# Patient Record
Sex: FEMALE | Race: WHITE | HISPANIC OR LATINO | Employment: OTHER | URBAN - METROPOLITAN AREA
[De-identification: names, ages, dates, MRNs, and addresses within clinical notes are randomized per-mention and may not be internally consistent; named-entity substitution may affect disease eponyms.]

---

## 2017-01-03 RX ORDER — OMEPRAZOLE 20 MG/1
CAPSULE, DELAYED RELEASE ORAL
Qty: 180 CAPSULE | Refills: 0 | OUTPATIENT
Start: 2017-01-03

## 2017-01-10 ENCOUNTER — OFFICE VISIT (OUTPATIENT)
Dept: FAMILY MEDICINE CLINIC | Facility: CLINIC | Age: 67
End: 2017-01-10

## 2017-01-10 VITALS
TEMPERATURE: 98.6 F | DIASTOLIC BLOOD PRESSURE: 74 MMHG | SYSTOLIC BLOOD PRESSURE: 122 MMHG | BODY MASS INDEX: 34.6 KG/M2 | HEIGHT: 62 IN | OXYGEN SATURATION: 95 % | WEIGHT: 188 LBS | HEART RATE: 62 BPM

## 2017-01-10 DIAGNOSIS — J01.00 ACUTE MAXILLARY SINUSITIS, RECURRENCE NOT SPECIFIED: Primary | ICD-10-CM

## 2017-01-10 PROCEDURE — 99213 OFFICE O/P EST LOW 20 MIN: CPT | Performed by: NURSE PRACTITIONER

## 2017-01-10 PROCEDURE — 96372 THER/PROPH/DIAG INJ SC/IM: CPT | Performed by: NURSE PRACTITIONER

## 2017-01-10 RX ORDER — METOPROLOL TARTRATE 50 MG/1
TABLET, FILM COATED ORAL
Qty: 180 TABLET | Refills: 0 | Status: SHIPPED | OUTPATIENT
Start: 2017-01-10 | End: 2017-04-08 | Stop reason: SDUPTHER

## 2017-01-10 RX ORDER — CIPROFLOXACIN 500 MG/1
500 TABLET, FILM COATED ORAL 2 TIMES DAILY
Qty: 14 TABLET | Refills: 0 | Status: SHIPPED | OUTPATIENT
Start: 2017-01-10 | End: 2017-02-14 | Stop reason: HOSPADM

## 2017-01-10 RX ADMIN — CYANOCOBALAMIN 1000 MCG: 1000 INJECTION, SOLUTION INTRAMUSCULAR; SUBCUTANEOUS at 12:25

## 2017-01-10 NOTE — PROGRESS NOTES
"Subjective   Dalila Valerio is a 66 y.o. female who presents c/o cough, congestion, sinus pressure, pain on left side of face x 3 weeks.     History of Present Illness   L facial and frontal pressure x 3 weeks, teeth not pain, avoiding chewing on this side, though due for skin graft to L lower gums. Has been using clove oil for sinus pressure.   The following portions of the patient's history were reviewed and updated as appropriate: allergies, current medications, past family history, past medical history, past social history, past surgical history and problem list.    Review of Systems   Constitutional: Negative for chills and fever.   HENT: Positive for congestion, ear pain, sinus pressure and sore throat. Negative for ear discharge, facial swelling, hearing loss, nosebleeds, rhinorrhea, sneezing and trouble swallowing.    Respiratory: Positive for cough and wheezing. Negative for shortness of breath.    Cardiovascular: Negative.  Negative for chest pain.   Gastrointestinal: Negative for abdominal pain.   Endocrine: Negative.    Musculoskeletal: Negative for arthralgias.   Allergic/Immunologic: Negative for environmental allergies.     Visit Vitals   • /74   • Pulse 62   • Temp 98.6 °F (37 °C) (Oral)   • Ht 62\" (157.5 cm)   • Wt 188 lb (85.3 kg)   • SpO2 95%   • BMI 34.39 kg/m2     Objective   Physical Exam   Constitutional: She is oriented to person, place, and time. She appears well-developed and well-nourished.   HENT:   Right Ear: Tympanic membrane is retracted. Tympanic membrane is not erythematous.   Left Ear: Tympanic membrane is erythematous. Tympanic membrane is not retracted and not bulging.   Nose: Mucosal edema present. No rhinorrhea. Right sinus exhibits maxillary sinus tenderness. Left sinus exhibits maxillary sinus tenderness.   Mouth/Throat: Abnormal dentition. Posterior oropharyngeal erythema present. No oropharyngeal exudate or posterior oropharyngeal edema.       Neck: Normal range of " motion. Neck supple. No thyromegaly present.   Cardiovascular: Normal rate, regular rhythm and normal heart sounds.    Pulmonary/Chest: Effort normal and breath sounds normal.   Neurological: She is alert and oriented to person, place, and time.   Skin: Skin is warm and dry.   Psychiatric: She has a normal mood and affect. Her behavior is normal. Judgment and thought content normal.     Assessment/Plan   Problems Addressed this Visit     None      Visit Diagnoses     Acute maxillary sinusitis, recurrence not specified    -  Primary        Seen for vertigo 12/23 and lisinopril decreased to 5 mg daily, doing better with this, but facial pressure worse. Cover for sinusitis. Continue work with pain management and cardiology.     Lactose free milk helping with chronic cough. Would continue for now. Fu prn and every 3-4 months for routine.

## 2017-01-10 NOTE — MR AVS SNAPSHOT
Dalila Valerio   1/10/2017 11:45 AM   Office Visit    Provider:  RL Michael   Department:  [facility]   Dept Phone:  [phone]                Your Full Care Plan              Today's Medication Changes          These changes are accurate as of: 1/10/17 12:55 PM.  If you have any questions, ask your nurse or doctor.               New Medication(s)Ordered:     ciprofloxacin 500 MG tablet   Commonly known as:  CIPRO   Take 1 tablet by mouth 2 (Two) Times a Day.   Started by:  RL Michael            Where to Get Your Medications      These medications were sent to [facility] [address] [place] [address] - [address] AT [facility] [address] - [phone]  - [phone]   [address] , [facility] [address]     Phone:  [phone]     ciprofloxacin 500 MG tablet    metoprolol tartrate 50 MG tablet                  Your Updated Medication List          This list is accurate as of: 1/10/17 12:55 PM.  Always use your most recent med list.                albuterol 108 (90 BASE) MCG/ACT inhaler   Commonly known as:  PROAIR HFA   INHALE 1 TO 2 PUFFS EVERY 4 TO 6 HOURS AS NEEDED       ALPRAZolam 0.25 MG tablet   Commonly known as:  XANAX   Take 1 tablet by mouth daily as needed for anxiety. Don't take more than written; on narcotic from pain management, risk of overdose       aspirin 325 MG tablet       atorvastatin 80 MG tablet   Commonly known as:  LIPITOR       BENADRYL 25 mg capsule   Generic drug:  diphenhydrAMINE       budesonide-formoterol 160-4.5 MCG/ACT inhaler   Commonly known as:  SYMBICORT   Inhale 2 puffs 2 (Two) Times a Day. Rinse mouth after use       ciprofloxacin 500 MG tablet   Commonly known as:  CIPRO   Take 1 tablet by mouth 2 (Two) Times a Day.       citalopram 20 MG tablet   Commonly known as:  CeleXA   TAKE 1 TABLET BY MOUTH EVERY DAY AS DIRECTED       cyclobenzaprine 10 MG tablet  "  Commonly known as:  FLEXERIL       EFFIENT 10 MG tablet   Generic drug:  prasugrel       FREESTYLE LITE test strip   Generic drug:  glucose blood   TEST UP TO 5 TIMES DAILY FOR LOW BLOOD SUGARS AND SYMPTOMATIC HYPOGLYCEMIA       gabapentin 600 MG tablet   Commonly known as:  NEURONTIN       HYDROcodone-acetaminophen  MG per tablet   Commonly known as:  NORCO       Insulin Syringe 29G X 1\" 0.3 ML misc   1 each daily with dinner.       lisinopril 5 MG tablet   Commonly known as:  PRINIVIL,ZESTRIL   Take 1 tablet by mouth Daily.       Melatonin 5 MG tablet dispersible       metoprolol tartrate 50 MG tablet   Commonly known as:  LOPRESSOR   TAKE 1 TABLET BY MOUTH EVERY 12 HOURS       NITROSTAT 0.4 MG SL tablet   Generic drug:  nitroglycerin       omeprazole 20 MG capsule   Commonly known as:  priLOSEC   TAKE 1 CAPSULE BY MOUTH TWICE DAILY       pioglitazone 15 MG tablet   Commonly known as:  ACTOS   TAKE 1 TABLET BY MOUTH DAILY       PREMARIN 0.625 MG/GM vaginal cream   Generic drug:  conjugated estrogens       TUMS 500 MG chewable tablet   Generic drug:  calcium carbonate       vitamin D 50838 UNITS capsule capsule   Commonly known as:  ERGOCALCIFEROL   Take one capsule by mouth 3 times weekly       vitamin D3 5000 UNITS capsule capsule   Take 1 capsule by mouth daily.               You Were Diagnosed With        Codes Comments    Acute maxillary sinusitis, recurrence not specified    -  Primary ICD-10-CM: J01.00  ICD-9-CM: 461.0       Medications to be Given to You by a Medical Professional     Due       Frequency    1/23/2017 cyanocobalamin injection 1,000 mcg  Every 28 Days      Instructions     None    Patient Instructions History      [facility] Sign[facility]     Our records indicate that you have an active [employer] account.    You can view your After Visit Summary by going to [employer] and logging in with your [employer] username and password.  If you don't have a [employer] username and " "password but a parent or guardian has access to your record, the parent or guardian should login with their own [employer] username and password and access your record to view the After Visit Summary.    If you have questions, you can email [email or web address] or call [phone] to talk to our [employer] staff.  Remember, [employer] is NOT to be used for urgent needs.  For medical emergencies, dial 911.               Other Info from Your Visit           Allergies     Carafate [Sucralfate]  GI Intolerance    Duloxetine Hcl      Other reaction(s): Hallucinations    Morphine And Related      Nitroglycerin      Sensitive, causes headaches    Nsaids      Oxycodone      Penicillins      Statins        Reason for Visit     URI           Vital Signs     Blood Pressure Pulse Temperature Height Weight Oxygen Saturation    122/74 62 98.6 °F (37 °C) (Oral) 62\" (157.5 cm) 188 lb (85.3 kg) 95%    Body Mass Index Smoking Status                34.39 kg/m2 Former Smoker          Problems and Diagnoses Noted     Acute maxillary sinusitis    -  Primary      Medications Administered     cyanocobalamin injection 1,000 mcg                    "

## 2017-01-17 ENCOUNTER — TELEPHONE (OUTPATIENT)
Dept: FAMILY MEDICINE CLINIC | Facility: CLINIC | Age: 67
End: 2017-01-17

## 2017-01-17 NOTE — TELEPHONE ENCOUNTER
Pt went to [facility] ER with left hip pain. She didn't get any imaging done but was given Valium and Diluadid while there.  Today the pain is radiating from waistline into her buttocks. She has appt with Dr. Luis Enrique Santacruz on 2/14/17 and is going to try to get this moved up sooner but wants to know what she should do about pain until then. Pt wants some imaging done.

## 2017-01-24 ENCOUNTER — TELEPHONE (OUTPATIENT)
Dept: FAMILY MEDICINE CLINIC | Facility: CLINIC | Age: 67
End: 2017-01-24

## 2017-01-24 RX ORDER — OMEPRAZOLE 40 MG/1
40 CAPSULE, DELAYED RELEASE ORAL 2 TIMES DAILY
Qty: 60 CAPSULE | Refills: 3 | Status: SHIPPED | OUTPATIENT
Start: 2017-01-24 | End: 2017-01-24 | Stop reason: SDUPTHER

## 2017-01-24 RX ORDER — OMEPRAZOLE 40 MG/1
CAPSULE, DELAYED RELEASE ORAL
Qty: 180 CAPSULE | Refills: 0 | Status: SHIPPED | OUTPATIENT
Start: 2017-01-24 | End: 2017-02-01 | Stop reason: SDUPTHER

## 2017-01-24 NOTE — TELEPHONE ENCOUNTER
Pt just wanted you to know that her gabapentin has been increased to 800 mg TID.  Pt has appt with Dr. Wilson's next week and Dr. Santacruz is the one that ordered the MRI. Pt is waiting to here back on when she is to have the MRI done.

## 2017-02-01 RX ORDER — OMEPRAZOLE 40 MG/1
40 CAPSULE, DELAYED RELEASE ORAL 2 TIMES DAILY
Qty: 180 CAPSULE | Refills: 0 | Status: SHIPPED | OUTPATIENT
Start: 2017-02-01 | End: 2017-08-01 | Stop reason: SDUPTHER

## 2017-02-04 DIAGNOSIS — E11.649 HYPOGLYCEMIA ASSOCIATED WITH TYPE 2 DIABETES MELLITUS (HCC): ICD-10-CM

## 2017-02-04 DIAGNOSIS — IMO0002 UNCONTROLLED DIABETES MELLITUS: ICD-10-CM

## 2017-02-06 DIAGNOSIS — IMO0002 UNCONTROLLED DIABETES MELLITUS: ICD-10-CM

## 2017-02-06 DIAGNOSIS — E11.649 HYPOGLYCEMIA ASSOCIATED WITH TYPE 2 DIABETES MELLITUS (HCC): ICD-10-CM

## 2017-02-06 RX ORDER — ERGOCALCIFEROL 1.25 MG/1
CAPSULE ORAL
Qty: 36 CAPSULE | Refills: 0 | Status: SHIPPED | OUTPATIENT
Start: 2017-02-06 | End: 2017-03-08

## 2017-02-06 RX ORDER — BLOOD-GLUCOSE METER
KIT MISCELLANEOUS
Qty: 150 EACH | Refills: 3 | Status: SHIPPED | OUTPATIENT
Start: 2017-02-06 | End: 2017-02-06 | Stop reason: SDUPTHER

## 2017-02-06 RX ORDER — BLOOD-GLUCOSE METER
KIT MISCELLANEOUS
Qty: 450 EACH | Refills: 3 | Status: SHIPPED | OUTPATIENT
Start: 2017-02-06 | End: 2019-02-26 | Stop reason: SDUPTHER

## 2017-02-07 ENCOUNTER — TELEPHONE (OUTPATIENT)
Dept: FAMILY MEDICINE CLINIC | Facility: CLINIC | Age: 67
End: 2017-02-07

## 2017-02-09 ENCOUNTER — TELEPHONE (OUTPATIENT)
Dept: FAMILY MEDICINE CLINIC | Facility: CLINIC | Age: 67
End: 2017-02-09

## 2017-02-14 ENCOUNTER — OFFICE VISIT (OUTPATIENT)
Dept: FAMILY MEDICINE CLINIC | Facility: CLINIC | Age: 67
End: 2017-02-14

## 2017-02-14 VITALS
HEART RATE: 59 BPM | OXYGEN SATURATION: 98 % | DIASTOLIC BLOOD PRESSURE: 68 MMHG | HEIGHT: 62 IN | SYSTOLIC BLOOD PRESSURE: 102 MMHG | WEIGHT: 186 LBS | TEMPERATURE: 98.6 F | BODY MASS INDEX: 34.23 KG/M2

## 2017-02-14 DIAGNOSIS — M71.332 OTHER BURSAL CYST OF WRIST, LEFT: ICD-10-CM

## 2017-02-14 DIAGNOSIS — M18.12 PRIMARY OSTEOARTHRITIS OF FIRST CARPOMETACARPAL JOINT OF LEFT HAND: ICD-10-CM

## 2017-02-14 DIAGNOSIS — K05.10 GINGIVITIS: Primary | ICD-10-CM

## 2017-02-14 PROCEDURE — 99213 OFFICE O/P EST LOW 20 MIN: CPT | Performed by: NURSE PRACTITIONER

## 2017-02-14 RX ORDER — CLINDAMYCIN HYDROCHLORIDE 300 MG/1
CAPSULE ORAL
COMMUNITY
Start: 2017-02-13 | End: 2017-03-08

## 2017-02-14 RX ORDER — DIAZEPAM 5 MG/1
TABLET ORAL
Refills: 0 | COMMUNITY
Start: 2017-01-17 | End: 2017-10-31

## 2017-02-14 RX ORDER — GABAPENTIN 800 MG/1
TABLET ORAL
Refills: 3 | COMMUNITY
Start: 2017-01-18 | End: 2017-04-28 | Stop reason: SDUPTHER

## 2017-03-07 DIAGNOSIS — IMO0002 UNCONTROLLED DIABETES MELLITUS: ICD-10-CM

## 2017-03-07 RX ORDER — PIOGLITAZONEHYDROCHLORIDE 15 MG/1
15 TABLET ORAL DAILY
Qty: 90 TABLET | Refills: 0 | Status: SHIPPED | OUTPATIENT
Start: 2017-03-07 | End: 2017-06-03 | Stop reason: SDUPTHER

## 2017-03-08 ENCOUNTER — OFFICE VISIT (OUTPATIENT)
Dept: FAMILY MEDICINE CLINIC | Facility: CLINIC | Age: 67
End: 2017-03-08

## 2017-03-08 VITALS
DIASTOLIC BLOOD PRESSURE: 80 MMHG | HEIGHT: 62 IN | SYSTOLIC BLOOD PRESSURE: 124 MMHG | HEART RATE: 78 BPM | WEIGHT: 188 LBS | BODY MASS INDEX: 34.6 KG/M2 | TEMPERATURE: 98.4 F | OXYGEN SATURATION: 95 %

## 2017-03-08 DIAGNOSIS — E55.9 VITAMIN D DEFICIENCY: Primary | ICD-10-CM

## 2017-03-08 DIAGNOSIS — J40 BRONCHITIS: ICD-10-CM

## 2017-03-08 DIAGNOSIS — E11.8 CONTROLLED TYPE 2 DIABETES MELLITUS WITH COMPLICATION, WITHOUT LONG-TERM CURRENT USE OF INSULIN (HCC): ICD-10-CM

## 2017-03-08 DIAGNOSIS — E53.8 COBALAMIN DEFICIENCY: ICD-10-CM

## 2017-03-08 PROCEDURE — 99213 OFFICE O/P EST LOW 20 MIN: CPT | Performed by: NURSE PRACTITIONER

## 2017-03-08 PROCEDURE — 96372 THER/PROPH/DIAG INJ SC/IM: CPT | Performed by: NURSE PRACTITIONER

## 2017-03-08 RX ORDER — AZITHROMYCIN 250 MG/1
TABLET, FILM COATED ORAL
Qty: 6 TABLET | Refills: 0 | Status: SHIPPED | OUTPATIENT
Start: 2017-03-08 | End: 2017-04-28

## 2017-03-08 RX ORDER — LISINOPRIL 10 MG/1
10 TABLET ORAL DAILY
COMMUNITY
Start: 2017-03-07 | End: 2019-07-31 | Stop reason: SDUPTHER

## 2017-03-08 RX ORDER — CYANOCOBALAMIN 1000 UG/ML
1000 INJECTION, SOLUTION INTRAMUSCULAR; SUBCUTANEOUS
Status: DISCONTINUED | OUTPATIENT
Start: 2017-03-08 | End: 2017-12-22

## 2017-03-08 RX ADMIN — CYANOCOBALAMIN 1000 MCG: 1000 INJECTION, SOLUTION INTRAMUSCULAR; SUBCUTANEOUS at 14:31

## 2017-03-08 NOTE — PROGRESS NOTES
"Subjective   Dalila Valerio is a 66 y.o. female who presents c/o productive cough x 2 weeks. Would like to discuss increase in Actos. Had epidural 2/7/17 and thinks it is causing increase in glucose. levels.     History of Present Illness   Cough productive thinks in bronchioles. symbicort is helping to prevent any worsening. Also having sinus drainage as well. No ST. Taking allergy pill as increased sinus drainage of late.     Epidural has helped 80% of pain, but blood sugar can get 180 or higher after eating. Lowest lately 140s. No low blood sugar in quite some time. Has not noticed blood sugar trending back down in the last week.   The following portions of the patient's history were reviewed and updated as appropriate: allergies, current medications, past family history, past medical history, past social history, past surgical history and problem list.    Review of Systems   Constitutional: Negative for chills and fever.   HENT: Positive for congestion, postnasal drip and rhinorrhea. Negative for ear pain, sinus pressure and sore throat.    Eyes: Negative.    Respiratory: Negative.    Cardiovascular: Negative.    Gastrointestinal: Negative.    Endocrine: Negative.    Allergic/Immunologic: Positive for environmental allergies.     Visit Vitals   • /80   • Pulse 78   • Temp 98.4 °F (36.9 °C) (Oral)   • Ht 62\" (157.5 cm)   • Wt 188 lb (85.3 kg)   • SpO2 95%   • BMI 34.39 kg/m2     Objective   Physical Exam   Constitutional: She appears well-developed and well-nourished.   HENT:   Right Ear: Tympanic membrane normal.   Left Ear: Tympanic membrane is retracted. A middle ear effusion is present.   Nose: Rhinorrhea present.   Mouth/Throat: Abnormal dentition.   Cardiovascular: Normal rate, regular rhythm and normal heart sounds.    Pulmonary/Chest: Effort normal and breath sounds normal.   Skin: Skin is warm and dry.   Nursing note and vitals reviewed.    Assessment/Plan   Problems Addressed this Visit        " Digestive    Cobalamin deficiency    Relevant Medications    cyanocobalamin injection 1,000 mcg    Vitamin D deficiency - Primary    Relevant Orders    Vitamin D 25 Hydroxy       Other    Controlled diabetes mellitus type 2 with complications    Relevant Orders    Hemoglobin A1c      Other Visit Diagnoses     Bronchitis        Relevant Medications    azithromycin (ZITHROMAX) 250 MG tablet        Cautious with increasing actos as history of severe lows. Hold changes for now. Changes based on A1c. As history of MAC, reasonable for atypical antibiotic coverage. Continue allergy symptom control. FU if not settling 1-2 weeks.

## 2017-03-09 LAB
25(OH)D3+25(OH)D2 SERPL-MCNC: 76.3 NG/ML (ref 30–100)
HBA1C MFR BLD: 6.9 % (ref 4.8–5.6)

## 2017-03-10 ENCOUNTER — TELEPHONE (OUTPATIENT)
Dept: FAMILY MEDICINE CLINIC | Facility: CLINIC | Age: 67
End: 2017-03-10

## 2017-03-10 NOTE — TELEPHONE ENCOUNTER
----- Message from RL Michael sent at 3/9/2017 12:53 PM EST -----  Normal lab results A1c 6.9, would not increase pioglitazone for now. Decrease vitamin D to 2000 IU daily as now well replaced.

## 2017-03-17 RX ORDER — CITALOPRAM 20 MG/1
TABLET ORAL
Qty: 90 TABLET | Refills: 0 | Status: SHIPPED | OUTPATIENT
Start: 2017-03-17 | End: 2017-06-12 | Stop reason: SDUPTHER

## 2017-04-10 RX ORDER — METOPROLOL TARTRATE 50 MG/1
TABLET, FILM COATED ORAL
Qty: 180 TABLET | Refills: 0 | Status: SHIPPED | OUTPATIENT
Start: 2017-04-10 | End: 2017-07-12 | Stop reason: SDUPTHER

## 2017-04-24 ENCOUNTER — TELEPHONE (OUTPATIENT)
Dept: FAMILY MEDICINE CLINIC | Facility: CLINIC | Age: 67
End: 2017-04-24

## 2017-04-24 DIAGNOSIS — Z12.39 SCREENING FOR BREAST CANCER: Primary | ICD-10-CM

## 2017-04-24 DIAGNOSIS — Z12.31 ENCOUNTER FOR SCREENING MAMMOGRAM FOR BREAST CANCER: ICD-10-CM

## 2017-04-28 ENCOUNTER — OFFICE VISIT (OUTPATIENT)
Dept: FAMILY MEDICINE CLINIC | Facility: CLINIC | Age: 67
End: 2017-04-28

## 2017-04-28 ENCOUNTER — TELEPHONE (OUTPATIENT)
Dept: FAMILY MEDICINE CLINIC | Facility: CLINIC | Age: 67
End: 2017-04-28

## 2017-04-28 VITALS
SYSTOLIC BLOOD PRESSURE: 126 MMHG | TEMPERATURE: 98.4 F | DIASTOLIC BLOOD PRESSURE: 74 MMHG | HEIGHT: 62 IN | HEART RATE: 63 BPM | OXYGEN SATURATION: 95 % | WEIGHT: 186 LBS | BODY MASS INDEX: 34.23 KG/M2

## 2017-04-28 DIAGNOSIS — M25.442 SWELLING OF HAND JOINT, LEFT: Primary | ICD-10-CM

## 2017-04-28 PROCEDURE — 73130 X-RAY EXAM OF HAND: CPT | Performed by: NURSE PRACTITIONER

## 2017-04-28 PROCEDURE — 99213 OFFICE O/P EST LOW 20 MIN: CPT | Performed by: NURSE PRACTITIONER

## 2017-04-28 RX ORDER — GABAPENTIN 800 MG/1
800 TABLET ORAL 3 TIMES DAILY
Qty: 90 TABLET | Refills: 3 | Status: SHIPPED | OUTPATIENT
Start: 2017-04-28 | End: 2017-10-30 | Stop reason: SDUPTHER

## 2017-04-28 NOTE — TELEPHONE ENCOUNTER
----- Message from RL Michael sent at 4/28/2017  1:37 PM EDT -----  Doppler negative for vein problem
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
## 2017-05-11 RX ORDER — ERGOCALCIFEROL 1.25 MG/1
CAPSULE ORAL
Qty: 36 CAPSULE | Refills: 0 | OUTPATIENT
Start: 2017-05-11

## 2017-05-17 RX ORDER — ERGOCALCIFEROL 1.25 MG/1
CAPSULE ORAL
Qty: 36 CAPSULE | Refills: 0 | OUTPATIENT
Start: 2017-05-17

## 2017-05-17 RX ORDER — ATORVASTATIN CALCIUM 80 MG/1
TABLET, FILM COATED ORAL
Qty: 90 TABLET | Refills: 0 | Status: SHIPPED | OUTPATIENT
Start: 2017-05-17 | End: 2017-08-10 | Stop reason: SDUPTHER

## 2017-06-01 ENCOUNTER — TELEPHONE (OUTPATIENT)
Dept: FAMILY MEDICINE CLINIC | Facility: CLINIC | Age: 67
End: 2017-06-01

## 2017-06-03 DIAGNOSIS — IMO0002 UNCONTROLLED DIABETES MELLITUS: ICD-10-CM

## 2017-06-05 RX ORDER — PIOGLITAZONEHYDROCHLORIDE 15 MG/1
TABLET ORAL
Qty: 90 TABLET | Refills: 0 | Status: SHIPPED | OUTPATIENT
Start: 2017-06-05 | End: 2017-09-09 | Stop reason: SDUPTHER

## 2017-06-06 ENCOUNTER — CLINICAL SUPPORT (OUTPATIENT)
Dept: FAMILY MEDICINE CLINIC | Facility: CLINIC | Age: 67
End: 2017-06-06

## 2017-06-06 DIAGNOSIS — E53.8 COBALAMIN DEFICIENCY: ICD-10-CM

## 2017-06-06 PROCEDURE — 96372 THER/PROPH/DIAG INJ SC/IM: CPT | Performed by: NURSE PRACTITIONER

## 2017-06-06 RX ADMIN — CYANOCOBALAMIN 1000 MCG: 1000 INJECTION, SOLUTION INTRAMUSCULAR; SUBCUTANEOUS at 14:57

## 2017-06-12 RX ORDER — CITALOPRAM 20 MG/1
TABLET ORAL
Qty: 90 TABLET | Refills: 0 | Status: SHIPPED | OUTPATIENT
Start: 2017-06-12 | End: 2017-09-21 | Stop reason: SDUPTHER

## 2017-06-19 ENCOUNTER — OFFICE VISIT (OUTPATIENT)
Dept: FAMILY MEDICINE CLINIC | Facility: CLINIC | Age: 67
End: 2017-06-19

## 2017-06-19 VITALS
BODY MASS INDEX: 33.49 KG/M2 | HEART RATE: 74 BPM | HEIGHT: 62 IN | SYSTOLIC BLOOD PRESSURE: 118 MMHG | OXYGEN SATURATION: 93 % | TEMPERATURE: 99.9 F | WEIGHT: 182 LBS | DIASTOLIC BLOOD PRESSURE: 72 MMHG

## 2017-06-19 DIAGNOSIS — N39.0 URINARY TRACT INFECTION, SITE UNSPECIFIED: Primary | ICD-10-CM

## 2017-06-19 LAB
BILIRUB BLD-MCNC: NEGATIVE MG/DL
CLARITY, POC: CLEAR
COLOR UR: YELLOW
GLUCOSE UR STRIP-MCNC: NEGATIVE MG/DL
KETONES UR QL: NEGATIVE
LEUKOCYTE EST, POC: ABNORMAL
NITRITE UR-MCNC: NEGATIVE MG/ML
PH UR: 5.5 [PH] (ref 5–8)
PROT UR STRIP-MCNC: NEGATIVE MG/DL
RBC # UR STRIP: ABNORMAL /UL
SP GR UR: 1.02 (ref 1–1.03)
UROBILINOGEN UR QL: NORMAL

## 2017-06-19 PROCEDURE — 81003 URINALYSIS AUTO W/O SCOPE: CPT | Performed by: NURSE PRACTITIONER

## 2017-06-19 PROCEDURE — 99213 OFFICE O/P EST LOW 20 MIN: CPT | Performed by: NURSE PRACTITIONER

## 2017-06-19 RX ORDER — SULFAMETHOXAZOLE AND TRIMETHOPRIM 800; 160 MG/1; MG/1
1 TABLET ORAL 2 TIMES DAILY
Qty: 20 TABLET | Refills: 0 | Status: SHIPPED | OUTPATIENT
Start: 2017-06-19 | End: 2017-10-31

## 2017-06-19 NOTE — PROGRESS NOTES
Subjective   Dalila Valerio is a 66 y.o. female. Trouble urinating with night sweats. Hot flashes. She states last time this happened she had a bladder infection. She started feeling bad about one week ago. She does have some dysuria, frequency and lower abdominal pain around where the ovaries would be.  Denies fever or chills. Just sweating.     History of Present Illness     The following portions of the patient's history were reviewed and updated as appropriate: allergies, current medications, past medical history, past social history and problem list.    Review of Systems   Constitutional: Negative.    Respiratory: Negative.    Cardiovascular: Negative.    Genitourinary: Positive for dysuria, frequency and pelvic pain.       Objective   Physical Exam   Constitutional: She appears well-developed and well-nourished.   Cardiovascular: Normal rate, regular rhythm, normal heart sounds and intact distal pulses.    Pulmonary/Chest: Effort normal and breath sounds normal.   Abdominal: Soft. Normal appearance and bowel sounds are normal. There is tenderness in the suprapubic area. There is no CVA tenderness.   Nursing note and vitals reviewed.    Vitals:    06/19/17 1415   BP: 118/72   Pulse: 74   Temp: 99.9 °F (37.7 °C)   SpO2: 93%     Results for orders placed or performed in visit on 06/19/17   POCT urinalysis dipstick, automated   Result Value Ref Range    Color Yellow Yellow, Straw, Dark Yellow, Ruth    Clarity, UA Clear Clear    Glucose, UA Negative Negative, 1000 mg/dL (3+) mg/dL    Bilirubin Negative Negative    Ketones, UA Negative Negative    Specific Gravity  1.025 1.005 - 1.030    Blood, UA Trace (A) Negative    pH, Urine 5.5 5.0 - 8.0    Protein, POC Negative Negative mg/dL    Urobilinogen, UA Normal Normal    Leukocytes Small (1+) (A) Negative    Nitrite, UA Negative Negative       Assessment/Plan   Diagnoses and all orders for this visit:    Urinary tract infection, site unspecified  -     POCT  urinalysis dipstick, automated  -     sulfamethoxazole-trimethoprim (BACTRIM DS,SEPTRA DS) 800-160 MG per tablet; Take 1 tablet by mouth 2 (Two) Times a Day.  -     Urine Culture    Will treat with Sulfa pending culture results.   Tips for preventing UTI's: Drink lots of water, less caffeine, always wipe front to back, limit bubble baths, urinate after intercourse, and wear cotton panties.  RTC if not improved

## 2017-06-21 ENCOUNTER — TELEPHONE (OUTPATIENT)
Dept: FAMILY MEDICINE CLINIC | Facility: CLINIC | Age: 67
End: 2017-06-21

## 2017-06-21 LAB
BACTERIA UR CULT: NORMAL
BACTERIA UR CULT: NORMAL

## 2017-06-21 RX ORDER — LANCETS
EACH MISCELLANEOUS
Qty: 408 EACH | Refills: 0 | Status: SHIPPED | OUTPATIENT
Start: 2017-06-21 | End: 2017-10-30 | Stop reason: SDUPTHER

## 2017-06-21 NOTE — TELEPHONE ENCOUNTER
----- Message from RL Machado sent at 6/21/2017  1:43 PM EDT -----  Urine culture shows no growth. Does not have to finish antibiotics.

## 2017-07-10 RX ORDER — BUDESONIDE AND FORMOTEROL FUMARATE DIHYDRATE 160; 4.5 UG/1; UG/1
AEROSOL RESPIRATORY (INHALATION)
Qty: 10.2 G | Refills: 2 | Status: SHIPPED | OUTPATIENT
Start: 2017-07-10 | End: 2017-10-09 | Stop reason: SDUPTHER

## 2017-07-12 RX ORDER — OMEPRAZOLE 40 MG/1
CAPSULE, DELAYED RELEASE ORAL
Qty: 180 CAPSULE | Refills: 0 | OUTPATIENT
Start: 2017-07-12

## 2017-07-12 RX ORDER — METOPROLOL TARTRATE 50 MG/1
TABLET, FILM COATED ORAL
Qty: 180 TABLET | Refills: 0 | Status: SHIPPED | OUTPATIENT
Start: 2017-07-12 | End: 2017-11-13 | Stop reason: SDUPTHER

## 2017-07-26 RX ORDER — OMEPRAZOLE 40 MG/1
CAPSULE, DELAYED RELEASE ORAL
Qty: 180 CAPSULE | Refills: 0 | OUTPATIENT
Start: 2017-07-26

## 2017-08-02 ENCOUNTER — TELEPHONE (OUTPATIENT)
Dept: FAMILY MEDICINE CLINIC | Facility: CLINIC | Age: 67
End: 2017-08-02

## 2017-08-02 RX ORDER — OMEPRAZOLE 40 MG/1
CAPSULE, DELAYED RELEASE ORAL
Qty: 180 CAPSULE | Refills: 0 | Status: SHIPPED | OUTPATIENT
Start: 2017-08-02 | End: 2017-10-29 | Stop reason: SDUPTHER

## 2017-08-10 RX ORDER — ATORVASTATIN CALCIUM 80 MG/1
TABLET, FILM COATED ORAL
Qty: 90 TABLET | Refills: 0 | Status: SHIPPED | OUTPATIENT
Start: 2017-08-10 | End: 2017-11-05 | Stop reason: SDUPTHER

## 2017-09-09 DIAGNOSIS — IMO0002 UNCONTROLLED DIABETES MELLITUS: ICD-10-CM

## 2017-09-11 RX ORDER — PIOGLITAZONEHYDROCHLORIDE 15 MG/1
TABLET ORAL
Qty: 90 TABLET | Refills: 0 | Status: SHIPPED | OUTPATIENT
Start: 2017-09-11 | End: 2017-12-09 | Stop reason: SDUPTHER

## 2017-09-21 RX ORDER — CITALOPRAM 20 MG/1
TABLET ORAL
Qty: 90 TABLET | Refills: 0 | Status: SHIPPED | OUTPATIENT
Start: 2017-09-21 | End: 2017-12-25 | Stop reason: SDUPTHER

## 2017-10-09 RX ORDER — BUDESONIDE AND FORMOTEROL FUMARATE DIHYDRATE 160; 4.5 UG/1; UG/1
AEROSOL RESPIRATORY (INHALATION)
Qty: 10.2 G | Refills: 1 | Status: SHIPPED | OUTPATIENT
Start: 2017-10-09 | End: 2017-12-08 | Stop reason: SDUPTHER

## 2017-10-30 DIAGNOSIS — M25.442 SWELLING OF HAND JOINT, LEFT: ICD-10-CM

## 2017-10-30 RX ORDER — OMEPRAZOLE 40 MG/1
CAPSULE, DELAYED RELEASE ORAL
Qty: 180 CAPSULE | Refills: 0 | Status: SHIPPED | OUTPATIENT
Start: 2017-10-30 | End: 2018-01-27 | Stop reason: SDUPTHER

## 2017-10-31 ENCOUNTER — OFFICE VISIT (OUTPATIENT)
Dept: FAMILY MEDICINE CLINIC | Facility: CLINIC | Age: 67
End: 2017-10-31

## 2017-10-31 VITALS
HEART RATE: 62 BPM | TEMPERATURE: 98.7 F | BODY MASS INDEX: 33.86 KG/M2 | SYSTOLIC BLOOD PRESSURE: 128 MMHG | WEIGHT: 184 LBS | DIASTOLIC BLOOD PRESSURE: 74 MMHG | OXYGEN SATURATION: 98 % | HEIGHT: 62 IN

## 2017-10-31 DIAGNOSIS — E78.5 DYSLIPIDEMIA: ICD-10-CM

## 2017-10-31 DIAGNOSIS — Z23 NEED FOR IMMUNIZATION AGAINST INFLUENZA: ICD-10-CM

## 2017-10-31 DIAGNOSIS — G47.00 INSOMNIA, UNSPECIFIED TYPE: ICD-10-CM

## 2017-10-31 DIAGNOSIS — E53.8 COBALAMIN DEFICIENCY: Primary | ICD-10-CM

## 2017-10-31 DIAGNOSIS — Z79.899 HIGH RISK MEDICATIONS (NOT ANTICOAGULANTS) LONG-TERM USE: ICD-10-CM

## 2017-10-31 DIAGNOSIS — E11.8 CONTROLLED TYPE 2 DIABETES MELLITUS WITH COMPLICATION, WITHOUT LONG-TERM CURRENT USE OF INSULIN (HCC): ICD-10-CM

## 2017-10-31 DIAGNOSIS — E55.9 VITAMIN D DEFICIENCY: ICD-10-CM

## 2017-10-31 PROCEDURE — 90662 IIV NO PRSV INCREASED AG IM: CPT | Performed by: NURSE PRACTITIONER

## 2017-10-31 PROCEDURE — 96372 THER/PROPH/DIAG INJ SC/IM: CPT | Performed by: NURSE PRACTITIONER

## 2017-10-31 PROCEDURE — G0008 ADMIN INFLUENZA VIRUS VAC: HCPCS | Performed by: NURSE PRACTITIONER

## 2017-10-31 PROCEDURE — 99214 OFFICE O/P EST MOD 30 MIN: CPT | Performed by: NURSE PRACTITIONER

## 2017-10-31 RX ORDER — AMANTADINE HYDROCHLORIDE 100 MG/1
TABLET ORAL
COMMUNITY
Start: 2017-10-27 | End: 2017-10-31

## 2017-10-31 RX ORDER — TRAZODONE HYDROCHLORIDE 50 MG/1
50 TABLET ORAL NIGHTLY
Qty: 30 TABLET | Refills: 0 | Status: SHIPPED | OUTPATIENT
Start: 2017-10-31 | End: 2017-10-31 | Stop reason: SDUPTHER

## 2017-10-31 RX ORDER — GABAPENTIN 800 MG/1
TABLET ORAL
Qty: 90 TABLET | Refills: 0 | Status: SHIPPED | OUTPATIENT
Start: 2017-10-31 | End: 2018-05-01 | Stop reason: SDUPTHER

## 2017-10-31 RX ORDER — LANCETS
EACH MISCELLANEOUS
Qty: 408 EACH | Refills: 0 | Status: SHIPPED | OUTPATIENT
Start: 2017-10-31 | End: 2017-11-06 | Stop reason: SDUPTHER

## 2017-10-31 RX ADMIN — CYANOCOBALAMIN 1000 MCG: 1000 INJECTION, SOLUTION INTRAMUSCULAR; SUBCUTANEOUS at 15:47

## 2017-10-31 NOTE — PROGRESS NOTES
"Subjective   Dalila Valerio is a 67 y.o. female who presents for a follow up after having a TIA on 10/28/17. Was also recently seen in ER for swelling of right leg and impacted bowels.     History of Present Illness   Recent TIA, on effient and ASA, told to follow up with neurology. No deficits noted now, at time had R facial droop and numbness, slurred speech.. Swelling of RLE has resolved. Has appointment with neurology next week.     Bowels are typically very regular, Had to be manually disimpacted. occ otc laxative. Discussed role of soft formed stools.      Can't sleep well, not napping during the day. Has trouble initiating and maintaining sleep. Feels not getting into deep sleep. Previously took otc melatonin, but not restful sleep. Feeling so tired thinks will start having panic attacks.     Has had cold, cough is settling, symbicort more regular use has seemed to help to manage.   The following portions of the patient's history were reviewed and updated as appropriate: allergies, current medications, past family history, past medical history, past social history, past surgical history and problem list.    Review of Systems   Constitutional: Negative for chills and fever.   HENT: Positive for congestion.    Respiratory: Positive for cough.    Cardiovascular: Negative.    Gastrointestinal: Positive for constipation, nausea and vomiting. Negative for anal bleeding and diarrhea.   Endocrine: Negative.    Genitourinary: Negative.    Musculoskeletal: Positive for arthralgias and back pain (Has TAVON scheduled next week. ).   Skin: Negative.    Allergic/Immunologic: Negative.    Neurological: Negative for dizziness, tremors, syncope, facial asymmetry, speech difficulty, weakness, light-headedness, numbness and headaches.   Psychiatric/Behavioral: Positive for dysphoric mood and sleep disturbance.     /74  Pulse 62  Temp 98.7 °F (37.1 °C) (Oral)   Ht 62\" (157.5 cm)  Wt 184 lb (83.5 kg)  SpO2 98%  BMI " 33.65 kg/m2    Objective   Physical Exam   Constitutional: She appears well-developed and well-nourished.   HENT:   Head: Normocephalic and atraumatic.   Neck: Normal range of motion. Neck supple. No thyromegaly present.   Cardiovascular: Normal rate, regular rhythm and normal heart sounds.    Pulses:       Carotid pulses are 2+ on the right side, and 2+ on the left side.  Pulmonary/Chest: Effort normal and breath sounds normal.   Abdominal: Soft. Bowel sounds are normal. There is no tenderness.   Lymphadenopathy:     She has no cervical adenopathy.   Skin: Skin is warm and dry. She is not diaphoretic.   Psychiatric: She has a normal mood and affect. Her behavior is normal. Judgment and thought content normal.   Nursing note and vitals reviewed.    Assessment/Plan   Problems Addressed this Visit        Digestive    Cobalamin deficiency - Primary    Relevant Orders    CBC & Differential    Vitamin B12    Vitamin D deficiency    Relevant Orders    Vitamin D 25 Hydroxy       Endocrine    Controlled diabetes mellitus type 2 with complications    Relevant Orders    Comprehensive Metabolic Panel    Microalbumin / Creatinine Urine Ratio - Urine, Clean Catch    Hemoglobin A1c       Other    Dyslipidemia    Relevant Orders    Lipid Panel      Other Visit Diagnoses     High risk medications (not anticoagulants) long-term use        Relevant Orders    Pain Management Profile (13 Drugs) Urine - Urine, Clean Catch    Insomnia, unspecified type        Relevant Medications    traZODone (DESYREL) 50 MG tablet    Need for immunization against influenza        Relevant Orders    Flu Vaccine High Dose PF 65YR+ (Completed)        Add fiber supplement to address bowels. Repeat colonoscopy 2019, had 2 small polyps in 2014.   ER for any recurrence of stroke symptoms.   Trial trazodone for sleep, FU 1 month if not settling.  Return for fasting labs regarding diabetes and lipids.

## 2017-11-01 RX ORDER — TRAZODONE HYDROCHLORIDE 50 MG/1
TABLET ORAL
Qty: 90 TABLET | Refills: 0 | Status: SHIPPED | OUTPATIENT
Start: 2017-11-01 | End: 2017-11-02

## 2017-11-02 ENCOUNTER — TELEPHONE (OUTPATIENT)
Dept: FAMILY MEDICINE CLINIC | Facility: CLINIC | Age: 67
End: 2017-11-02

## 2017-11-02 RX ORDER — HYDROXYZINE HYDROCHLORIDE 25 MG/1
25 TABLET, FILM COATED ORAL NIGHTLY
Qty: 30 TABLET | Refills: 0 | Status: SHIPPED | OUTPATIENT
Start: 2017-11-02 | End: 2018-12-10

## 2017-11-02 NOTE — TELEPHONE ENCOUNTER
Jose called about a possible drug-drug interaction between Trazodone and Metoprolol. There is a change of QT interval prolongation. Pharmacist recommends a trial of seroquel for sleep instead if you are agreeable.     Medicare has denied lancets to be used for testing blood sugar 5x daily. A CMN form was faxed in yesterday to support this and a decision is pending.

## 2017-11-06 RX ORDER — ATORVASTATIN CALCIUM 80 MG/1
TABLET, FILM COATED ORAL
Qty: 90 TABLET | Refills: 0 | Status: SHIPPED | OUTPATIENT
Start: 2017-11-06 | End: 2018-02-02 | Stop reason: SDUPTHER

## 2017-11-06 RX ORDER — LANCETS
EACH MISCELLANEOUS
Qty: 408 EACH | Refills: 0 | Status: SHIPPED | OUTPATIENT
Start: 2017-11-06

## 2017-11-13 RX ORDER — METOPROLOL TARTRATE 50 MG/1
TABLET, FILM COATED ORAL
Qty: 180 TABLET | Refills: 0 | Status: SHIPPED | OUTPATIENT
Start: 2017-11-13 | End: 2018-02-08 | Stop reason: SDUPTHER

## 2017-12-08 RX ORDER — BUDESONIDE AND FORMOTEROL FUMARATE DIHYDRATE 160; 4.5 UG/1; UG/1
AEROSOL RESPIRATORY (INHALATION)
Qty: 10.2 G | Refills: 0 | Status: SHIPPED | OUTPATIENT
Start: 2017-12-08 | End: 2018-01-07 | Stop reason: SDUPTHER

## 2017-12-09 DIAGNOSIS — IMO0002 UNCONTROLLED DIABETES MELLITUS: ICD-10-CM

## 2017-12-11 RX ORDER — PIOGLITAZONEHYDROCHLORIDE 15 MG/1
TABLET ORAL
Qty: 90 TABLET | Refills: 0 | Status: SHIPPED | OUTPATIENT
Start: 2017-12-11 | End: 2017-12-26 | Stop reason: SDUPTHER

## 2017-12-15 ENCOUNTER — OFFICE VISIT (OUTPATIENT)
Dept: FAMILY MEDICINE CLINIC | Facility: CLINIC | Age: 67
End: 2017-12-15

## 2017-12-15 VITALS
TEMPERATURE: 98.3 F | HEART RATE: 62 BPM | OXYGEN SATURATION: 98 % | WEIGHT: 200 LBS | DIASTOLIC BLOOD PRESSURE: 74 MMHG | BODY MASS INDEX: 36.8 KG/M2 | HEIGHT: 62 IN | SYSTOLIC BLOOD PRESSURE: 124 MMHG

## 2017-12-15 DIAGNOSIS — Z86.73 HISTORY OF TIA (TRANSIENT ISCHEMIC ATTACK): ICD-10-CM

## 2017-12-15 DIAGNOSIS — R47.01 EXPRESSIVE APHASIA: Primary | ICD-10-CM

## 2017-12-15 PROCEDURE — 99213 OFFICE O/P EST LOW 20 MIN: CPT | Performed by: NURSE PRACTITIONER

## 2017-12-15 RX ORDER — AMANTADINE HYDROCHLORIDE 100 MG/1
TABLET ORAL
COMMUNITY
Start: 2017-11-27 | End: 2017-12-15

## 2017-12-15 NOTE — PROGRESS NOTES
"Subjective   Dalila Valerio is a 67 y.o. female who presents c/o wheezing and SOA x several weeks. Woke up today with worsening dizziness and numbness in tongue.     History of Present Illness   Thinks had a TIA, as dizziness and numbness in the tongue, does have a history of vertigo. On effient and ASA, similar symptoms in October with full work up at [facility]. Now having difficulty with word finding as well.   The following portions of the patient's history were reviewed and updated as appropriate: allergies, current medications, past family history, past medical history, past social history, past surgical history and problem list.    Review of Systems   Constitutional: Positive for fatigue.   Respiratory: Positive for wheezing.    Neurological: Positive for dizziness, speech difficulty, light-headedness and numbness. Negative for tremors, seizures, syncope, facial asymmetry and weakness.     /74  Pulse 62  Temp 98.3 °F (36.8 °C) (Oral)   Ht 157.5 cm (62\")  Wt 90.7 kg (200 lb)  SpO2 98%  BMI 36.58 kg/m2    Objective   Physical Exam   Constitutional: She appears well-developed and well-nourished. No distress.   Cardiovascular: Normal rate.    Pulmonary/Chest: Effort normal.   Neurological:   Word searching   Skin: Skin is warm and dry.   Nursing note and vitals reviewed.    Assessment/Plan   Problems Addressed this Visit     None      Visit Diagnoses     Expressive aphasia    -  Primary    History of TIA (transient ischemic attack)            Instructed to ER for any TIA type symptoms. Spouse will transport to ER for further evaluation, declines EMS         "

## 2017-12-22 ENCOUNTER — OFFICE VISIT (OUTPATIENT)
Dept: FAMILY MEDICINE CLINIC | Facility: CLINIC | Age: 67
End: 2017-12-22

## 2017-12-22 VITALS
OXYGEN SATURATION: 95 % | DIASTOLIC BLOOD PRESSURE: 78 MMHG | BODY MASS INDEX: 35.88 KG/M2 | SYSTOLIC BLOOD PRESSURE: 128 MMHG | TEMPERATURE: 98.6 F | HEART RATE: 58 BPM | HEIGHT: 62 IN | WEIGHT: 195 LBS

## 2017-12-22 DIAGNOSIS — E78.5 DYSLIPIDEMIA: ICD-10-CM

## 2017-12-22 DIAGNOSIS — E11.8 CONTROLLED TYPE 2 DIABETES MELLITUS WITH COMPLICATION, WITHOUT LONG-TERM CURRENT USE OF INSULIN (HCC): ICD-10-CM

## 2017-12-22 DIAGNOSIS — J40 BRONCHITIS: ICD-10-CM

## 2017-12-22 DIAGNOSIS — E55.9 VITAMIN D DEFICIENCY: ICD-10-CM

## 2017-12-22 DIAGNOSIS — E53.8 COBALAMIN DEFICIENCY: Primary | ICD-10-CM

## 2017-12-22 DIAGNOSIS — I25.10 CORONARY ARTERIOSCLEROSIS IN NATIVE ARTERY: ICD-10-CM

## 2017-12-22 DIAGNOSIS — R25.2 MUSCLE CRAMPS: ICD-10-CM

## 2017-12-22 LAB
25(OH)D3+25(OH)D2 SERPL-MCNC: 14.5 NG/ML (ref 30–100)
ALBUMIN SERPL-MCNC: 4 G/DL (ref 3.5–5.2)
ALBUMIN/GLOB SERPL: 1.3 G/DL
ALP SERPL-CCNC: 47 U/L (ref 39–117)
ALT SERPL-CCNC: 31 U/L (ref 1–33)
AST SERPL-CCNC: 22 U/L (ref 1–32)
BASOPHILS # BLD AUTO: 0.04 10*3/MM3 (ref 0–0.2)
BASOPHILS NFR BLD AUTO: 0.5 % (ref 0–1.5)
BILIRUB SERPL-MCNC: 0.3 MG/DL (ref 0.1–1.2)
BUN SERPL-MCNC: 12 MG/DL (ref 8–23)
BUN/CREAT SERPL: 13 (ref 7–25)
CALCIUM SERPL-MCNC: 9.5 MG/DL (ref 8.6–10.5)
CHLORIDE SERPL-SCNC: 101 MMOL/L (ref 98–107)
CHOLEST SERPL-MCNC: 136 MG/DL (ref 0–200)
CO2 SERPL-SCNC: 28.9 MMOL/L (ref 22–29)
CREAT SERPL-MCNC: 0.92 MG/DL (ref 0.57–1)
EOSINOPHIL # BLD AUTO: 0.31 10*3/MM3 (ref 0–0.7)
EOSINOPHIL NFR BLD AUTO: 3.9 % (ref 0.3–6.2)
ERYTHROCYTE [DISTWIDTH] IN BLOOD BY AUTOMATED COUNT: 13.1 % (ref 11.7–13)
GLOBULIN SER CALC-MCNC: 3 GM/DL
GLUCOSE SERPL-MCNC: 137 MG/DL (ref 65–99)
HBA1C MFR BLD: 7.4 % (ref 4.8–5.6)
HCT VFR BLD AUTO: 41.7 % (ref 35.6–45.5)
HDLC SERPL-MCNC: 36 MG/DL (ref 40–60)
HGB BLD-MCNC: 13.5 G/DL (ref 11.9–15.5)
IMM GRANULOCYTES # BLD: 0.03 10*3/MM3 (ref 0–0.03)
IMM GRANULOCYTES NFR BLD: 0.4 % (ref 0–0.5)
LDLC SERPL CALC-MCNC: 70 MG/DL (ref 0–100)
LYMPHOCYTES # BLD AUTO: 3.46 10*3/MM3 (ref 0.9–4.8)
LYMPHOCYTES NFR BLD AUTO: 43.2 % (ref 19.6–45.3)
MAGNESIUM SERPL-MCNC: 1.7 MG/DL (ref 1.6–2.4)
MCH RBC QN AUTO: 31.7 PG (ref 26.9–32)
MCHC RBC AUTO-ENTMCNC: 32.4 G/DL (ref 32.4–36.3)
MCV RBC AUTO: 97.9 FL (ref 80.5–98.2)
MONOCYTES # BLD AUTO: 0.72 10*3/MM3 (ref 0.2–1.2)
MONOCYTES NFR BLD AUTO: 9 % (ref 5–12)
NEUTROPHILS # BLD AUTO: 3.45 10*3/MM3 (ref 1.9–8.1)
NEUTROPHILS NFR BLD AUTO: 43 % (ref 42.7–76)
PLATELET # BLD AUTO: 220 10*3/MM3 (ref 140–500)
POTASSIUM SERPL-SCNC: 4.3 MMOL/L (ref 3.5–5.2)
PROT SERPL-MCNC: 7 G/DL (ref 6–8.5)
RBC # BLD AUTO: 4.26 10*6/MM3 (ref 3.9–5.2)
SODIUM SERPL-SCNC: 141 MMOL/L (ref 136–145)
TRIGL SERPL-MCNC: 151 MG/DL (ref 0–150)
TSH SERPL DL<=0.005 MIU/L-ACNC: 2.2 MIU/ML (ref 0.27–4.2)
VIT B12 SERPL-MCNC: >2000 PG/ML (ref 211–946)
VLDLC SERPL CALC-MCNC: 30.2 MG/DL (ref 5–40)
WBC # BLD AUTO: 8.01 10*3/MM3 (ref 4.5–10.7)

## 2017-12-22 PROCEDURE — 96372 THER/PROPH/DIAG INJ SC/IM: CPT | Performed by: NURSE PRACTITIONER

## 2017-12-22 PROCEDURE — 99214 OFFICE O/P EST MOD 30 MIN: CPT | Performed by: NURSE PRACTITIONER

## 2017-12-22 RX ORDER — CYANOCOBALAMIN 1000 UG/ML
1000 INJECTION, SOLUTION INTRAMUSCULAR; SUBCUTANEOUS WEEKLY
Status: DISCONTINUED | OUTPATIENT
Start: 2017-12-23 | End: 2020-08-25 | Stop reason: HOSPADM

## 2017-12-22 RX ORDER — DOXYCYCLINE HYCLATE 100 MG/1
100 CAPSULE ORAL 2 TIMES DAILY
Qty: 14 CAPSULE | Refills: 0 | Status: SHIPPED | OUTPATIENT
Start: 2017-12-22 | End: 2018-03-02

## 2017-12-22 RX ADMIN — CYANOCOBALAMIN 1000 MCG: 1000 INJECTION, SOLUTION INTRAMUSCULAR; SUBCUTANEOUS at 09:42

## 2017-12-22 NOTE — PROGRESS NOTES
"Subjective   Dalila Valerio is a 67 y.o. female who presents for a follow up after being admitted to [facility] on 12/15/17 for stroke like symptoms. Was told b12 level very low and to FU with PCP about this.     History of Present Illness   AT [facility] Told B12 very low, generally feeling unwell, sleeping a lot. Was told no stroke. Was even giving wrong month of birth, to the nurses at the hospital. Has been getting b12 injections monthly.      Has had steroid shot from Dr. Velazco, and attributes weight gain to this, though is 5 lbs down by my scale.    Checking blood sugars, noting 130-140 highs, 115 lows. Depends on if eaten or not.     Wheezing has returned and having dark brown phlegm, using inhaler to address wheezing.   The following portions of the patient's history were reviewed and updated as appropriate: allergies, current medications, past family history, past medical history, past social history, past surgical history and problem list.    Review of Systems   Constitutional: Positive for fatigue. Negative for appetite change (poor), chills, fever and unexpected weight change.   HENT: Negative.    Eyes: Negative.    Respiratory: Positive for cough and wheezing. Negative for chest tightness and shortness of breath.    Cardiovascular: Negative.    Gastrointestinal: Negative.    Endocrine: Negative.    Genitourinary: Negative.    Musculoskeletal: Positive for arthralgias, back pain, gait problem (uses cane, though none today. ) and myalgias (cramping to upper back muscles and legs at times. ).   Skin: Negative.    Neurological: Positive for numbness (hands, painful and numb).   Hematological: Negative.    Psychiatric/Behavioral: Positive for dysphoric mood.     /78  Pulse 58  Temp 98.6 °F (37 °C) (Oral)   Ht 157.5 cm (62\")  Wt 88.5 kg (195 lb)  SpO2 95%  BMI 35.67 kg/m2    Objective   Physical Exam   Constitutional: She appears well-developed and well-nourished.   HENT:   Mouth/Throat: No oropharyngeal " exudate.   Neck: Normal range of motion. Neck supple. No JVD present. No tracheal deviation present. No thyromegaly present.   Cardiovascular: Normal rate, regular rhythm and normal heart sounds.    Pulmonary/Chest: Effort normal. She has wheezes.   Abdominal: Soft.   Lymphadenopathy:     She has no cervical adenopathy.   Skin: Skin is warm and dry.   Psychiatric: Her speech is normal and behavior is normal. Thought content normal. She exhibits a depressed mood.   Nursing note and vitals reviewed.    Assessment/Plan   Problems Addressed this Visit        Cardiovascular and Mediastinum    Coronary arteriosclerosis in native artery       Digestive    Cobalamin deficiency - Primary    Relevant Medications    cyanocobalamin injection 1,000 mcg (Start on 12/23/2017  9:00 AM)    Other Relevant Orders    CBC & Differential    Vitamin B12    Vitamin D deficiency    Relevant Orders    Vitamin D 25 Hydroxy       Endocrine    Controlled diabetes mellitus type 2 with complications    Relevant Orders    Comprehensive Metabolic Panel    Hemoglobin A1c       Other    Dyslipidemia    Relevant Orders    Lipid Panel      Other Visit Diagnoses     Muscle cramps        Relevant Orders    Comprehensive Metabolic Panel    Magnesium    TSH    Bronchitis        Relevant Medications    doxycycline (VIBRAMYCIN) 100 MG capsule        No records beyond radiology available for review, will update labs, increase B12 injections to weekly for now.   Start doxycycline for wheezing and cough  Continue work with Mora SOW for musculoskeletal complaints.   FU 1 month.

## 2017-12-24 NOTE — PROGRESS NOTES
Please call the patient regarding her abnormal result. Start vitamin d 2000 IU daily, consider increasing pioglitazone to 30mg daily. Otherwise, b12 injections as discussed and follow up in 6-8 weeks.

## 2017-12-26 ENCOUNTER — TELEPHONE (OUTPATIENT)
Dept: FAMILY MEDICINE CLINIC | Facility: CLINIC | Age: 67
End: 2017-12-26

## 2017-12-26 RX ORDER — CITALOPRAM 20 MG/1
TABLET ORAL
Qty: 90 TABLET | Refills: 0 | Status: SHIPPED | OUTPATIENT
Start: 2017-12-26 | End: 2018-03-30 | Stop reason: SDUPTHER

## 2017-12-26 RX ORDER — PIOGLITAZONEHYDROCHLORIDE 30 MG/1
30 TABLET ORAL DAILY
Start: 2017-12-26 | End: 2018-04-16 | Stop reason: SDUPTHER

## 2017-12-26 NOTE — TELEPHONE ENCOUNTER
----- Message from RL Michael sent at 12/24/2017 12:41 PM EST -----  Please call the patient regarding her abnormal result. Start vitamin d 2000 IU daily, consider increasing pioglitazone to 30mg daily. Otherwise, b12 injections as discussed and follow up in 6-8 weeks.

## 2017-12-27 ENCOUNTER — TELEPHONE (OUTPATIENT)
Dept: FAMILY MEDICINE CLINIC | Facility: CLINIC | Age: 67
End: 2017-12-27

## 2017-12-27 NOTE — TELEPHONE ENCOUNTER
Patient states she has extreme heartburn since starting doxycycline. She has tried with and without food. She describes the pain as a burning feeling in her throat that travels down chest and into stomach. Please advise

## 2017-12-29 ENCOUNTER — CLINICAL SUPPORT (OUTPATIENT)
Dept: FAMILY MEDICINE CLINIC | Facility: CLINIC | Age: 67
End: 2017-12-29

## 2017-12-29 DIAGNOSIS — E53.8 COBALAMIN DEFICIENCY: ICD-10-CM

## 2017-12-29 PROCEDURE — 96372 THER/PROPH/DIAG INJ SC/IM: CPT | Performed by: NURSE PRACTITIONER

## 2017-12-29 RX ADMIN — CYANOCOBALAMIN 1000 MCG: 1000 INJECTION, SOLUTION INTRAMUSCULAR; SUBCUTANEOUS at 11:58

## 2018-01-05 ENCOUNTER — CLINICAL SUPPORT (OUTPATIENT)
Dept: FAMILY MEDICINE CLINIC | Facility: CLINIC | Age: 68
End: 2018-01-05

## 2018-01-05 DIAGNOSIS — E53.8 COBALAMIN DEFICIENCY: ICD-10-CM

## 2018-01-05 PROCEDURE — 96372 THER/PROPH/DIAG INJ SC/IM: CPT | Performed by: NURSE PRACTITIONER

## 2018-01-05 RX ADMIN — CYANOCOBALAMIN 1000 MCG: 1000 INJECTION, SOLUTION INTRAMUSCULAR; SUBCUTANEOUS at 12:11

## 2018-01-08 RX ORDER — BUDESONIDE AND FORMOTEROL FUMARATE DIHYDRATE 160; 4.5 UG/1; UG/1
AEROSOL RESPIRATORY (INHALATION)
Qty: 10.2 G | Refills: 0 | Status: SHIPPED | OUTPATIENT
Start: 2018-01-08 | End: 2018-02-07 | Stop reason: SDUPTHER

## 2018-01-15 ENCOUNTER — TELEPHONE (OUTPATIENT)
Dept: FAMILY MEDICINE CLINIC | Facility: CLINIC | Age: 68
End: 2018-01-15

## 2018-01-15 ENCOUNTER — CLINICAL SUPPORT (OUTPATIENT)
Dept: FAMILY MEDICINE CLINIC | Facility: CLINIC | Age: 68
End: 2018-01-15

## 2018-01-15 DIAGNOSIS — E53.8 COBALAMIN DEFICIENCY: ICD-10-CM

## 2018-01-15 PROCEDURE — 96372 THER/PROPH/DIAG INJ SC/IM: CPT | Performed by: NURSE PRACTITIONER

## 2018-01-15 RX ADMIN — CYANOCOBALAMIN 1000 MCG: 1000 INJECTION, SOLUTION INTRAMUSCULAR; SUBCUTANEOUS at 09:39

## 2018-01-24 DIAGNOSIS — E53.8 B12 DEFICIENCY: Primary | ICD-10-CM

## 2018-01-24 LAB — VIT B12 SERPL-MCNC: 707 PG/ML (ref 211–946)

## 2018-01-25 NOTE — PROGRESS NOTES
Please call the patient regarding her abnormal result. B12 improved, start monthly injections and oral b12 supplement.

## 2018-01-29 RX ORDER — OMEPRAZOLE 40 MG/1
CAPSULE, DELAYED RELEASE ORAL
Qty: 180 CAPSULE | Refills: 0 | Status: SHIPPED | OUTPATIENT
Start: 2018-01-29 | End: 2018-04-30 | Stop reason: SDUPTHER

## 2018-01-31 ENCOUNTER — TELEPHONE (OUTPATIENT)
Dept: FAMILY MEDICINE CLINIC | Facility: CLINIC | Age: 68
End: 2018-01-31

## 2018-01-31 NOTE — TELEPHONE ENCOUNTER
----- Message from RL Michael sent at 1/25/2018  7:30 AM EST -----  Please call the patient regarding her abnormal result. B12 improved, start monthly injections and oral b12 supplement.

## 2018-02-02 RX ORDER — ATORVASTATIN CALCIUM 80 MG/1
TABLET, FILM COATED ORAL
Qty: 90 TABLET | Refills: 0 | Status: SHIPPED | OUTPATIENT
Start: 2018-02-02 | End: 2018-04-30 | Stop reason: SDUPTHER

## 2018-02-07 RX ORDER — BUDESONIDE AND FORMOTEROL FUMARATE DIHYDRATE 160; 4.5 UG/1; UG/1
AEROSOL RESPIRATORY (INHALATION)
Qty: 10.2 G | Refills: 0 | Status: SHIPPED | OUTPATIENT
Start: 2018-02-07 | End: 2018-03-07 | Stop reason: SDUPTHER

## 2018-02-08 RX ORDER — METOPROLOL TARTRATE 50 MG/1
TABLET, FILM COATED ORAL
Qty: 180 TABLET | Refills: 0 | Status: SHIPPED | OUTPATIENT
Start: 2018-02-08 | End: 2018-05-06 | Stop reason: SDUPTHER

## 2018-03-02 ENCOUNTER — OFFICE VISIT (OUTPATIENT)
Dept: FAMILY MEDICINE CLINIC | Facility: CLINIC | Age: 68
End: 2018-03-02

## 2018-03-02 VITALS
HEIGHT: 62 IN | WEIGHT: 200 LBS | TEMPERATURE: 98.8 F | SYSTOLIC BLOOD PRESSURE: 134 MMHG | DIASTOLIC BLOOD PRESSURE: 80 MMHG | HEART RATE: 59 BPM | OXYGEN SATURATION: 98 % | BODY MASS INDEX: 36.8 KG/M2

## 2018-03-02 DIAGNOSIS — K59.00 COLONIC CONSTIPATION: ICD-10-CM

## 2018-03-02 DIAGNOSIS — J30.89 CHRONIC NON-SEASONAL ALLERGIC RHINITIS, UNSPECIFIED TRIGGER: ICD-10-CM

## 2018-03-02 DIAGNOSIS — E53.8 COBALAMIN DEFICIENCY: ICD-10-CM

## 2018-03-02 DIAGNOSIS — E55.9 VITAMIN D DEFICIENCY: ICD-10-CM

## 2018-03-02 DIAGNOSIS — E11.8 CONTROLLED TYPE 2 DIABETES MELLITUS WITH COMPLICATION, WITHOUT LONG-TERM CURRENT USE OF INSULIN (HCC): Primary | ICD-10-CM

## 2018-03-02 PROCEDURE — 99214 OFFICE O/P EST MOD 30 MIN: CPT | Performed by: NURSE PRACTITIONER

## 2018-03-02 PROCEDURE — 96372 THER/PROPH/DIAG INJ SC/IM: CPT | Performed by: NURSE PRACTITIONER

## 2018-03-02 RX ORDER — DOCUSATE SODIUM 250 MG
250 CAPSULE ORAL DAILY
Qty: 30 CAPSULE | Refills: 5 | Status: SHIPPED | OUTPATIENT
Start: 2018-03-02 | End: 2019-06-06

## 2018-03-02 RX ADMIN — CYANOCOBALAMIN 1000 MCG: 1000 INJECTION, SOLUTION INTRAMUSCULAR; SUBCUTANEOUS at 15:19

## 2018-03-02 NOTE — PROGRESS NOTES
"Subjective   Dalila Valerio is a 67 y.o. female who presents c/o hard stool x 1 month. Also with productive cough with drainage x 2 weeks.     History of Present Illness   Productive cough x 2-3 week taking otc medication to address. Overall improved as initially had low grade fever and malaise. Did not want to get out of bed. Energy now improved.     Constipation and straining, a chronic issue, increased a bit of late. Taking otc stool softeners, not daily, prn for constipation. Has been having intestinal tenderness.     Also fiery pain to mid thighs occurred when in bed the other day, resolved with time on its own. No color change or rash.     Blood sugars are better overall, mostly fasting 119, feels overall controlled.   The following portions of the patient's history were reviewed and updated as appropriate: allergies, current medications, past family history, past medical history, past social history, past surgical history and problem list.    Review of Systems   Constitutional: Positive for fever. Negative for chills.   HENT: Positive for congestion and postnasal drip (constant sniffle).    Respiratory: Positive for cough.    Cardiovascular: Negative.    Gastrointestinal: Positive for abdominal pain and constipation. Negative for nausea and vomiting.   Endocrine: Positive for cold intolerance.   Genitourinary: Negative.    Musculoskeletal: Positive for arthralgias and back pain.   Skin: Negative.    Neurological: Positive for headaches (low grade sinus).   Hematological: Negative.    Psychiatric/Behavioral: Negative.      /80  Pulse 59  Temp 98.8 °F (37.1 °C) (Oral)   Ht 157.5 cm (62\")  Wt 90.7 kg (200 lb)  SpO2 98%  BMI 36.58 kg/m2    Objective   Physical Exam   Constitutional: She is oriented to person, place, and time. She appears well-developed and well-nourished.   HENT:   Head: Normocephalic.   Right Ear: Tympanic membrane normal.   Left Ear: Tympanic membrane normal.   Nose: Mucosal " edema present. Right sinus exhibits no maxillary sinus tenderness and no frontal sinus tenderness. Left sinus exhibits no maxillary sinus tenderness and no frontal sinus tenderness.   Mouth/Throat: Uvula is midline, oropharynx is clear and moist and mucous membranes are normal.   Cardiovascular: Normal rate, regular rhythm and normal heart sounds.    Pulmonary/Chest: Effort normal and breath sounds normal.   Neurological: She is alert and oriented to person, place, and time.   Skin: Skin is warm and dry.   Psychiatric: She has a normal mood and affect. Her behavior is normal. Judgment and thought content normal.   Nursing note and vitals reviewed.    Assessment/Plan   Problems Addressed this Visit        Respiratory    Atopic rhinitis       Digestive    Cobalamin deficiency    Colonic constipation    Relevant Medications    docusate sodium (COLACE) 250 MG capsule    Vitamin D deficiency    Relevant Orders    Vitamin D 25 Hydroxy       Endocrine    Controlled diabetes mellitus type 2 with complications - Primary    Relevant Orders    Hemoglobin A1c        Last colonoscopy 1/22/14 with 2 polyps, 5 yr repeat recommended. Recommend start stool softener daily for constipation.   With cough, favor allergies. Continue benadryl. As now improved, hold further treatment for now.   Update A1c 1-3 weeks.   Continue B12 injections and vitamin D.

## 2018-03-07 RX ORDER — BUDESONIDE AND FORMOTEROL FUMARATE DIHYDRATE 160; 4.5 UG/1; UG/1
AEROSOL RESPIRATORY (INHALATION)
Qty: 10.2 G | Refills: 0 | Status: SHIPPED | OUTPATIENT
Start: 2018-03-07 | End: 2018-04-07 | Stop reason: SDUPTHER

## 2018-03-08 DIAGNOSIS — IMO0002 UNCONTROLLED DIABETES MELLITUS: ICD-10-CM

## 2018-03-08 RX ORDER — PIOGLITAZONEHYDROCHLORIDE 30 MG/1
30 TABLET ORAL DAILY
Qty: 90 TABLET | Refills: 0 | Status: SHIPPED | OUTPATIENT
Start: 2018-03-08 | End: 2018-06-12

## 2018-03-14 ENCOUNTER — TELEPHONE (OUTPATIENT)
Dept: FAMILY MEDICINE CLINIC | Facility: CLINIC | Age: 68
End: 2018-03-14

## 2018-03-14 NOTE — TELEPHONE ENCOUNTER
Tell her to use some heat to the back. She should make a follow up appointment if she is not improving. She already takes gabapentin and hydrocodone. Thanks

## 2018-03-14 NOTE — TELEPHONE ENCOUNTER
Pt states she went to ER yesterday for severe pain in back that was radiating around to chest. Was [facility] and given a muscle relaxer while in ER and a script for nausea medication. Pt is requesting a script for muscle relaxer to help with this. ER dr told her he was concerned she was on enough medication already and did not want to sent script per pt report.

## 2018-03-15 ENCOUNTER — OFFICE VISIT (OUTPATIENT)
Dept: FAMILY MEDICINE CLINIC | Facility: CLINIC | Age: 68
End: 2018-03-15

## 2018-03-15 VITALS
BODY MASS INDEX: 36.07 KG/M2 | OXYGEN SATURATION: 96 % | TEMPERATURE: 98.6 F | HEIGHT: 62 IN | HEART RATE: 60 BPM | DIASTOLIC BLOOD PRESSURE: 78 MMHG | SYSTOLIC BLOOD PRESSURE: 124 MMHG | WEIGHT: 196 LBS

## 2018-03-15 DIAGNOSIS — M79.7 FIBROMYALGIA: Primary | ICD-10-CM

## 2018-03-15 DIAGNOSIS — M54.16 LUMBAR RADICULOPATHY: ICD-10-CM

## 2018-03-15 PROCEDURE — 99213 OFFICE O/P EST LOW 20 MIN: CPT | Performed by: NURSE PRACTITIONER

## 2018-03-15 RX ORDER — CYCLOBENZAPRINE HCL 10 MG
10 TABLET ORAL 3 TIMES DAILY PRN
Qty: 30 TABLET | Refills: 0 | Status: SHIPPED | OUTPATIENT
Start: 2018-03-15 | End: 2018-12-10

## 2018-03-15 RX ORDER — ONDANSETRON 4 MG/1
4 TABLET, ORALLY DISINTEGRATING ORAL EVERY 8 HOURS PRN
COMMUNITY
Start: 2018-03-14 | End: 2018-12-10

## 2018-03-15 NOTE — PROGRESS NOTES
"Subjective   Dalila Valerio is a 67 y.o. female who presents for follow up after being treated in ER on 3/13/18 for pulled muscle. Pain in mid back that radiates around to right side and down into buttocks on both side. Has been treating with heat and a pain cream as prescribed by pain management.     History of Present Illness   Pain described as R side of spine from neck to waist. Can extend into buttocks. Exhausted from the pain. Plans to schedule epidural through pain management. Would like muscle relaxer to address the pain, as helped while in ER. Pain started as sudden onset after standing up. No remembered injury.   The following portions of the patient's history were reviewed and updated as appropriate: allergies, current medications, past family history, past medical history, past social history, past surgical history and problem list.    Review of Systems   Constitutional: Positive for activity change. Negative for fever and unexpected weight gain.   HENT: Negative.    Respiratory: Negative for shortness of breath.    Cardiovascular: Negative for chest pain.   Gastrointestinal: Negative.    Musculoskeletal: Positive for back pain and myalgias.   Skin: Negative for bruise.   Neurological: Negative.    Hematological: Negative.    Psychiatric/Behavioral: Negative.      /78   Pulse 60   Temp 98.6 °F (37 °C) (Oral)   Ht 157.5 cm (62\")   Wt 88.9 kg (196 lb)   SpO2 96%   BMI 35.85 kg/m²     Objective   Physical Exam   Constitutional: She appears well-developed and well-nourished. No distress.   Musculoskeletal: She exhibits no edema.        Cervical back: She exhibits tenderness and pain. She exhibits no swelling and no spasm.        Thoracic back: She exhibits tenderness.        Lumbar back: She exhibits decreased range of motion, pain and spasm.   SLR + R, -L, SI niyah nontender.    Skin: Skin is warm and dry.   Psychiatric: She has a normal mood and affect. Her behavior is normal. Judgment and " thought content normal.   Nursing note and vitals reviewed.    Assessment/Plan   Problems Addressed this Visit        Nervous and Auditory    Lumbar radiculopathy       Other    Fibromyalgia - Primary      Other Visit Diagnoses    None.       CTA chest 3/13 normal, encouraged to be more active, cautious use of flexeril. Not to drive on flexeril. FU with Dr. Velazco regarding flare of musculoskeletal pain. Continue HC per his directions.

## 2018-03-30 RX ORDER — CITALOPRAM 20 MG/1
TABLET ORAL
Qty: 90 TABLET | Refills: 0 | Status: SHIPPED | OUTPATIENT
Start: 2018-03-30 | End: 2018-06-28 | Stop reason: SDUPTHER

## 2018-04-09 RX ORDER — BUDESONIDE AND FORMOTEROL FUMARATE DIHYDRATE 160; 4.5 UG/1; UG/1
AEROSOL RESPIRATORY (INHALATION)
Qty: 10.2 G | Refills: 3 | Status: SHIPPED | OUTPATIENT
Start: 2018-04-09 | End: 2018-10-08 | Stop reason: SDUPTHER

## 2018-04-16 ENCOUNTER — TELEPHONE (OUTPATIENT)
Dept: FAMILY MEDICINE CLINIC | Facility: CLINIC | Age: 68
End: 2018-04-16

## 2018-04-16 ENCOUNTER — OFFICE VISIT (OUTPATIENT)
Dept: FAMILY MEDICINE CLINIC | Facility: CLINIC | Age: 68
End: 2018-04-16

## 2018-04-16 VITALS
SYSTOLIC BLOOD PRESSURE: 124 MMHG | DIASTOLIC BLOOD PRESSURE: 70 MMHG | TEMPERATURE: 98.5 F | HEIGHT: 62 IN | HEART RATE: 79 BPM | BODY MASS INDEX: 36.62 KG/M2 | OXYGEN SATURATION: 96 % | WEIGHT: 199 LBS

## 2018-04-16 DIAGNOSIS — E55.9 VITAMIN D DEFICIENCY: ICD-10-CM

## 2018-04-16 DIAGNOSIS — Z00.00 INITIAL MEDICARE ANNUAL WELLNESS VISIT: ICD-10-CM

## 2018-04-16 DIAGNOSIS — Z11.59 NEED FOR HEPATITIS C SCREENING TEST: ICD-10-CM

## 2018-04-16 DIAGNOSIS — E11.8 CONTROLLED TYPE 2 DIABETES MELLITUS WITH COMPLICATION, WITHOUT LONG-TERM CURRENT USE OF INSULIN (HCC): Primary | ICD-10-CM

## 2018-04-16 DIAGNOSIS — B37.2 YEAST DERMATITIS: ICD-10-CM

## 2018-04-16 PROCEDURE — G0438 PPPS, INITIAL VISIT: HCPCS | Performed by: NURSE PRACTITIONER

## 2018-04-16 PROCEDURE — 99214 OFFICE O/P EST MOD 30 MIN: CPT | Performed by: NURSE PRACTITIONER

## 2018-04-16 PROCEDURE — 96372 THER/PROPH/DIAG INJ SC/IM: CPT | Performed by: NURSE PRACTITIONER

## 2018-04-16 RX ORDER — NYSTATIN AND TRIAMCINOLONE ACETONIDE 100000; 1 [USP'U]/G; MG/G
OINTMENT TOPICAL 2 TIMES DAILY
Qty: 30 G | Refills: 0 | Status: SHIPPED | OUTPATIENT
Start: 2018-04-16 | End: 2018-12-10

## 2018-04-16 RX ADMIN — CYANOCOBALAMIN 1000 MCG: 1000 INJECTION, SOLUTION INTRAMUSCULAR; SUBCUTANEOUS at 13:45

## 2018-04-16 NOTE — PROGRESS NOTES
QUICK REFERENCE INFORMATION:  The ABCs of the Annual Wellness Visit    Initial Medicare Wellness Visit    HEALTH RISK ASSESSMENT    1950    Recent Hospitalizations:  Recently treated at the following:  Other: [facility] x 3 for chest pain.        Current Medical Providers:  Patient Care Team:  RL Michael as PCP - General  RL Michael as PCP - Family Medicine  RL Michael as PCP - Claims Attributed  Devyn Velazco MD as Consulting Physician (Pain Medicine)  Bogdan Olmedo MD as Surgeon (Orthopedic Surgery)        Smoking Status:  History   Smoking Status   • Former Smoker   • Quit date: 1/1/1980   Smokeless Tobacco   • Never Used       Alcohol Consumption:  History   Alcohol Use No       Depression Screen:   PHQ-2/PHQ-9 Depression Screening 4/16/2018   Little interest or pleasure in doing things 0   Feeling down, depressed, or hopeless 1   Trouble falling or staying asleep, or sleeping too much 3   Feeling tired or having little energy 2   Poor appetite or overeating 0   Feeling bad about yourself - or that you are a failure or have let yourself or your family down 1   Trouble concentrating on things, such as reading the newspaper or watching television 0   Moving or speaking so slowly that other people could have noticed. Or the opposite - being so fidgety or restless that you have been moving around a lot more than usual 0   Thoughts that you would be better off dead, or of hurting yourself in some way 0   Total Score 7   If you checked off any problems, how difficult have these problems made it for you to do your work, take care of things at home, or get along with other people? Not difficult at all       Health Habits and Functional and Cognitive Screening:  Functional & Cognitive Status 4/16/2018   Do you have difficulty preparing food and eating? No   Do you have difficulty bathing yourself, getting dressed or grooming yourself? Yes   Do you have difficulty using the  toilet? No   Do you have difficulty moving around from place to place? No   Do you have trouble with steps or getting out of a bed or a chair? Yes   In the past year have you fallen or experienced a near fall? Yes   Current Diet Well Balanced Diet   Dental Exam Not up to date   Eye Exam Not up to date   Exercise (times per week) 0 times per week   Current Exercise Activities Include No Regular Exercise   Do you need help using the phone?  No   Are you deaf or do you have serious difficulty hearing?  No   Do you need help with transportation? No   Do you need help shopping? No   Do you need help preparing meals?  No   Do you need help with housework?  No   Do you need help with laundry? No   Do you need help taking your medications? No   Do you need help managing money? No   Do you ever drive or ride in a car without wearing a seat belt? No   Have you felt unusual stress, anger or loneliness in the last month? Yes   Who do you live with? Spouse   If you need help, do you have trouble finding someone available to you? No   Have you been bothered in the last four weeks by sexual problems? No   Do you have difficulty concentrating, remembering or making decisions? No           Does the patient have evidence of cognitive impairment? No    Asiprin use counseling: taking 325 mg ASA appropriately      Recent Lab Results:    Visual Acuity:  No exam data present    Age-appropriate Screening Schedule:  Refer to the list below for future screening recommendations based on patient's age, sex and/or medical conditions. Orders for these recommended tests are listed in the plan section. The patient has been provided with a written plan.    Health Maintenance   Topic Date Due   • TDAP/TD VACCINES (1 - Tdap) 10/05/1967   • URINE MICROALBUMIN  08/09/2017   • PNEUMOCOCCAL VACCINES (65+ LOW/MEDIUM RISK) (2 of 2 - PCV13) 11/15/2017   • DIABETIC FOOT EXAM  11/15/2017   • HEMOGLOBIN A1C  06/22/2018   • INFLUENZA VACCINE  08/01/2018   •  "DIABETIC EYE EXAM  04/16/2019   • MAMMOGRAM  04/28/2019   • COLONOSCOPY  01/22/2024   • ZOSTER VACCINE  Addressed        Subjective   History of Present Illness    Dalila Valerio is a 67 y.o. female who presents for an Annual Wellness Visit.    The following portions of the patient's history were reviewed and updated as appropriate: allergies, current medications, past family history, past medical history, past social history, past surgical history and problem list.    Outpatient Medications Prior to Visit   Medication Sig Dispense Refill   • ACCU-CHEK FASTCLIX LANCETS misc USE TO TEST BLOOD SUGAR UP TO FIVE TIMES DAILY AS DIRECTED. 408 each 0   • albuterol (PROAIR HFA) 108 (90 BASE) MCG/ACT inhaler  INHALE 1 TO 2 PUFFS EVERY 4 TO 6 HOURS AS NEEDED 1 inhaler 1   • aspirin 325 MG tablet Take 325 mg by mouth daily.     • atorvastatin (LIPITOR) 80 MG tablet TAKE 1 TABLET BY MOUTH EVERY DAY 90 tablet 0   • Cholecalciferol (VITAMIN D3) 5000 UNITS capsule capsule Take 1 capsule by mouth daily. 30 capsule 5   • citalopram (CeleXA) 20 MG tablet TAKE 1 TABLET BY MOUTH EVERY DAY AS DIRECTED 90 tablet 0   • cyclobenzaprine (FLEXERIL) 10 MG tablet Take 1 tablet by mouth 3 (Three) Times a Day As Needed for Muscle Spasms. 30 tablet 0   • diphenhydrAMINE (BENADRYL) 25 mg capsule Take 1 capsule by mouth nightly.     • docusate sodium (COLACE) 250 MG capsule Take 1 capsule by mouth Daily. 30 capsule 5   • EFFIENT 10 MG tablet Take 10 mg by mouth daily.     • FREESTYLE LITE test strip USE TO TEST BLOOD SUGAR UP TO FIVE TIMES DAILY FOR LOW BLOOD SUGAR AND SYMPTOMATIC GLYCEMIA 450 each 3   • gabapentin (NEURONTIN) 800 MG tablet TAKE 1 TABLET BY MOUTH THREE TIMES DAILY 90 tablet 0   • HYDROcodone-acetaminophen (NORCO)  MG per tablet Take 1 tablet by mouth 4 (four) times a day.     • hydrOXYzine (ATARAX) 25 MG tablet Take 1 tablet by mouth Every Night. 30 tablet 0   • Insulin Syringe 29G X 1\" 0.3 ML misc 1 each daily with " dinner. 100 each 0   • lisinopril (PRINIVIL,ZESTRIL) 10 MG tablet Take 10 mg by mouth Daily.     • metoprolol tartrate (LOPRESSOR) 50 MG tablet TAKE 1 TABLET BY MOUTH EVERY 12 HOURS 180 tablet 0   • nitroglycerin (NITROSTAT) 0.4 MG SL tablet Place under the tongue. Place 1 tablet under the tongue every 5 minutes for up to 3 doses as needed for chest pain. Call 911 if pain persists     • omeprazole (priLOSEC) 40 MG capsule TAKE ONE CAPSULE BY MOUTH TWICE DAILY 180 capsule 0   • ondansetron ODT (ZOFRAN-ODT) 4 MG disintegrating tablet      • pioglitazone (ACTOS) 30 MG tablet Take 1 tablet by mouth Daily. 90 tablet 0   • SYMBICORT 160-4.5 MCG/ACT inhaler INHALE 2 PUFFS BY MOUTH TWICE DAILY. RINSE MOUTH AFTER USE 10.2 g 3   • pioglitazone (ACTOS) 30 MG tablet Take 1 tablet by mouth Daily.       Facility-Administered Medications Prior to Visit   Medication Dose Route Frequency Provider Last Rate Last Dose   • cyanocobalamin injection 1,000 mcg  1,000 mcg Intramuscular Weekly RL Michael   1,000 mcg at 03/02/18 1519       Patient Active Problem List   Diagnosis   • Adjustment disorder with mixed anxiety and depressed mood   • Atopic rhinitis   • Coronary arteriosclerosis in native artery   • Cobalamin deficiency   • History of Williamson's esophagus   • Benign colonic polyp   • Lumbar radiculopathy   • Controlled diabetes mellitus type 2 with complications   • Binocular vision disorder with diplopia   • Dyslipidemia   • Arthropathy of hand   • Knee pain   • Cramps of lower extremity   • Low back pain   • Migraine with typical aura   • MAC (mycobacterium avium-intracellulare complex)   • Chronic nausea   • Obstructive sleep apnea syndrome   • Palpitations   • Ventricular premature beats   • Cephalalgia   • Colonic constipation   • Neurocardiogenic syncope   • Vitamin D deficiency   • DODSON (nonalcoholic steatohepatitis)   • Fibromyalgia       Advance Care Planning:  has NO advance directive - information provided to  "the patient today    Identification of Risk Factors:  Risk factors include: weight .    Review of Systems    Compared to one year ago, the patient feels her physical health is better.  Compared to one year ago, the patient feels her mental health is better.    Objective     Physical Exam    Vitals:    04/16/18 1303   BP: 124/70   Pulse: 79   Temp: 98.5 °F (36.9 °C)   TempSrc: Oral   SpO2: 96%   Weight: 90.3 kg (199 lb)   Height: 157.5 cm (62\")   PainSc:   8   PainLoc: Back       Patient's Body mass index is 36.4 kg/m². BMI is above normal parameters. Follow-up plan includes:  exercise counseling.      Assessment/Plan   Patient Self-Management and Personalized Health Advice  The patient has been provided with information about: designing advance directives and preventive services including:   · Advance directive, Exercise counseling provided.    Visit Diagnoses:  No diagnosis found.    No orders of the defined types were placed in this encounter.      Outpatient Encounter Prescriptions as of 4/16/2018   Medication Sig Dispense Refill   • ACCU-CHEK FASTCLIX LANCETS misc USE TO TEST BLOOD SUGAR UP TO FIVE TIMES DAILY AS DIRECTED. 408 each 0   • albuterol (PROAIR HFA) 108 (90 BASE) MCG/ACT inhaler  INHALE 1 TO 2 PUFFS EVERY 4 TO 6 HOURS AS NEEDED 1 inhaler 1   • aspirin 325 MG tablet Take 325 mg by mouth daily.     • atorvastatin (LIPITOR) 80 MG tablet TAKE 1 TABLET BY MOUTH EVERY DAY 90 tablet 0   • Cholecalciferol (VITAMIN D3) 5000 UNITS capsule capsule Take 1 capsule by mouth daily. 30 capsule 5   • citalopram (CeleXA) 20 MG tablet TAKE 1 TABLET BY MOUTH EVERY DAY AS DIRECTED 90 tablet 0   • cyclobenzaprine (FLEXERIL) 10 MG tablet Take 1 tablet by mouth 3 (Three) Times a Day As Needed for Muscle Spasms. 30 tablet 0   • diphenhydrAMINE (BENADRYL) 25 mg capsule Take 1 capsule by mouth nightly.     • docusate sodium (COLACE) 250 MG capsule Take 1 capsule by mouth Daily. 30 capsule 5   • EFFIENT 10 MG tablet Take 10 mg " "by mouth daily.     • FREESTYLE LITE test strip USE TO TEST BLOOD SUGAR UP TO FIVE TIMES DAILY FOR LOW BLOOD SUGAR AND SYMPTOMATIC GLYCEMIA 450 each 3   • gabapentin (NEURONTIN) 800 MG tablet TAKE 1 TABLET BY MOUTH THREE TIMES DAILY 90 tablet 0   • HYDROcodone-acetaminophen (NORCO)  MG per tablet Take 1 tablet by mouth 4 (four) times a day.     • hydrOXYzine (ATARAX) 25 MG tablet Take 1 tablet by mouth Every Night. 30 tablet 0   • Insulin Syringe 29G X 1\" 0.3 ML misc 1 each daily with dinner. 100 each 0   • lisinopril (PRINIVIL,ZESTRIL) 10 MG tablet Take 10 mg by mouth Daily.     • metoprolol tartrate (LOPRESSOR) 50 MG tablet TAKE 1 TABLET BY MOUTH EVERY 12 HOURS 180 tablet 0   • nitroglycerin (NITROSTAT) 0.4 MG SL tablet Place under the tongue. Place 1 tablet under the tongue every 5 minutes for up to 3 doses as needed for chest pain. Call 911 if pain persists     • omeprazole (priLOSEC) 40 MG capsule TAKE ONE CAPSULE BY MOUTH TWICE DAILY 180 capsule 0   • ondansetron ODT (ZOFRAN-ODT) 4 MG disintegrating tablet      • pioglitazone (ACTOS) 30 MG tablet Take 1 tablet by mouth Daily. 90 tablet 0   • SYMBICORT 160-4.5 MCG/ACT inhaler INHALE 2 PUFFS BY MOUTH TWICE DAILY. RINSE MOUTH AFTER USE 10.2 g 3   • [DISCONTINUED] pioglitazone (ACTOS) 30 MG tablet Take 1 tablet by mouth Daily.       Facility-Administered Encounter Medications as of 4/16/2018   Medication Dose Route Frequency Provider Last Rate Last Dose   • cyanocobalamin injection 1,000 mcg  1,000 mcg Intramuscular Weekly RL Michael   1,000 mcg at 03/02/18 1519       Reviewed use of high risk medication in the elderly: yes  Reviewed for potential of harmful drug interactions in the elderly: yes    Follow Up:  No Follow-up on file.     An After Visit Summary and PPPS with all of these plans were given to the patient.           "

## 2018-04-16 NOTE — PATIENT INSTRUCTIONS
Medicare Wellness  Personal Prevention Plan of Service     Date of Office Visit:  2018  Encounter Provider:  RL Dia  Place of Service:  [facility]  Patient Name: Dalila Valerio  :  1950    As part of the Medicare Wellness portion of your visit today, we are providing you with this personalized preventive plan of services (PPPS). This plan is based upon recommendations of the [place] Preventive Services Task Force (USPSTF) and the Advisory Committee on Immunization Practices (ACIP).    This lists the preventive care services that should be considered, and provides dates of when you are due. Items listed as completed are up-to-date and do not require any further intervention.    Health Maintenance   Topic Date Due   • TDAP/TD VACCINES (1 - Tdap) 10/05/1967   • HEPATITIS C SCREENING  2016   • URINE MICROALBUMIN  2017   • PNEUMOCOCCAL VACCINES (65+ LOW/MEDIUM RISK) (2 of 2 - PCV13) 11/15/2017   • DIABETIC FOOT EXAM  11/15/2017   • HEMOGLOBIN A1C  2018   • INFLUENZA VACCINE  2018   • MEDICARE ANNUAL WELLNESS  2019   • DIABETIC EYE EXAM  2019   • MAMMOGRAM  2019   • COLONOSCOPY  2024   • ZOSTER VACCINE  Addressed       Orders Placed This Encounter   Procedures   • Microalbumin / Creatinine Urine Ratio - Urine, Clean Catch   • Hemoglobin A1c   • Hepatitis C Antibody   • Vitamin D 25 Hydroxy       No Follow-up on file.

## 2018-04-16 NOTE — PROGRESS NOTES
Subjective   Dalila Valerio is a 67 y.o. female who presents c/o rash across chest.     Rash across the chest for 3 months treating with topical butt paste, justs not fully resolving.     3 ER evaluations for chest pain, all not MI, starts in middle of back, spreading to front of chest. Has had EKG, CTA chest, all negative, does have history of MI, stents. Does have TNG for pain, just not with her when had episodes.     Using topicals for pain, not helpful for chest pains. Following with Dr. Velazco.       Diabetes   She presents for her follow-up diabetic visit. She has type 2 diabetes mellitus. No MedicAlert identification noted. Her disease course has been fluctuating. There are no hypoglycemic associated symptoms. Pertinent negatives for hypoglycemia include no nervousness/anxiousness. Associated symptoms include chest pain. There are no hypoglycemic complications. Symptoms are stable. Diabetic complications include heart disease. Risk factors for coronary artery disease include diabetes mellitus, dyslipidemia, obesity, hypertension, post-menopausal, sedentary lifestyle and stress. Current diabetic treatment includes oral agent (monotherapy). She is compliant with treatment all of the time. Her weight is fluctuating minimally. She is following a diabetic diet. Meal planning includes avoidance of concentrated sweets. She has not had a previous visit with a dietitian. She rarely participates in exercise. Her home blood glucose trend is fluctuating minimally. Her breakfast blood glucose range is generally 110-130 mg/dl. Her overall blood glucose range is 140-180 mg/dl. An ACE inhibitor/angiotensin II receptor blocker is being taken. She does not see a podiatrist.Eye exam is current.      The following portions of the patient's history were reviewed and updated as appropriate: allergies, current medications, past family history, past medical history, past social history, past surgical history and problem  "list.    Review of Systems   Constitutional: Positive for unexpected weight gain.   HENT: Positive for sinus pressure. Negative for postnasal drip.    Respiratory: Positive for cough and shortness of breath.    Cardiovascular: Positive for chest pain. Negative for palpitations and leg swelling.   Gastrointestinal: Positive for GERD (well controlled, not due for scope until next year.).   Musculoskeletal: Positive for back pain.   Skin:        Toenail dystrophy   Psychiatric/Behavioral: Positive for stress. Negative for dysphoric mood, sleep disturbance and suicidal ideas. The patient is not nervous/anxious.      /70   Pulse 79   Temp 98.5 °F (36.9 °C) (Oral)   Ht 157.5 cm (62\")   Wt 90.3 kg (199 lb)   SpO2 96%   BMI 36.40 kg/m²     Objective   Physical Exam   Constitutional: She appears well-developed and well-nourished.   HENT:   Right Ear: Tympanic membrane normal.   Left Ear: Tympanic membrane normal.   Nose: Mucosal edema and rhinorrhea present. Right sinus exhibits no maxillary sinus tenderness and no frontal sinus tenderness. Left sinus exhibits no maxillary sinus tenderness and no frontal sinus tenderness.   Mouth/Throat: Uvula is midline, oropharynx is clear and moist and mucous membranes are normal.   Neck: Normal range of motion. Neck supple. No thyromegaly present.   Cardiovascular: Normal rate, regular rhythm and normal heart sounds.    Pulmonary/Chest: Effort normal and breath sounds normal.   Lymphadenopathy:     She has no cervical adenopathy.   Skin: Skin is warm and dry.   Psychiatric: She has a normal mood and affect. Her behavior is normal. Judgment and thought content normal.   Nursing note and vitals reviewed.    Assessment/Plan   Problems Addressed this Visit        Digestive    Vitamin D deficiency    Relevant Orders    Vitamin D 25 Hydroxy       Endocrine    Controlled diabetes mellitus type 2 with complications - Primary    Relevant Orders    Microalbumin / Creatinine Urine Ratio " - Urine, Clean Catch    Hemoglobin A1c      Other Visit Diagnoses     Need for hepatitis C screening test        Relevant Orders    Hepatitis C Antibody    Initial Medicare annual wellness visit        Yeast dermatitis        Relevant Medications    diclofenac (VOLTAREN) 1 % gel gel    nystatin-triamcinolone (MYCOLOG) 100932-7.1 UNIT/GM-% ointment      Depression--declines AD changes today, happy with citalopram.   Diabetes--expect less controlled, by home readings. Changes pending labs  Hep C screening--previous distant exposure unsure what type of hepatitis.  Vitamin D deficiency--update labs, continue supplement  Yeast dermatitis--start nystatin, follow up if not settling. 2-3 weeks.   Otherwise FU 6 months.

## 2018-04-17 LAB
25(OH)D3+25(OH)D2 SERPL-MCNC: 20.3 NG/ML (ref 30–100)
ALBUMIN/CREAT UR: 1.8 MG/G CREAT (ref 0–30)
CREAT UR-MCNC: 170 MG/DL
HBA1C MFR BLD: 7.6 % (ref 4.8–5.6)
HCV AB S/CO SERPL IA: <0.1 S/CO RATIO (ref 0–0.9)
MICROALBUMIN UR-MCNC: 3 UG/ML

## 2018-04-17 RX ORDER — GLIPIZIDE 5 MG/1
2.5 TABLET ORAL DAILY
Qty: 15 TABLET | Refills: 2 | Status: SHIPPED | OUTPATIENT
Start: 2018-04-17 | End: 2018-07-07 | Stop reason: SDUPTHER

## 2018-04-30 RX ORDER — OMEPRAZOLE 40 MG/1
CAPSULE, DELAYED RELEASE ORAL
Qty: 180 CAPSULE | Refills: 0 | Status: SHIPPED | OUTPATIENT
Start: 2018-04-30 | End: 2018-07-28 | Stop reason: SDUPTHER

## 2018-04-30 RX ORDER — ATORVASTATIN CALCIUM 80 MG/1
TABLET, FILM COATED ORAL
Qty: 90 TABLET | Refills: 0 | Status: SHIPPED | OUTPATIENT
Start: 2018-04-30 | End: 2018-07-28 | Stop reason: SDUPTHER

## 2018-05-01 DIAGNOSIS — M25.442 SWELLING OF HAND JOINT, LEFT: ICD-10-CM

## 2018-05-01 DIAGNOSIS — Z79.899 HIGH RISK MEDICATION USE: Primary | ICD-10-CM

## 2018-05-01 RX ORDER — GABAPENTIN 800 MG/1
TABLET ORAL
Qty: 90 TABLET | Refills: 0 | Status: SHIPPED | OUTPATIENT
Start: 2018-05-01 | End: 2019-03-30 | Stop reason: SDUPTHER

## 2018-05-02 DIAGNOSIS — M25.442 SWELLING OF HAND JOINT, LEFT: ICD-10-CM

## 2018-05-02 RX ORDER — GABAPENTIN 800 MG/1
TABLET ORAL
Qty: 90 TABLET | Refills: 0 | OUTPATIENT
Start: 2018-05-02

## 2018-05-07 RX ORDER — METOPROLOL TARTRATE 50 MG/1
TABLET, FILM COATED ORAL
Qty: 180 TABLET | Refills: 0 | Status: SHIPPED | OUTPATIENT
Start: 2018-05-07 | End: 2018-08-02 | Stop reason: SDUPTHER

## 2018-05-08 LAB
AMPHETAMINES UR QL SCN: NEGATIVE NG/ML
BARBITURATES UR QL SCN: NEGATIVE NG/ML
BENZODIAZ UR QL SCN: NEGATIVE NG/ML
BZE UR QL SCN: NEGATIVE NG/ML
CANNABINOIDS UR QL SCN: NEGATIVE NG/ML
CREAT UR-MCNC: 92.2 MG/DL (ref 20–300)
FENTANYL+NORFENTANYL UR QL SCN: NEGATIVE PG/ML
LABORATORY COMMENT REPORT: ABNORMAL
MEPERIDINE UR QL: NEGATIVE NG/ML
METHADONE UR QL SCN: NEGATIVE NG/ML
OPIATES UR QL SCN: POSITIVE NG/ML
OXYCODONE+OXYMORPHONE UR QL SCN: NEGATIVE NG/ML
PCP UR QL: NEGATIVE NG/ML
PH UR: 5.2 [PH] (ref 4.5–8.9)
PROPOXYPH UR QL SCN: NEGATIVE NG/ML
SP GR UR: 1.01
TRAMADOL UR QL SCN: NEGATIVE NG/ML

## 2018-05-17 ENCOUNTER — CLINICAL SUPPORT (OUTPATIENT)
Dept: FAMILY MEDICINE CLINIC | Facility: CLINIC | Age: 68
End: 2018-05-17

## 2018-05-17 DIAGNOSIS — E53.8 COBALAMIN DEFICIENCY: ICD-10-CM

## 2018-05-17 PROCEDURE — 96372 THER/PROPH/DIAG INJ SC/IM: CPT | Performed by: NURSE PRACTITIONER

## 2018-05-17 RX ADMIN — CYANOCOBALAMIN 1000 MCG: 1000 INJECTION, SOLUTION INTRAMUSCULAR; SUBCUTANEOUS at 13:08

## 2018-05-29 DIAGNOSIS — M25.442 SWELLING OF HAND JOINT, LEFT: ICD-10-CM

## 2018-06-01 RX ORDER — GABAPENTIN 800 MG/1
TABLET ORAL
Qty: 90 TABLET | Refills: 0 | OUTPATIENT
Start: 2018-06-01

## 2018-06-02 DIAGNOSIS — M25.442 SWELLING OF HAND JOINT, LEFT: ICD-10-CM

## 2018-06-04 ENCOUNTER — OFFICE VISIT (OUTPATIENT)
Dept: FAMILY MEDICINE CLINIC | Facility: CLINIC | Age: 68
End: 2018-06-04

## 2018-06-04 VITALS
TEMPERATURE: 98.4 F | BODY MASS INDEX: 36.76 KG/M2 | OXYGEN SATURATION: 97 % | WEIGHT: 201 LBS | SYSTOLIC BLOOD PRESSURE: 128 MMHG | DIASTOLIC BLOOD PRESSURE: 64 MMHG | HEART RATE: 71 BPM

## 2018-06-04 DIAGNOSIS — B37.2 YEAST DERMATITIS: Primary | ICD-10-CM

## 2018-06-04 DIAGNOSIS — F43.23 ADJUSTMENT DISORDER WITH MIXED ANXIETY AND DEPRESSED MOOD: ICD-10-CM

## 2018-06-04 DIAGNOSIS — H60.541 ACUTE ECZEMATOID OTITIS EXTERNA OF RIGHT EAR: ICD-10-CM

## 2018-06-04 PROCEDURE — 99214 OFFICE O/P EST MOD 30 MIN: CPT | Performed by: NURSE PRACTITIONER

## 2018-06-04 RX ORDER — ARIPIPRAZOLE 2 MG/1
2 TABLET ORAL DAILY
Qty: 30 TABLET | Refills: 0 | Status: SHIPPED | OUTPATIENT
Start: 2018-06-04 | End: 2018-06-04 | Stop reason: SDUPTHER

## 2018-06-04 RX ORDER — CIPROFLOXACIN AND DEXAMETHASONE 3; 1 MG/ML; MG/ML
4 SUSPENSION/ DROPS AURICULAR (OTIC) 2 TIMES DAILY
Qty: 7.5 ML | Refills: 0 | Status: SHIPPED | OUTPATIENT
Start: 2018-06-04 | End: 2018-06-12

## 2018-06-04 RX ORDER — GABAPENTIN 800 MG/1
TABLET ORAL
Qty: 90 TABLET | Refills: 0 | OUTPATIENT
Start: 2018-06-04

## 2018-06-04 RX ORDER — CLOTRIMAZOLE AND BETAMETHASONE DIPROPIONATE 10; .64 MG/G; MG/G
CREAM TOPICAL EVERY 12 HOURS SCHEDULED
Qty: 15 G | Refills: 0 | Status: SHIPPED | OUTPATIENT
Start: 2018-06-04 | End: 2018-07-02 | Stop reason: SDUPTHER

## 2018-06-04 NOTE — PROGRESS NOTES
Subjective   Dalila Valerio is a 67 y.o. female who presents c/o rash under abdomen x 2 weeks. Also not able to sleep lately and also with pain in right ear and near jaw.     History of Present Illness   Rash has been on abdomen, described as itchy, has tried applying guaze to keep dry and nystatin, for 2 weeks.    Back to not sleeping. Some issues with estrangement from daughter who is bipolar. Has had her crying daily. Daughter is burying a . She is concerned about her mental health. Also tearful about her current weight. Not a candidate for stimulants, based on CAD.     Ear has been hurting, if ear repositioned stops hurting. No nasal drainage, though did wake with PND cough this morning, choking her.   The following portions of the patient's history were reviewed and updated as appropriate: allergies, current medications, past family history, past medical history, past social history, past surgical history and problem list.    Review of Systems   Constitutional: Negative for chills and fever.   HENT: Positive for ear pain. Negative for facial swelling, postnasal drip, rhinorrhea, sinus pressure and sore throat.    Eyes: Negative.    Respiratory: Positive for cough. Negative for shortness of breath.    Cardiovascular: Negative.    Gastrointestinal: Negative.    Genitourinary: Negative.    Skin: Positive for rash.   Allergic/Immunologic: Positive for environmental allergies.   Neurological: Negative for headache.   Hematological: Negative for adenopathy.   Psychiatric/Behavioral: Positive for sleep disturbance and depressed mood. Negative for self-injury, suicidal ideas and stress.     /64   Pulse 71   Temp 98.4 °F (36.9 °C) (Oral)   Wt 91.2 kg (201 lb)   SpO2 97%   BMI 36.76 kg/m²     Objective   Physical Exam   Constitutional: She is oriented to person, place, and time. She appears well-developed and well-nourished.   HENT:   Right Ear: Tympanic membrane is erythematous.   Left Ear: Tympanic  membrane normal.  No middle ear effusion.   Nose: Mucosal edema and rhinorrhea present. Right sinus exhibits no maxillary sinus tenderness and no frontal sinus tenderness. Left sinus exhibits no maxillary sinus tenderness and no frontal sinus tenderness.   Mouth/Throat: Uvula is midline and oropharynx is clear and moist.   Cardiovascular: Normal rate and regular rhythm.    Pulmonary/Chest: Effort normal and breath sounds normal.   Neurological: She is alert and oriented to person, place, and time.   Skin:   Low abdomen with scattered macular rash.   Psychiatric: Her speech is normal and behavior is normal. Judgment and thought content normal. Her mood appears anxious. Her affect is not angry. Cognition and memory are normal. She exhibits a depressed mood. She is attentive.   Nursing note and vitals reviewed.    Assessment/Plan   Problems Addressed this Visit        Other    Adjustment disorder with mixed anxiety and depressed mood    Relevant Medications    ARIPiprazole (ABILIFY) 2 MG tablet      Other Visit Diagnoses     Yeast dermatitis    -  Primary    Relevant Medications    clotrimazole-betamethasone (LOTRISONE) 1-0.05 % cream    Acute eczematoid otitis externa of right ear        Relevant Medications    ciprofloxacin-dexamethasone (CIPRODEX) 0.3-0.1 % otic suspension    clotrimazole-betamethasone (LOTRISONE) 1-0.05 % cream        Depression--secondary worsening, add abilify  Yeast dermatitis--try lotrisone as nystatin insufficient.  Ear--start ciprodex, follow up if ear pain not settling 1 week.   DM and weight gain--stop pioglitazone, assess A1c in 1 month.

## 2018-06-05 RX ORDER — ARIPIPRAZOLE 2 MG/1
2 TABLET ORAL DAILY
Qty: 90 TABLET | Refills: 0 | Status: SHIPPED | OUTPATIENT
Start: 2018-06-05 | End: 2018-12-10 | Stop reason: SDUPTHER

## 2018-06-06 DIAGNOSIS — IMO0002 UNCONTROLLED DIABETES MELLITUS: ICD-10-CM

## 2018-06-06 RX ORDER — PIOGLITAZONEHYDROCHLORIDE 30 MG/1
30 TABLET ORAL DAILY
Qty: 90 TABLET | Refills: 0 | OUTPATIENT
Start: 2018-06-06

## 2018-06-08 ENCOUNTER — TELEPHONE (OUTPATIENT)
Dept: FAMILY MEDICINE CLINIC | Facility: CLINIC | Age: 68
End: 2018-06-08

## 2018-06-08 RX ORDER — DOXYCYCLINE HYCLATE 100 MG/1
100 CAPSULE ORAL 2 TIMES DAILY
Qty: 14 CAPSULE | Refills: 0 | Status: SHIPPED | OUTPATIENT
Start: 2018-06-08 | End: 2018-07-17

## 2018-06-08 NOTE — TELEPHONE ENCOUNTER
Patient reports right ear burns badly when applying drops. Ear pain is not resolving and face and neck on right side are also hurting. She thinks she needs an antibiotic for this.

## 2018-06-12 ENCOUNTER — TELEPHONE (OUTPATIENT)
Dept: FAMILY MEDICINE CLINIC | Facility: CLINIC | Age: 68
End: 2018-06-12

## 2018-06-12 ENCOUNTER — OFFICE VISIT (OUTPATIENT)
Dept: FAMILY MEDICINE CLINIC | Facility: CLINIC | Age: 68
End: 2018-06-12

## 2018-06-12 VITALS
HEART RATE: 59 BPM | OXYGEN SATURATION: 97 % | SYSTOLIC BLOOD PRESSURE: 124 MMHG | HEIGHT: 62 IN | WEIGHT: 198 LBS | BODY MASS INDEX: 36.44 KG/M2 | TEMPERATURE: 99.7 F | DIASTOLIC BLOOD PRESSURE: 72 MMHG

## 2018-06-12 DIAGNOSIS — H66.001 ACUTE SUPPURATIVE OTITIS MEDIA OF RIGHT EAR WITHOUT SPONTANEOUS RUPTURE OF TYMPANIC MEMBRANE, RECURRENCE NOT SPECIFIED: Primary | ICD-10-CM

## 2018-06-12 PROCEDURE — 99213 OFFICE O/P EST LOW 20 MIN: CPT | Performed by: NURSE PRACTITIONER

## 2018-06-12 RX ORDER — CEFDINIR 300 MG/1
300 CAPSULE ORAL 2 TIMES DAILY
Qty: 14 CAPSULE | Refills: 0 | Status: SHIPPED | OUTPATIENT
Start: 2018-06-12 | End: 2018-07-17

## 2018-06-12 NOTE — PROGRESS NOTES
"Subjective   Dalila Valerio is a 67 y.o. female who presents for follow up on right ear pain.     History of Present Illness   R ear pain x 2 weeks.Medication not effective Reports she is now getting headaches. Has been using the ear drops as well as taking antibiotic as prescribed with no relief.  Also reports possible tooth pain or it is associated with the ear pain Has tried oral gel but only provides temporary minimal relief and comes right back. Right great toe still reddened and the toenail still looks 'fungal'.  The following portions of the patient's history were reviewed and updated as appropriate: allergies, current medications, past family history, past medical history, past social history, past surgical history and problem list.    Review of Systems   HENT: Positive for dental problem and ear pain.    Gastrointestinal: Positive for nausea (with antibiotic use-takes with bread for prevention).   Genitourinary: Negative.    Musculoskeletal:        Right great toe pain, toenail   Neurological: Positive for headache.   /72   Pulse 59   Temp 99.7 °F (37.6 °C) (Oral)   Ht 157.5 cm (62\")   Wt 89.8 kg (198 lb)   SpO2 97%   BMI 36.21 kg/m²       Objective   Physical Exam   Constitutional: Vital signs are normal. She appears well-developed and well-nourished.   HENT:   Head: Normocephalic.   Right Ear: There is tenderness.   Left Ear: Tympanic membrane normal.   Nose: Nose normal. Right sinus exhibits no maxillary sinus tenderness and no frontal sinus tenderness. Left sinus exhibits no maxillary sinus tenderness and no frontal sinus tenderness.   Mouth/Throat: Uvula is midline.   Neck: No thyromegaly present.   Cardiovascular: Normal rate, regular rhythm, S1 normal, S2 normal and normal heart sounds.    Pulmonary/Chest: Effort normal and breath sounds normal.   Abdominal: Normal appearance.      During the foot exam she had a monofilament test not performed.     Neurological: She is alert.   Skin: " Skin is warm and dry.   Psychiatric: She has a normal mood and affect. Her speech is normal and behavior is normal. Judgment and thought content normal.   Nursing note and vitals reviewed.    Assessment/Plan   Problems Addressed this Visit     None      Visit Diagnoses     Acute suppurative otitis media of right ear without spontaneous rupture of tympanic membrane, recurrence not specified    -  Primary          Reasonable to discontinue ear drops as reports increased pain with drops and no relief after all this time. Consider referral to ENT as medications not effective for ear pain x 2 wks. Will trial omnicef and see if resolves.     Continue to follow with podiatry for toe.

## 2018-06-28 RX ORDER — CITALOPRAM 20 MG/1
TABLET ORAL
Qty: 90 TABLET | Refills: 0 | Status: SHIPPED | OUTPATIENT
Start: 2018-06-28 | End: 2018-08-07 | Stop reason: SDUPTHER

## 2018-07-02 RX ORDER — CLOTRIMAZOLE AND BETAMETHASONE DIPROPIONATE 10; .64 MG/G; MG/G
CREAM TOPICAL
Qty: 15 G | Refills: 0 | Status: SHIPPED | OUTPATIENT
Start: 2018-07-02 | End: 2018-12-10

## 2018-07-02 RX ORDER — ARIPIPRAZOLE 2 MG/1
2 TABLET ORAL DAILY
Qty: 30 TABLET | Refills: 0 | Status: SHIPPED | OUTPATIENT
Start: 2018-07-02 | End: 2018-07-17 | Stop reason: SDUPTHER

## 2018-07-09 RX ORDER — GLIPIZIDE 5 MG/1
TABLET ORAL
Qty: 15 TABLET | Refills: 0 | Status: SHIPPED | OUTPATIENT
Start: 2018-07-09 | End: 2018-07-19 | Stop reason: ALTCHOICE

## 2018-07-17 ENCOUNTER — OFFICE VISIT (OUTPATIENT)
Dept: FAMILY MEDICINE CLINIC | Facility: CLINIC | Age: 68
End: 2018-07-17

## 2018-07-17 VITALS
HEIGHT: 62 IN | HEART RATE: 70 BPM | BODY MASS INDEX: 36.8 KG/M2 | OXYGEN SATURATION: 96 % | SYSTOLIC BLOOD PRESSURE: 128 MMHG | WEIGHT: 200 LBS | DIASTOLIC BLOOD PRESSURE: 74 MMHG | TEMPERATURE: 98.9 F

## 2018-07-17 DIAGNOSIS — R63.5 ABNORMAL WEIGHT GAIN: ICD-10-CM

## 2018-07-17 DIAGNOSIS — F43.23 ADJUSTMENT DISORDER WITH MIXED ANXIETY AND DEPRESSED MOOD: ICD-10-CM

## 2018-07-17 DIAGNOSIS — E11.8 CONTROLLED TYPE 2 DIABETES MELLITUS WITH COMPLICATION, WITHOUT LONG-TERM CURRENT USE OF INSULIN (HCC): Primary | ICD-10-CM

## 2018-07-17 PROCEDURE — 99213 OFFICE O/P EST LOW 20 MIN: CPT | Performed by: NURSE PRACTITIONER

## 2018-07-17 RX ORDER — VILAZODONE HYDROCHLORIDE 40 MG/1
40 TABLET ORAL DAILY
Qty: 30 TABLET | Refills: 0 | Status: SHIPPED | OUTPATIENT
Start: 2018-07-17 | End: 2018-08-07

## 2018-07-17 NOTE — PROGRESS NOTES
"Subjective   Dalila Valerio is a 67 y.o. female who presents for a follow up on diabetes, weight gain.     History of Present Illness   Has had 2 highs in the 180s, has had 2 recent epidurals. No diet changes, reports a healthy diet with increased vegetable intake. Eats small servings of meat. Rare ice cream. Still will have an occasional low blood sugar. Tends to be when trying to be more active.     Having increased issues with nerves. Went 48 hrs without sleeping. Waking typically 4 am EMA, up, unsure why. Nervous about daughter's situation. Mood just plummets at times. Thinks did better when on prozac, had some down thoughts when on prozac, thought to \"check out\" when on. No suicidal actions. Thinks good support system with Ed and her friend.     Frustrated with extra weight.   The following portions of the patient's history were reviewed and updated as appropriate: allergies, current medications, past family history, past medical history, past social history, past surgical history and problem list.    Review of Systems   Constitutional: Negative for activity change, appetite change, fatigue, unexpected weight gain and unexpected weight loss.   Respiratory: Negative.  Negative for shortness of breath.    Cardiovascular: Negative.  Negative for chest pain, palpitations and leg swelling.   Musculoskeletal: Positive for arthralgias and back pain.   Psychiatric/Behavioral: Positive for depressed mood. The patient is nervous/anxious.      /74   Pulse 70   Temp 98.9 °F (37.2 °C) (Oral)   Ht 157.5 cm (62\")   Wt 90.7 kg (200 lb)   SpO2 96%   BMI 36.58 kg/m²     Objective   Physical Exam   Constitutional: She appears well-developed and well-nourished.   Neck: Normal range of motion. Neck supple. No thyromegaly present.   Cardiovascular: Normal rate, regular rhythm and normal heart sounds.    Pulmonary/Chest: Effort normal and breath sounds normal.    Dalila had a diabetic foot exam performed today.   " During the foot exam she had a monofilament test performed (see scanned).  Lymphadenopathy:     She has no cervical adenopathy.   Skin: Skin is warm and dry.   Psychiatric: Her behavior is normal. Judgment and thought content normal. She exhibits a depressed mood.   Nursing note and vitals reviewed.    Assessment/Plan   Problems Addressed this Visit        Endocrine    Controlled diabetes mellitus type 2 with complications (CMS/[facility]) - Primary    Relevant Orders    Hemoglobin A1c       Other    Adjustment disorder with mixed anxiety and depressed mood    Relevant Medications    vilazodone (VIIBRYD) 40 MG tablet tablet      Other Visit Diagnoses     Abnormal weight gain        Relevant Orders    TSH        Consider lexapro or viibryd to address depressive symptoms in a more weight neutral way. Not a candidate for stimulants. FU 1 month.     DM2--update A1c, consider change to glimepiride. Discussed carb cautions with pt. To increase protein in diet. FU 3 months.

## 2018-07-18 LAB
HBA1C MFR BLD: 6.8 % (ref 4.8–5.6)
TSH SERPL DL<=0.005 MIU/L-ACNC: 1.77 UIU/ML (ref 0.45–4.5)

## 2018-07-18 NOTE — PROGRESS NOTES
Please call the patient regarding her result. Thyroid normal and A1c 6.8. Ok to change to glimepiride 2mg qd #30 5rf, FU 6 months. Stop glyburide

## 2018-07-19 RX ORDER — GLIMEPIRIDE 2 MG/1
2 TABLET ORAL
Qty: 30 TABLET | Refills: 5
Start: 2018-07-19 | End: 2018-08-01 | Stop reason: SDUPTHER

## 2018-07-30 ENCOUNTER — TELEPHONE (OUTPATIENT)
Dept: FAMILY MEDICINE CLINIC | Facility: CLINIC | Age: 68
End: 2018-07-30

## 2018-07-30 RX ORDER — OMEPRAZOLE 40 MG/1
CAPSULE, DELAYED RELEASE ORAL
Qty: 180 CAPSULE | Refills: 0 | Status: SHIPPED | OUTPATIENT
Start: 2018-07-30 | End: 2018-10-25 | Stop reason: SDUPTHER

## 2018-07-30 RX ORDER — ATORVASTATIN CALCIUM 80 MG/1
TABLET, FILM COATED ORAL
Qty: 90 TABLET | Refills: 0 | Status: SHIPPED | OUTPATIENT
Start: 2018-07-30 | End: 2018-10-26 | Stop reason: SDUPTHER

## 2018-07-30 RX ORDER — ARIPIPRAZOLE 2 MG/1
2 TABLET ORAL DAILY
Qty: 30 TABLET | Refills: 0 | Status: SHIPPED | OUTPATIENT
Start: 2018-07-30 | End: 2018-08-23 | Stop reason: SDUPTHER

## 2018-08-01 ENCOUNTER — TELEPHONE (OUTPATIENT)
Dept: FAMILY MEDICINE CLINIC | Facility: CLINIC | Age: 68
End: 2018-08-01

## 2018-08-01 RX ORDER — GLIMEPIRIDE 2 MG/1
2 TABLET ORAL
Qty: 30 TABLET | Refills: 5 | Status: SHIPPED | OUTPATIENT
Start: 2018-08-01 | End: 2018-12-10 | Stop reason: SINTOL

## 2018-08-02 RX ORDER — METOPROLOL TARTRATE 50 MG/1
TABLET, FILM COATED ORAL
Qty: 180 TABLET | Refills: 0 | Status: SHIPPED | OUTPATIENT
Start: 2018-08-02 | End: 2018-10-29 | Stop reason: SDUPTHER

## 2018-08-03 ENCOUNTER — TELEPHONE (OUTPATIENT)
Dept: FAMILY MEDICINE CLINIC | Facility: CLINIC | Age: 68
End: 2018-08-03

## 2018-08-03 NOTE — TELEPHONE ENCOUNTER
Patient states she is having pain in legs. The pain comes fast and is severe. at times the pain shoots up her back as well. She is unsure if this is related to varicose veins, neuropathy, or something else and wants to know if you think she should FU here, with cardiology or elsewhere?

## 2018-08-07 ENCOUNTER — TELEPHONE (OUTPATIENT)
Dept: FAMILY MEDICINE CLINIC | Facility: CLINIC | Age: 68
End: 2018-08-07

## 2018-08-07 RX ORDER — CITALOPRAM 20 MG/1
20 TABLET ORAL DAILY
Qty: 90 TABLET | Refills: 0 | Status: SHIPPED | OUTPATIENT
Start: 2018-08-07 | End: 2018-12-17 | Stop reason: SDUPTHER

## 2018-08-13 ENCOUNTER — TELEPHONE (OUTPATIENT)
Dept: FAMILY MEDICINE CLINIC | Facility: CLINIC | Age: 68
End: 2018-08-13

## 2018-08-14 ENCOUNTER — OFFICE VISIT (OUTPATIENT)
Dept: FAMILY MEDICINE CLINIC | Facility: CLINIC | Age: 68
End: 2018-08-14

## 2018-08-14 VITALS
DIASTOLIC BLOOD PRESSURE: 76 MMHG | HEART RATE: 65 BPM | OXYGEN SATURATION: 97 % | BODY MASS INDEX: 36.8 KG/M2 | WEIGHT: 200 LBS | HEIGHT: 62 IN | SYSTOLIC BLOOD PRESSURE: 124 MMHG | TEMPERATURE: 98.6 F

## 2018-08-14 DIAGNOSIS — J40 BRONCHITIS: Primary | ICD-10-CM

## 2018-08-14 PROCEDURE — 99213 OFFICE O/P EST LOW 20 MIN: CPT | Performed by: NURSE PRACTITIONER

## 2018-08-14 RX ORDER — GLIPIZIDE 10 MG/1
10 TABLET ORAL DAILY
COMMUNITY
End: 2018-08-14 | Stop reason: ALTCHOICE

## 2018-08-14 RX ORDER — NITROGLYCERIN 0.4 MG/1
TABLET SUBLINGUAL
Qty: 300 TABLET | Refills: 5 | Status: SHIPPED | OUTPATIENT
Start: 2018-08-14 | End: 2022-01-05 | Stop reason: SDUPTHER

## 2018-08-14 RX ORDER — NITROGLYCERIN 0.4 MG/1
TABLET SUBLINGUAL
Qty: 30 TABLET | Refills: 5 | Status: SHIPPED | OUTPATIENT
Start: 2018-08-14 | End: 2018-08-14 | Stop reason: SDUPTHER

## 2018-08-14 NOTE — PROGRESS NOTES
"Subjective   Dalila Valerio is a 67 y.o. female. Congestion, wheezing    History of Present Illness    Has had issues with congestion and wheezing for about 1 week, brought up a lot of thick discharge yesterday, gray in color.   The following portions of the patient's history were reviewed and updated as appropriate: allergies, current medications, past family history, past medical history, past social history, past surgical history and problem list.    Review of Systems   Constitutional: Negative for chills and fever.   HENT: Negative for congestion, ear pain, rhinorrhea, sinus pressure and sore throat.    Respiratory: Positive for cough, shortness of breath and wheezing.    Cardiovascular: Negative.    Gastrointestinal: Negative.    Neurological: Positive for headache.   Hematological: Negative.    Psychiatric/Behavioral: Negative.      /76   Pulse 65   Temp 98.6 °F (37 °C) (Oral)   Ht 157.5 cm (62.01\")   Wt 90.7 kg (200 lb)   SpO2 97%   BMI 36.57 kg/m²     Objective   Physical Exam   Constitutional: She is oriented to person, place, and time. She appears well-developed and well-nourished.   HENT:   Right Ear: Tympanic membrane normal.   Left Ear: Tympanic membrane normal.   Nose: Mucosal edema present. No rhinorrhea. Right sinus exhibits no maxillary sinus tenderness and no frontal sinus tenderness. Left sinus exhibits no maxillary sinus tenderness and no frontal sinus tenderness.   Mouth/Throat: Uvula is midline, oropharynx is clear and moist and mucous membranes are normal.   Cardiovascular: Normal rate, regular rhythm and normal heart sounds.    Pulmonary/Chest: Effort normal. She has wheezes (scattered).   Neurological: She is alert and oriented to person, place, and time.   Psychiatric: She has a normal mood and affect. Her behavior is normal. Judgment and thought content normal.   Nursing note and vitals reviewed.    Assessment/Plan   Problems Addressed this Visit     None      Visit " Diagnoses     Bronchitis    -  Primary        As starting to feel better, hold further treatment, ok for prn nebs to address wheezing. Consider medrol dose pack if not settling, as blood sugar well controlled of late (120s)

## 2018-08-23 ENCOUNTER — TELEPHONE (OUTPATIENT)
Dept: FAMILY MEDICINE CLINIC | Facility: CLINIC | Age: 68
End: 2018-08-23

## 2018-08-23 RX ORDER — ARIPIPRAZOLE 2 MG/1
2 TABLET ORAL DAILY
Qty: 30 TABLET | Refills: 0 | Status: SHIPPED | OUTPATIENT
Start: 2018-08-23 | End: 2018-09-18 | Stop reason: SDUPTHER

## 2018-08-24 ENCOUNTER — TELEPHONE (OUTPATIENT)
Dept: FAMILY MEDICINE CLINIC | Facility: CLINIC | Age: 68
End: 2018-08-24

## 2018-08-24 RX ORDER — PROMETHAZINE HCL/CODEINE 6.25-10/5
5 SYRUP ORAL EVERY 6 HOURS PRN
Qty: 50 ML | Refills: 0 | Status: SHIPPED | OUTPATIENT
Start: 2018-08-24 | End: 2018-09-18

## 2018-08-24 RX ORDER — BENZONATATE 100 MG/1
100 CAPSULE ORAL 3 TIMES DAILY PRN
Qty: 45 CAPSULE | Refills: 0 | Status: SHIPPED | OUTPATIENT
Start: 2018-08-24 | End: 2018-09-18

## 2018-08-24 NOTE — TELEPHONE ENCOUNTER
Patient called back stating tessalon is not covered by insurance. Separate message sent to Rossy.

## 2018-08-29 ENCOUNTER — TELEPHONE (OUTPATIENT)
Dept: FAMILY MEDICINE CLINIC | Facility: CLINIC | Age: 68
End: 2018-08-29

## 2018-08-29 NOTE — TELEPHONE ENCOUNTER
Patient saw vascular specialist regarding pain in legs and was told the following:  Good artery circulation  No varicose veins  Has muscle hernias in both legs  Wants her to stop atorvastatin 2-4 weeks and re-evalluate the pain  Also advised that celexa may be worsening RLS and therefore causing leg pain.     Patient wants to know your opinion on this and if she should stop these 2 medications now or one at a time.

## 2018-08-29 NOTE — TELEPHONE ENCOUNTER
Would stop the atorvastatin 1st for 2 weeks and assess effect on legs. If decides to stop celexa, would consider weaning off to avoid withdrawal.

## 2018-09-18 ENCOUNTER — OFFICE VISIT (OUTPATIENT)
Dept: FAMILY MEDICINE CLINIC | Facility: CLINIC | Age: 68
End: 2018-09-18

## 2018-09-18 VITALS
HEIGHT: 62 IN | SYSTOLIC BLOOD PRESSURE: 124 MMHG | DIASTOLIC BLOOD PRESSURE: 78 MMHG | TEMPERATURE: 99.2 F | HEART RATE: 60 BPM | BODY MASS INDEX: 36.8 KG/M2 | OXYGEN SATURATION: 97 % | WEIGHT: 200 LBS

## 2018-09-18 DIAGNOSIS — J40 BRONCHITIS: Primary | ICD-10-CM

## 2018-09-18 PROCEDURE — 99213 OFFICE O/P EST LOW 20 MIN: CPT | Performed by: NURSE PRACTITIONER

## 2018-09-18 PROCEDURE — 71046 X-RAY EXAM CHEST 2 VIEWS: CPT | Performed by: NURSE PRACTITIONER

## 2018-09-18 RX ORDER — GUAIFENESIN AND CODEINE PHOSPHATE 100; 10 MG/5ML; MG/5ML
5 SOLUTION ORAL 3 TIMES DAILY PRN
Qty: 180 ML | Refills: 0 | Status: SHIPPED | OUTPATIENT
Start: 2018-09-18 | End: 2018-10-16

## 2018-09-18 NOTE — PROGRESS NOTES
"Subjective   Dalila Valerio is a 67 y.o. female who presents c/o cough, congestion, wheezing, SOA. Cough has been present for approx. 2 months.     History of Present Illness   Last CXR in March, former smoker. Has called for cough syrup, but too expensive, so taking an otc nyquil. Not getting a lot of sleep secondary to cough. Using inhaler BID. Cough is productive brown sputum.   The following portions of the patient's history were reviewed and updated as appropriate: allergies, current medications, past family history, past medical history, past social history, past surgical history and problem list.    Review of Systems   Constitutional: Positive for fever (at night).   HENT: Positive for congestion (worse at night), dental problem (teeth hurting. ), ear pain (R ear) and sore throat.    Respiratory: Positive for cough and wheezing.    Cardiovascular: Negative.    Musculoskeletal: Positive for back pain.   Allergic/Immunologic: Positive for environmental allergies.   Neurological: Negative for headache.   Hematological: Negative.    Psychiatric/Behavioral: Negative.      /78   Pulse 60   Temp 99.2 °F (37.3 °C) (Oral)   Ht 157.5 cm (62\")   Wt 90.7 kg (200 lb)   SpO2 97%   BMI 36.58 kg/m²     Objective   Physical Exam   Constitutional: She is oriented to person, place, and time. She appears well-developed and well-nourished.   HENT:   Right Ear: Tympanic membrane normal.   Left Ear: Tympanic membrane normal.   Nose: Mucosal edema and rhinorrhea present. Right sinus exhibits no maxillary sinus tenderness and no frontal sinus tenderness. Left sinus exhibits no maxillary sinus tenderness and no frontal sinus tenderness.   Mouth/Throat: Uvula is midline, oropharynx is clear and moist and mucous membranes are normal.   Neck: Normal range of motion. Neck supple. No tracheal deviation present. No thyromegaly present.   Cardiovascular: Normal rate, regular rhythm and normal heart sounds.    Pulmonary/Chest: " Effort normal and breath sounds normal.   Lymphadenopathy:     She has no cervical adenopathy.   Neurological: She is alert and oriented to person, place, and time.   Psychiatric: She has a normal mood and affect. Her behavior is normal. Judgment and thought content normal.   Nursing note and vitals reviewed.    Assessment/Plan   Problems Addressed this Visit     None      Visit Diagnoses     Bronchitis    -  Primary    Relevant Medications    guaifenesin-codeine (GUAIFENESIN AC) 100-10 MG/5ML liquid    Other Relevant Orders    XR Chest PA & Lateral (In Office) (Completed)        CXR with NAD, comparison 9/23/16, no change, will send to radiology for interpretation.   Reasonable to suppress the cough at this point. Follow up if not settling 1-2 weeks.

## 2018-09-25 RX ORDER — CITALOPRAM 20 MG/1
TABLET ORAL
Qty: 90 TABLET | Refills: 0 | Status: SHIPPED | OUTPATIENT
Start: 2018-09-25 | End: 2018-10-16 | Stop reason: SDUPTHER

## 2018-10-02 ENCOUNTER — FLU SHOT (OUTPATIENT)
Dept: FAMILY MEDICINE CLINIC | Facility: CLINIC | Age: 68
End: 2018-10-02

## 2018-10-02 PROCEDURE — 90662 IIV NO PRSV INCREASED AG IM: CPT | Performed by: NURSE PRACTITIONER

## 2018-10-02 PROCEDURE — G0008 ADMIN INFLUENZA VIRUS VAC: HCPCS | Performed by: NURSE PRACTITIONER

## 2018-10-03 RX ORDER — ARIPIPRAZOLE 2 MG/1
2 TABLET ORAL DAILY
Qty: 30 TABLET | Refills: 0 | Status: SHIPPED | OUTPATIENT
Start: 2018-10-03 | End: 2018-10-16 | Stop reason: SDUPTHER

## 2018-10-08 RX ORDER — BUDESONIDE AND FORMOTEROL FUMARATE DIHYDRATE 160; 4.5 UG/1; UG/1
AEROSOL RESPIRATORY (INHALATION)
Qty: 10.2 G | Refills: 0 | Status: SHIPPED | OUTPATIENT
Start: 2018-10-08 | End: 2018-11-12 | Stop reason: SDUPTHER

## 2018-10-16 ENCOUNTER — OFFICE VISIT (OUTPATIENT)
Dept: FAMILY MEDICINE CLINIC | Facility: CLINIC | Age: 68
End: 2018-10-16

## 2018-10-16 ENCOUNTER — TELEPHONE (OUTPATIENT)
Dept: FAMILY MEDICINE CLINIC | Facility: CLINIC | Age: 68
End: 2018-10-16

## 2018-10-16 VITALS
DIASTOLIC BLOOD PRESSURE: 78 MMHG | TEMPERATURE: 99.1 F | HEART RATE: 60 BPM | OXYGEN SATURATION: 95 % | SYSTOLIC BLOOD PRESSURE: 128 MMHG

## 2018-10-16 DIAGNOSIS — R29.6 FREQUENT FALLS: ICD-10-CM

## 2018-10-16 DIAGNOSIS — M85.9 DISORDER OF BONE DENSITY AND STRUCTURE, UNSPECIFIED: ICD-10-CM

## 2018-10-16 DIAGNOSIS — S92.902D CLOSED FRACTURE OF LEFT FOOT WITH ROUTINE HEALING, SUBSEQUENT ENCOUNTER: Primary | ICD-10-CM

## 2018-10-16 DIAGNOSIS — M48.062 SPINAL STENOSIS OF LUMBAR REGION WITH NEUROGENIC CLAUDICATION: ICD-10-CM

## 2018-10-16 PROCEDURE — 99213 OFFICE O/P EST LOW 20 MIN: CPT | Performed by: NURSE PRACTITIONER

## 2018-10-16 NOTE — PROGRESS NOTES
Subjective   Dalila Valerio is a 68 y.o. female who presents to discuss worsening sleep apnea symptoms. Also wants to discuss therapy or treatment for stenosis. Fell on 10/6 and fractured left foot, treated at [facility] ER.     History of Present Illness   Sleep apnea symptoms include daytime sleepiness, +snoring at night, at times worse than others. Has been tested for sleep apnea, told positive, just cannot tolerate PAP therapy.      FALLING CONSTANTLY secondary to not careful where stepping, or with from sit to stand will go backwards. Has EMG scheduled secondary to cane use and falls. MRI shows stenosis, does know legs are getting weak at times before falls.     Brace has helped low back pain, can stand longer. Chiropractor has also helped.   The following portions of the patient's history were reviewed and updated as appropriate: allergies, current medications, past family history, past medical history, past social history, past surgical history and problem list.    Review of Systems   Constitutional: Negative for appetite change and unexpected weight loss.   Respiratory: Positive for apnea. Negative for shortness of breath.    Cardiovascular: Negative.    Gastrointestinal: Negative.    Endocrine: Negative.    Musculoskeletal: Positive for arthralgias, back pain and myalgias.   Skin: Negative.    Neurological: Negative for dizziness and headache.   Psychiatric/Behavioral: Negative.      /78   Pulse 60   Temp 99.1 °F (37.3 °C) (Oral)   SpO2 95%     Objective   Physical Exam   Constitutional: She appears well-developed and well-nourished. No distress.   Musculoskeletal:        Lumbar back: She exhibits decreased range of motion, tenderness, pain and spasm.        Left foot: There is decreased range of motion and tenderness (in walking boot today).   SLR pos niyah, +SI tenderness, negative piriformis to distraction.    Neurological: No sensory deficit. She exhibits normal muscle tone. Gait normal.    Psychiatric: She has a normal mood and affect. Her speech is normal and behavior is normal.     Assessment/Plan   Problems Addressed this Visit     None      Visit Diagnoses     Closed fracture of left foot with routine healing, subsequent encounter    -  Primary    Relevant Orders    DEXA Bone Density Axial    Disorder of bone density and structure, unspecified         Relevant Orders    DEXA Bone Density Axial    Frequent falls        Spinal stenosis of lumbar region with neurogenic claudication            Discussed with her heart condition, typically stenosis is not surgically treated. Treatment pending EMG  Discussed medical necessity of walker with seat, as cane insufficient to prevent falls.   Recommend reconsider PAP therapy for sleep apnea  Update bone density, as fracture, not up to date.   FU January for labs, kaylene  Continue work with pain management.

## 2018-10-24 ENCOUNTER — TELEPHONE (OUTPATIENT)
Dept: FAMILY MEDICINE CLINIC | Facility: CLINIC | Age: 68
End: 2018-10-24

## 2018-10-24 NOTE — TELEPHONE ENCOUNTER
Patient states she is on a 1200 calorie diet. BS has been lowering since she started this. Last night it got down to 73. She has lost approx 10 lbs so far. She wants to know if she should lower the glimepiride down to 1 daily instead of 2 daily for now?

## 2018-10-26 RX ORDER — OMEPRAZOLE 40 MG/1
CAPSULE, DELAYED RELEASE ORAL
Qty: 180 CAPSULE | Refills: 0 | Status: SHIPPED | OUTPATIENT
Start: 2018-10-26 | End: 2019-01-26 | Stop reason: SDUPTHER

## 2018-10-26 RX ORDER — ATORVASTATIN CALCIUM 80 MG/1
TABLET, FILM COATED ORAL
Qty: 90 TABLET | Refills: 0 | Status: SHIPPED | OUTPATIENT
Start: 2018-10-26 | End: 2019-01-26 | Stop reason: SDUPTHER

## 2018-10-29 RX ORDER — METOPROLOL TARTRATE 50 MG/1
TABLET, FILM COATED ORAL
Qty: 180 TABLET | Refills: 0 | Status: SHIPPED | OUTPATIENT
Start: 2018-10-29 | End: 2019-01-26 | Stop reason: SDUPTHER

## 2018-10-31 RX ORDER — ALENDRONATE SODIUM 70 MG/1
70 TABLET ORAL
Qty: 12 TABLET | Refills: 3 | Status: SHIPPED | OUTPATIENT
Start: 2018-10-31 | End: 2019-10-06 | Stop reason: SDUPTHER

## 2018-11-02 DIAGNOSIS — M25.442 SWELLING OF HAND JOINT, LEFT: ICD-10-CM

## 2018-11-05 DIAGNOSIS — M25.442 SWELLING OF HAND JOINT, LEFT: ICD-10-CM

## 2018-11-05 RX ORDER — GABAPENTIN 800 MG/1
TABLET ORAL
Qty: 90 TABLET | Refills: 0 | OUTPATIENT
Start: 2018-11-05

## 2018-11-05 RX ORDER — ARIPIPRAZOLE 2 MG/1
2 TABLET ORAL DAILY
Qty: 30 TABLET | Refills: 3 | Status: SHIPPED | OUTPATIENT
Start: 2018-11-05 | End: 2019-02-13 | Stop reason: SDUPTHER

## 2018-11-12 RX ORDER — BUDESONIDE AND FORMOTEROL FUMARATE DIHYDRATE 160; 4.5 UG/1; UG/1
AEROSOL RESPIRATORY (INHALATION)
Qty: 10.2 G | Refills: 2 | Status: SHIPPED | OUTPATIENT
Start: 2018-11-12 | End: 2019-02-02 | Stop reason: SDUPTHER

## 2018-12-10 ENCOUNTER — OFFICE VISIT (OUTPATIENT)
Dept: FAMILY MEDICINE CLINIC | Facility: CLINIC | Age: 68
End: 2018-12-10

## 2018-12-10 VITALS
BODY MASS INDEX: 35.33 KG/M2 | SYSTOLIC BLOOD PRESSURE: 130 MMHG | WEIGHT: 192 LBS | HEART RATE: 58 BPM | OXYGEN SATURATION: 97 % | DIASTOLIC BLOOD PRESSURE: 74 MMHG | TEMPERATURE: 98.9 F | HEIGHT: 62 IN

## 2018-12-10 DIAGNOSIS — I25.10 CORONARY ARTERIOSCLEROSIS IN NATIVE ARTERY: ICD-10-CM

## 2018-12-10 DIAGNOSIS — E66.01 MORBIDLY OBESE (HCC): ICD-10-CM

## 2018-12-10 DIAGNOSIS — M25.552 HIP PAIN, CHRONIC, LEFT: ICD-10-CM

## 2018-12-10 DIAGNOSIS — E55.9 VITAMIN D DEFICIENCY: ICD-10-CM

## 2018-12-10 DIAGNOSIS — E11.8 CONTROLLED TYPE 2 DIABETES MELLITUS WITH COMPLICATION, WITHOUT LONG-TERM CURRENT USE OF INSULIN (HCC): Primary | ICD-10-CM

## 2018-12-10 DIAGNOSIS — G89.29 HIP PAIN, CHRONIC, LEFT: ICD-10-CM

## 2018-12-10 PROCEDURE — 73502 X-RAY EXAM HIP UNI 2-3 VIEWS: CPT | Performed by: NURSE PRACTITIONER

## 2018-12-10 PROCEDURE — 99214 OFFICE O/P EST MOD 30 MIN: CPT | Performed by: NURSE PRACTITIONER

## 2018-12-10 RX ORDER — NATEGLINIDE 60 MG/1
60 TABLET ORAL
Qty: 90 TABLET | Refills: 3 | Status: SHIPPED | OUTPATIENT
Start: 2018-12-10 | End: 2019-05-26 | Stop reason: SDUPTHER

## 2018-12-10 NOTE — PROGRESS NOTES
"Subjective   Dalila Valerio is a 68 y.o. female who presents c/o pain in both legs that is worsening. Left is worse than right. Also wants A1C checked and a referral to endocrinology for extreme fluctuations in blood sugar.     History of Present Illness   Pain in the legs occurs in the hips and legs. L hip hurts in the joint, through the groin. But also sharp pain from the foot up the thigh to the groin, feels like a nerve pain. Worse if lays on side. Not as bad if lays on back. Has not had hip xrayed.   Blood sugar ranges from 54 to 95 to 170, unsure why. Has never been on nateglinide. Checking blood sugar frequently due to lows. Does eat protein with every meal, avoids white carbs. Is losing weight.   Unsure why not taking a statin, thinks was told numbers were low enough? Still taking vitamin D  The following portions of the patient's history were reviewed and updated as appropriate: allergies, current medications, past family history, past medical history, past social history, past surgical history and problem list.    Review of Systems   Constitutional: Negative for activity change, appetite change, fatigue, unexpected weight gain and unexpected weight loss.   Respiratory: Negative.  Negative for shortness of breath.    Cardiovascular: Negative.  Negative for chest pain, palpitations and leg swelling.   Endocrine: Negative.    Genitourinary: Negative.    Musculoskeletal: Positive for arthralgias, back pain and gait problem. Negative for myalgias.   Skin: Negative.    Psychiatric/Behavioral: Negative.      /74   Pulse 58   Temp 98.9 °F (37.2 °C) (Oral)   Ht 157.5 cm (62\")   Wt 87.1 kg (192 lb) Comment: with back brace on  SpO2 97%   BMI 35.12 kg/m²     Objective   Physical Exam   Constitutional: She appears well-developed and well-nourished. No distress.   Neck: Normal range of motion. Neck supple. No thyromegaly present.   Cardiovascular: Normal rate, regular rhythm and normal heart sounds. "   Pulmonary/Chest: Effort normal and breath sounds normal.   Musculoskeletal:        Left hip: She exhibits decreased range of motion (decreased external rotation), tenderness and bony tenderness (trochanteric bursitis).   Lymphadenopathy:     She has no cervical adenopathy.   Skin: Skin is warm and dry.   Psychiatric: She has a normal mood and affect. Her behavior is normal. Judgment and thought content normal.   Nursing note and vitals reviewed.    Assessment/Plan   Problems Addressed this Visit        Cardiovascular and Mediastinum    Coronary arteriosclerosis in native artery    Relevant Orders    Comprehensive Metabolic Panel    Lipid Panel       Digestive    Vitamin D deficiency    Relevant Orders    Vitamin D 25 Hydroxy    Morbidly obese (CMS/[facility])       Endocrine    Controlled diabetes mellitus type 2 with complications (CMS/[facility]) - Primary    Relevant Medications    nateglinide (STARLIX) 60 MG tablet    Other Relevant Orders    Hemoglobin A1c    Microalbumin / Creatinine Urine Ratio - Urine, Clean Catch      Other Visit Diagnoses     Hip pain, chronic, left        Relevant Orders    XR Hip With or Without Pelvis 2 - 3 View Left      CAD--restart statin, continue ASA, effient, BB, ACE, continue to follow with [facility]  DM2--last A1c 6.8, on glimepiride secondary to budget for medications, intolerant to metformin. Will rotate to nateglinide, to see if can avoid lows. Declines referral to endocrinology for now.  Obesity--continue to work on diet and weight  Vit D def--continue supplement, update levels.    Leg pain--EMG did not indicate PN or radiculopathy, symptoms focused around the groin, will xray. Continue work with pain management.   Xrays show DJD, interval increase since 11/28/16, will send to radiology for interpretation. will refer to Manuel for another opinion.   HLD--recommend restart statin, previously tolerated, and Hx CAD  Recommend Hep A vaccine, should get at pharmacy.

## 2018-12-11 LAB
25(OH)D3+25(OH)D2 SERPL-MCNC: 13.3 NG/ML (ref 30–100)
ALBUMIN SERPL-MCNC: 4.5 G/DL (ref 3.5–5.2)
ALBUMIN/CREAT UR: 5.1 MG/G CREAT (ref 0–30)
ALBUMIN/GLOB SERPL: 1.7 G/DL
ALP SERPL-CCNC: 55 U/L (ref 39–117)
ALT SERPL-CCNC: 20 U/L (ref 1–33)
AST SERPL-CCNC: 16 U/L (ref 1–32)
BILIRUB SERPL-MCNC: 0.2 MG/DL (ref 0.1–1.2)
BUN SERPL-MCNC: 15 MG/DL (ref 8–23)
BUN/CREAT SERPL: 14.2 (ref 7–25)
CALCIUM SERPL-MCNC: 9.9 MG/DL (ref 8.6–10.5)
CHLORIDE SERPL-SCNC: 106 MMOL/L (ref 98–107)
CHOLEST SERPL-MCNC: 271 MG/DL (ref 0–200)
CO2 SERPL-SCNC: 25.4 MMOL/L (ref 22–29)
CREAT SERPL-MCNC: 1.06 MG/DL (ref 0.57–1)
CREAT UR-MCNC: 87.1 MG/DL
GLOBULIN SER CALC-MCNC: 2.6 GM/DL
GLUCOSE SERPL-MCNC: 67 MG/DL (ref 65–99)
HBA1C MFR BLD: 6 % (ref 4.8–5.6)
HDLC SERPL-MCNC: 36 MG/DL (ref 40–60)
LDLC SERPL CALC-MCNC: 207 MG/DL (ref 0–100)
MICROALBUMIN UR-MCNC: 4.4 UG/ML
POTASSIUM SERPL-SCNC: 4.2 MMOL/L (ref 3.5–5.2)
PROT SERPL-MCNC: 7.1 G/DL (ref 6–8.5)
SODIUM SERPL-SCNC: 143 MMOL/L (ref 136–145)
TRIGL SERPL-MCNC: 142 MG/DL (ref 0–150)
VLDLC SERPL CALC-MCNC: 28.4 MG/DL (ref 5–40)

## 2018-12-11 NOTE — PROGRESS NOTES
Please call the patient regarding her abnormal result. Diabetes well controlled, cholesterol very high. Changes as discussed yesterday. Vitamin d is low as well. Start vitamin d 5000 IU daily to address. FU 3 months.

## 2018-12-17 RX ORDER — CITALOPRAM 20 MG/1
20 TABLET ORAL DAILY
Qty: 90 TABLET | Refills: 0 | Status: SHIPPED | OUTPATIENT
Start: 2018-12-17 | End: 2019-06-12 | Stop reason: SDUPTHER

## 2018-12-24 ENCOUNTER — TELEPHONE (OUTPATIENT)
Dept: FAMILY MEDICINE CLINIC | Facility: CLINIC | Age: 68
End: 2018-12-24

## 2018-12-24 NOTE — TELEPHONE ENCOUNTER
Pt is aware and going to keep a log of her sugars. She said she may want a referral to endo but not at this time.

## 2019-01-09 ENCOUNTER — OFFICE VISIT (OUTPATIENT)
Dept: FAMILY MEDICINE CLINIC | Facility: CLINIC | Age: 69
End: 2019-01-09

## 2019-01-09 VITALS
SYSTOLIC BLOOD PRESSURE: 124 MMHG | DIASTOLIC BLOOD PRESSURE: 74 MMHG | OXYGEN SATURATION: 96 % | HEIGHT: 62 IN | BODY MASS INDEX: 33.86 KG/M2 | WEIGHT: 184 LBS | TEMPERATURE: 98.8 F | HEART RATE: 65 BPM

## 2019-01-09 DIAGNOSIS — J10.1 INFLUENZA A: ICD-10-CM

## 2019-01-09 DIAGNOSIS — S93.401D SPRAIN OF RIGHT ANKLE, UNSPECIFIED LIGAMENT, SUBSEQUENT ENCOUNTER: ICD-10-CM

## 2019-01-09 DIAGNOSIS — E11.8 CONTROLLED TYPE 2 DIABETES MELLITUS WITH COMPLICATION, WITHOUT LONG-TERM CURRENT USE OF INSULIN (HCC): Primary | ICD-10-CM

## 2019-01-09 PROCEDURE — 99214 OFFICE O/P EST MOD 30 MIN: CPT | Performed by: NURSE PRACTITIONER

## 2019-01-09 NOTE — PROGRESS NOTES
"Subjective   Dalila Valerio is a 68 y.o. female who presents for a follow up after being admitted to [facility] on 12/28 with influenza. Also went to ER on 1/7/19 and was dx with sprain of right ankle.     History of Present Illness   Still very fatigued from the flu, still running a low grade fever. Only had a higher fever the first few days. No appetite. Blood sugar is erratic 243-400, was given steroids when in hospital. Was running low on Magnesium in the hospital.     R ankle is painful now since walks on ball of foot after fracturing other ankle. Now wearing ace wrap. Is swollen.   The following portions of the patient's history were reviewed and updated as appropriate: allergies, current medications, past family history, past medical history, past social history, past surgical history and problem list.    Review of Systems   Constitutional: Positive for activity change, appetite change, fatigue and fever.   HENT: Positive for congestion. Negative for ear pain and sore throat.    Respiratory: Positive for cough and wheezing. Negative for shortness of breath.    Cardiovascular: Negative.    Gastrointestinal: Negative.    Musculoskeletal: Positive for arthralgias and back pain.   Skin: Negative.    Psychiatric/Behavioral: Negative.      /74   Pulse 65   Temp 98.8 °F (37.1 °C) (Oral)   Ht 157.5 cm (62\")   Wt 83.5 kg (184 lb)   SpO2 96%   BMI 33.65 kg/m²     Objective   Physical Exam   Constitutional: She is oriented to person, place, and time. She appears well-developed and well-nourished.   HENT:   Right Ear: Tympanic membrane normal.   Left Ear: Tympanic membrane normal.   Nose: Mucosal edema present. Right sinus exhibits no maxillary sinus tenderness and no frontal sinus tenderness. Left sinus exhibits no maxillary sinus tenderness and no frontal sinus tenderness.   Mouth/Throat: Uvula is midline. No oropharyngeal exudate.   Neck: Normal range of motion. Neck supple. No tracheal deviation present. No " thyromegaly present.   Cardiovascular: Normal rate, regular rhythm and normal heart sounds.   Pulmonary/Chest: Effort normal and breath sounds normal.   Musculoskeletal: She exhibits edema (trace at R foot and ankle).        Right ankle: She exhibits swelling. She exhibits normal range of motion, no ecchymosis and no deformity. Tenderness. Lateral malleolus, AITFL, posterior TFL and proximal fibula tenderness found. Achilles tendon normal.   Lymphadenopathy:     She has no cervical adenopathy.   Neurological: She is alert and oriented to person, place, and time.   Skin: Skin is warm and dry.   Psychiatric: She has a normal mood and affect. Her behavior is normal. Judgment and thought content normal.   Nursing note and vitals reviewed.    Assessment/Plan   Problems Addressed this Visit        Endocrine    Controlled diabetes mellitus type 2 with complications (CMS/[facility]) - Primary      Other Visit Diagnoses     Influenza A        Sprain of right ankle, unspecified ligament, subsequent encounter            Reviewed hospital records, A1c was 6.3, was positive for influenza A, blood cultures and chest xray were negative.     Influenza A--No longer infectious once fever resolved.     R ankle sprain--is using ACE and RICE, using walker. Recommend PT if pain not settling in a few weeks.     DM2--high numbers are result of steroids during hospitalization, numbers should be coming down during the next week. To call if not coming down. Just eat healthy meals. Don't be afraid to eat.

## 2019-01-28 RX ORDER — METOPROLOL TARTRATE 50 MG/1
TABLET, FILM COATED ORAL
Qty: 180 TABLET | Refills: 0 | Status: SHIPPED | OUTPATIENT
Start: 2019-01-28 | End: 2019-04-28 | Stop reason: SDUPTHER

## 2019-01-28 RX ORDER — OMEPRAZOLE 40 MG/1
CAPSULE, DELAYED RELEASE ORAL
Qty: 180 CAPSULE | Refills: 0 | Status: SHIPPED | OUTPATIENT
Start: 2019-01-28 | End: 2019-04-28 | Stop reason: SDUPTHER

## 2019-01-28 RX ORDER — ATORVASTATIN CALCIUM 80 MG/1
TABLET, FILM COATED ORAL
Qty: 90 TABLET | Refills: 0 | Status: SHIPPED | OUTPATIENT
Start: 2019-01-28 | End: 2019-04-28 | Stop reason: SDUPTHER

## 2019-01-31 ENCOUNTER — TELEPHONE (OUTPATIENT)
Dept: FAMILY MEDICINE CLINIC | Facility: CLINIC | Age: 69
End: 2019-01-31

## 2019-01-31 DIAGNOSIS — Z86.010 PERSONAL HISTORY OF COLONIC POLYPS: Primary | ICD-10-CM

## 2019-01-31 NOTE — TELEPHONE ENCOUNTER
With her mom's history and personal history of polyps would recommend repeat, refer to Dr. Eubanks who did her last scope. Ok for both

## 2019-01-31 NOTE — TELEPHONE ENCOUNTER
Patient received notice from [facility] that her colonoscopy is due. She wants to know if you think she should proceed with this and if so she would like to have endoscopy done at the same time. Will need orders for both if okay.

## 2019-02-04 RX ORDER — BUDESONIDE AND FORMOTEROL FUMARATE DIHYDRATE 160; 4.5 UG/1; UG/1
AEROSOL RESPIRATORY (INHALATION)
Qty: 10.2 G | Refills: 0 | Status: SHIPPED | OUTPATIENT
Start: 2019-02-04 | End: 2019-03-04 | Stop reason: SDUPTHER

## 2019-02-13 RX ORDER — ARIPIPRAZOLE 2 MG/1
2 TABLET ORAL DAILY
Qty: 30 TABLET | Refills: 0 | Status: SHIPPED | OUTPATIENT
Start: 2019-02-13 | End: 2019-03-12 | Stop reason: SDUPTHER

## 2019-02-15 ENCOUNTER — TELEPHONE (OUTPATIENT)
Dept: FAMILY MEDICINE CLINIC | Facility: CLINIC | Age: 69
End: 2019-02-15

## 2019-02-15 NOTE — TELEPHONE ENCOUNTER
Patient has a productive cough with dark colored sputum and wants to be worked in this afternoon. She unable to come in this morning due to another appt. Please advise.

## 2019-02-19 ENCOUNTER — OFFICE VISIT (OUTPATIENT)
Dept: FAMILY MEDICINE CLINIC | Facility: CLINIC | Age: 69
End: 2019-02-19

## 2019-02-19 VITALS
BODY MASS INDEX: 34.41 KG/M2 | HEART RATE: 66 BPM | DIASTOLIC BLOOD PRESSURE: 78 MMHG | WEIGHT: 187 LBS | SYSTOLIC BLOOD PRESSURE: 122 MMHG | HEIGHT: 62 IN | TEMPERATURE: 98.7 F | OXYGEN SATURATION: 98 %

## 2019-02-19 DIAGNOSIS — J40 BRONCHITIS: Primary | ICD-10-CM

## 2019-02-19 PROCEDURE — 99213 OFFICE O/P EST LOW 20 MIN: CPT | Performed by: NURSE PRACTITIONER

## 2019-02-19 RX ORDER — LEVOFLOXACIN 500 MG/1
TABLET, FILM COATED ORAL
Refills: 0 | COMMUNITY
Start: 2019-02-16 | End: 2019-03-05

## 2019-02-19 RX ORDER — PREDNISONE 20 MG/1
TABLET ORAL
Refills: 0 | COMMUNITY
Start: 2019-02-16 | End: 2019-03-05

## 2019-02-19 NOTE — PROGRESS NOTES
"Subjective   Dalila Valerio is a 68 y.o. female who presents for a follow up after being treated in ER on 2-15-19 for a cough. Prescribed Levaquin and prednisone and advised to FU with PCP for possible pneumonia.     History of Present Illness   Reports onset of symptoms 1 1/2 weeks ago, seen 2/15/19 at [facility], some waxing and waning of symptoms since starting antibiotics. Not sleeping well since starting prednisone. Less rumbling in the chest, though not resolved. Cough is productive of brown sputum. Still feeling feverish at times. Is using nebulizer at home.   The following portions of the patient's history were reviewed and updated as appropriate: allergies, current medications, past family history, past medical history, past social history, past surgical history and problem list.    Review of Systems   Constitutional: Positive for fatigue and fever. Negative for chills.   HENT: Positive for congestion. Negative for sore throat.    Respiratory: Positive for cough, chest tightness, shortness of breath and wheezing.    Cardiovascular: Positive for palpitations. Negative for chest pain.   Musculoskeletal: Positive for back pain.   Psychiatric/Behavioral: Negative.      /78   Pulse 66   Temp 98.7 °F (37.1 °C) (Oral)   Ht 157.5 cm (62\")   Wt 84.8 kg (187 lb) Comment: with back brace on  SpO2 98%   BMI 34.20 kg/m²     Objective   Physical Exam   Constitutional: She is oriented to person, place, and time. She appears well-developed and well-nourished.   HENT:   Right Ear: Tympanic membrane normal.   Left Ear: Tympanic membrane normal.   Nose: Nose normal. Right sinus exhibits no maxillary sinus tenderness and no frontal sinus tenderness. Left sinus exhibits no maxillary sinus tenderness and no frontal sinus tenderness.   Mouth/Throat: Uvula is midline, oropharynx is clear and moist and mucous membranes are normal.   Neck: Normal range of motion. Neck supple. No tracheal deviation present. No thyromegaly " present.   Cardiovascular: Normal rate, regular rhythm and normal heart sounds.   Pulmonary/Chest: Effort normal and breath sounds normal.   Lymphadenopathy:     She has no cervical adenopathy.   Neurological: She is alert and oriented to person, place, and time.   Skin: Skin is warm and dry. Capillary refill takes less than 2 seconds.   Psychiatric: She has a normal mood and affect. Her behavior is normal. Judgment and thought content normal.   Nursing note and vitals reviewed.    Assessment/Plan   Problems Addressed this Visit     None      Visit Diagnoses     Bronchitis    -  Primary        Reviewed ER records, WBC 9.7, CXR with no infiltrate, Dx bronchitis, on prednisone and levaquin, to finish all medications and follow up if not settling.

## 2019-02-25 ENCOUNTER — TELEPHONE (OUTPATIENT)
Dept: GASTROENTEROLOGY | Facility: CLINIC | Age: 69
End: 2019-02-25

## 2019-02-25 NOTE — TELEPHONE ENCOUNTER
Left voice message with Dr Cesilia Franco's MA requesting cardiac clearance for patient to hold her Effient for 7 days prior to a colonoscopy.

## 2019-02-25 NOTE — TELEPHONE ENCOUNTER
----- Message from Suzy Eubanks MD sent at 2/23/2019  8:36 AM EST -----  Regarding: FW: RECALL PAPERWORK  pls get clearance to hold effient from dr street      ----- Message -----  From: Gwendolyn Sanders RN  Sent: 2/19/2019   3:02 PM  To: Suzy Eubanks MD  Subject: RECALL PAPERWORK                                 Open Access/Recall paperwork scanned in under the media tab.  Please review and return to Gwendolyn.

## 2019-02-25 NOTE — TELEPHONE ENCOUNTER
Li from Dr Lomas's office returned call.  Dr Lomas said that it is okay for patient to hold her Effient for 7 days prior to a scheduled colonoscopy.

## 2019-02-26 ENCOUNTER — TELEPHONE (OUTPATIENT)
Dept: FAMILY MEDICINE CLINIC | Facility: CLINIC | Age: 69
End: 2019-02-26

## 2019-02-26 ENCOUNTER — TELEPHONE (OUTPATIENT)
Dept: GASTROENTEROLOGY | Facility: CLINIC | Age: 69
End: 2019-02-26

## 2019-02-26 ENCOUNTER — PREP FOR SURGERY (OUTPATIENT)
Dept: OTHER | Facility: HOSPITAL | Age: 69
End: 2019-02-26

## 2019-02-26 DIAGNOSIS — IMO0002 UNCONTROLLED DIABETES MELLITUS: ICD-10-CM

## 2019-02-26 DIAGNOSIS — Z80.0 FH: COLON CANCER: Primary | ICD-10-CM

## 2019-02-26 DIAGNOSIS — E11.649 HYPOGLYCEMIA ASSOCIATED WITH TYPE 2 DIABETES MELLITUS (HCC): ICD-10-CM

## 2019-02-26 NOTE — TELEPHONE ENCOUNTER
----- Message from Gwendolyn Sanders RN sent at 2/25/2019  2:03 PM EST -----  Regarding: RE: RECALL PAPERWORK  Received a message from Li at Dr Street's office.  Dr Street said that it is okay for patient to hold her Effient for 7 days prior to a scheduled colonoscopy.  ----- Message -----  From: Suzy Eubanks MD  Sent: 2/23/2019   8:36 AM  To: Gwendolyn Sanders RN  Subject: FW: RECALL PAPERWORK                             pls get clearance to hold effient from dr street      ----- Message -----  From: Gwendolyn Sanders RN  Sent: 2/19/2019   3:02 PM  To: Suzy Eubanks MD  Subject: RECALL PAPERWORK                                 Open Access/Recall paperwork scanned in under the media tab.  Please review and return to Gwendolyn.

## 2019-02-26 NOTE — TELEPHONE ENCOUNTER
Call to pt.  Advise per Dr Eubanks and Dr Lomas that may hold effient for 7 days prior to c/s.      Pt verb understanding - has not yet been scheduled.  Message to Scheduling.

## 2019-02-27 DIAGNOSIS — E11.649 HYPOGLYCEMIA ASSOCIATED WITH TYPE 2 DIABETES MELLITUS (HCC): ICD-10-CM

## 2019-02-27 DIAGNOSIS — IMO0002 UNCONTROLLED DIABETES MELLITUS: ICD-10-CM

## 2019-03-04 RX ORDER — BUDESONIDE AND FORMOTEROL FUMARATE DIHYDRATE 160; 4.5 UG/1; UG/1
AEROSOL RESPIRATORY (INHALATION)
Qty: 10.2 G | Refills: 2 | Status: SHIPPED | OUTPATIENT
Start: 2019-03-04 | End: 2019-06-02 | Stop reason: SDUPTHER

## 2019-03-05 ENCOUNTER — OFFICE VISIT (OUTPATIENT)
Dept: FAMILY MEDICINE CLINIC | Facility: CLINIC | Age: 69
End: 2019-03-05

## 2019-03-05 VITALS
HEART RATE: 59 BPM | BODY MASS INDEX: 35.15 KG/M2 | HEIGHT: 62 IN | TEMPERATURE: 99.1 F | WEIGHT: 191 LBS | SYSTOLIC BLOOD PRESSURE: 130 MMHG | DIASTOLIC BLOOD PRESSURE: 78 MMHG | OXYGEN SATURATION: 94 %

## 2019-03-05 DIAGNOSIS — J40 BRONCHITIS: ICD-10-CM

## 2019-03-05 DIAGNOSIS — E11.8 CONTROLLED TYPE 2 DIABETES MELLITUS WITH COMPLICATION, WITHOUT LONG-TERM CURRENT USE OF INSULIN (HCC): Primary | ICD-10-CM

## 2019-03-05 LAB — HBA1C MFR BLD: 7.41 % (ref 4.8–5.6)

## 2019-03-05 PROCEDURE — 99213 OFFICE O/P EST LOW 20 MIN: CPT | Performed by: NURSE PRACTITIONER

## 2019-03-05 NOTE — PROGRESS NOTES
Subjective   Dalila Valerio is a 68 y.o. female who presents c/o wheezing x 2 weeks. Also concerned diabetes medication is not working.     Diabetes   There are no hypoglycemic associated symptoms. There are no hypoglycemic complications. Diabetic complications include heart disease. Risk factors for coronary artery disease include dyslipidemia, diabetes mellitus, obesity, hypertension, post-menopausal, sedentary lifestyle and stress. Current diabetic treatment includes oral agent (monotherapy). She is compliant with treatment most of the time. She is following a generally healthy (snacks, eats 5-6 x daily) diet. When asked about meal planning, she reported none. Her home blood glucose trend is fluctuating minimally. Her breakfast blood glucose range is generally 140-180 mg/dl. Her overall blood glucose range is 140-180 mg/dl. An ACE inhibitor/angiotensin II receptor blocker is being taken.      On 2/19 was taking prednisone and levaquin from ER. Finished a few days later and wheezing returned. Wheezing is worse at night with increased congestion and temperature. Using albuterol and symbicort BID typically. Controlling wheezing some. Today a bit more controlled. Ed is worried she needs measles booster. Has not been exposed to the measles or been around anyone with the measles.   The following portions of the patient's history were reviewed and updated as appropriate: allergies, current medications, past family history, past medical history, past social history, past surgical history and problem list.    Review of Systems   Constitutional: Positive for activity change and fever. Negative for chills.   HENT: Positive for congestion, postnasal drip and rhinorrhea (improving). Negative for ear pain and sore throat.    Respiratory: Positive for cough (taking OTC cough suppressant, helps) and wheezing.    Gastrointestinal: Positive for abdominal distention (gas).   Musculoskeletal: Positive for back pain.   Skin:  "Negative for rash.   Neurological: Positive for headache (low grade intermittent).     /78   Pulse 59   Temp 99.1 °F (37.3 °C) (Oral)   Ht 157.5 cm (62\")   Wt 86.6 kg (191 lb)   SpO2 94%   BMI 34.93 kg/m²     Objective   Physical Exam   Constitutional: She is oriented to person, place, and time. She appears well-developed and well-nourished.   HENT:   Right Ear: Tympanic membrane normal.   Left Ear: A middle ear effusion is present.   Nose: Nose normal. Right sinus exhibits no maxillary sinus tenderness and no frontal sinus tenderness. Left sinus exhibits no maxillary sinus tenderness and no frontal sinus tenderness.   Mouth/Throat: Uvula is midline, oropharynx is clear and moist and mucous membranes are normal.   Neck: Normal range of motion. Neck supple. No tracheal deviation present. No thyromegaly present.   Cardiovascular: Normal rate, regular rhythm and normal heart sounds.   Pulmonary/Chest: Effort normal and breath sounds normal.   Lymphadenopathy:     She has no cervical adenopathy.   Neurological: She is alert and oriented to person, place, and time.   Skin: Skin is warm and dry. Capillary refill takes less than 2 seconds.   Psychiatric: She has a normal mood and affect. Her behavior is normal. Judgment and thought content normal.   Nursing note and vitals reviewed.    Assessment/Plan   Problems Addressed this Visit        Endocrine    Controlled diabetes mellitus type 2 with complications (CMS/[facility]) - Primary    Relevant Orders    Hemoglobin A1c      Other Visit Diagnoses     Bronchitis            DM2--update A1c, favor continue nateglinide, sugar likely up secondary to recent steroid use  Bronchitis--settling--increase inhaler use for breakthrough wheezing. Ok to continue to suppress the cough. Follow up if not settling 1-2 weeks.         "

## 2019-03-12 RX ORDER — ARIPIPRAZOLE 2 MG/1
2 TABLET ORAL DAILY
Qty: 30 TABLET | Refills: 3 | Status: SHIPPED | OUTPATIENT
Start: 2019-03-12 | End: 2019-07-13 | Stop reason: SDUPTHER

## 2019-03-18 RX ORDER — CITALOPRAM 20 MG/1
TABLET ORAL
Qty: 90 TABLET | Refills: 0 | Status: SHIPPED | OUTPATIENT
Start: 2019-03-18 | End: 2019-05-10 | Stop reason: SDUPTHER

## 2019-03-30 DIAGNOSIS — M25.442 SWELLING OF HAND JOINT, LEFT: ICD-10-CM

## 2019-04-03 RX ORDER — GABAPENTIN 800 MG/1
TABLET ORAL
Qty: 90 TABLET | Refills: 0 | Status: SHIPPED | OUTPATIENT
Start: 2019-04-03 | End: 2019-04-28 | Stop reason: SDUPTHER

## 2019-04-19 ENCOUNTER — OFFICE VISIT (OUTPATIENT)
Dept: FAMILY MEDICINE CLINIC | Facility: CLINIC | Age: 69
End: 2019-04-19

## 2019-04-19 VITALS
OXYGEN SATURATION: 98 % | WEIGHT: 185 LBS | HEART RATE: 64 BPM | TEMPERATURE: 99 F | HEIGHT: 62 IN | BODY MASS INDEX: 34.04 KG/M2

## 2019-04-19 DIAGNOSIS — R10.9 ACUTE RIGHT FLANK PAIN: ICD-10-CM

## 2019-04-19 DIAGNOSIS — R10.31 ABDOMINAL PAIN, RLQ: ICD-10-CM

## 2019-04-19 DIAGNOSIS — R31.21 ASYMPTOMATIC MICROSCOPIC HEMATURIA: Primary | ICD-10-CM

## 2019-04-19 LAB
BILIRUB BLD-MCNC: NEGATIVE MG/DL
CLARITY, POC: CLEAR
COLOR UR: YELLOW
GLUCOSE UR STRIP-MCNC: NEGATIVE MG/DL
KETONES UR QL: NEGATIVE
LEUKOCYTE EST, POC: ABNORMAL
NITRITE UR-MCNC: NEGATIVE MG/ML
PH UR: 6 [PH] (ref 5–8)
PROT UR STRIP-MCNC: NEGATIVE MG/DL
RBC # UR STRIP: ABNORMAL /UL
SP GR UR: 1 (ref 1–1.03)
UROBILINOGEN UR QL: NORMAL

## 2019-04-19 PROCEDURE — 99214 OFFICE O/P EST MOD 30 MIN: CPT | Performed by: NURSE PRACTITIONER

## 2019-04-19 PROCEDURE — 81003 URINALYSIS AUTO W/O SCOPE: CPT | Performed by: NURSE PRACTITIONER

## 2019-04-19 PROCEDURE — 74018 RADEX ABDOMEN 1 VIEW: CPT | Performed by: NURSE PRACTITIONER

## 2019-04-19 NOTE — PROGRESS NOTES
"Subjective   Dalila Valerio is a 68 y.o. female who presents for a follow up on right flank pain. Was treated in [facility] ER for UTI, musculoskeletal pain. Prescribed flexeril and levaquin but was unable to fill either medication.     History of Present Illness   Did not fill either medication secondary to expense of antibiotics. Since home from ER has felt a lot of R flank pain and pinching pain in the R hip area forward. Doesn't think it is musculoskeletal. Thinks it feels like a kidney stone. Pain still in the same area, but improving since the time of ER visit.   The following portions of the patient's history were reviewed and updated as appropriate: allergies, current medications, past family history, past medical history, past social history, past surgical history and problem list.    Review of Systems   Constitutional: Negative for chills and fever.   HENT: Negative.    Respiratory: Negative.    Cardiovascular: Negative.    Gastrointestinal: Positive for abdominal pain.   Genitourinary: Positive for flank pain. Negative for decreased urine volume, difficulty urinating, dysuria and hematuria.   Musculoskeletal: Positive for arthralgias (R hip pain) and back pain.   Skin: Negative.    Hematological: Negative.    Psychiatric/Behavioral: The patient is nervous/anxious.      Pulse 64   Temp 99 °F (37.2 °C) (Oral)   Ht 157.5 cm (62\")   Wt 83.9 kg (185 lb)   SpO2 98%   BMI 33.84 kg/m²     Objective   Physical Exam   Constitutional: She appears well-developed and well-nourished.   Cardiovascular: Normal rate, regular rhythm and normal heart sounds.   Pulmonary/Chest: Effort normal.   Abdominal: Soft. There is no hepatosplenomegaly. There is tenderness in the right lower quadrant. There is CVA tenderness. There is no rigidity, no rebound, no guarding, no tenderness at McBurney's point and negative Guadarrama's sign.   Musculoskeletal:        Lumbar back: She exhibits decreased range of motion, tenderness, bony " tenderness and spasm.   Nursing note and vitals reviewed.    Assessment/Plan   Problems Addressed this Visit     None      Visit Diagnoses     Asymptomatic microscopic hematuria    -  Primary    Relevant Orders    POCT urinalysis dipstick, automated (Completed)    Abdominal pain, RLQ        Relevant Orders    XR Abdomen KUB (In Office)    Acute right flank pain              Obtained and reviewed records from [facility], [facility] films only ordered, though her complaints of RLQ and flank pain, H/O kidney stones. Will order KUB, as records reviewed and culture negative for UTI, flank pain some improved with time, persistent blood in the urine. Continue to push fluids and rest. Send urine for culture. Follow up pending culture results.     KUB--with no obvious stones, no comparison, will send to radiology for interpretation. Discussed best test for stone is CT, to return to ER if worsening pain over the weekend. FU pending culture results and if other needs.     Results for orders placed or performed in visit on 04/19/19   POCT urinalysis dipstick, automated   Result Value Ref Range    Color Yellow Yellow, Straw, Dark Yellow, Ruth    Clarity, UA Clear Clear    Specific Gravity  1.005 1.005 - 1.030    pH, Urine 6.0 5.0 - 8.0    Leukocytes Small (1+) (A) Negative    Nitrite, UA Negative Negative    Protein, POC Negative Negative mg/dL    Glucose, UA Negative Negative, 1000 mg/dL (3+) mg/dL    Ketones, UA Negative Negative    Urobilinogen, UA Normal Normal    Bilirubin Negative Negative    Blood, UA Trace (A) Negative

## 2019-04-21 LAB
BACTERIA UR CULT: NO GROWTH
BACTERIA UR CULT: NORMAL
Lab: NORMAL

## 2019-04-28 DIAGNOSIS — M25.442 SWELLING OF HAND JOINT, LEFT: ICD-10-CM

## 2019-04-29 RX ORDER — ATORVASTATIN CALCIUM 80 MG/1
TABLET, FILM COATED ORAL
Qty: 90 TABLET | Refills: 0 | Status: SHIPPED | OUTPATIENT
Start: 2019-04-29 | End: 2019-11-08 | Stop reason: SDUPTHER

## 2019-04-29 RX ORDER — METOPROLOL TARTRATE 50 MG/1
TABLET, FILM COATED ORAL
Qty: 180 TABLET | Refills: 0 | Status: SHIPPED | OUTPATIENT
Start: 2019-04-29 | End: 2019-07-27 | Stop reason: SDUPTHER

## 2019-04-29 RX ORDER — OMEPRAZOLE 40 MG/1
CAPSULE, DELAYED RELEASE ORAL
Qty: 180 CAPSULE | Refills: 0 | Status: SHIPPED | OUTPATIENT
Start: 2019-04-29 | End: 2019-07-27 | Stop reason: SDUPTHER

## 2019-05-02 RX ORDER — GABAPENTIN 800 MG/1
TABLET ORAL
Qty: 90 TABLET | Refills: 0 | Status: SHIPPED | OUTPATIENT
Start: 2019-05-02 | End: 2019-05-25 | Stop reason: SDUPTHER

## 2019-05-10 ENCOUNTER — OFFICE VISIT (OUTPATIENT)
Dept: FAMILY MEDICINE CLINIC | Facility: CLINIC | Age: 69
End: 2019-05-10

## 2019-05-10 VITALS
WEIGHT: 177 LBS | DIASTOLIC BLOOD PRESSURE: 70 MMHG | HEART RATE: 73 BPM | BODY MASS INDEX: 32.57 KG/M2 | SYSTOLIC BLOOD PRESSURE: 118 MMHG | HEIGHT: 62 IN | OXYGEN SATURATION: 96 % | TEMPERATURE: 98.3 F

## 2019-05-10 DIAGNOSIS — H57.89 PERIORBITAL SWELLING: Primary | ICD-10-CM

## 2019-05-10 DIAGNOSIS — K12.1 ULCER MOUTH: ICD-10-CM

## 2019-05-10 PROCEDURE — 99213 OFFICE O/P EST LOW 20 MIN: CPT | Performed by: NURSE PRACTITIONER

## 2019-05-10 NOTE — PROGRESS NOTES
"Subjective   Dalila Valerio is a 68 y.o. female who present to us today for an eye issue.     History of Present Illness   Had molar removed on 5/2 and has had eye issue since with eye swelling and facial swelling since. On clindamycin since, throat pain and white spots in mouth. Dentist thought all would settle with clindamycin.   Moved, smaller place, less stress.   The following portions of the patient's history were reviewed and updated as appropriate: allergies, current medications, past family history, past medical history, past social history, past surgical history and problem list.    Review of Systems   Constitutional: Negative for fatigue, unexpected weight gain and unexpected weight loss.   HENT: Positive for dental problem, facial swelling, mouth sores and sore throat.    Eyes: Positive for blurred vision.        Lid swelling   Respiratory: Negative.  Negative for shortness of breath.    Cardiovascular: Negative.  Negative for chest pain and palpitations.   Psychiatric/Behavioral: Negative for behavioral problems, decreased concentration, dysphoric mood and sleep disturbance. The patient is not nervous/anxious.      /70 (BP Location: Right arm, Patient Position: Sitting, Cuff Size: Adult)   Pulse 73   Temp 98.3 °F (36.8 °C) (Oral)   Ht 157.5 cm (62\")   Wt 80.3 kg (177 lb)   SpO2 96%   BMI 32.37 kg/m²     Objective   Physical Exam   Constitutional: She appears well-developed and well-nourished.   HENT:   Mouth/Throat: Mucous membranes are not pale. Oral lesions (apthous ulcer R buccal) present. Abnormal dentition. No dental abscesses. Posterior oropharyngeal edema present.       Eyes: EOM are normal.   Mild upper lid swelling niyah   Neck: Normal range of motion. Neck supple. No tracheal deviation present. No thyromegaly present.   Cardiovascular: Normal rate, regular rhythm and normal heart sounds.   Pulmonary/Chest: Effort normal and breath sounds normal.   Lymphadenopathy:     She has no " cervical adenopathy.   Skin: Skin is warm and dry. No rash noted.   Psychiatric: She has a normal mood and affect. Her behavior is normal. Judgment and thought content normal.   Nursing note and vitals reviewed.    Assessment/Plan   Problems Addressed this Visit     None      Visit Diagnoses     Periorbital swelling    -  Primary    Ulcer mouth            Periorbital swelling--not significant swelling, doubt infectious  Ulcer mouth--should self settle.  Can tolerate clindamycin well. Doubt role of further antibiotics at this point.   FU prn

## 2019-05-25 DIAGNOSIS — M25.442 SWELLING OF HAND JOINT, LEFT: ICD-10-CM

## 2019-05-26 DIAGNOSIS — E11.8 CONTROLLED TYPE 2 DIABETES MELLITUS WITH COMPLICATION, WITHOUT LONG-TERM CURRENT USE OF INSULIN (HCC): ICD-10-CM

## 2019-05-28 DIAGNOSIS — Z79.899 HIGH RISK MEDICATION USE: Primary | ICD-10-CM

## 2019-05-28 RX ORDER — GABAPENTIN 800 MG/1
TABLET ORAL
Qty: 90 TABLET | Refills: 0 | Status: SHIPPED | OUTPATIENT
Start: 2019-05-28 | End: 2019-05-29 | Stop reason: SDUPTHER

## 2019-05-28 RX ORDER — NATEGLINIDE 60 MG/1
TABLET ORAL
Qty: 90 TABLET | Refills: 0 | Status: SHIPPED | OUTPATIENT
Start: 2019-05-28 | End: 2019-07-01 | Stop reason: SDUPTHER

## 2019-05-29 DIAGNOSIS — M25.442 SWELLING OF HAND JOINT, LEFT: ICD-10-CM

## 2019-05-29 RX ORDER — GABAPENTIN 800 MG/1
800 TABLET ORAL 3 TIMES DAILY
Qty: 90 TABLET | Refills: 0 | Status: SHIPPED | OUTPATIENT
Start: 2019-05-29 | End: 2019-07-01 | Stop reason: SDUPTHER

## 2019-05-29 NOTE — TELEPHONE ENCOUNTER
Pt wasin to  gabapentin script and leave UDS this morning but left with out taking script. She did leave the urine sample. Can you send this to pharmacy so she does not have to come back.

## 2019-05-29 NOTE — TELEPHONE ENCOUNTER
Pt says her last pain management appt was cancelled because she had an outstanding balance. She cannot pay the balance until the first and dr dunn would not accept a post dated check per pt report. She will pay him on the 1st and has another appt pending for later this month when he is able to see her again however she is out of lortab. She states she has been told if she abruptly stops her pain medication she could have a heart attack and she is worried. She wants you to write her a couple weeks worth of medication. I explained to pt she is under a contract with dr dunn and this is not possible. She wants advice.

## 2019-05-29 NOTE — TELEPHONE ENCOUNTER
Cannot write her lortab, she should cut back on doses to stretch out and taper off if going to run out.

## 2019-06-03 ENCOUNTER — TELEPHONE (OUTPATIENT)
Dept: FAMILY MEDICINE CLINIC | Facility: CLINIC | Age: 69
End: 2019-06-03

## 2019-06-03 LAB
AMPHETAMINES UR QL SCN: NEGATIVE NG/ML
BARBITURATES UR QL SCN: NEGATIVE NG/ML
BENZODIAZ UR QL SCN: NEGATIVE NG/ML
BZE UR QL SCN: NEGATIVE NG/ML
CANNABINOIDS UR QL SCN: NEGATIVE NG/ML
CREAT UR-MCNC: 145.5 MG/DL (ref 20–300)
FENTANYL+NORFENTANYL UR QL SCN: NEGATIVE PG/ML
LABORATORY COMMENT REPORT: ABNORMAL
MEPERIDINE UR QL: NEGATIVE NG/ML
METHADONE UR QL SCN: NEGATIVE NG/ML
OPIATES UR QL SCN: POSITIVE NG/ML
OXYCODONE+OXYMORPHONE UR QL SCN: NEGATIVE NG/ML
PCP UR QL: NEGATIVE NG/ML
PH UR: 5.6 [PH] (ref 4.5–8.9)
PROPOXYPH UR QL SCN: NEGATIVE NG/ML
SP GR UR: 1.01
TRAMADOL UR QL SCN: NEGATIVE NG/ML

## 2019-06-03 RX ORDER — BUDESONIDE AND FORMOTEROL FUMARATE DIHYDRATE 160; 4.5 UG/1; UG/1
AEROSOL RESPIRATORY (INHALATION)
Qty: 10.2 G | Refills: 2 | Status: SHIPPED | OUTPATIENT
Start: 2019-06-03 | End: 2022-03-21 | Stop reason: SDUPTHER

## 2019-06-03 NOTE — TELEPHONE ENCOUNTER
"REHAN pt walked into office to talk about this. I informed her that we cannot write for xanax either. She says she went to dunn office this morning and paid her bill and the next available appt is in July.she plans to call everyday to check for a cancelled appt. She stated \"I am starting to lose it and hurt myself.\" I asked her to go to [facility]. Spouse was waiting parked outside the front door, I asked her to have him take her to the hospital and if a script was prescribed there to have it approved by [facility] before filling.  She refused and said \"let it be\" and left the office.   "

## 2019-06-03 NOTE — TELEPHONE ENCOUNTER
Pt is going to dr dunn office today to pay her balance. Her rx will be processed after this is paid but it takes 7 days. She is asking if you can write her xanax to get her thru this week. I informed pt that this is also a controlled substance and will violate the contact she has with pain management. She wanted to ask you anyway.

## 2019-06-06 ENCOUNTER — OFFICE VISIT (OUTPATIENT)
Dept: FAMILY MEDICINE CLINIC | Facility: CLINIC | Age: 69
End: 2019-06-06

## 2019-06-06 VITALS
HEART RATE: 65 BPM | BODY MASS INDEX: 32.57 KG/M2 | OXYGEN SATURATION: 98 % | WEIGHT: 177 LBS | DIASTOLIC BLOOD PRESSURE: 60 MMHG | SYSTOLIC BLOOD PRESSURE: 118 MMHG | TEMPERATURE: 99.8 F | HEIGHT: 62 IN

## 2019-06-06 DIAGNOSIS — J02.9 SORE THROAT: Primary | ICD-10-CM

## 2019-06-06 DIAGNOSIS — H65.93 BILATERAL NON-SUPPURATIVE OTITIS MEDIA: ICD-10-CM

## 2019-06-06 DIAGNOSIS — J02.9 VIRAL PHARYNGITIS: ICD-10-CM

## 2019-06-06 LAB
EXPIRATION DATE: NORMAL
INTERNAL CONTROL: NORMAL
Lab: NORMAL
S PYO AG THROAT QL: NEGATIVE

## 2019-06-06 PROCEDURE — 99213 OFFICE O/P EST LOW 20 MIN: CPT | Performed by: NURSE PRACTITIONER

## 2019-06-06 PROCEDURE — 87880 STREP A ASSAY W/OPTIC: CPT | Performed by: NURSE PRACTITIONER

## 2019-06-06 RX ORDER — CLINDAMYCIN HYDROCHLORIDE 300 MG/1
300 CAPSULE ORAL AS NEEDED
COMMUNITY
Start: 2019-05-02 | End: 2019-08-27

## 2019-06-06 RX ORDER — CEFDINIR 300 MG/1
300 CAPSULE ORAL 2 TIMES DAILY
Qty: 14 CAPSULE | Refills: 0 | Status: CANCELLED | OUTPATIENT
Start: 2019-06-06

## 2019-06-06 RX ORDER — TOPIRAMATE 25 MG/1
25 TABLET ORAL DAILY
Refills: 1 | COMMUNITY
Start: 2019-05-28 | End: 2019-09-25 | Stop reason: SDUPTHER

## 2019-06-06 NOTE — PROGRESS NOTES
Subjective     Dalila Valerio is a 68 y.o. female who presents with   Chief Complaint   Patient presents with   • Sore Throat     x2 days   • URI   • Cough     non productive.       Sore Throat    The current episode started yesterday. The problem has been unchanged. The maximum temperature recorded prior to her arrival was 100.4 - 100.9 F. The pain is mild. Associated symptoms include congestion, coughing, headaches and a plugged ear sensation. Pertinent negatives include no abdominal pain, ear discharge, ear pain, shortness of breath or trouble swallowing. She has tried nothing (cool drinks ) for the symptoms.   URI    This is a new problem. The current episode started in the past 7 days. The problem has been unchanged. Associated symptoms include congestion, coughing, headaches, a plugged ear sensation, rhinorrhea and a sore throat. Pertinent negatives include no abdominal pain, chest pain, ear pain, sneezing or wheezing.   Cough   This is a new problem. The current episode started in the past 7 days. The problem has been unchanged. The problem occurs constantly. The cough is non-productive. Associated symptoms include a fever, headaches, rhinorrhea and a sore throat. Pertinent negatives include no chest pain, chills, ear pain, shortness of breath or wheezing. Nothing aggravates the symptoms. She has tried nothing for the symptoms. There is no history of environmental allergies.        Review of Systems   Constitutional: Positive for fever. Negative for chills.   HENT: Positive for congestion, rhinorrhea and sore throat. Negative for ear discharge, ear pain, facial swelling, hearing loss, nosebleeds, sinus pressure, sneezing and trouble swallowing.    Respiratory: Positive for cough. Negative for shortness of breath and wheezing.    Cardiovascular: Negative.  Negative for chest pain.   Gastrointestinal: Negative for abdominal pain.   Endocrine: Negative.    Musculoskeletal: Negative for arthralgias.    Allergic/Immunologic: Negative for environmental allergies.   Neurological: Positive for headaches.   All other systems reviewed and are negative.        The following portions of the patient's history were reviewed and updated as appropriate: allergies, current medications, past family history, past medical history, past social history, past surgical history and problem list.      Patient Active Problem List    Diagnosis Date Noted   • Morbidly obese (CMS/[facility]) 12/10/2018   • DODSON (nonalcoholic steatohepatitis) 04/01/2016   • Adjustment disorder with mixed anxiety and depressed mood 03/08/2016   • Atopic rhinitis 03/08/2016   • Coronary arteriosclerosis in native artery 03/08/2016     Note Last Updated: 3/8/2016     Description: s/p PX LAD stent on 12/26/13.     • Cobalamin deficiency 03/08/2016   • History of Williamson's esophagus 03/08/2016   • Benign colonic polyp 03/08/2016   • Lumbar radiculopathy 03/08/2016   • Controlled diabetes mellitus type 2 with complications (CMS/[facility]) 03/08/2016     Note Last Updated: 5/19/2016     Has lows with increased glimiperide dose, intolerant metformin, unable to afford many options, tolerating farxiga well, could consider pioglitizone as well, as no sign of heart failure, EF 60% on cath 5/16, did not try nessina, or any other DDP4 secondary to cost. Has not tried insulin secondary to cost.     • Binocular vision disorder with diplopia 03/08/2016   • Dyslipidemia 03/08/2016   • Arthropathy of hand 03/08/2016   • Knee pain 03/08/2016   • Cramps of lower extremity 03/08/2016   • Low back pain 03/08/2016   • Migraine with typical aura 03/08/2016   • MAC (mycobacterium avium-intracellulare complex) 03/08/2016   • Chronic nausea 03/08/2016   • Obstructive sleep apnea syndrome 03/08/2016     Note Last Updated: 3/8/2016     Description: Does not want to use CPAP as she is claustrophobic.     • Palpitations 03/08/2016   • Ventricular premature beats 03/08/2016   • Cephalalgia 03/08/2016  "  • Colonic constipation 03/08/2016   • Neurocardiogenic syncope 03/08/2016     Note Last Updated: 3/8/2016     Description: No issues at the present time.     • Vitamin D deficiency 03/08/2016   • Fibromyalgia 03/26/2013       Current Outpatient Medications on File Prior to Visit   Medication Sig Dispense Refill   • ACCU-CHEK FASTCLIX LANCETS misc USE TO TEST BLOOD SUGAR UP TO FIVE TIMES DAILY AS DIRECTED. 408 each 0   • albuterol (PROAIR HFA) 108 (90 BASE) MCG/ACT inhaler  INHALE 1 TO 2 PUFFS EVERY 4 TO 6 HOURS AS NEEDED 1 inhaler 1   • alendronate (FOSAMAX) 70 MG tablet Take 1 tablet by mouth Every 7 (Seven) Days. 12 tablet 3   • ARIPiprazole (ABILIFY) 2 MG tablet TAKE 1 TABLET BY MOUTH DAILY 30 tablet 3   • aspirin 325 MG tablet Take 325 mg by mouth daily.     • atorvastatin (LIPITOR) 80 MG tablet TAKE 1 TABLET BY MOUTH EVERY DAY 90 tablet 0   • Cholecalciferol (VITAMIN D3) 5000 UNITS capsule capsule Take 1 capsule by mouth daily. 30 capsule 5   • clindamycin (CLEOCIN) 300 MG capsule Take 300 mg by mouth As Needed.     • diphenhydrAMINE (BENADRYL) 25 mg capsule Take 1 capsule by mouth nightly.     • EFFIENT 10 MG tablet Take 10 mg by mouth daily.     • gabapentin (NEURONTIN) 800 MG tablet Take 1 tablet by mouth 3 (Three) Times a Day. 90 tablet 0   • glucose blood (FREESTYLE LITE) test strip Use as instructed to test blood sugar up to 5 times daily for low blood sugar and symptomatic glycemia 450 each 3   • HYDROcodone-acetaminophen (NORCO)  MG per tablet Take 1 tablet by mouth 4 (four) times a day.     • Insulin Syringe 29G X 1\" 0.3 ML misc 1 each daily with dinner. 100 each 0   • lisinopril (PRINIVIL,ZESTRIL) 10 MG tablet Take 10 mg by mouth Daily.     • metoprolol tartrate (LOPRESSOR) 50 MG tablet TAKE 1 TABLET BY MOUTH EVERY 12 HOURS 180 tablet 0   • nateglinide (STARLIX) 60 MG tablet TAKE 1 TABLET BY MOUTH THREE TIMES DAILY BEFORE MEALS 90 tablet 0   • nitroglycerin (NITROSTAT) 0.4 MG SL tablet ONE " "TABLET UNDER TONGUE AS NEEDED FOR CHEST PAIN EVERY 5 MINUTES FOR UP TO 3 DOSES, CALL 911 IF PAIN PERSISTS 300 tablet 5   • omeprazole (priLOSEC) 40 MG capsule TAKE ONE CAPSULE BY MOUTH TWICE DAILY 180 capsule 0   • SYMBICORT 160-4.5 MCG/ACT inhaler INHALE 2 PUFFS BY MOUTH TWICE DAILY. RINSE MOUTH AFTER USE 10.2 g 2   • topiramate (TOPAMAX) 25 MG tablet Take 25 mg by mouth Daily.  1   • [DISCONTINUED] citalopram (CeleXA) 20 MG tablet TAKE 1 TABLET BY MOUTH DAILY 90 tablet 0     Current Facility-Administered Medications on File Prior to Visit   Medication Dose Route Frequency Provider Last Rate Last Dose   • cyanocobalamin injection 1,000 mcg  1,000 mcg Intramuscular Weekly Cinthya Valentine APRN   1,000 mcg at 05/17/18 1308       Objective     /60 (BP Location: Right arm, Patient Position: Sitting, Cuff Size: Adult)   Pulse 65   Temp 99.8 °F (37.7 °C) (Oral)   Ht 157.5 cm (62\")   Wt 80.3 kg (177 lb)   SpO2 98%   BMI 32.37 kg/m²     Physical Exam   Vitals reviewed.      Procedures    Assessment/Plan   Dalila was seen today for sore throat, uri and cough.    Diagnoses and all orders for this visit:    Sore throat  -     POCT rapid strep A    Viral pharyngitis    Bilateral non-suppurative otitis media  -     cefdinir (OMNICEF) 300 MG capsule; Take 1 capsule by mouth 2 (Two) Times a Day.        Discussion     OTC cough medication and decongestant   Warm compress to bilateral ears for pain.  No future appointments.      "

## 2019-06-07 ENCOUNTER — TELEPHONE (OUTPATIENT)
Dept: FAMILY MEDICINE CLINIC | Facility: CLINIC | Age: 69
End: 2019-06-07

## 2019-06-07 DIAGNOSIS — H65.93 BILATERAL NON-SUPPURATIVE OTITIS MEDIA: Primary | ICD-10-CM

## 2019-06-07 RX ORDER — DOXYCYCLINE HYCLATE 100 MG/1
100 CAPSULE ORAL 2 TIMES DAILY
Qty: 14 CAPSULE | Refills: 0 | Status: SHIPPED | OUTPATIENT
Start: 2019-06-07 | End: 2019-06-26

## 2019-06-12 RX ORDER — CITALOPRAM 20 MG/1
20 TABLET ORAL DAILY
Qty: 90 TABLET | Refills: 0 | Status: SHIPPED | OUTPATIENT
Start: 2019-06-12 | End: 2019-09-07 | Stop reason: SDUPTHER

## 2019-06-14 ENCOUNTER — OFFICE VISIT (OUTPATIENT)
Dept: FAMILY MEDICINE CLINIC | Facility: CLINIC | Age: 69
End: 2019-06-14

## 2019-06-14 ENCOUNTER — TELEPHONE (OUTPATIENT)
Dept: FAMILY MEDICINE CLINIC | Facility: CLINIC | Age: 69
End: 2019-06-14

## 2019-06-14 VITALS
TEMPERATURE: 98.4 F | WEIGHT: 181 LBS | DIASTOLIC BLOOD PRESSURE: 82 MMHG | HEART RATE: 65 BPM | BODY MASS INDEX: 33.11 KG/M2 | OXYGEN SATURATION: 98 % | SYSTOLIC BLOOD PRESSURE: 155 MMHG

## 2019-06-14 DIAGNOSIS — J40 BRONCHITIS: Primary | ICD-10-CM

## 2019-06-14 DIAGNOSIS — I25.10 CORONARY ARTERIOSCLEROSIS IN NATIVE ARTERY: ICD-10-CM

## 2019-06-14 PROCEDURE — 96372 THER/PROPH/DIAG INJ SC/IM: CPT | Performed by: NURSE PRACTITIONER

## 2019-06-14 PROCEDURE — 99213 OFFICE O/P EST LOW 20 MIN: CPT | Performed by: NURSE PRACTITIONER

## 2019-06-14 RX ORDER — METHYLPREDNISOLONE ACETATE 80 MG/ML
80 INJECTION, SUSPENSION INTRA-ARTICULAR; INTRALESIONAL; INTRAMUSCULAR; SOFT TISSUE ONCE
Status: COMPLETED | OUTPATIENT
Start: 2019-06-14 | End: 2019-06-14

## 2019-06-14 RX ORDER — ONDANSETRON 4 MG/1
4 TABLET, FILM COATED ORAL 3 TIMES DAILY PRN
Refills: 0 | COMMUNITY
Start: 2019-06-12 | End: 2021-05-05 | Stop reason: HOSPADM

## 2019-06-14 RX ADMIN — METHYLPREDNISOLONE ACETATE 80 MG: 80 INJECTION, SUSPENSION INTRA-ARTICULAR; INTRALESIONAL; INTRAMUSCULAR; SOFT TISSUE at 14:41

## 2019-06-14 NOTE — PROGRESS NOTES
Subjective   Dalila Valerio is a 68 y.o. female who presents for a follow up after being treated in [facility] ER on 6/12/19 for bronchitis. Was advised to FU with PCP if BP went above 140/90 and lately it has been running around 166/90.     History of Present Illness   Reports was given zofran only in the ER, not feeling well and has been dizzy with elevated blood pressure. Ears are improving some, but cough feels worse this time of the day, worsening in the evening. Tired and not getting enough sleep. Must sleep sitting up secondary to the cough. Cough is really not productive. Today is last day of clindamycin. Taking OTC cough medication. Nasal discharge is decreasing. Taking albuterol and symbicort for the wheezing.   The following portions of the patient's history were reviewed and updated as appropriate: allergies, current medications, past family history, past medical history, past social history, past surgical history and problem list.    Review of Systems   Constitutional: Negative for chills and fever.   HENT: Positive for congestion and rhinorrhea. Negative for ear pain and sore throat.    Respiratory: Positive for cough. Negative for wheezing.    Cardiovascular: Negative.    Gastrointestinal: Positive for nausea.   Neurological: Positive for dizziness.     /82   Pulse 65   Temp 98.4 °F (36.9 °C) (Oral)   Wt 82.1 kg (181 lb)   SpO2 98%   BMI 33.11 kg/m²     Objective   Physical Exam   Constitutional: She is oriented to person, place, and time. She appears well-developed and well-nourished. No distress.   HENT:   Right Ear: Tympanic membrane normal.   Left Ear: Tympanic membrane normal.   Nose: No mucosal edema or rhinorrhea. Right sinus exhibits no maxillary sinus tenderness and no frontal sinus tenderness. Left sinus exhibits no maxillary sinus tenderness and no frontal sinus tenderness.   Mouth/Throat: Uvula is midline and mucous membranes are normal. Posterior oropharyngeal erythema present.    Neck: Normal range of motion. Neck supple. No tracheal deviation present. No thyromegaly present.   Cardiovascular: Normal rate, regular rhythm and normal heart sounds.   Pulmonary/Chest: Effort normal. No respiratory distress. She has wheezes.   Lymphadenopathy:     She has no cervical adenopathy.   Neurological: She is alert and oriented to person, place, and time.   Skin: Skin is warm and dry.   Psychiatric: She has a normal mood and affect. Her behavior is normal. Judgment and thought content normal.   Nursing note and vitals reviewed.    Assessment/Plan   Problems Addressed this Visit        Cardiovascular and Mediastinum    Coronary arteriosclerosis in native artery      Other Visit Diagnoses     Bronchitis    -  Primary    Relevant Medications    methylPREDNISolone acetate (DEPO-medrol) injection 80 mg (Start on 6/14/2019  2:35 PM)        CAD--HTN uncontrolled--Increase metoprolol to 150mg daily for 3 days while ill.   Bronchitis--continue doxycycline, add depo medrol FU if not settling 1 week.

## 2019-06-17 ENCOUNTER — TELEPHONE (OUTPATIENT)
Dept: FAMILY MEDICINE CLINIC | Facility: CLINIC | Age: 69
End: 2019-06-17

## 2019-06-26 ENCOUNTER — OFFICE VISIT (OUTPATIENT)
Dept: FAMILY MEDICINE CLINIC | Facility: CLINIC | Age: 69
End: 2019-06-26

## 2019-06-26 VITALS
SYSTOLIC BLOOD PRESSURE: 148 MMHG | BODY MASS INDEX: 31.65 KG/M2 | OXYGEN SATURATION: 98 % | WEIGHT: 172 LBS | HEART RATE: 59 BPM | TEMPERATURE: 98.8 F | DIASTOLIC BLOOD PRESSURE: 78 MMHG | HEIGHT: 62 IN

## 2019-06-26 DIAGNOSIS — I25.10 CORONARY ARTERIOSCLEROSIS IN NATIVE ARTERY: Primary | ICD-10-CM

## 2019-06-26 DIAGNOSIS — R25.2 MUSCLE CRAMPING: ICD-10-CM

## 2019-06-26 LAB
ALBUMIN SERPL-MCNC: 4.2 G/DL (ref 3.5–5.2)
ALBUMIN/GLOB SERPL: 1.4 G/DL
ALP SERPL-CCNC: 43 U/L (ref 39–117)
ALT SERPL-CCNC: 17 U/L (ref 1–33)
AST SERPL-CCNC: 15 U/L (ref 1–32)
BILIRUB SERPL-MCNC: 0.3 MG/DL (ref 0.2–1.2)
BUN SERPL-MCNC: 21 MG/DL (ref 8–23)
BUN/CREAT SERPL: 20.4 (ref 7–25)
CALCIUM SERPL-MCNC: 10.1 MG/DL (ref 8.6–10.5)
CHLORIDE SERPL-SCNC: 99 MMOL/L (ref 98–107)
CO2 SERPL-SCNC: 28.7 MMOL/L (ref 22–29)
CREAT SERPL-MCNC: 1.03 MG/DL (ref 0.57–1)
GLOBULIN SER CALC-MCNC: 2.9 GM/DL
GLUCOSE SERPL-MCNC: 173 MG/DL (ref 65–99)
MAGNESIUM SERPL-MCNC: 2.1 MG/DL (ref 1.6–2.4)
POTASSIUM SERPL-SCNC: 3.9 MMOL/L (ref 3.5–5.2)
PROT SERPL-MCNC: 7.1 G/DL (ref 6–8.5)
SODIUM SERPL-SCNC: 140 MMOL/L (ref 136–145)

## 2019-06-26 PROCEDURE — 99213 OFFICE O/P EST LOW 20 MIN: CPT | Performed by: NURSE PRACTITIONER

## 2019-06-26 RX ORDER — HYDROCHLOROTHIAZIDE 25 MG/1
TABLET ORAL DAILY
Refills: 3 | Status: ON HOLD | COMMUNITY
Start: 2019-06-20 | End: 2020-08-19

## 2019-06-26 NOTE — PROGRESS NOTES
"Subjective   Dalila Valerio is a 68 y.o. female who presents for a follow up after being admitted to [facility] on 6/18/19 for chest pain.    History of Present Illness   Since home she reports some continued high blood pressures, though some normals. Some muscle cramping in legs since starting HCTZ. Wakes with pain in legs, that when stands, goes away. When walks, goes away. Pillow between legs seems to help.   No chest pain since home.   The following portions of the patient's history were reviewed and updated as appropriate: allergies, current medications, past family history, past medical history, past social history, past surgical history and problem list.    Review of Systems   Constitutional: Negative for activity change, appetite change, fatigue, unexpected weight gain and unexpected weight loss.   Respiratory: Negative.  Negative for shortness of breath.    Cardiovascular: Negative.  Negative for chest pain, palpitations and leg swelling.   Psychiatric/Behavioral: Negative.      /78   Pulse 59   Temp 98.8 °F (37.1 °C) (Oral)   Ht 157.5 cm (62\")   Wt 78 kg (172 lb)   SpO2 98%   BMI 31.46 kg/m²     Objective   Physical Exam   Constitutional: She appears well-developed and well-nourished.   Neck: Normal range of motion. Neck supple. No thyromegaly present.   Cardiovascular: Normal rate, regular rhythm and normal heart sounds.   Pulmonary/Chest: Effort normal and breath sounds normal.   Lymphadenopathy:     She has no cervical adenopathy.   Skin: Skin is warm and dry.   Psychiatric: She has a normal mood and affect. Her behavior is normal. Judgment and thought content normal.   Nursing note and vitals reviewed.    Assessment/Plan   Problems Addressed this Visit        Cardiovascular and Mediastinum    Coronary arteriosclerosis in native artery - Primary      Other Visit Diagnoses     Muscle cramping        Relevant Orders    Comprehensive Metabolic Panel    Magnesium        CAD--Hospital records " reviewed: cath was negative for new occlusive disease, stents widely patent. ACE and HCTZ added for blood pressure control. Expect BP to goal in next 2 weeks.   Muscle cramping--check lytes as HCTZ added.   FU prn, update A1c in 9/19

## 2019-07-01 DIAGNOSIS — M25.442 SWELLING OF HAND JOINT, LEFT: ICD-10-CM

## 2019-07-01 DIAGNOSIS — E11.8 CONTROLLED TYPE 2 DIABETES MELLITUS WITH COMPLICATION, WITHOUT LONG-TERM CURRENT USE OF INSULIN (HCC): ICD-10-CM

## 2019-07-01 RX ORDER — NATEGLINIDE 60 MG/1
TABLET ORAL
Qty: 90 TABLET | Refills: 1 | Status: SHIPPED | OUTPATIENT
Start: 2019-07-01 | End: 2020-04-16 | Stop reason: DRUGHIGH

## 2019-07-02 RX ORDER — GABAPENTIN 800 MG/1
TABLET ORAL
Qty: 90 TABLET | Refills: 4 | Status: SHIPPED | OUTPATIENT
Start: 2019-07-02 | End: 2019-11-20 | Stop reason: SDUPTHER

## 2019-07-15 RX ORDER — ARIPIPRAZOLE 2 MG/1
2 TABLET ORAL DAILY
Qty: 30 TABLET | Refills: 1 | Status: SHIPPED | OUTPATIENT
Start: 2019-07-15 | End: 2019-09-17 | Stop reason: SDUPTHER

## 2019-07-18 ENCOUNTER — TELEPHONE (OUTPATIENT)
Dept: FAMILY MEDICINE CLINIC | Facility: CLINIC | Age: 69
End: 2019-07-18

## 2019-07-18 NOTE — TELEPHONE ENCOUNTER
Patient says BP has been running between 180-195/90's for a few days. She had headaches off and on. She is due to FU at the end of August but doesn't want to wait this long to address BP. She added HCTZ and increased metoprolol to TID as instructed. Please advise if there is something else she can do before coming in for FU.

## 2019-07-19 RX ORDER — AMLODIPINE BESYLATE 10 MG/1
10 TABLET ORAL DAILY
Qty: 30 TABLET | Refills: 0 | Status: SHIPPED | OUTPATIENT
Start: 2019-07-19 | End: 2019-08-22

## 2019-07-19 RX ORDER — AMLODIPINE BESYLATE 10 MG/1
10 TABLET ORAL DAILY
Qty: 90 TABLET | Refills: 0 | OUTPATIENT
Start: 2019-07-19

## 2019-07-19 RX ORDER — AMLODIPINE BESYLATE 10 MG/1
10 TABLET ORAL DAILY
Qty: 30 TABLET | Refills: 0 | Status: SHIPPED | OUTPATIENT
Start: 2019-07-19 | End: 2019-07-19 | Stop reason: SDUPTHER

## 2019-07-29 ENCOUNTER — TELEPHONE (OUTPATIENT)
Dept: FAMILY MEDICINE CLINIC | Facility: CLINIC | Age: 69
End: 2019-07-29

## 2019-07-29 RX ORDER — OMEPRAZOLE 40 MG/1
CAPSULE, DELAYED RELEASE ORAL
Qty: 180 CAPSULE | Refills: 0 | Status: SHIPPED | OUTPATIENT
Start: 2019-07-29 | End: 2019-10-24 | Stop reason: SDUPTHER

## 2019-07-29 RX ORDER — METOPROLOL TARTRATE 50 MG/1
TABLET, FILM COATED ORAL
Qty: 180 TABLET | Refills: 0 | Status: SHIPPED | OUTPATIENT
Start: 2019-07-29 | End: 2019-07-29 | Stop reason: SDUPTHER

## 2019-07-29 RX ORDER — METOPROLOL TARTRATE 50 MG/1
50 TABLET, FILM COATED ORAL 3 TIMES DAILY
Qty: 270 TABLET | Refills: 0 | Status: SHIPPED | OUTPATIENT
Start: 2019-07-29 | End: 2020-01-30

## 2019-07-29 NOTE — TELEPHONE ENCOUNTER
Metoprolol was sent to pharmacy for BID dosing but pt says she takes three daily now. Okay to change rx?

## 2019-07-31 ENCOUNTER — TELEPHONE (OUTPATIENT)
Dept: FAMILY MEDICINE CLINIC | Facility: CLINIC | Age: 69
End: 2019-07-31

## 2019-07-31 ENCOUNTER — CLINICAL SUPPORT (OUTPATIENT)
Dept: FAMILY MEDICINE CLINIC | Facility: CLINIC | Age: 69
End: 2019-07-31

## 2019-07-31 VITALS — OXYGEN SATURATION: 97 % | DIASTOLIC BLOOD PRESSURE: 82 MMHG | SYSTOLIC BLOOD PRESSURE: 142 MMHG | HEART RATE: 70 BPM

## 2019-07-31 RX ORDER — LISINOPRIL 20 MG/1
20 TABLET ORAL DAILY
Qty: 90 TABLET | Refills: 0 | Status: SHIPPED | OUTPATIENT
Start: 2019-07-31 | End: 2019-10-24 | Stop reason: SDUPTHER

## 2019-07-31 RX ORDER — LISINOPRIL 20 MG/1
20 TABLET ORAL DAILY
Qty: 30 TABLET | Refills: 0 | Status: SHIPPED | OUTPATIENT
Start: 2019-07-31 | End: 2019-07-31 | Stop reason: SDUPTHER

## 2019-07-31 NOTE — TELEPHONE ENCOUNTER
Pt says she had developed a headache since leaving the office and just checked her BP. It is now 173/103 pulse 72

## 2019-07-31 NOTE — TELEPHONE ENCOUNTER
Patient in office for BP check today. She says BP has been running up and down at home. Current hypertension medications are:   Amlodipine 10mg qd  Metoprolol 50mg tid  Lisinopril 10mg qd    /82  Pulse 70

## 2019-08-02 ENCOUNTER — TELEPHONE (OUTPATIENT)
Dept: FAMILY MEDICINE CLINIC | Facility: CLINIC | Age: 69
End: 2019-08-02

## 2019-08-02 NOTE — TELEPHONE ENCOUNTER
Patient called to report the increase in lisinopril to 20mg has greatly improved her blood pressure. FYI     128/75  116/63  117/64  127/70  149/74

## 2019-08-14 ENCOUNTER — TELEPHONE (OUTPATIENT)
Dept: FAMILY MEDICINE CLINIC | Facility: CLINIC | Age: 69
End: 2019-08-14

## 2019-08-14 NOTE — TELEPHONE ENCOUNTER
Pt hasn't been using any NSAIDS but has swollen feet and ankles. Pt wants to know if her kidney function could be causing this. Her cardiologist is out of town and she is concerned about this and doesn't know what to do about the swelling? Please advise.

## 2019-08-14 NOTE — TELEPHONE ENCOUNTER
Mild swelling in the ankles most likely secondary to the heat and the amlodipine. Should settle. If severe may need to switch to a stronger diuretic. Most likely would just observe for now

## 2019-08-21 ENCOUNTER — TELEPHONE (OUTPATIENT)
Dept: FAMILY MEDICINE CLINIC | Facility: CLINIC | Age: 69
End: 2019-08-21

## 2019-08-21 NOTE — TELEPHONE ENCOUNTER
I looked through phone notes and most recent change was lisinopril was increased for uncontrolled BP.  Phone call after noted great improvement.  When she called about bp, was she having those symptoms then as well?   BP still running normal or low?   If BP is high, GO ER.  IF normal or low, cut amlodipine to 5mg po daily, check BP once daily and see Ignacio next week or when has appt if already scheduled sometime in the next 2 weeks.  RRJ

## 2019-08-22 RX ORDER — AMLODIPINE BESYLATE 10 MG/1
5 TABLET ORAL DAILY
Qty: 30 TABLET | Refills: 0
Start: 2019-08-22 | End: 2020-01-08

## 2019-08-22 NOTE — TELEPHONE ENCOUNTER
Per pt, her BP has been running normal now. She is aware to cut the amlodipine in half and check her blood pressure once daily and bring log in for her appt with Neena on 8/27/19. I have update med list.

## 2019-08-27 ENCOUNTER — OFFICE VISIT (OUTPATIENT)
Dept: FAMILY MEDICINE CLINIC | Facility: CLINIC | Age: 69
End: 2019-08-27

## 2019-08-27 VITALS
WEIGHT: 177 LBS | HEIGHT: 62 IN | OXYGEN SATURATION: 98 % | HEART RATE: 59 BPM | DIASTOLIC BLOOD PRESSURE: 82 MMHG | BODY MASS INDEX: 32.57 KG/M2 | SYSTOLIC BLOOD PRESSURE: 140 MMHG | TEMPERATURE: 98.6 F

## 2019-08-27 DIAGNOSIS — Z12.11 SCREENING FOR COLON CANCER: ICD-10-CM

## 2019-08-27 DIAGNOSIS — Z87.19 HISTORY OF BARRETT'S ESOPHAGUS: ICD-10-CM

## 2019-08-27 DIAGNOSIS — Z00.00 MEDICARE ANNUAL WELLNESS VISIT, SUBSEQUENT: ICD-10-CM

## 2019-08-27 DIAGNOSIS — I25.10 CORONARY ARTERIOSCLEROSIS IN NATIVE ARTERY: Primary | ICD-10-CM

## 2019-08-27 DIAGNOSIS — R49.0 HOARSENESS, CHRONIC: ICD-10-CM

## 2019-08-27 DIAGNOSIS — E11.8 CONTROLLED TYPE 2 DIABETES MELLITUS WITH COMPLICATION, WITHOUT LONG-TERM CURRENT USE OF INSULIN (HCC): ICD-10-CM

## 2019-08-27 PROCEDURE — 99214 OFFICE O/P EST MOD 30 MIN: CPT | Performed by: NURSE PRACTITIONER

## 2019-08-27 PROCEDURE — G0439 PPPS, SUBSEQ VISIT: HCPCS | Performed by: NURSE PRACTITIONER

## 2019-08-27 NOTE — PATIENT INSTRUCTIONS
Medicare Wellness  Personal Prevention Plan of Service     Date of Office Visit:  2019  Encounter Provider:  RL Dia  Place of Service:  [facility]  Patient Name: Dalila Valerio  :  1950    As part of the Medicare Wellness portion of your visit today, we are providing you with this personalized preventive plan of services (PPPS). This plan is based upon recommendations of the [place] Preventive Services Task Force (USPSTF) and the Advisory Committee on Immunization Practices (ACIP).    This lists the preventive care services that should be considered, and provides dates of when you are due. Items listed as completed are up-to-date and do not require any further intervention.    Health Maintenance   Topic Date Due   • TDAP/TD VACCINES (1 - Tdap) 10/05/1967   • PNEUMOCOCCAL VACCINES (65+ LOW/MEDIUM RISK) (2 of 2 - PCV13) 11/15/2017   • COLONOSCOPY  2019   • DIABETIC EYE EXAM  2019   • INFLUENZA VACCINE  2019   • HEMOGLOBIN A1C  2019   • URINE MICROALBUMIN  12/10/2019   • MEDICARE ANNUAL WELLNESS  2020   • DIABETIC FOOT EXAM  2020   • MAMMOGRAM  10/16/2020   • HEPATITIS C SCREENING  Completed       No orders of the defined types were placed in this encounter.      No Follow-up on file.

## 2019-08-27 NOTE — PROGRESS NOTES
Subjective   Dalila Valerio is a 68 y.o. female who presents for a follow up on hypertension.     History of Present Illness   Hypertension has been difficult to control in the 2 months amlodipine has been added, lisinopril increased, and now amlodipine reduced in the last week due to sleepiness and dizziness and stomach pains.. Her home readings are as follows: 140/82 occasional high when watching TV, more concerned about muscle spasms in the heart. Plans to see cardiologist in September. Taking TNG every 4-5 days, works when takes. Doesn't think angina. Does not think it is an esophagus spasm. Is hoarse, losing voice more frequent, for last several months. Foods do not get stuck in the esophagus, is not able to swallow all pills at once anymore. Attributes voice loss to years of chronic smoking. Smoked for 18-20 yrs 3ppd.   Has not been checking sugar, due to recent move. Has recently completed another round of injections from pain management. Has gained a small amount of weight.   The following portions of the patient's history were reviewed and updated as appropriate: allergies, current medications, past family history, past medical history, past social history, past surgical history and problem list.    Review of Systems   Constitutional: Positive for fatigue and unexpected weight gain.   HENT: Positive for trouble swallowing (swallowing pills) and voice change. Negative for rhinorrhea and sore throat.    Respiratory: Negative for cough and shortness of breath.    Cardiovascular: Positive for chest pain and leg swelling (worse with standing prolonged, medication (HCTZ) is helping).   Gastrointestinal: Positive for abdominal pain (LUQ, now resolved) and GERD.   Musculoskeletal: Positive for arthralgias, back pain and myalgias.   Neurological: Positive for light-headedness, numbness and memory problem. Negative for dizziness.   Hematological: Negative.    Psychiatric/Behavioral: Negative for depressed mood.  "    /82   Pulse 59   Temp 98.6 °F (37 °C) (Oral)   Ht 157.5 cm (62\")   Wt 80.3 kg (177 lb)   SpO2 98%   BMI 32.37 kg/m²   110/52  Objective   Physical Exam   Constitutional: She appears well-developed and well-nourished.   HENT:   Mouth/Throat: Mucous membranes are dry. No oral lesions. Posterior oropharyngeal erythema present.   Neck: Normal range of motion. Neck supple. No thyromegaly present.   Cardiovascular: Normal rate, regular rhythm and normal heart sounds.   Pulmonary/Chest: Effort normal and breath sounds normal.    Dalila had a diabetic foot exam performed today.   During the foot exam she had a monofilament test performed (see scanned).  Lymphadenopathy:     She has no cervical adenopathy.   Skin: Skin is warm and dry.   Psychiatric: She has a normal mood and affect. Her behavior is normal. Judgment and thought content normal.   Nursing note and vitals reviewed.    Assessment/Plan   Problems Addressed this Visit        Cardiovascular and Mediastinum    Coronary arteriosclerosis in native artery - Primary       Endocrine    Controlled diabetes mellitus type 2 with complications (CMS/HCC)    Relevant Orders    Hemoglobin A1c    Comprehensive Metabolic Panel       Other    History of Williamson's esophagus    Relevant Orders    Ambulatory Referral to Gastroenterology      Other Visit Diagnoses     Hoarseness, chronic        Relevant Orders    Ambulatory Referral to Gastroenterology    Ambulatory Referral to ENT (Otolaryngology)    Screening for colon cancer        Relevant Orders    Ambulatory Referral to Gastroenterology        CAD--no changes to HTN medications, less side effects now.   DM2--check A1c, should find meter, start checking more around time of pain management injections, restart gentle exercise  Hx Williamson's--check upper endoscopy  Hoarseness--ex heavy smoker--refer to ENT as well  Update colon cancer screening.        "

## 2019-08-27 NOTE — PROGRESS NOTES
The ABCs of the Annual Wellness Visit  Subsequent Medicare Wellness Visit    Chief Complaint   Patient presents with   • Hypertension       Subjective   History of Present Illness:  Dalila Valerio is a 68 y.o. female who presents for a Subsequent Medicare Wellness Visit.    HEALTH RISK ASSESSMENT    Recent Hospitalizations:  Recently treated at the following:  Other: [facility]    Current Medical Providers:  Patient Care Team:  Cinthya Valentine APRN as PCP - General  Cinthya Valentine APRN as PCP - Family Medicine  Cinthya Valentine APRN as PCP - Claims Attributed  Devyn Velazco MD as Consulting Physician (Pain Medicine)  Bogdan Olmedo MD as Surgeon (Orthopedic Surgery)  Pedro Pablo Lomas MD as Consulting Physician (Cardiology)    Smoking Status:  Social History     Tobacco Use   Smoking Status Former Smoker   • Last attempt to quit: 1980   • Years since quittin.6   Smokeless Tobacco Never Used       Alcohol Consumption:  Social History     Substance and Sexual Activity   Alcohol Use No       Depression Screen:   PHQ-2/PHQ-9 Depression Screening 2019   Little interest or pleasure in doing things 0   Feeling down, depressed, or hopeless 0   Trouble falling or staying asleep, or sleeping too much 1   Feeling tired or having little energy 1   Poor appetite or overeating 0   Feeling bad about yourself - or that you are a failure or have let yourself or your family down 0   Trouble concentrating on things, such as reading the newspaper or watching television 0   Moving or speaking so slowly that other people could have noticed. Or the opposite - being so fidgety or restless that you have been moving around a lot more than usual 0   Thoughts that you would be better off dead, or of hurting yourself in some way 0   Total Score 2   If you checked off any problems, how difficult have these problems made it for you to do your work, take care of things at home, or get along with other people? Not difficult  at all       Fall Risk Screen:  AMIRA Fall Risk Assessment has not been completed.    Health Habits and Functional and Cognitive Screening:  Functional & Cognitive Status 8/27/2019   Do you have difficulty preparing food and eating? No   Do you have difficulty bathing yourself, getting dressed or grooming yourself? No   Do you have difficulty using the toilet? No   Do you have difficulty moving around from place to place? No   Do you have trouble with steps or getting out of a bed or a chair? Yes   Current Diet Unhealthy Diet   Dental Exam Up to date   Eye Exam Not up to date   Exercise (times per week) 0 times per week   Current Exercise Activities Include No Regular Exercise   Do you need help using the phone?  No   Are you deaf or do you have serious difficulty hearing?  No   Do you need help with transportation? No   Do you need help shopping? No   Do you need help preparing meals?  No   Do you need help with housework?  No   Do you need help with laundry? No   Do you need help taking your medications? No   Do you need help managing money? No   Do you ever drive or ride in a car without wearing a seat belt? No   Have you felt unusual stress, anger or loneliness in the last month? No   Who do you live with? Spouse   If you need help, do you have trouble finding someone available to you? No   Have you been bothered in the last four weeks by sexual problems? No   Do you have difficulty concentrating, remembering or making decisions? Yes         Does the patient have evidence of cognitive impairment? No    Asprin use counseling:Taking ASA appropriately as indicated    Age-appropriate Screening Schedule:  Refer to the list below for future screening recommendations based on patient's age, sex and/or medical conditions. Orders for these recommended tests are listed in the plan section. The patient has been provided with a written plan.    Health Maintenance   Topic Date Due   • TDAP/TD VACCINES (1 - Tdap) 10/05/1969    • PNEUMOCOCCAL VACCINES (65+ LOW/MEDIUM RISK) (2 of 2 - PCV13) 11/15/2017   • COLONOSCOPY  01/22/2019   • DIABETIC EYE EXAM  04/16/2019   • DIABETIC FOOT EXAM  07/17/2019   • INFLUENZA VACCINE  08/01/2019   • HEMOGLOBIN A1C  09/05/2019   • URINE MICROALBUMIN  12/10/2019   • MAMMOGRAM  10/16/2020          The following portions of the patient's history were reviewed and updated as appropriate: allergies, current medications, past family history, past medical history, past social history, past surgical history and problem list.    Outpatient Medications Prior to Visit   Medication Sig Dispense Refill   • ACCU-CHEK FASTCLIX LANCETS misc USE TO TEST BLOOD SUGAR UP TO FIVE TIMES DAILY AS DIRECTED. 408 each 0   • albuterol (PROAIR HFA) 108 (90 BASE) MCG/ACT inhaler  INHALE 1 TO 2 PUFFS EVERY 4 TO 6 HOURS AS NEEDED 1 inhaler 1   • alendronate (FOSAMAX) 70 MG tablet Take 1 tablet by mouth Every 7 (Seven) Days. 12 tablet 3   • amLODIPine (NORVASC) 10 MG tablet Take 0.5 tablets by mouth Daily. (Patient taking differently: Take 10 mg by mouth Daily.) 30 tablet 0   • ARIPiprazole (ABILIFY) 2 MG tablet TAKE 1 TABLET BY MOUTH DAILY 30 tablet 1   • aspirin 325 MG tablet Take 325 mg by mouth daily.     • atorvastatin (LIPITOR) 80 MG tablet TAKE 1 TABLET BY MOUTH EVERY DAY 90 tablet 0   • Cholecalciferol (VITAMIN D3) 5000 UNITS capsule capsule Take 1 capsule by mouth daily. 30 capsule 5   • citalopram (CeleXA) 20 MG tablet TAKE 1 TABLET BY MOUTH DAILY 90 tablet 0   • diphenhydrAMINE (BENADRYL) 25 mg capsule Take 1 capsule by mouth nightly.     • EFFIENT 10 MG tablet Take 10 mg by mouth daily.     • gabapentin (NEURONTIN) 800 MG tablet TAKE 1 TABLET BY MOUTH THREE TIMES DAILY 90 tablet 4   • glucose blood (FREESTYLE LITE) test strip Use as instructed to test blood sugar up to 5 times daily for low blood sugar and symptomatic glycemia 450 each 3   • hydrochlorothiazide (HYDRODIURIL) 25 MG tablet Take  by mouth Daily.  3   •  "HYDROcodone-acetaminophen (NORCO)  MG per tablet Take 1 tablet by mouth 4 (four) times a day.     • Insulin Syringe 29G X 1\" 0.3 ML misc 1 each daily with dinner. 100 each 0   • lisinopril (PRINIVIL,ZESTRIL) 20 MG tablet TAKE 1 TABLET BY MOUTH DAILY 90 tablet 0   • metoprolol tartrate (LOPRESSOR) 50 MG tablet Take 1 tablet by mouth 3 (Three) Times a Day. 270 tablet 0   • nateglinide (STARLIX) 60 MG tablet TAKE 1 TABLET BY MOUTH THREE TIMES DAILY BEFORE MEALS 90 tablet 1   • nitroglycerin (NITROSTAT) 0.4 MG SL tablet ONE TABLET UNDER TONGUE AS NEEDED FOR CHEST PAIN EVERY 5 MINUTES FOR UP TO 3 DOSES, CALL 911 IF PAIN PERSISTS 300 tablet 5   • omeprazole (priLOSEC) 40 MG capsule TAKE ONE CAPSULE BY MOUTH TWICE DAILY 180 capsule 0   • ondansetron (ZOFRAN) 4 MG tablet TK 1 T PO  TID PRF NAUSEA  0   • SYMBICORT 160-4.5 MCG/ACT inhaler INHALE 2 PUFFS BY MOUTH TWICE DAILY. RINSE MOUTH AFTER USE 10.2 g 2   • topiramate (TOPAMAX) 25 MG tablet Take 25 mg by mouth Daily.  1   • clindamycin (CLEOCIN) 300 MG capsule Take 300 mg by mouth As Needed.       Facility-Administered Medications Prior to Visit   Medication Dose Route Frequency Provider Last Rate Last Dose   • cyanocobalamin injection 1,000 mcg  1,000 mcg Intramuscular Weekly Cinthya Valentine APRN   1,000 mcg at 05/17/18 1308       Patient Active Problem List   Diagnosis   • Adjustment disorder with mixed anxiety and depressed mood   • Atopic rhinitis   • Coronary arteriosclerosis in native artery   • Cobalamin deficiency   • History of Williamson's esophagus   • Benign colonic polyp   • Lumbar radiculopathy   • Controlled diabetes mellitus type 2 with complications (CMS/HCC)   • Binocular vision disorder with diplopia   • Dyslipidemia   • Arthropathy of hand   • Knee pain   • Cramps of lower extremity   • Low back pain   • Migraine with typical aura   • MAC (mycobacterium avium-intracellulare complex)   • Chronic nausea   • Obstructive sleep apnea syndrome   • " "Palpitations   • Ventricular premature beats   • Cephalalgia   • Colonic constipation   • Neurocardiogenic syncope   • Vitamin D deficiency   • DODSON (nonalcoholic steatohepatitis)   • Fibromyalgia   • Morbidly obese (CMS/[facility])       Advanced Care Planning:  Patient does not have an advance directive - information provided to the patient today    Review of Systems    Compared to one year ago, the patient feels her physical health is worse.  Compared to one year ago, the patient feels her mental health is the same.    Reviewed chart for potential of high risk medication in the elderly: yes  Reviewed chart for potential of harmful drug interactions in the elderly:yes    Objective         Vitals:    08/27/19 1013   BP: 140/82   Pulse: 59   Temp: 98.6 °F (37 °C)   TempSrc: Oral   SpO2: 98%   Weight: 80.3 kg (177 lb)   Height: 157.5 cm (62\")   PainSc: 0-No pain       Body mass index is 32.37 kg/m².  Discussed the patient's BMI with her. The BMI is above average; BMI management plan is completed.    Physical Exam    Lab Results   Component Value Date     (H) 06/26/2019        Assessment/Plan   Medicare Risks and Personalized Health Plan  CMS Preventative Services Quick Reference  Advance Directive Discussion  Colon Cancer Screening  Diabetic Lab Screening   Immunizations Discussed/Encouraged (specific immunizations; Td and Influenza )  Inactivity/Sedentary  Obesity/Overweight     The above risks/problems have been discussed with the patient.  Pertinent information has been shared with the patient in the After Visit Summary.  Follow up plans and orders are seen below in the Assessment/Plan Section.    There are no diagnoses linked to this encounter.  Follow Up:  No Follow-up on file.     An After Visit Summary and PPPS were given to the patient.             "

## 2019-08-28 ENCOUNTER — TELEPHONE (OUTPATIENT)
Dept: FAMILY MEDICINE CLINIC | Facility: CLINIC | Age: 69
End: 2019-08-28

## 2019-08-28 DIAGNOSIS — E11.8 CONTROLLED TYPE 2 DIABETES MELLITUS WITH COMPLICATION, WITHOUT LONG-TERM CURRENT USE OF INSULIN (HCC): Primary | ICD-10-CM

## 2019-08-28 LAB
ALBUMIN SERPL-MCNC: 4.4 G/DL (ref 3.5–5.2)
ALBUMIN/GLOB SERPL: 1.6 G/DL
ALP SERPL-CCNC: 42 U/L (ref 39–117)
ALT SERPL-CCNC: 19 U/L (ref 1–33)
AST SERPL-CCNC: 16 U/L (ref 1–32)
BILIRUB SERPL-MCNC: 0.3 MG/DL (ref 0.2–1.2)
BUN SERPL-MCNC: 20 MG/DL (ref 8–23)
BUN/CREAT SERPL: 18.2 (ref 7–25)
CALCIUM SERPL-MCNC: 9.3 MG/DL (ref 8.6–10.5)
CHLORIDE SERPL-SCNC: 101 MMOL/L (ref 98–107)
CO2 SERPL-SCNC: 27.9 MMOL/L (ref 22–29)
CREAT SERPL-MCNC: 1.1 MG/DL (ref 0.57–1)
GLOBULIN SER CALC-MCNC: 2.7 GM/DL
GLUCOSE SERPL-MCNC: 120 MG/DL (ref 65–99)
HBA1C MFR BLD: 7.4 % (ref 4.8–5.6)
POTASSIUM SERPL-SCNC: 3.9 MMOL/L (ref 3.5–5.2)
PROT SERPL-MCNC: 7.1 G/DL (ref 6–8.5)
SODIUM SERPL-SCNC: 140 MMOL/L (ref 136–145)

## 2019-08-28 NOTE — TELEPHONE ENCOUNTER
Call patient, see if she has preference who to see, I don't recall discussing this. Why does she need to go?

## 2019-08-29 NOTE — TELEPHONE ENCOUNTER
She needs a diabetic eye exam and thinks she may have cataracts. She does not have a doctor preference but wants to stay close to home.

## 2019-09-09 RX ORDER — CITALOPRAM 20 MG/1
20 TABLET ORAL DAILY
Qty: 90 TABLET | Refills: 0 | Status: SHIPPED | OUTPATIENT
Start: 2019-09-09 | End: 2019-12-04 | Stop reason: SDUPTHER

## 2019-09-16 RX ORDER — AMLODIPINE BESYLATE 10 MG/1
5 TABLET ORAL DAILY
Qty: 30 TABLET | Refills: 0 | Status: SHIPPED | OUTPATIENT
Start: 2019-09-16 | End: 2019-09-25 | Stop reason: SDUPTHER

## 2019-09-17 RX ORDER — ARIPIPRAZOLE 2 MG/1
2 TABLET ORAL DAILY
Qty: 30 TABLET | Refills: 0 | Status: SHIPPED | OUTPATIENT
Start: 2019-09-17 | End: 2019-10-14 | Stop reason: SDUPTHER

## 2019-09-23 ENCOUNTER — TELEPHONE (OUTPATIENT)
Dept: FAMILY MEDICINE CLINIC | Facility: CLINIC | Age: 69
End: 2019-09-23

## 2019-09-25 ENCOUNTER — OFFICE VISIT (OUTPATIENT)
Dept: FAMILY MEDICINE CLINIC | Facility: CLINIC | Age: 69
End: 2019-09-25

## 2019-09-25 VITALS
HEART RATE: 71 BPM | WEIGHT: 177 LBS | TEMPERATURE: 99.1 F | OXYGEN SATURATION: 98 % | BODY MASS INDEX: 32.57 KG/M2 | HEIGHT: 62 IN | DIASTOLIC BLOOD PRESSURE: 78 MMHG | SYSTOLIC BLOOD PRESSURE: 138 MMHG

## 2019-09-25 DIAGNOSIS — G43.109 MIGRAINE WITH TYPICAL AURA: Primary | ICD-10-CM

## 2019-09-25 DIAGNOSIS — Z23 IMMUNIZATION DUE: ICD-10-CM

## 2019-09-25 PROCEDURE — 90662 IIV NO PRSV INCREASED AG IM: CPT | Performed by: NURSE PRACTITIONER

## 2019-09-25 PROCEDURE — 99213 OFFICE O/P EST LOW 20 MIN: CPT | Performed by: NURSE PRACTITIONER

## 2019-09-25 PROCEDURE — G0008 ADMIN INFLUENZA VIRUS VAC: HCPCS | Performed by: NURSE PRACTITIONER

## 2019-09-25 RX ORDER — DEXAMETHASONE 4 MG/1
4 TABLET ORAL
Qty: 7 TABLET | Refills: 0 | Status: ON HOLD | OUTPATIENT
Start: 2019-09-25 | End: 2020-08-19

## 2019-09-25 RX ORDER — TOPIRAMATE 50 MG/1
50 TABLET, FILM COATED ORAL DAILY
Qty: 30 TABLET | Refills: 3 | Status: SHIPPED | OUTPATIENT
Start: 2019-09-25 | End: 2020-01-14

## 2019-09-25 NOTE — PROGRESS NOTES
"Subjective   Dalila Valerio is a 68 y.o. female who presents for follow up on migraine. Was treated at [facility] ER on 9/21/19 and advised to FU with PCP for abortive medications.     History of Present Illness   Patient reports had migraine for couple of weeks and got really bad 3 days before and went to er. Called pharmacy and was told couldn't take anything over the counter so she went to er. Once gets a headache her whole head hurts and it does not go away. Any reading or using vision makes it worse. Made appointment to see eye doctor oct 10th dr french. Usually starts on one side or the other then sits in one side and she can't tolerate light or tv, talking, and certain sounds can trigger it. Has seen neurologist has been struggling with this since little girl. Went without a lot of them for a long time then they started to come back.  Headache causes double vision then it becomes blurred. Gets unable to do any tasks when has headaches. No unilateral weakness with headaches. Migraines occurring 1 x monthly, but last for 2-3 weeks.  Previously took imitrex and zomig, fiorcet, topamax at a higher dose. Headache always with same features and type.   The following portions of the patient's history were reviewed and updated as appropriate: allergies, current medications, past family history, past medical history, past social history, past surgical history and problem list.    Review of Systems   Constitutional: Negative for chills and fever.   HENT: Negative.    Eyes: Positive for blurred vision, double vision and visual disturbance.   Respiratory: Negative for cough and shortness of breath.    Gastrointestinal: Negative for constipation and nausea.   Endocrine: Negative.    Musculoskeletal: Negative.    Skin: Negative.    Neurological: Positive for weakness (feels generally weak during headaches due to severity) and headache.   /78   Pulse 71   Temp 99.1 °F (37.3 °C) (Oral)   Ht 157.5 cm (62\")   Wt 80.3 kg " (177 lb)   SpO2 98%   BMI 32.37 kg/m²     Objective   Physical Exam   Constitutional: She is oriented to person, place, and time. She appears well-developed and well-nourished.   Neck: Normal range of motion.   Cardiovascular: Normal rate, regular rhythm, normal heart sounds and intact distal pulses.   Pulmonary/Chest: Effort normal and breath sounds normal.   Abdominal: Soft. Bowel sounds are normal.   Neurological: She is alert and oriented to person, place, and time. She has normal strength. No cranial nerve deficit or sensory deficit. She exhibits normal muscle tone. Coordination and gait normal.   Skin: Skin is warm and dry.   Psychiatric: She has a normal mood and affect. Her behavior is normal. Judgment and thought content normal.   Nursing note and vitals reviewed.    Assessment/Plan   Problems Addressed this Visit        Cardiovascular and Mediastinum    Migraine with typical aura - Primary    Relevant Medications    dexamethasone (DECADRON) 4 MG tablet    topiramate (TOPAMAX) 50 MG tablet      Other Visit Diagnoses     Immunization due        Relevant Orders    [employer] High Dose 65YR+ (9316-5081) (Completed)          Migraine- increase topiramate to 50 mg daily, and start ATC diphenhydramine for 3 days to abort, add dexamethasone to abort. Consider neurology referral. She will call back with preferred name. Consider triptan contraindicated with her CAD, angina fairly uncontrolled. NSAID relatively contraindicated with Effient    MRI brain in 2017 with migrainous changes, CTA head and neck with no evidence of aneurysms or masses. Description of migraine really not changed from 2017 other than resurgence of frequency. Hold additional imaging at this point.

## 2019-10-01 ENCOUNTER — TELEPHONE (OUTPATIENT)
Dept: FAMILY MEDICINE CLINIC | Facility: CLINIC | Age: 69
End: 2019-10-01

## 2019-10-01 DIAGNOSIS — Z12.39 SCREENING FOR BREAST CANCER: Primary | ICD-10-CM

## 2019-10-01 DIAGNOSIS — Z12.31 ENCOUNTER FOR SCREENING MAMMOGRAM FOR MALIGNANT NEOPLASM OF BREAST: ICD-10-CM

## 2019-10-03 ENCOUNTER — OFFICE VISIT (OUTPATIENT)
Dept: FAMILY MEDICINE CLINIC | Facility: CLINIC | Age: 69
End: 2019-10-03

## 2019-10-03 VITALS
HEART RATE: 61 BPM | DIASTOLIC BLOOD PRESSURE: 68 MMHG | TEMPERATURE: 98.8 F | WEIGHT: 176 LBS | HEIGHT: 62 IN | OXYGEN SATURATION: 95 % | SYSTOLIC BLOOD PRESSURE: 120 MMHG | BODY MASS INDEX: 32.39 KG/M2

## 2019-10-03 DIAGNOSIS — J40 FREQUENT EPISODES OF BRONCHITIS: Primary | ICD-10-CM

## 2019-10-03 PROCEDURE — 99213 OFFICE O/P EST LOW 20 MIN: CPT | Performed by: NURSE PRACTITIONER

## 2019-10-03 NOTE — PROGRESS NOTES
Subjective     Dalila Valerio is a 69 y.o. female who presents with   Chief Complaint   Patient presents with   • Bronchitis       Chronic Cough: Originally seen in the office for cough nagging cough and voice starts to vade when I speak for a few months now. Endoscopy and colonoscopy to be performed history of mora's esophagitis. Made an effort the last few months to not cough as much.      Bronchitis   This is a chronic problem. The current episode started more than 1 month ago. The problem occurs constantly. The problem has been unchanged. Associated symptoms include coughing (really hard time getting the mucous up and out ). Pertinent negatives include no abdominal pain, arthralgias, chest pain, diaphoresis, fatigue, headaches, myalgias, nausea or vomiting. Associated symptoms comments: Loss of voice . Exacerbated by: talking  Treatments tried: ENT suggest Chest imaging and chest imaging suggest that there is mild bilateral peribronchial cuffing is noted in report  The treatment provided mild relief.        Review of Systems   Constitutional: Negative for appetite change, diaphoresis, fatigue and unexpected weight change.   Eyes: Negative for pain and visual disturbance.   Respiratory: Positive for cough (really hard time getting the mucous up and out ). Negative for shortness of breath.    Cardiovascular: Negative for chest pain.   Gastrointestinal: Negative for abdominal pain, nausea and vomiting.   Musculoskeletal: Negative for arthralgias and myalgias.   Neurological: Negative for dizziness and headaches.         The following portions of the patient's history were reviewed and updated as appropriate: allergies, current medications, past family history, past medical history, past social history, past surgical history and problem list.      Patient Active Problem List    Diagnosis Date Noted   • Morbidly obese (CMS/HCC) 12/10/2018   • DODSON (nonalcoholic steatohepatitis) 04/01/2016   • Adjustment  disorder with mixed anxiety and depressed mood 03/08/2016   • Atopic rhinitis 03/08/2016   • Coronary arteriosclerosis in native artery 03/08/2016     Note Last Updated: 3/8/2016     Description: s/p PX LAD stent on 12/26/13.     • Cobalamin deficiency 03/08/2016   • History of Williamson's esophagus 03/08/2016   • Benign colonic polyp 03/08/2016   • Lumbar radiculopathy 03/08/2016   • Controlled diabetes mellitus type 2 with complications (CMS/[facility]) 03/08/2016     Note Last Updated: 5/19/2016     Has lows with increased glimiperide dose, intolerant metformin, unable to afford many options, tolerating farxiga well, could consider pioglitizone as well, as no sign of heart failure, EF 60% on cath 5/16, did not try nessina, or any other DDP4 secondary to cost. Has not tried insulin secondary to cost.     • Binocular vision disorder with diplopia 03/08/2016   • Dyslipidemia 03/08/2016   • Arthropathy of hand 03/08/2016   • Knee pain 03/08/2016   • Cramps of lower extremity 03/08/2016   • Low back pain 03/08/2016   • Migraine with typical aura 03/08/2016   • Chronic nausea 03/08/2016   • Obstructive sleep apnea syndrome 03/08/2016     Note Last Updated: 3/8/2016     Description: Does not want to use CPAP as she is claustrophobic.     • Palpitations 03/08/2016   • Ventricular premature beats 03/08/2016   • Cephalalgia 03/08/2016   • Colonic constipation 03/08/2016   • Neurocardiogenic syncope 03/08/2016     Note Last Updated: 3/8/2016     Description: No issues at the present time.     • Vitamin D deficiency 03/08/2016   • Fibromyalgia 03/26/2013       Current Outpatient Medications on File Prior to Visit   Medication Sig Dispense Refill   • ACCU-CHEK FASTCLIX LANCETS misc USE TO TEST BLOOD SUGAR UP TO FIVE TIMES DAILY AS DIRECTED. 408 each 0   • albuterol (PROAIR HFA) 108 (90 BASE) MCG/ACT inhaler  INHALE 1 TO 2 PUFFS EVERY 4 TO 6 HOURS AS NEEDED 1 inhaler 1   • amLODIPine (NORVASC) 10 MG tablet Take 0.5 tablets by mouth  "Daily. (Patient taking differently: Take 10 mg by mouth Daily.) 30 tablet 0   • ARIPiprazole (ABILIFY) 2 MG tablet TAKE 1 TABLET BY MOUTH DAILY 30 tablet 0   • aspirin 325 MG tablet Take 325 mg by mouth daily.     • atorvastatin (LIPITOR) 80 MG tablet TAKE 1 TABLET BY MOUTH EVERY DAY 90 tablet 0   • Cholecalciferol (VITAMIN D3) 5000 UNITS capsule capsule Take 1 capsule by mouth daily. 30 capsule 5   • citalopram (CeleXA) 20 MG tablet TAKE 1 TABLET BY MOUTH DAILY 90 tablet 0   • dexamethasone (DECADRON) 4 MG tablet Take 1 tablet by mouth Daily With Breakfast. 7 tablet 0   • diphenhydrAMINE (BENADRYL) 25 mg capsule Take 1 capsule by mouth nightly.     • EFFIENT 10 MG tablet Take 10 mg by mouth daily.     • gabapentin (NEURONTIN) 800 MG tablet TAKE 1 TABLET BY MOUTH THREE TIMES DAILY 90 tablet 4   • glucose blood (FREESTYLE LITE) test strip Use as instructed to test blood sugar up to 5 times daily for low blood sugar and symptomatic glycemia 450 each 3   • hydrochlorothiazide (HYDRODIURIL) 25 MG tablet Take  by mouth Daily.  3   • HYDROcodone-acetaminophen (NORCO)  MG per tablet Take 1 tablet by mouth 4 (four) times a day.     • Insulin Syringe 29G X 1\" 0.3 ML misc 1 each daily with dinner. 100 each 0   • lisinopril (PRINIVIL,ZESTRIL) 20 MG tablet TAKE 1 TABLET BY MOUTH DAILY 90 tablet 0   • metoprolol tartrate (LOPRESSOR) 50 MG tablet Take 1 tablet by mouth 3 (Three) Times a Day. 270 tablet 0   • nateglinide (STARLIX) 60 MG tablet TAKE 1 TABLET BY MOUTH THREE TIMES DAILY BEFORE MEALS 90 tablet 1   • nitroglycerin (NITROSTAT) 0.4 MG SL tablet ONE TABLET UNDER TONGUE AS NEEDED FOR CHEST PAIN EVERY 5 MINUTES FOR UP TO 3 DOSES, CALL 911 IF PAIN PERSISTS 300 tablet 5   • omeprazole (priLOSEC) 40 MG capsule TAKE ONE CAPSULE BY MOUTH TWICE DAILY 180 capsule 0   • ondansetron (ZOFRAN) 4 MG tablet TK 1 T PO  TID PRF NAUSEA  0   • SYMBICORT 160-4.5 MCG/ACT inhaler INHALE 2 PUFFS BY MOUTH TWICE DAILY. RINSE MOUTH AFTER USE " "10.2 g 2   • topiramate (TOPAMAX) 50 MG tablet Take 1 tablet by mouth Daily. 30 tablet 3     Current Facility-Administered Medications on File Prior to Visit   Medication Dose Route Frequency Provider Last Rate Last Dose   • cyanocobalamin injection 1,000 mcg  1,000 mcg Intramuscular Weekly Cinthya Valentine APRN   1,000 mcg at 05/17/18 1308       Objective     /68 (BP Location: Right arm, Patient Position: Sitting, Cuff Size: Adult)   Pulse 61   Temp 98.8 °F (37.1 °C) (Oral)   Ht 157.5 cm (62\")   Wt 79.8 kg (176 lb)   SpO2 95%   BMI 32.19 kg/m²     Physical Exam   Constitutional: She appears well-developed and well-nourished.   Eyes: Conjunctivae are normal. Pupils are equal, round, and reactive to light.   Neck: Normal range of motion.   Cardiovascular: Normal rate, regular rhythm and normal heart sounds.   Pulmonary/Chest: Effort normal. No respiratory distress. She has wheezes.   Abdominal: Bowel sounds are normal. She exhibits no distension and no mass. There is no tenderness. There is no rebound and no guarding.   Skin: Skin is warm and dry.   Psychiatric: She has a normal mood and affect.       Procedures    Assessment/Plan   Dalila was seen today for bronchitis.    Diagnoses and all orders for this visit:    Frequent episodes of bronchitis  -     Ambulatory Referral to Pulmonology        Discussion         No future appointments.    Colonoscopy scheduled and Diabetic eye exam on the 13 of October   "

## 2019-10-07 RX ORDER — ALENDRONATE SODIUM 70 MG/1
TABLET ORAL
Qty: 12 TABLET | Refills: 1 | Status: SHIPPED | OUTPATIENT
Start: 2019-10-07 | End: 2020-03-18

## 2019-10-14 RX ORDER — ARIPIPRAZOLE 2 MG/1
2 TABLET ORAL DAILY
Qty: 30 TABLET | Refills: 0 | Status: SHIPPED | OUTPATIENT
Start: 2019-10-14 | End: 2019-11-08 | Stop reason: SDUPTHER

## 2019-10-15 ENCOUNTER — TELEPHONE (OUTPATIENT)
Dept: FAMILY MEDICINE CLINIC | Facility: CLINIC | Age: 69
End: 2019-10-15

## 2019-10-15 NOTE — TELEPHONE ENCOUNTER
Patient says she was admitted to [facility] over the weekend and will be coming in for a follow up. A blood clot and a heart attack was ruled out per patient. She has had a cough and congestion and is afraid to take anything over the counter. She is calling to find out what she can take. She did use nyquil for the cough 1 time, has also been using a humidifier and a nebulizer.

## 2019-10-15 NOTE — TELEPHONE ENCOUNTER
Patient wants to know what else she can take? She was told not to use ibuprofen or tylenol for fever, etc.

## 2019-10-21 RX ORDER — METOPROLOL TARTRATE 50 MG/1
50 TABLET, FILM COATED ORAL 3 TIMES DAILY
Qty: 270 TABLET | Refills: 0 | Status: SHIPPED | OUTPATIENT
Start: 2019-10-21 | End: 2019-12-17

## 2019-10-22 ENCOUNTER — TELEPHONE (OUTPATIENT)
Dept: GASTROENTEROLOGY | Facility: CLINIC | Age: 69
End: 2019-10-22

## 2019-10-22 ENCOUNTER — PREP FOR SURGERY (OUTPATIENT)
Dept: OTHER | Facility: HOSPITAL | Age: 69
End: 2019-10-22

## 2019-10-22 DIAGNOSIS — Z83.71 FAMILY HISTORY OF COLONIC POLYPS: ICD-10-CM

## 2019-10-22 DIAGNOSIS — Z80.0 FAMILY HISTORY OF MALIGNANT NEOPLASM OF COLON: ICD-10-CM

## 2019-10-22 DIAGNOSIS — K63.5 POLYP OF COLON, UNSPECIFIED PART OF COLON, UNSPECIFIED TYPE: Primary | ICD-10-CM

## 2019-10-22 NOTE — TELEPHONE ENCOUNTER
Need clearance to hold plavix x 1 week for c/s - pls find out prescribing md grimm c/s at [facility] only

## 2019-10-22 NOTE — TELEPHONE ENCOUNTER
Per OA paperwork pt's cardiologist is Dr Morales.  Called Dr Morales's office at [phone] and on vm requested approval for pt to hold plavix for one week prior to c/s.  Advised they can call back at [phone] or can fax [phone].

## 2019-10-23 NOTE — TELEPHONE ENCOUNTER
Called Dr Morales's office at [phone] and spoke with Cheryl who advised that she has faxed us approval.  Pt can hold her effient for one week prior to her c/s. Advised that our fax machine has been down today.    Called pt and left vm for pt to call back.

## 2019-10-24 RX ORDER — OMEPRAZOLE 40 MG/1
CAPSULE, DELAYED RELEASE ORAL
Qty: 180 CAPSULE | Refills: 0 | Status: SHIPPED | OUTPATIENT
Start: 2019-10-24 | End: 2020-01-20

## 2019-10-24 RX ORDER — LISINOPRIL 20 MG/1
20 TABLET ORAL DAILY
Qty: 90 TABLET | Refills: 0 | Status: SHIPPED | OUTPATIENT
Start: 2019-10-24 | End: 2020-01-20

## 2019-10-25 ENCOUNTER — PREP FOR SURGERY (OUTPATIENT)
Dept: OTHER | Facility: HOSPITAL | Age: 69
End: 2019-10-25

## 2019-10-28 NOTE — TELEPHONE ENCOUNTER
Called pt and advised per Dr Crawford that she can hold her effient for one week prior to her c/s.     Pt verb understanding and reports that her pcp also wanted her to have egd at the same time.  Pt reports that while talking she loses her voice . Pt is also reporting having increased reflux and she states she does have a hx of mora's esophagus.  Pt states she thinks her last egd was in 2014.  Advised pt will send message to Dr Eubanks. Pt verb understanding.

## 2019-10-29 NOTE — TELEPHONE ENCOUNTER
Received fax from Dr Crawford's office advising pt can hole effient for 5 days prior to c/s. Scanned under media tab. Pt called and update and verb understanding.     Also update sent to Dr Eubanks. Pt is still wanting to add egd.

## 2019-11-01 PROBLEM — K63.5 POLYP OF COLON: Status: ACTIVE | Noted: 2019-11-01

## 2019-11-01 PROBLEM — Z80.0 FAMILY HISTORY OF MALIGNANT NEOPLASM OF COLON: Status: ACTIVE | Noted: 2019-11-01

## 2019-11-01 PROBLEM — Z83.71 FAMILY HISTORY OF COLONIC POLYPS: Status: ACTIVE | Noted: 2019-11-01

## 2019-11-04 ENCOUNTER — PREP FOR SURGERY (OUTPATIENT)
Dept: OTHER | Facility: HOSPITAL | Age: 69
End: 2019-11-04

## 2019-11-05 ENCOUNTER — ANESTHESIA EVENT (OUTPATIENT)
Dept: GASTROENTEROLOGY | Facility: HOSPITAL | Age: 69
End: 2019-11-05

## 2019-11-05 ENCOUNTER — ANESTHESIA (OUTPATIENT)
Dept: GASTROENTEROLOGY | Facility: HOSPITAL | Age: 69
End: 2019-11-05

## 2019-11-05 ENCOUNTER — HOSPITAL ENCOUNTER (OUTPATIENT)
Facility: HOSPITAL | Age: 69
Setting detail: HOSPITAL OUTPATIENT SURGERY
Discharge: HOME | End: 2019-11-05
Attending: INTERNAL MEDICINE | Admitting: INTERNAL MEDICINE

## 2019-11-05 VITALS
BODY MASS INDEX: 32.39 KG/M2 | HEIGHT: 62 IN | RESPIRATION RATE: 16 BRPM | DIASTOLIC BLOOD PRESSURE: 69 MMHG | SYSTOLIC BLOOD PRESSURE: 121 MMHG | OXYGEN SATURATION: 99 % | HEART RATE: 69 BPM | WEIGHT: 176 LBS | TEMPERATURE: 98.2 F

## 2019-11-05 DIAGNOSIS — K63.5 POLYP OF COLON, UNSPECIFIED PART OF COLON, UNSPECIFIED TYPE: ICD-10-CM

## 2019-11-05 DIAGNOSIS — Z83.71 FAMILY HISTORY OF COLONIC POLYPS: ICD-10-CM

## 2019-11-05 DIAGNOSIS — Z80.0 FAMILY HISTORY OF MALIGNANT NEOPLASM OF COLON: ICD-10-CM

## 2019-11-05 LAB — GLUCOSE BLDC GLUCOMTR-MCNC: 157 MG/DL (ref 70–130)

## 2019-11-05 PROCEDURE — 82962 GLUCOSE BLOOD TEST: CPT

## 2019-11-05 PROCEDURE — 43239 EGD BIOPSY SINGLE/MULTIPLE: CPT | Performed by: INTERNAL MEDICINE

## 2019-11-05 PROCEDURE — 45380 COLONOSCOPY AND BIOPSY: CPT | Performed by: INTERNAL MEDICINE

## 2019-11-05 PROCEDURE — 25010000002 PROPOFOL 10 MG/ML EMULSION: Performed by: ANESTHESIOLOGY

## 2019-11-05 PROCEDURE — 88305 TISSUE EXAM BY PATHOLOGIST: CPT | Performed by: INTERNAL MEDICINE

## 2019-11-05 PROCEDURE — 45385 COLONOSCOPY W/LESION REMOVAL: CPT | Performed by: INTERNAL MEDICINE

## 2019-11-05 PROCEDURE — S0260 H&P FOR SURGERY: HCPCS | Performed by: INTERNAL MEDICINE

## 2019-11-05 RX ORDER — SODIUM CHLORIDE, SODIUM LACTATE, POTASSIUM CHLORIDE, CALCIUM CHLORIDE 600; 310; 30; 20 MG/100ML; MG/100ML; MG/100ML; MG/100ML
1000 INJECTION, SOLUTION INTRAVENOUS CONTINUOUS
Status: DISCONTINUED | OUTPATIENT
Start: 2019-11-05 | End: 2019-11-05 | Stop reason: HOSPADM

## 2019-11-05 RX ORDER — PROPOFOL 10 MG/ML
VIAL (ML) INTRAVENOUS AS NEEDED
Status: DISCONTINUED | OUTPATIENT
Start: 2019-11-05 | End: 2019-11-05 | Stop reason: SURG

## 2019-11-05 RX ORDER — PROPOFOL 10 MG/ML
VIAL (ML) INTRAVENOUS CONTINUOUS PRN
Status: DISCONTINUED | OUTPATIENT
Start: 2019-11-05 | End: 2019-11-05 | Stop reason: SURG

## 2019-11-05 RX ORDER — SODIUM CHLORIDE 0.9 % (FLUSH) 0.9 %
10 SYRINGE (ML) INJECTION AS NEEDED
Status: DISCONTINUED | OUTPATIENT
Start: 2019-11-05 | End: 2019-11-05 | Stop reason: HOSPADM

## 2019-11-05 RX ORDER — LIDOCAINE HYDROCHLORIDE 10 MG/ML
0.5 INJECTION, SOLUTION INFILTRATION; PERINEURAL ONCE AS NEEDED
Status: DISCONTINUED | OUTPATIENT
Start: 2019-11-05 | End: 2019-11-05 | Stop reason: HOSPADM

## 2019-11-05 RX ORDER — LIDOCAINE HYDROCHLORIDE 20 MG/ML
INJECTION, SOLUTION INFILTRATION; PERINEURAL AS NEEDED
Status: DISCONTINUED | OUTPATIENT
Start: 2019-11-05 | End: 2019-11-05 | Stop reason: SURG

## 2019-11-05 RX ADMIN — SODIUM CHLORIDE, POTASSIUM CHLORIDE, SODIUM LACTATE AND CALCIUM CHLORIDE 1000 ML: 600; 310; 30; 20 INJECTION, SOLUTION INTRAVENOUS at 10:44

## 2019-11-05 RX ADMIN — PROPOFOL 125 MG: 10 INJECTION, EMULSION INTRAVENOUS at 11:18

## 2019-11-05 RX ADMIN — LIDOCAINE HYDROCHLORIDE 50 MG: 20 INJECTION, SOLUTION INFILTRATION; PERINEURAL at 11:18

## 2019-11-05 RX ADMIN — PROPOFOL 140 MCG/KG/MIN: 10 INJECTION, EMULSION INTRAVENOUS at 11:18

## 2019-11-05 RX ADMIN — EPHEDRINE SULFATE 10 MG: 50 INJECTION INTRAMUSCULAR; INTRAVENOUS; SUBCUTANEOUS at 11:31

## 2019-11-05 NOTE — ANESTHESIA PREPROCEDURE EVALUATION
Anesthesia Evaluation     Patient summary reviewed   NPO Solid Status: > 8 hours  NPO Liquid Status: > 8 hours           Airway   Mallampati: III  TM distance: >3 FB  Neck ROM: full  Possible difficult intubation  Dental - normal exam     Pulmonary     breath sounds clear to auscultation  Cardiovascular   Exercise tolerance: good (4-7 METS)    Rhythm: regular  Rate: normal        Neuro/Psych  GI/Hepatic/Renal/Endo      Musculoskeletal     Abdominal    Substance History      OB/GYN          Other                        Anesthesia Plan    ASA 3     MAC     intravenous induction     Anesthetic plan, all risks, benefits, and alternatives have been provided, discussed and informed consent has been obtained with: patient.

## 2019-11-05 NOTE — H&P
[facility]  Pre Procedure History & Physical    Chief Complaint:   gerd/Hx polyps, FH CRC    Subjective     HPI:   70 yo here today for egd/colonoscopy.  Pt reports + FH CRC/polyps (mother).  Patient reports GI symptoms currrently-increased gerd.  Last exam .    Last dose effient 5 days ago    Past Medical History:   Past Medical History:   Diagnosis Date   • Abnormal weight gain    • Acquired trigger finger    • Acute myocardial infarction (CMS/HCC)    • Adjustment reaction with mixed emotional features    • ASHD (arteriosclerotic heart disease)    • B12 deficiency    • Bacterial pneumonia    • Williamson esophagus    • Barretts esophagus    • Benign colonic polyp    • Bilateral knee pain    • Birthmark     haemangioma of the bone   • Bronchiectasis (CMS/HCC)    • Chronic cough    • Chronic glomerulonephritis with exudative nephritis    • Chronic lumbar radiculopathy    • Diabetes mellitus (CMS/HCC)    • Diabetic peripheral neuropathy (CMS/HCC)    • Dyslipidemia    • Fibromyositis    • Herpes zoster    • Lupus anticoagulant disorder (CMS/HCC)    • Mycobacterium avium complex (CMS/HCC)    • Nephrolithiasis    • SILVINAO (obstructive sleep apnea)    • Palpitations    • Premature ventricular contraction    • Slow transit constipation    • Vitamin D deficiency        Past Surgical History:  Past Surgical History:   Procedure Laterality Date   • APPENDECTOMY     • BRONCHOSCOPY     •  SECTION     • CHOLECYSTECTOMY     • COLONOSCOPY     • CORONARY ANGIOPLASTY WITH STENT PLACEMENT     • KNEE ARTHROSCOPY     • OTHER SURGICAL HISTORY      elbow surgery   • ROTATOR CUFF REPAIR     • TUBAL ABDOMINAL LIGATION         Family History:  Family History   Problem Relation Age of Onset   • Colon cancer Mother    • Uterine cancer Mother    • Cancer Father         malignant brain tumor   • Bone cancer Maternal Aunt    • Diabetes Paternal Grandmother    • Diabetes Paternal Grandfather        Social  History:   reports that she quit smoking about 39 years ago. She has never used smokeless tobacco. She reports that she does not drink alcohol or use drugs.    Medications:   Facility-Administered Medications Prior to Admission   Medication Dose Route Frequency Provider Last Rate Last Dose   • cyanocobalamin injection 1,000 mcg  1,000 mcg Intramuscular Weekly Cinthya Valentine APRN   1,000 mcg at 05/17/18 1308     Medications Prior to Admission   Medication Sig Dispense Refill Last Dose   • ACCU-CHEK FASTCLIX LANCETS misc USE TO TEST BLOOD SUGAR UP TO FIVE TIMES DAILY AS DIRECTED. 408 each 0 Past Week at Unknown time   • albuterol (PROAIR HFA) 108 (90 BASE) MCG/ACT inhaler  INHALE 1 TO 2 PUFFS EVERY 4 TO 6 HOURS AS NEEDED 1 inhaler 1 Past Month at Unknown time   • atorvastatin (LIPITOR) 80 MG tablet TAKE 1 TABLET BY MOUTH EVERY DAY 90 tablet 0 11/4/2019 at Unknown time   • Cholecalciferol (VITAMIN D3) 5000 UNITS capsule capsule Take 1 capsule by mouth daily. 30 capsule 5 11/4/2019 at Unknown time   • citalopram (CeleXA) 20 MG tablet TAKE 1 TABLET BY MOUTH DAILY 90 tablet 0 11/4/2019 at Unknown time   • diphenhydrAMINE (BENADRYL) 25 mg capsule Take 1 capsule by mouth nightly.   11/5/2019 at Unknown time   • gabapentin (NEURONTIN) 800 MG tablet TAKE 1 TABLET BY MOUTH THREE TIMES DAILY 90 tablet 4 11/5/2019 at Unknown time   • glucose blood (FREESTYLE LITE) test strip Use as instructed to test blood sugar up to 5 times daily for low blood sugar and symptomatic glycemia 450 each 3 Past Week at Unknown time   • hydrochlorothiazide (HYDRODIURIL) 25 MG tablet Take  by mouth Daily.  3 11/4/2019 at Unknown time   • HYDROcodone-acetaminophen (NORCO)  MG per tablet Take 1 tablet by mouth 4 (four) times a day.   11/5/2019 at 0700   • lisinopril (PRINIVIL,ZESTRIL) 20 MG tablet TAKE 1 TABLET BY MOUTH DAILY 90 tablet 0 11/4/2019 at Unknown time   • metoprolol tartrate (LOPRESSOR) 50 MG tablet Take 1 tablet by mouth 3  "(Three) Times a Day. 270 tablet 0 11/4/2019 at Unknown time   • metoprolol tartrate (LOPRESSOR) 50 MG tablet Take 1 tablet by mouth 3 (Three) Times a Day. 270 tablet 0 11/5/2019 at 0700   • nateglinide (STARLIX) 60 MG tablet TAKE 1 TABLET BY MOUTH THREE TIMES DAILY BEFORE MEALS 90 tablet 1 Past Week at Unknown time   • nitroglycerin (NITROSTAT) 0.4 MG SL tablet ONE TABLET UNDER TONGUE AS NEEDED FOR CHEST PAIN EVERY 5 MINUTES FOR UP TO 3 DOSES, CALL 911 IF PAIN PERSISTS 300 tablet 5 Past Month at Unknown time   • omeprazole (priLOSEC) 40 MG capsule TAKE ONE CAPSULE BY MOUTH TWICE DAILY 180 capsule 0 11/5/2019 at 0700   • SYMBICORT 160-4.5 MCG/ACT inhaler INHALE 2 PUFFS BY MOUTH TWICE DAILY. RINSE MOUTH AFTER USE 10.2 g 2 Past Month at Unknown time   • topiramate (TOPAMAX) 50 MG tablet Take 1 tablet by mouth Daily. 30 tablet 3 11/4/2019 at Unknown time   • alendronate (FOSAMAX) 70 MG tablet TAKE 1 TABLET BY MOUTH EVERY 7 DAYS 12 tablet 1    • amLODIPine (NORVASC) 10 MG tablet Take 0.5 tablets by mouth Daily. (Patient taking differently: Take 10 mg by mouth Daily.) 30 tablet 0 Taking   • ARIPiprazole (ABILIFY) 2 MG tablet TAKE 1 TABLET BY MOUTH DAILY 30 tablet 0    • aspirin 325 MG tablet Take 325 mg by mouth daily.   10/31/2019   • dexamethasone (DECADRON) 4 MG tablet Take 1 tablet by mouth Daily With Breakfast. 7 tablet 0 Unknown at Unknown time   • EFFIENT 10 MG tablet Take 10 mg by mouth daily.   10/31/2019   • Insulin Syringe 29G X 1\" 0.3 ML misc 1 each daily with dinner. 100 each 0 Taking   • ondansetron (ZOFRAN) 4 MG tablet TK 1 T PO  TID PRF NAUSEA  0 More than a month at Unknown time       Allergies:  Carafate [sucralfate]; Duloxetine hcl; Morphine and related; Nitroglycerin; Nsaids; Oxycodone; Penicillins; Statins; and Viibryd [vilazodone hcl]    ROS:    Pertinent items are noted in HPI, all other systems reviewed and negative     Objective     Blood pressure 106/49, pulse 56, temperature 98.2 °F (36.8 °C), " "temperature source Oral, resp. rate 16, height 157.5 cm (62\"), weight 79.8 kg (176 lb), SpO2 93 %.    Physical Exam   Constitutional: Pt is oriented to person, place, and time and well-developed, well-nourished, and in no distress.   Mouth/Throat: Oropharynx is clear and moist.   Neck: Normal range of motion.   Cardiovascular: Normal rate, regular rhythm    Pulmonary/Chest: Effort normal    Abdominal: Soft. Nontender  Skin: Skin is warm and dry.   Psychiatric: Mood, memory, affect and judgment normal.     Assessment/Plan     Diagnosis:  gerd/Hx polyps, FH CRC    Anticipated Surgical Procedure:  egd/colonoscopy    The risks, benefits, and alternatives of this procedure have been discussed with the patient or the responsible party- the patient understands and agrees to proceed.                                                              "

## 2019-11-05 NOTE — ANESTHESIA POSTPROCEDURE EVALUATION
Patient: Dalila Valerio    Procedure Summary     Date:  11/05/19 Room / Location:  [facility] 10 / [facility]    Anesthesia Start:  1110 Anesthesia Stop:  1156    Procedures:       COLONOSCOPY to cecum with cold biopsy and cold and hotsnare polypectomies (N/A )      ESOPHAGOGASTRODUODENOSCOPY with cold biopsies (N/A Esophagus) Diagnosis:       Polyp of colon, unspecified part of colon, unspecified type      Family history of colonic polyps      Family history of malignant neoplasm of colon      (Polyp of colon, unspecified part of colon, unspecified type [K63.5])      (Family history of colonic polyps [Z83.71])      (Family history of malignant neoplasm of colon [Z80.0])    Surgeon:  Suzy Eubanks MD Provider:  Skip Austin MD    Anesthesia Type:  MAC ASA Status:  3          Anesthesia Type: MAC  Last vitals  BP   109/62 (11/05/19 1210)   Temp   36.8 °C (98.2 °F) (11/05/19 1025)   Pulse   69 (11/05/19 1210)   Resp   16 (11/05/19 1210)     SpO2   98 % (11/05/19 1210)     Post Anesthesia Care and Evaluation    Patient location during evaluation: bedside  Patient participation: complete - patient participated  Level of consciousness: awake and alert  Pain score: 0  Pain management: adequate  Airway patency: patent  Anesthetic complications: No anesthetic complications  PONV Status: none  Cardiovascular status: acceptable  Respiratory status: acceptable  Hydration status: acceptable  Post Neuraxial Block status: Motor and sensory function returned to baseline

## 2019-11-05 NOTE — DISCHARGE INSTRUCTIONS
For the next 24 hours patient needs to be with a responsible adult.    For 24 hours DO NOT drive, operate machinery, appliances, drink alcohol, make important decisions or sign legal documents.    Start with a light or bland diet and advance to regular diet as tolerated.    Follow recommendations on procedure report provided by your doctor.    Call Dr Eubanks for problems [phone] and for biopsy results in 7-10 days    Problems may include but not limited to: large amounts of bleeding, trouble breathing, repeated vomiting, severe unrelieved pain, fever or chills.

## 2019-11-08 LAB
CYTO UR: NORMAL
LAB AP CASE REPORT: NORMAL
PATH REPORT.FINAL DX SPEC: NORMAL
PATH REPORT.GROSS SPEC: NORMAL

## 2019-11-08 RX ORDER — ARIPIPRAZOLE 2 MG/1
2 TABLET ORAL DAILY
Qty: 30 TABLET | Refills: 0 | Status: SHIPPED | OUTPATIENT
Start: 2019-11-08 | End: 2019-12-08 | Stop reason: SDUPTHER

## 2019-11-08 RX ORDER — AMLODIPINE BESYLATE 10 MG/1
TABLET ORAL
Qty: 30 TABLET | Refills: 0 | Status: SHIPPED | OUTPATIENT
Start: 2019-11-08 | End: 2019-11-15 | Stop reason: SDUPTHER

## 2019-11-08 RX ORDER — ATORVASTATIN CALCIUM 80 MG/1
TABLET, FILM COATED ORAL
Qty: 90 TABLET | Refills: 0 | Status: SHIPPED | OUTPATIENT
Start: 2019-11-08 | End: 2020-01-30

## 2019-11-12 ENCOUNTER — TELEPHONE (OUTPATIENT)
Dept: GASTROENTEROLOGY | Facility: CLINIC | Age: 69
End: 2019-11-12

## 2019-11-13 ENCOUNTER — TELEPHONE (OUTPATIENT)
Dept: GASTROENTEROLOGY | Facility: CLINIC | Age: 69
End: 2019-11-13

## 2019-11-13 NOTE — TELEPHONE ENCOUNTER
----- Message from Riley Olson sent at 11/13/2019 12:35 PM EST -----  Regarding: C/S   Contact: [phone]  CALLING FOR C/S REPORT

## 2019-11-14 NOTE — TELEPHONE ENCOUNTER
Gastric biopsy shows chronic inactive inflammation.  Gastric polyps were benign.    The polyp(s) showed hyperplastic change, which is non-cancerous and not pre-cancerous. Follow-up colonoscopy in 5 years is advised.

## 2019-11-15 ENCOUNTER — OFFICE VISIT (OUTPATIENT)
Dept: FAMILY MEDICINE CLINIC | Facility: CLINIC | Age: 69
End: 2019-11-15

## 2019-11-15 VITALS
HEIGHT: 62 IN | DIASTOLIC BLOOD PRESSURE: 78 MMHG | WEIGHT: 180 LBS | OXYGEN SATURATION: 97 % | BODY MASS INDEX: 33.13 KG/M2 | SYSTOLIC BLOOD PRESSURE: 128 MMHG | TEMPERATURE: 98.7 F | HEART RATE: 63 BPM

## 2019-11-15 DIAGNOSIS — K63.5 POLYP OF COLON, UNSPECIFIED PART OF COLON, UNSPECIFIED TYPE: ICD-10-CM

## 2019-11-15 DIAGNOSIS — M75.51 BURSITIS OF RIGHT SHOULDER: Primary | ICD-10-CM

## 2019-11-15 DIAGNOSIS — M79.18 MYOFASCIAL PAIN SYNDROME, CERVICAL: ICD-10-CM

## 2019-11-15 PROCEDURE — 99213 OFFICE O/P EST LOW 20 MIN: CPT | Performed by: NURSE PRACTITIONER

## 2019-11-15 PROCEDURE — 20610 DRAIN/INJ JOINT/BURSA W/O US: CPT | Performed by: NURSE PRACTITIONER

## 2019-11-15 RX ORDER — LIDOCAINE HYDROCHLORIDE 20 MG/ML
2 INJECTION, SOLUTION INFILTRATION; PERINEURAL ONCE
Status: COMPLETED | OUTPATIENT
Start: 2019-11-15 | End: 2019-11-15

## 2019-11-15 RX ORDER — TRIAMCINOLONE ACETONIDE 40 MG/ML
40 INJECTION, SUSPENSION INTRA-ARTICULAR; INTRAMUSCULAR ONCE
Status: COMPLETED | OUTPATIENT
Start: 2019-11-15 | End: 2019-11-15

## 2019-11-15 RX ADMIN — TRIAMCINOLONE ACETONIDE 40 MG: 40 INJECTION, SUSPENSION INTRA-ARTICULAR; INTRAMUSCULAR at 15:45

## 2019-11-15 RX ADMIN — LIDOCAINE HYDROCHLORIDE 2 ML: 20 INJECTION, SOLUTION INFILTRATION; PERINEURAL at 15:46

## 2019-11-15 NOTE — TELEPHONE ENCOUNTER
Called pt and advised per Dr Eubanks that the stomach bx showed chronic inactive inflammation.  Stomach polyps were benign.     The polyp bx showed hyperplastic change.  This is not cancerous and not precancerous.  She recommends a repeat c/s in 5 yrs. Pt verb understanding.     C/s placed in recall for 11/05/2024.

## 2019-11-15 NOTE — PROGRESS NOTES
"Subjective   Dalila Valerio is a 69 y.o. female who presents c/o pain in right shoulder and right side of neck x several weeks that is not improving.     History of Present Illness   No remembered injury, has had pain in R arm for a really long time, but now has worked into neck. Has difficulty rotating arm now for 6 weeks. Is difficult to turn neck as well at times. Worse if keeping neck in fixed position. Arm feels occasionally numb and tingling x1, this occurred after sleeping. usually more painful. Only position can sleep in is on back. Has been using a topical agent with NSAID in it, but stopped as should not be on. Was unsure whether to use ice or heat. Has had bursitis in the past.  The following portions of the patient's history were reviewed and updated as appropriate: allergies, current medications, past family history, past medical history, past social history, past surgical history and problem list.    Review of Systems   Constitutional: Negative.  Negative for activity change, appetite change, chills, fever, unexpected weight gain and unexpected weight loss.   Respiratory: Negative for shortness of breath.    Cardiovascular: Negative.    Gastrointestinal: Negative for constipation (no bowel or bladder changes) and diarrhea.   Genitourinary: Negative for urinary incontinence.   Musculoskeletal: Positive for arthralgias, myalgias, neck pain, neck stiffness and bursitis. Negative for gait problem.   Skin: Negative for rash.   Neurological: Positive for numbness and headache (no migraines, but daily). Negative for weakness.   Hematological: Negative.    Psychiatric/Behavioral: Negative.      /78   Pulse 63   Temp 98.7 °F (37.1 °C) (Oral)   Ht 157.5 cm (62\")   Wt 81.6 kg (180 lb)   SpO2 97%   BMI 32.92 kg/m²     Objective   Physical Exam   Constitutional: She appears well-developed and well-nourished. No distress.   Cardiovascular: Normal rate.   Pulmonary/Chest: Effort normal. "   Musculoskeletal:        Right shoulder: She exhibits decreased range of motion, tenderness, crepitus and pain (subacromial). She exhibits normal strength.        Cervical back: She exhibits decreased range of motion (marked), tenderness (diffuse), pain and spasm (traps and SCM niyah, L/S niyah). She exhibits no bony tenderness.   Neurological: She has normal strength. No cranial nerve deficit or sensory deficit. She exhibits normal muscle tone. Gait normal.   Skin: Skin is warm and dry.   Psychiatric: She has a normal mood and affect. Her behavior is normal. Judgment and thought content normal.   Nursing note and vitals reviewed.    Assessment/Plan   Problems Addressed this Visit        Digestive    Polyp of colon      Other Visit Diagnoses     Bursitis of right shoulder    -  Primary    Relevant Medications    lidocaine (XYLOCAINE) 2% injection 2 mL (Start on 11/15/2019  3:29 PM)    triamcinolone acetonide (KENALOG-40) injection 40 mg (Start on 11/15/2019  3:29 PM)    Other Relevant Orders    Arthrocentesis    Myofascial pain syndrome, cervical            Colon polyps--discussed recent results--scopes upper with no esophogitis, mora's healed. Polyps were hyperplastic only, to return in 5 yrs  Cervical pain--MRI CSPINE 12/16/17 poor quality study with DDD, no canal stenosis--if not settling down with shoulder settling and pain management, would recommend PT  Shoulder bursitis--allergic NSAIDS, reasonable for injection, may need therapy as well, as features of rotator cuff syndrome. Continue work with pain management.    Arthrocentesis  Date/Time: 11/15/2019 2:31 PM  Performed by: Cinthya Valentine APRN  Authorized by: Cinthya Valentine APRN   Consent: Verbal consent obtained. Written consent obtained.  Risks and benefits: risks, benefits and alternatives were discussed  Patient understanding: patient states understanding of the procedure being performed  Patient consent: the patient's understanding of the  "procedure matches consent given  Procedure consent: procedure consent matches procedure scheduled  Patient identity confirmed: verbally with patient  Time out: Immediately prior to procedure a \"time out\" was called to verify the correct patient, procedure, equipment, support staff and site/side marked as required.  Indications: pain   Body area: shoulder  Joint: right subacromial bursa  Local anesthesia used: no    Anesthesia:  Local anesthesia used: no    Sedation:  Patient sedated: no    Preparation: Patient was prepped and draped in the usual sterile fashion.  Needle size: 22 G  Ultrasound guidance: no  Approach: posterior  Aspirate amount: 0 mL  Patient tolerance: Patient tolerated the procedure well with no immediate complications             "

## 2019-11-20 DIAGNOSIS — M25.442 SWELLING OF HAND JOINT, LEFT: ICD-10-CM

## 2019-11-20 RX ORDER — GABAPENTIN 800 MG/1
TABLET ORAL
Qty: 90 TABLET | Refills: 0 | Status: SHIPPED | OUTPATIENT
Start: 2019-11-20 | End: 2020-01-09

## 2019-12-04 RX ORDER — CITALOPRAM 20 MG/1
20 TABLET ORAL DAILY
Qty: 90 TABLET | Refills: 0 | Status: SHIPPED | OUTPATIENT
Start: 2019-12-04 | End: 2020-03-02

## 2019-12-09 RX ORDER — ARIPIPRAZOLE 2 MG/1
2 TABLET ORAL DAILY
Qty: 30 TABLET | Refills: 1 | Status: SHIPPED | OUTPATIENT
Start: 2019-12-09 | End: 2020-01-30 | Stop reason: SDUPTHER

## 2019-12-16 ENCOUNTER — TELEPHONE (OUTPATIENT)
Dept: FAMILY MEDICINE CLINIC | Facility: CLINIC | Age: 69
End: 2019-12-16

## 2019-12-17 ENCOUNTER — OFFICE VISIT (OUTPATIENT)
Dept: FAMILY MEDICINE CLINIC | Facility: CLINIC | Age: 69
End: 2019-12-17

## 2019-12-17 VITALS
OXYGEN SATURATION: 99 % | WEIGHT: 178 LBS | DIASTOLIC BLOOD PRESSURE: 74 MMHG | SYSTOLIC BLOOD PRESSURE: 128 MMHG | TEMPERATURE: 98.5 F | HEART RATE: 57 BPM | HEIGHT: 62 IN | BODY MASS INDEX: 32.76 KG/M2

## 2019-12-17 DIAGNOSIS — R63.8 DEHYDRATION SYMPTOMS: Primary | ICD-10-CM

## 2019-12-17 DIAGNOSIS — R42 VERTIGO: ICD-10-CM

## 2019-12-17 DIAGNOSIS — R25.2 MUSCLE CRAMPING: ICD-10-CM

## 2019-12-17 DIAGNOSIS — H65.91 MIDDLE EAR EFFUSION, RIGHT: ICD-10-CM

## 2019-12-17 DIAGNOSIS — R10.84 GENERALIZED ABDOMINAL PAIN: ICD-10-CM

## 2019-12-17 PROCEDURE — 99214 OFFICE O/P EST MOD 30 MIN: CPT | Performed by: NURSE PRACTITIONER

## 2019-12-17 NOTE — PROGRESS NOTES
"Subjective   Dalila Valerio is a 69 y.o. female who presents c/o dizziness that started with severe abd pain several days ago. Has been nauseated also and has an area of skin discoloration on left wrist that is concerning.     History of Present Illness   Abdominal pain started 3 days ago, though was feeling nauseous before this. Onset was sudden with associated bilious vomiting and dizziness. Still with dizzy moments and leg and toe cramps. Has been able to eat small amts since then and keep things down. No diarrhea. No fever. Has just been drinking water to get rehydrated.  No vomiting in 2 days and abdominal pain has improved, though some is still present.  The following portions of the patient's history were reviewed and updated as appropriate: allergies, current medications, past family history, past medical history, past social history, past surgical history and problem list.    Review of Systems   Constitutional: Negative for chills, diaphoresis and fever.   HENT: Negative.    Eyes: Negative.    Respiratory: Negative.    Cardiovascular: Negative.    Gastrointestinal: Positive for abdominal pain, nausea, vomiting and GERD. Negative for abdominal distention, diarrhea and indigestion.   Genitourinary: Negative.    Musculoskeletal: Positive for myalgias (charley horses.).   Neurological: Positive for dizziness and headache. Negative for confusion.   Hematological: Negative.      /74   Pulse 57   Temp 98.5 °F (36.9 °C) (Oral)   Ht 157.5 cm (62\")   Wt 80.7 kg (178 lb)   SpO2 99%   BMI 32.56 kg/m²     Objective   Physical Exam   Constitutional: She is oriented to person, place, and time. She appears well-developed and well-nourished.   HENT:   Right Ear: There is tenderness.   Left Ear: A middle ear effusion is present.   Nose: Nose normal. Right sinus exhibits no maxillary sinus tenderness and no frontal sinus tenderness. Left sinus exhibits no maxillary sinus tenderness and no frontal sinus " tenderness.   Mouth/Throat: Uvula is midline, oropharynx is clear and moist and mucous membranes are normal.   Neck: Normal range of motion. Neck supple. No tracheal deviation present. No thyromegaly present.   Cardiovascular: Normal rate, regular rhythm and normal heart sounds.   Pulmonary/Chest: Effort normal and breath sounds normal.   Abdominal: Soft. Bowel sounds are normal. She exhibits no distension. There is no hepatosplenomegaly. There is generalized tenderness. There is no rigidity, no rebound, no guarding, no tenderness at McBurney's point and negative Guadarrama's sign.   Lymphadenopathy:     She has no cervical adenopathy.   Neurological: She is alert and oriented to person, place, and time. No cranial nerve deficit or sensory deficit. Coordination normal.   aicha osman pike negative   Skin: Skin is warm and dry. Capillary refill takes less than 2 seconds. There is pallor.   Nursing note and vitals reviewed.    Assessment/Plan   Problems Addressed this Visit     None      Visit Diagnoses     Dehydration symptoms    -  Primary    Relevant Orders    Comprehensive Metabolic Panel (Completed)    Magnesium (Completed)    Muscle cramping        Relevant Orders    Comprehensive Metabolic Panel (Completed)    Magnesium (Completed)    Vertigo        Generalized abdominal pain        Relevant Orders    Comprehensive Metabolic Panel (Completed)    Magnesium (Completed)    CBC & Differential (Completed)    Middle ear effusion, right            Dehydration symptoms--check labs--encouraged increased fluid intake to settle symptoms down.  Muscle cramping--likely secondary to low po intake  Vertigo--should settle with increased water intake, likely triggered by episode of vomiting. To call if not settling 2-3 days  Generalized abdominal pain--likely worsened by severe episode of retching--Recent upper scope with benign gastric polyps and cscope with hyperplastic polyps  MISSAEL--continue decadron and allergy symptom  controllers

## 2019-12-18 LAB
ALBUMIN SERPL-MCNC: 4.2 G/DL (ref 3.5–5.2)
ALBUMIN/GLOB SERPL: 1.5 G/DL
ALP SERPL-CCNC: 47 U/L (ref 39–117)
ALT SERPL-CCNC: 24 U/L (ref 1–33)
AST SERPL-CCNC: 14 U/L (ref 1–32)
BASOPHILS # BLD AUTO: 0.07 10*3/MM3 (ref 0–0.2)
BASOPHILS NFR BLD AUTO: 0.7 % (ref 0–1.5)
BILIRUB SERPL-MCNC: 0.2 MG/DL (ref 0.2–1.2)
BUN SERPL-MCNC: 19 MG/DL (ref 8–23)
BUN/CREAT SERPL: 16.1 (ref 7–25)
CALCIUM SERPL-MCNC: 9.3 MG/DL (ref 8.6–10.5)
CHLORIDE SERPL-SCNC: 99 MMOL/L (ref 98–107)
CO2 SERPL-SCNC: 28.4 MMOL/L (ref 22–29)
CREAT SERPL-MCNC: 1.18 MG/DL (ref 0.57–1)
EOSINOPHIL # BLD AUTO: 0.29 10*3/MM3 (ref 0–0.4)
EOSINOPHIL NFR BLD AUTO: 2.9 % (ref 0.3–6.2)
ERYTHROCYTE [DISTWIDTH] IN BLOOD BY AUTOMATED COUNT: 12.4 % (ref 12.3–15.4)
GLOBULIN SER CALC-MCNC: 2.8 GM/DL
GLUCOSE SERPL-MCNC: 201 MG/DL (ref 65–99)
HCT VFR BLD AUTO: 39.2 % (ref 34–46.6)
HGB BLD-MCNC: 12.7 G/DL (ref 12–15.9)
IMM GRANULOCYTES # BLD AUTO: 0.05 10*3/MM3 (ref 0–0.05)
IMM GRANULOCYTES NFR BLD AUTO: 0.5 % (ref 0–0.5)
LYMPHOCYTES # BLD AUTO: 2.99 10*3/MM3 (ref 0.7–3.1)
LYMPHOCYTES NFR BLD AUTO: 29.5 % (ref 19.6–45.3)
MAGNESIUM SERPL-MCNC: 2.2 MG/DL (ref 1.6–2.4)
MCH RBC QN AUTO: 29.1 PG (ref 26.6–33)
MCHC RBC AUTO-ENTMCNC: 32.4 G/DL (ref 31.5–35.7)
MCV RBC AUTO: 89.9 FL (ref 79–97)
MONOCYTES # BLD AUTO: 0.72 10*3/MM3 (ref 0.1–0.9)
MONOCYTES NFR BLD AUTO: 7.1 % (ref 5–12)
NEUTROPHILS # BLD AUTO: 6.02 10*3/MM3 (ref 1.7–7)
NEUTROPHILS NFR BLD AUTO: 59.3 % (ref 42.7–76)
NRBC BLD AUTO-RTO: 0 /100 WBC (ref 0–0.2)
PLATELET # BLD AUTO: 214 10*3/MM3 (ref 140–450)
POTASSIUM SERPL-SCNC: 4 MMOL/L (ref 3.5–5.2)
PROT SERPL-MCNC: 7 G/DL (ref 6–8.5)
RBC # BLD AUTO: 4.36 10*6/MM3 (ref 3.77–5.28)
SODIUM SERPL-SCNC: 137 MMOL/L (ref 136–145)
WBC # BLD AUTO: 10.14 10*3/MM3 (ref 3.4–10.8)

## 2019-12-18 NOTE — PROGRESS NOTES
Please call the patient regarding her abnormal result. Electrolytes are normal, but blood sugar high and kidney function a little low, likely from the dehydration and vomiting. Increase water intake. This should ease the cramping. Follow up if not settling.

## 2020-01-07 ENCOUNTER — OFFICE VISIT (OUTPATIENT)
Dept: FAMILY MEDICINE CLINIC | Facility: CLINIC | Age: 70
End: 2020-01-07

## 2020-01-07 VITALS
SYSTOLIC BLOOD PRESSURE: 124 MMHG | BODY MASS INDEX: 33.49 KG/M2 | OXYGEN SATURATION: 95 % | TEMPERATURE: 98.5 F | HEIGHT: 62 IN | WEIGHT: 182 LBS | DIASTOLIC BLOOD PRESSURE: 78 MMHG | HEART RATE: 64 BPM

## 2020-01-07 DIAGNOSIS — H81.13 BPPV (BENIGN PAROXYSMAL POSITIONAL VERTIGO), BILATERAL: Primary | ICD-10-CM

## 2020-01-07 PROCEDURE — 99213 OFFICE O/P EST LOW 20 MIN: CPT | Performed by: NURSE PRACTITIONER

## 2020-01-07 RX ORDER — MECLIZINE HCL 12.5 MG/1
TABLET ORAL
COMMUNITY
Start: 2019-12-30 | End: 2020-01-07 | Stop reason: SDUPTHER

## 2020-01-07 RX ORDER — MECLIZINE HCL 12.5 MG/1
12.5 TABLET ORAL 3 TIMES DAILY PRN
Qty: 30 TABLET | Refills: 0 | Status: SHIPPED | OUTPATIENT
Start: 2020-01-07 | End: 2020-10-12

## 2020-01-07 NOTE — PROGRESS NOTES
Subjective   Dalila Valerio is a 69 y.o. female who presents for a follow up on dizziness. Went to [facility] and was given meclizine but still feeling dizzy.     History of Present Illness   Dizziness a chronic problem off and on. Meclizine has helped when taken. Notices a pattern with sleep apnea. When weather is bad and cloudy, tends to be more severe. Has trouble waking up. And will have difficulty walking from bathroom to bed without assistance. If puts head forward, will fall forward. Has trouble going around curves, falling into walls. No longer following with neurology for migraines. In bad cycle of dizzy spells for a few months. No major medication changes in the last few months. Has never had a bad cycle like this in the past. Not treating sleep apnea. No trouble initiating sleep, but sleep very interrupted during night. Dizzy spells are not worse during the night or day. Much worse when severely fatigued. Has to stand up and still before can walk.  Ortho is sending to therapy for shoulder--Dr. Carreon  The following portions of the patient's history were reviewed and updated as appropriate: allergies, current medications, past family history, past medical history, past social history, past surgical history and problem list.    Review of Systems   Constitutional: Positive for activity change and fatigue. Negative for appetite change, chills, diaphoresis, fever, unexpected weight gain and unexpected weight loss.   HENT: Negative.    Eyes: Positive for visual disturbance.   Respiratory: Positive for apnea. Negative for shortness of breath.    Cardiovascular: Negative.  Negative for chest pain, palpitations and leg swelling.   Genitourinary: Negative.    Musculoskeletal: Positive for back pain, gait problem, myalgias and neck pain.   Skin: Negative.    Neurological: Positive for dizziness and headache (continuous HA all the time). Negative for facial asymmetry, speech difficulty, weakness,  "light-headedness and memory problem.   Hematological: Negative.    Psychiatric/Behavioral: Positive for sleep disturbance, depressed mood and stress. Negative for self-injury and suicidal ideas. The patient is nervous/anxious.      /78   Pulse 64   Temp 98.5 °F (36.9 °C) (Oral)   Ht 157.5 cm (62\")   Wt 82.6 kg (182 lb)   SpO2 95%   BMI 33.29 kg/m²     Objective   Physical Exam   Constitutional: She is oriented to person, place, and time.   HENT:   Right Ear: Tympanic membrane normal.   Left Ear: Tympanic membrane normal.   Nose: Nose normal.   Mouth/Throat: Uvula is midline, oropharynx is clear and moist and mucous membranes are normal.   Cardiovascular: Normal rate, regular rhythm and normal heart sounds.   Pulmonary/Chest: Effort normal and breath sounds normal.   Neurological: She is alert and oriented to person, place, and time. She has normal strength. No cranial nerve deficit or sensory deficit. Coordination normal.   aicha osman pike positive R   Psychiatric: Her speech is normal and behavior is normal. Thought content normal. Her mood appears anxious. She exhibits a depressed mood.   Nursing note and vitals reviewed.    Assessment/Plan   Problems Addressed this Visit     None      Visit Diagnoses     BPPV (benign paroxysmal positional vertigo), bilateral    -  Primary    Relevant Medications    meclizine (ANTIVERT) 12.5 MG tablet        Meclizine--was making managable does not want stronger, but offered option. To start exercises to address. If not settling, 1 week, to call for PT. Consider re-evaluation with neurology if not settling. Consider migraine variant.        "

## 2020-01-08 RX ORDER — AMLODIPINE BESYLATE 10 MG/1
TABLET ORAL
Qty: 30 TABLET | Refills: 0 | Status: SHIPPED | OUTPATIENT
Start: 2020-01-08 | End: 2020-01-10 | Stop reason: SDUPTHER

## 2020-01-09 DIAGNOSIS — M25.442 SWELLING OF HAND JOINT, LEFT: ICD-10-CM

## 2020-01-09 RX ORDER — GABAPENTIN 800 MG/1
TABLET ORAL
Qty: 90 TABLET | Refills: 4 | Status: SHIPPED | OUTPATIENT
Start: 2020-01-09 | End: 2020-06-16

## 2020-01-09 NOTE — TELEPHONE ENCOUNTER
Pt says she is prescribed amlodipine 10mg and is supposed to take 1/2 tab daily. She is unable to cut this in half and wants a new script for 5mg daily. Please advise.

## 2020-01-10 RX ORDER — AMLODIPINE BESYLATE 5 MG/1
5 TABLET ORAL DAILY
Qty: 30 TABLET | Refills: 3 | Status: SHIPPED | OUTPATIENT
Start: 2020-01-10 | End: 2020-04-29

## 2020-01-14 DIAGNOSIS — G43.109 MIGRAINE WITH TYPICAL AURA: ICD-10-CM

## 2020-01-14 RX ORDER — TOPIRAMATE 50 MG/1
50 TABLET, FILM COATED ORAL DAILY
Qty: 30 TABLET | Refills: 3 | Status: SHIPPED | OUTPATIENT
Start: 2020-01-14 | End: 2020-05-11

## 2020-01-20 RX ORDER — OMEPRAZOLE 40 MG/1
CAPSULE, DELAYED RELEASE ORAL
Qty: 180 CAPSULE | Refills: 0 | Status: SHIPPED | OUTPATIENT
Start: 2020-01-20 | End: 2020-04-15

## 2020-01-20 RX ORDER — LISINOPRIL 20 MG/1
20 TABLET ORAL DAILY
Qty: 90 TABLET | Refills: 0 | Status: SHIPPED | OUTPATIENT
Start: 2020-01-20 | End: 2020-04-15

## 2020-01-21 ENCOUNTER — OFFICE VISIT (OUTPATIENT)
Dept: FAMILY MEDICINE CLINIC | Facility: CLINIC | Age: 70
End: 2020-01-21

## 2020-01-21 VITALS
DIASTOLIC BLOOD PRESSURE: 78 MMHG | HEART RATE: 68 BPM | BODY MASS INDEX: 33.13 KG/M2 | OXYGEN SATURATION: 96 % | HEIGHT: 62 IN | SYSTOLIC BLOOD PRESSURE: 124 MMHG | TEMPERATURE: 98.5 F | WEIGHT: 180 LBS

## 2020-01-21 DIAGNOSIS — R20.0 NUMBNESS OF TONGUE: ICD-10-CM

## 2020-01-21 DIAGNOSIS — R39.9 UTI SYMPTOMS: Primary | ICD-10-CM

## 2020-01-21 DIAGNOSIS — G43.109 MIGRAINE WITH TYPICAL AURA: ICD-10-CM

## 2020-01-21 LAB
BILIRUB BLD-MCNC: NEGATIVE MG/DL
CLARITY, POC: CLEAR
COLOR UR: ABNORMAL
GLUCOSE UR STRIP-MCNC: NEGATIVE MG/DL
KETONES UR QL: NEGATIVE
LEUKOCYTE EST, POC: ABNORMAL
NITRITE UR-MCNC: NEGATIVE MG/ML
PH UR: 5.5 [PH] (ref 5–8)
PROT UR STRIP-MCNC: ABNORMAL MG/DL
RBC # UR STRIP: NEGATIVE /UL
SP GR UR: 1.02 (ref 1–1.03)
UROBILINOGEN UR QL: NORMAL

## 2020-01-21 PROCEDURE — 99214 OFFICE O/P EST MOD 30 MIN: CPT | Performed by: NURSE PRACTITIONER

## 2020-01-21 PROCEDURE — 81003 URINALYSIS AUTO W/O SCOPE: CPT | Performed by: NURSE PRACTITIONER

## 2020-01-21 RX ORDER — LORAZEPAM 1 MG/1
1 TABLET ORAL 3 TIMES DAILY PRN
COMMUNITY
Start: 2020-01-14 | End: 2020-08-25 | Stop reason: HOSPADM
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
## 2020-01-22 LAB
ANA SER QL: NEGATIVE
CRP SERPL-MCNC: 0.04 MG/DL (ref 0–0.5)
ERYTHROCYTE [SEDIMENTATION RATE] IN BLOOD BY WESTERGREN METHOD: 8 MM/HR (ref 0–30)
RHEUMATOID FACT SERPL-ACNC: <10 IU/ML (ref 0–13.9)

## 2020-01-30 RX ORDER — METOPROLOL TARTRATE 50 MG/1
TABLET, FILM COATED ORAL
Qty: 270 TABLET | Refills: 0 | Status: SHIPPED | OUTPATIENT
Start: 2020-01-30 | End: 2020-04-29

## 2020-01-30 RX ORDER — ARIPIPRAZOLE 5 MG/1
5 TABLET ORAL DAILY
Qty: 30 TABLET | Refills: 0 | Status: SHIPPED | OUTPATIENT
Start: 2020-01-30 | End: 2020-02-27

## 2020-01-30 RX ORDER — ATORVASTATIN CALCIUM 80 MG/1
TABLET, FILM COATED ORAL
Qty: 90 TABLET | Refills: 0 | Status: SHIPPED | OUTPATIENT
Start: 2020-01-30 | End: 2020-04-29

## 2020-01-31 RX ORDER — ARIPIPRAZOLE 2 MG/1
2 TABLET ORAL DAILY
Qty: 30 TABLET | Refills: 1 | OUTPATIENT
Start: 2020-01-31

## 2020-02-20 DIAGNOSIS — E11.649 HYPOGLYCEMIA ASSOCIATED WITH TYPE 2 DIABETES MELLITUS (HCC): ICD-10-CM

## 2020-02-20 DIAGNOSIS — IMO0002 UNCONTROLLED DIABETES MELLITUS: ICD-10-CM

## 2020-02-27 RX ORDER — ARIPIPRAZOLE 5 MG/1
5 TABLET ORAL DAILY
Qty: 30 TABLET | Refills: 2 | Status: SHIPPED | OUTPATIENT
Start: 2020-02-27 | End: 2020-05-21

## 2020-03-02 RX ORDER — CITALOPRAM 20 MG/1
20 TABLET ORAL DAILY
Qty: 90 TABLET | Refills: 0 | Status: SHIPPED | OUTPATIENT
Start: 2020-03-02 | End: 2020-05-28

## 2020-03-16 ENCOUNTER — OFFICE VISIT (OUTPATIENT)
Dept: FAMILY MEDICINE CLINIC | Facility: CLINIC | Age: 70
End: 2020-03-16

## 2020-03-16 VITALS
BODY MASS INDEX: 33.49 KG/M2 | WEIGHT: 182 LBS | SYSTOLIC BLOOD PRESSURE: 128 MMHG | OXYGEN SATURATION: 98 % | DIASTOLIC BLOOD PRESSURE: 72 MMHG | HEIGHT: 62 IN | HEART RATE: 66 BPM | TEMPERATURE: 98.4 F

## 2020-03-16 DIAGNOSIS — E11.65 UNCONTROLLED TYPE 2 DIABETES MELLITUS WITH HYPERGLYCEMIA (HCC): ICD-10-CM

## 2020-03-16 DIAGNOSIS — J18.9 PNEUMONIA DUE TO INFECTIOUS ORGANISM, UNSPECIFIED LATERALITY, UNSPECIFIED PART OF LUNG: Primary | ICD-10-CM

## 2020-03-16 PROCEDURE — 99213 OFFICE O/P EST LOW 20 MIN: CPT | Performed by: NURSE PRACTITIONER

## 2020-03-16 RX ORDER — BENZONATATE 100 MG/1
CAPSULE ORAL
COMMUNITY
Start: 2020-03-09 | End: 2020-04-14

## 2020-03-16 RX ORDER — DOXYCYCLINE HYCLATE 100 MG/1
CAPSULE ORAL
COMMUNITY
Start: 2020-03-09 | End: 2020-04-14

## 2020-03-16 NOTE — PROGRESS NOTES
"Subjective   Dalila Valerio is a 69 y.o. female who presents for a follow up after being treated at [facility] for pneumonia.     History of Present Illness   Onset of symptoms 2 weeks before treated in ER, thinks about 3/7/2020 to ER. Was given 10 days of doxy, but always has a difficult time tolerating secondary to nausea. Was wheezing audibly when at ER. Was doing 3 breathing tx a day when ill. Needing less now. Still coughing a bit. Overall greatly improved.  The following portions of the patient's history were reviewed and updated as appropriate: allergies, current medications, past family history, past medical history, past social history, past surgical history and problem list.    Review of Systems   Constitutional: Negative for chills and fever.   HENT: Positive for congestion and rhinorrhea. Negative for postnasal drip, sinus pressure and sore throat.    Respiratory: Positive for cough (not productive), shortness of breath (improved completely) and wheezing (now only occasional at night).    Gastrointestinal: Positive for nausea.   Musculoskeletal: Positive for arthralgias, back pain and gait problem.   Neurological: Negative for dizziness and weakness.   Hematological: Negative.    Psychiatric/Behavioral: Negative.      /72   Pulse 66   Temp 98.4 °F (36.9 °C) (Oral)   Ht 157.5 cm (62\")   Wt 82.6 kg (182 lb)   SpO2 98%   BMI 33.29 kg/m²     Objective   Physical Exam   Constitutional: She is oriented to person, place, and time. She appears well-developed and well-nourished.   HENT:   Right Ear: Tympanic membrane normal.   Left Ear: Tympanic membrane normal.   Nose: Right sinus exhibits no maxillary sinus tenderness and no frontal sinus tenderness. Left sinus exhibits no maxillary sinus tenderness and no frontal sinus tenderness.   Neck: Normal range of motion. Neck supple. No tracheal deviation present. No thyromegaly present.   Cardiovascular: Normal rate, regular rhythm and normal heart sounds. "   Pulmonary/Chest: Effort normal and breath sounds normal. She has no wheezes. She exhibits no tenderness.   Musculoskeletal: She exhibits no edema.   Lymphadenopathy:     She has no cervical adenopathy.   Neurological: She is alert and oriented to person, place, and time.   Skin: Skin is warm and dry.   Psychiatric: She has a normal mood and affect. Her behavior is normal. Judgment and thought content normal.     Assessment/Plan   Problems Addressed this Visit     None      Visit Diagnoses     Pneumonia due to infectious organism, unspecified laterality, unspecified part of lung    -  Primary    Relevant Medications    doxycycline (VIBRAMYCIN) 100 MG capsule    benzonatate (TESSALON) 100 MG capsule    Uncontrolled type 2 diabetes mellitus with hyperglycemia (CMS/[facility])        Relevant Orders    Hemoglobin A1c    Microalbumin / Creatinine Urine Ratio - Urine, Clean Catch        Pneumonia--settling, have called for records, but not available when seen in clinic today. Clinically better. Would finish doxycycline and FU if not settling.   S2BB--ybawlfk for lab, but just treated with steroid. Hold labs 30 days and do outpt. She reports sugars were controlled while in hospital.

## 2020-03-18 RX ORDER — ALENDRONATE SODIUM 70 MG/1
TABLET ORAL
Qty: 12 TABLET | Refills: 1 | Status: SHIPPED | OUTPATIENT
Start: 2020-03-18 | End: 2020-07-13

## 2020-04-14 ENCOUNTER — OFFICE VISIT (OUTPATIENT)
Dept: FAMILY MEDICINE CLINIC | Facility: CLINIC | Age: 70
End: 2020-04-14

## 2020-04-14 VITALS
HEIGHT: 62 IN | SYSTOLIC BLOOD PRESSURE: 124 MMHG | OXYGEN SATURATION: 99 % | HEART RATE: 61 BPM | TEMPERATURE: 98.4 F | BODY MASS INDEX: 34.41 KG/M2 | WEIGHT: 187 LBS | DIASTOLIC BLOOD PRESSURE: 80 MMHG

## 2020-04-14 DIAGNOSIS — Z78.9 STATIN INTOLERANCE: ICD-10-CM

## 2020-04-14 DIAGNOSIS — R79.9 ABNORMAL FINDING OF BLOOD CHEMISTRY, UNSPECIFIED: ICD-10-CM

## 2020-04-14 DIAGNOSIS — R25.2 MUSCLE CRAMPING: Primary | ICD-10-CM

## 2020-04-14 DIAGNOSIS — I25.720 ATHEROSCLEROSIS OF AUTOLOGOUS ARTERY CORONARY ARTERY BYPASS GRAFT(S) WITH UNSTABLE ANGINA PECTORIS (HCC): ICD-10-CM

## 2020-04-14 DIAGNOSIS — E11.65 TYPE 2 DIABETES MELLITUS WITH HYPERGLYCEMIA, UNSPECIFIED WHETHER LONG TERM INSULIN USE (HCC): ICD-10-CM

## 2020-04-14 DIAGNOSIS — M54.16 LUMBAR RADICULOPATHY: ICD-10-CM

## 2020-04-14 DIAGNOSIS — R79.89 ELEVATED BRAIN NATRIURETIC PEPTIDE (BNP) LEVEL: ICD-10-CM

## 2020-04-14 PROCEDURE — 36415 COLL VENOUS BLD VENIPUNCTURE: CPT | Performed by: NURSE PRACTITIONER

## 2020-04-14 PROCEDURE — 99214 OFFICE O/P EST MOD 30 MIN: CPT | Performed by: NURSE PRACTITIONER

## 2020-04-14 RX ORDER — CYCLOBENZAPRINE HCL 10 MG
10 TABLET ORAL 3 TIMES DAILY PRN
COMMUNITY
Start: 2020-03-27 | End: 2020-10-01 | Stop reason: HOSPADM

## 2020-04-15 LAB
ALBUMIN SERPL-MCNC: 4.1 G/DL (ref 3.7–4.7)
ALBUMIN/GLOB SERPL: 1.6 {RATIO} (ref 1.2–2.2)
ALP SERPL-CCNC: 46 IU/L (ref 39–117)
ALT SERPL-CCNC: 23 IU/L (ref 0–32)
AST SERPL-CCNC: 18 IU/L (ref 0–40)
BILIRUB SERPL-MCNC: <0.2 MG/DL (ref 0–1.2)
BNP SERPL-MCNC: 86.6 PG/ML (ref 0–100)
BUN SERPL-MCNC: 21 MG/DL (ref 8–27)
BUN/CREAT SERPL: 19 (ref 12–28)
CALCIUM SERPL-MCNC: 9.4 MG/DL (ref 8.7–10.3)
CHLORIDE SERPL-SCNC: 96 MMOL/L (ref 96–106)
CHOLEST SERPL-MCNC: 140 MG/DL (ref 100–199)
CO2 SERPL-SCNC: 25 MMOL/L (ref 20–29)
CREAT SERPL-MCNC: 1.08 MG/DL (ref 0.57–1)
FERRITIN SERPL-MCNC: 17 NG/ML (ref 15–150)
GLOBULIN SER CALC-MCNC: 2.6 G/DL (ref 1.5–4.5)
GLUCOSE SERPL-MCNC: 170 MG/DL (ref 65–99)
HBA1C MFR BLD: 8.3 % (ref 4.8–5.6)
HDLC SERPL-MCNC: 47 MG/DL
LDLC SERPL CALC-MCNC: 73 MG/DL (ref 0–99)
MAGNESIUM SERPL-MCNC: 1.9 MG/DL (ref 1.6–2.3)
POTASSIUM SERPL-SCNC: 4.2 MMOL/L (ref 3.5–5.2)
PROT SERPL-MCNC: 6.7 G/DL (ref 6–8.5)
SODIUM SERPL-SCNC: 136 MMOL/L (ref 134–144)
TRIGL SERPL-MCNC: 101 MG/DL (ref 0–149)
VLDLC SERPL CALC-MCNC: 20 MG/DL (ref 5–40)

## 2020-04-15 RX ORDER — LISINOPRIL 20 MG/1
20 TABLET ORAL DAILY
Qty: 90 TABLET | Refills: 0 | Status: SHIPPED | OUTPATIENT
Start: 2020-04-15 | End: 2020-08-18

## 2020-04-15 RX ORDER — OMEPRAZOLE 40 MG/1
CAPSULE, DELAYED RELEASE ORAL
Qty: 180 CAPSULE | Refills: 0 | Status: SHIPPED | OUTPATIENT
Start: 2020-04-15 | End: 2020-08-03

## 2020-04-15 NOTE — PROGRESS NOTES
Please call the patient regarding her abnormal result. Electrolytes are normal cholesterol to goal with high intensity statin. Rotate atorvastatin to crestor 20 mg daily and see if tolerated better. A1c not to goal. Increase nateglinide to 120mg TID <30 min before meals--skip if not eating #90 3rf FU 3 months

## 2020-04-16 RX ORDER — NATEGLINIDE 120 MG/1
120 TABLET ORAL
Qty: 90 TABLET | Refills: 3 | Status: SHIPPED | OUTPATIENT
Start: 2020-04-16 | End: 2020-10-12

## 2020-04-16 RX ORDER — ROSUVASTATIN CALCIUM 20 MG/1
20 TABLET, COATED ORAL DAILY
Qty: 30 TABLET | Refills: 3 | Status: SHIPPED | OUTPATIENT
Start: 2020-04-16 | End: 2020-08-18

## 2020-04-29 RX ORDER — AMLODIPINE BESYLATE 5 MG/1
5 TABLET ORAL DAILY
Qty: 90 TABLET | Refills: 0 | Status: SHIPPED | OUTPATIENT
Start: 2020-04-29 | End: 2020-08-03

## 2020-04-29 RX ORDER — ATORVASTATIN CALCIUM 80 MG/1
TABLET, FILM COATED ORAL
Qty: 90 TABLET | Refills: 0 | Status: ON HOLD | OUTPATIENT
Start: 2020-04-29 | End: 2020-08-19

## 2020-04-29 RX ORDER — METOPROLOL TARTRATE 50 MG/1
TABLET, FILM COATED ORAL
Qty: 270 TABLET | Refills: 0 | Status: SHIPPED | OUTPATIENT
Start: 2020-04-29 | End: 2020-11-09

## 2020-05-09 DIAGNOSIS — G43.109 MIGRAINE WITH TYPICAL AURA: ICD-10-CM

## 2020-05-11 RX ORDER — TOPIRAMATE 50 MG/1
50 TABLET, FILM COATED ORAL DAILY
Qty: 30 TABLET | Refills: 3 | Status: ON HOLD | OUTPATIENT
Start: 2020-05-11 | End: 2020-08-19

## 2020-05-21 RX ORDER — ARIPIPRAZOLE 5 MG/1
5 TABLET ORAL DAILY
Qty: 30 TABLET | Refills: 2 | Status: SHIPPED | OUTPATIENT
Start: 2020-05-21 | End: 2020-08-03

## 2020-05-28 RX ORDER — CITALOPRAM 20 MG/1
20 TABLET ORAL DAILY
Qty: 90 TABLET | Refills: 0 | Status: SHIPPED | OUTPATIENT
Start: 2020-05-28 | End: 2020-10-12

## 2020-06-03 ENCOUNTER — OFFICE VISIT (OUTPATIENT)
Dept: FAMILY MEDICINE CLINIC | Facility: CLINIC | Age: 70
End: 2020-06-03

## 2020-06-03 VITALS
TEMPERATURE: 98.5 F | SYSTOLIC BLOOD PRESSURE: 134 MMHG | WEIGHT: 180 LBS | DIASTOLIC BLOOD PRESSURE: 82 MMHG | HEIGHT: 62 IN | OXYGEN SATURATION: 96 % | HEART RATE: 87 BPM | BODY MASS INDEX: 33.13 KG/M2

## 2020-06-03 DIAGNOSIS — T14.8XXA CONTUSION OF MUSCLE: ICD-10-CM

## 2020-06-03 DIAGNOSIS — N17.9 ACUTE KIDNEY INJURY (HCC): ICD-10-CM

## 2020-06-03 DIAGNOSIS — R20.2 PARESTHESIA OF LOWER LIMB: ICD-10-CM

## 2020-06-03 DIAGNOSIS — R60.0 BILATERAL LOWER EXTREMITY EDEMA: ICD-10-CM

## 2020-06-03 DIAGNOSIS — E11.8 CONTROLLED TYPE 2 DIABETES MELLITUS WITH COMPLICATION, WITHOUT LONG-TERM CURRENT USE OF INSULIN (HCC): ICD-10-CM

## 2020-06-03 DIAGNOSIS — F07.81 POST CONCUSSIVE SYNDROME: Primary | ICD-10-CM

## 2020-06-03 DIAGNOSIS — R25.1: ICD-10-CM

## 2020-06-03 PROCEDURE — 99214 OFFICE O/P EST MOD 30 MIN: CPT | Performed by: NURSE PRACTITIONER

## 2020-06-03 RX ORDER — CLOPIDOGREL BISULFATE 75 MG/1
75 TABLET ORAL DAILY
Status: ON HOLD | COMMUNITY
Start: 2020-04-29 | End: 2020-08-19

## 2020-06-04 ENCOUNTER — TELEPHONE (OUTPATIENT)
Dept: FAMILY MEDICINE CLINIC | Facility: CLINIC | Age: 70
End: 2020-06-04

## 2020-06-04 DIAGNOSIS — R20.2 PARESTHESIA OF LOWER LIMB: ICD-10-CM

## 2020-06-04 DIAGNOSIS — R60.0 BILATERAL LOWER EXTREMITY EDEMA: ICD-10-CM

## 2020-06-04 LAB
BASOPHILS # BLD AUTO: 0.07 10*3/MM3 (ref 0–0.2)
BASOPHILS NFR BLD AUTO: 0.5 % (ref 0–1.5)
BUN SERPL-MCNC: 21 MG/DL (ref 8–23)
BUN/CREAT SERPL: 16.9 (ref 7–25)
CALCIUM SERPL-MCNC: 10.1 MG/DL (ref 8.6–10.5)
CHLORIDE SERPL-SCNC: 100 MMOL/L (ref 98–107)
CK SERPL-CCNC: 95 U/L (ref 20–180)
CO2 SERPL-SCNC: 26.6 MMOL/L (ref 22–29)
CREAT SERPL-MCNC: 1.24 MG/DL (ref 0.57–1)
EOSINOPHIL # BLD AUTO: 0.58 10*3/MM3 (ref 0–0.4)
EOSINOPHIL NFR BLD AUTO: 4 % (ref 0.3–6.2)
ERYTHROCYTE [DISTWIDTH] IN BLOOD BY AUTOMATED COUNT: 13.3 % (ref 12.3–15.4)
GLUCOSE SERPL-MCNC: 174 MG/DL (ref 65–99)
HCT VFR BLD AUTO: 35.1 % (ref 34–46.6)
HGB BLD-MCNC: 11.7 G/DL (ref 12–15.9)
IMM GRANULOCYTES # BLD AUTO: 0.11 10*3/MM3 (ref 0–0.05)
IMM GRANULOCYTES NFR BLD AUTO: 0.8 % (ref 0–0.5)
LYMPHOCYTES # BLD AUTO: 2.73 10*3/MM3 (ref 0.7–3.1)
LYMPHOCYTES NFR BLD AUTO: 19 % (ref 19.6–45.3)
MCH RBC QN AUTO: 30.2 PG (ref 26.6–33)
MCHC RBC AUTO-ENTMCNC: 33.3 G/DL (ref 31.5–35.7)
MCV RBC AUTO: 90.5 FL (ref 79–97)
MONOCYTES # BLD AUTO: 1.04 10*3/MM3 (ref 0.1–0.9)
MONOCYTES NFR BLD AUTO: 7.2 % (ref 5–12)
NEUTROPHILS # BLD AUTO: 9.85 10*3/MM3 (ref 1.7–7)
NEUTROPHILS NFR BLD AUTO: 68.5 % (ref 42.7–76)
NRBC BLD AUTO-RTO: 0 /100 WBC (ref 0–0.2)
PLATELET # BLD AUTO: 179 10*3/MM3 (ref 140–450)
POTASSIUM SERPL-SCNC: 4.1 MMOL/L (ref 3.5–5.2)
RBC # BLD AUTO: 3.88 10*6/MM3 (ref 3.77–5.28)
SODIUM SERPL-SCNC: 135 MMOL/L (ref 136–145)
WBC # BLD AUTO: 14.38 10*3/MM3 (ref 3.4–10.8)

## 2020-06-04 RX ORDER — FUROSEMIDE 20 MG/1
20 TABLET ORAL DAILY
Qty: 30 TABLET | Refills: 0 | Status: SHIPPED | OUTPATIENT
Start: 2020-06-04 | End: 2020-06-23

## 2020-06-04 RX ORDER — FUROSEMIDE 20 MG/1
20 TABLET ORAL DAILY
Qty: 90 TABLET | OUTPATIENT
Start: 2020-06-04

## 2020-06-04 NOTE — TELEPHONE ENCOUNTER
Pt wants to know the results of her U/S bilateral veins. Report scanned in chart. Please advise what to tell pt.

## 2020-06-04 NOTE — PROGRESS NOTES
Please call the patient regarding her abnormal result. White blood cell count is elevated, most likely just reactive due to recent steroids. If any sign of infecction, would follow up. Doppler was negative for DVT, did have a superficial clot in LLE. Treat with ambulation, elevation, warm or cool compresses. If becoming more painful and swollen would recheck. Kidney function is improving. avoidance of all NSAIDS recommended. These include ibuprofen, aleve, naproxen, meloxicam, and many others. If need otc pain relief, tylenol is ok. Recheck 1 month. No sign of compartment syndrome

## 2020-06-10 ENCOUNTER — TELEPHONE (OUTPATIENT)
Dept: FAMILY MEDICINE CLINIC | Facility: CLINIC | Age: 70
End: 2020-06-10

## 2020-06-10 NOTE — TELEPHONE ENCOUNTER
Patient informed. She will not have a car for about a week or so. Can she do a phone visit for this follow up

## 2020-06-10 NOTE — TELEPHONE ENCOUNTER
Patient called to report her legs are both still swollen, especially the left one. She wants to know what to do about this. Please advise.

## 2020-06-15 DIAGNOSIS — M25.442 SWELLING OF HAND JOINT, LEFT: ICD-10-CM

## 2020-06-16 RX ORDER — GABAPENTIN 800 MG/1
TABLET ORAL
Qty: 90 TABLET | Refills: 0 | Status: SHIPPED | OUTPATIENT
Start: 2020-06-16 | End: 2020-07-14

## 2020-06-19 ENCOUNTER — OFFICE VISIT (OUTPATIENT)
Dept: FAMILY MEDICINE CLINIC | Facility: CLINIC | Age: 70
End: 2020-06-19

## 2020-06-19 DIAGNOSIS — R60.0 LOWER EXTREMITY EDEMA: Primary | ICD-10-CM

## 2020-06-19 DIAGNOSIS — F07.81 POSTCONCUSSIONAL SYNDROME: ICD-10-CM

## 2020-06-19 DIAGNOSIS — R60.0 LOWER EXTREMITY EDEMA: ICD-10-CM

## 2020-06-19 PROCEDURE — 99442 PR PHYS/QHP TELEPHONE EVALUATION 11-20 MIN: CPT | Performed by: NURSE PRACTITIONER

## 2020-06-19 RX ORDER — POTASSIUM CHLORIDE 600 MG/1
8 TABLET, FILM COATED, EXTENDED RELEASE ORAL 2 TIMES DAILY
Qty: 60 TABLET | Refills: 0 | Status: SHIPPED | OUTPATIENT
Start: 2020-06-19 | End: 2020-06-19

## 2020-06-19 RX ORDER — POTASSIUM CHLORIDE 600 MG/1
TABLET, FILM COATED, EXTENDED RELEASE ORAL
Qty: 180 TABLET | Refills: 0 | Status: SHIPPED | OUTPATIENT
Start: 2020-06-19 | End: 2020-07-14

## 2020-06-19 NOTE — PROGRESS NOTES
Subjective   Dalila Valerio is a 69 y.o. female. Swelling in the lower extremities    History of Present Illness    bruising is improving to lower extremities, but still hurt when walking. Edema extends from feet to proximal shins at level of injury. HA severe, not daily and extend into neck. Flexeril seems to help. Considering chiro to adjust neck. Has not had xrays. Overall HA are somewhat better overall unless shoulders get tight.  Taking 40 mg Lasix daily, and legs are going down, just taking some time. When stands legs hurt.  The following portions of the patient's history were reviewed and updated as appropriate: allergies, current medications, past family history, past medical history, past social history, past surgical history and problem list.    Review of Systems   Constitutional: Negative for activity change, appetite change, fatigue, unexpected weight gain and unexpected weight loss.   Respiratory: Negative.  Negative for shortness of breath.    Cardiovascular: Positive for leg swelling. Negative for chest pain and palpitations.   Musculoskeletal: Positive for arthralgias, gait problem and myalgias.   Skin: Positive for color change and bruise.   Neurological: Positive for headache. Negative for dizziness, weakness, numbness and confusion.   Psychiatric/Behavioral: Negative.        Objective   Physical Exam   Constitutional: She is oriented to person, place, and time. No distress.   Pulmonary/Chest: Effort normal.   Neurological: She is alert and oriented to person, place, and time.   Psychiatric: She has a normal mood and affect. Her behavior is normal. Judgment and thought content normal.     Assessment/Plan   Problems Addressed this Visit     None      Visit Diagnoses     Lower extremity edema    -  Primary    Postconcussional syndrome              Lower extremity edema--increase lasix to 40 am and 20 early afternoon and start potassium replacement. FU 2 weeks for recheck and BMP  Postconcussional  syndrome--HA should continue to gradually improve    Visit conducted by telephone secondary to technology issues and pandemic. She consents to phone evaluation. EM 14 minutes

## 2020-06-23 RX ORDER — FUROSEMIDE 20 MG/1
TABLET ORAL
Qty: 90 TABLET | Refills: 0 | Status: SHIPPED | OUTPATIENT
Start: 2020-06-23 | End: 2020-06-23

## 2020-06-23 RX ORDER — FUROSEMIDE 20 MG/1
TABLET ORAL
Qty: 270 TABLET | Refills: 0 | Status: SHIPPED | OUTPATIENT
Start: 2020-06-23 | End: 2020-09-24

## 2020-07-01 ENCOUNTER — OFFICE VISIT (OUTPATIENT)
Dept: NEUROLOGY | Facility: CLINIC | Age: 70
End: 2020-07-01

## 2020-07-01 VITALS — HEIGHT: 62 IN | OXYGEN SATURATION: 93 % | WEIGHT: 190 LBS | HEART RATE: 100 BPM | BODY MASS INDEX: 34.96 KG/M2

## 2020-07-01 DIAGNOSIS — Z87.828 HISTORY OF HEAD INJURY: ICD-10-CM

## 2020-07-01 DIAGNOSIS — G20 PARKINSONISM, UNSPECIFIED PARKINSONISM TYPE (HCC): Primary | ICD-10-CM

## 2020-07-01 PROCEDURE — 99204 OFFICE O/P NEW MOD 45 MIN: CPT | Performed by: PSYCHIATRY & NEUROLOGY

## 2020-07-01 RX ORDER — PROMETHAZINE HYDROCHLORIDE 25 MG/1
25 TABLET ORAL EVERY 6 HOURS PRN
COMMUNITY
End: 2020-08-25 | Stop reason: HOSPADM

## 2020-07-01 RX ORDER — ASPIRIN 81 MG/1
81 TABLET ORAL DAILY
COMMUNITY
End: 2020-08-25 | Stop reason: HOSPADM

## 2020-07-01 NOTE — PROGRESS NOTES
"Chief Complaint   Patient presents with   • Concussion       Patient ID: Dalila Valerio is a 69 y.o. female.    HPI: I had the pleasure of seeing your patient today.  As you may know she is a 69-year-old female referred to us by Cinthya Valentine for history of a fall with hit to head.  She states that back in May she was walking up a step at a [employer] and ended up tripping on that step hitting her head on a post.  She did suffer from a severe laceration on the forehead.  She did not lose consciousness however she says that she saw \"stars\" for several moments.  She was not confused afterwards.  She was in obvious pain for quite some time.  She had a CT scan that did not show any acute abnormalities.  She says that since then she has had a lot of issues with this lightheadedness.  She is also had some double vision occasionally.  She is also noted a decrease in the volume of her voice.  She states that she will have excessive pooling of saliva and occasionally noticed some drooling.  She has continued to have gait issues.  She says that she shuffles a lot when she walks.  She has noted an occasional tremor in her hands at rest.  It is not very consistent.  She has also noticed that it is hard for her to initiate her movements.  Getting up from a seated position is quite difficult for her.  She says that her legs just do not seem to want to do what she asked them to.  She has some occasional double vision as a mention.  Typically this occurs when reading.  Things seem to overlap each other.  If she looks away and rolls her eyes it will typically resolve.  She does acknowledge a family history of Parkinson's disease.  She states that her paternal uncle had Parkinson's.  She denies previous history of head injuries aside from this most recent one.  No history of stroke.     seek emergent medical attentionThe following portions of the patient's history were reviewed and updated as appropriate: allergies, current " medications, past family history, past medical history, past social history, past surgical history and problem list.    Review of Systems   Constitutional: Negative for activity change, appetite change and fatigue.   HENT: Negative for facial swelling, hearing loss and trouble swallowing.    Eyes: Positive for visual disturbance (double vision. over lapping vision ). Negative for photophobia and redness.   Respiratory: Negative for chest tightness, shortness of breath and wheezing.    Cardiovascular: Negative for chest pain, palpitations and leg swelling.   Gastrointestinal: Negative for abdominal pain, nausea and vomiting.   Endocrine: Negative for cold intolerance, heat intolerance and polydipsia.   Musculoskeletal: Positive for gait problem (pt fell may 23/2020 and hit head. pt uses cane. when pt fell she states it was like she forgot how to walk  and shuffling.). Negative for back pain and neck pain.   Skin: Negative for color change, rash and wound.   Allergic/Immunologic: Negative for environmental allergies, food allergies and immunocompromised state.   Neurological: Positive for dizziness, light-headedness and headaches. Negative for tremors, seizures, syncope, facial asymmetry, speech difficulty, weakness and numbness.   Hematological: Negative for adenopathy. Does not bruise/bleed easily.   Psychiatric/Behavioral: Positive for confusion (confused after concussion) and sleep disturbance (sleeping a lot ). Negative for agitation, behavioral problems, decreased concentration, dysphoric mood, hallucinations, self-injury and suicidal ideas. The patient is not nervous/anxious and is not hyperactive.       I have reviewed the review of systems above performed by my medical assistant.      Vitals:    07/01/20 1454   Pulse: 100   SpO2: 93%       Neurologic Exam     Mental Status   Oriented to person, place, and time.   Registration: recalls 3 of 3 objects. Follows 3 step commands.   Attention: normal.  Concentration: normal.   Speech: speech is normal   Level of consciousness: alert  Knowledge: consistent with education (No deficits found.).   Normal comprehension.     Cranial Nerves     CN II   Visual fields full to confrontation.     CN III, IV, VI   Pupils are equal, round, and reactive to light.  Extraocular motions are normal.   CN III: no CN III palsy  CN VI: no CN VI palsy  Nystagmus: none   Diplopia: none    CN V   Facial sensation intact.     CN VII   Facial expression full, symmetric.     CN VIII   CN VIII normal.     CN IX, X   CN IX normal.   CN X normal.     CN XI   CN XI normal.     CN XII   CN XII normal.     Motor Exam   Muscle bulk: normal  Right arm tone: normal  Left arm tone: cogwheel rigidity  Right leg tone: normal  Left leg tone: normal    Strength   Right neck flexion: 5/5  Left neck flexion: 5/5  Right neck extension: 5/5  Left neck extension: 5/5  Right deltoid: 5/5  Left deltoid: 5/5  Right biceps: 5/5  Left biceps: 5/5  Right triceps: 5/5  Left triceps: 5/5  Right wrist flexion: 5/5  Left wrist flexion: 5/5  Right wrist extension: 5/5  Left wrist extension: 5/5  Right interossei: 5/5  Left interossei: 5/5  Right abdominals: 5/5  Left abdominals: 5/5  Right iliopsoas: 5/5  Left iliopsoas: 5/5  Right quadriceps: 5/5  Left quadriceps: 5/5  Right hamstrin/5  Left hamstrin/5  Right glutei: 5/5  Left glutei: 5/5  Right anterior tibial: 5/5  Left anterior tibial: 5/5  Right posterior tibial: 5/5  Left posterior tibial: 5/5  Right peroneal: 5/5  Left peroneal: 5/5  Right gastroc: 5/5  Left gastroc: 5/5Significant bradykinesia in both upper and lower extremities.     Sensory Exam   Light touch normal.   Vibration normal.   Proprioception normal.   Pinprick normal.     Gait, Coordination, and Reflexes     Gait  Gait: (Stooped posture.  She shuffles.  Turns slowly.  Poor arm swing.)    Coordination   Romberg: negative    Tremor   Resting tremor: absent  Intention tremor: absent    Reflexes    Right brachioradialis: 2+  Left brachioradialis: 2+  Right biceps: 2+  Left biceps: 2+  Right triceps: 2+  Left triceps: 2+  Right patellar: 2+  Left patellar: 2+  Right achilles: 2+  Left achilles: 2+  Right : 2+  Left : 2+Masked face       Physical Exam   Constitutional: She is oriented to person, place, and time. She appears well-developed. No distress.   HENT:   Head: Normocephalic and atraumatic.   Eyes: Pupils are equal, round, and reactive to light. EOM are normal.   Neck: Normal range of motion.   Cardiovascular: Normal rate, regular rhythm and normal heart sounds.   Pulmonary/Chest: Effort normal and breath sounds normal. No respiratory distress.   Abdominal: Soft. Bowel sounds are normal. She exhibits no distension. There is no tenderness.   Musculoskeletal: She exhibits no edema or deformity.   Neurological: She is oriented to person, place, and time. She has a normal Romberg Test.   Reflex Scores:       Tricep reflexes are 2+ on the right side and 2+ on the left side.       Bicep reflexes are 2+ on the right side and 2+ on the left side.       Brachioradialis reflexes are 2+ on the right side and 2+ on the left side.       Patellar reflexes are 2+ on the right side and 2+ on the left side.       Achilles reflexes are 2+ on the right side and 2+ on the left side.  Skin: Skin is warm. No rash noted.   Psychiatric: She has a normal mood and affect. Her speech is normal. Judgment normal.   Vitals reviewed.      Procedures    Assessment/Plan: I would like to schedule her for an MRI of the brain.  We did talk about parkinsonism.  It is very likely that she has idiopathic Parkinson's disease.  I at this point do not feel that the onset of her parkinsonism is directly related to the head injury.  We will start physical therapy, particularly [facility] big and loud therapy.  Return to clinic in 4 months.       Dalila was seen today for concussion.    Diagnoses and all orders for this visit:    Parkinsonism,  unspecified Parkinsonism type (CMS/HCC)  -     Ambulatory Referral to Physical Therapy Evaluate and treat  -     MRI Brain With & Without Contrast; Future    History of head injury  -     MRI Brain With & Without Contrast; Future           Luis Enrique Santacruz II, MD

## 2020-07-02 ENCOUNTER — OFFICE VISIT (OUTPATIENT)
Dept: FAMILY MEDICINE CLINIC | Facility: CLINIC | Age: 70
End: 2020-07-02

## 2020-07-02 VITALS
TEMPERATURE: 97.9 F | BODY MASS INDEX: 34.96 KG/M2 | WEIGHT: 190 LBS | SYSTOLIC BLOOD PRESSURE: 130 MMHG | DIASTOLIC BLOOD PRESSURE: 60 MMHG | HEART RATE: 82 BPM | HEIGHT: 62 IN | OXYGEN SATURATION: 95 %

## 2020-07-02 DIAGNOSIS — N17.9 ACUTE KIDNEY INJURY (HCC): ICD-10-CM

## 2020-07-02 DIAGNOSIS — F07.81 POSTCONCUSSIONAL SYNDROME: Primary | ICD-10-CM

## 2020-07-02 DIAGNOSIS — E11.8 CONTROLLED TYPE 2 DIABETES MELLITUS WITH COMPLICATION, WITHOUT LONG-TERM CURRENT USE OF INSULIN (HCC): ICD-10-CM

## 2020-07-02 DIAGNOSIS — R60.0 LOWER EXTREMITY EDEMA: ICD-10-CM

## 2020-07-02 DIAGNOSIS — R60.0 BILATERAL LOWER EXTREMITY EDEMA: ICD-10-CM

## 2020-07-02 LAB
BUN SERPL-MCNC: 12 MG/DL (ref 8–23)
BUN/CREAT SERPL: 9.1 (ref 7–25)
CALCIUM SERPL-MCNC: 9.9 MG/DL (ref 8.6–10.5)
CHLORIDE SERPL-SCNC: 106 MMOL/L (ref 98–107)
CO2 SERPL-SCNC: 25.7 MMOL/L (ref 22–29)
CREAT SERPL-MCNC: 1.32 MG/DL (ref 0.57–1)
GLUCOSE SERPL-MCNC: 136 MG/DL (ref 65–99)
HBA1C MFR BLD: 8 % (ref 4.8–5.6)
POTASSIUM SERPL-SCNC: 4.3 MMOL/L (ref 3.5–5.2)
SODIUM SERPL-SCNC: 142 MMOL/L (ref 136–145)

## 2020-07-02 PROCEDURE — 99214 OFFICE O/P EST MOD 30 MIN: CPT | Performed by: NURSE PRACTITIONER

## 2020-07-02 NOTE — PROGRESS NOTES
"Subjective   Dalila Valerio is a 69 y.o. female. Patient presents today to follow up on the swelling in her feet and ankles.    History of Present Illness   Had increased lasix to 60 mg daily 2 weeks ago and added potassium supplement, reports swelling is not improving. Did have a lot of walking 1 day, legs were hurting. Did fall very hard onto shins. Pain is primarily in the front of shins. No pain in calves.  Saw Dr. Santacruz yesterday, regarding post concussion changes--still having double vision and HA, but gradually improving. She does not feel had parkinson symptoms before the fall. Will be scheduled for MRI. She really felt set back very far from fall. She was reluctant to start medication. Discussed benefits of medication. Planning on open MRI, PT    The following portions of the patient's history were reviewed and updated as appropriate: allergies, current medications, past family history, past medical history, past social history, past surgical history and problem list.    Review of Systems   Constitutional: Positive for activity change. Negative for chills, fever, unexpected weight gain and unexpected weight loss.   HENT: Positive for drooling.    Respiratory: Positive for cough.    Cardiovascular: Negative.    Gastrointestinal: Negative.    Endocrine: Negative.    Musculoskeletal: Positive for arthralgias, gait problem and myalgias.   Neurological: Positive for tremors and headache. Negative for seizures and speech difficulty.   Hematological: Negative.    Psychiatric/Behavioral: Positive for depressed mood.     /60 (BP Location: Left arm, Patient Position: Sitting, Cuff Size: Adult)   Pulse 82   Temp 97.9 °F (36.6 °C) (Oral)   Ht 157.5 cm (62\")   Wt 86.2 kg (190 lb)   SpO2 95%   BMI 34.75 kg/m²     Objective   Physical Exam   Constitutional: She is oriented to person, place, and time. She appears well-developed and well-nourished.   Neck: Normal range of motion. Neck supple. No thyromegaly " present.   Cardiovascular: Normal rate, regular rhythm and normal heart sounds.   Pulmonary/Chest: Effort normal and breath sounds normal.   Lymphadenopathy:     She has no cervical adenopathy.   Neurological: She is alert and oriented to person, place, and time. She displays no atrophy. No cranial nerve deficit or sensory deficit. Gait abnormal. Coordination normal.   Skin: Skin is warm and dry.   Psychiatric: She has a normal mood and affect. Her behavior is normal. Judgment and thought content normal.   Nursing note and vitals reviewed.    Assessment/Plan   Problems Addressed this Visit        Endocrine    Controlled diabetes mellitus type 2 with complications (CMS/[facility])    Relevant Orders    Hemoglobin A1c      Other Visit Diagnoses     Postconcussional syndrome    -  Primary    Lower extremity edema        Bilateral lower extremity edema        Relevant Orders    Basic Metabolic Panel    Acute kidney injury (CMS/[facility])        Relevant Orders    Basic Metabolic Panel        DM2--due for A1c, possibly less controlled due to recent steroid injections and hospitalization for pneumonia  Postconcussion syndrome--Reviewed Dr. Santacruz's note and impression is gait and tremor not related to concussion, but idiopathic parkinsons. Agree with MRI, based on Hx head trauma. Based on my knowledge of her, still favor some post concussive symptoms. These including fatigue and HA, short term memory loss should continue to settle.  Lower extremity edema--Dopplers negative for DVT 6/3/2020--not improved with lasix 60 mg daily--would recheck kidney function  ROSALINE--has gotten dehydrated with pneumonia  FU 3 months and prn

## 2020-07-03 NOTE — PROGRESS NOTES
Please call the patient regarding her abnormal result. Kidney function declining mildly. Avoid all NSAIDS. Recheck 6 months. A1c not to goal, add back glimepiride 1 mg with biggest meal of day

## 2020-07-06 RX ORDER — GLIMEPIRIDE 1 MG/1
TABLET ORAL
Qty: 30 TABLET | Refills: 3 | Status: SHIPPED | OUTPATIENT
Start: 2020-07-06 | End: 2020-07-27 | Stop reason: SDUPTHER

## 2020-07-13 ENCOUNTER — TELEPHONE (OUTPATIENT)
Dept: NEUROLOGY | Facility: CLINIC | Age: 70
End: 2020-07-13

## 2020-07-13 DIAGNOSIS — M25.442 SWELLING OF HAND JOINT, LEFT: ICD-10-CM

## 2020-07-13 RX ORDER — ALENDRONATE SODIUM 70 MG/1
TABLET ORAL
Qty: 12 TABLET | Refills: 1 | Status: SHIPPED | OUTPATIENT
Start: 2020-07-13 | End: 2020-09-23

## 2020-07-13 NOTE — TELEPHONE ENCOUNTER
Provider: DR. MADSEN  Caller: KATY REID  Relationship to Patient: SELF        Reason for Call: PT STATES THAT SHE HAD TALKED TO DR. MADSEN AND IT WAS OK FOR HER TO GET THERAPY THAT IS CLOSER TO HER AND HE SAID THAT IT WAS FINE. SHE ALSO THOUGHT SHE WOULD GET A SCRIPT FOR THE THERAPY BUT DID NOT. SHE STATES THAT [facility] CALL[facility] HER Thursday AND DIDN'T KNOW WHY THEY CALLED HER CAUSE SHE THOUGHT SHE CAN DO THE THERAPY CLOSER TO HER HOUSE, SINCE [facility] IS FATHER AWAY. PLEASE CALL HER BACK [phone]

## 2020-07-13 NOTE — TELEPHONE ENCOUNTER
PT requesting something closer to home [facility] is to far away. Pt has mediation and this is for Parkinsons. Can you put in order for [facility] home health for pt.     Please advise thank you

## 2020-07-14 DIAGNOSIS — R60.0 LOWER EXTREMITY EDEMA: ICD-10-CM

## 2020-07-14 RX ORDER — GABAPENTIN 800 MG/1
TABLET ORAL
Qty: 90 TABLET | Refills: 0 | Status: SHIPPED | OUTPATIENT
Start: 2020-07-14 | End: 2020-08-18

## 2020-07-14 RX ORDER — POTASSIUM CHLORIDE 600 MG/1
TABLET, FILM COATED, EXTENDED RELEASE ORAL
Qty: 60 TABLET | Refills: 1 | Status: SHIPPED | OUTPATIENT
Start: 2020-07-14 | End: 2020-07-16

## 2020-07-14 NOTE — TELEPHONE ENCOUNTER
Please advise pt wants to go to [facility] rehab. Number listed below. Do I need a new order in ?

## 2020-07-16 DIAGNOSIS — R60.0 LOWER EXTREMITY EDEMA: ICD-10-CM

## 2020-07-16 RX ORDER — POTASSIUM CHLORIDE 600 MG/1
TABLET, FILM COATED, EXTENDED RELEASE ORAL
Qty: 180 TABLET | Refills: 0 | Status: SHIPPED | OUTPATIENT
Start: 2020-07-16 | End: 2020-12-21

## 2020-07-17 ENCOUNTER — HOSPITAL ENCOUNTER (OUTPATIENT)
Dept: MRI IMAGING | Facility: HOSPITAL | Age: 70
Discharge: HOME | End: 2020-07-17
Admitting: PSYCHIATRY & NEUROLOGY

## 2020-07-17 DIAGNOSIS — Z87.828 HISTORY OF HEAD INJURY: ICD-10-CM

## 2020-07-17 DIAGNOSIS — G20 PARKINSONISM, UNSPECIFIED PARKINSONISM TYPE (HCC): ICD-10-CM

## 2020-07-17 PROCEDURE — 0 GADOBENATE DIMEGLUMINE 529 MG/ML SOLUTION: Performed by: PSYCHIATRY & NEUROLOGY

## 2020-07-17 PROCEDURE — A9577 INJ MULTIHANCE: HCPCS | Performed by: PSYCHIATRY & NEUROLOGY

## 2020-07-17 PROCEDURE — 70553 MRI BRAIN STEM W/O & W/DYE: CPT

## 2020-07-17 RX ADMIN — GADOBENATE DIMEGLUMINE 15 ML: 529 INJECTION, SOLUTION INTRAVENOUS at 08:26

## 2020-07-21 ENCOUNTER — TELEPHONE (OUTPATIENT)
Dept: NEUROLOGY | Facility: CLINIC | Age: 70
End: 2020-07-21

## 2020-07-27 ENCOUNTER — TELEPHONE (OUTPATIENT)
Dept: FAMILY MEDICINE CLINIC | Facility: CLINIC | Age: 70
End: 2020-07-27

## 2020-07-27 RX ORDER — GLIMEPIRIDE 1 MG/1
TABLET ORAL
Qty: 30 TABLET | Refills: 3
Start: 2020-07-27 | End: 2020-10-05

## 2020-08-03 RX ORDER — OMEPRAZOLE 40 MG/1
CAPSULE, DELAYED RELEASE ORAL
Qty: 180 CAPSULE | Refills: 0 | Status: SHIPPED | OUTPATIENT
Start: 2020-08-03 | End: 2021-02-03

## 2020-08-03 RX ORDER — AMLODIPINE BESYLATE 5 MG/1
5 TABLET ORAL DAILY
Qty: 90 TABLET | Refills: 0 | Status: SHIPPED | OUTPATIENT
Start: 2020-08-03 | End: 2020-09-22

## 2020-08-03 RX ORDER — ARIPIPRAZOLE 5 MG/1
5 TABLET ORAL DAILY
Qty: 30 TABLET | Refills: 2 | Status: SHIPPED | OUTPATIENT
Start: 2020-08-03 | End: 2020-09-08

## 2020-08-03 RX ORDER — OMEPRAZOLE 40 MG/1
CAPSULE, DELAYED RELEASE ORAL
Qty: 180 CAPSULE | Refills: 0 | Status: SHIPPED | OUTPATIENT
Start: 2020-08-03 | End: 2020-08-03

## 2020-08-18 DIAGNOSIS — M25.442 SWELLING OF HAND JOINT, LEFT: ICD-10-CM

## 2020-08-18 DIAGNOSIS — Z79.899 HIGH RISK MEDICATION USE: Primary | ICD-10-CM

## 2020-08-18 RX ORDER — LISINOPRIL 20 MG/1
20 TABLET ORAL DAILY
Qty: 90 TABLET | Refills: 0 | Status: SHIPPED | OUTPATIENT
Start: 2020-08-18 | End: 2020-09-22

## 2020-08-18 RX ORDER — GABAPENTIN 800 MG/1
TABLET ORAL
Qty: 90 TABLET | Refills: 0 | Status: SHIPPED | OUTPATIENT
Start: 2020-08-18 | End: 2020-09-23

## 2020-08-18 RX ORDER — ROSUVASTATIN CALCIUM 20 MG/1
20 TABLET, COATED ORAL DAILY
Qty: 90 TABLET | Refills: 0 | Status: SHIPPED | OUTPATIENT
Start: 2020-08-18 | End: 2020-12-01

## 2020-08-19 ENCOUNTER — APPOINTMENT (OUTPATIENT)
Dept: GENERAL RADIOLOGY | Facility: HOSPITAL | Age: 70
End: 2020-08-19

## 2020-08-19 ENCOUNTER — APPOINTMENT (OUTPATIENT)
Dept: CT IMAGING | Facility: HOSPITAL | Age: 70
End: 2020-08-19

## 2020-08-19 ENCOUNTER — ANESTHESIA (OUTPATIENT)
Dept: PERIOP | Facility: HOSPITAL | Age: 70
End: 2020-08-19

## 2020-08-19 ENCOUNTER — ANESTHESIA EVENT (OUTPATIENT)
Dept: PERIOP | Facility: HOSPITAL | Age: 70
End: 2020-08-19

## 2020-08-19 ENCOUNTER — APPOINTMENT (OUTPATIENT)
Dept: MRI IMAGING | Facility: HOSPITAL | Age: 70
End: 2020-08-19

## 2020-08-19 ENCOUNTER — HOSPITAL ENCOUNTER (INPATIENT)
Facility: HOSPITAL | Age: 70
LOS: 6 days | Discharge: HOME HEALTH | End: 2020-08-25
Attending: EMERGENCY MEDICINE | Admitting: INTERNAL MEDICINE

## 2020-08-19 DIAGNOSIS — I63.9 ACUTE CVA (CEREBROVASCULAR ACCIDENT) (HCC): Primary | ICD-10-CM

## 2020-08-19 LAB
ABO GROUP BLD: NORMAL
ALBUMIN SERPL-MCNC: 3.9 G/DL (ref 3.5–5.2)
ALBUMIN/GLOB SERPL: 1.5 G/DL
ALP SERPL-CCNC: 30 U/L (ref 39–117)
ALT SERPL W P-5'-P-CCNC: 12 U/L (ref 1–33)
ANION GAP SERPL CALCULATED.3IONS-SCNC: 10.3 MMOL/L (ref 5–15)
APTT PPP: 23.8 SECONDS (ref 22.7–35.4)
AST SERPL-CCNC: 13 U/L (ref 1–32)
B PARAPERT DNA SPEC QL NAA+PROBE: NOT DETECTED
B PERT DNA SPEC QL NAA+PROBE: NOT DETECTED
BILIRUB SERPL-MCNC: 0.2 MG/DL (ref 0–1.2)
BLD GP AB SCN SERPL QL: NEGATIVE
BUN SERPL-MCNC: 15 MG/DL (ref 8–23)
BUN/CREAT SERPL: 11.9 (ref 7–25)
C PNEUM DNA NPH QL NAA+NON-PROBE: NOT DETECTED
CALCIUM SPEC-SCNC: 9.3 MG/DL (ref 8.6–10.5)
CHLORIDE SERPL-SCNC: 106 MMOL/L (ref 98–107)
CO2 SERPL-SCNC: 25.7 MMOL/L (ref 22–29)
CREAT SERPL-MCNC: 1.26 MG/DL (ref 0.57–1)
FLUAV H1 2009 PAND RNA NPH QL NAA+PROBE: NOT DETECTED
FLUAV H1 HA GENE NPH QL NAA+PROBE: NOT DETECTED
FLUAV H3 RNA NPH QL NAA+PROBE: NOT DETECTED
FLUAV SUBTYP SPEC NAA+PROBE: NOT DETECTED
FLUBV RNA ISLT QL NAA+PROBE: NOT DETECTED
GFR SERPL CREATININE-BSD FRML MDRD: 42 ML/MIN/1.73
GLOBULIN UR ELPH-MCNC: 2.6 GM/DL
GLUCOSE BLDC GLUCOMTR-MCNC: 131 MG/DL (ref 70–130)
GLUCOSE BLDC GLUCOMTR-MCNC: 137 MG/DL (ref 70–130)
GLUCOSE BLDC GLUCOMTR-MCNC: 137 MG/DL (ref 70–130)
GLUCOSE BLDC GLUCOMTR-MCNC: 149 MG/DL (ref 70–130)
GLUCOSE BLDC GLUCOMTR-MCNC: 172 MG/DL (ref 70–130)
GLUCOSE SERPL-MCNC: 188 MG/DL (ref 65–99)
HADV DNA SPEC NAA+PROBE: NOT DETECTED
HCOV 229E RNA SPEC QL NAA+PROBE: NOT DETECTED
HCOV HKU1 RNA SPEC QL NAA+PROBE: NOT DETECTED
HCOV NL63 RNA SPEC QL NAA+PROBE: NOT DETECTED
HCOV OC43 RNA SPEC QL NAA+PROBE: NOT DETECTED
HMPV RNA NPH QL NAA+NON-PROBE: NOT DETECTED
HPIV1 RNA SPEC QL NAA+PROBE: NOT DETECTED
HPIV2 RNA SPEC QL NAA+PROBE: NOT DETECTED
HPIV3 RNA NPH QL NAA+PROBE: NOT DETECTED
HPIV4 P GENE NPH QL NAA+PROBE: NOT DETECTED
INR PPP: 0.99 (ref 0.9–1.1)
M PNEUMO IGG SER IA-ACNC: NOT DETECTED
POTASSIUM SERPL-SCNC: 3.2 MMOL/L (ref 3.5–5.2)
PROT SERPL-MCNC: 6.5 G/DL (ref 6–8.5)
PROTHROMBIN TIME: 13 SECONDS (ref 11.7–14.2)
RH BLD: NEGATIVE
RHINOVIRUS RNA SPEC NAA+PROBE: NOT DETECTED
RSV RNA NPH QL NAA+NON-PROBE: NOT DETECTED
SARS-COV-2 RNA PNL SPEC NAA+PROBE: NOT DETECTED
SODIUM SERPL-SCNC: 142 MMOL/L (ref 136–145)
T&S EXPIRATION DATE: NORMAL
TROPONIN T SERPL-MCNC: <0.01 NG/ML (ref 0–0.03)

## 2020-08-19 PROCEDURE — 70498 CT ANGIOGRAPHY NECK: CPT

## 2020-08-19 PROCEDURE — 0 IOPAMIDOL PER 1 ML: Performed by: EMERGENCY MEDICINE

## 2020-08-19 PROCEDURE — 86901 BLOOD TYPING SEROLOGIC RH(D): CPT | Performed by: EMERGENCY MEDICINE

## 2020-08-19 PROCEDURE — 25010000002 SUCCINYLCHOLINE PER 20 MG: Performed by: ANESTHESIOLOGY

## 2020-08-19 PROCEDURE — 25010000002 ALTEPLASE PER 1 MG: Performed by: PSYCHIATRY & NEUROLOGY

## 2020-08-19 PROCEDURE — 80053 COMPREHEN METABOLIC PANEL: CPT | Performed by: EMERGENCY MEDICINE

## 2020-08-19 PROCEDURE — 3E03317 INTRODUCTION OF OTHER THROMBOLYTIC INTO PERIPHERAL VEIN, PERCUTANEOUS APPROACH: ICD-10-PCS | Performed by: PSYCHIATRY & NEUROLOGY

## 2020-08-19 PROCEDURE — B30RZZZ PLAIN RADIOGRAPHY OF INTRACRANIAL ARTERIES: ICD-10-PCS | Performed by: NEUROLOGICAL SURGERY

## 2020-08-19 PROCEDURE — 93010 ELECTROCARDIOGRAM REPORT: CPT | Performed by: INTERNAL MEDICINE

## 2020-08-19 PROCEDURE — 0202U NFCT DS 22 TRGT SARS-COV-2: CPT | Performed by: EMERGENCY MEDICINE

## 2020-08-19 PROCEDURE — C1887 CATHETER, GUIDING: HCPCS | Performed by: NEUROLOGICAL SURGERY

## 2020-08-19 PROCEDURE — 70496 CT ANGIOGRAPHY HEAD: CPT

## 2020-08-19 PROCEDURE — C1769 GUIDE WIRE: HCPCS | Performed by: NEUROLOGICAL SURGERY

## 2020-08-19 PROCEDURE — 25010000002 ONDANSETRON PER 1 MG: Performed by: ANESTHESIOLOGY

## 2020-08-19 PROCEDURE — 0042T HC CT CEREBRAL PERFUSION W/WO CONTRAST: CPT

## 2020-08-19 PROCEDURE — 36226 PLACE CATH VERTEBRAL ART: CPT | Performed by: NEUROLOGICAL SURGERY

## 2020-08-19 PROCEDURE — C1894 INTRO/SHEATH, NON-LASER: HCPCS | Performed by: NEUROLOGICAL SURGERY

## 2020-08-19 PROCEDURE — 86850 RBC ANTIBODY SCREEN: CPT | Performed by: EMERGENCY MEDICINE

## 2020-08-19 PROCEDURE — 71045 X-RAY EXAM CHEST 1 VIEW: CPT

## 2020-08-19 PROCEDURE — 85610 PROTHROMBIN TIME: CPT | Performed by: EMERGENCY MEDICINE

## 2020-08-19 PROCEDURE — 86900 BLOOD TYPING SEROLOGIC ABO: CPT | Performed by: EMERGENCY MEDICINE

## 2020-08-19 PROCEDURE — 82962 GLUCOSE BLOOD TEST: CPT

## 2020-08-19 PROCEDURE — 70450 CT HEAD/BRAIN W/O DYE: CPT

## 2020-08-19 PROCEDURE — 85730 THROMBOPLASTIN TIME PARTIAL: CPT | Performed by: EMERGENCY MEDICINE

## 2020-08-19 PROCEDURE — 99222 1ST HOSP IP/OBS MODERATE 55: CPT | Performed by: PSYCHIATRY & NEUROLOGY

## 2020-08-19 PROCEDURE — 25010000002 GADOTERATE MEGLUMINE 10 MMOL/20ML SOLUTION: Performed by: INTERNAL MEDICINE

## 2020-08-19 PROCEDURE — 99284 EMERGENCY DEPT VISIT MOD MDM: CPT

## 2020-08-19 PROCEDURE — 70553 MRI BRAIN STEM W/O & W/DYE: CPT

## 2020-08-19 PROCEDURE — A9575 INJ GADOTERATE MEGLUMI 0.1ML: HCPCS | Performed by: INTERNAL MEDICINE

## 2020-08-19 PROCEDURE — 84484 ASSAY OF TROPONIN QUANT: CPT | Performed by: EMERGENCY MEDICINE

## 2020-08-19 PROCEDURE — 93005 ELECTROCARDIOGRAM TRACING: CPT | Performed by: EMERGENCY MEDICINE

## 2020-08-19 PROCEDURE — 25010000002 PROPOFOL 10 MG/ML EMULSION: Performed by: ANESTHESIOLOGY

## 2020-08-19 PROCEDURE — 25010000002 FENTANYL CITRATE (PF) 100 MCG/2ML SOLUTION: Performed by: ANESTHESIOLOGY

## 2020-08-19 PROCEDURE — 25010000002 HEPARIN (PORCINE) PER 1000 UNITS: Performed by: NEUROLOGICAL SURGERY

## 2020-08-19 PROCEDURE — 82565 ASSAY OF CREATININE: CPT

## 2020-08-19 PROCEDURE — 25010000002 PHENYLEPHRINE PER 1 ML: Performed by: ANESTHESIOLOGY

## 2020-08-19 RX ORDER — SODIUM CHLORIDE 9 MG/ML
75 INJECTION, SOLUTION INTRAVENOUS CONTINUOUS
Status: DISCONTINUED | OUTPATIENT
Start: 2020-08-19 | End: 2020-08-20

## 2020-08-19 RX ORDER — ONDANSETRON 2 MG/ML
4 INJECTION INTRAMUSCULAR; INTRAVENOUS ONCE AS NEEDED
Status: COMPLETED | OUTPATIENT
Start: 2020-08-19 | End: 2020-08-20

## 2020-08-19 RX ORDER — ASPIRIN 325 MG
325 TABLET ORAL DAILY
Status: DISCONTINUED | OUTPATIENT
Start: 2020-08-20 | End: 2020-08-20

## 2020-08-19 RX ORDER — ROCURONIUM BROMIDE 10 MG/ML
INJECTION, SOLUTION INTRAVENOUS AS NEEDED
Status: DISCONTINUED | OUTPATIENT
Start: 2020-08-19 | End: 2020-08-19 | Stop reason: SURG

## 2020-08-19 RX ORDER — SODIUM CHLORIDE 0.9 % (FLUSH) 0.9 %
10 SYRINGE (ML) INJECTION AS NEEDED
Status: DISCONTINUED | OUTPATIENT
Start: 2020-08-19 | End: 2020-08-25 | Stop reason: HOSPADM

## 2020-08-19 RX ORDER — FENTANYL CITRATE 50 UG/ML
INJECTION, SOLUTION INTRAMUSCULAR; INTRAVENOUS AS NEEDED
Status: DISCONTINUED | OUTPATIENT
Start: 2020-08-19 | End: 2020-08-19 | Stop reason: SURG

## 2020-08-19 RX ORDER — HYDRALAZINE HYDROCHLORIDE 20 MG/ML
5 INJECTION INTRAMUSCULAR; INTRAVENOUS
Status: DISCONTINUED | OUTPATIENT
Start: 2020-08-19 | End: 2020-08-20

## 2020-08-19 RX ORDER — PROMETHAZINE HYDROCHLORIDE 25 MG/ML
6.25 INJECTION, SOLUTION INTRAMUSCULAR; INTRAVENOUS
Status: DISCONTINUED | OUTPATIENT
Start: 2020-08-19 | End: 2020-08-20

## 2020-08-19 RX ORDER — PROMETHAZINE HYDROCHLORIDE 25 MG/1
25 SUPPOSITORY RECTAL ONCE AS NEEDED
Status: DISCONTINUED | OUTPATIENT
Start: 2020-08-19 | End: 2020-08-20

## 2020-08-19 RX ORDER — DIPHENHYDRAMINE HCL 25 MG
25 CAPSULE ORAL
Status: DISCONTINUED | OUTPATIENT
Start: 2020-08-19 | End: 2020-08-20

## 2020-08-19 RX ORDER — FLUMAZENIL 0.1 MG/ML
0.2 INJECTION INTRAVENOUS AS NEEDED
Status: DISCONTINUED | OUTPATIENT
Start: 2020-08-19 | End: 2020-08-20

## 2020-08-19 RX ORDER — IODIXANOL 320 MG/ML
100 INJECTION, SOLUTION INTRAVASCULAR
Status: DISCONTINUED | OUTPATIENT
Start: 2020-08-19 | End: 2020-08-25 | Stop reason: HOSPADM

## 2020-08-19 RX ORDER — HYDROMORPHONE HYDROCHLORIDE 1 MG/ML
0.5 INJECTION, SOLUTION INTRAMUSCULAR; INTRAVENOUS; SUBCUTANEOUS
Status: DISCONTINUED | OUTPATIENT
Start: 2020-08-19 | End: 2020-08-20

## 2020-08-19 RX ORDER — DIPHENHYDRAMINE HYDROCHLORIDE 50 MG/ML
12.5 INJECTION INTRAMUSCULAR; INTRAVENOUS
Status: DISCONTINUED | OUTPATIENT
Start: 2020-08-19 | End: 2020-08-20

## 2020-08-19 RX ORDER — HYDROCODONE BITARTRATE AND ACETAMINOPHEN 7.5; 325 MG/1; MG/1
1 TABLET ORAL ONCE AS NEEDED
Status: DISCONTINUED | OUTPATIENT
Start: 2020-08-19 | End: 2020-08-20

## 2020-08-19 RX ORDER — EPHEDRINE SULFATE 50 MG/ML
5 INJECTION, SOLUTION INTRAVENOUS ONCE AS NEEDED
Status: DISCONTINUED | OUTPATIENT
Start: 2020-08-19 | End: 2020-08-20

## 2020-08-19 RX ORDER — LABETALOL HYDROCHLORIDE 5 MG/ML
5 INJECTION, SOLUTION INTRAVENOUS
Status: DISCONTINUED | OUTPATIENT
Start: 2020-08-19 | End: 2020-08-20

## 2020-08-19 RX ORDER — FENTANYL CITRATE 50 UG/ML
50 INJECTION, SOLUTION INTRAMUSCULAR; INTRAVENOUS
Status: DISCONTINUED | OUTPATIENT
Start: 2020-08-19 | End: 2020-08-19

## 2020-08-19 RX ORDER — NALOXONE HCL 0.4 MG/ML
0.2 VIAL (ML) INJECTION AS NEEDED
Status: DISCONTINUED | OUTPATIENT
Start: 2020-08-19 | End: 2020-08-20

## 2020-08-19 RX ORDER — SUCCINYLCHOLINE CHLORIDE 20 MG/ML
INJECTION INTRAMUSCULAR; INTRAVENOUS AS NEEDED
Status: DISCONTINUED | OUTPATIENT
Start: 2020-08-19 | End: 2020-08-19 | Stop reason: SURG

## 2020-08-19 RX ORDER — PROMETHAZINE HYDROCHLORIDE 25 MG/ML
12.5 INJECTION, SOLUTION INTRAMUSCULAR; INTRAVENOUS ONCE AS NEEDED
Status: DISCONTINUED | OUTPATIENT
Start: 2020-08-19 | End: 2020-08-20

## 2020-08-19 RX ORDER — ONDANSETRON 2 MG/ML
INJECTION INTRAMUSCULAR; INTRAVENOUS AS NEEDED
Status: DISCONTINUED | OUTPATIENT
Start: 2020-08-19 | End: 2020-08-19 | Stop reason: SURG

## 2020-08-19 RX ORDER — GADOTERATE MEGLUMINE 376.9 MG/ML
20 INJECTION INTRAVENOUS
Status: COMPLETED | OUTPATIENT
Start: 2020-08-19 | End: 2020-08-19

## 2020-08-19 RX ORDER — PRASUGREL 10 MG/1
10 TABLET, FILM COATED ORAL DAILY
COMMUNITY
End: 2020-08-25 | Stop reason: HOSPADM

## 2020-08-19 RX ORDER — PROMETHAZINE HYDROCHLORIDE 25 MG/1
25 TABLET ORAL ONCE AS NEEDED
Status: DISCONTINUED | OUTPATIENT
Start: 2020-08-19 | End: 2020-08-20

## 2020-08-19 RX ORDER — SODIUM CHLORIDE 0.9 % (FLUSH) 0.9 %
10 SYRINGE (ML) INJECTION EVERY 12 HOURS SCHEDULED
Status: DISCONTINUED | OUTPATIENT
Start: 2020-08-19 | End: 2020-08-25 | Stop reason: HOSPADM

## 2020-08-19 RX ORDER — PROPOFOL 10 MG/ML
VIAL (ML) INTRAVENOUS AS NEEDED
Status: DISCONTINUED | OUTPATIENT
Start: 2020-08-19 | End: 2020-08-19 | Stop reason: SURG

## 2020-08-19 RX ORDER — ASPIRIN 300 MG/1
300 SUPPOSITORY RECTAL DAILY
Status: DISCONTINUED | OUTPATIENT
Start: 2020-08-20 | End: 2020-08-20

## 2020-08-19 RX ORDER — SODIUM CHLORIDE 9 MG/ML
100 INJECTION, SOLUTION INTRAVENOUS ONCE
Status: DISCONTINUED | OUTPATIENT
Start: 2020-08-19 | End: 2020-08-25 | Stop reason: HOSPADM

## 2020-08-19 RX ORDER — OXYCODONE AND ACETAMINOPHEN 7.5; 325 MG/1; MG/1
1 TABLET ORAL ONCE AS NEEDED
Status: DISCONTINUED | OUTPATIENT
Start: 2020-08-19 | End: 2020-08-20

## 2020-08-19 RX ORDER — SODIUM CHLORIDE, SODIUM LACTATE, POTASSIUM CHLORIDE, CALCIUM CHLORIDE 600; 310; 30; 20 MG/100ML; MG/100ML; MG/100ML; MG/100ML
INJECTION, SOLUTION INTRAVENOUS CONTINUOUS PRN
Status: DISCONTINUED | OUTPATIENT
Start: 2020-08-19 | End: 2020-08-19 | Stop reason: SURG

## 2020-08-19 RX ADMIN — ALTEPLASE 7.7 MG: KIT at 07:55

## 2020-08-19 RX ADMIN — SODIUM CHLORIDE 75 ML/HR: 9 INJECTION, SOLUTION INTRAVENOUS at 22:07

## 2020-08-19 RX ADMIN — PHENYLEPHRINE HYDROCHLORIDE 0.5 MCG/KG/MIN: 10 INJECTION INTRAVENOUS at 09:00

## 2020-08-19 RX ADMIN — ROCURONIUM BROMIDE 40 MG: 10 INJECTION INTRAVENOUS at 08:40

## 2020-08-19 RX ADMIN — ONDANSETRON HYDROCHLORIDE 4 MG: 2 SOLUTION INTRAMUSCULAR; INTRAVENOUS at 09:21

## 2020-08-19 RX ADMIN — SUGAMMADEX 200 MG: 100 INJECTION, SOLUTION INTRAVENOUS at 09:21

## 2020-08-19 RX ADMIN — ALTEPLASE 69.3 MG: KIT at 08:00

## 2020-08-19 RX ADMIN — PHENYLEPHRINE HYDROCHLORIDE 200 MCG: 10 INJECTION INTRAVENOUS at 08:45

## 2020-08-19 RX ADMIN — SODIUM CHLORIDE 5 MG/HR: 9 INJECTION, SOLUTION INTRAVENOUS at 10:36

## 2020-08-19 RX ADMIN — PHENYLEPHRINE HYDROCHLORIDE 100 MCG: 10 INJECTION INTRAVENOUS at 09:00

## 2020-08-19 RX ADMIN — SODIUM CHLORIDE, PRESERVATIVE FREE 10 ML: 5 INJECTION INTRAVENOUS at 22:06

## 2020-08-19 RX ADMIN — IOPAMIDOL 150 ML: 755 INJECTION, SOLUTION INTRAVENOUS at 07:53

## 2020-08-19 RX ADMIN — SODIUM CHLORIDE, POTASSIUM CHLORIDE, SODIUM LACTATE AND CALCIUM CHLORIDE: 600; 310; 30; 20 INJECTION, SOLUTION INTRAVENOUS at 08:24

## 2020-08-19 RX ADMIN — PHENYLEPHRINE HYDROCHLORIDE 200 MCG: 10 INJECTION INTRAVENOUS at 08:30

## 2020-08-19 RX ADMIN — PHENYLEPHRINE HYDROCHLORIDE 200 MCG: 10 INJECTION INTRAVENOUS at 08:51

## 2020-08-19 RX ADMIN — GADOTERATE MEGLUMINE 17 ML: 376.9 INJECTION, SOLUTION INTRAVENOUS at 21:07

## 2020-08-19 RX ADMIN — FENTANYL CITRATE 100 MCG: 50 INJECTION INTRAMUSCULAR; INTRAVENOUS at 08:45

## 2020-08-19 RX ADMIN — SUCCINYLCHOLINE CHLORIDE 140 MG: 20 INJECTION, SOLUTION INTRAMUSCULAR; INTRAVENOUS; PARENTERAL at 08:30

## 2020-08-19 RX ADMIN — SODIUM CHLORIDE 75 ML/HR: 9 INJECTION, SOLUTION INTRAVENOUS at 13:00

## 2020-08-19 RX ADMIN — PROPOFOL 100 MG: 10 INJECTION, EMULSION INTRAVENOUS at 08:30

## 2020-08-19 RX ADMIN — ROCURONIUM BROMIDE 5 MG: 10 INJECTION INTRAVENOUS at 08:28

## 2020-08-19 NOTE — BRIEF OP NOTE
EMBOLECTOMY MECHANICAL  Progress Note    Dalila Valerio  8/19/2020    Pre-op Diagnosis:   Basilar tip occlusion       Post-Op Diagnosis Codes:  Resolved basilar tip occlusion with IV TPA with TICI 3 revascularization    Procedure/CPT® Codes:        Procedure(s):  EMERGENT CEREBRAL ANGIOGRAM    Surgeon(s):  Jonh Meehan MD    Anesthesia: General    Staff:   Circulator: Cinthya Amado RN  Scrub Person: Tank Rooney  Vascular Radiology Technician: Bogdan Gary  Other: Lakesha Salguero RN         Estimated Blood Loss: minimal    Urine Voided: * No values recorded between 8/19/2020  8:24 AM and 8/19/2020  9:27 AM *    Specimens:                None          Drains: * No LDAs found *    Findings: Basilar thrombus was cleared with IV TPA.  TICI 3 revascularization    Complications: none          Jonh Meehan MD     Date: 8/19/2020  Time: 09:30

## 2020-08-19 NOTE — OP NOTE
Procedure: Diagnostic cerebral angiogram     Surgeon: Willis Meehan MD     Anesthesia Type         Monitored anesthesia care      Pre-operative Diagnosis  Acute ischemic stroke with basilar tip occlusion     Post-operative Diagnosis  Acute ischemic stroke with basilar tip occlusion now status post diagnostic angiogram and IV TPA with TICI 3 revascularization    Name of Procedure: Diagnostic cerebral angiogram.     Vessels catheterized:  1.  Right radial artery  2.  Right vertebral artery        Indications for procedure and informed consent:        Patient is a 69-year-old female who presented to the emergency room with new onset confusion, a aphasia, and right-sided hemiparesis   Sedation and monitoring:    General endotracheal anesthesia was carried out by the anesthesia service.  See anesthesia note for complete details.        Description of the procedure: The patient was brought to the angio suite and placed in a supine position on the exam table.  His right wrist was supinated prepped and draped in the usual sterile fashion.  Using a micropuncture set, access into the right radial artery was obtained. There was no resistance to threading the microwire. Using modified Seldinger technique, a 5 Angolan introducer sheath was inserted into the right radial artery.  The 5 Angolan sheath was then exchanged for a 6 Angolan fubuki over a Glidewire and this was advanced into the right subclavian artery.  An angiogram was performed of the subclavian artery and the right vertebral artery was identified at its origin.  This image was then used as a roadmap and a 5 Angolan Pauly was advanced over a marksman microcatheter and Synchro standard microwire into the vertebral artery.  A baseline angiogram was then performed which demonstrated complete resolution of the basilar tip thrombus.  With TICI 3 revascularization.   Cervical and cranial views were obtained and there was no evidence of residual thrombus.  The decision was  made in the procedure at this point the catheter was removed and a TR band was placed on the right wrist and inflated to 15 cc of air.          Interpretation of the films:  1.  Right subclavian artery: AP view.  There is no evidence of significant stenosis, the origin of the vertebral artery is seen with no significant stenosis.  2. Right vertebral artery, AP and lateral cervical views.  The catheter is seen positioned with the tip in the V1 segment.  There is no evidence of significant flow-limiting stenosis in the V2 or V3 segments  3. Right vertebral artery: AP and lateral cranial views.  The left V4 segment is seen tapering as it merges into the right vertebral artery to become the basilar artery.  PICA is seen without any evidence of aneurysm.  The normal branches are seen including the bilateral PCA, SCA's, and the AICAs.   There is no evidence of residual thrombus.  No evidence of aneurysm or AVM.

## 2020-08-19 NOTE — ED PROVIDER NOTES
EMERGENCY DEPARTMENT ENCOUNTER    Room Number:  13/13  Date of encounter:  8/19/2020  PCP: Cinthya Valentine APRN  Historian: EMS    Patient was placed in face mask during triage process. Patient was wearing facemask when I entered the room and throughout our encounter. I wore full protective equipment throughout this patient encounter including a face mask, eye protection, and gloves. Hand hygiene was performed before donning protective equipment and again following doffing of PPE after leaving the room.    HPI:  Chief Complaint: Acute aphasia and right sided weakness  A complete HPI/ROS/PMH/PSH/SH/FH are unobtainable due to: Acute altered mental status/aphasia  Context: Dalila Valerio is a 69 y.o. female who presents to the ED for evaluation after she developed sudden onset aphasia and right-sided weakness.  She was noted by her  to get up at 0630 this morning complaining only of some mild dizziness.  When he saw her again it 0 640 she was unable to speak and had limited movement of her right side with facial droop.  Patient's prior medical status is reported as normal ability to walk, talk, and take care of daily activities.      MEDICAL HISTORY REVIEW  MRI BRAIN W WO CONTRAST-  07/17/2020   HISTORY: Parkinsonism. History of head injury.   Multiple pre and postcontrast sagittal, axial and coronal images were  obtained through the brain.   The diffusion-weighted images show no evidence of acute infarction.  FLAIR images show only a few very tiny scattered foci of bright signal  intensity in the bilateral cerebral white matter consistent with minimal  small vessel white matter ischemic disease.   There is mild diffuse atrophy. No intracranial hemorrhage is seen.   No abnormal enhancement is seen following gadolinium. The craniocervical  junction and sella appear normal.   Normal-appearing vascular flow voids are seen.   IMPRESSION:  1. Very minimal changes of bilateral small vessel white matter  ischemic  disease.  2. No other significant findings are noted. There is no evidence of  acute infarction, hemorrhage or abnormal enhancement.     This report was finalized on 7/17/2020 4:35 PM by Dr. Nikos Blanchard M.D.    PAST MEDICAL HISTORY  Active Ambulatory Problems     Diagnosis Date Noted   • Adjustment disorder with mixed anxiety and depressed mood 03/08/2016   • Atopic rhinitis 03/08/2016   • Coronary arteriosclerosis in native artery 03/08/2016   • Cobalamin deficiency 03/08/2016   • Benign colonic polyp 03/08/2016   • Lumbar radiculopathy 03/08/2016   • Controlled diabetes mellitus type 2 with complications (CMS/[facility]) 03/08/2016   • Binocular vision disorder with diplopia 03/08/2016   • Dyslipidemia 03/08/2016   • Arthropathy of hand 03/08/2016   • Knee pain 03/08/2016   • Cramps of lower extremity 03/08/2016   • Low back pain 03/08/2016   • Migraine with typical aura 03/08/2016   • Chronic nausea 03/08/2016   • Obstructive sleep apnea syndrome 03/08/2016   • Palpitations 03/08/2016   • Ventricular premature beats 03/08/2016   • Cephalalgia 03/08/2016   • Colonic constipation 03/08/2016   • Neurocardiogenic syncope 03/08/2016   • Vitamin D deficiency 03/08/2016   • DODSON (nonalcoholic steatohepatitis) 04/01/2016   • Fibromyalgia 03/26/2013   • Morbidly obese (CMS/[facility]) 12/10/2018   • Polyp of colon 11/01/2019   • Family history of colonic polyps 11/01/2019   • Family history of malignant neoplasm of colon 11/01/2019     Resolved Ambulatory Problems     Diagnosis Date Noted   • Flank pain 03/08/2016   • Abnormal blood sugar 03/08/2016   • Abnormal weight gain 03/08/2016   • Acquired trigger finger 03/08/2016   • Acute bronchitis 03/08/2016   • Atypical chest pain 03/08/2016   • History of Williamson's esophagus 03/08/2016   • CAP (community acquired pneumonia) 03/08/2016   • Candidiasis of skin 03/08/2016   • Diabetic peripheral neuropathy (CMS/[facility]) 03/08/2016   • Breathing difficult 03/08/2016   •  Dizziness 2016   • Hematuria 2016   • Muscle ache 2016   • MAC (mycobacterium avium-intracellulare complex) 2016   • Night sweats 2016   • Otitis externa 2016   • Abscess, perianal 2016   • Skin tag 2016   • Upper respiratory tract infection 2016   • Urinary hesitancy 2016   • Asthmatic breathing 2016   • Preoperative clearance 2016     Past Medical History:   Diagnosis Date   • Acute myocardial infarction (CMS/HCC)    • Adjustment reaction with mixed emotional features    • ASHD (arteriosclerotic heart disease)    • B12 deficiency    • Bacterial pneumonia    • Williamson esophagus    • Barretts esophagus    • Bilateral knee pain    • Birthmark    • Bronchiectasis (CMS/HCC)    • Chronic cough    • Chronic glomerulonephritis with exudative nephritis    • Chronic lumbar radiculopathy    • Diabetes mellitus (CMS/HCC)    • Fibromyositis    • Herpes zoster    • Lupus anticoagulant disorder (CMS/HCC)    • Mycobacterium avium complex (CMS/HCC)    • Nephrolithiasis    • SILVIANO (obstructive sleep apnea)    • Premature ventricular contraction    • Slow transit constipation          PAST SURGICAL HISTORY  Past Surgical History:   Procedure Laterality Date   • APPENDECTOMY     • BRONCHOSCOPY     •  SECTION     • CHOLECYSTECTOMY     • COLONOSCOPY     • COLONOSCOPY N/A 2019    Procedure: COLONOSCOPY to cecum with cold biopsy and cold and hotsnare polypectomies;  Surgeon: Suzy Eubanks MD;  Location: [facility];  Service: Gastroenterology   • CORONARY ANGIOPLASTY WITH STENT PLACEMENT     • ENDOSCOPY N/A 2019    Procedure: ESOPHAGOGASTRODUODENOSCOPY with cold biopsies;  Surgeon: Suzy Eubanks MD;  Location: [facility] END[facility];  Service: Gastroenterology   • KNEE ARTHROSCOPY     • OTHER SURGICAL HISTORY      elbow surgery   • ROTATOR CUFF REPAIR     • TUBAL ABDOMINAL LIGATION           FAMILY HISTORY  Family History   Problem  Relation Age of Onset   • Colon cancer Mother    • Uterine cancer Mother    • Arthritis Mother    • Cancer Mother    • Heart disease Mother    • Migraines Mother    • Stroke Mother    • Cancer Father         malignant brain tumor   • Aneurysm Father    • Bone cancer Maternal Aunt    • Diabetes Paternal Grandmother    • Diabetes Paternal Grandfather    • Arthritis Maternal Grandmother    • Heart disease Maternal Grandmother    • Thyroid disease Maternal Grandmother          SOCIAL HISTORY  Social History     Socioeconomic History   • Marital status: Single     Spouse name: Not on file   • Number of children: Not on file   • Years of education: Not on file   • Highest education level: Not on file   Tobacco Use   • Smoking status: Former Smoker     Last attempt to quit: 1980     Years since quittin.6   • Smokeless tobacco: Never Used   Substance and Sexual Activity   • Alcohol use: No   • Drug use: No   • Sexual activity: Yes     Partners: Male         ALLERGIES  Carafate [sucralfate]; Duloxetine hcl; Morphine and related; Nitroglycerin; Nsaids; Oxycodone; Penicillins; Statins; and Viibryd [vilazodone hcl]        REVIEW OF SYSTEMS  Review of Systems     All systems reviewed and negative except for those discussed in HPI.       PHYSICAL EXAM    I have reviewed the triage vital signs and nursing notes.    ED Triage Vitals [20 0732]   Temp Heart Rate Resp BP SpO2   98.1 °F (36.7 °C) 90 16 120/80 98 %      Temp src Heart Rate Source Patient Position BP Location FiO2 (%)   Temporal Monitor -- -- --       Physical Exam    Physical Exam   Constitutional: Not overtly toxic appearing.  Aphasic though able to follow some simple commands.  HENT:  Head: Normocephalic and atraumatic.   Oropharynx: Mucous membranes are moist.   Eyes: No scleral icterus. No conjunctival pallor.  Neck: Painless range of motion noted. Neck supple.   Cardiovascular: Normal rate, regular rhythm and intact distal pulses.  Pulmonary/Chest:  No respiratory distress. There are no wheezes, no rhonchi, and no rales.   Abdominal: Soft. There is no tenderness. There is no rebound and no guarding.   Musculoskeletal: Significant right-sided motor deficit the patient is able to weakly squeeze with her right hand and wiggle right toes.  Atraumatic extremity exam.    Neurological: Alert c facial droop and expressive aphasia though she seems to be receptively intact. Able to follow commands with extremities with significant R sided motor deficit. Difficulty tracking examiner initially.  NIHSS 14-15  Skin: Skin is pink, warm, and dry. No pallor.   Psychiatric: unable to assess   Nursing note and vitals reviewed.    LAB RESULTS  Recent Results (from the past 24 hour(s))   Comprehensive Metabolic Panel    Collection Time: 08/19/20  7:38 AM   Result Value Ref Range    Glucose 188 (H) 65 - 99 mg/dL    BUN 15 8 - 23 mg/dL    Creatinine 1.26 (H) 0.57 - 1.00 mg/dL    Sodium 142 136 - 145 mmol/L    Potassium 3.2 (L) 3.5 - 5.2 mmol/L    Chloride 106 98 - 107 mmol/L    CO2 25.7 22.0 - 29.0 mmol/L    Calcium 9.3 8.6 - 10.5 mg/dL    Total Protein 6.5 6.0 - 8.5 g/dL    Albumin 3.90 3.50 - 5.20 g/dL    ALT (SGPT) 12 1 - 33 U/L    AST (SGOT) 13 1 - 32 U/L    Alkaline Phosphatase 30 (L) 39 - 117 U/L    Total Bilirubin 0.2 0.0 - 1.2 mg/dL    eGFR Non African Amer 42 (L) >60 mL/min/1.73    Globulin 2.6 gm/dL    A/G Ratio 1.5 g/dL    BUN/Creatinine Ratio 11.9 7.0 - 25.0    Anion Gap 10.3 5.0 - 15.0 mmol/L   Protime-INR    Collection Time: 08/19/20  7:38 AM   Result Value Ref Range    Protime 13.0 11.7 - 14.2 Seconds    INR 0.99 0.90 - 1.10   aPTT    Collection Time: 08/19/20  7:38 AM   Result Value Ref Range    PTT 23.8 22.7 - 35.4 seconds   Troponin    Collection Time: 08/19/20  7:38 AM   Result Value Ref Range    Troponin T <0.010 0.000 - 0.030 ng/mL   POC Glucose Once    Collection Time: 08/19/20  7:42 AM   Result Value Ref Range    Glucose 149 (H) 70 - 130 mg/dL       Ordered the  above labs and independently reviewed the results.    EKG           EKG time: 0802  Rhythm/Rate: Sinus, 90  P waves and MN: SMITH within normal limits  QRS, axis: Narrow QRS complex  ST and T waves: No STEMI; QTC within normal limits    Interpreted Contemporaneously by me, independently viewed  Comparison: 5/25/2015-other than rate no significant change      RADIOLOGY    CT head, CTA head and neck, CT perfusion brain reviewed and discussed with stroke neurology and neuroradiologist.    RADIOLOGY        Study: Portable chest x-ray-1 view    Findings: No acute pathology identified    Interpreted contemporaneously with treatment by me, independently viewed by me    I ordered the above noted radiological studies. Reviewed by me and discussed with radiologist.  See dictation for official radiology interpretation.      PROCEDURES    Procedures    Total critical care time: Approximately 35 minutes    Due to a high probability of clinically significant, life threatening deterioration, the patient required my highest level of preparedness to intervene emergently and I personally spent this critical care time directly and personally managing the patient. This critical care time included obtaining a history; examining the patient; vital sign monitoring; ordering and review of studies; arranging urgent treatment with development of a management plan; evaluation of patient's response to treatment; frequent reassessment; and, discussions with other providers.    This critical care time was performed to assess and manage the high probability of imminent, life-threatening deterioration that could result in multi-organ failure. It was exclusive of separately billable procedures and treating other patients and teaching time.    Please see MDM section and the rest of the note for further information on patient assessment and treatment.      MEDICATIONS GIVEN IN ER    Medications   sodium chloride 0.9 % flush 10 mL (has no administration  in time range)   sodium chloride 0.9 % bolus 1,000 mL (has no administration in time range)   iopamidol (ISOVUE-370) 76 % injection 150 mL (150 mL Intravenous Given by Other 8/19/20 0753)         PROGRESS, DATA ANALYSIS, CONSULTS, AND MEDICAL DECISION MAKING    My differential diagnosis for altered mental status includes but is not limited to:  Hypoglycemia, hyperglycemia, DKA, overdose, ethanol intoxication, thiamine deficiency, niacin deficiency, hypothymia, hyperviscosity, Wesley’s disease, hyponatremia, hypernatremia, liver failure, kidney failure, hyper or hypothyroid, no insufficiency, hypoxia, hypercarbia, carbon monoxide poisoning, postanoxic encephalopathy, ischemic stroke, intracranial bleed, subarachnoid hemorrhage, brain tumor, closed head injury, epidural hematoma, epidural hematoma, seizure activity, postictal state, syncopal episode, disseminated encephalomyelitis, central pontine myelinolysis, post cardiac arrest, bacterial meningitis, viral meningitis, fungal meningitis, encephalitis, brain abscess, subdural empyema, hysteria, catatonic state, malingering, hypertensive encephalopathy, vasculitis, TTP, DIC      All labs have been independently reviewed by me.  All radiology studies have been reviewed by me and discussed with radiologist dictating the report.   EKG's independently viewed and interpreted by me.  Discussion below represents my analysis of pertinent findings related to patient's condition, differential diagnosis, treatment plan and final disposition.      ED Course as of Aug 19 0815   Wed Aug 19, 2020   0738 Case has been reviewed with stroke neurology after team D declared upon patient's arrival.  Dr. Blas Zamora is aware of the patient, her elevated NIH stroke scale, last known normal, and pending imaging studies.    [RS]   0800 Stroke neurologist, Dr. Zamora, at bedside. He and I have both discussed diagnosis c the patient's , plan for tPA and possibly surgical intervention,  as well as, risks, benefits and alternatives. He grants permission to proceed with plan as discussed.    [RS]   0806 Chronic renal insufficiency unchanged baseline.   Creatinine(!): 1.26 [RS]   0806 Not hypoglycemic   Glucose(!): 149 [RS]   0806 No evidence of underlying coagulopathy   INR: 0.99 [RS]   0814 Case discussed with Dr. AMAYA, ICU.  He is agreeable to accept the patient postoperatively    [RS]   0814  to the ICU.    [RS]      ED Course User Index  [RS] Justin Kelley MD       AS OF 08:15 VITALS:    BP - 120/80  HR - 90  TEMP - 98.1 °F (36.7 °C) (Temporal)  O2 SATS - 98%        DIAGNOSIS  Final diagnoses:   Acute CVA (cerebrovascular accident) (CMS/HCC)         DISPOSITION  ADMISSION    Discussed treatment plan and reason for admission with pt/family and admitting physician.  Pt/family voiced understanding of the plan for admission for further testing/treatment as needed.          Justin Kelley MD  08/19/20 [id number]

## 2020-08-19 NOTE — PROGRESS NOTES
Clinical Pharmacy Services: Medication History    Dalila Valerio is a 69 y.o. female presenting to [facility] for Acute CVA (cerebrovascular accident) (CMS/[facility]) [I63.9]    She  has a past medical history of Abnormal weight gain, Acquired trigger finger, Acute myocardial infarction (CMS/[facility]), Adjustment reaction with mixed emotional features, ASHD (arteriosclerotic heart disease), B12 deficiency, Bacterial pneumonia, Williamson esophagus, Barretts esophagus, Benign colonic polyp, Bilateral knee pain, Birthmark, Bronchiectasis (CMS/[facility]), Chronic cough, Chronic glomerulonephritis with exudative nephritis, Chronic lumbar radiculopathy, Diabetes mellitus (CMS/[facility]), Diabetic peripheral neuropathy (CMS/[facility]), Dyslipidemia, Fibromyositis, Herpes zoster, Lupus anticoagulant disorder (CMS/[facility]), Mycobacterium avium complex (CMS/[facility]), Nephrolithiasis, SILVIANO (obstructive sleep apnea), Palpitations, Premature ventricular contraction, Slow transit constipation, and Vitamin D deficiency.    Allergies as of 08/19/2020 - Reviewed 08/19/2020   Allergen Reaction Noted   • Carafate [sucralfate] GI Intolerance 09/15/2016   • Duloxetine hcl  12/01/2015   • Morphine and related  12/01/2015   • Nitroglycerin  12/01/2015   • Nsaids  12/01/2015   • Oxycodone  12/01/2015   • Penicillins  12/01/2015   • Statins  12/01/2015   • Viibryd [vilazodone hcl] Hallucinations 08/07/2018       Medication information was obtained from: Pharmacy  [id number] [address] AT [address] [phone] [facility] [phone] FX    Prior to Admission Medications       Prescriptions Last Dose Informant Patient Reported? Taking?    ACCU-CHEK FASTCLIX LANCETS misc   No No    USE TO TEST BLOOD SUGAR UP TO FIVE TIMES DAILY AS DIRECTED.    albuterol (PROAIR HFA) 108 (90 BASE) MCG/ACT inhaler   No No     INHALE 1 TO 2 PUFFS EVERY 4 TO 6 HOURS AS NEEDED    alendronate  "(FOSAMAX) 70 MG tablet  Pharmacy No Yes    TAKE 1 TABLET BY MOUTH EVERY 7 DAYS    Patient taking differently:  Take 70 mg by mouth Every 7 (Seven) Days.    amLODIPine (NORVASC) 5 MG tablet  Pharmacy No Yes    TAKE 1 TABLET BY MOUTH DAILY    ARIPiprazole (ABILIFY) 5 MG tablet  Pharmacy No Yes    TAKE 1 TABLET BY MOUTH DAILY    aspirin 81 MG EC tablet   Yes No    Take 81 mg by mouth Daily.    carbidopa-levodopa (SINEMET)  MG per tablet  Pharmacy Yes Yes    Take 1 tablet by mouth 3 (Three) Times a Day.    Cholecalciferol (VITAMIN D3) 5000 UNITS capsule capsule   No No    Take 1 capsule by mouth daily.    citalopram (CeleXA) 20 MG tablet  Pharmacy No No    TAKE 1 TABLET BY MOUTH DAILY    cyanocobalamin injection 1,000 mcg   No No    cyclobenzaprine (FLEXERIL) 10 MG tablet   Yes No    Take 10 mg by mouth 3 (Three) Times a Day As Needed.    dexamethasone (DECADRON) 4 MG tablet  Pharmacy No No    Take 1 tablet by mouth Daily With Breakfast.    diphenhydrAMINE (BENADRYL) 25 mg capsule   Yes No    Take 1 capsule by mouth nightly.    furosemide (LASIX) 20 MG tablet  Pharmacy No Yes    TAKE 2 TABLETS BY MOUTH IN THE MORNING AND 1 TABLET BY MOUTH EARLY AFTERNOON    gabapentin (NEURONTIN) 800 MG tablet  Pharmacy No Yes    TAKE 1 TABLET BY MOUTH THREE TIMES DAILY    glimepiride (Amaryl) 1 MG tablet  Pharmacy No Yes    Take 2 tablet by mouth daily with the biggest of the meal of the day    Patient taking differently:  Take 1 mg by mouth Daily. Take 1 tablet by mouth daily with the biggest of the meal of the day    glucose blood test strip   No No    Test up to 5 times daily for low blood sugar and symptomatic glycemia    hydrochlorothiazide (HYDRODIURIL) 25 MG tablet  Pharmacy Yes No    Take  by mouth Daily.    HYDROcodone-acetaminophen (NORCO)  MG per tablet  Pharmacy Yes Yes    Take 1 tablet by mouth 4 (four) times a day.    Insulin Syringe 29G X 1\" 0.3 ML misc   No No    1 each daily with dinner.    lisinopril " (PRINIVIL,ZESTRIL) 20 MG tablet  Pharmacy No Yes    TAKE 1 TABLET BY MOUTH DAILY    LORazepam (ATIVAN) 1 MG tablet  Pharmacy Yes No    Take 1 mg by mouth 3 (Three) Times a Day As Needed for Anxiety.    meclizine (ANTIVERT) 12.5 MG tablet   No No    Take 1 tablet by mouth 3 (Three) Times a Day As Needed for Dizziness.    metoprolol tartrate (LOPRESSOR) 50 MG tablet  Pharmacy No No    TAKE 1 TABLET BY MOUTH THREE TIMES DAILY    Patient taking differently:  Take 50 mg by mouth 2 (Two) Times a Day.    nateglinide (Starlix) 120 MG tablet  Pharmacy No No    Take 1 tablet by mouth 3 (Three) Times a Day Before Meals.    nitroglycerin (NITROSTAT) 0.4 MG SL tablet   No No    ONE TABLET UNDER TONGUE AS NEEDED FOR CHEST PAIN EVERY 5 MINUTES FOR UP TO 3 DOSES, CALL 911 IF PAIN PERSISTS    omeprazole (priLOSEC) 40 MG capsule  Pharmacy No Yes    TAKE 1 CAPSULE BY MOUTH TWICE DAILY    Patient taking differently:  Take 40 mg by mouth 2 (two) times a day.    ondansetron (ZOFRAN) 4 MG tablet   Yes No    Take 4 mg by mouth 3 (Three) Times a Day As Needed for Nausea.    potassium chloride (KLOR-CON) 8 MEQ CR tablet  Pharmacy No Yes    TAKE 1 TABLET BY MOUTH TWICE DAILY    Patient taking differently:  Take 8 mEq by mouth 2 (Two) Times a Day.    prasugrel (EFFIENT) 10 MG tablet  Pharmacy Yes Yes    Take 10 mg by mouth Daily.    promethazine (PHENERGAN) 25 MG tablet   Yes No    Take 25 mg by mouth Every 6 (Six) Hours As Needed for Nausea or Vomiting.    rosuvastatin (CRESTOR) 20 MG tablet  Pharmacy No Yes    TAKE 1 TABLET BY MOUTH DAILY    Sennosides 10 MG chewable tablet   Yes No    Chew As Needed.    SYMBICORT 160-4.5 MCG/ACT inhaler   No No    INHALE 2 PUFFS BY MOUTH TWICE DAILY. RINSE MOUTH AFTER USE    topiramate (TOPAMAX) 50 MG tablet  Pharmacy No No    TAKE 1 TABLET BY MOUTH DAILY          Medication notes: Was unable to see patient because the patient went to surgery. Called the patient's pharmacy for all of the following  information. Removed atorvastatin, clopidogrel. Pt is likely no longer filling dexamethasonem hydrochlorothiazide, and topiramate given the last fill date. Adjusted directions on glimepiride and added sinemet. Unsure if the patient is still taking metoprolol tartrate and nateglinide because the patient's pharmacy reports the medication had not been picked up since April. Recommend further f/u when able to talk to patient or .     This medication list is complete to the best of my knowledge as of 8/19/2020    Please call if questions.    Jose Miguel Haddad, PharmD, BCPS  Clinical Staff Pharmacist  Ext. [phone]  8/19/2020 10:25

## 2020-08-19 NOTE — PERIOPERATIVE NURSING NOTE
Pre-procedure NIH:  Decision time: 0803  In room : 0824  Procedure start: 0839  Arterial access Puncture time: 0839  Guide catheter placement time: 0856  First thrombectomy device placement time (first pass):  n/a  Subsequent device placement time: n/a  Time of recanalization:  n/a  Final TICI Flow: 3  Procedure End time:  0927    **These values were verbally verified with physician performing procedure.

## 2020-08-19 NOTE — ED TRIAGE NOTES
Pt woke at 0630 and was dizzy.  10 minutes later she exhibited right side weakness and was unable top speak.      Patient was placed in face mask during first look triage.  Patient was wearing a face mask throughout encounter.  I wore personal protective equipment throughout the encounter.  Hand hygiene was performed before and after patient encounter.

## 2020-08-19 NOTE — ANESTHESIA PROCEDURE NOTES
Airway  Urgency: emergent    Date/Time: 8/19/2020 8:35 AM  Airway not difficult    General Information and Staff    Patient location during procedure: OR  Anesthesiologist: Tae Kathleen MD    Consent for Airway (if performed for an anesthetic, see related documentation for consents)  Patient identity confirmed: arm band, provided demographic data and hospital-assigned identification number  Consent: The procedure was performed in an emergent situation. Verbal consent not obtained. Written consent not obtained.  Risks and benefits: risks, benefits and alternatives were not discussed      Indications and Patient Condition  Indications for airway management: airway protection    Preoxygenated: yes  MILS maintained throughout  Mask difficulty assessment: 2 - vent by mask + OA or adjuvant +/- NMBA    Final Airway Details  Final airway type: endotracheal airway      Successful airway: ETT  Cuffed: yes   Successful intubation technique: direct laryngoscopy  Endotracheal tube insertion site: oral  Blade: Charla  Blade size: 3  ETT size (mm): 7.0  Cormack-Lehane Classification: grade I - full view of glottis  Placement verified by: chest auscultation and capnometry   Measured from: lips  ETT/EBT  to lips (cm): 21  Number of attempts at approach: 1  Assessment: lips, teeth, and gum same as pre-op and atraumatic intubation

## 2020-08-19 NOTE — H&P
[place] [facility]    Patient Care Team:  Cinthya Valentine APRN as PCP - General  Cinthya Valentine APRN as PCP - Family Medicine  Cinthya Valentine APRN as PCP - Claims Attributed  Devyn Velazco MD as Consulting Physician (Pain Medicine)  Bogdan Olmedo MD as Surgeon (Orthopedic Surgery)  Pedro Pablo Lomas MD as Consulting Physician (Cardiology)    CC: Confusion, aphasia, ataxia    HPI: 69-year-old morbidly obese white female with a past medical history significant for CAD status post PCI, CKD, diabetes, SILVIANO on CPAP, previous history of lupus anticoagulant not on anticoagulation who was brought into the ER with concerns of new onset dizziness and ataxia.  Patient was aphasic and having right hemiplegia after she collapsed.  She was mute on arrival and team D was called and patient was diagnosed with distal basilar thrombosis.  She was given TPA and taken for possible mechanical thrombectomy and patient was noted to have good flow in the basilar artery with recanalization post TPA and hence was transferred to the ICU postprocedure.    Patient initially was altered and there was some concern for airway compromise but she started waking up and he is actually able to speak some now.  No significant hemiparesis noted as well.  Not able to provide much more history now due to aphasia.      I have reviewed (if any available) last discharge summaries as well as the outpatient notes from the patients other consultants available in the [facility] [place] as well previous notes from our group and summarized above    Objective     I have discussed the case with ED physician     Records Reviewed  Old medical records.  Nursing notes.  Previous radiology studies.    Review of Systems:  UTO due post procedural confusion and aphasia    Past Medical History:   Diagnosis Date   • Abnormal weight gain    • Acquired trigger finger    • Acute myocardial infarction (CMS/HCC)    • Adjustment reaction with mixed emotional  features    • ASHD (arteriosclerotic heart disease)    • B12 deficiency    • Bacterial pneumonia    • Williamson esophagus    • Barretts esophagus    • Benign colonic polyp    • Bilateral knee pain    • Birthmark     haemangioma of the bone   • Bronchiectasis (CMS/HCC)    • Chronic cough    • Chronic glomerulonephritis with exudative nephritis    • Chronic lumbar radiculopathy    • Diabetes mellitus (CMS/HCC)    • Diabetic peripheral neuropathy (CMS/HCC)    • Dyslipidemia    • Fibromyositis    • Herpes zoster    • Lupus anticoagulant disorder (CMS/HCC)    • Mycobacterium avium complex (CMS/HCC)    • Nephrolithiasis    • SILVIANO (obstructive sleep apnea)    • Palpitations    • Premature ventricular contraction    • Slow transit constipation    • Vitamin D deficiency        Past Surgical History:   Procedure Laterality Date   • APPENDECTOMY     • BRONCHOSCOPY     •  SECTION     • CHOLECYSTECTOMY     • COLONOSCOPY     • COLONOSCOPY N/A 2019    Procedure: COLONOSCOPY to cecum with cold biopsy and cold and hotsnare polypectomies;  Surgeon: Suzy Eubanks MD;  Location: [facility] ENDOSCOPY;  Service: Gastroenterology   • CORONARY ANGIOPLASTY WITH STENT PLACEMENT     • ENDOSCOPY N/A 2019    Procedure: ESOPHAGOGASTRODUODENOSCOPY with cold biopsies;  Surgeon: Suzy Eubanks MD;  Location: [facility] ENDOSCOPY;  Service: Gastroenterology   • KNEE ARTHROSCOPY     • OTHER SURGICAL HISTORY      elbow surgery   • ROTATOR CUFF REPAIR     • TUBAL ABDOMINAL LIGATION         Prior to Admission Medications     Prescriptions Last Dose Informant Patient Reported? Taking?    alendronate (FOSAMAX) 70 MG tablet  Pharmacy No Yes    TAKE 1 TABLET BY MOUTH EVERY 7 DAYS    Patient taking differently:  Take 70 mg by mouth Every 7 (Seven) Days.    amLODIPine (NORVASC) 5 MG tablet  Pharmacy No Yes    TAKE 1 TABLET BY MOUTH DAILY    ARIPiprazole (ABILIFY) 5 MG tablet  Pharmacy No Yes    TAKE 1 TABLET BY MOUTH DAILY     carbidopa-levodopa (SINEMET)  MG per tablet  Pharmacy Yes Yes    Take 1 tablet by mouth 3 (Three) Times a Day.    furosemide (LASIX) 20 MG tablet  Pharmacy No Yes    TAKE 2 TABLETS BY MOUTH IN THE MORNING AND 1 TABLET BY MOUTH EARLY AFTERNOON    gabapentin (NEURONTIN) 800 MG tablet  Pharmacy No Yes    TAKE 1 TABLET BY MOUTH THREE TIMES DAILY    glimepiride (Amaryl) 1 MG tablet  Pharmacy No Yes    Take 2 tablet by mouth daily with the biggest of the meal of the day    Patient taking differently:  Take 1 mg by mouth Daily. Take 1 tablet by mouth daily with the biggest of the meal of the day    HYDROcodone-acetaminophen (NORCO)  MG per tablet  Pharmacy Yes Yes    Take 1 tablet by mouth 4 (four) times a day.    lisinopril (PRINIVIL,ZESTRIL) 20 MG tablet  Pharmacy No Yes    TAKE 1 TABLET BY MOUTH DAILY    omeprazole (priLOSEC) 40 MG capsule  Pharmacy No Yes    TAKE 1 CAPSULE BY MOUTH TWICE DAILY    Patient taking differently:  Take 40 mg by mouth 2 (two) times a day.    potassium chloride (KLOR-CON) 8 MEQ CR tablet  Pharmacy No Yes    TAKE 1 TABLET BY MOUTH TWICE DAILY    Patient taking differently:  Take 8 mEq by mouth 2 (Two) Times a Day.    prasugrel (EFFIENT) 10 MG tablet  Pharmacy Yes Yes    Take 10 mg by mouth Daily.    rosuvastatin (CRESTOR) 20 MG tablet  Pharmacy No Yes    TAKE 1 TABLET BY MOUTH DAILY    ACCU-CHEK FASTCLIX LANCETS [facility]   No No    USE TO TEST BLOOD SUGAR UP TO FIVE TIMES DAILY AS DIRECTED.    albuterol (PROAIR HFA) 108 (90 BASE) MCG/ACT inhaler   No No     INHALE 1 TO 2 PUFFS EVERY 4 TO 6 HOURS AS NEEDED    aspirin 81 MG EC tablet   Yes No    Take 81 mg by mouth Daily.    Cholecalciferol (VITAMIN D3) 5000 UNITS capsule capsule   No No    Take 1 capsule by mouth daily.    citalopram (CeleXA) 20 MG tablet  Pharmacy No No    TAKE 1 TABLET BY MOUTH DAILY    cyanocobalamin injection 1,000 mcg   No No    cyclobenzaprine (FLEXERIL) 10 MG tablet   Yes No    Take 10 mg by mouth 3 (Three) Times  "a Day As Needed.    diphenhydrAMINE (BENADRYL) 25 mg capsule   Yes No    Take 1 capsule by mouth nightly.    glucose blood test strip   No No    Test up to 5 times daily for low blood sugar and symptomatic glycemia    Insulin Syringe 29G X 1\" 0.3 ML misc   No No    1 each daily with dinner.    LORazepam (ATIVAN) 1 MG tablet  Pharmacy Yes No    Take 1 mg by mouth 3 (Three) Times a Day As Needed for Anxiety.    meclizine (ANTIVERT) 12.5 MG tablet   No No    Take 1 tablet by mouth 3 (Three) Times a Day As Needed for Dizziness.    metoprolol tartrate (LOPRESSOR) 50 MG tablet  Pharmacy No No    TAKE 1 TABLET BY MOUTH THREE TIMES DAILY    Patient taking differently:  Take 50 mg by mouth 2 (Two) Times a Day.    nateglinide (Starlix) 120 MG tablet  Pharmacy No No    Take 1 tablet by mouth 3 (Three) Times a Day Before Meals.    nitroglycerin (NITROSTAT) 0.4 MG SL tablet   No No    ONE TABLET UNDER TONGUE AS NEEDED FOR CHEST PAIN EVERY 5 MINUTES FOR UP TO 3 DOSES, CALL 911 IF PAIN PERSISTS    ondansetron (ZOFRAN) 4 MG tablet   Yes No    Take 4 mg by mouth 3 (Three) Times a Day As Needed for Nausea.    promethazine (PHENERGAN) 25 MG tablet   Yes No    Take 25 mg by mouth Every 6 (Six) Hours As Needed for Nausea or Vomiting.    Sennosides 10 MG chewable tablet   Yes No    Chew As Needed.    SYMBICORT 160-4.5 MCG/ACT inhaler   No No    INHALE 2 PUFFS BY MOUTH TWICE DAILY. RINSE MOUTH AFTER USE          Carafate [sucralfate]; Duloxetine hcl; Morphine and related; Nitroglycerin; Nsaids; Oxycodone; Penicillins; Statins; and Viibryd [vilazodone hcl]    Family History   Problem Relation Age of Onset   • Colon cancer Mother    • Uterine cancer Mother    • Arthritis Mother    • Cancer Mother    • Heart disease Mother    • Migraines Mother    • Stroke Mother    • Cancer Father         malignant brain tumor   • Aneurysm Father    • Bone cancer Maternal Aunt    • Diabetes Paternal Grandmother    • Diabetes Paternal Grandfather    • " Arthritis Maternal Grandmother    • Heart disease Maternal Grandmother    • Thyroid disease Maternal Grandmother        Social History     Socioeconomic History   • Marital status: Single     Spouse name: Not on file   • Number of children: Not on file   • Years of education: Not on file   • Highest education level: Not on file   Tobacco Use   • Smoking status: Former Smoker     Last attempt to quit: 1980     Years since quittin.6   • Smokeless tobacco: Never Used   Substance and Sexual Activity   • Alcohol use: No   • Drug use: No   • Sexual activity: Yes     Partners: Male       Physical Exam    Temp:  [97.9 °F (36.6 °C)-98.1 °F (36.7 °C)] 97.9 °F (36.6 °C)  Heart Rate:  [] 101  Resp:  [16] 16  BP: (120-146)/(74-80) 146/74    PHYSICAL EXAM   Constitutional: Middle aged morbidly obese WF pt in bed, No acute respiratory distress, + accessory muscle use  Head: - NCAT  Eyes: No pallor, Anicteric conjunctiva, EOMI.  ENMT:  Mallampati 4, no oral thrush. Moist MM.   NECK: Trachea midline, No thyromegaly, no palpable cervical LNpathy  Heart: RRR, no murmur. Trace pedal edema   Lungs: MIREILLE +, Occ rhonchi. No wheezes/ crackles heard    Abdomen: Soft. Obese. No tenderness, guarding or rigidity. No palpable masses  Extremities: Extremities warm and well perfused. No cyanosis/ clubbing  Neuro: Conscious, drowsy, aphasic. No gross focal neuro deficits  Psych: Mood and affect appropriate    LABS and IMAGING DATA:    Lab Results (last 24 hours)     Procedure Component Value Units Date/Time    POC Glucose Once [[id number]]  (Abnormal) Collected:  20 1005    Specimen:  Blood Updated:  20 1006     Glucose 172 mg/dL     COVID PRE-OP / PRE-PROCEDURE SCREENING ORDER (NO ISOLATION) - Swab, Nasopharynx [[id number]] Collected:  20 0811    Specimen:  Swab from Nasopharynx Updated:  20 0912    Narrative:       The following orders were created for panel order COVID PRE-OP / PRE-PROCEDURE SCREENING ORDER  (NO ISOLATION) - Swab, Nasopharynx.  Procedure                               Abnormality         Status                     ---------                               -----------         ------                     Respiratory Panel PCR w/...[[id number]]  Normal              Final result                 Please view results for these tests on the individual orders.    Respiratory Panel PCR w/COVID-19(SARS-CoV-2) HENRY/SONYA/TOD/PAD In-House, NP Swab in UTM/VTM, 3-4 HR TAT - Swab, Nasopharynx [[id number]]  (Normal) Collected:  08/19/20 0811    Specimen:  Swab from Nasopharynx Updated:  08/19/20 0912     ADENOVIRUS, PCR Not Detected     Coronavirus 229E Not Detected     Coronavirus HKU1 Not Detected     Coronavirus NL63 Not Detected     Coronavirus OC43 Not Detected     COVID19 Not Detected     Human Metapneumovirus Not Detected     Human Rhinovirus/Enterovirus Not Detected     Influenza A PCR Not Detected     Influenza A H1 Not Detected     Influenza A H1 2009 PCR Not Detected     Influenza A H3 Not Detected     Influenza B PCR Not Detected     Parainfluenza Virus 1 Not Detected     Parainfluenza Virus 2 Not Detected     Parainfluenza Virus 3 Not Detected     Parainfluenza Virus 4 Not Detected     RSV, PCR Not Detected     Bordetella pertussis pcr Not Detected     Bordetella parapertussis PCR Not Detected     Chlamydophila pneumoniae PCR Not Detected     Mycoplasma pneumo by PCR Not Detected    Narrative:       Fact sheet for providers: https://docs.[employer]/wp-content/uploads/JRZ9080-6313-RT6.1-EUA-Provider-Fact-Sheet-3.pdf    Fact sheet for patients: https://docs.[employer]/wp-content/uploads/LBI2800-8360-RX3.1-EUA-Patient-Fact-Sheet-1.pdf    Comprehensive Metabolic Panel [[id number]]  (Abnormal) Collected:  08/19/20 0738    Specimen:  Blood Updated:  08/19/20 0803     Glucose 188 mg/dL      BUN 15 mg/dL      Creatinine 1.26 mg/dL      Sodium 142 mmol/L      Potassium 3.2 mmol/L      Chloride 106 mmol/L      CO2 25.7  mmol/L      Calcium 9.3 mg/dL      Total Protein 6.5 g/dL      Albumin 3.90 g/dL      ALT (SGPT) 12 U/L      AST (SGOT) 13 U/L      Alkaline Phosphatase 30 U/L      Total Bilirubin 0.2 mg/dL      eGFR Non African Amer 42 mL/min/1.73      Globulin 2.6 gm/dL      A/G Ratio 1.5 g/dL      BUN/Creatinine Ratio 11.9     Anion Gap 10.3 mmol/L     Narrative:       GFR Normal >60  Chronic Kidney Disease <60  Kidney Failure <15      Troponin [[id number]]  (Normal) Collected:  08/19/20 0738    Specimen:  Blood Updated:  08/19/20 0803     Troponin T <0.010 ng/mL     Narrative:       Troponin T Reference Range:  <= 0.03 ng/mL-   Negative for AMI  >0.03 ng/mL-     Abnormal for myocardial necrosis.  Clinicians would have to utilize clinical acumen, EKG, Troponin and serial changes to determine if it is an Acute Myocardial Infarction or myocardial injury due to an underlying chronic condition.       Results may be falsely decreased if patient taking Biotin.      Protime-INR [[id number]]  (Normal) Collected:  08/19/20 0738    Specimen:  Blood Updated:  08/19/20 0757     Protime 13.0 Seconds      INR 0.99    aPTT [[id number]]  (Normal) Collected:  08/19/20 0738    Specimen:  Blood Updated:  08/19/20 0757     PTT 23.8 seconds     POC Glucose Once [[id number]]  (Abnormal) Collected:  08/19/20 0742    Specimen:  Blood Updated:  08/19/20 0745     Glucose 149 mg/dL         Lab Results   Component Value Date    CKTOTAL 95 06/03/2020    CKMB 1.9 03/14/2014    TROPONINT <0.010 08/19/2020             Results from last 7 days   Lab Units 08/19/20 0738   INR  0.99            I have personally reviewed the chest imaging from the last 3 days, agree with assessment of the radiologist and summarized it below    Assessment     ASSESSMENT:  Acute basilar artery CVA s/p TPA  Acute basilar artery occlusion s/p angiogram - not needing thrombectomy  Acute aphasia; improving  Acute encephalopathy  Right-sided hemiparesis; improved  CAD s/p MI/  PCI  CKD  Bronchiectasis with LUL  B12 deficiency  Lupus anticoagulant disorder  DM  Williamson's esophagus/GERD  Morbid obesity  SILVIANO on PAP    PLAN:  Patient admitted to the ICU post TPA and attempted thrombectomy for acute basilar artery CVA. Work-up done so far and recommendations from consultants including stroke neurology and interventional neurology noted.  Reviewed operative reports.  Some concern for airway compromise initially but she is doing well now post sedation wearing off.  Post TPA protocol.  Secondary stroke prophylaxis and ongoing work-up for etiology noted  Continue with frequent neuro checks.  Discussed with the nurse at bedside  Renal function is stable.  Noted concern for lupus anticoagulant and hypercoagulability state concern.  Therapy evaluation when appropriate  Will restart home medications once cleared by speech therapy  We will continue with CPAP from home  Full code    I have discussed my findings and recommendations with patient, nursing staff and consulting provider.     Quang Elizabeth MD  8/19/2020  10:51

## 2020-08-19 NOTE — NURSING NOTE
Acute rehab referral received via stroke order set. Please note that the acute rehab admission RNs will not be actively evaluating this patient. If it is felt that the patient is or will be acute rehab appropriate please call the admissions office at [phone] and a full evaluation will be initiated.    Thank you!    Bernardo Ibrahim RN  p   f

## 2020-08-19 NOTE — PLAN OF CARE
Problem: Patient Care Overview  Goal: Plan of Care Review  Outcome: Ongoing (interventions implemented as appropriate)  Flowsheets (Taken 8/19/2020 1619)  Plan of Care Reviewed With: patient; spouse  Outcome Summary: Pt recieved from OR around 1000 post retreival.  NIH has improved since TPA administration. Pt continues to have Rt radial compression device on, bleeding present when defalted. Dr. Meehan aware.  VSS Cardene as needed for SBP less than 150.     Problem: Patient Care Overview  Goal: Individualization and Mutuality  Outcome: Ongoing (interventions implemented as appropriate)     Problem: Patient Care Overview  Goal: Discharge Needs Assessment  Outcome: Ongoing (interventions implemented as appropriate)     Problem: Patient Care Overview  Goal: Interprofessional Rounds/Family Conf  Outcome: Ongoing (interventions implemented as appropriate)     Problem: Fall Risk (Adult)  Goal: Identify Related Risk Factors and Signs and Symptoms  Outcome: Ongoing (interventions implemented as appropriate)     Problem: Fall Risk (Adult)  Goal: Absence of Fall  Outcome: Ongoing (interventions implemented as appropriate)     Problem: Skin Injury Risk (Adult)  Goal: Identify Related Risk Factors and Signs and Symptoms  Outcome: Ongoing (interventions implemented as appropriate)

## 2020-08-19 NOTE — ANESTHESIA PREPROCEDURE EVALUATION
Anesthesia Evaluation     Patient summary reviewed and Nursing notes reviewed   NPO Solid Status: Waived due to emergency  NPO Liquid Status: Waived due to emergency           Airway   No difficulty expected  Comment: Patient is not cooperative. Unable to assess.  Dental      Pulmonary     breath sounds clear to auscultation  (+) sleep apnea,   (-) pneumonia  Cardiovascular   Exercise tolerance: unable to assess    Rhythm: regular  Rate: normal    (+) CAD,       Neuro/Psych  (+) CVA residual symptoms, psychiatric history,     GI/Hepatic/Renal/Endo    (+) morbid obesity,  diabetes mellitus,     Musculoskeletal     Abdominal    Substance History      OB/GYN          Other                        Anesthesia Plan    ASA 4 - emergent     general     intravenous induction   Postoperative Plan: Expected vent after surgery  Plan/risks discussed with: Unable to consent. Emergency.

## 2020-08-19 NOTE — CONSULTS
Neurology Consult Note    Consult Date: 8/19/2020    Referring MD: Quang Elizabeth*    Reason for Consult I have been asked to see the patient in neurological consultation to render advice and opinion regarding acute stroke    Dalila Valerio is a 69 y.o. female with PMH of CAD, B12 deficiency, Williamson's esophagus, glomerulonephritis, diabetes, diabetic neuropathy, obstructive sleep apnea, reported lupus anticoagulant, no known history of atrial fibrillation.  She reportedly got up at 6 AM today and complained of feeling somewhat dizzy and ataxic.  At 630 she collapsed and was aphasic with right hemiplegia.  She was brought to the emergency department and found to be mute with right hemiplegia and disconjugate gaze.  The symptoms did not improve in the emergency department.  She underwent team D imaging with CT, CTA and CT perfusion which showed a distal basilar thrombus.  She received TPA in the emergency department and subsequently went to the operating room for angiogram which revealed that the basilar artery had recanalized after TPA.  She was subsequently transferred to the ICU with an improving neurologic exam.    Past Medical History:   Diagnosis Date   • Abnormal weight gain    • Acquired trigger finger    • Acute myocardial infarction (CMS/HCC)    • Adjustment reaction with mixed emotional features    • ASHD (arteriosclerotic heart disease)    • B12 deficiency    • Bacterial pneumonia    • Williamson esophagus    • Barretts esophagus    • Benign colonic polyp    • Bilateral knee pain    • Birthmark     haemangioma of the bone   • Bronchiectasis (CMS/HCC)    • Chronic cough    • Chronic glomerulonephritis with exudative nephritis    • Chronic lumbar radiculopathy    • Diabetes mellitus (CMS/HCC)    • Diabetic peripheral neuropathy (CMS/HCC)    • Dyslipidemia    • Fibromyositis    • Herpes zoster    • Lupus anticoagulant disorder (CMS/HCC)    • Mycobacterium avium complex (CMS/HCC)    • Nephrolithiasis     • SILVIANO (obstructive sleep apnea)    • Palpitations    • Premature ventricular contraction    • Slow transit constipation    • Vitamin D deficiency        ROS: Obtained from   No fevers, chills  + weakness, aphasia, ataxia, dizziness  No chest pain, + palpitations  No shortness of air, cough    Exam  /80   Pulse 90   Temp 98.1 °F (36.7 °C) (Temporal)   Resp 16   Wt 85.5 kg (188 lb 9 oz)   SpO2 98%   BMI 34.49 kg/m²   Gen: NAD, vitals reviewed  MS: Opens eyes to voice, tracks examiner, does not follow commands, globally aphasic, unable to test memory, orientation, concentration due to aphasia  CN: visual acuity grossly normal, no blink to threat on right side, disconjugate gaze, PERRL, EOMI, right facial droop, tongue midline  Motor: 0/5 right upper and lower extremity, increased tone right lower extremity, decreased tone right upper extremity, 5/5 left upper and lower extremity,  Reflexes: Right plantar mute, left plantar downgoing    DATA:    Lab Results   Component Value Date    GLUCOSE 188 (H) 08/19/2020    CALCIUM 9.3 08/19/2020     08/19/2020    K 3.2 (L) 08/19/2020    CO2 25.7 08/19/2020     08/19/2020    BUN 15 08/19/2020    CREATININE 1.26 (H) 08/19/2020    EGFRIFAFRI 48 (L) 07/02/2020    EGFRIFNONA 42 (L) 08/19/2020    BCR 11.9 08/19/2020    ANIONGAP 10.3 08/19/2020     Lab Results   Component Value Date    WBC 14.38 (H) 06/03/2020    HGB 11.7 (L) 06/03/2020    HCT 35.1 06/03/2020    MCV 90.5 06/03/2020     06/03/2020       Lab review: GFR 42, WBC 14.4    Imaging review: I personally reviewed her CT head, CTA head and neck and CT perfusion.  CT head showed a hyperdensity in the distal basilar artery with no evidence of intracerebral hemorrhage, CTA showed distal basilar occlusion with clot extending into the posterior cerebral arteries, CT perfusion showed large area of tissue at risk mainly in posterior circulation.  Discussed with the reading neuroradiologist   Audra.    Discussed with Dr. Meehan regarding plan for angio, thrombectomy    Diagnoses:  Stroke, basilar artery, embolic    Comment: Suspect embolic occlusion of the basilar artery.  Patient received TPA in the emergency department and fortunately her angiogram showed recanalization of the basilar artery.  The patient is examined in ICU is improving at this time.  Plan is to control blood pressure less than 150 systolic post intervention and obtain brain MRI to determine the extent of her infarct.  There is a reported history of lupus anticoagulant which we will need to investigate as a possible etiology.    PLAN:  Status post TPA and angiogram, goal blood pressure less than 150 systolic  MRI brain without contrast  2D echocardiogram with bubble study  Will likely need GREGORY  No statin due to allergy    Addendum:     Patient continues to improve. Got some additional history from . She had a recent concussion which was associated with some impaired memory and dysconjugate gaze. MRI was just recently done and did not show any intracranial abnormality. She was seen by my partner Dr. Santacruz who noted some parkinsonism and suspects she may be developing idiopathic PD. She was started on Sinemet. On my exam now her mental status is improved, she has no aphasia or neglect, and is oriented. She does have prominent dysconjugate gaze with left MITALI. Her  is not sure this is new because she has had some intermittent diplopia since her concussion. She did not have any obvious gaze palsy when seen in our office by Dr. Santacruz on 7/1 which makes me suspect her ocular findings today are probably secondary to her stroke. Otherwise she has generalized weakness but is improving. We will get MRI to evaluate for the extent of her strokes in the posterior circulation. Regarding the etiology the stroke looks cardioembolic. She has a documented history of lupus anticoagulant but the patient and her  do not know the details  of that. She takes plavix for CAD. There is no known history of a fib that I can elicit.    Updated plan  BP <150 systolic post angio  MRI brain wo/w  Cardiology consult for possible GREGORY tomorrow  Follow up CT today per Dr. Meehan, hold ASA for now per TPA precauations    Discussed with nurse and  at the bedside

## 2020-08-20 ENCOUNTER — APPOINTMENT (OUTPATIENT)
Dept: CARDIOLOGY | Facility: HOSPITAL | Age: 70
End: 2020-08-20

## 2020-08-20 ENCOUNTER — APPOINTMENT (OUTPATIENT)
Dept: CT IMAGING | Facility: HOSPITAL | Age: 70
End: 2020-08-20

## 2020-08-20 LAB
ALBUMIN SERPL-MCNC: 3.3 G/DL (ref 3.5–5.2)
ALBUMIN/GLOB SERPL: 1.4 G/DL
ALP SERPL-CCNC: 26 U/L (ref 39–117)
ALT SERPL W P-5'-P-CCNC: 18 U/L (ref 1–33)
ANION GAP SERPL CALCULATED.3IONS-SCNC: 8 MMOL/L (ref 5–15)
AST SERPL-CCNC: 16 U/L (ref 1–32)
BASOPHILS # BLD AUTO: 0.03 10*3/MM3 (ref 0–0.2)
BASOPHILS NFR BLD AUTO: 0.3 % (ref 0–1.5)
BH CV LOW VAS RIGHT GASTRONEMIUS VESSEL: 1
BH CV LOW VAS RIGHT GREATER SAPH AK VESSEL: 1
BH CV LOWER VASCULAR LEFT COMMON FEMORAL AUGMENT: NORMAL
BH CV LOWER VASCULAR LEFT COMMON FEMORAL COMPETENT: NORMAL
BH CV LOWER VASCULAR LEFT COMMON FEMORAL COMPRESS: NORMAL
BH CV LOWER VASCULAR LEFT COMMON FEMORAL PHASIC: NORMAL
BH CV LOWER VASCULAR LEFT COMMON FEMORAL SPONT: NORMAL
BH CV LOWER VASCULAR LEFT DISTAL FEMORAL COMPRESS: NORMAL
BH CV LOWER VASCULAR LEFT GASTRONEMIUS COMPRESS: NORMAL
BH CV LOWER VASCULAR LEFT GREATER SAPH AK COMPRESS: NORMAL
BH CV LOWER VASCULAR LEFT GREATER SAPH BK COMPRESS: NORMAL
BH CV LOWER VASCULAR LEFT LESSER SAPH COMPRESS: NORMAL
BH CV LOWER VASCULAR LEFT MID FEMORAL AUGMENT: NORMAL
BH CV LOWER VASCULAR LEFT MID FEMORAL COMPETENT: NORMAL
BH CV LOWER VASCULAR LEFT MID FEMORAL COMPRESS: NORMAL
BH CV LOWER VASCULAR LEFT MID FEMORAL PHASIC: NORMAL
BH CV LOWER VASCULAR LEFT MID FEMORAL SPONT: NORMAL
BH CV LOWER VASCULAR LEFT PERONEAL COMPRESS: NORMAL
BH CV LOWER VASCULAR LEFT POPLITEAL AUGMENT: NORMAL
BH CV LOWER VASCULAR LEFT POPLITEAL COMPETENT: NORMAL
BH CV LOWER VASCULAR LEFT POPLITEAL COMPRESS: NORMAL
BH CV LOWER VASCULAR LEFT POPLITEAL PHASIC: NORMAL
BH CV LOWER VASCULAR LEFT POPLITEAL SPONT: NORMAL
BH CV LOWER VASCULAR LEFT POSTERIOR TIBIAL COMPRESS: NORMAL
BH CV LOWER VASCULAR LEFT PROFUNDA FEMORAL COMPRESS: NORMAL
BH CV LOWER VASCULAR LEFT PROXIMAL FEMORAL COMPRESS: NORMAL
BH CV LOWER VASCULAR LEFT SAPHENOFEMORAL JUNCTION COMPRESS: NORMAL
BH CV LOWER VASCULAR RIGHT COMMON FEMORAL AUGMENT: NORMAL
BH CV LOWER VASCULAR RIGHT COMMON FEMORAL COMPETENT: NORMAL
BH CV LOWER VASCULAR RIGHT COMMON FEMORAL COMPRESS: NORMAL
BH CV LOWER VASCULAR RIGHT COMMON FEMORAL PHASIC: NORMAL
BH CV LOWER VASCULAR RIGHT COMMON FEMORAL SPONT: NORMAL
BH CV LOWER VASCULAR RIGHT DISTAL FEMORAL COMPRESS: NORMAL
BH CV LOWER VASCULAR RIGHT GASTRONEMIUS COMPRESS: NORMAL
BH CV LOWER VASCULAR RIGHT GASTRONEMIUS THROMBUS: NORMAL
BH CV LOWER VASCULAR RIGHT GREATER SAPH AK COMPRESS: NORMAL
BH CV LOWER VASCULAR RIGHT GREATER SAPH AK THROMBUS: NORMAL
BH CV LOWER VASCULAR RIGHT GREATER SAPH BK COMPRESS: NORMAL
BH CV LOWER VASCULAR RIGHT LESSER SAPH COMPRESS: NORMAL
BH CV LOWER VASCULAR RIGHT MID FEMORAL AUGMENT: NORMAL
BH CV LOWER VASCULAR RIGHT MID FEMORAL COMPETENT: NORMAL
BH CV LOWER VASCULAR RIGHT MID FEMORAL COMPRESS: NORMAL
BH CV LOWER VASCULAR RIGHT MID FEMORAL PHASIC: NORMAL
BH CV LOWER VASCULAR RIGHT MID FEMORAL SPONT: NORMAL
BH CV LOWER VASCULAR RIGHT PERONEAL COMPRESS: NORMAL
BH CV LOWER VASCULAR RIGHT POPLITEAL AUGMENT: NORMAL
BH CV LOWER VASCULAR RIGHT POPLITEAL COMPETENT: NORMAL
BH CV LOWER VASCULAR RIGHT POPLITEAL COMPRESS: NORMAL
BH CV LOWER VASCULAR RIGHT POPLITEAL PHASIC: NORMAL
BH CV LOWER VASCULAR RIGHT POPLITEAL SPONT: NORMAL
BH CV LOWER VASCULAR RIGHT POSTERIOR TIBIAL COMPRESS: NORMAL
BH CV LOWER VASCULAR RIGHT PROFUNDA FEMORAL COMPRESS: NORMAL
BH CV LOWER VASCULAR RIGHT PROXIMAL FEMORAL COMPRESS: NORMAL
BH CV LOWER VASCULAR RIGHT SAPHENOFEMORAL JUNCTION COMPRESS: NORMAL
BILIRUB SERPL-MCNC: 0.3 MG/DL (ref 0–1.2)
BUN SERPL-MCNC: 11 MG/DL (ref 8–23)
BUN/CREAT SERPL: 12 (ref 7–25)
CALCIUM SPEC-SCNC: 8.5 MG/DL (ref 8.6–10.5)
CHLORIDE SERPL-SCNC: 112 MMOL/L (ref 98–107)
CHOLEST SERPL-MCNC: 106 MG/DL (ref 0–200)
CO2 SERPL-SCNC: 21 MMOL/L (ref 22–29)
CREAT SERPL-MCNC: 0.92 MG/DL (ref 0.57–1)
DEPRECATED RDW RBC AUTO: 41.7 FL (ref 37–54)
DEPRECATED RDW RBC AUTO: 42.6 FL (ref 37–54)
EOSINOPHIL # BLD AUTO: 0.05 10*3/MM3 (ref 0–0.4)
EOSINOPHIL NFR BLD AUTO: 0.5 % (ref 0.3–6.2)
ERYTHROCYTE [DISTWIDTH] IN BLOOD BY AUTOMATED COUNT: 12.8 % (ref 12.3–15.4)
ERYTHROCYTE [DISTWIDTH] IN BLOOD BY AUTOMATED COUNT: 12.9 % (ref 12.3–15.4)
GFR SERPL CREATININE-BSD FRML MDRD: 60 ML/MIN/1.73
GLOBULIN UR ELPH-MCNC: 2.4 GM/DL
GLUCOSE BLDC GLUCOMTR-MCNC: 128 MG/DL (ref 70–130)
GLUCOSE BLDC GLUCOMTR-MCNC: 131 MG/DL (ref 70–130)
GLUCOSE SERPL-MCNC: 129 MG/DL (ref 65–99)
HBA1C MFR BLD: 7.9 % (ref 4.8–5.6)
HCT VFR BLD AUTO: 31.8 % (ref 34–46.6)
HCT VFR BLD AUTO: 32 % (ref 34–46.6)
HDLC SERPL-MCNC: 34 MG/DL (ref 40–60)
HGB BLD-MCNC: 10.7 G/DL (ref 12–15.9)
HGB BLD-MCNC: 10.7 G/DL (ref 12–15.9)
IMM GRANULOCYTES # BLD AUTO: 0.04 10*3/MM3 (ref 0–0.05)
IMM GRANULOCYTES NFR BLD AUTO: 0.4 % (ref 0–0.5)
LDLC SERPL CALC-MCNC: 54 MG/DL (ref 0–100)
LDLC/HDLC SERPL: 1.59 {RATIO}
LV EF 2D ECHO EST: 60 %
LYMPHOCYTES # BLD AUTO: 2.07 10*3/MM3 (ref 0.7–3.1)
LYMPHOCYTES NFR BLD AUTO: 19.4 % (ref 19.6–45.3)
MCH RBC QN AUTO: 29.6 PG (ref 26.6–33)
MCH RBC QN AUTO: 29.8 PG (ref 26.6–33)
MCHC RBC AUTO-ENTMCNC: 33.4 G/DL (ref 31.5–35.7)
MCHC RBC AUTO-ENTMCNC: 33.6 G/DL (ref 31.5–35.7)
MCV RBC AUTO: 88.6 FL (ref 79–97)
MCV RBC AUTO: 88.6 FL (ref 79–97)
MONOCYTES # BLD AUTO: 0.84 10*3/MM3 (ref 0.1–0.9)
MONOCYTES NFR BLD AUTO: 7.9 % (ref 5–12)
NEUTROPHILS NFR BLD AUTO: 7.63 10*3/MM3 (ref 1.7–7)
NEUTROPHILS NFR BLD AUTO: 71.5 % (ref 42.7–76)
NRBC BLD AUTO-RTO: 0 /100 WBC (ref 0–0.2)
PLATELET # BLD AUTO: 172 10*3/MM3 (ref 140–450)
PLATELET # BLD AUTO: 180 10*3/MM3 (ref 140–450)
PMV BLD AUTO: 10.1 FL (ref 6–12)
PMV BLD AUTO: 10.1 FL (ref 6–12)
POTASSIUM SERPL-SCNC: 3.8 MMOL/L (ref 3.5–5.2)
PROT SERPL-MCNC: 5.7 G/DL (ref 6–8.5)
RBC # BLD AUTO: 3.59 10*6/MM3 (ref 3.77–5.28)
RBC # BLD AUTO: 3.61 10*6/MM3 (ref 3.77–5.28)
SODIUM SERPL-SCNC: 141 MMOL/L (ref 136–145)
TRIGL SERPL-MCNC: 90 MG/DL (ref 0–150)
VLDLC SERPL-MCNC: 18 MG/DL (ref 5–40)
WBC # BLD AUTO: 10.66 10*3/MM3 (ref 3.4–10.8)
WBC # BLD AUTO: 11 10*3/MM3 (ref 3.4–10.8)

## 2020-08-20 PROCEDURE — 82962 GLUCOSE BLOOD TEST: CPT

## 2020-08-20 PROCEDURE — 25010000002 FENTANYL CITRATE (PF) 100 MCG/2ML SOLUTION: Performed by: INTERNAL MEDICINE

## 2020-08-20 PROCEDURE — 25010000002 ONDANSETRON PER 1 MG: Performed by: ANESTHESIOLOGY

## 2020-08-20 PROCEDURE — 25010000002 ENOXAPARIN PER 10 MG: Performed by: PSYCHIATRY & NEUROLOGY

## 2020-08-20 PROCEDURE — 97162 PT EVAL MOD COMPLEX 30 MIN: CPT

## 2020-08-20 PROCEDURE — 93320 DOPPLER ECHO COMPLETE: CPT | Performed by: INTERNAL MEDICINE

## 2020-08-20 PROCEDURE — 92610 EVALUATE SWALLOWING FUNCTION: CPT | Performed by: SPEECH-LANGUAGE PATHOLOGIST

## 2020-08-20 PROCEDURE — 97530 THERAPEUTIC ACTIVITIES: CPT

## 2020-08-20 PROCEDURE — 80061 LIPID PANEL: CPT | Performed by: NEUROLOGICAL SURGERY

## 2020-08-20 PROCEDURE — 25010000002 MIDAZOLAM PER 1 MG: Performed by: INTERNAL MEDICINE

## 2020-08-20 PROCEDURE — 80053 COMPREHEN METABOLIC PANEL: CPT | Performed by: NEUROLOGICAL SURGERY

## 2020-08-20 PROCEDURE — 85027 COMPLETE CBC AUTOMATED: CPT | Performed by: NEUROLOGICAL SURGERY

## 2020-08-20 PROCEDURE — 99232 SBSQ HOSP IP/OBS MODERATE 35: CPT | Performed by: PSYCHIATRY & NEUROLOGY

## 2020-08-20 PROCEDURE — 93970 EXTREMITY STUDY: CPT

## 2020-08-20 PROCEDURE — 70450 CT HEAD/BRAIN W/O DYE: CPT

## 2020-08-20 PROCEDURE — B24BZZ4 ULTRASONOGRAPHY OF HEART WITH AORTA, TRANSESOPHAGEAL: ICD-10-PCS | Performed by: INTERNAL MEDICINE

## 2020-08-20 PROCEDURE — 93325 DOPPLER ECHO COLOR FLOW MAPG: CPT | Performed by: INTERNAL MEDICINE

## 2020-08-20 PROCEDURE — 85025 COMPLETE CBC W/AUTO DIFF WBC: CPT | Performed by: EMERGENCY MEDICINE

## 2020-08-20 PROCEDURE — 93312 ECHO TRANSESOPHAGEAL: CPT | Performed by: INTERNAL MEDICINE

## 2020-08-20 PROCEDURE — 83036 HEMOGLOBIN GLYCOSYLATED A1C: CPT | Performed by: NEUROLOGICAL SURGERY

## 2020-08-20 PROCEDURE — 25010000002 DIPHENHYDRAMINE PER 50 MG: Performed by: INTERNAL MEDICINE

## 2020-08-20 PROCEDURE — 99232 SBSQ HOSP IP/OBS MODERATE 35: CPT | Performed by: NURSE PRACTITIONER

## 2020-08-20 RX ORDER — SENNA PLUS 8.6 MG/1
1 TABLET ORAL NIGHTLY
Status: DISCONTINUED | OUTPATIENT
Start: 2020-08-20 | End: 2020-08-25 | Stop reason: HOSPADM

## 2020-08-20 RX ORDER — LISINOPRIL 20 MG/1
20 TABLET ORAL DAILY
Status: DISCONTINUED | OUTPATIENT
Start: 2020-08-20 | End: 2020-08-25 | Stop reason: HOSPADM

## 2020-08-20 RX ORDER — AMLODIPINE BESYLATE 5 MG/1
5 TABLET ORAL DAILY
Status: DISCONTINUED | OUTPATIENT
Start: 2020-08-20 | End: 2020-08-25 | Stop reason: HOSPADM

## 2020-08-20 RX ORDER — BUDESONIDE AND FORMOTEROL FUMARATE DIHYDRATE 160; 4.5 UG/1; UG/1
2 AEROSOL RESPIRATORY (INHALATION) 2 TIMES DAILY
Status: DISCONTINUED | OUTPATIENT
Start: 2020-08-20 | End: 2020-08-25 | Stop reason: HOSPADM

## 2020-08-20 RX ORDER — GABAPENTIN 400 MG/1
400 CAPSULE ORAL EVERY 12 HOURS SCHEDULED
Status: DISCONTINUED | OUTPATIENT
Start: 2020-08-20 | End: 2020-08-25 | Stop reason: HOSPADM

## 2020-08-20 RX ORDER — METOPROLOL TARTRATE 50 MG/1
50 TABLET, FILM COATED ORAL 2 TIMES DAILY
Status: DISCONTINUED | OUTPATIENT
Start: 2020-08-20 | End: 2020-08-25 | Stop reason: HOSPADM

## 2020-08-20 RX ORDER — DIPHENHYDRAMINE HYDROCHLORIDE 50 MG/ML
50 INJECTION INTRAMUSCULAR; INTRAVENOUS ONCE
Status: COMPLETED | OUTPATIENT
Start: 2020-08-20 | End: 2020-08-20

## 2020-08-20 RX ORDER — CYCLOBENZAPRINE HCL 10 MG
10 TABLET ORAL 3 TIMES DAILY PRN
Status: DISCONTINUED | OUTPATIENT
Start: 2020-08-20 | End: 2020-08-25 | Stop reason: HOSPADM

## 2020-08-20 RX ORDER — MIDAZOLAM HYDROCHLORIDE 5 MG/ML
4 INJECTION INTRAMUSCULAR; INTRAVENOUS ONCE
Status: COMPLETED | OUTPATIENT
Start: 2020-08-20 | End: 2020-08-20

## 2020-08-20 RX ORDER — PANTOPRAZOLE SODIUM 40 MG/1
40 TABLET, DELAYED RELEASE ORAL EVERY MORNING
Status: DISCONTINUED | OUTPATIENT
Start: 2020-08-21 | End: 2020-08-25 | Stop reason: HOSPADM

## 2020-08-20 RX ORDER — CITALOPRAM 20 MG/1
20 TABLET ORAL DAILY
Status: DISCONTINUED | OUTPATIENT
Start: 2020-08-20 | End: 2020-08-25 | Stop reason: HOSPADM

## 2020-08-20 RX ORDER — ARIPIPRAZOLE 5 MG/1
5 TABLET ORAL DAILY
Status: DISCONTINUED | OUTPATIENT
Start: 2020-08-20 | End: 2020-08-25 | Stop reason: HOSPADM

## 2020-08-20 RX ORDER — FENTANYL CITRATE 50 UG/ML
100 INJECTION, SOLUTION INTRAMUSCULAR; INTRAVENOUS ONCE
Status: COMPLETED | OUTPATIENT
Start: 2020-08-20 | End: 2020-08-20

## 2020-08-20 RX ADMIN — FENTANYL CITRATE 100 MCG: 50 INJECTION, SOLUTION INTRAMUSCULAR; INTRAVENOUS at 12:27

## 2020-08-20 RX ADMIN — GABAPENTIN 400 MG: 400 CAPSULE ORAL at 20:01

## 2020-08-20 RX ADMIN — LISINOPRIL 20 MG: 20 TABLET ORAL at 15:40

## 2020-08-20 RX ADMIN — DIPHENHYDRAMINE HYDROCHLORIDE 50 MG: 50 INJECTION, SOLUTION INTRAMUSCULAR; INTRAVENOUS at 12:27

## 2020-08-20 RX ADMIN — MIDAZOLAM 4 MG: 5 INJECTION INTRAMUSCULAR; INTRAVENOUS at 12:27

## 2020-08-20 RX ADMIN — CARBIDOPA AND LEVODOPA 1 TABLET: 10; 100 TABLET ORAL at 15:40

## 2020-08-20 RX ADMIN — ASPIRIN 300 MG: 300 SUPPOSITORY RECTAL at 11:53

## 2020-08-20 RX ADMIN — CARBIDOPA AND LEVODOPA 1 TABLET: 10; 100 TABLET ORAL at 20:01

## 2020-08-20 RX ADMIN — ENOXAPARIN SODIUM 90 MG: 100 INJECTION SUBCUTANEOUS at 19:59

## 2020-08-20 RX ADMIN — AMLODIPINE BESYLATE 5 MG: 5 TABLET ORAL at 15:40

## 2020-08-20 RX ADMIN — METOPROLOL TARTRATE 50 MG: 50 TABLET, FILM COATED ORAL at 20:01

## 2020-08-20 RX ADMIN — GABAPENTIN 400 MG: 400 CAPSULE ORAL at 15:40

## 2020-08-20 RX ADMIN — SENNOSIDES 1 TABLET: 8.6 TABLET, FILM COATED ORAL at 20:01

## 2020-08-20 RX ADMIN — SODIUM CHLORIDE, PRESERVATIVE FREE 10 ML: 5 INJECTION INTRAVENOUS at 12:52

## 2020-08-20 RX ADMIN — ARIPIPRAZOLE 5 MG: 5 TABLET ORAL at 15:40

## 2020-08-20 RX ADMIN — SODIUM CHLORIDE, PRESERVATIVE FREE 10 ML: 5 INJECTION INTRAVENOUS at 20:01

## 2020-08-20 RX ADMIN — CITALOPRAM 20 MG: 20 TABLET, FILM COATED ORAL at 15:40

## 2020-08-20 RX ADMIN — ONDANSETRON 4 MG: 2 INJECTION INTRAMUSCULAR; INTRAVENOUS at 01:46

## 2020-08-20 NOTE — PROGRESS NOTES
"Adult Nutrition  Assessment/PES    Patient Name:  Dalila Valerio  YOB: 1950  MRN: [id number]  Admit Date:  8/19/2020    Assessment Date:  8/20/2020    Comments:  Nutrition screen completed for Олег Baumann. Pt s/p CVA, speech to evaluate for po. BMI 34kg/m2. Discussed care in ICU rounds. Will follow for advance of diet.    Reason for Assessment     Row Name 08/20/20 1042          Reason for Assessment    Reason For Assessment  identified at risk by screening criteria     Diagnosis  -- CVA, CAD, DM, obesity         Nutrition/Diet History     Row Name 08/20/20 1043          Nutrition/Diet History    Typical Food/Fluid Intake  speech eval scheduled today         Anthropometrics     Row Name 08/20/20 1043          Anthropometrics    Height  157.5 cm (62.01\")     Weight  85.5 kg (188 lb 7.9 oz) not weighed by RD        Ideal Body Weight (IBW)    Ideal Body Weight (IBW) (kg)  50.45     % Ideal Body Weight  169.47        Body Mass Index (BMI)    BMI (kg/m2)  34.54     BMI Assessment  BMI 30-34.9: obesity grade I         Labs/Tests/Procedures/Meds     Row Name 08/20/20 1044          Labs/Procedures/Meds    Lab Results Reviewed  reviewed     Lab Results Comments  glu, A1C, alb        Diagnostic Tests/Procedures    Diagnostic Test/Procedure Reviewed  reviewed        Medications    Pertinent Medications Reviewed  reviewed     Pertinent Medications Comments  NaCl at 100cc/hr,         Physical Findings     Row Name 08/20/20 1045          Physical Findings    Overall Physical Appearance  obese     Skin  -- олег 12         Estimated/Assessed Needs     Row Name 08/20/20 1043          Calculation Measurements    Height  157.5 cm (62.01\")         Nutrition Prescription Ordered     Row Name 08/20/20 1045          Nutrition Prescription PO    Current PO Diet  NPO                 Problem/Interventions:  Problem 1     Row Name 08/20/20 1045          Nutrition Diagnoses Problem 1    Problem 1  Nutrition Appropriate for " Condition at this Time     Etiology (related to)  Medical Diagnosis     Neurological  CVA     Signs/Symptoms (evidenced by)  NPO;SLP/Swallow eval     Swallow eval status  Pending               Intervention Goal     Row Name 08/20/20 1045          Intervention Goal    General  Maintain nutrition     PO  Initiate feeding     Weight  Appropriate weight loss         Nutrition Intervention     Row Name 08/20/20 1046          Nutrition Intervention    RD/Tech Action  Follow Tx progress;Care plan reviewd           Education/Evaluation     Row Name 08/20/20 1046          Education    Education  Will Instruct as appropriate        Monitor/Evaluation    Monitor  Per protocol           Electronically signed by:  María Morales RD  08/20/20 10:47

## 2020-08-20 NOTE — PROGRESS NOTES
Discharge Planning Assessment  [facility]     Patient Name: Dalila Valerio  MRN: [id number]  Today's Date: 8/20/2020    Admit Date: 8/19/2020    Discharge Needs Assessment     Row Name 08/20/20 1346       Living Environment    Lives With  spouse    Current Living Arrangements  home/apartment/condo    Potentially Unsafe Housing Conditions  other (see comments) no concerns     Primary Care Provided by  self    Provides Primary Care For  no one    Family Caregiver if Needed  spouse    Quality of Family Relationships  helpful;involved;supportive       Resource/Environmental Concerns    Transportation Concerns  car, none       Transition Planning    Patient/Family Anticipated Services at Transition  home health care;skilled nursing    Transportation Anticipated  family or friend will provide       Discharge Needs Assessment    Readmission Within the Last 30 Days  no previous admission in last 30 days    Concerns to be Addressed  care coordination/care conferences    Equipment Currently Used at Home  shower chair;grab bar    Outpatient/Agency/Support Group Needs  homecare agency;skilled nursing facility    Discharge Facility/Level of Care Needs  home with home health;nursing facility, skilled        Discharge Plan     Row Name 08/20/20 5041       Plan    Plan  Follow for PT/OT vivienne     Patient/Family in Agreement with Plan  yes    Plan Comments  CCP met with patient at bedside. CCP role explained and face sheet verified. IMM given. Patient lives with her , with 6-7 steps to the entrance of her home (handrail present). Patient's bedroom and bathroom are on the main level. Patient does not have any DME at home and is independent with her ADLs. Patient has grab bars and shower chair present in her bathroom. Patient has not used home health or been to SNF. Patient's preferred pharmacy is [facility] [address]. Patient unsure of discharge needs. CCP will follow for PT/OT justenals and provided HH/SNF list for patient.  Jessi OSCAR          Destination      Coordination has not been started for this encounter.      Durable Medical Equipment      Coordination has not been started for this encounter.      Dialysis/Infusion      Coordination has not been started for this encounter.      Home Medical Care      Coordination has not been started for this encounter.      Therapy      Coordination has not been started for this encounter.      Community Resources      Coordination has not been started for this encounter.          Demographic Summary     Row Name 08/20/20 1346       General Information    Admission Type  inpatient    Arrived From  emergency department    Required Notices Provided  Important Message from Medicare    Referral Source  admission list    Reason for Consult  discharge planning    Preferred Language  English        Functional Status     Row Name 08/20/20 1346       Functional Status    Usual Activity Tolerance  good       Functional Status, IADL    Medications  independent    Meal Preparation  independent    Housekeeping  independent    Laundry  independent    Shopping  independent        Psychosocial    No documentation.       Abuse/Neglect    No documentation.       Legal    No documentation.       Substance Abuse    No documentation.       Patient Forms    No documentation.           DAYNE Callejas

## 2020-08-20 NOTE — CONSULTS
Dalila Valerio   69 y.o.  female    LOS: 1 day   Patient Care Team:  Cinthya Valentine APRN as PCP - General  Cinthya Valentine APRN as PCP - Family Medicine  Cinthya Valentine APRN as PCP - Claims Attributed  Devyn Velazco MD as Consulting Physician (Pain Medicine)  Bogdan Olmedo MD as Surgeon (Orthopedic Surgery)  Pedro Pablo Lomas MD as Consulting Physician (Cardiology)      Subjective     Patient Complaints: Dizziness yesterday morning, patient does not remember much after that.    History of Present Illness: Patient was dizzy yesterday morning.  He was somewhat ataxic.  At 6:30 AM yesterday she apparently collapsed and was a phasic with a right hemiplegia.  She was brought to the emergency room.  Work-up revealed a distal basilar thrombus.  She received TPA in the emergency department and subsequently went to the operating room for angiogram which revealed that the basilar artery had recanalized after TPA.  No further intervention was required.  She was then sent to the ICU.  She has recovered neurologically.  She has very little memory of events yesterday.  Also her  of events prior to yesterday is not very good.  Neurology thought that the stroke was possibly embolic and wished her to have a GREGORY.  She has been followed in our office by Dr. Pedro Pablo Lomas.  She has coronary disease and a history of prior stents in the LAD and the right coronary artery system.  Last stent was in 2016.  Most recently she has been on lisinopril 75 mg daily for her coronary disease and stents.  May also still be on 81 mg aspirin but I need to confirm that.  She has a history of hypertension and hyperlipidemia.  I did not see a statin on the med list from her last visit in our office in November 2019.  She lists previous problems with statins.  She has a history of hypertension and has been on lisinopril 20 mg daily and metoprolol tartrate 50 mg twice a day.  She has a history of GERD and has been on omeprazole 40 mg daily.   History of COPD and she has been on some inhalers for that.  Said she stopped smoking 30 or 40 years ago.  She recently  a few months ago but I do not think she has legally changed her last name.     Review of Systems:   Patient not able to give me an adequate review of systems at this time    Medication Review:   Current Facility-Administered Medications:   •  aspirin tablet 325 mg, 325 mg, Oral, Daily **OR** aspirin suppository 300 mg, 300 mg, Rectal, Daily, Jonh Meehan MD  •  carbidopa-levodopa (SINEMET)  MG per tablet 1 tablet, 1 tablet, Oral, TID, Quang Elizabeth MD  •  diphenhydrAMINE (BENADRYL) capsule 25 mg, 25 mg, Oral, Q30 Min PRN, Tae Kathleen MD  •  diphenhydrAMINE (BENADRYL) injection 12.5 mg, 12.5 mg, Intravenous, Q15 Min PRN, Tae Kathleen MD  •  ePHEDrine injection 5 mg, 5 mg, Intravenous, Once PRN, Tae Kathleen MD  •  flumazenil (ROMAZICON) injection 0.2 mg, 0.2 mg, Intravenous, PRN, Tae Kathleen MD  •  hydrALAZINE (APRESOLINE) injection 5 mg, 5 mg, Intravenous, Q10 Min PRN, Tae Kathleen MD  •  HYDROcodone-acetaminophen (NORCO) 7.5-325 MG per tablet 1 tablet, 1 tablet, Oral, Once PRN, Tae Kathleen MD  •  HYDROmorphone (DILAUDID) injection 0.5 mg, 0.5 mg, Intravenous, Q5 Min PRN, Tae Kathleen MD  •  iodixanol (VISIPAQUE) 320 MG/ML injection 100 mL, 100 mL, Intra-arterial, Once in imaging, Jonh Meehan MD  •  labetalol (NORMODYNE,TRANDATE) injection 5 mg, 5 mg, Intravenous, Q5 Min PRN, Tae Kathleen MD  •  naloxone (NARCAN) injection 0.2 mg, 0.2 mg, Intravenous, PRN, Tae Kathleen MD  •  niCARdipine (CARDENE) 25 mg in 250 mL NS (0.1 mg/mL) infusion kit, 5-15 mg/hr, Intravenous, Titrated, Quang Elizabeth MD, Stopped at 08/20/20 0700  •  oxyCODONE-acetaminophen (PERCOCET) 7.5-325 MG per tablet 1 tablet, 1 tablet, Oral, Once PRN, Tae Kathleen MD  •  promethazine (PHENERGAN)  injection 6.25 mg, 6.25 mg, Intravenous, Q10 Min PRN **OR** promethazine (PHENERGAN) injection 12.5 mg, 12.5 mg, Intramuscular, Once PRN **OR** promethazine (PHENERGAN) suppository 25 mg, 25 mg, Rectal, Once PRN **OR** promethazine (PHENERGAN) tablet 25 mg, 25 mg, Oral, Once PRN, Tae Kathleen MD  •  sodium chloride 0.9 % bolus 1,000 mL, 1,000 mL, Intravenous, Once, Jonh Meehan MD  •  sodium chloride 0.9 % flush 10 mL, 10 mL, Intravenous, PRN, Jonh Meehan MD  •  sodium chloride 0.9 % flush 10 mL, 10 mL, Intravenous, Q12H, Jonh Meehan MD, 10 mL at 20  •  sodium chloride 0.9 % flush 10 mL, 10 mL, Intravenous, PRN, Jonh Meehan MD  •  sodium chloride 0.9 % infusion, 75 mL/hr, Intravenous, Continuous, Jonh Meehan MD, Last Rate: 75 mL/hr at 20, 75 mL/hr at 20  •  sodium chloride 0.9 % infusion, 100 mL/hr, Intravenous, Once, James Zamora MD      Family History   Problem Relation Age of Onset   • Colon cancer Mother    • Uterine cancer Mother    • Arthritis Mother    • Cancer Mother    • Heart disease Mother    • Migraines Mother    • Stroke Mother    • Cancer Father         malignant brain tumor   • Aneurysm Father    • Bone cancer Maternal Aunt    • Diabetes Paternal Grandmother    • Diabetes Paternal Grandfather    • Arthritis Maternal Grandmother    • Heart disease Maternal Grandmother    • Thyroid disease Maternal Grandmother      Social History     Socioeconomic History   • Marital status: Single     Spouse name: Not on file   • Number of children: Not on file   • Years of education: Not on file   • Highest education level: Not on file   Tobacco Use   • Smoking status: Former Smoker     Last attempt to quit: 1980     Years since quittin.6   • Smokeless tobacco: Never Used   Substance and Sexual Activity   • Alcohol use: No   • Drug use: No   • Sexual activity: Yes     Partners: Male     Objective   Past Surgical History:    Procedure Laterality Date   • APPENDECTOMY     • BRONCHOSCOPY     •  SECTION     • CHOLECYSTECTOMY     • COLONOSCOPY     • COLONOSCOPY N/A 2019    Procedure: COLONOSCOPY to cecum with cold biopsy and cold and hotsnare polypectomies;  Surgeon: Suzy Eubanks MD;  Location: [facility] ENDOSCOPY;  Service: Gastroenterology   • CORONARY ANGIOPLASTY WITH STENT PLACEMENT     • EMBOLECTOMY N/A 2020    Procedure: EMERGENT CEREBRAL ANGIOGRAM;  Surgeon: Jonh Meehan MD;  Location: [facility] OR ;  Service: Neurosurgery;  Laterality: N/A;   • ENDOSCOPY N/A 2019    Procedure: ESOPHAGOGASTRODUODENOSCOPY with cold biopsies;  Surgeon: Suzy Eubanks MD;  Location: [facility] ENDOSCOPY;  Service: Gastroenterology   • KNEE ARTHROSCOPY     • OTHER SURGICAL HISTORY      elbow surgery   • ROTATOR CUFF REPAIR     • TUBAL ABDOMINAL LIGATION       Past Medical History:   Diagnosis Date   • Abnormal weight gain    • Acquired trigger finger    • Acute myocardial infarction (CMS/HCC)    • Adjustment reaction with mixed emotional features    • Arthritis    • ASHD (arteriosclerotic heart disease)    • Asthma    • B12 deficiency    • Bacterial pneumonia    • Williamson esophagus    • Barretts esophagus    • Benign colonic polyp    • Bilateral knee pain    • Birthmark     haemangioma of the bone   • Bronchiectasis (CMS/HCC)    • CHF (congestive heart failure) (CMS/HCC)    • Chronic cough    • Chronic glomerulonephritis with exudative nephritis    • Chronic lumbar radiculopathy    • Diabetes mellitus (CMS/HCC)    • Diabetic peripheral neuropathy (CMS/HCC)    • Dyslipidemia    • Fibromyositis    • GERD (gastroesophageal reflux disease)    • Herpes zoster    • Hyperlipidemia    • Hypertension    • Lupus anticoagulant disorder (CMS/HCC)    • Mycobacterium avium complex (CMS/HCC)    • Nephrolithiasis    • SILVIANO (obstructive sleep apnea)    • Palpitations    • Premature ventricular contraction    • Slow transit  constipation    • Stroke (CMS/HCC)    • Vitamin D deficiency        Vital Sign Min/Max for last 24 hours  Temp  Min: 98.4 °F (36.9 °C)  Max: 99.6 °F (37.6 °C)   BP  Min: 89/48  Max: 142/59    Pulse  Min: 76  Max: 104     Wt Readings from Last 3 Encounters:   08/19/20 85.5 kg (188 lb 7.9 oz)   07/02/20 86.2 kg (190 lb)   07/01/20 86.2 kg (190 lb)        Physical Exam:      General Appearance:    Alert, cooperative, in no acute distress   Head:    Normocephalic, without obvious abnormality, atraumatic   Eyes:            Conjunctivae normal, no   icterus   Neck:   No adenopathy, supple, trachea midline, no thyromegaly, no   carotid bruit, no JVD   Lungs:     Clear to auscultation,respirations regular, even and                  unlabored    Heart:    Regular rhythm and normal rate, normal S1 and S2,            No murmur, no gallop, no rub, no click   Chest Wall:    No abnormalities observed   Abdomen:     Normal bowel sounds, no masses, no organomegaly, soft        non-tender, non-distended, no guarding, no rebound                tenderness   Rectal:     Deferred   Extremities:   No edema. Moves all extremities well, no cyanosis, no erythema   Pulses:   Pulses palpable and equal bilaterally   Skin:   No bleeding, bruising or rash   Neurologic:  Memory poor for the events yesterday.  Also her memory for prior events is not very good.  Speech seems normal now.  Moving extremities well        Results Review:     I reviewed the patient's new clinical results.  Potassium   Date Value Ref Range Status   08/20/2020 3.8 3.5 - 5.2 mmol/L Final     Creatinine   Date Value Ref Range Status   08/20/2020 0.92 0.57 - 1.00 mg/dL Final     Troponin T   Date Value Ref Range Status   08/19/2020 <0.010 0.000 - 0.030 ng/mL Final      Echo EF Estimated  )No results found for: ECHOEFEST    Sodium Sodium   Date Value Ref Range Status   08/20/2020 141 136 - 145 mmol/L Final   08/19/2020 142 136 - 145 mmol/L Final      Potassium Potassium    Date Value Ref Range Status   08/20/2020 3.8 3.5 - 5.2 mmol/L Final   08/19/2020 3.2 (L) 3.5 - 5.2 mmol/L Final      Chloride Chloride   Date Value Ref Range Status   08/20/2020 112 (H) 98 - 107 mmol/L Final   08/19/2020 106 98 - 107 mmol/L Final      Bicarbonate No results found for: PLASMABICARB   BUN BUN   Date Value Ref Range Status   08/20/2020 11 8 - 23 mg/dL Final   08/19/2020 15 8 - 23 mg/dL Final      Creatinine Creatinine   Date Value Ref Range Status   08/20/2020 0.92 0.57 - 1.00 mg/dL Final   08/19/2020 1.26 (H) 0.57 - 1.00 mg/dL Final      Calcium Calcium   Date Value Ref Range Status   08/20/2020 8.5 (L) 8.6 - 10.5 mg/dL Final   08/19/2020 9.3 8.6 - 10.5 mg/dL Final      Magnesium No results found for: MG     Results from last 7 days   Lab Units 08/20/20  0439   WBC 10*3/mm3 11.00*  10.66   HEMOGLOBIN g/dL 10.7*  10.7*   HEMATOCRIT % 32.0*  31.8*   PLATELETS 10*3/mm3 180  172     Lab Results   Lab Value Date/Time    TROPONINT <0.010 08/19/2020 0738    TROPONINT <0.01 05/25/2015 1758    TROPONINT <0.01 03/31/2014 1050     Lab Results   Component Value Date    CHOL 106 08/20/2020     Lab Results   Component Value Date    HDL 34 (L) 08/20/2020    HDL 47 04/14/2020    HDL 36 (L) 12/10/2018     Lab Results   Component Value Date    LDL 54 08/20/2020    LDL 73 04/14/2020     (H) 12/10/2018     Lab Results   Component Value Date    TRIG 90 08/20/2020    TRIG 101 04/14/2020    TRIG 142 12/10/2018     No components found for: CHOLHDL       Assessment/ Plan      Acute CVA (cerebrovascular accident) (CMS/HCC)  She received TPA in the emergency room her angiogram showed recannulization of the basilar artery, no other intervention required.  Neurology felt that the stroke looks cardioembolic.  Coronary artery disease.  Previous stents to the LAD and right coronary artery.  Last stent was in 2016.  No documented history of atrial fibrillation in the prior office records   hypertension  Hyperlipidemia,  has had problems taking statins previously  History of possible TIA in the past  Obesity  COPD  History of lupus anticoagulant  Normal LVEF 60% in June 2019  EGD November 2019 showing some gastritis, history of GERD but no esophagitis  Plan #1 patient stable cardiac wise at this time.  Neurology wishes her to have a GREGORY and they cleared her neurologically to proceed with it today.  GREGORY scheduled for about 1215 today.  Discussed this with the patient and  and they are agreeable.  Discussed with the patient's nurse.  Shai Omalley.MD   08/20/20  10:31      Time: 47 min

## 2020-08-20 NOTE — SIGNIFICANT NOTE
Patient off the floor at CT, will f/u this afternoon for swallow evaluation    08/20/20 0916   Rehab Treatment   Discipline speech language pathologist

## 2020-08-20 NOTE — PROGRESS NOTES
"DOS: 2020  NAME: aDlila Valerio   : 1950  PCP: Cinthya Valentine APRN  Chief Complaint   Patient presents with   • right sided weakness   • Speech Problem       Chief complaint: Stroke  Subjective: No acute event overnight.  Generalized weakness improving.  Denies double vision but has some persistent disconjugate gaze.  Underwent GREGORY today which showed moderate sized PFO with some shunting.  No new neurologic symptoms.    Objective:  Vital signs: /58   Pulse 79   Temp 99.3 °F (37.4 °C) (Oral)   Resp 16   Ht 157.5 cm (62.01\")   Wt 85.5 kg (188 lb 7.9 oz) Comment: not weighed by RD  SpO2 100%   BMI 34.47 kg/m²    Gen: NAD, vitals reviewed  MS: oriented x3, recent/remote memory intact, normal attention/concentration, language intact, no neglect.  CN: Visual fields full, visual acuity grossly normal, PERRL, subtle left internuclear ophthalmoplegia, no facial droop, no dysarthria  Motor: 5/5 throughout upper and lower extremities, normal tone  Sensory: intact to light touch all 4 ext.    ROS:  No weakness, numbness  No fevers, chills      Laboratory results:  Lab Results   Component Value Date    GLUCOSE 129 (H) 2020    CALCIUM 8.5 (L) 2020     2020    K 3.8 2020    CO2 21.0 (L) 2020     (H) 2020    BUN 11 2020    CREATININE 0.92 2020    EGFRIFAFRI 48 (L) 2020    EGFRIFNONA 60 (L) 2020    BCR 12.0 2020    ANIONGAP 8.0 2020     Lab Results   Component Value Date    WBC 10.66 2020    WBC 11.00 (H) 2020    HGB 10.7 (L) 2020    HGB 10.7 (L) 2020    HCT 31.8 (L) 2020    HCT 32.0 (L) 2020    MCV 88.6 2020    MCV 88.6 2020     2020     2020     Lab Results   Component Value Date    LDL 54 2020    LDL 73 2020     (H) 12/10/2018     @[email or web address]@     Review of labs: GFR 60, WBC 11, hemoglobin 10.7, platelets 180, LDL 54    Review " and interpretation of imaging: I personally reviewed her brain MRI which shows patchy left posterior cerebral artery infarcts with no hemorrhagic conversion.  Radiology report reviewed.  Follow-up post TPA CT head showed no hemorrhage.    Workup to date: Distal basilar occlusion, embolic, successfully recanalized with IV TPA    CTA 8/19: Large thrombus in the distal basilar artery extending into the bilateral PCAs  CTP 8/19: Large area of tissue at risk in the posterior circulation  Angiogram 8/19: Recanalization of basilar artery and bilateral PCAs  MRI 8/19: ill-defined acute infarct in the left temporal occipital region, no hemorrhage  GREGORY 8/20: EF 60%, borderline dilated left atrium, moderate size PFO with bidirectional shunt    Diagnoses:  Stroke, basilar artery, embolic  Status post TPA in the emergency department  PFO    Comment: GREGORY with moderate sized PFO which could be a potential etiology.  The atrium was also somewhat dilated which raises the possibility of atrial fibrillation.  Will discuss with cardiology regarding whether we should consider PFO closure and/or prolonged cardiac monitoring. I will check LE dopplers to evaluate for DVT.    Plan:  1.  daily for now  2. No statin due to allergy  3. Will discuss with cards PFO closure and/or prolonged heart monitoring  4. q4 neuro checks, downgrade to floor    Addendum: called regarding prelim LE doppler result, positive for DVT. Will go ahead and start Lovenox 1mg/kg. D/w Dr. Omalley, this certainly suggests that the PFO is symptomatic. I will go ahead and test for antiphospholipid antibodies as well given her previous reported history. If she has antiphospholipid antibody syndrome she will probably require long term AC anyway and there wouldn't be much indication for PFO closure. If her antiphospholipid panel is negative I think PFO closure should be seriously considered.    With her current infarct volume risk of clinically significant hemorrhagic  conversion is low. Will check an AM follow up CT to monitor.

## 2020-08-20 NOTE — PROCEDURES
· Estimated EF = 60%.  · Left ventricular systolic function is normal.  · Left atrial cavity size is borderline dilated.  · Mild mitral valve regurgitation is present  · Mild tricuspid valve regurgitation is present.  · Mild pulmonic valve regurgitation is present.     GREGORY done bedside, no complications    4 mg versed  100 mcg fentanyl  50 mg benadryl  30 mins sedation time    LV EF 55-60%  RV normal size and systolic function  Mild MR  Mild TR  No MAG thrombus  No significant aortic plaque  Medium size PFO with bi-directional shunt (color doppler L-R shunt, but several bubbles crossed over indicating R-L shunt)    Clinical correlation recommended

## 2020-08-20 NOTE — PLAN OF CARE
Problem: Patient Care Overview  Goal: Plan of Care Review  Outcome: Ongoing (interventions implemented as appropriate)  Flowsheets (Taken 8/20/2020 1842)  Plan of Care Reviewed With: patient  Outcome Summary: SLP completed bedside swallow evaluation recommend regular solids with thin liquids meds whole with thins and small bites and sips

## 2020-08-20 NOTE — THERAPY EVALUATION
Acute Care - Speech Language Pathology   Swallow Initial Evaluation [facility]     Patient Name: Dalila Valerio  : 1950  MRN: [id number]  Today's Date: 2020               Admit Date: 2020    Visit Dx:     ICD-10-CM ICD-9-CM   1. Acute CVA (cerebrovascular accident) (CMS/HCC) I63.9 434.91     Patient Active Problem List   Diagnosis   • Adjustment disorder with mixed anxiety and depressed mood   • Atopic rhinitis   • Coronary arteriosclerosis in native artery   • Cobalamin deficiency   • Benign colonic polyp   • Lumbar radiculopathy   • Controlled diabetes mellitus type 2 with complications (CMS/HCC)   • Binocular vision disorder with diplopia   • Dyslipidemia   • Arthropathy of hand   • Knee pain   • Cramps of lower extremity   • Low back pain   • Migraine with typical aura   • Chronic nausea   • Obstructive sleep apnea syndrome   • Palpitations   • Ventricular premature beats   • Cephalalgia   • Colonic constipation   • Neurocardiogenic syncope   • Vitamin D deficiency   • DODSON (nonalcoholic steatohepatitis)   • Fibromyalgia   • Morbidly obese (CMS/HCC)   • Polyp of colon   • Family history of colonic polyps   • Family history of malignant neoplasm of colon   • Acute CVA (cerebrovascular accident) (CMS/HCC)     Past Medical History:   Diagnosis Date   • Abnormal weight gain    • Acquired trigger finger    • Acute myocardial infarction (CMS/HCC)    • Adjustment reaction with mixed emotional features    • Arthritis    • ASHD (arteriosclerotic heart disease)    • Asthma    • B12 deficiency    • Bacterial pneumonia    • Williamson esophagus    • Barretts esophagus    • Benign colonic polyp    • Bilateral knee pain    • Birthmark     haemangioma of the bone   • Bronchiectasis (CMS/HCC)    • CHF (congestive heart failure) (CMS/HCC)    • Chronic cough    • Chronic glomerulonephritis with exudative nephritis    • Chronic lumbar radiculopathy    • Diabetes mellitus (CMS/HCC)    • Diabetic peripheral  neuropathy (CMS/HCC)    • Dyslipidemia    • Fibromyositis    • GERD (gastroesophageal reflux disease)    • Herpes zoster    • Hyperlipidemia    • Hypertension    • Lupus anticoagulant disorder (CMS/HCC)    • Mycobacterium avium complex (CMS/HCC)    • Nephrolithiasis    • SILVIANO (obstructive sleep apnea)    • Palpitations    • Premature ventricular contraction    • Slow transit constipation    • Stroke (CMS/HCC)    • Vitamin D deficiency      Past Surgical History:   Procedure Laterality Date   • APPENDECTOMY     • BRONCHOSCOPY     •  SECTION     • CHOLECYSTECTOMY     • COLONOSCOPY     • COLONOSCOPY N/A 2019    Procedure: COLONOSCOPY to cecum with cold biopsy and cold and hotsnare polypectomies;  Surgeon: Suzy Eubanks MD;  Location: [facility] ENDOSCOPY;  Service: Gastroenterology   • CORONARY ANGIOPLASTY WITH STENT PLACEMENT     • EMBOLECTOMY N/A 2020    Procedure: EMERGENT CEREBRAL ANGIOGRAM;  Surgeon: Jnoh Meehan MD;  Location: [facility] OR ;  Service: Neurosurgery;  Laterality: N/A;   • ENDOSCOPY N/A 2019    Procedure: ESOPHAGOGASTRODUODENOSCOPY with cold biopsies;  Surgeon: Suzy Eubanks MD;  Location: [facility] ENDOSCOPY;  Service: Gastroenterology   • KNEE ARTHROSCOPY     • OTHER SURGICAL HISTORY      elbow surgery   • ROTATOR CUFF REPAIR     • TUBAL ABDOMINAL LIGATION          SWALLOW EVALUATION (last 72 hours)      SLP Adult Swallow Evaluation     Row Name 20 1520                   Rehab Evaluation    Document Type  evaluation  -KA        Subjective Information  no complaints  -KA        Patient Observations  alert;cooperative  -KA        Patient Effort  good  -KA        Symptoms Noted During/After Treatment  none  -KA           General Information    Patient Profile Reviewed  yes  -KA        Pertinent History Of Current Problem  left temporoccipital lobe region CVA,  s/p thrombectomy and TIA, recent hx of concussion   -KA        Current Method of Nutrition   NPO  -KA        Precautions/Limitations, Vision  WFL;for purposes of eval  -KA        Precautions/Limitations, Hearing  WFL;for purposes of eval  -KA        Prior Level of Function-Communication  WFL  -KA        Prior Level of Function-Swallowing  no diet consistency restrictions;safe, efficient swallowing in all situations  -KA        Plans/Goals Discussed with  patient and family;agreed upon  -KA        Barriers to Rehab  none identified  -KA        Patient's Goals for Discharge  return to PO diet  -KA           Oral Motor and Function    Dentition Assessment  natural, present and adequate  -KA        Secretion Management  WNL/WFL  -KA        Mucosal Quality  moist, healthy  -KA        Volitional Swallow  WFL  -KA        Volitional Cough  WFL  -KA           Oral Musculature and Cranial Nerve Assessment    Oral Motor General Assessment  oral labial or buccal impairment  -KA        Oral Labial or Buccal Impairment, Detail, Cranial Nerve VII (Facial):  other (see comments);right labial droop very minimal   -KA           General Eating/Swallowing Observations    Respiratory Support Currently in Use  room air  -KA        Eating/Swallowing Skills  self-fed  -KA        Positioning During Eating  upright in chair  -KA        Utensils Used  spoon;cup;straw  -KA        Consistencies Trialed  regular textures;soft textures;chopped;thin liquids;pureed mixed  -KA           Clinical Swallow Eval    Oral Prep Phase  WFL  -KA        Oral Transit  WFL  -KA        Oral Residue  WFL  -KA        Pharyngeal Phase  no overt signs/symptoms of pharyngeal impairment  -KA        Clinical Swallow Evaluation Summary  Swallow appears WNL no overt s/s  of pen/asp with all tested consistencies, adequate mastication, timely swallow initiation and laryngeal elevation felt adequate upon neck palpation   -KA           Clinical Impression    SLP Swallowing Diagnosis  functional oral phase;functional pharyngeal phase  -KA        Functional Impact   risk of aspiration/pneumonia  -        Rehab Potential/Prognosis, Swallowing  good, to achieve stated therapy goals  -        Swallow Criteria for Skilled Therapeutic Interventions Met  demonstrates skilled criteria  -           Recommendations    Therapy Frequency (Swallow)  PRN  -KA        Predicted Duration Therapy Intervention (Days)  until discharge  -        SLP Diet Recommendation  thin liquids;regular textures  -        Recommended Precautions and Strategies  upright posture during/after eating;small bites of food and sips of liquid  -KA        SLP Rec. for Method of Medication Administration  meds whole;with thin liquids;as tolerated  -        Monitor for Signs of Aspiration  yes;notify SLP if any concerns  -KA        Anticipated Dischage Disposition (SLP)  anticipate therapy at next level of care;other (see comments) possibly for aphasia pending SLe  -           Swallow Goals (SLP)    Oral Nutrition/Hydration Goal Selection (SLP)  oral nutrition/hydration, SLP goal 1  -          User Key  (r) = Recorded By, (t) = Taken By, (c) = Cosigned By    Initials Name Effective Dates    Lauri Loyola MA,[facility]-SLP 06/08/18 -           EDUCATION  The patient has been educated in the following areas:   Dysphagia (Swallowing Impairment).    SLP Recommendation and Plan  SLP Swallowing Diagnosis: functional oral phase, functional pharyngeal phase  SLP Diet Recommendation: thin liquids, regular textures  Recommended Precautions and Strategies: upright posture during/after eating, small bites of food and sips of liquid  SLP Rec. for Method of Medication Administration: meds whole, with thin liquids, as tolerated     Monitor for Signs of Aspiration: yes, notify SLP if any concerns     Swallow Criteria for Skilled Therapeutic Interventions Met: demonstrates skilled criteria  Anticipated Dischage Disposition (SLP): anticipate therapy at next level of care, other (see comments)(possibly for aphasia pending  SLe)  Rehab Potential/Prognosis, Swallowing: good, to achieve stated therapy goals  Therapy Frequency (Swallow): PRN  Predicted Duration Therapy Intervention (Days): until discharge       Plan of Care Reviewed With: patient  Outcome Summary: SLP completed bedside swallow evaluation recommend regular solids with thin liquids meds whole with thins and small bites and sips         SLP Outcome Measures (last 72 hours)      SLP Outcome Measures     Row Name 08/20/20 1600             SLP Outcome Measures    Outcome Measure Used?  Adult NOMS  -         Adult FCM Scores    FCM Chosen  Swallowing  -      Swallowing FCM Score  7  -KA        User Key  (r) = Recorded By, (t) = Taken By, (c) = Cosigned By    Initials Name Effective Dates    Lauri Loyola MA,CCC-SLP 06/08/18 -            Time Calculation:   Time Calculation- SLP     Row Name 08/20/20 1627             Time Calculation- SLP    SLP Start Time  1445  -KA      SLP Received On  08/20/20  -        User Key  (r) = Recorded By, (t) = Taken By, (c) = Cosigned By    Initials Name Provider Type    Lauri Loyola MA,CCC-SLP Speech and Language Pathologist          Therapy Charges for Today     Code Description Service Date Service Provider Modifiers Qty    [id number] HC ST EVAL ORAL PHARYNG SWALLOW 4 8/20/2020 Lauri Kelley MA,CCC-SLP GN 1               Lauri Kelley MA,RYAN-SLP  8/20/2020

## 2020-08-20 NOTE — PROGRESS NOTES
Quang Elizabeth MD                          [phone]      Patient ID:    Name:  Dalila Valerio    MRN:  [id number]    1950   69 y.o.  female            Patient Care Team:  Cinthya Valentine APRN as PCP - General  Cinthya Valentine APRN as PCP - Family Medicine  Cinthya Valentine APRN as PCP - Claims Attributed  Devyn Velazco MD as Consulting Physician (Pain Medicine)  Bogdan Olmedo MD as Surgeon (Orthopedic Surgery)  Pedro Pablo Lomas MD as Consulting Physician (Cardiology)    CC/ Reason for visit: Acute CVA    Subjective: Pt seen and examined this AM. No acute overnight events noted. Doing better.  Does not have any aphasia anymore.  Wide-awake and interactive    ROS: Denies any subjective fevers, syncope or presyncopal events, new neurological deficits, nausea or vomiting currently    Objective     Vital Signs past 24hrs    BP range: BP: ()/(48-79) 118/63  Pulse range: Heart Rate:  [] 84  Resp rate range: Resp:  [16] 16  Temp range: Temp (24hrs), Av.3 °F (37.4 °C), Min:98.5 °F (36.9 °C), Max:99.6 °F (37.6 °C)      Ventilator/Non-Invasive Ventilation Settings (From admission, onward)    None          Device (Oxygen Therapy): room air       85.5 kg (188 lb 7.9 oz); Body mass index is 34.47 kg/m².      Intake/Output Summary (Last 24 hours) at 2020 1255  Last data filed at 2020 0500  Gross per 24 hour   Intake 557.5 ml   Output 1725 ml   Net -1167.5 ml       PHYSICAL EXAM   Constitutional: Middle aged morbidly obese WF pt in bed, No acute respiratory distress, No accessory muscle use  Head: - NCAT  Eyes: No pallor, Anicteric conjunctiva, EOMI.  ENMT:  Mallampati 4, no oral thrush. Moist MM.   NECK: Trachea midline, No thyromegaly, no palpable cervical LNpathy  Heart: RRR, no murmur. Trace pedal edema   Lungs: MIREILLE +, No wheezes/ crackles heard    Abdomen: Soft. Obese. No tenderness, guarding or rigidity. No palpable masses  Extremities:  Extremities warm and well perfused. No cyanosis/ clubbing  Neuro: Conscious, Answers well. No gross focal neuro deficits  Psych: Mood and affect appropriate    Scheduled meds:    aspirin 325 mg Oral Daily   Or      aspirin 300 mg Rectal Daily   carbidopa-levodopa 1 tablet Oral TID   iodixanol 100 mL Intra-arterial Once in imaging   sodium chloride 1,000 mL Intravenous Once   sodium chloride 10 mL Intravenous Q12H   sodium chloride 100 mL/hr Intravenous Once       IV meds:                        niCARdipine 5-15 mg/hr Last Rate: Stopped (08/20/20 0700)   sodium chloride 75 mL/hr Last Rate: 75 mL/hr (08/19/20 2207)       Data Review:      Results from last 7 days   Lab Units 08/20/20  0439 08/19/20  0738   SODIUM mmol/L 141 142   POTASSIUM mmol/L 3.8 3.2*   CHLORIDE mmol/L 112* 106   CO2 mmol/L 21.0* 25.7   BUN mg/dL 11 15   CREATININE mg/dL 0.92 1.26*   CALCIUM mg/dL 8.5* 9.3   BILIRUBIN mg/dL 0.3 0.2   ALK PHOS U/L 26* 30*   ALT (SGPT) U/L 18 12   AST (SGOT) U/L 16 13   GLUCOSE mg/dL 129* 188*   WBC 10*3/mm3 11.00*  10.66  --    HEMOGLOBIN g/dL 10.7*  10.7*  --    PLATELETS 10*3/mm3 180  172  --    INR   --  0.99       Lab Results   Component Value Date    CALCIUM 8.5 (L) 08/20/2020                    Results Review:    I have reviewed the relevant laboratory results and independently reviewed the chest imaging from this hospitalization including the available echocardiogram reports personally and summarized it if/ when appropriate below    Assessment    Acute basilar artery CVA s/p TPA  Acute basilar artery occlusion s/p angiogram - not needing thrombectomy  Acute aphasia; resolved  Acute encephalopathy  Right-sided hemiparesis; improved  CAD s/p MI/ PCI  CKD  Bronchiectasis with LUL  B12 deficiency  Lupus anticoagulant disorder  DM  Williamson's esophagus/GERD  Morbid obesity  SILVIANO on PAP    PLAN:  Patient has done very well post TPA and is currently without any neurological deficits.  Post TPA protocol  Cardiology  evaluation and echocardiogram plan noted  Secondary stroke prophylaxis  Therapy evaluation  Noted concern for lupus anticoagulant and need for work-up  Home medications have been restarted  CPAP for obstructive sleep apnea    We will transfer to the floor    I have discussed my findings and recommendations with patient, nursing staff and consulting provider.     Quang Elizabeth MD  8/20/2020

## 2020-08-20 NOTE — PLAN OF CARE
Problem: Patient Care Overview  Goal: Plan of Care Review  Flowsheets (Taken 8/20/2020 1520)  Outcome Summary: Pt is 68 y/o female admitted for ischemic CVA to L left posterior cerebral artery, s/p tPA. Pt is agreeable to PT this pm. She reports living with  in single story home with 2-3 steps to enter with bilat handrails. Pt reports PLOF as independent with ADLs and using an SPC and a rollator to aid with ambulation. Pt has flat affect and is A&O x3. She has some expressive aphasia and mild R side weakness but no sensation deficits or neglect. Pt required mod a x2 to sit EOB, mod a x2 to perform sit to stand transfer, and mod a x2 + TS to walk 50 ft. PT requires skilled PT to address cognitive and physical deficits post-stroke. Anticipate discharge to acute rehab or SNU. (Pended)  Note:   Patient was wearing a face mask during this therapy encounter. Therapist used appropriate personal protective equipment including eye protection, mask, and gloves.  Mask used was standard procedure mask. Appropriate PPE was worn during the entire therapy session. Hand hygiene was completed before and after therapy session. Patient is not in enhanced droplet precautions.

## 2020-08-20 NOTE — PROGRESS NOTES
"NEUROSURGERY FOLLOW UP PROCEDURE NOTE     LOS: 1 day   Patient Care Team:  Cinthya Valentine APRN as PCP - General  Cinthya Valentine APRN as PCP - Family Medicine  Cinthya Valentine APRN as PCP - Claims Attributed  Devyn Velazco MD as Consulting Physician (Pain Medicine)  Bogdan Olmedo MD as Surgeon (Orthopedic Surgery)  Pedro Pablo Lomas MD as Consulting Physician (Cardiology)    Chief Complaint:  Confusion     Subjective     Interval History:     Doesn't remember what happened yesterday. Denies headache. Having some expected discomfort at the right radial angiogram site.     History taken from: patient chart RN    Objective        Vital Signs  Temp:  [97.9 °F (36.6 °C)-99.6 °F (37.6 °C)] 99.4 °F (37.4 °C)  Heart Rate:  [] 82  BP: ()/(48-79) 118/58  Body mass index is 34.48 kg/m².    Physical Exam    AA&O x 2. Year \"2014.\"  Asking for food. Speech normal.  PERRL.  Face symmetric, tongue midline.  Follows commands x 4 extremities. Reluctant to move R arm much due to discomfort at wrist arterial puncture site.       Results Review:     I reviewed the patient's new clinical results.    Labs:    Lab Results (last 24 hours)     Procedure Component Value Units Date/Time    POC Glucose Once [[id number]]  (Abnormal) Collected:  08/19/20 1005    Specimen:  Blood Updated:  08/19/20 1006     Glucose 172 mg/dL     POC Glucose Once [[id number]]  (Abnormal) Collected:  08/19/20 1744    Specimen:  Blood Updated:  08/19/20 1745     Glucose 131 mg/dL     POC Glucose Once [[id number]]  (Abnormal) Collected:  08/19/20 1950    Specimen:  Blood Updated:  08/19/20 1959     Glucose 137 mg/dL     POC Glucose Once [[id number]]  (Abnormal) Collected:  08/19/20 2333    Specimen:  Blood Updated:  08/19/20 2352     Glucose 137 mg/dL     POC Glucose Once [[id number]]  (Normal) Collected:  08/20/20 0335    Specimen:  Blood Updated:  08/20/20 0355     Glucose 128 mg/dL     CBC & Differential [[id number]] Collected:  08/20/20 0439   "    Specimen:  Blood Updated:  08/20/20 0551    Narrative:       The following orders were created for panel order CBC & Differential.  Procedure                               Abnormality         Status                     ---------                               -----------         ------                     CBC Auto Differential[[id number]]        Abnormal            Final result                 Please view results for these tests on the individual orders.    CBC Auto Differential [[id number]]  (Abnormal) Collected:  08/20/20 0439    Specimen:  Blood Updated:  08/20/20 0551     WBC 10.66 10*3/mm3      RBC 3.59 10*6/mm3      Hemoglobin 10.7 g/dL      Hematocrit 31.8 %      MCV 88.6 fL      MCH 29.8 pg      MCHC 33.6 g/dL      RDW 12.9 %      RDW-SD 42.6 fl      MPV 10.1 fL      Platelets 172 10*3/mm3      Neutrophil % 71.5 %      Lymphocyte % 19.4 %      Monocyte % 7.9 %      Eosinophil % 0.5 %      Basophil % 0.3 %      Immature Grans % 0.4 %      Neutrophils, Absolute 7.63 10*3/mm3      Lymphocytes, Absolute 2.07 10*3/mm3      Monocytes, Absolute 0.84 10*3/mm3      Eosinophils, Absolute 0.05 10*3/mm3      Basophils, Absolute 0.03 10*3/mm3      Immature Grans, Absolute 0.04 10*3/mm3      nRBC 0.0 /100 WBC     CBC (No Diff) [[id number]]  (Abnormal) Collected:  08/20/20 0439    Specimen:  Blood Updated:  08/20/20 0610     WBC 11.00 10*3/mm3      RBC 3.61 10*6/mm3      Hemoglobin 10.7 g/dL      Hematocrit 32.0 %      MCV 88.6 fL      MCH 29.6 pg      MCHC 33.4 g/dL      RDW 12.8 %      RDW-SD 41.7 fl      MPV 10.1 fL      Platelets 180 10*3/mm3     Comprehensive Metabolic Panel [[id number]]  (Abnormal) Collected:  08/20/20 0439    Specimen:  Blood Updated:  08/20/20 0653     Glucose 129 mg/dL      BUN 11 mg/dL      Creatinine 0.92 mg/dL      Sodium 141 mmol/L      Potassium 3.8 mmol/L      Chloride 112 mmol/L      CO2 21.0 mmol/L      Calcium 8.5 mg/dL      Total Protein 5.7 g/dL      Albumin 3.30 g/dL      ALT (SGPT) 18  U/L      AST (SGOT) 16 U/L      Alkaline Phosphatase 26 U/L      Total Bilirubin 0.3 mg/dL      eGFR Non African Amer 60 mL/min/1.73      Globulin 2.4 gm/dL      A/G Ratio 1.4 g/dL      BUN/Creatinine Ratio 12.0     Anion Gap 8.0 mmol/L     Narrative:       GFR Normal >60  Chronic Kidney Disease <60  Kidney Failure <15      Hemoglobin A1c [[id number]]  (Abnormal) Collected:  08/20/20 0439    Specimen:  Blood Updated:  08/20/20 0630     Hemoglobin A1C 7.90 %     Narrative:       Hemoglobin A1C Ranges:    Increased Risk for Diabetes  5.7% to 6.4%  Diabetes                     >= 6.5%  Diabetic Goal                < 7.0%    Lipid Panel [[id number]]  (Abnormal) Collected:  08/20/20 0439    Specimen:  Blood Updated:  08/20/20 0648     Total Cholesterol 106 mg/dL      Triglycerides 90 mg/dL      HDL Cholesterol 34 mg/dL      LDL Cholesterol  54 mg/dL      VLDL Cholesterol 18 mg/dL      LDL/HDL Ratio 1.59    Narrative:       Cholesterol Reference Ranges  (U.S. Department of Health and Human Services ATP III Classifications)    Desirable          <200 mg/dL  Borderline High    200-239 mg/dL  High Risk          >240 mg/dL      Triglyceride Reference Ranges  (U.S. Department of Health and Human Services ATP III Classifications)    Normal           <150 mg/dL  Borderline High  150-199 mg/dL  High             200-499 mg/dL  Very High        >500 mg/dL    HDL Reference Ranges  (U.S. Department of Health and Human Services ATP III Classifcations)    Low     <40 mg/dl (major risk factor for CHD)  High    >60 mg/dl ('negative' risk factor for CHD)        LDL Reference Ranges  (U.S. Department of Health and Human Services ATP III Classifcations)    Optimal          <100 mg/dL  Near Optimal     100-129 mg/dL  Borderline High  130-159 mg/dL  High             160-189 mg/dL  Very High        >189 mg/dL          Imaging:    Reviewed the head CT images performed August 19, 2020 at 3:37 PM with Dr. Meehan while rounding this morning.  The CT  shows no evidence of acute infarct.  No evidence of intracranial hemorrhage.    Current Medications:   Scheduled Meds:  aspirin 325 mg Oral Daily   Or      aspirin 300 mg Rectal Daily   carbidopa-levodopa 1 tablet Oral TID   iodixanol 100 mL Intra-arterial Once in imaging   sodium chloride 1,000 mL Intravenous Once   sodium chloride 10 mL Intravenous Q12H   sodium chloride 100 mL/hr Intravenous Once     Continuous Infusions:  niCARdipine 5-15 mg/hr Last Rate: 5 mg/hr (08/19/20 1036)   sodium chloride 75 mL/hr Last Rate: 75 mL/hr (08/19/20 2207)       Assessment/Plan       Acute CVA (cerebrovascular accident) (CMS/[facility])    Acute ischemic stroke with basilar tip occlusion s/p IV tPA upon arrival and subsequent emergent cerebral angiogram with Dr. Meehan 8/19 showing TICI 3 revascularization - no evidence of residual thrombus.  No evidence of aneurysm or AVM.    Plan:          Dr. Meehan is aware of the above exam and CT findings. We will await CT later today.       ADDENDUM:     L temporal and occipital areas of infarction similar to previous MRI 8/19/20. No evidence of acute hemorrhage. Dr. Meehan aware. Patient can be mobilized anytime and continue with neurologists for stroke management. Nothing further to add or recommend from neurovascular standpoint.       Daphne Stratton, RL  08/20/20  08:22

## 2020-08-20 NOTE — THERAPY EVALUATION
Patient Name: Dalila Valerio  : 1950    MRN: [id number]                              Today's Date: 2020       Admit Date: 2020    Visit Dx:     ICD-10-CM ICD-9-CM   1. Acute CVA (cerebrovascular accident) (CMS/HCC) I63.9 434.91     Patient Active Problem List   Diagnosis   • Adjustment disorder with mixed anxiety and depressed mood   • Atopic rhinitis   • Coronary arteriosclerosis in native artery   • Cobalamin deficiency   • Benign colonic polyp   • Lumbar radiculopathy   • Controlled diabetes mellitus type 2 with complications (CMS/HCC)   • Binocular vision disorder with diplopia   • Dyslipidemia   • Arthropathy of hand   • Knee pain   • Cramps of lower extremity   • Low back pain   • Migraine with typical aura   • Chronic nausea   • Obstructive sleep apnea syndrome   • Palpitations   • Ventricular premature beats   • Cephalalgia   • Colonic constipation   • Neurocardiogenic syncope   • Vitamin D deficiency   • DODSON (nonalcoholic steatohepatitis)   • Fibromyalgia   • Morbidly obese (CMS/HCC)   • Polyp of colon   • Family history of colonic polyps   • Family history of malignant neoplasm of colon   • Acute CVA (cerebrovascular accident) (CMS/HCC)     Past Medical History:   Diagnosis Date   • Abnormal weight gain    • Acquired trigger finger    • Acute myocardial infarction (CMS/HCC)    • Adjustment reaction with mixed emotional features    • Arthritis    • ASHD (arteriosclerotic heart disease)    • Asthma    • B12 deficiency    • Bacterial pneumonia    • Williamson esophagus    • Barretts esophagus    • Benign colonic polyp    • Bilateral knee pain    • Birthmark     haemangioma of the bone   • Bronchiectasis (CMS/HCC)    • CHF (congestive heart failure) (CMS/HCC)    • Chronic cough    • Chronic glomerulonephritis with exudative nephritis    • Chronic lumbar radiculopathy    • Diabetes mellitus (CMS/HCC)    • Diabetic peripheral neuropathy (CMS/HCC)    • Dyslipidemia    • Fibromyositis    • GERD  (gastroesophageal reflux disease)    • Herpes zoster    • Hyperlipidemia    • Hypertension    • Lupus anticoagulant disorder (CMS/HCC)    • Mycobacterium avium complex (CMS/HCC)    • Nephrolithiasis    • SILVIANO (obstructive sleep apnea)    • Palpitations    • Premature ventricular contraction    • Slow transit constipation    • Stroke (CMS/HCC)    • Vitamin D deficiency      Past Surgical History:   Procedure Laterality Date   • APPENDECTOMY     • BRONCHOSCOPY     •  SECTION     • CHOLECYSTECTOMY     • COLONOSCOPY     • COLONOSCOPY N/A 2019    Procedure: COLONOSCOPY to cecum with cold biopsy and cold and hotsnare polypectomies;  Surgeon: Suzy Eubanks MD;  Location: [facility] ENDOSCOPY;  Service: Gastroenterology   • CORONARY ANGIOPLASTY WITH STENT PLACEMENT     • EMBOLECTOMY N/A 2020    Procedure: EMERGENT CEREBRAL ANGIOGRAM;  Surgeon: Jonh Meehan MD;  [facility]  Service: Neurosurgery;  Laterality: N/A;   • ENDOSCOPY N/A 2019    Procedure: ESOPHAGOGASTRODUODENOSCOPY with cold biopsies;  Surgeon: Suzy Eubanks MD;  Location: [facility] ENDOSCOPY;  Service: Gastroenterology   • KNEE ARTHROSCOPY     • OTHER SURGICAL HISTORY      elbow surgery   • ROTATOR CUFF REPAIR     • TUBAL ABDOMINAL LIGATION       General Information     Row Name 20 1514          PT Evaluation Time/Intention    Document Type  evaluation  -PC (r) AR (t) PC (c)     Mode of Treatment  physical therapy;individual therapy  -PC (r) AR (t) PC (c)     Row Name 20 1517          General Information    Patient Profile Reviewed?  yes  -PC (r) AR (t) PC (c)     Prior Level of Function  independent:;ADL's;min assist:;all household mobility;gait  -PC (r) AR (t) PC (c)     Existing Precautions/Restrictions  fall;oxygen therapy device and L/min  -PC (r) AR (t) PC (c)     Barriers to Rehab  none identified  -PC (r) AR (t) PC (c)     Row Name 20 3103          Relationship/Environment     Lives With  spouse  -PC (r) AR (t) PC (c)     Name(s) of Who Lives With Patient  Ed  -PC (r) AR (t) PC (c)     Row Name 08/20/20 1514          Resource/Environmental Concerns    Current Living Arrangements  home/apartment/condo  -PC (r) AR (t) PC (c)     Row Name 08/20/20 1514          Home Main Entrance    Number of Stairs, Main Entrance  three  -PC (r) AR (t) PC (c)     Stair Railings, Main Entrance  railings safe and in good condition  -PC (r) AR (t) PC (c)     Row Name 08/20/20 1514          Stairs Within Home, Primary    Number of Stairs, Within Home, Primary  none  -PC (r) AR (t) PC (c)     Row Name 08/20/20 1514          Cognitive Assessment/Intervention- PT/OT    Orientation Status (Cognition)  oriented x 3  -PC (r) AR (t) PC (c)     Row Name 08/20/20 1514          Safety Issues, Functional Mobility    Impairments Affecting Function (Mobility)  balance;cognition;coordination;endurance/activity tolerance;strength  -PC (r) AR (t) PC (c)       User Key  (r) = Recorded By, (t) = Taken By, (c) = Cosigned By    Initials Name Provider Type    Sydnee Hyde, PT Physical Therapist    Jose Jimenez, PT Student PT Student        Mobility     Row Name 08/20/20 1516          Bed Mobility Assessment/Treatment    Bed Mobility Assessment/Treatment  supine-sit  -PC (r) AR (t) PC (c)     Supine-Sit [place] (Bed Mobility)  moderate assist (50% patient effort);2 person assist  -PC (r) AR (t) PC (c)     Assistive Device (Bed Mobility)  draw sheet  -PC (r) AR (t) PC (c)     Row Name 08/20/20 1516          Sit-Stand Transfer    Sit-Stand [place] (Transfers)  moderate assist (50% patient effort);2 person assist  -PC (r) AR (t) PC (c)     Row Name 08/20/20 1516          Gait/Stairs Assessment/Training    [place] Level (Gait)  moderate assist (50% patient effort);2 person assist;1 person to manage equipment HHA x2  -PC (r) AR (t) PC (c)     Distance in Feet (Gait)  50 ft  -PC (r) AR (t) PC (c)     Pattern (Gait)   step-through  -PC (r) AR (t) PC (c)     Deviations/Abnormal Patterns (Gait)  jose decreased;festinating/shuffling;gait speed decreased;stride length decreased  -PC (r) AR (t) PC (c)     Bilateral Gait Deviations  forward flexed posture;heel strike decreased  -PC (r) AR (t) PC (c)     Comment (Gait/Stairs)  Pt walked 50 ft with mod a x2 and TS. Unsteady on feet but no major LOB. Short, shuffling gait pattern  -PC (r) AR (t) PC (c)       User Key  (r) = Recorded By, (t) = Taken By, (c) = Cosigned By    Initials Name Provider Type    PC Sydnee Molina, PT Physical Therapist    AR Jose Yee PT Student PT Student        Obj/Interventions     Row Name 08/20/20 1518          General ROM    GENERAL ROM COMMENTS  BL U/LE WNL  -PC (r) AR (t) PC (c)     Row Name 08/20/20 1518          MMT (Manual Muscle Testing)    General MMT Comments  L elbow flexion 4/5, L shoulder flexion 4/5, R elbow flexion 3+/5, R shoulder flexion 3+/5; L hip flexion 4/5, L knee extension 4/5, L dorsiflexion 4/5, L hip flexion 3+/5, L knee extension 3+/5, L dorsiflexion 3+/5  (Pended)   -AR     Row Name 08/20/20 1518          Therapeutic Exercise    Upper Extremity Range of Motion (Therapeutic Exercise)  shoulder flexion/extension, bilateral;elbow flexion/extension, bilateral  -PC (r) AR (t) PC (c)     Lower Extremity (Therapeutic Exercise)  marching while seated  -PC (r) AR (t) PC (c)     Lower Extremity Range of Motion (Therapeutic Exercise)  knee flexion/extension, bilateral;ankle dorsiflexion/plantar flexion, bilateral  -PC (r) AR (t) PC (c)     Exercise Type (Therapeutic Exercise)  AROM (active range of motion)  -PC (r) AR (t) PC (c)     Position (Therapeutic Exercise)  seated  -PC (r) AR (t) PC (c)     Sets/Reps (Therapeutic Exercise)  1x5  -PC (r) AR (t) PC (c)     Expected Outcome (Therapeutic Exercise)  facilitate normal movement patterns  -PC (r) AR (t) PC (c)     Row Name 08/20/20 1518          Static Sitting Balance    Level of  Delta (Unsupported Sitting, Static Balance)  contact guard assist;1 person assist  -PC (r) AR (t) PC (c)     Sitting Position (Unsupported Sitting, Static Balance)  sitting on edge of bed  -PC (r) AR (t) PC (c)     Time Able to Maintain Position (Unsupported Sitting, Static Balance)  2 to 3 minutes  -PC (r) AR (t) PC (c)     Row Name 08/20/20 1518          Static Standing Balance    Level of Delta (Supported Standing, Static Balance)  minimal assist, 75% patient effort;2 person assist  -PC (r) AR (t) PC (c)     Time Able to Maintain Position (Supported Standing, Static Balance)  2 to 3 minutes  -PC (r) AR (t) PC (c)     Row Name 08/20/20 1518          Sensory Assessment/Intervention    Sensory General Assessment  no sensation deficits identified  -PC (r) AR (t) PC (c)       User Key  (r) = Recorded By, (t) = Taken By, (c) = Cosigned By    Initials Name Provider Type    Sydnee Hyde, PT Physical Therapist    Jose Jimenez, PT Student PT Student        Goals/Plan     Row Name 08/20/20 1530          Bed Mobility Goal 1 (PT)    Activity/Assistive Device (Bed Mobility Goal 1, PT)  bed mobility activities, all  -PC (r) AR (t) PC (c)     Delta Level/Cues Needed (Bed Mobility Goal 1, PT)  minimum assist (75% or more patient effort);1 person assist  -PC (r) AR (t) PC (c)     Time Frame (Bed Mobility Goal 1, PT)  1 week  -PC (r) AR (t) PC (c)     Row Name 08/20/20 1530          Transfer Goal 1 (PT)    Activity/Assistive Device (Transfer Goal 1, PT)  sit-to-stand/stand-to-sit  -PC (r) AR (t) PC (c)     Delta Level/Cues Needed (Transfer Goal 1, PT)  minimum assist (75% or more patient effort);1 person assist  -PC (r) AR (t) PC (c)     Time Frame (Transfer Goal 1, PT)  1 week  -PC (r) AR (t) PC (c)     Row Name 08/20/20 1530          Gait Training Goal 1 (PT)    Activity/Assistive Device (Gait Training Goal 1, PT)  gait (walking locomotion)  -PC (r) AR (t) PC (c)     Delta Level (Gait  Training Goal 1, PT)  minimum assist (75% or more patient effort);1 person assist  -PC (r) AR (t) PC (c)     Distance (Gait Goal 1, PT)  50 ft  -PC (r) AR (t) PC (c)     Time Frame (Gait Training Goal 1, PT)  10 days  -PC (r) AR (t) PC (c)       User Key  (r) = Recorded By, (t) = Taken By, (c) = Cosigned By    Initials Name Provider Type    PC Sydnee Molina, PT Physical Therapist    Jose Jimenez, PT Student PT Student        Clinical Impression     Row Name 08/20/20 1520          Pain Scale: Numbers Pre/Post-Treatment    Pain Scale: Numbers, Pretreatment  0/10 - no pain  -PC (r) AR (t) PC (c)     Pain Scale: Numbers, Post-Treatment  0/10 - no pain  -PC (r) AR (t) PC (c)     Row Name 08/20/20 1522          Plan of Care Review    Plan of Care Reviewed With  patient;spouse  -PC (r) AR (t) PC (c)     Outcome Summary  Pt is 68 y/o female admitted for ischemic CVA to L left posterior cerebral artery, s/p tPA. Pt is agreeable to PT this pm. She reports living with  in single story home with 2-3 steps to enter with bilat handrails. Pt reports PLOF as independent with ADLs and using an SPC and a rollator to aid with ambulation. Pt has flat affect and is A&O x3. She has some expressive aphasia and mild R side weakness but no sensation deficits or neglect. Pt required mod a x2 to sit EOB, mod a x2 to perform sit to stand transfer, and mod a x2 + TS to walk 50 ft. PT requires skilled PT to address cognitive and physical deficits post-stroke. Anticipate discharge to acute rehab or SNU.  -PC (r) AR (t) PC (c)     Row Name 08/20/20 1520          Vital Signs    Pre Systolic BP Rehab  123  -PC (r) AR (t) PC (c)     Pre Treatment Diastolic BP  58  -PC (r) AR (t) PC (c)     Pre SpO2 (%)  100 4L  -PC (r) AR (t) PC (c)     O2 Delivery Pre Treatment  nasal cannula  -PC (r) AR (t) PC (c)     Post SpO2 (%)  98 4L  -PC (r) AR (t) PC (c)     O2 Delivery Post Treatment  nasal cannula  -PC (r) AR (t) PC (c)     Row Name 08/20/20 3537           Positioning and Restraints    Pre-Treatment Position  in bed  -PC (r) AR (t) PC (c)     Post Treatment Position  chair  -PC (r) AR (t) PC (c)     In Chair  with SLP;with family/caregiver;notified nsg  -PC (r) AR (t) PC (c)       User Key  (r) = Recorded By, (t) = Taken By, (c) = Cosigned By    Initials Name Provider Type    PC Sydnee Molina, PT Physical Therapist    Jose Jimenez, PT Student PT Student        Outcome Measures     Row Name 08/20/20 1532          How much help from another person do you currently need...    Turning from your back to your side while in flat bed without using bedrails?  2  -PC (r) AR (t) PC (c)     Moving from lying on back to sitting on the side of a flat bed without bedrails?  2  -PC (r) AR (t) PC (c)     Moving to and from a bed to a chair (including a wheelchair)?  2  -PC (r) AR (t) PC (c)     Standing up from a chair using your arms (e.g., wheelchair, bedside chair)?  2  -PC (r) AR (t) PC (c)     Climbing 3-5 steps with a railing?  1  -PC (r) AR (t) PC (c)     To walk in hospital room?  2  -PC (r) AR (t) PC (c)     AM-PAC 6 Clicks Score (PT)  11  -PC (r) AR (t)     Row Name 08/20/20 1532          Modified Julissa Scale    Modified [place] Scale  4 - Moderately severe disability.  Unable to walk without assistance, and unable to attend to own bodily needs without assistance.  -PC (r) AR (t) PC (c)     Row Name 08/20/20 1532          Functional Assessment    Outcome Measure Options  AM-PAC 6 Clicks Basic Mobility (PT);Modified [place]  -PC (r) AR (t) PC (c)       User Key  (r) = Recorded By, (t) = Taken By, (c) = Cosigned By    Initials Name Provider Type    PC Sydnee Molina, PT Physical Therapist    Jose Jimenez, PT Student PT Student        Physical Therapy Education                 Title: PT OT SLP Therapies (Done)     Topic: Physical Therapy (Done)     Point: Mobility training (Done)     Description:   Instruct learner(s) on safety and technique for assisting patient  out of bed, chair or wheelchair.  Instruct in the proper use of assistive devices, such as walker, crutches, cane or brace.              Patient Friendly Description:   It's important to get you on your feet again, but we need to do so in a way that is safe for you. Falling has serious consequences, and your personal safety is the most important thing of all.        When it's time to get out of bed, one of us or a family member will sit next to you on the bed to give you support.     If your doctor or nurse tells you to use a walker, crutches, a cane, or a brace, be sure you use it every time you get out of bed, even if you think you don't need it.    Learning Progress Summary           Patient Acceptance, E, DU,NR by AR at 8/20/2020 1533   Significant Other Acceptance, E, DU,NR by AR at 8/20/2020 1533                   Point: Home exercise program (Done)     Description:   Instruct learner(s) on appropriate technique for monitoring, assisting and/or progressing patient with therapeutic exercises and activities.              Learning Progress Summary           Patient Acceptance, E, DU,NR by AR at 8/20/2020 1533   Significant Other Acceptance, E, DU,NR by AR at 8/20/2020 1533                   Point: Body mechanics (Done)     Description:   Instruct learner(s) on proper positioning and spine alignment for patient and/or caregiver during mobility tasks and/or exercises.              Learning Progress Summary           Patient Acceptance, E, DU,NR by AR at 8/20/2020 1533   Significant Other Acceptance, E, DU,NR by AR at 8/20/2020 1533                   Point: Precautions (Done)     Description:   Instruct learner(s) on prescribed precautions during mobility and gait tasks              Learning Progress Summary           Patient Acceptance, E, DU,NR by AR at 8/20/2020 1533   Significant Other Acceptance, E, DU,NR by AR at 8/20/2020 1533                               User Key     Initials Effective Dates Name Provider  Type Discipline    AR 07/02/20 -  Jose Yee, PT Student PT Student PT              PT Recommendation and Plan  Planned Therapy Interventions (PT Eval): balance training, bed mobility training, gait training, motor coordination training, neuromuscular re-education, patient/family education, postural re-education, ROM (range of motion), stair training, strengthening, stretching, transfer training  Outcome Summary/Treatment Plan (PT)  Anticipated Discharge Disposition (PT): inpatient rehabilitation facility, skilled nursing facility  Plan of Care Reviewed With: patient, spouse  Outcome Summary: Pt is 68 y/o female admitted for ischemic CVA to L left posterior cerebral artery, s/p tPA. Pt is agreeable to PT this pm. She reports living with  in single story home with 2-3 steps to enter with bilat handrails. Pt reports PLOF as independent with ADLs and using an SPC and a rollator to aid with ambulation. Pt has flat affect and is A&O x3. She has some expressive aphasia and mild R side weakness but no sensation deficits or neglect. Pt required mod a x2 to sit EOB, mod a x2 to perform sit to stand transfer, and mod a x2 + TS to walk 50 ft. PT requires skilled PT to address cognitive and physical deficits post-stroke. Anticipate discharge to acute rehab or SNU.     Time Calculation:   PT Charges     Row Name 08/20/20 1534             Time Calculation    Start Time  1428  -PC (r) AR (t) PC (c)      Stop Time  1458  -PC (r) AR (t) PC (c)      Time Calculation (min)  30 min  -PC (r) AR (t)      PT Received On  08/20/20  -PC (r) AR (t) PC (c)      PT - Next Appointment  08/21/20  -PC (r) AR (t) PC (c)      PT Goal Re-Cert Due Date  08/27/20  -PC (r) AR (t) PC (c)         Time Calculation- PT    Total Timed Code Minutes- PT  20 minute(s)  -PC (r) AR (t) PC (c)        User Key  (r) = Recorded By, (t) = Taken By, (c) = Cosigned By    Initials Name Provider Type    PC Sydnee Molina, PT Physical Therapist    Jose Jimenez,  PT Student PT Student        Therapy Charges for Today     Code Description Service Date Service Provider Modifiers Qty    10021208058  PT EVAL MOD COMPLEXITY 2 8/20/2020 Jose Yee, PT Student GP 1    [id number]  PT THERAPEUTIC ACT EA 15 MIN 8/20/2020 Jose Yee, PT Student GP 1    [id number]  PT THER SUPP EA 15 MIN 8/20/2020 Jose Yee, PT Student GP 1          PT G-Codes  Outcome Measure Options: AM-PAC 6 Clicks Basic Mobility (PT), Modified [place]  AM-PAC 6 Clicks Score (PT): 11  Modified [place] Scale: 4 - Moderately severe disability.  Unable to walk without assistance, and unable to attend to own bodily needs without assistance.    Jose Yee, PT Student  8/20/2020

## 2020-08-20 NOTE — PLAN OF CARE
Problem: Patient Care Overview  Goal: Plan of Care Review  Outcome: Ongoing (interventions implemented as appropriate)  Flowsheets (Taken 8/20/2020 0644)  Outcome Summary: Pt VSS, neuro status similar to start of shift, A&O x3, no reported headache, speech becoming less slurred throughout shift, good but weak bilateral motor control. Pressure device removed from right radial artery with no drainage or complications. Will continue to monitor.       Problem: Stroke (Ischemic) (Adult)  Goal: Signs and Symptoms of Listed Potential Problems Will be Absent, Minimized or Managed (Stroke)  Outcome: Ongoing (interventions implemented as appropriate)     Problem: Diabetes, Type 2 (Adult)  Goal: Signs and Symptoms of Listed Potential Problems Will be Absent, Minimized or Managed (Diabetes, Type 2)  Outcome: Ongoing (interventions implemented as appropriate)

## 2020-08-21 ENCOUNTER — APPOINTMENT (OUTPATIENT)
Dept: GENERAL RADIOLOGY | Facility: HOSPITAL | Age: 70
End: 2020-08-21

## 2020-08-21 ENCOUNTER — APPOINTMENT (OUTPATIENT)
Dept: CT IMAGING | Facility: HOSPITAL | Age: 70
End: 2020-08-21

## 2020-08-21 ENCOUNTER — HOSPITAL ENCOUNTER (INPATIENT)
Facility: HOSPITAL | Age: 70
End: 2020-08-21
Attending: PHYSICAL MEDICINE & REHABILITATION | Admitting: PHYSICAL MEDICINE & REHABILITATION

## 2020-08-21 LAB
CREAT BLDA-MCNC: 1.2 MG/DL (ref 0.6–1.3)
GLUCOSE BLDC GLUCOMTR-MCNC: 129 MG/DL (ref 70–130)

## 2020-08-21 PROCEDURE — 85730 THROMBOPLASTIN TIME PARTIAL: CPT | Performed by: PSYCHIATRY & NEUROLOGY

## 2020-08-21 PROCEDURE — 82962 GLUCOSE BLOOD TEST: CPT

## 2020-08-21 PROCEDURE — 97535 SELF CARE MNGMENT TRAINING: CPT | Performed by: OCCUPATIONAL THERAPIST

## 2020-08-21 PROCEDURE — 94799 UNLISTED PULMONARY SVC/PX: CPT

## 2020-08-21 PROCEDURE — 85732 THROMBOPLASTIN TIME PARTIAL: CPT | Performed by: PSYCHIATRY & NEUROLOGY

## 2020-08-21 PROCEDURE — 86147 CARDIOLIPIN ANTIBODY EA IG: CPT | Performed by: PSYCHIATRY & NEUROLOGY

## 2020-08-21 PROCEDURE — 85670 THROMBIN TIME PLASMA: CPT | Performed by: PSYCHIATRY & NEUROLOGY

## 2020-08-21 PROCEDURE — 86146 BETA-2 GLYCOPROTEIN ANTIBODY: CPT | Performed by: PSYCHIATRY & NEUROLOGY

## 2020-08-21 PROCEDURE — 25010000002 ENOXAPARIN PER 10 MG: Performed by: PSYCHIATRY & NEUROLOGY

## 2020-08-21 PROCEDURE — 85613 RUSSELL VIPER VENOM DILUTED: CPT | Performed by: PSYCHIATRY & NEUROLOGY

## 2020-08-21 PROCEDURE — 74018 RADEX ABDOMEN 1 VIEW: CPT

## 2020-08-21 PROCEDURE — 25010000002 ONDANSETRON PER 1 MG: Performed by: INTERNAL MEDICINE

## 2020-08-21 PROCEDURE — 85598 HEXAGNAL PHOSPH PLTLT NEUTRL: CPT | Performed by: PSYCHIATRY & NEUROLOGY

## 2020-08-21 PROCEDURE — 97166 OT EVAL MOD COMPLEX 45 MIN: CPT | Performed by: OCCUPATIONAL THERAPIST

## 2020-08-21 PROCEDURE — 85610 PROTHROMBIN TIME: CPT | Performed by: PSYCHIATRY & NEUROLOGY

## 2020-08-21 PROCEDURE — 97110 THERAPEUTIC EXERCISES: CPT

## 2020-08-21 PROCEDURE — 70450 CT HEAD/BRAIN W/O DYE: CPT

## 2020-08-21 PROCEDURE — 94640 AIRWAY INHALATION TREATMENT: CPT

## 2020-08-21 PROCEDURE — 99232 SBSQ HOSP IP/OBS MODERATE 35: CPT | Performed by: PSYCHIATRY & NEUROLOGY

## 2020-08-21 RX ORDER — ONDANSETRON 2 MG/ML
4 INJECTION INTRAMUSCULAR; INTRAVENOUS EVERY 6 HOURS PRN
Status: DISCONTINUED | OUTPATIENT
Start: 2020-08-21 | End: 2020-08-25 | Stop reason: HOSPADM

## 2020-08-21 RX ADMIN — METOPROLOL TARTRATE 50 MG: 50 TABLET, FILM COATED ORAL at 20:20

## 2020-08-21 RX ADMIN — ONDANSETRON 4 MG: 2 INJECTION INTRAMUSCULAR; INTRAVENOUS at 09:58

## 2020-08-21 RX ADMIN — SENNOSIDES 1 TABLET: 8.6 TABLET, FILM COATED ORAL at 20:20

## 2020-08-21 RX ADMIN — ARIPIPRAZOLE 5 MG: 5 TABLET ORAL at 09:00

## 2020-08-21 RX ADMIN — BUDESONIDE AND FORMOTEROL FUMARATE DIHYDRATE 2 PUFF: 160; 4.5 AEROSOL RESPIRATORY (INHALATION) at 19:07

## 2020-08-21 RX ADMIN — SODIUM CHLORIDE, PRESERVATIVE FREE 10 ML: 5 INJECTION INTRAVENOUS at 20:21

## 2020-08-21 RX ADMIN — METOPROLOL TARTRATE 50 MG: 50 TABLET, FILM COATED ORAL at 09:00

## 2020-08-21 RX ADMIN — SODIUM CHLORIDE, PRESERVATIVE FREE 10 ML: 5 INJECTION INTRAVENOUS at 11:31

## 2020-08-21 RX ADMIN — CARBIDOPA AND LEVODOPA 1 TABLET: 10; 100 TABLET ORAL at 20:20

## 2020-08-21 RX ADMIN — ONDANSETRON 4 MG: 2 INJECTION INTRAMUSCULAR; INTRAVENOUS at 04:29

## 2020-08-21 RX ADMIN — CARBIDOPA AND LEVODOPA 1 TABLET: 10; 100 TABLET ORAL at 16:39

## 2020-08-21 RX ADMIN — GABAPENTIN 400 MG: 400 CAPSULE ORAL at 08:59

## 2020-08-21 RX ADMIN — CARBIDOPA AND LEVODOPA 1 TABLET: 10; 100 TABLET ORAL at 08:59

## 2020-08-21 RX ADMIN — ENOXAPARIN SODIUM 90 MG: 100 INJECTION SUBCUTANEOUS at 06:07

## 2020-08-21 RX ADMIN — CITALOPRAM 20 MG: 20 TABLET, FILM COATED ORAL at 08:59

## 2020-08-21 RX ADMIN — GABAPENTIN 400 MG: 400 CAPSULE ORAL at 20:20

## 2020-08-21 RX ADMIN — LISINOPRIL 20 MG: 20 TABLET ORAL at 08:59

## 2020-08-21 RX ADMIN — AMLODIPINE BESYLATE 5 MG: 5 TABLET ORAL at 09:00

## 2020-08-21 RX ADMIN — ENOXAPARIN SODIUM 90 MG: 100 INJECTION SUBCUTANEOUS at 17:54

## 2020-08-21 NOTE — SIGNIFICANT NOTE
08/21/20 1054   Rehab Time/Intention   Evaluation Not Performed other (see comments)  (Speech-language evaluation attempted. Pt was too lethargic to participate, RN notified. Pt would wake briefly and answer some orientation questions, but would rapidly return to sleep. Will continue to follow for SLE and diet tolerance.)   Rehab Treatment   Discipline speech language pathologist

## 2020-08-21 NOTE — PROGRESS NOTES
Quang Elizabeth MD                          [phone]      Patient ID:    Name:  Dalila Valerio    MRN:  [id number]    1950   69 y.o.  female            Patient Care Team:  Cinthya Valentine APRN as PCP - General  Cinthya Valentine APRN as PCP - Family Medicine  Cinthya Valentine APRN as PCP - Claims Attributed  Devyn Velazco MD as Consulting Physician (Pain Medicine)  Bogdan Olmedo MD as Surgeon (Orthopedic Surgery)  Pedro Pablo Lomas MD as Consulting Physician (Cardiology)    CC/ Reason for visit: Acute CVA    Subjective: Pt seen and examined this AM. No acute overnight events noted. Doing better.  Does not have any aphasia anymore.  Wide-awake and interactive.  Had some brief lower abdominal pain improved with stool output    ROS: Denies any subjective fevers, syncope or presyncopal events, new neurological deficits, nausea or vomiting currently    Objective     Vital Signs past 24hrs    BP range: BP: (106-154)/(52-91) 116/60  Pulse range: Heart Rate:  [61-84] 68  Resp rate range: Resp:  [16-18] 16  Temp range: Temp (24hrs), Av.3 °F (37.4 °C), Min:98.5 °F (36.9 °C), Max:99.8 °F (37.7 °C)      Ventilator/Non-Invasive Ventilation Settings (From admission, onward)    None          Device (Oxygen Therapy): room air       85.5 kg (188 lb 7.9 oz); Body mass index is 34.47 kg/m².      Intake/Output Summary (Last 24 hours) at 2020 1543  Last data filed at 2020 1016  Gross per 24 hour   Intake 2333.75 ml   Output 925 ml   Net 1408.75 ml       PHYSICAL EXAM   Constitutional: Middle aged morbidly obese WF pt in bed, No acute respiratory distress, No accessory muscle use  Head: - NCAT  Eyes: No pallor, Anicteric conjunctiva, EOMI.  ENMT:  Mallampati 4, no oral thrush. Moist MM.   NECK: Trachea midline, No thyromegaly, no palpable cervical LNpathy  Heart: RRR, no murmur. Trace pedal edema   Lungs: MIREILLE +, No wheezes/ crackles heard    Abdomen: Soft. Obese. No  tenderness, guarding or rigidity. No palpable masses  Extremities: Extremities warm and well perfused. No cyanosis/ clubbing  Neuro: Conscious, Answers well. No gross focal neuro deficits  Psych: Mood and affect appropriate    Scheduled meds:      amLODIPine 5 mg Oral Daily   ARIPiprazole 5 mg Oral Daily   budesonide-formoterol 2 puff Inhalation BID   carbidopa-levodopa 1 tablet Oral TID   citalopram 20 mg Oral Daily   enoxaparin 1 mg/kg Subcutaneous Q12H   gabapentin 400 mg Oral Q12H   iodixanol 100 mL Intra-arterial Once in imaging   lisinopril 20 mg Oral Daily   metoprolol tartrate 50 mg Oral BID   pantoprazole 40 mg Oral QAM   senna 1 tablet Oral Nightly   sodium chloride 1,000 mL Intravenous Once   sodium chloride 10 mL Intravenous Q12H   sodium chloride 100 mL/hr Intravenous Once       IV meds:                           Data Review:      Results from last 7 days   Lab Units 08/20/20  0439 08/19/20  0745 08/19/20  0738   SODIUM mmol/L 141  --  142   POTASSIUM mmol/L 3.8  --  3.2*   CHLORIDE mmol/L 112*  --  106   CO2 mmol/L 21.0*  --  25.7   BUN mg/dL 11  --  15   CREATININE mg/dL 0.92 1.20 1.26*   CALCIUM mg/dL 8.5*  --  9.3   BILIRUBIN mg/dL 0.3  --  0.2   ALK PHOS U/L 26*  --  30*   ALT (SGPT) U/L 18  --  12   AST (SGOT) U/L 16  --  13   GLUCOSE mg/dL 129*  --  188*   WBC 10*3/mm3 11.00*  10.66  --   --    HEMOGLOBIN g/dL 10.7*  10.7*  --   --    PLATELETS 10*3/mm3 180  172  --   --    INR   --   --  0.99       Lab Results   Component Value Date    CALCIUM 8.5 (L) 08/20/2020                    Results Review:    I have reviewed the relevant laboratory results and independently reviewed the chest imaging from this hospitalization including the available echocardiogram reports personally and summarized it if/ when appropriate below    Assessment    Acute left temporal CVA s/p TPA  Acute basilar artery occlusion s/p angiogram - not needing thrombectomy  Acute aphasia; resolved  Acute encephalopathy;  resolved  Right-sided hemiparesis; resolved  Moderate-sized PFO  Right lower extremity DVT  CAD s/p MI/ PCI  CKD  Bronchiectasis with LUL  B12 deficiency  Lupus anticoagulant disorder  DM  Williamson's esophagus/GERD  Morbid obesity  SILVIANO on PAP    PLAN:  Patient is doing well and does not have any neurological deficits  Ongoing work-up by neurology and cardiology noted.  Echo shows moderate size PFO with right lower extremity DVT and patient started on Lovenox with eventual plan to switch to long-term anticoagulation for possible paradoxical embolism being the source.  Appreciate cardiology input and noted plan for outpatient evaluation for PFO closure.  Secondary stroke prophylaxis  Therapy evaluation  Noted concern for lupus anticoagulant and need for work-up  CPAP for obstructive sleep apnea    Awaiting floor bed.    I have discussed my findings and recommendations with patient, nursing staff and consulting provider.     Quang Elizabeth MD  8/21/2020

## 2020-08-21 NOTE — PLAN OF CARE
Patient vital signs and assessments stable overnight. Patient disoriented to time, place and situation. Patient complaining of nausea and needing to have BM this AM. Emesis x2. Scheduled AM CT obtained. Some resolve of nausea with zofran. Dr. Mauricio notified. Sung catheter discontinued.     Problem: Patient Care Overview  Goal: Plan of Care Review  Outcome: Ongoing (interventions implemented as appropriate)     Problem: Fall Risk (Adult)  Goal: Identify Related Risk Factors and Signs and Symptoms  Outcome: Ongoing (interventions implemented as appropriate)  Goal: Absence of Fall  Outcome: Ongoing (interventions implemented as appropriate)     Problem: Skin Injury Risk (Adult)  Goal: Identify Related Risk Factors and Signs and Symptoms  Outcome: Ongoing (interventions implemented as appropriate)  Goal: Skin Health and Integrity  Outcome: Ongoing (interventions implemented as appropriate)

## 2020-08-21 NOTE — NURSING NOTE
Acute rehab referral received via stroke order set. Call from Arielle with CCP indicating that patient should be full referral. Will initiate evaluation. Please note that rehab unit is currently near/at full capacity. Bed availability questionable through next week.    Thank you!    Bernardo Ibrahim RN  p

## 2020-08-21 NOTE — PROGRESS NOTES
Continued Stay Note  [facility]     Patient Name: Dalila Valerio  MRN: [id number]  Today's Date: 8/21/2020    Admit Date: 8/19/2020    Discharge Plan     Row Name 08/21/20 1034       Plan    Plan  Pending referral to BAR vs. SNF     Plan Comments  CCP met with patient and patient's spouse at bedside. Discussed discharge options of acute rehab vs. subacute rehab. Patient and patient's spouse inquired OP PT. CCP discussed patient will need higher level of rehab before transitioning to OP PT; patient and spouse verbalized understanding. Patient's spouse states they have concerns going to nursing facility due to COVID 19 and would prefer referral to [facility]. CCP discussed needing to meet criteria for BAR and provided SNF list. CCP encouraged patient and patient's spouse to review SNF options for back up choice. CCP spoke with Ben for evaluation. Jessi Bazan CSW         Discharge Codes    No documentation.             DAYNE Callejas

## 2020-08-21 NOTE — THERAPY TREATMENT NOTE
Patient Name: Dalila Valerio  : 1950    MRN: [id number]                              Today's Date: 2020       Admit Date: 2020    Visit Dx:     ICD-10-CM ICD-9-CM   1. Acute CVA (cerebrovascular accident) (CMS/HCC) I63.9 434.91     Patient Active Problem List   Diagnosis   • Adjustment disorder with mixed anxiety and depressed mood   • Atopic rhinitis   • Coronary arteriosclerosis in native artery   • Cobalamin deficiency   • Benign colonic polyp   • Lumbar radiculopathy   • Controlled diabetes mellitus type 2 with complications (CMS/HCC)   • Binocular vision disorder with diplopia   • Dyslipidemia   • Arthropathy of hand   • Knee pain   • Cramps of lower extremity   • Low back pain   • Migraine with typical aura   • Chronic nausea   • Obstructive sleep apnea syndrome   • Palpitations   • Ventricular premature beats   • Cephalalgia   • Colonic constipation   • Neurocardiogenic syncope   • Vitamin D deficiency   • DODSON (nonalcoholic steatohepatitis)   • Fibromyalgia   • Morbidly obese (CMS/HCC)   • Polyp of colon   • Family history of colonic polyps   • Family history of malignant neoplasm of colon   • Acute CVA (cerebrovascular accident) (CMS/HCC)     Past Medical History:   Diagnosis Date   • Abnormal weight gain    • Acquired trigger finger    • Acute myocardial infarction (CMS/HCC)    • Adjustment reaction with mixed emotional features    • Arthritis    • ASHD (arteriosclerotic heart disease)    • Asthma    • B12 deficiency    • Bacterial pneumonia    • Williamson esophagus    • Barretts esophagus    • Benign colonic polyp    • Bilateral knee pain    • Birthmark     haemangioma of the bone   • Bronchiectasis (CMS/HCC)    • CHF (congestive heart failure) (CMS/HCC)    • Chronic cough    • Chronic glomerulonephritis with exudative nephritis    • Chronic lumbar radiculopathy    • Diabetes mellitus (CMS/HCC)    • Diabetic peripheral neuropathy (CMS/HCC)    • Dyslipidemia    • Fibromyositis    • GERD  (gastroesophageal reflux disease)    • Herpes zoster    • Hyperlipidemia    • Hypertension    • Lupus anticoagulant disorder (CMS/HCC)    • Mycobacterium avium complex (CMS/HCC)    • Nephrolithiasis    • SILVIANO (obstructive sleep apnea)    • Palpitations    • Premature ventricular contraction    • Slow transit constipation    • Stroke (CMS/HCC)    • Vitamin D deficiency      Past Surgical History:   Procedure Laterality Date   • APPENDECTOMY     • BRONCHOSCOPY     •  SECTION     • CHOLECYSTECTOMY     • COLONOSCOPY     • COLONOSCOPY N/A 2019    Procedure: COLONOSCOPY to cecum with cold biopsy and cold and hotsnare polypectomies;  Surgeon: Suzy Eubanks MD;  Location: [facility] ENDOSCOPY;  Service: Gastroenterology   • CORONARY ANGIOPLASTY WITH STENT PLACEMENT     • EMBOLECTOMY N/A 2020    Procedure: EMERGENT CEREBRAL ANGIOGRAM;  Surgeon: Jonh Meehan MD;  Location: [facility] OR ;  Service: Neurosurgery;  Laterality: N/A;   • ENDOSCOPY N/A 2019    Procedure: ESOPHAGOGASTRODUODENOSCOPY with cold biopsies;  Surgeon: Suzy Eubanks MD;  Location: [facility] ENDOSCOPY;  Service: Gastroenterology   • KNEE ARTHROSCOPY     • OTHER SURGICAL HISTORY      elbow surgery   • ROTATOR CUFF REPAIR     • TUBAL ABDOMINAL LIGATION       General Information     Row Name 20 1633          PT Evaluation Time/Intention    Document Type  therapy note (daily note)  -PC     Mode of Treatment  physical therapy  -     Row Name 20 1633          General Information    Existing Precautions/Restrictions  fall  -     Row Name 20 1633          Cognitive Assessment/Intervention- PT/OT    Orientation Status (Cognition)  oriented to;person;place;verbal cues/prompts needed for orientation  -     Cognitive Assessment/Intervention Comment  pt sleeping, had a bad night, aroused but remained lethargic throughout  -       User Key  (r) = Recorded By, (t) = Taken By, (c) = Cosigned By     Initials Name Provider Type    PC Sydnee Molina, PT Physical Therapist        Mobility     Row Name 08/21/20 1634          Bed Mobility Assessment/Treatment    Supine-Sit [place] (Bed Mobility)  minimum assist (75% patient effort)  -PC     Sit-Supine [place] (Bed Mobility)  minimum assist (75% patient effort)  -PC     Row Name 08/21/20 1634          Sit-Stand Transfer    Sit-Stand R[place]man (Transfers)  minimum assist (75% patient effort);2 person assist  -PC     Row Name 08/21/20 1634          Gait/Stairs Assessment/Training    Gait/Stairs Assessment/Training  gait/ambulation independence;gait/ambulation assistive device  -PC     Rittman Level (Gait)  minimum assist (75% patient effort);1 person assist;1 person to manage equipment  -PC     Assistive Device (Gait)  -- HHA x1  -PC     Distance in Feet (Gait)  60 ft  -PC     Pattern (Gait)  step-to  -PC     Deviations/Abnormal Patterns (Gait)  festinating/shuffling;gait speed decreased;stride length decreased;jose decreased  -PC     Comment (Gait/Stairs)  staggering type gait due to lethargy  -PC       User Key  (r) = Recorded By, (t) = Taken By, (c) = Cosigned By    Initials Name Provider Type    PC Sydnee Molina PT Physical Therapist        Obj/Interventions    No documentation.       Goals/Plan    No documentation.       Clinical Impression     Row Name 08/21/20 2605          Pain Scale: Numbers Pre/Post-Treatment    Pain Scale: Numbers, Pretreatment  0/10 - no pain  -PC     Pre/Post Treatment Pain Comment  no pain but reports not feeling well due to bad night with nausea and vomiting  -PC     Row Name 08/21/20 5927          Plan of Care Review    Plan of Care Reviewed With  patient  -PC     Progress  improving  -PC     Outcome Summary  pt was lethargic this afternoon but able to walk 60 ft with min assist, staggering type gait at times with cues to keep eyes open, needed alot of encouragement  -PC     Row Name 08/21/20 3952           Positioning and Restraints    Pre-Treatment Position  in bed  -PC     Post Treatment Position  bed  -PC     In Bed  supine;call light within reach;encouraged to call for assist;exit alarm on;with family/caregiver  -PC       User Key  (r) = Recorded By, (t) = Taken By, (c) = Cosigned By    Initials Name Provider Type    PC Sydnee Molina PT Physical Therapist        Outcome Measures     Row Name 08/21/20 3779          How much help from another person do you currently need...    Turning from your back to your side while in flat bed without using bedrails?  3  -PC     Moving from lying on back to sitting on the side of a flat bed without bedrails?  3  -PC     Moving to and from a bed to a chair (including a wheelchair)?  3  -PC     Standing up from a chair using your arms (e.g., wheelchair, bedside chair)?  3  -PC     Climbing 3-5 steps with a railing?  2  -PC     To walk in hospital room?  3  -PC     AM-PAC 6 Clicks Score (PT)  17  -PC       User Key  (r) = Recorded By, (t) = Taken By, (c) = Cosigned By    Initials Name Provider Type    PC Sydnee Molina PT Physical Therapist        Physical Therapy Education                 Title: PT OT SLP Therapies (In Progress)     Topic: Physical Therapy (In Progress)     Point: Mobility training (In Progress)     Description:   Instruct learner(s) on safety and technique for assisting patient out of bed, chair or wheelchair.  Instruct in the proper use of assistive devices, such as walker, crutches, cane or brace.              Patient Friendly Description:   It's important to get you on your feet again, but we need to do so in a way that is safe for you. Falling has serious consequences, and your personal safety is the most important thing of all.        When it's time to get out of bed, one of us or a family member will sit next to you on the bed to give you support.     If your doctor or nurse tells you to use a walker, crutches, a cane, or a brace, be sure you use it  every time you get out of bed, even if you think you don't need it.    Learning Progress Summary           Patient Acceptance, E,D, NR by PC at 8/21/2020 1640    Acceptance, E, DU,NR by AR at 8/20/2020 1533   Significant Other Acceptance, E, DU,NR by AR at 8/20/2020 1533                   Point: Home exercise program (In Progress)     Description:   Instruct learner(s) on appropriate technique for monitoring, assisting and/or progressing patient with therapeutic exercises and activities.              Learning Progress Summary           Patient Acceptance, E,D, NR by PC at 8/21/2020 1640    Acceptance, E, DU,NR by AR at 8/20/2020 1533   Significant Other Acceptance, E, DU,NR by AR at 8/20/2020 1533                   Point: Body mechanics (In Progress)     Description:   Instruct learner(s) on proper positioning and spine alignment for patient and/or caregiver during mobility tasks and/or exercises.              Learning Progress Summary           Patient Acceptance, E,D, NR by PC at 8/21/2020 1640    Acceptance, E, DU,NR by AR at 8/20/2020 1533   Significant Other Acceptance, E, DU,NR by AR at 8/20/2020 1533                   Point: Precautions (In Progress)     Description:   Instruct learner(s) on prescribed precautions during mobility and gait tasks              Learning Progress Summary           Patient Acceptance, E,D, NR by PC at 8/21/2020 1640    Acceptance, E, DU,NR by AR at 8/20/2020 1533   Significant Other Acceptance, E, DU,NR by AR at 8/20/2020 1533                               User Key     Initials Effective Dates Name Provider Type Discipline    PC 04/03/18 -  Sydnee Molina PT Physical Therapist PT    AR 07/02/20 -  Jose Yee PT Student PT Student PT              PT Recommendation and Plan     Outcome Summary/Treatment Plan (PT)  Anticipated Discharge Disposition (PT): home with assist, skilled nursing facility  Plan of Care Reviewed With: patient  Progress: improving  Outcome Summary: pt was  lethargic this afternoon but able to walk 60 ft with min assist, staggering type gait at times with cues to keep eyes open, needed alot of encouragement     Time Calculation:   PT Charges     Row Name 08/21/20 1641             Time Calculation    Start Time  1620  -PC      Stop Time  1632  -PC      Time Calculation (min)  12 min  -PC      PT Received On  08/21/20  -PC      PT - Next Appointment  08/22/20  -PC        User Key  (r) = Recorded By, (t) = Taken By, (c) = Cosigned By    Initials Name Provider Type    PC Sydnee Molina, PT Physical Therapist        Therapy Charges for Today     Code Description Service Date Service Provider Modifiers Qty    64535576095 HC PT THER PROC EA 15 MIN 8/21/2020 Sydnee Molina, PT GP 1    73803722083 HC PT THER SUPP EA 15 MIN 8/21/2020 Sydnee Molina, PT GP 1          PT G-Codes  Outcome Measure Options: AM-PAC 6 Clicks Daily Activity (OT)  AM-PAC 6 Clicks Score (PT): 17  AM-PAC 6 Clicks Score (OT): 12  Modified Julissa Scale: 4 - Moderately severe disability.  Unable to walk without assistance, and unable to attend to own bodily needs without assistance.    Sydnee Molina, LUIS  8/21/2020

## 2020-08-21 NOTE — PLAN OF CARE
Problem: Patient Care Overview  Goal: Plan of Care Review  Outcome: Ongoing (interventions implemented as appropriate)  Flowsheets  Taken 8/21/2020 1636 by Sydnee Molina PT  Progress: improving  Plan of Care Reviewed With: patient  Taken 8/21/2020 1709 by Casi Bolton RN  Outcome Summary: Patient came for ICU in report they stated she was lethargic and hard to arouse, she has been AOx3 and has worked with PT and ambulated to the [facility]. She is currently eating her dinner with no problems.  is at the bedside with her and assisting with her needs as well. will CTM     Problem: Fall Risk (Adult)  Goal: Identify Related Risk Factors and Signs and Symptoms  Outcome: Ongoing (interventions implemented as appropriate)  Flowsheets (Taken 8/21/2020 1709)  Related Risk Factors (Fall Risk): confusion/agitation  Signs and Symptoms (Fall Risk): presence of risk factors

## 2020-08-21 NOTE — THERAPY EVALUATION
Acute Care - Occupational Therapy Initial Evaluation  [facility]     Patient Name: Dalila Valerio  : 1950  MRN: [id number]  Today's Date: 2020             Admit Date: 2020       ICD-10-CM ICD-9-CM   1. Acute CVA (cerebrovascular accident) (CMS/HCC) I63.9 434.91     Patient Active Problem List   Diagnosis   • Adjustment disorder with mixed anxiety and depressed mood   • Atopic rhinitis   • Coronary arteriosclerosis in native artery   • Cobalamin deficiency   • Benign colonic polyp   • Lumbar radiculopathy   • Controlled diabetes mellitus type 2 with complications (CMS/HCC)   • Binocular vision disorder with diplopia   • Dyslipidemia   • Arthropathy of hand   • Knee pain   • Cramps of lower extremity   • Low back pain   • Migraine with typical aura   • Chronic nausea   • Obstructive sleep apnea syndrome   • Palpitations   • Ventricular premature beats   • Cephalalgia   • Colonic constipation   • Neurocardiogenic syncope   • Vitamin D deficiency   • DODSON (nonalcoholic steatohepatitis)   • Fibromyalgia   • Morbidly obese (CMS/HCC)   • Polyp of colon   • Family history of colonic polyps   • Family history of malignant neoplasm of colon   • Acute CVA (cerebrovascular accident) (CMS/HCC)     Past Medical History:   Diagnosis Date   • Abnormal weight gain    • Acquired trigger finger    • Acute myocardial infarction (CMS/HCC)    • Adjustment reaction with mixed emotional features    • Arthritis    • ASHD (arteriosclerotic heart disease)    • Asthma    • B12 deficiency    • Bacterial pneumonia    • Williamson esophagus    • Barretts esophagus    • Benign colonic polyp    • Bilateral knee pain    • Birthmark     haemangioma of the bone   • Bronchiectasis (CMS/HCC)    • CHF (congestive heart failure) (CMS/HCC)    • Chronic cough    • Chronic glomerulonephritis with exudative nephritis    • Chronic lumbar radiculopathy    • Diabetes mellitus (CMS/HCC)    • Diabetic peripheral neuropathy (CMS/HCC)    •  Dyslipidemia    • Fibromyositis    • GERD (gastroesophageal reflux disease)    • Herpes zoster    • Hyperlipidemia    • Hypertension    • Lupus anticoagulant disorder (CMS/HCC)    • Mycobacterium avium complex (CMS/HCC)    • Nephrolithiasis    • SILVIANO (obstructive sleep apnea)    • Palpitations    • Premature ventricular contraction    • Slow transit constipation    • Stroke (CMS/HCC)    • Vitamin D deficiency      Past Surgical History:   Procedure Laterality Date   • APPENDECTOMY     • BRONCHOSCOPY     •  SECTION     • CHOLECYSTECTOMY     • COLONOSCOPY     • COLONOSCOPY N/A 2019    Procedure: COLONOSCOPY to cecum with cold biopsy and cold and hotsnare polypectomies;  Surgeon: Suzy Eubanks MD;  Location: [facility] ENDOSCOPY;  Service: Gastroenterology   • CORONARY ANGIOPLASTY WITH STENT PLACEMENT     • EMBOLECTOMY N/A 2020    Procedure: EMERGENT CEREBRAL ANGIOGRAM;  Surgeon: Jonh Meehan MD;  Location: [facility] OR ;  Service: Neurosurgery;  Laterality: N/A;   • ENDOSCOPY N/A 2019    Procedure: ESOPHAGOGASTRODUODENOSCOPY with cold biopsies;  Surgeon: Suzy Eubanks MD;  Location: [facility] ENDOSCOPY;  Service: Gastroenterology   • KNEE ARTHROSCOPY     • OTHER SURGICAL HISTORY      elbow surgery   • ROTATOR CUFF REPAIR     • TUBAL ABDOMINAL LIGATION            OT ASSESSMENT FLOWSHEET (last 12 hours)      Occupational Therapy Evaluation     Row Name 20 1203                   OT Evaluation Time/Intention    Subjective Information  no complaints  -SG        Document Type  evaluation  -SG        Mode of Treatment  individual therapy;occupational therapy  -SG        Patient Effort  adequate  -SG        Symptoms Noted During/After Treatment  -- N/V  -SG           General Information    Patient Profile Reviewed?  yes  -SG        Patient Observations  alert;agree to therapy;cooperative  -SG        General Observations of Patient  Pt supine in bed, holding vomit bag  -SG         Prior Level of Function  independent:;ADL's  -SG        Equipment Currently Used at Home  none  -SG        Pertinent History of Current Functional Problem  CVA  -SG        Existing Precautions/Restrictions  fall;oxygen therapy device and L/min  -SG        Limitations/Impairments  safety/cognitive  -SG        Barriers to Rehab  medically complex  -SG           Relationship/Environment    Lives With  spouse  -SG           Cognitive Assessment/Intervention- PT/OT    Orientation Status (Cognition)  oriented x 3  -SG        Follows Commands (Cognition)  follows one step commands  -SG        Safety Deficit (Cognitive)  moderate deficit  -SG           Bed Mobility Assessment/Treatment    Bed Mobility Assessment/Treatment  supine-sit;sit-supine  -SG        Supine-Sit [place] (Bed Mobility)  moderate assist (50% patient effort);2 person assist;verbal cues  -SG        Sit-Supine [place] (Bed Mobility)  not tested  -SG           Functional Mobility    Functional Mobility- Comment  Takes pivot steps to BSC  -SG           Transfer Assessment/Treatment    Transfer Assessment/Treatment  sit-stand transfer;stand-sit transfer;toilet transfer  -SG           Sit-Stand Transfer    Sit-Stand [place] (Transfers)  minimum assist (75% patient effort);2 person assist  -SG        Assistive Device (Sit-Stand Transfers)  walker, front-wheeled  -SG           Stand-Sit Transfer    Stand-Sit [place] (Transfers)  minimum assist (75% patient effort);2 person assist  -SG        Assistive Device (Stand-Sit Transfers)  walker, front-wheeled  -SG           Toilet Transfer    Type (Toilet Transfer)  stand pivot/stand step  -SG        [place] Level (Toilet Transfer)  minimum assist (75% patient effort);2 person assist;verbal cues;nonverbal cues (demo/gesture)  -SG        Assistive Device (Toilet Transfer)  commode, bedside without drop arms;walker, front-wheeled  -SG           ADL Assessment/Intervention    BADL  Assessment/Intervention  lower body dressing;toileting  -SG           Lower Body Dressing Assessment/Training    Lower Body Dressing [place] Level  don;undergarment;maximum assist (25% patient effort);verbal cues  -SG        Lower Body Dressing Position  supported sitting;supported standing  -SG           Toileting Assessment/Training    [place] Level (Toileting)  toileting skills;adjust/manage clothing;change pad/brief;perform perineal hygiene;maximum assist (25% patient effort);verbal cues;nonverbal cues (demo/gesture)  -SG        Assistive Devices (Toileting)  commode, bedside without drop arms  -SG        Toileting Position  supported sitting;supported standing  -SG           General ROM    GENERAL ROM COMMENTS  UE appear WFL  -SG           MMT (Manual Muscle Testing)    General MMT Comments  Really unable to formally MMT due to pt bathroom needs and nausea  -SG           Positioning and Restraints    Pre-Treatment Position  in bed  -SG        Post Treatment Position  bsc  -SG        On BS commode  sitting;with nsg Aid present also  -SG           Pain Assessment    Additional Documentation  Pain Scale: FACES Pre/Post-Treatment (Group)  -SG           Pain Scale: FACES Pre/Post-Treatment    Pain: FACES Scale, Pretreatment  8-->hurts whole lot  -SG        Pre/Post Treatment Pain Comment  Generalized abdominal pain  -SG           Plan of Care Review    Plan of Care Reviewed With  patient  -SG        Outcome Summary  Pt seen today for OT eval following CVA s/p tpa; pt was previously indep with ADLs and used a rollator. This date pt is very nauseous and complaining of abdominal discomfort. She transfered to the commode min Ax2 but requires max A for all toileting skillls. Pt shows generalized weakness and poor endurance, would benefit from OT to address deficits. Will most likely need rehab at d/c.  -SG           Clinical Impression (OT)    OT Diagnosis  Need for assist with personal care  -SG         Criteria for Skilled Therapeutic Interventions Met (OT Eval)  yes  -SG        Rehab Potential (OT Eval)  fair, will monitor progress closely  -SG        Therapy Frequency (OT Eval)  5 times/wk  -SG        Care Plan Review (OT)  evaluation/treatment results reviewed;care plan/treatment goals reviewed;patient/other agree to care plan  -SG        Anticipated Discharge Disposition (OT)  inpatient rehabilitation facility;skilled nursing facility  -SG           OT Goals    Transfer Goal Selection (OT)  transfer, OT goal 1  -SG        Toileting Goal Selection (OT)  toileting, OT goal 1  -SG        Strength Goal Selection (OT)  strength, OT goal 1  -SG        Additional Documentation  Strength Goal Selection (OT) (Row)  -SG           Transfer Goal 1 (OT)    Activity/Assistive Device (Transfer Goal 1, OT)  sit-to-stand/stand-to-sit;toilet;commode, bedside without drop arms  -SG        [place] Level/Cues Needed (Transfer Goal 1, OT)  contact guard assist  -SG        Time Frame (Transfer Goal 1, OT)  short term goal (STG);2 weeks  -SG        Progress/Outcome (Transfer Goal 1, OT)  goal ongoing  -SG           Toileting Goal 1 (OT)    Activity/Device (Toileting Goal 1, OT)  toileting skills, all  -SG        [place] Level/Cues Needed (Toileting Goal 1, OT)  contact guard assist  -SG        Time Frame (Toileting Goal 1, OT)  short term goal (STG);2 weeks  -SG        Progress/Outcome (Toileting Goal 1, OT)  goal ongoing  -SG           Strength Goal 1 (OT)    Strength Goal 1 (OT)  Pt to improve UE strength to 4-/5 to assist with ADLs.  -SG        Time Frame (Strength Goal 1, OT)  short term goal (STG);2 weeks  -SG        Progress/Outcome (Strength Goal 1, OT)  goal ongoing  -SG          User Key  (r) = Recorded By, (t) = Taken By, (c) = Cosigned By    Initials Name Effective Dates    Monie Gonzalez OTR 12/26/18 -          Occupational Therapy Education                 Title: PT OT SLP Therapies (In Progress)      Topic: Occupational Therapy (In Progress)     Point: ADL training (Done)     Description:   Instruct learner(s) on proper safety adaptation and remediation techniques during self care or transfers.   Instruct in proper use of assistive devices.              Learning Progress Summary           Patient Acceptance, E,TB, VU,NR by  at 8/21/2020 1217    Comment:  Discussed OT role and goals, BSC transfer and walker use                   Point: Home exercise program (Not Started)     Description:   Instruct learner(s) on appropriate technique for monitoring, assisting and/or progressing therapeutic exercises/activities.              Learner Progress:   Not documented in this visit.          Point: Precautions (Not Started)     Description:   Instruct learner(s) on prescribed precautions during self-care and functional transfers.              Learner Progress:   Not documented in this visit.          Point: Body mechanics (Not Started)     Description:   Instruct learner(s) on proper positioning and spine alignment during self-care, functional mobility activities and/or exercises.              Learner Progress:   Not documented in this visit.                      User Key     Initials Effective Dates Name Provider Type Discipline     12/26/18 -  Monie Villareal OTR Occupational Therapist OT                  OT Recommendation and Plan  Outcome Summary/Treatment Plan (OT)  Anticipated Discharge Disposition (OT): inpatient rehabilitation facility, skilled nursing facility  Therapy Frequency (OT Eval): 5 times/wk  Plan of Care Review  Plan of Care Reviewed With: patient  Plan of Care Reviewed With: patient  Outcome Summary: Pt seen today for OT eval following CVA s/p tpa; pt was previously indep with ADLs and used a rollator. This date pt is very nauseous and complaining of abdominal discomfort. She transfered to the commode min Ax2 but requires max A for all toileting skillls. Pt shows generalized weakness and poor  endurance, would benefit from OT to address deficits. Will most likely need rehab at d/c.    Outcome Measures     Row Name 08/21/20 1200             How much help from another is currently needed...    Putting on and taking off regular lower body clothing?  2  -SG      Bathing (including washing, rinsing, and drying)  2  -SG      Toileting (which includes using toilet bed pan or urinal)  2  -SG      Putting on and taking off regular upper body clothing  2  -SG      Taking care of personal grooming (such as brushing teeth)  2  -SG      Eating meals  2  -SG      AM-PAC 6 Clicks Score (OT)  12  -SG         Modified Wake Scale    Modified Wake Scale  4 - Moderately severe disability.  Unable to walk without assistance, and unable to attend to own bodily needs without assistance.  -SG         Functional Assessment    Outcome Measure Options  AM-PAC 6 Clicks Daily Activity (OT)  -SG        User Key  (r) = Recorded By, (t) = Taken By, (c) = Cosigned By    Initials Name Provider Type    Monie Gonzalez OTR Occupational Therapist          Time Calculation:   Time Calculation- OT     Row Name 08/21/20 1218             Time Calculation- OT    OT Start Time  0933  -      OT Stop Time  1001  -      OT Time Calculation (min)  28 min  -SG      OT Received On  08/21/20  -      OT Goal Re-Cert Due Date  09/04/20  -        User Key  (r) = Recorded By, (t) = Taken By, (c) = Cosigned By    Initials Name Provider Type    Monie Gonzalez OTR Occupational Therapist        Therapy Charges for Today     Code Description Service Date Service Provider Modifiers Qty    10766108220 HC OT EVAL MOD COMPLEXITY 2 8/21/2020 Monie Villareal OTR GO 1    10368097016 HC OT SELF CARE/MGMT/TRAIN EA 15 MIN 8/21/2020 Monie Villareal OTR GO 1    29481913199 HC OT THER SUPP EA 15 MIN 8/21/2020 Monie Villareal OTR GO 1               RA Liriano  8/21/2020

## 2020-08-21 NOTE — PLAN OF CARE
Problem: Patient Care Overview  Goal: Plan of Care Review  Outcome: Ongoing (interventions implemented as appropriate)  Flowsheets (Taken 8/21/2020 7771)  Outcome Summary: pt was lethargic this afternoon but able to walk 60 ft with min assist, staggering type gait at times with cues to keep eyes open, needed alot of encouragement     ..Patient was intermittently wearing a face mask during this therapy encounter. Therapist used appropriate personal protective equipment including eye protection, mask, and gloves.  Mask used was standard procedure mask. Appropriate PPE was worn during the entire therapy session. Hand hygiene was completed before and after therapy session. Patient is not in enhanced droplet precautions.

## 2020-08-21 NOTE — PROGRESS NOTES
"DOS: 2020  NAME: Dalila Valerio   : 1950  PCP: Cinthya Valentine APRN  Chief Complaint   Patient presents with   • right sided weakness   • Speech Problem       Chief complaint: Stroke  Subjective: Developed nausea overnight with some associated abdominal pain.  No new neurologic symptoms.  Repeat CT completed.    Objective:  Vital signs: /57   Pulse 67   Temp 98.5 °F (36.9 °C) (Oral)   Resp 18   Ht 157.5 cm (62.01\")   Wt 85.5 kg (188 lb 7.9 oz) Comment: not weighed by RD  SpO2 96%   BMI 34.47 kg/m²    Gen: NAD, vitals reviewed  ABD: Abdomen soft, with epigastric tenderness, nondistended  MS: oriented x3, recent/remote memory intact, normal attention/concentration, language intact, no neglect.  CN: visual acuity grossly normal, PERRL, left internuclear ophthalmoplegia, no facial droop, mild dysarthria  Motor: 5/5 bilateral upper extremities, 5/5 right lower extremity, 4+/5 left lower extremity, normal tone throughout  Sensory: intact to light touch all 4 ext.    ROS:  No weakness, numbness  No fevers, chills  + Nausea, vomiting, abdominal pain  No chest pain, palpitations    Laboratory results:  Lab Results   Component Value Date    GLUCOSE 129 (H) 2020    CALCIUM 8.5 (L) 2020     2020    K 3.8 2020    CO2 21.0 (L) 2020     (H) 2020    BUN 11 2020    CREATININE 0.92 2020    EGFRIFAFRI 48 (L) 2020    EGFRIFNONA 60 (L) 2020    BCR 12.0 2020    ANIONGAP 8.0 2020     Lab Results   Component Value Date    WBC 10.66 2020    WBC 11.00 (H) 2020    HGB 10.7 (L) 2020    HGB 10.7 (L) 2020    HCT 31.8 (L) 2020    HCT 32.0 (L) 2020    MCV 88.6 2020    MCV 88.6 2020     2020     2020     Lab Results   Component Value Date    LDL 54 2020    LDL 73 2020     (H) 12/10/2018     @[email or web address]@     Review of labs: GFR 48, WBC 11, " hemoglobin 10.7, platelets 172    Review and interpretation of imaging: I personally reviewed her follow-up head CT which shows stable left PCA territory stroke with no hemorrhagic conversion.  Radiology report reviewed    Workup to date: Distal basilar occlusion, embolic, successfully recanalized with IV TPA     CTA 8/19: Large thrombus in the distal basilar artery extending into the bilateral PCAs  CTP 8/19: Large area of tissue at risk in the posterior circulation  Angiogram 8/19: Recanalization of basilar artery and bilateral PCAs  MRI 8/19: ill-defined acute infarct in the left temporal occipital region, no hemorrhage  GREGORY 8/20: EF 60%, borderline dilated left atrium, moderate size PFO with bidirectional shunt  Lower extremity Doppler 8/20: Positive for right lower extremity DVTs  Labs: antiphospholipid antibody panel pending    Diagnoses:  Stroke, basilar artery, embolic  Status post TPA in the emergency department  PFO  Deep venous thrombosis  History of lupus anticoagulant    Comment: Suspect basilar occlusion was related to paradoxical embolism given DVT and associated PFO with shunt.  She does have a reported history of lupus anticoagulant so we will try to exclude that before PFO closure is seriously considered.  She will be anticoagulated for DVT.  We will start Lovenox and once her nausea improves will transition to NOAC.    Plan:  1.  KUB for nausea, constipation  2.  Lovenox 1 mg/kg for DVT, PFO with paradoxical embolism, transition to Eliquis once taking p.o. reliably  3.  Follow-up antiphospholipid antibody panel  4.  Outpatient cardiology follow-up for possible PFO closure  5.  Out of bed, PT/OT.  Downgrade to floor at any time  6.  No statin due to allergy    Discussed with Dr. Elizabeth

## 2020-08-21 NOTE — PROGRESS NOTES
Dalila Valerio   69 y.o.  female    LOS: 2 days   Patient Care Team:  Cinthya Valentine APRN as PCP - General  Cinthya Valentine APRN as PCP - Family Medicine  Cinthya Valentine APRN as PCP - Claims Attributed  Devyn Velazco MD as Consulting Physician (Pain Medicine)  Bogdan Olmedo MD as Surgeon (Orthopedic Surgery)  Pedro Pablo Lomas MD as Consulting Physician (Cardiology)      Subjective     Interval History:     Patient Complaints: Denies any shortness of air or chest pain today.    Review of Systems:   Had some nausea overnight.  No new neurologic issues  No obvious bleeding  Medication Review:   Current Facility-Administered Medications:   •  amLODIPine (NORVASC) tablet 5 mg, 5 mg, Oral, Daily, Quang Elizabeth MD, 5 mg at 08/21/20 0900  •  ARIPiprazole (ABILIFY) tablet 5 mg, 5 mg, Oral, Daily, Quang Elizabeth MD, 5 mg at 08/21/20 0900  •  budesonide-formoterol (SYMBICORT) 160-4.5 MCG/ACT inhaler 2 puff, 2 puff, Inhalation, BID, Quang Elizabeth MD  •  carbidopa-levodopa (SINEMET)  MG per tablet 1 tablet, 1 tablet, Oral, TID, Quang Elizabeth MD, 1 tablet at 08/21/20 0859  •  citalopram (CeleXA) tablet 20 mg, 20 mg, Oral, Daily, Quang Elizabeth MD, 20 mg at 08/21/20 0859  •  cyclobenzaprine (FLEXERIL) tablet 10 mg, 10 mg, Oral, TID PRN, Quang Elizabeth MD  •  enoxaparin (LOVENOX) syringe 90 mg, 1 mg/kg, Subcutaneous, Q12H, James Zamora MD, 90 mg at 08/21/20 0607  •  gabapentin (NEURONTIN) capsule 400 mg, 400 mg, Oral, Q12H, Quang Elizabeth MD, 400 mg at 08/21/20 0859  •  iodixanol (VISIPAQUE) 320 MG/ML injection 100 mL, 100 mL, Intra-arterial, Once in imaging, Quang Elizabeth MD  •  lisinopril (PRINIVIL,ZESTRIL) tablet 20 mg, 20 mg, Oral, Daily, Quang Elizabeth MD, 20 mg at 08/21/20 0859  •  metoprolol tartrate (LOPRESSOR) tablet 50 mg, 50 mg, Oral, BID, Quang Elizabeth MD, 50 mg at 08/21/20  0900  •  ondansetron (ZOFRAN) injection 4 mg, 4 mg, Intravenous, Q6H PRN, Deon Mauricio MD, 4 mg at 08/21/20 0958  •  pantoprazole (PROTONIX) EC tablet 40 mg, 40 mg, Oral, QAM, Quang Elizabeth MD  •  senna (SENOKOT) tablet 1 tablet, 1 tablet, Oral, Nightly, Quang Elizabeth MD, 1 tablet at 08/20/20 2001  •  sodium chloride 0.9 % bolus 1,000 mL, 1,000 mL, Intravenous, Once, Quang Elizabeth MD  •  sodium chloride 0.9 % flush 10 mL, 10 mL, Intravenous, PRN, Quang Elizabeth MD  •  sodium chloride 0.9 % flush 10 mL, 10 mL, Intravenous, Q12H, Quang Elizabeth MD, 10 mL at 08/21/20 1131  •  sodium chloride 0.9 % flush 10 mL, 10 mL, Intravenous, PRN, Quang Elizabeth MD  •  sodium chloride 0.9 % infusion, 100 mL/hr, Intravenous, Once, Quang Elizabeth MD      Objective     Vital Sign Min/Max for last 24 hours  Temp  Min: 98.5 °F (36.9 °C)  Max: 99.8 °F (37.7 °C)   BP  Min: 106/71  Max: 154/78    Pulse  Min: 61  Max: 93     Wt Readings from Last 3 Encounters:   08/20/20 85.5 kg (188 lb 7.9 oz)   07/02/20 86.2 kg (190 lb)   07/01/20 86.2 kg (190 lb)        Intake/Output Summary (Last 24 hours) at 8/21/2020 1210  Last data filed at 8/21/2020 0500  Gross per 24 hour   Intake 2153.75 ml   Output 925 ml   Net 1228.75 ml     Physical Exam:      General Appearance:    Well developed and well nourished in no acute distress   Head:    Normocephalic, atraumatic   Eyes:            Conjunctivae normal, non icteric, no xanthelasma   Neck:   no carotid bruit, no JVD   Lungs:     Clear to auscultation bilaterally. No wheezes, rhonchi or rales. No accessory muscle use.     Heart:    Regular rate and rhythm.  Normal S1 and S2. No murmur, no gallop, rub or lift.    Chest Wall:    No abnormalities observed   Abdomen:     Normal bowel sounds, no masses, no organomegaly, soft        non-tender, non-distended, no guarding, no rebound                tenderness   Rectal:      Deferred   Extremities:   No clubbing, cyanosis or edema.     Pulses:   Pulses palpable and equal bilaterally   Skin:   No bleeding, bruising or rash   Neurologic:   awake alert,, speech clear and approp, no facial drooping      Monitor:  nsr  Results Review:     I reviewed the patient's new clinical results.    Sodium Sodium   Date Value Ref Range Status   08/20/2020 141 136 - 145 mmol/L Final   08/19/2020 142 136 - 145 mmol/L Final      Potassium Potassium   Date Value Ref Range Status   08/20/2020 3.8 3.5 - 5.2 mmol/L Final   08/19/2020 3.2 (L) 3.5 - 5.2 mmol/L Final      Chloride Chloride   Date Value Ref Range Status   08/20/2020 112 (H) 98 - 107 mmol/L Final   08/19/2020 106 98 - 107 mmol/L Final      Bicarbonate No results found for: PLASMABICARB   BUN BUN   Date Value Ref Range Status   08/20/2020 11 8 - 23 mg/dL Final   08/19/2020 15 8 - 23 mg/dL Final      Creatinine Creatinine   Date Value Ref Range Status   08/20/2020 0.92 0.57 - 1.00 mg/dL Final   08/19/2020 1.20 0.60 - 1.30 mg/dL Final     Comment:     Serial Number: [id number]:  [id number]   08/19/2020 1.26 (H) 0.57 - 1.00 mg/dL Final      Calcium Calcium   Date Value Ref Range Status   08/20/2020 8.5 (L) 8.6 - 10.5 mg/dL Final   08/19/2020 9.3 8.6 - 10.5 mg/dL Final      Magnesium No results found for: MG     Results from last 7 days   Lab Units 08/20/20  0439   WBC 10*3/mm3 11.00*  10.66   HEMOGLOBIN g/dL 10.7*  10.7*   HEMATOCRIT % 32.0*  31.8*   PLATELETS 10*3/mm3 180  172     Lab Results   Lab Value Date/Time    TROPONINT <0.010 08/19/2020 0738    TROPONINT <0.01 05/25/2015 1758    TROPONINT <0.01 03/31/2014 1050     Lab Results   Component Value Date    CHOL 106 08/20/2020     Lab Results   Component Value Date    HDL 34 (L) 08/20/2020    HDL 47 04/14/2020    HDL 36 (L) 12/10/2018     Lab Results   Component Value Date    LDL 54 08/20/2020    LDL 73 04/14/2020     (H) 12/10/2018     Lab Results   Component Value Date    TRIG 90  08/20/2020    TRIG 101 04/14/2020    TRIG 142 12/10/2018     No components found for: CHOLHDL    Results from last 7 days   Lab Units 08/19/20  0738   INR  0.99         Echo EF Estimated  Lab Results   Component Value Date    ECHOEFEST 60 08/20/2020          Assessment/ Plan   Acute CVA (cerebrovascular accident) (CMS/[facility])  She received TPA in the emergency room her angiogram showed recannulization of the basilar artery, no other intervention required.  Neurology felt that the stroke looks cardioembolic.  Coronary artery disease.  Previous stents to the LAD and right coronary artery.  Last stent was in 2016.  No documented history of atrial fibrillation in the prior office records   hypertension  Hyperlipidemia, has had problems taking statins previously  History of possible TIA in the past  Obesity  COPD  History of lupus anticoagulant  Normal LVEF 60% in June 2019  EGD November 2019 showing some gastritis, history of GERD but no esophagitis  #1 remains stable cardiovascular wise at this time.  #2 CT of the head today demonstrates subacute left temporal occipital infarct involving.  No hemorrhage seen.  She has DVTs in the right leg and is on Lovenox initially and then will be transitioned to a NOAC.  #3 there is a possible history of a lupus anticoagulant that is being checked.  If there is no indication for long-term anticoagulation related to this problem then she is certainly a candidate to consider closure of the moderate-sized PFO that she has since with the DVTs and the PFO we must consider this could be paradoxical embolism causing the stroke.  #4 she is a patient of Dr. Pedro Pablo Lomas in our group and I am attempting to reach him to let him know about all this since she will be following up with him.  Shai Omalley MD  08/21/20  12:10      Time: 19 minutes

## 2020-08-22 PROCEDURE — 94799 UNLISTED PULMONARY SVC/PX: CPT

## 2020-08-22 PROCEDURE — 97110 THERAPEUTIC EXERCISES: CPT

## 2020-08-22 PROCEDURE — 25010000002 ENOXAPARIN PER 10 MG: Performed by: PSYCHIATRY & NEUROLOGY

## 2020-08-22 RX ORDER — ROSUVASTATIN CALCIUM 20 MG/1
20 TABLET, COATED ORAL NIGHTLY
Status: DISCONTINUED | OUTPATIENT
Start: 2020-08-22 | End: 2020-08-25 | Stop reason: HOSPADM

## 2020-08-22 RX ORDER — LOPERAMIDE HYDROCHLORIDE 2 MG/1
2 CAPSULE ORAL 4 TIMES DAILY PRN
Status: DISCONTINUED | OUTPATIENT
Start: 2020-08-22 | End: 2020-08-25 | Stop reason: HOSPADM

## 2020-08-22 RX ADMIN — CARBIDOPA AND LEVODOPA 1 TABLET: 10; 100 TABLET ORAL at 21:05

## 2020-08-22 RX ADMIN — LOPERAMIDE HYDROCHLORIDE 2 MG: 2 CAPSULE ORAL at 21:05

## 2020-08-22 RX ADMIN — LISINOPRIL 20 MG: 20 TABLET ORAL at 08:15

## 2020-08-22 RX ADMIN — CITALOPRAM 20 MG: 20 TABLET, FILM COATED ORAL at 08:15

## 2020-08-22 RX ADMIN — SODIUM CHLORIDE, PRESERVATIVE FREE 10 ML: 5 INJECTION INTRAVENOUS at 21:06

## 2020-08-22 RX ADMIN — METOPROLOL TARTRATE 50 MG: 50 TABLET, FILM COATED ORAL at 08:15

## 2020-08-22 RX ADMIN — BUDESONIDE AND FORMOTEROL FUMARATE DIHYDRATE 2 PUFF: 160; 4.5 AEROSOL RESPIRATORY (INHALATION) at 09:58

## 2020-08-22 RX ADMIN — ARIPIPRAZOLE 5 MG: 5 TABLET ORAL at 08:15

## 2020-08-22 RX ADMIN — GABAPENTIN 400 MG: 400 CAPSULE ORAL at 21:05

## 2020-08-22 RX ADMIN — BUDESONIDE AND FORMOTEROL FUMARATE DIHYDRATE 2 PUFF: 160; 4.5 AEROSOL RESPIRATORY (INHALATION) at 19:35

## 2020-08-22 RX ADMIN — GABAPENTIN 400 MG: 400 CAPSULE ORAL at 08:15

## 2020-08-22 RX ADMIN — SODIUM CHLORIDE, PRESERVATIVE FREE 10 ML: 5 INJECTION INTRAVENOUS at 08:15

## 2020-08-22 RX ADMIN — CARBIDOPA AND LEVODOPA 1 TABLET: 10; 100 TABLET ORAL at 08:15

## 2020-08-22 RX ADMIN — METOPROLOL TARTRATE 50 MG: 50 TABLET, FILM COATED ORAL at 20:59

## 2020-08-22 RX ADMIN — ENOXAPARIN SODIUM 90 MG: 100 INJECTION SUBCUTANEOUS at 06:03

## 2020-08-22 RX ADMIN — PANTOPRAZOLE SODIUM 40 MG: 40 TABLET, DELAYED RELEASE ORAL at 04:42

## 2020-08-22 RX ADMIN — ROSUVASTATIN CALCIUM 20 MG: 20 TABLET, FILM COATED ORAL at 21:05

## 2020-08-22 RX ADMIN — CARBIDOPA AND LEVODOPA 1 TABLET: 10; 100 TABLET ORAL at 16:50

## 2020-08-22 RX ADMIN — AMLODIPINE BESYLATE 5 MG: 5 TABLET ORAL at 08:15

## 2020-08-22 RX ADMIN — ENOXAPARIN SODIUM 90 MG: 100 INJECTION SUBCUTANEOUS at 18:25

## 2020-08-22 NOTE — PROGRESS NOTES
PROGRESS NOTE  Patient Name: Dalila Valerio  Age/Sex: 69 y.o. female  : 1950  MRN: [id number]    Date of Admission: 2020  Date of Encounter Visit: 20   LOS: 3 days   Patient Care Team:  Cinthya Valentine APRN as PCP - General  Cinthya Valentine APRN as PCP - Family Medicine  Cinthya Valentine APRN as PCP - Claims Attributed  Devyn Velazco MD as Consulting Physician (Pain Medicine)  Bogdan Olmedo MD as Surgeon (Orthopedic Surgery)  Pedro Pablo Lomas MD as Consulting Physician (Cardiology)    Chief Complaint: Besides complaining about the hospital for the patient has no complaints    Hospital course: Admitted with acute right-sided weakness with aphasia, had acute CVA status post TPA with good clinical response.  Had an acute DVT, is on Lovenox and the plan is to transition to the new agent oral anticoagulation once her nausea and vomiting are safely controlled.  The patient reported no vomiting for the last 24 hours, she is complaining of diarrhea however.  No fever or chills, no shortness of breath, on room air    Interval History: Improving nausea and vomiting, positive diarrhea, acute DVT on anticoagulation with no bleeding, good neurological recovery, she is able to walk and ambulate with minimal assistance, the plan is to go home.  Speech is back to normal but she is still complaining of some memory issues.    REVIEW OF SYSTEMS:   CONSTITUTIONAL: no fever or chills  CARDIOVASCULAR: No chest pain, chest pressure or chest discomfort. No palpitations or edema.   RESPIRATORY: No shortness of breath, cough or sputum.   GASTROINTESTINAL: Positive for diarrhea, normal nausea vomiting for the last 24 hours. No abdominal pain or blood.   HEMATOLOGIC: No bleeding or bruising.     Ventilator/Non-Invasive Ventilation Settings (From admission, onward)    None            Vital Signs  Temp:  [98.7 °F (37.1 °C)-99.4 °F (37.4 °C)] 98.9 °F (37.2 °C)  Heart Rate:  [60-71] 60  Resp:  [16-27] 20  BP:  "(112-118)/(52-60) 116/58  SpO2:  [92 %-96 %] 95 %  on    Device (Oxygen Therapy): room air    Intake/Output Summary (Last 24 hours) at 8/22/2020 1125  Last data filed at 8/22/2020 0900  Gross per 24 hour   Intake 460 ml   Output --   Net 460 ml     Flowsheet Rows      First Filed Value   Admission Height  157.5 cm (62.01\") Documented at 08/20/2020 1043   Admission Weight  85.5 kg (188 lb 9 oz) Documented at 08/19/2020 0737        Body mass index is 34.83 kg/m².      08/19/20  1630 08/20/20  1043 08/22/20  0454   Weight: 85.5 kg (188 lb 7.9 oz) 85.5 kg (188 lb 7.9 oz) (not weighed by RD) 86.4 kg (190 lb 7.6 oz)       Physical Exam:  GEN:  No acute distress, alert, cooperative, well developed   EYES:   Sclerae clear. No icterus. PERRL. Normal EOM  ENT:   External ears/nose normal, no oral lesions, no thrush, mucous membranes moist  NECK:  Supple, midline trachea, no JVD  LUNGS: Normal chest on inspection, CTAB, no wheezes. No rhonchi. No crackles. Respirations regular, even and unlabored.   CV:  Regular rhythm and rate. Normal S1/S2. No murmurs, gallops, or rubs noted.  ABD:  Soft, nontender and nondistended. Normal bowel sounds. No guarding  EXT:  Moves all extremities well. No cyanosis. No redness. No edema.   Skin: Dry, intact, no bleeding    Results Review:      Results from last 7 days   Lab Units 08/20/20  0439 08/19/20  0745 08/19/20  0738   SODIUM mmol/L 141  --  142   POTASSIUM mmol/L 3.8  --  3.2*   CHLORIDE mmol/L 112*  --  106   CO2 mmol/L 21.0*  --  25.7   BUN mg/dL 11  --  15   CREATININE mg/dL 0.92 1.20 1.26*   CALCIUM mg/dL 8.5*  --  9.3   AST (SGOT) U/L 16  --  13   ALT (SGPT) U/L 18  --  12   ANION GAP mmol/L 8.0  --  10.3   ALBUMIN g/dL 3.30*  --  3.90     Results from last 7 days   Lab Units 08/19/20  0738   TROPONIN T ng/mL <0.010             Results from last 7 days   Lab Units 08/20/20  0439   WBC 10*3/mm3 11.00*  10.66   HEMOGLOBIN g/dL 10.7*  10.7*   HEMATOCRIT % 32.0*  31.8*   PLATELETS " 10*3/mm3 180  172   MCV fL 88.6  88.6   NEUTROPHIL % % 71.5   LYMPHOCYTE % % 19.4*   MONOCYTES % % 7.9   EOSINOPHIL % % 0.5   BASOPHIL % % 0.3   IMM GRAN % % 0.4     Results from last 7 days   Lab Units 08/19/20  0738   INR  0.99   APTT seconds 23.8         Results from last 7 days   Lab Units 08/20/20  0439   CHOLESTEROL mg/dL 106   TRIGLYCERIDES mg/dL 90   HDL CHOL mg/dL 34*         Results from last 7 days   Lab Units 08/20/20  0439   HEMOGLOBIN A1C % 7.90*     Glucose   Date/Time Value Ref Range Status   08/21/2020 1157 129 70 - 130 mg/dL Final   08/20/2020 1737 131 (H) 70 - 130 mg/dL Final   08/20/2020 0335 128 70 - 130 mg/dL Final   08/19/2020 2333 137 (H) 70 - 130 mg/dL Final   08/19/2020 1950 137 (H) 70 - 130 mg/dL Final   08/19/2020 1744 131 (H) 70 - 130 mg/dL Final                 Results from last 7 days   Lab Units 08/19/20  0811   ADENOVIRUS DETECTION BY PCR  Not Detected   CORONAVIRUS 229E  Not Detected   CORONAVIRUS HKU1  Not Detected   CORONAVIRUS NL63  Not Detected   CORONAVIRUS OC43  Not Detected   HUMAN METAPNEUMOVIRUS  Not Detected   HUMAN RHINOVIRUS/ENTEROVIRUS  Not Detected   INFLUENZA B PCR  Not Detected   PARAINFLUENZA 1  Not Detected   PARAINFLUENZA VIRUS 2  Not Detected   PARAINFLUENZA VIRUS 3  Not Detected   PARAINFLUENZA VIRUS 4  Not Detected   BORDETELLA PERTUSSIS PCR  Not Detected   BORDETELLA PARAPERTUSSIS PCR  Not Detected   [id number]  Not Detected   CHLAMYDOPHILA PNEUMONIAE PCR  Not Detected   MYCOPLAMA PNEUMO PCR  Not Detected   INFLUENZA A H3  Not Detected   INFLUENZA A H1  Not Detected   RSV, PCR  Not Detected               Imaging:   Imaging Results (All)        I reviewed the patient's new clinical results.  I personally viewed and interpreted the patient's imaging results: No new films for me to review        Medication Review:     amLODIPine 5 mg Oral Daily   ARIPiprazole 5 mg Oral Daily   budesonide-formoterol 2 puff Inhalation BID   carbidopa-levodopa 1 tablet Oral TID      citalopram 20 mg Oral Daily   enoxaparin 1 mg/kg Subcutaneous Q12H   gabapentin 400 mg Oral Q12H   iodixanol 100 mL Intra-arterial Once in imaging   lisinopril 20 mg Oral Daily   metoprolol tartrate 50 mg Oral BID   pantoprazole 40 mg Oral QAM   rosuvastatin 20 mg Oral Nightly   senna 1 tablet Oral Nightly   sodium chloride 1,000 mL Intravenous Once   sodium chloride 10 mL Intravenous Q12H   sodium chloride 100 mL/hr Intravenous Once            ASSESSMENT:   1. Acute left temporal CVA status post thrombolytic with acute aphasia and right-sided hemiparesis on presentation, resolved  2. 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 Acute encephalopathy, improved  4. Coronary artery disease with prior MI and angioplasty  5. Right lower extremity DVT on oral anticoagulation now for that and for the stroke  6. Bronchiectasis Mycobacterium avium infection  7. Williamson's esophagus with acid reflux  8. Morbid obesity  9. Obstructive sleep apnea on CPAP  10. Chronic kidney disease type II, her GFR did improve down to almost normal level over the hospital  11. Nausea and vomiting      PLAN:  Neurology note reviewed, the plan is to transition to the NOAC once patient is able to take p.o. reliably given her vomiting earlier  Neurologically she is back to baseline, complaining of some subjective generalized weakness but no focal deficit.  The diarrhea is  unlikely to be due to any infection, no antibiotic and no leukocytosis to suspect C. difficile colitis  History of statin intolerance but seems to be tolerating Crestor 20 mg prior to presentation, resumed  We will add some Imodium for the diarrhea      Discussed with patient, neurology note reviewed, plan is to discharge home hopefully once transition to oral anticoagulation, anticipate discharge tomorrow if okay with consultant    Disposition:     Gerardo Thornton MD  08/22/20  11:25         Dictated utilizing Dragon dictation

## 2020-08-22 NOTE — NURSING NOTE
Evaluation for acute rehab. Spoke with both pt and spouse (in room). She reports feeling like she did much better today with therapy and also states she is concerned about staying at any rehab because of the fear over catching anything.   Pt states her spouse is able to assist and be with her 24/7 and has a cane, walker, shower chair, grab bars in the shower. Discussed high risk for falls and need for asst at all times with transfers, ambulation, showering, toileting. Both verbalized understanding.   Told them I will follow back up tomorrow and encouraged them to discuss rehab vs home with their physician.     Che Stallings RN  Acute Rehab Admission Nurse  [phone]

## 2020-08-22 NOTE — PROGRESS NOTES
"Patient Care Team:  Cinthya Valentine APRN as PCP - General  Cinthya Valentine APRN as PCP - Family Medicine  Cinthya Valentine APRN as PCP - Claims Attributed  Devyn Velazco MD as Consulting Physician (Pain Medicine)  Bogdan Olmedo MD as Surgeon (Orthopedic Surgery)  Pedro Pablo Lomas MD as Consulting Physician (Cardiology)    Sub: No chest pain   Tele with normal sinus rhythm     Objective   Vital Signs  Temp:  [98.5 °F (36.9 °C)-99.4 °F (37.4 °C)] 98.5 °F (36.9 °C)  Heart Rate:  [60-71] 68  Resp:  [18-27] 22  BP: (112-118)/(52-60) 112/60    Intake/Output Summary (Last 24 hours) at 8/22/2020 1627  Last data filed at 8/22/2020 0900  Gross per 24 hour   Intake 460 ml   Output --   Net 460 ml     Flowsheet Rows      First Filed Value   Admission Height  157.5 cm (62.01\") Documented at 08/20/2020 1043   Admission Weight  85.5 kg (188 lb 9 oz) Documented at 08/19/2020 0737          Physical Exam:   General Appearance:    Alert, cooperative, in no acute distress   Lungs:     Clear to auscultation,respirations regular, even and                  unlabored    Heart:    Regular rhythm and normal rate, normal S1 and S2, no            murmur, no gallop, no rub, no click   Chest Wall:    No abnormalities observed   Abdomen:     Normal bowel sounds, no masses, no organomegaly, soft        non-tender, non-distended, no guarding, no rebound                tenderness   Extremities:    no edema, no cyanosis, no             redness     Results Review:    Results from last 7 days   Lab Units 08/20/20  0439   SODIUM mmol/L 141   POTASSIUM mmol/L 3.8   CHLORIDE mmol/L 112*   CO2 mmol/L 21.0*   BUN mg/dL 11   CREATININE mg/dL 0.92   GLUCOSE mg/dL 129*   CALCIUM mg/dL 8.5*     Results from last 7 days   Lab Units 08/19/20  0738   TROPONIN T ng/mL <0.010     Results from last 7 days   Lab Units 08/20/20  0439   WBC 10*3/mm3 11.00*  10.66   HEMOGLOBIN g/dL 10.7*  10.7*   HEMATOCRIT % 32.0*  31.8*   PLATELETS 10*3/mm3 180  172 "     Results from last 7 days   Lab Units 08/19/20  0738   INR  0.99   APTT seconds 23.8         Results from last 7 days   Lab Units 08/20/20  0439   CHOLESTEROL mg/dL 106   TRIGLYCERIDES mg/dL 90   HDL CHOL mg/dL 34*   LDL CHOL mg/dL 54         amLODIPine 5 mg Oral Daily   ARIPiprazole 5 mg Oral Daily   budesonide-formoterol 2 puff Inhalation BID   carbidopa-levodopa 1 tablet Oral TID   citalopram 20 mg Oral Daily   enoxaparin 1 mg/kg Subcutaneous Q12H   gabapentin 400 mg Oral Q12H   iodixanol 100 mL Intra-arterial Once in imaging   lisinopril 20 mg Oral Daily   metoprolol tartrate 50 mg Oral BID   pantoprazole 40 mg Oral QAM   rosuvastatin 20 mg Oral Nightly   senna 1 tablet Oral Nightly   sodium chloride 1,000 mL Intravenous Once   sodium chloride 10 mL Intravenous Q12H   sodium chloride 100 mL/hr Intravenous Once            I reviewed the patient's new clinical results.  I personally viewed and interpreted the patient's EKG/Telemetry data    Assessment/Plan    1. Acute left temporal CVA status post thrombolytic with acute aphasia and right-sided hemiparesis on presentation, resolved  2. 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 Coronary artery disease with prior PCI   4. Right lower extremity DVT on oral anticoagulation now for that and for the stroke  5. Williamson's esophagus with acid reflux  6. Morbid obesity / Obstructive sleep apnea on CPAP  7. Moderate size PFO with shunting   8.    Lupus anticoagulant ?         She is currently stable from cardiac standpoint   PFO identified and now with embolic stroke and DVT     Continue anticoagulation     If lupus anticoagulant is negative and no long term anticoagulation indicated , then we will consider closing PFO   If long term anticoagulation recommended then medical management is plenty         08/22/20  16:27

## 2020-08-22 NOTE — PLAN OF CARE
Problem: Patient Care Overview  Goal: Plan of Care Review  Outcome: Ongoing (interventions implemented as appropriate)  Flowsheets (Taken 8/22/2020 0505)  Progress: no change  Plan of Care Reviewed With: patient  Outcome Summary: t is alert with confusion. Continue on NIH (3). Assist x1 to bedside commode. Pt complained of stomach ache schedule protonix given earlier.Will continue to monitor.     Problem: Patient Care Overview  Goal: Individualization and Mutuality  Outcome: Ongoing (interventions implemented as appropriate)     Problem: Fall Risk (Adult)  Goal: Identify Related Risk Factors and Signs and Symptoms  Outcome: Ongoing (interventions implemented as appropriate)     Problem: Fall Risk (Adult)  Goal: Absence of Fall  Outcome: Ongoing (interventions implemented as appropriate)     Problem: Skin Injury Risk (Adult)  Goal: Identify Related Risk Factors and Signs and Symptoms  Outcome: Ongoing (interventions implemented as appropriate)     Problem: Stroke (Ischemic) (Adult)  Goal: Signs and Symptoms of Listed Potential Problems Will be Absent, Minimized or Managed (Stroke)  Outcome: Ongoing (interventions implemented as appropriate)

## 2020-08-22 NOTE — THERAPY TREATMENT NOTE
Patient Name: Dalila Valerio  : 1950    MRN: [id number]                              Today's Date: 2020       Admit Date: 2020    Visit Dx:     ICD-10-CM ICD-9-CM   1. Acute CVA (cerebrovascular accident) (CMS/HCC) I63.9 434.91     Patient Active Problem List   Diagnosis   • Adjustment disorder with mixed anxiety and depressed mood   • Atopic rhinitis   • Coronary arteriosclerosis in native artery   • Cobalamin deficiency   • Benign colonic polyp   • Lumbar radiculopathy   • Controlled diabetes mellitus type 2 with complications (CMS/HCC)   • Binocular vision disorder with diplopia   • Dyslipidemia   • Arthropathy of hand   • Knee pain   • Cramps of lower extremity   • Low back pain   • Migraine with typical aura   • Chronic nausea   • Obstructive sleep apnea syndrome   • Palpitations   • Ventricular premature beats   • Cephalalgia   • Colonic constipation   • Neurocardiogenic syncope   • Vitamin D deficiency   • DODSON (nonalcoholic steatohepatitis)   • Fibromyalgia   • Morbidly obese (CMS/HCC)   • Polyp of colon   • Family history of colonic polyps   • Family history of malignant neoplasm of colon   • Acute CVA (cerebrovascular accident) (CMS/HCC)     Past Medical History:   Diagnosis Date   • Abnormal weight gain    • Acquired trigger finger    • Acute myocardial infarction (CMS/HCC)    • Adjustment reaction with mixed emotional features    • Arthritis    • ASHD (arteriosclerotic heart disease)    • Asthma    • B12 deficiency    • Bacterial pneumonia    • Williamson esophagus    • Barretts esophagus    • Benign colonic polyp    • Bilateral knee pain    • Birthmark     haemangioma of the bone   • Bronchiectasis (CMS/HCC)    • CHF (congestive heart failure) (CMS/HCC)    • Chronic cough    • Chronic glomerulonephritis with exudative nephritis    • Chronic lumbar radiculopathy    • Diabetes mellitus (CMS/HCC)    • Diabetic peripheral neuropathy (CMS/HCC)    • Dyslipidemia    • Fibromyositis    • GERD  (gastroesophageal reflux disease)    • Herpes zoster    • Hyperlipidemia    • Hypertension    • Lupus anticoagulant disorder (CMS/HCC)    • Mycobacterium avium complex (CMS/HCC)    • Nephrolithiasis    • SILVIANO (obstructive sleep apnea)    • Palpitations    • Premature ventricular contraction    • Slow transit constipation    • Stroke (CMS/HCC)    • Vitamin D deficiency      Past Surgical History:   Procedure Laterality Date   • APPENDECTOMY     • BRONCHOSCOPY     •  SECTION     • CHOLECYSTECTOMY     • COLONOSCOPY     • COLONOSCOPY N/A 2019    Procedure: COLONOSCOPY to cecum with cold biopsy and cold and hotsnare polypectomies;  Surgeon: Suzy Eubanks MD;  Location: [facility] ENDOSCOPY;  Service: Gastroenterology   • CORONARY ANGIOPLASTY WITH STENT PLACEMENT     • EMBOLECTOMY N/A 2020    Procedure: EMERGENT CEREBRAL ANGIOGRAM;  Surgeon: Jonh Meehan MD;  Location: [facility] OR ;  Service: Neurosurgery;  Laterality: N/A;   • ENDOSCOPY N/A 2019    Procedure: ESOPHAGOGASTRODUODENOSCOPY with cold biopsies;  Surgeon: Suzy Eubanks MD;  Location: [facility] ENDOSCOPY;  Service: Gastroenterology   • KNEE ARTHROSCOPY     • OTHER SURGICAL HISTORY      elbow surgery   • ROTATOR CUFF REPAIR     • TUBAL ABDOMINAL LIGATION       General Information     Row Name 20 1009          PT Evaluation Time/Intention    Document Type  therapy note (daily note)  -DO     Mode of Treatment  physical therapy  -DO     Row Name 20 1009          General Information    Patient Profile Reviewed?  yes  -DO     Existing Precautions/Restrictions  fall  -DO     Row Name 20 1009          Cognitive Assessment/Intervention- PT/OT    Orientation Status (Cognition)  oriented x 3  -DO     Row Name 20 1009          Safety Issues, Functional Mobility    Safety Issues Affecting Function (Mobility)  ability to follow commands;insight into deficits/self awareness;positioning of assistive device   -DO     Impairments Affecting Function (Mobility)  balance;endurance/activity tolerance;strength  -DO       User Key  (r) = Recorded By, (t) = Taken By, (c) = Cosigned By    Initials Name Provider Type    DO Derian Mueller PT Physical Therapist        Mobility     Row Name 08/22/20 1010          Bed Mobility Assessment/Treatment    Supine-Sit [place] (Bed Mobility)  contact guard;verbal cues;nonverbal cues (demo/gesture)  -DO     Sit-Supine [place] (Bed Mobility)  not tested ended session in chair  -DO     Assistive Device (Bed Mobility)  bed rails  -DO     Row Name 08/22/20 1010          Sit-Stand Transfer    Sit-Stand [place] (Transfers)  contact guard;verbal cues;nonverbal cues (demo/gesture)  -DO     Assistive Device (Sit-Stand Transfers)  walker, front-wheeled  -DO     Row Name 08/22/20 1010          Gait/Stairs Assessment/Training    [place] Level (Gait)  contact guard;minimum assist (75% patient effort);nonverbal cues (demo/gesture);verbal cues  -DO     Assistive Device (Gait)  walker, front-wheeled  -DO     Distance in Feet (Gait)  110  -DO     Pattern (Gait)  step-through  -DO     Deviations/Abnormal Patterns (Gait)  jose decreased;base of support, narrow  -DO     Bilateral Gait Deviations  forward flexed posture  -DO       User Key  (r) = Recorded By, (t) = Taken By, (c) = Cosigned By    Initials Name Provider Type    DO Derian Mueller PT Physical Therapist        Obj/Interventions     Row Name 08/22/20 1010          Therapeutic Exercise    Lower Extremity (Therapeutic Exercise)  -- educated on seated therex including marching, LAQs, APs x10 reps each for BLE while sitting in chair   -DO     Row Name 08/22/20 1010          Static Sitting Balance    Level of [place] (Unsupported Sitting, Static Balance)  supervision  -DO     Sitting Position (Unsupported Sitting, Static Balance)  sitting on edge of bed  -DO     Time Able to Maintain Position (Unsupported Sitting, Static  Balance)  1 to 2 minutes  -DO     [facility] Name 08/22/20 1010          Static Standing Balance    Level of [place] (Supported Standing, Static Balance)  contact guard assist  -DO     Time Able to Maintain Position (Supported Standing, Static Balance)  2 to 3 minutes  -DO     Assistive Device Utilized (Supported Standing, Static Balance)  walker, rolling  -DO       User Key  (r) = Recorded By, (t) = Taken By, (c) = Cosigned By    Initials Name Provider Type    DO Derian Mueller, PT Physical Therapist        Goals/Plan    No documentation.       Clinical Impression     Row Name 08/22/20 1011          Pain Scale: Numbers Pre/Post-Treatment    Pain Scale: Numbers, Pretreatment  0/10 - no pain  -DO     Pain Scale: Numbers, Post-Treatment  0/10 - no pain  -DO     Row Name 08/22/20 1011          Plan of Care Review    Plan of Care Reviewed With  patient  -DO     Progress  improving  -DO     Outcome Summary  Pt able to ambulate a total of 110ft with RWx and CGA-Minx1. Overall she is slightly unsteady during ambulation but demonstrates no overt LOB with CGAx1. Needs Minx1 for proper use and positioning of the RWx during ambulation today. Verbal cuing needed for hand placement during transfers to ensure safety and pt inconsistent with demonstrating her understanding. Ended session in bathroom with  presernt in room and educated both on contacting nursing to assist in getting back to the chair. Spoke with LD Hightower about progress. Pt is doing well and may benefit from continuing skilled PT to improve her independence with functional mobility.   -DO     [facility] Name 08/22/20 1011          Physical Therapy Clinical Impression    Criteria for Skilled Interventions Met (PT Clinical Impression)  yes  -DO     Rehab Potential (PT Clinical Summary)  good, to achieve stated therapy goals  -DO     [facility] Name 08/22/20 1011          Vital Signs    Pretreatment Heart Rate (beats/min)  64  -DO     Pretreatment Resp Rate (breaths/min)  20   -DO     Pre SpO2 (%)  95  -DO     O2 Delivery Pre Treatment  room air  -DO     Pre Patient Position  Supine  -DO     Intra Patient Position  Standing  -DO     Post Patient Position  Sitting  -DO     Row Name 08/22/20 1011          Positioning and Restraints    Pre-Treatment Position  in bed  -DO     Post Treatment Position  bathroom  -DO     Bathroom  notified nsg;sitting;call light within reach;encouraged to call for assist;with family/caregiver  -DO       User Key  (r) = Recorded By, (t) = Taken By, (c) = Cosigned By    Initials Name Provider Type    DO Derian Mueller PT Physical Therapist        Outcome Measures     Row Name 08/22/20 1014          How much help from another person do you currently need...    Turning from your back to your side while in flat bed without using bedrails?  4  -DO     Moving from lying on back to sitting on the side of a flat bed without bedrails?  3  -DO     Moving to and from a bed to a chair (including a wheelchair)?  3  -DO     Standing up from a chair using your arms (e.g., wheelchair, bedside chair)?  3  -DO     Climbing 3-5 steps with a railing?  2  -DO     To walk in hospital room?  3  -DO     AM-PAC 6 Clicks Score (PT)  18  -DO     Row Name 08/22/20 1014          Functional Assessment    Outcome Measure Options  AM-PAC 6 Clicks Basic Mobility (PT)  -DO       User Key  (r) = Recorded By, (t) = Taken By, (c) = Cosigned By    Initials Name Provider Type    DO Derian Mueller PT Physical Therapist        Physical Therapy Education                 Title: PT OT SLP Therapies (In Progress)     Topic: Physical Therapy (In Progress)     Point: Mobility training (In Progress)     Description:   Instruct learner(s) on safety and technique for assisting patient out of bed, chair or wheelchair.  Instruct in the proper use of assistive devices, such as walker, crutches, cane or brace.              Patient Friendly Description:   It's important to get you on your feet again, but we need  to do so in a way that is safe for you. Falling has serious consequences, and your personal safety is the most important thing of all.        When it's time to get out of bed, one of us or a family member will sit next to you on the bed to give you support.     If your doctor or nurse tells you to use a walker, crutches, a cane, or a brace, be sure you use it every time you get out of bed, even if you think you don't need it.    Learning Progress Summary           Patient Acceptance, E, NR by DO at 8/22/2020 1014    Acceptance, E,D, NR by PC at 8/21/2020 1640    Acceptance, E, DU,NR by AR at 8/20/2020 1533   Family Acceptance, E, NR by DO at 8/22/2020 1014   Significant Other Acceptance, E, DU,NR by AR at 8/20/2020 1533                   Point: Home exercise program (In Progress)     Description:   Instruct learner(s) on appropriate technique for monitoring, assisting and/or progressing patient with therapeutic exercises and activities.              Learning Progress Summary           Patient Acceptance, E, NR by DO at 8/22/2020 1014    Acceptance, E,D, NR by PC at 8/21/2020 1640    Acceptance, E, DU,NR by AR at 8/20/2020 1533   Family Acceptance, E, NR by DO at 8/22/2020 1014   Significant Other Acceptance, E, DU,NR by AR at 8/20/2020 1533                   Point: Body mechanics (In Progress)     Description:   Instruct learner(s) on proper positioning and spine alignment for patient and/or caregiver during mobility tasks and/or exercises.              Learning Progress Summary           Patient Acceptance, E, NR by DO at 8/22/2020 1014    Acceptance, E,D, NR by PC at 8/21/2020 1640    Acceptance, E, DU,NR by AR at 8/20/2020 1533   Family Acceptance, E, NR by DO at 8/22/2020 1014   Significant Other Acceptance, E, DU,NR by AR at 8/20/2020 1533                   Point: Precautions (In Progress)     Description:   Instruct learner(s) on prescribed precautions during mobility and gait tasks              Learning  Progress Summary           Patient Acceptance, E, NR by DO at 8/22/2020 1014    Acceptance, E,D, NR by PC at 8/21/2020 1640    Acceptance, E, DU,NR by AR at 8/20/2020 1533   Family Acceptance, E, NR by DO at 8/22/2020 1014   Significant Other Acceptance, E, DU,NR by AR at 8/20/2020 1533                               User Key     Initials Effective Dates Name Provider Type Discipline    PC 04/03/18 -  Sydnee Molina PT Physical Therapist PT    DO 03/07/18 -  Derian Mueller, PT Physical Therapist PT    AR 07/02/20 -  Jose Yee, PT Student PT Student PT              PT Recommendation and Plan     Plan of Care Reviewed With: patient  Progress: improving  Outcome Summary: Pt able to ambulate a total of 110ft with RWx and CGA-Minx1. Overall she is slightly unsteady during ambulation but demonstrates no overt LOB with CGAx1. Needs Minx1 for proper use and positioning of the RWx during ambulation today. Verbal cuing needed for hand placement during transfers to ensure safety and pt inconsistent with demonstrating her understanding. Ended session in bathroom with  presernt in room and educated both on contacting nursing to assist in getting back to the chair. Spoke with LD Hightower about progress. Pt is doing well and may benefit from continuing skilled PT to improve her independence with functional mobility.      Time Calculation:   PT Charges     Row Name 08/22/20 1015             Time Calculation    Start Time  0924  -DO      Stop Time  0942  -DO      Time Calculation (min)  18 min  -DO      PT Received On  08/22/20  -DO      PT - Next Appointment  08/24/20  -DO        User Key  (r) = Recorded By, (t) = Taken By, (c) = Cosigned By    Initials Name Provider Type    DO Derian Mueller, PT Physical Therapist        Therapy Charges for Today     Code Description Service Date Service Provider Modifiers Qty    [id number] HC PT THER PROC EA 15 MIN 8/22/2020 Derian Mueller, PT GP 1          PT G-Codes  Outcome Measure  Options: AM-PAC 6 Clicks Basic Mobility (PT)  AM-PAC 6 Clicks Score (PT): 18  AM-PAC 6 Clicks Score (OT): 12  Modified Julissa Scale: 4 - Moderately severe disability.  Unable to walk without assistance, and unable to attend to own bodily needs without assistance.    Derian Mueller, PT  8/22/2020

## 2020-08-22 NOTE — PROGRESS NOTES
"DOS: 2020  NAME: Dalila Valerio   : 1950  PCP: Cinthya Valentine APRN  Chief Complaint   Patient presents with   • right sided weakness   • Speech Problem     Stroke    Subjective: Patient continues to complain of nausea which she says is very atypical.  She denies vision change but does note that her right hand does not seem to be as strong.  She also notes change in memory since her stroke.  She denies headache or shortness of air.  Pt seen in follow up today, however the problem is new to the examiner.      Objective:  Vital signs: /58 (BP Location: Left arm, Patient Position: Lying)   Pulse 64   Temp 98.9 °F (37.2 °C) (Oral)   Resp 24   Ht 157.5 cm (62.01\")   Wt 86.4 kg (190 lb 7.6 oz)   SpO2 92%   BMI 34.83 kg/m²       General appearance: Well developed, well nourished, well groomed, alert and cooperative.   HEENT: Normocephalic.   Neck and spine: Normal range of motion. Normal alignment. No mass or tenderness.    Cardiac: Regular rate and rhythm. No murmurs.   Peripheral Vasculature: Radial pulses are equal and symmetric.  Chest Exam: Clear to auscultation bilaterally, no wheezes, no rhonchi.  Extremities: Normal, no edema.   Skin: No rashes or birthmarks.     Higher integrative function: Oriented to time, place, person. Impaired recent memory. No aphasia/dysarthria.   CN II: Right superior quadrantanopia.  CN III IV VI: Extraocular movements intact. Pupils are equal, round, and reactive to light.   CN V: Normal facial sensation.  CN VII: Facial movements are symmetric, no weakness.   CN VIII: Auditory acuity is normal.   CN IX & X: Symmetric palatal movement.   CN XI: Sternocleidomastoid and trapezius are normal. No weakness.   CN XII: The tongue is midline. No atrophy or fasciculations.   Motor: Normal muscle strength except LLE 4+/5.  No fasciculations, rigidity, spasticity or abnormal movements.   Sensation: Normal light touch.  Station and gait: Mildly broad-based, using " walker and asst x1.  Muscle stretch reflexes: Reflexes are decreased.  Plantar reflexes are flexor bilaterally.   Coordination: Finger to nose test showed no dysmetria. Rapid alternating movements were normal. Heel to shin normal.     Scheduled Meds:  amLODIPine 5 mg Oral Daily   ARIPiprazole 5 mg Oral Daily   budesonide-formoterol 2 puff Inhalation BID   carbidopa-levodopa 1 tablet Oral TID   citalopram 20 mg Oral Daily   enoxaparin 1 mg/kg Subcutaneous Q12H   gabapentin 400 mg Oral Q12H   iodixanol 100 mL Intra-arterial Once in imaging   lisinopril 20 mg Oral Daily   metoprolol tartrate 50 mg Oral BID   pantoprazole 40 mg Oral QAM   senna 1 tablet Oral Nightly   sodium chloride 1,000 mL Intravenous Once   sodium chloride 10 mL Intravenous Q12H   sodium chloride 100 mL/hr Intravenous Once     Continuous Infusions:   PRN Meds:.cyclobenzaprine  •  ondansetron  •  sodium chloride  •  sodium chloride    Laboratory results:  Lab Results   Component Value Date    GLUCOSE 129 (H) 08/20/2020    CALCIUM 8.5 (L) 08/20/2020     08/20/2020    K 3.8 08/20/2020    CO2 21.0 (L) 08/20/2020     (H) 08/20/2020    BUN 11 08/20/2020    CREATININE 0.92 08/20/2020    EGFRIFAFRI 48 (L) 07/02/2020    EGFRIFNONA 60 (L) 08/20/2020    BCR 12.0 08/20/2020    ANIONGAP 8.0 08/20/2020     Lab Results   Component Value Date    WBC 10.66 08/20/2020    WBC 11.00 (H) 08/20/2020    HGB 10.7 (L) 08/20/2020    HGB 10.7 (L) 08/20/2020    HCT 31.8 (L) 08/20/2020    HCT 32.0 (L) 08/20/2020    MCV 88.6 08/20/2020    MCV 88.6 08/20/2020     08/20/2020     08/20/2020     Lab Results   Component Value Date    CHOL 106 08/20/2020     Lab Results   Component Value Date    HDL 34 (L) 08/20/2020    HDL 47 04/14/2020    HDL 36 (L) 12/10/2018     Lab Results   Component Value Date    LDL 54 08/20/2020    LDL 73 04/14/2020     (H) 12/10/2018     Lab Results   Component Value Date    TRIG 90 08/20/2020    TRIG 101 04/14/2020    TRIG  142 12/10/2018     ECG 8/19 tracings viewed by me, shows NSR  Review and interpretation of imaging:MRI brain images viewed by me, shows L PCA stroke, no hemorrhagic transformation.  CT OF THE BRAIN WITHOUT CONTRAST 8/21/2020     HISTORY: Stroke.     TECHNIQUE:  Axial images were obtained through the brain without  intravenous contrast and compared to the previous study dated 8/20/2020.     FINDINGS:  Again seen is moderate size ill-defined area of low  attenuation in the left temporal occipital region consistent with an  area of acute to subacute infarction. Some of these areas demonstrate  slightly lower density than on the 8/20/2020 study but the overall  extensiveness of the infarct is probably similar.     No new areas of infarct, mass effect or hemorrhage are seen. No midline  shift is seen.     IMPRESSION:  1. Subacute left temporal occipital infarct. Some of the areas appear  slightly lower in density than on the previous study of 8/20/2020  consistent with an evolving infarct.  2. The overall size appears similar.  3. No hemorrhage is seen.           Radiation dose reduction techniques were utilized, including automated  exposure control and exposure modulation based on body size.     This report was finalized on 8/21/2020 4:34 PM by Dr. Nikos Blanchard M.D.     CT ANGIOGRAMS NECK AND HEAD WITH CONTRAST AND CT PERFUSION WITH CONTRAST     HISTORY: Stroke.     Initially, a noncontrasted CT examination of the brain was performed.  There is increased attenuation appreciated related to the tip of the  basilar and extending to the proximal aspect of the P1 segments  bilaterally. This is consistent with thrombus. There is no evidence of  hemorrhage or of a focal area of decreased attenuation to suggest acute  infarction. The above information was immediately called to Dr. Zamora. Dr. Zamora requested further evaluation with CT perfusion  and CT angiography.     CT PERFUSION: The CBF imaging demonstrates an  area of decreased blood  flow involving the medial aspect of the left occipital lobe measuring 5  mL in volume. There is a larger surrounding area of delayed perfusion.  Delayed perfusion involving the right cerebral hemisphere superiorly is  also appreciated. The total area of delayed perfusion is 54 mL. This  results in a mismatch ratio of 10.8.     CT ANGIOGRAM NECK AND HEAD WITH CONTRAST: The CT angiogram of the neck  and head was performed. Multiplanar as well as 3-dimensional  reconstructions were also generated.     The great vessels are arranged in a classic configuration. There is mild  calcified plaque appreciated involving the proximal aspects of the  internal carotid arteries bilaterally with 0% stenosis using NASCET  criteria. The proximal aspects of the anterior and middle cerebral  arteries appear unremarkable.     Both vertebral arteries were opacified. The vertebral arteries are  codominant. There is no evidence of ostial stenosis. The cervical  segments of the vertebral arteries are of normal caliber. There is  thrombus present within the distal aspect of the basilar. This is more  prominent on the right. The superior cerebellar artery is not opacified  on the right. Thrombus extends superiorly extending into the proximal  aspects of the posterior cerebral arteries bilaterally. There is severe  stenosis of the proximal aspect of the right posterior cerebral artery.  There is occlusion of the left posterior cerebral artery at its origin  with decreased opacification more distally.     A standard postcontrast CT examination of the brain showed no evidence  of abnormal enhancement.     IMPRESSION:  Thrombus within the distal basilar artery extending into the posterior  cerebral arteries bilaterally, more prominent to the left. The component  involving the distal aspect of the basilar artery is more prominent to  the right where it overlies the right superior cerebral artery. The  right superior  cerebellar artery is not opacified.     The noncontrasted CT examination of the brain was made available for  interpretation at 0744 hours and results called to Dr. Zamora at 0744  hours. The CT angiogram was made available for interpretation at 0752  hours and results called to Dr. Zamora at 0754 hours.                 Radiation dose reduction techniques were utilized, including automated  exposure control and exposure modulation based on body size.     This report was finalized on 8/19/2020 9:46 AM by Dr. Dallas Zhu M.D.     MRI BRAIN WITH AND WITHOUT CONTRAST-  08/19/2020     HISTORY: Stroke.     Multiple pre and post sagittal and axial images were obtained through  the brain. Previous CT of the brain dated 08/19/2020 is compared.     FINDINGS: On diffusion-weighted images, there is ill-defined increased  signal intensity tracking from the medial aspect of the left temporal  lobe posteriorly into the left posterior parietal and occipital lobe.  This is best seen on diffusion-weighted series 4 image 10 and measures  up to 6.9 cm in AP dimension by 1.8 cm in transverse dimension. This is  consistent with an area of acute infarction.     FLAIR images show minimal foci of bright signal intensity in the  bilateral cerebral hemispheres consistent with minimal small vessel  white matter ischemic disease. No intracranial hemorrhage is seen.     Normal-appearing vascular flow voids are seen. The CT angiogram of the  head from 08/19/2020 showed some thrombus in the distal basilar artery.  This is not well seen on the MRI. Craniocervical junction and sella  appear normal.     IMPRESSION:  1. Ill-defined acute infiltrate in the left temporooccipital region as  described.  2. No hemorrhage is seen.  3. Please see additional dictation for today's CT angiogram of the head  and neck.     This report was finalized on 8/21/2020 4:29 AM by Dr. Nikos Blanchard M.D.  Echocardiogram Findings     Left Ventricle Left  ventricular systolic function is normal. Estimated EF appears to be in the range of 56 - 60%. Estimated EF = 60%. Normal left ventricular cavity size and wall thickness noted. All left ventricular wall segments contract normally. Septal wall motion is normal. Left ventricular diastolic function is normal. Normal left atrial pressure. There is no evidence of a left ventricular mass or thrombus present.   Right Ventricle Normal right ventricular cavity size and systolic function noted. No evidence of a right ventricular thrombus present. No evidence of a right ventricular mass present.   Left Atrium Left atrial cavity size is borderline dilated. There is no spontaneous echo contrast present. Moderate patent foramen ovale present with bi-directional shunting. No evidence of an atrial septal defect present. . Saline test results are positive.  No ASD or PFO closure device in interatrial septum. The left superior and right superior pulmonary vein(s) appear normal with no flow abnormalities.Normal pulmonary vein flow.   Right Atrium Normal right atrial size noted. The inferior vena cava is small in size.   Aortic Valve The aortic valve is structurally normal. The valve appears trileaflet. No aortic valve regurgitation is present. No aortic valve stenosis is present.   Mitral Valve The mitral valve is normal in structure. Mild mitral valve regurgitation is present. No significant mitral valve stenosis is present.   Tricuspid Valve The tricuspid valve is normal. Mild tricuspid valve regurgitation is present. Estimated right ventricular systolic pressure from tricuspid regurgitation is normal (<35 mmHg).   Pulmonic Valve The pulmonic valve is structurally normal. There is mild pulmonic valve regurgitation present.   Greater Vessels No dilation of the aortic root is present. No dilation of the sinuses of Valsalva is present. There is mild plaque in the proximal aorta present. There is mild plaque in the descending aorta  present.   Pericardium There is no evidence of pericardial effusion.     Interpretation Summary     · Acute right lower extremity deep vein thrombosis noted in the gastrocnemius.  · Acute right lower extremity superficial thrombophlebitis noted in the greater saphenous (above knee).  · All other veins appeared normal bilaterally.       Impression:  This patient is a 69-year-old female with HLD, diabetes complicated by neuropathy, B12 deficiency, CAD status post stent, SILVIANO, CKD, Williamson's esophagus, and reported history of lupus anticoagulant disorder, and parkinsonism followed by Dr. Santacruz as outpatient who presented 8/19 with initial complaint of dizziness and ataxia followed by collapse with aphasia and right hemiplegia.  Team D imaging showed distal basilar thrombus and she received IV TPA.  When she went to the operating room for angiogram the basilar artery had recanalized after TPA.  She was found to have PFO with shunting and DVT and has developed nausea this admission.  She was on Effient 10 mg and Crestor 20 mg prior to arrival.    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  GREGORY 8/20: EF 60%, borderline dilated left atrial cavity size.  Saline test results positive for moderate PFO with bidirectional shunting.  Bilateral lower extremity venous ultrasound 8/20: Positive for acute right lower extremity DVT in the gastric anemias.  MRI brain 8/21: Ill-defined acute stroke in the left temporal occipital region without hemorrhage.  Most recent CT head 8/21: Subacute left temporal occipital infarct, no hemorrhage.  Labs: Hemoglobin A1c 7.90%, LDL 54, respiratory viral panel including COVID-19 negative on 8/19      Plan:  On therapeutic Lovenox with plans to switch to NOAC once taking PO  reliable (nausea). KUB unremarkable.  Outpatient follow-up with cardiology for evaluation of possible PFO closure  Follow-up antiphospholipid antibody panel, if abnormal refer to hematology  The patient has reported statin intolerance but was taking Crestor 20 mg prior to arrival, will restart  Neurochecks  BP control-normalize  Stroke Education  PT/OT/ST  Discussed with Dr. Zamora today. Will follow.

## 2020-08-22 NOTE — PLAN OF CARE
Problem: Patient Care Overview  Goal: Plan of Care Review  Outcome: Ongoing (interventions implemented as appropriate)  Flowsheets (Taken 8/22/2020 1011)  Progress: improving  Plan of Care Reviewed With: patient  Outcome Summary: Pt able to ambulate a total of 110ft with RWx and CGA-Minx1. Overall she is slightly unsteady during ambulation but demonstrates no overt LOB with CGAx1. Needs Minx1 for proper use and positioning of the RWx during ambulation today. Verbal cuing needed for hand placement during transfers to ensure safety and pt inconsistent with demonstrating her understanding. Ended session in bathroom with  presernt in room and educated both on contacting nursing to assist in getting back to the chair. Spoke with LD Hightower about progress. Pt is doing well and may benefit from continuing skilled PT to improve her independence with functional mobility. Patient was wearing a face mask during this therapy encounter. Therapist used appropriate personal protective equipment including eye protection, mask, and gloves.  Mask used was standard procedure mask. Appropriate PPE was worn during the entire therapy session. Hand hygiene was completed before and after therapy session. Patient is not in enhanced droplet precautions.

## 2020-08-23 PROCEDURE — 25010000002 ENOXAPARIN PER 10 MG: Performed by: PSYCHIATRY & NEUROLOGY

## 2020-08-23 PROCEDURE — 94799 UNLISTED PULMONARY SVC/PX: CPT

## 2020-08-23 RX ORDER — ECHINACEA PURPUREA EXTRACT 125 MG
2 TABLET ORAL AS NEEDED
Status: DISCONTINUED | OUTPATIENT
Start: 2020-08-23 | End: 2020-08-25 | Stop reason: HOSPADM

## 2020-08-23 RX ORDER — CETIRIZINE HYDROCHLORIDE 10 MG/1
5 TABLET ORAL DAILY
Status: DISCONTINUED | OUTPATIENT
Start: 2020-08-23 | End: 2020-08-25 | Stop reason: HOSPADM

## 2020-08-23 RX ADMIN — APIXABAN 5 MG: 5 TABLET, FILM COATED ORAL at 18:05

## 2020-08-23 RX ADMIN — BUDESONIDE AND FORMOTEROL FUMARATE DIHYDRATE 2 PUFF: 160; 4.5 AEROSOL RESPIRATORY (INHALATION) at 21:15

## 2020-08-23 RX ADMIN — ARIPIPRAZOLE 5 MG: 5 TABLET ORAL at 08:00

## 2020-08-23 RX ADMIN — METOPROLOL TARTRATE 50 MG: 50 TABLET, FILM COATED ORAL at 08:03

## 2020-08-23 RX ADMIN — ENOXAPARIN SODIUM 90 MG: 100 INJECTION SUBCUTANEOUS at 06:33

## 2020-08-23 RX ADMIN — ROSUVASTATIN CALCIUM 20 MG: 20 TABLET, FILM COATED ORAL at 20:13

## 2020-08-23 RX ADMIN — SODIUM CHLORIDE, PRESERVATIVE FREE 10 ML: 5 INJECTION INTRAVENOUS at 20:13

## 2020-08-23 RX ADMIN — CARBIDOPA AND LEVODOPA 1 TABLET: 10; 100 TABLET ORAL at 15:17

## 2020-08-23 RX ADMIN — AMLODIPINE BESYLATE 5 MG: 5 TABLET ORAL at 08:00

## 2020-08-23 RX ADMIN — METOPROLOL TARTRATE 50 MG: 50 TABLET, FILM COATED ORAL at 20:13

## 2020-08-23 RX ADMIN — BUDESONIDE AND FORMOTEROL FUMARATE DIHYDRATE 2 PUFF: 160; 4.5 AEROSOL RESPIRATORY (INHALATION) at 08:30

## 2020-08-23 RX ADMIN — LISINOPRIL 20 MG: 20 TABLET ORAL at 08:03

## 2020-08-23 RX ADMIN — CETIRIZINE HYDROCHLORIDE 5 MG: 10 TABLET ORAL at 18:05

## 2020-08-23 RX ADMIN — CARBIDOPA AND LEVODOPA 1 TABLET: 10; 100 TABLET ORAL at 20:13

## 2020-08-23 RX ADMIN — GABAPENTIN 400 MG: 400 CAPSULE ORAL at 20:13

## 2020-08-23 RX ADMIN — CARBIDOPA AND LEVODOPA 1 TABLET: 10; 100 TABLET ORAL at 08:00

## 2020-08-23 RX ADMIN — PANTOPRAZOLE SODIUM 40 MG: 40 TABLET, DELAYED RELEASE ORAL at 06:33

## 2020-08-23 RX ADMIN — SENNOSIDES 1 TABLET: 8.6 TABLET, FILM COATED ORAL at 20:13

## 2020-08-23 RX ADMIN — SODIUM CHLORIDE, PRESERVATIVE FREE 10 ML: 5 INJECTION INTRAVENOUS at 08:03

## 2020-08-23 RX ADMIN — GABAPENTIN 400 MG: 400 CAPSULE ORAL at 08:00

## 2020-08-23 RX ADMIN — CITALOPRAM 20 MG: 20 TABLET, FILM COATED ORAL at 08:00

## 2020-08-23 NOTE — PLAN OF CARE
Problem: Patient Care Overview  Goal: Plan of Care Review  Outcome: Ongoing (interventions implemented as appropriate)  Flowsheets (Taken 8/23/2020 0515)  Progress: improving  Plan of Care Reviewed With: patient  Outcome Summary: Pt alert and disoriented with the month. Pt requested for immodium with relief noted. Cont on NIH (2). Will continue to monitor.     Problem: Patient Care Overview  Goal: Individualization and Mutuality  Outcome: Ongoing (interventions implemented as appropriate)     Problem: Skin Injury Risk (Adult)  Goal: Identify Related Risk Factors and Signs and Symptoms  Outcome: Ongoing (interventions implemented as appropriate)     Problem: Fall Risk (Adult)  Goal: Identify Related Risk Factors and Signs and Symptoms  Outcome: Ongoing (interventions implemented as appropriate)     Problem: Stroke (Ischemic) (Adult)  Goal: Signs and Symptoms of Listed Potential Problems Will be Absent, Minimized or Managed (Stroke)  Outcome: Ongoing (interventions implemented as appropriate)

## 2020-08-23 NOTE — PROGRESS NOTES
PROGRESS NOTE  Patient Name: Dalila Valerio  Age/Sex: 69 y.o. female  : 1950  MRN: [id number]    Date of Admission: 2020  Date of Encounter Visit: 20   LOS: 4 days   Patient Care Team:  Cinthya Valentine APRN as PCP - General  Cinthya Valentine APRN as PCP - Family Medicine  Cinthya Valentine APRN as PCP - Claims Attributed  Devyn Velazco MD as Consulting Physician (Pain Medicine)  Bogdan Olmedo MD as Surgeon (Orthopedic Surgery)  Pedro Pablo Lomas MD as Consulting Physician (Cardiology)    Chief Complaint: No complaints    Hospital course: Admitted with acute right-sided weakness with aphasia, had acute CVA status post TPA with good clinical response.  Had an acute DVT, is on Lovenox and the plan is to transition to the new agent oral anticoagulation once her nausea and vomiting are safely controlled.  The patient reported no vomiting for the last 24 hours, she is complaining of diarrhea however.  No fever or chills, no shortness of breath, on room air    Interval History: Improving nausea and vomiting, positive diarrhea, acute DVT on anticoagulation with no bleeding, good neurological recovery, she is able to walk and ambulate with minimal assistance, the plan is to go home.  Speech is back to normal but she is still complaining of some memory issues.  Neuro and cardiology note reviewed, the plan is to consider closing the POF only if patient is okay to come off the anticoagulation from the DVT standpoint.      REVIEW OF SYSTEMS:   CONSTITUTIONAL: no fever or chills  CARDIOVASCULAR: No chest pain, chest pressure or chest discomfort. No palpitations or edema.   RESPIRATORY: No shortness of breath, cough or sputum.   GASTROINTESTINAL: no more complaints about diarrhea.  No abdominal pain or blood.   HEMATOLOGIC: No bleeding or bruising.     Ventilator/Non-Invasive Ventilation Settings (From admission, onward)    None            Vital Signs  Temp:  [98.6 °F (37 °C)-99 °F (37.2 °C)]  "98.7 °F (37.1 °C)  Heart Rate:  [53-74] 64  Resp:  [16-24] 22  BP: (108-122)/(62-80) 108/62  SpO2:  [94 %-97 %] 97 %  on    Device (Oxygen Therapy): room air    Intake/Output Summary (Last 24 hours) at 8/23/2020 1531  Last data filed at 8/23/2020 0824  Gross per 24 hour   Intake 480 ml   Output 0 ml   Net 480 ml     Flowsheet Rows      First Filed Value   Admission Height  157.5 cm (62.01\") Documented at 08/20/2020 1043   Admission Weight  85.5 kg (188 lb 9 oz) Documented at 08/19/2020 0737        Body mass index is 34.83 kg/m².      08/19/20  1630 08/20/20  1043 08/22/20  0454   Weight: 85.5 kg (188 lb 7.9 oz) 85.5 kg (188 lb 7.9 oz) (not weighed by RD) 86.4 kg (190 lb 7.6 oz)       Physical Exam:  GEN:  No acute distress, alert, cooperative, well developed   EYES:   Sclerae clear. No icterus. PERRL. Normal EOM  ENT:   External ears/nose normal, no oral lesions, no thrush, mucous membranes moist  NECK:  Supple, midline trachea, no JVD  LUNGS: Normal chest on inspection, CTAB, no wheezes. No rhonchi. No crackles. Respirations regular, even and unlabored.   CV:  Regular rhythm and rate. Normal S1/S2. No murmurs, gallops, or rubs noted.  ABD:  Soft, nontender and nondistended. Normal bowel sounds. No guarding  EXT:  Moves all extremities well. No cyanosis. No redness. No edema.   Skin: Dry, intact, no bleeding    Results Review:      Results from last 7 days   Lab Units 08/20/20  0439 08/19/20  0745 08/19/20  0738   SODIUM mmol/L 141  --  142   POTASSIUM mmol/L 3.8  --  3.2*   CHLORIDE mmol/L 112*  --  106   CO2 mmol/L 21.0*  --  25.7   BUN mg/dL 11  --  15   CREATININE mg/dL 0.92 1.20 1.26*   CALCIUM mg/dL 8.5*  --  9.3   AST (SGOT) U/L 16  --  13   ALT (SGPT) U/L 18  --  12   ANION GAP mmol/L 8.0  --  10.3   ALBUMIN g/dL 3.30*  --  3.90     Results from last 7 days   Lab Units 08/19/20  0738   TROPONIN T ng/mL <0.010             Results from last 7 days   Lab Units 08/20/20  0439   WBC 10*3/mm3 11.00*  10.66   "   HEMOGLOBIN g/dL 10.7*  10.7*   HEMATOCRIT % 32.0*  31.8*   PLATELETS 10*3/mm3 180  172   MCV fL 88.6  88.6   NEUTROPHIL % % 71.5   LYMPHOCYTE % % 19.4*   MONOCYTES % % 7.9   EOSINOPHIL % % 0.5   BASOPHIL % % 0.3   IMM GRAN % % 0.4     Results from last 7 days   Lab Units 08/19/20  0738   INR  0.99   APTT seconds 23.8         Results from last 7 days   Lab Units 08/20/20  0439   CHOLESTEROL mg/dL 106   TRIGLYCERIDES mg/dL 90   HDL CHOL mg/dL 34*         Results from last 7 days   Lab Units 08/20/20  0439   HEMOGLOBIN A1C % 7.90*     Glucose   Date/Time Value Ref Range Status   08/21/2020 1157 129 70 - 130 mg/dL Final   08/20/2020 1737 131 (H) 70 - 130 mg/dL Final                 Results from last 7 days   Lab Units 08/19/20  0811   ADENOVIRUS DETECTION BY PCR  Not Detected   CORONAVIRUS 229E  Not Detected   CORONAVIRUS HKU1  Not Detected   CORONAVIRUS NL63  Not Detected   CORONAVIRUS OC43  Not Detected   HUMAN METAPNEUMOVIRUS  Not Detected   HUMAN RHINOVIRUS/ENTEROVIRUS  Not Detected   INFLUENZA B PCR  Not Detected   PARAINFLUENZA 1  Not Detected   PARAINFLUENZA VIRUS 2  Not Detected   PARAINFLUENZA VIRUS 3  Not Detected   PARAINFLUENZA VIRUS 4  Not Detected   BORDETELLA PERTUSSIS PCR  Not Detected   BORDETELLA PARAPERTUSSIS PCR  Not Detected   [id number]  Not Detected   CHLAMYDOPHILA PNEUMONIAE PCR  Not Detected   MYCOPLAMA PNEUMO PCR  Not Detected   INFLUENZA A H3  Not Detected   INFLUENZA A H1  Not Detected   RSV, PCR  Not Detected               Imaging:   Imaging Results (All)        I reviewed the patient's new clinical results.  I personally viewed and interpreted the patient's imaging results: No new films for me to review        Medication Review:     amLODIPine 5 mg Oral Daily   apixaban 5 mg Oral Q12H   ARIPiprazole 5 mg Oral Daily   budesonide-formoterol 2 puff Inhalation BID   carbidopa-levodopa 1 tablet Oral TID   citalopram 20 mg Oral Daily   gabapentin 400 mg Oral Q12H   iodixanol 100 mL  Intra-arterial Once in imaging   lisinopril 20 mg Oral Daily   metoprolol tartrate 50 mg Oral BID   pantoprazole 40 mg Oral QAM   rosuvastatin 20 mg Oral Nightly   senna 1 tablet Oral Nightly   sodium chloride 1,000 mL Intravenous Once   sodium chloride 10 mL Intravenous Q12H   sodium chloride 100 mL/hr Intravenous Once            ASSESSMENT:   1. Acute left temporal CVA status post thrombolytic with acute aphasia and right-sided hemiparesis on presentation, resolved  2. 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 Acute encephalopathy, improved  4. Coronary artery disease with prior MI and angioplasty  5. Right lower extremity DVT on oral anticoagulation now for that and for the stroke  6. Bronchiectasis Mycobacterium avium infection  7. Williamson's esophagus with acid reflux  8. Morbid obesity  9. Obstructive sleep apnea on CPAP  10. Chronic kidney disease type II, her GFR did improve down to almost normal level over the hospital  11. Nausea and vomiting      PLAN:  Ne patient was transitioned to the oral anticoagulation, waiting on the hypercoagulable work-up, if no long-term indication for anticoagulation then the patient can be protected with closure of the PFO, the results of the hypercoagulable work-up however are still pending.  If the patient is going to require anticoagulation for the first 3 months anyway because of the DVT then she can be discharged home and follow-up with the cardiology and neurology as an outpatient.  We will discuss with the other to consult in the morning and the plan is to discharge home with further evaluation as an outpatient since she is doing better from the medical standpoint otherwise and is on oral regimen and she continues to improve.        Disposition: Hopefully home in ..    Gerardo Thornton MD  08/23/20  15:31         Dictated utilizing Dragon dictation

## 2020-08-23 NOTE — PROGRESS NOTES
"Patient Care Team:  Cinthya Valentine APRN as PCP - General  Cinthya Valentine APRN as PCP - Family Medicine  Cinthya Valentine APRN as PCP - Claims Attributed  Devyn Velazco MD as Consulting Physician (Pain Medicine)  Bogdan Olmedo MD as Surgeon (Orthopedic Surgery)  Pedro Pablo Lomas MD as Consulting Physician (Cardiology)    Sub: no chest pain   Tele stable     Objective   Vital Signs  Temp:  [98.5 °F (36.9 °C)-99 °F (37.2 °C)] 98.8 °F (37.1 °C)  Heart Rate:  [53-74] 67  Resp:  [16-24] 16  BP: (112-122)/(60-80) 122/80    Intake/Output Summary (Last 24 hours) at 8/23/2020 1216  Last data filed at 8/23/2020 0824  Gross per 24 hour   Intake 480 ml   Output 0 ml   Net 480 ml     Flowsheet Rows      First Filed Value   Admission Height  157.5 cm (62.01\") Documented at 08/20/2020 1043   Admission Weight  85.5 kg (188 lb 9 oz) Documented at 08/19/2020 0737          Physical Exam:   General Appearance:    Alert, cooperative, in no acute distress   Lungs:     Clear to auscultation,respirations regular, even and                  unlabored    Heart:    Regular rhythm and normal rate, normal S1 and S2, no            murmur, no gallop, no rub, no click   Chest Wall:    No abnormalities observed   Abdomen:     Normal bowel sounds, no masses, no organomegaly, soft        non-tender, non-distended, no guarding, no rebound                tenderness   Extremities:   no edema, no cyanosis, no redness     Results Review:    Results from last 7 days   Lab Units 08/20/20  0439   SODIUM mmol/L 141   POTASSIUM mmol/L 3.8   CHLORIDE mmol/L 112*   CO2 mmol/L 21.0*   BUN mg/dL 11   CREATININE mg/dL 0.92   GLUCOSE mg/dL 129*   CALCIUM mg/dL 8.5*     Results from last 7 days   Lab Units 08/19/20  0738   TROPONIN T ng/mL <0.010     Results from last 7 days   Lab Units 08/20/20  0439   WBC 10*3/mm3 11.00*  10.66   HEMOGLOBIN g/dL 10.7*  10.7*   HEMATOCRIT % 32.0*  31.8*   PLATELETS 10*3/mm3 180  172     Results from last 7 days "   Lab Units 08/19/20  0738   INR  0.99   APTT seconds 23.8         Results from last 7 days   Lab Units 08/20/20  0439   CHOLESTEROL mg/dL 106   TRIGLYCERIDES mg/dL 90   HDL CHOL mg/dL 34*   LDL CHOL mg/dL 54         amLODIPine 5 mg Oral Daily   apixaban 5 mg Oral Q12H   ARIPiprazole 5 mg Oral Daily   budesonide-formoterol 2 puff Inhalation BID   carbidopa-levodopa 1 tablet Oral TID   citalopram 20 mg Oral Daily   gabapentin 400 mg Oral Q12H   iodixanol 100 mL Intra-arterial Once in imaging   lisinopril 20 mg Oral Daily   metoprolol tartrate 50 mg Oral BID   pantoprazole 40 mg Oral QAM   rosuvastatin 20 mg Oral Nightly   senna 1 tablet Oral Nightly   sodium chloride 1,000 mL Intravenous Once   sodium chloride 10 mL Intravenous Q12H   sodium chloride 100 mL/hr Intravenous Once            I reviewed the patient's new clinical results.  I personally viewed and interpreted the patient's EKG/Telemetry data    Assessment/Plan  1. Acute left temporal CVA status post thrombolytic with acute aphasia and right-sided hemiparesis on presentation, resolved  2. 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 Coronary artery disease with prior PCI   4. Right lower extremity DVT on oral anticoagulation now for that and for the stroke  5. Williamson's esophagus with acid reflux  6. Morbid obesity / Obstructive sleep apnea on CPAP  7. Moderate size PFO with shunting   8.    Lupus anticoagulant ?            She is currently stable from cardiac standpoint   PFO identified and now with embolic stroke and DVT      Continue anticoagulation , eliquis initiated      If lupus anticoagulant is negative and no long term anticoagulation indicated , then we will consider closing PFO   If long term anticoagulation recommended then medical management is plenty     We will see her as needed.   She can follow with Dr Pedro Pablo Lomas at [facility], primary cardiologist in 2 weeks   I will see her  in my clinic in 6 weeks        Keshia Travis MD  08/23/20  12:16

## 2020-08-23 NOTE — PROGRESS NOTES
"DOS: 2020  NAME: Dalila Valerio   : 1950  PCP: Cinthya Valentine APRN  Chief Complaint   Patient presents with   • right sided weakness   • Speech Problem     Stroke    Subjective: She notes that she had some nausea overnight as well as loose stools.  She denies headache.  Notes blurry vision but no double vision and ongoing weakness of her right hand.  No headache or shortness of air.    Objective:  Vital signs: /80 (BP Location: Left arm, Patient Position: Sitting)   Pulse 67   Temp 98.8 °F (37.1 °C) (Oral)   Resp 16   Ht 157.5 cm (62.01\")   Wt 86.4 kg (190 lb 7.6 oz)   SpO2 96%   BMI 34.83 kg/m²       General appearance: Well developed, well nourished, well groomed, alert and cooperative.   HEENT: Normocephalic.   Neck and spine: Normal range of motion. Normal alignment. No mass or tenderness.    Cardiac: Regular rate and rhythm. No murmurs.   Peripheral Vasculature: Radial pulses are equal and symmetric.  Chest Exam: Clear to auscultation bilaterally, no wheezes, no rhonchi.  Extremities: Normal, no edema.   Skin: No rashes or birthmarks.      Higher integrative function: Oriented to time, place, person. Impaired recent memory. No aphasia/dysarthria.   CN II: Right superior quadrantanopia.  CN III IV VI: Mildly disconjugate gaze. Extraocular movements intact. Pupils are equal, round, and reactive to light.   CN V: Mildly decreased right facial sensation  CN VII: Facial movements are symmetric, no weakness.   CN VIII: Auditory acuity is normal.   CN IX & X: Symmetric palatal movement.   CN XI: Sternocleidomastoid and trapezius are normal. No weakness.   CN XII: The tongue is midline. No atrophy or fasciculations.   Motor: Normal muscle strength. No fasciculations, rigidity, spasticity or abnormal movements.   Sensation: Light touch intact throughout, decreased in the right arm and leg.  Station and gait:  And/a  Muscle stretch reflexes: Reflexes are decreased.  Plantar reflexes are " flexor bilaterally.   Coordination: Finger to nose test showed no dysmetria. Rapid alternating movements were normal. Heel to shin normal.   Patient reexamined, changes noted.    Scheduled Meds:  amLODIPine 5 mg Oral Daily   ARIPiprazole 5 mg Oral Daily   budesonide-formoterol 2 puff Inhalation BID   carbidopa-levodopa 1 tablet Oral TID   citalopram 20 mg Oral Daily   enoxaparin 1 mg/kg Subcutaneous Q12H   gabapentin 400 mg Oral Q12H   iodixanol 100 mL Intra-arterial Once in imaging   lisinopril 20 mg Oral Daily   metoprolol tartrate 50 mg Oral BID   pantoprazole 40 mg Oral QAM   rosuvastatin 20 mg Oral Nightly   senna 1 tablet Oral Nightly   sodium chloride 1,000 mL Intravenous Once   sodium chloride 10 mL Intravenous Q12H   sodium chloride 100 mL/hr Intravenous Once     Continuous Infusions:   PRN Meds:.cyclobenzaprine  •  loperamide  •  ondansetron  •  sodium chloride  •  sodium chloride    Laboratory results:  Lab Results   Component Value Date    GLUCOSE 129 (H) 08/20/2020    CALCIUM 8.5 (L) 08/20/2020     08/20/2020    K 3.8 08/20/2020    CO2 21.0 (L) 08/20/2020     (H) 08/20/2020    BUN 11 08/20/2020    CREATININE 0.92 08/20/2020    EGFRIFAFRI 48 (L) 07/02/2020    EGFRIFNONA 60 (L) 08/20/2020    BCR 12.0 08/20/2020    ANIONGAP 8.0 08/20/2020     Lab Results   Component Value Date    WBC 10.66 08/20/2020    WBC 11.00 (H) 08/20/2020    HGB 10.7 (L) 08/20/2020    HGB 10.7 (L) 08/20/2020    HCT 31.8 (L) 08/20/2020    HCT 32.0 (L) 08/20/2020    MCV 88.6 08/20/2020    MCV 88.6 08/20/2020     08/20/2020     08/20/2020     Lab Results   Component Value Date    CHOL 106 08/20/2020     Lab Results   Component Value Date    HDL 34 (L) 08/20/2020    HDL 47 04/14/2020    HDL 36 (L) 12/10/2018     Lab Results   Component Value Date    LDL 54 08/20/2020    LDL 73 04/14/2020     (H) 12/10/2018     Lab Results   Component Value Date    TRIG 90 08/20/2020    TRIG 101 04/14/2020    TRIG 142  12/10/2018     @[id number]@   Review and interpretation of imaging:    Impression:  This patient is a 69-year-old female with HLD, diabetes complicated by neuropathy, B12 deficiency, CAD status post stent, SILVIANO, CKD, Williamson's esophagus, and reported history of lupus anticoagulant disorder, and parkinsonism followed by Dr. Santacruz as outpatient who presented 8/19 with initial complaint of dizziness and ataxia followed by collapse with aphasia and right hemiplegia.  Team D imaging showed distal basilar thrombus and she received IV TPA.  When she went to the operating room for angiogram the basilar artery had recanalized after TPA.  She was found to have PFO with shunting and DVT and has developed nausea this admission.  She was on Effient 10 mg and Crestor 20 mg prior to arrival.     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  GREGORY 8/20: EF 60%, borderline dilated left atrial cavity size.  Saline test results positive for moderate PFO with bidirectional shunting.  Bilateral lower extremity venous ultrasound 8/20: Positive for acute right lower extremity DVT in the gastric anemias.  MRI brain 8/21: Ill-defined acute stroke in the left temporal occipital region without hemorrhage.  Most recent CT head 8/21: Subacute left temporal occipital infarct, no hemorrhage.  Labs: Hemoglobin A1c 7.90%, LDL 54, respiratory viral panel including COVID-19 negative on 8/19  The above impression statement reviewed and no changes noted.    Plan:  Follow-up antiphospholipid antibody panel.  If negative cardiology to consider PFO closure, if abnormal will refer to hematology.  On therapeutic Lovenox,will d/c and start eliquis 5 mg BID. First dose this evening as she has already had AM lovenox dose.   Documented statin  intolerance, home Crestor 20 mg restarted  Neurochecks  BP control-normalize  Stroke Education  SONAM/SCDs  PT/OT/ST- Likely home with PT/OT  Discussed with Dr. Zamora today.  No further neurologic work-up.  We will sign off, please call if further questions or concerns.  F/U with Dr Santacruz 11/2 arranged.

## 2020-08-23 NOTE — NURSING NOTE
Spoke with pt/ spouse again. Continues to adamantly want to go home and not do rehab anywhere but home.   Therapy has not seen yet today. Will continue to follow progress but if she is dc'd home would recommend home health for both PT and OT and possibly nursing.     Che Stallings RN  Acute Rehab Admission Nurse  [phone]

## 2020-08-24 LAB
HCT VFR BLD AUTO: 33.8 % (ref 34–46.6)
HGB BLD-MCNC: 10.7 G/DL (ref 12–15.9)

## 2020-08-24 PROCEDURE — 85018 HEMOGLOBIN: CPT | Performed by: INTERNAL MEDICINE

## 2020-08-24 PROCEDURE — 25010000002 ONDANSETRON PER 1 MG: Performed by: INTERNAL MEDICINE

## 2020-08-24 PROCEDURE — 97110 THERAPEUTIC EXERCISES: CPT

## 2020-08-24 PROCEDURE — 94799 UNLISTED PULMONARY SVC/PX: CPT

## 2020-08-24 PROCEDURE — 85014 HEMATOCRIT: CPT | Performed by: INTERNAL MEDICINE

## 2020-08-24 PROCEDURE — 92523 SPEECH SOUND LANG COMPREHEN: CPT

## 2020-08-24 RX ADMIN — ROSUVASTATIN CALCIUM 20 MG: 20 TABLET, FILM COATED ORAL at 21:05

## 2020-08-24 RX ADMIN — METOPROLOL TARTRATE 50 MG: 50 TABLET, FILM COATED ORAL at 21:05

## 2020-08-24 RX ADMIN — METOPROLOL TARTRATE 50 MG: 50 TABLET, FILM COATED ORAL at 08:50

## 2020-08-24 RX ADMIN — PANTOPRAZOLE SODIUM 40 MG: 40 TABLET, DELAYED RELEASE ORAL at 06:49

## 2020-08-24 RX ADMIN — SODIUM CHLORIDE, PRESERVATIVE FREE 10 ML: 5 INJECTION INTRAVENOUS at 21:06

## 2020-08-24 RX ADMIN — AMLODIPINE BESYLATE 5 MG: 5 TABLET ORAL at 08:44

## 2020-08-24 RX ADMIN — LOPERAMIDE HYDROCHLORIDE 2 MG: 2 CAPSULE ORAL at 18:39

## 2020-08-24 RX ADMIN — ONDANSETRON 4 MG: 2 INJECTION INTRAMUSCULAR; INTRAVENOUS at 21:15

## 2020-08-24 RX ADMIN — CARBIDOPA AND LEVODOPA 1 TABLET: 10; 100 TABLET ORAL at 08:44

## 2020-08-24 RX ADMIN — SODIUM CHLORIDE, PRESERVATIVE FREE 10 ML: 5 INJECTION INTRAVENOUS at 08:50

## 2020-08-24 RX ADMIN — APIXABAN 5 MG: 5 TABLET, FILM COATED ORAL at 08:44

## 2020-08-24 RX ADMIN — BUDESONIDE AND FORMOTEROL FUMARATE DIHYDRATE 2 PUFF: 160; 4.5 AEROSOL RESPIRATORY (INHALATION) at 19:32

## 2020-08-24 RX ADMIN — CETIRIZINE HYDROCHLORIDE 5 MG: 10 TABLET ORAL at 08:44

## 2020-08-24 RX ADMIN — APIXABAN 5 MG: 5 TABLET, FILM COATED ORAL at 21:05

## 2020-08-24 RX ADMIN — CARBIDOPA AND LEVODOPA 1 TABLET: 10; 100 TABLET ORAL at 17:18

## 2020-08-24 RX ADMIN — GABAPENTIN 400 MG: 400 CAPSULE ORAL at 08:50

## 2020-08-24 RX ADMIN — CITALOPRAM 20 MG: 20 TABLET, FILM COATED ORAL at 08:44

## 2020-08-24 RX ADMIN — LISINOPRIL 20 MG: 20 TABLET ORAL at 08:44

## 2020-08-24 RX ADMIN — CARBIDOPA AND LEVODOPA 1 TABLET: 10; 100 TABLET ORAL at 21:05

## 2020-08-24 RX ADMIN — BUDESONIDE AND FORMOTEROL FUMARATE DIHYDRATE 2 PUFF: 160; 4.5 AEROSOL RESPIRATORY (INHALATION) at 08:18

## 2020-08-24 RX ADMIN — GABAPENTIN 400 MG: 400 CAPSULE ORAL at 21:05

## 2020-08-24 RX ADMIN — ARIPIPRAZOLE 5 MG: 5 TABLET ORAL at 08:44

## 2020-08-24 NOTE — PLAN OF CARE
Problem: Patient Care Overview  Goal: Plan of Care Review  Flowsheets (Taken 8/24/2020 0842)  Plan of Care Reviewed With: patient  Outcome Summary: OT-No pain c/o at present-did not sleep well last night. Reports weakness, numbness R side. Encouragement required to participate. Respiratory came during tx. Agreed to UE therap ex. completed bed level with rest breaks. Pt. has DME tub seat and rails at home. Uses a cane. Discussed safety at home for ADL's and tub transfers. HEP reviewed for R hand  strength. Plan to cont. OT. Anticipate D/c home with assist and HH.      Patient was wearing a face mask during this therapy encounter. Therapist used appropriate personal protective equipment including mask, gloves and eye protection.  Mask used was standard procedure mask. Appropriate PPE was worn during the entire therapy session. Hand hygiene was completed before and after therapy session. Patient is not in enhanced droplet precautions.

## 2020-08-24 NOTE — THERAPY EVALUATION
Acute Care - Speech Language Pathology Initial Evaluation  [facility]     Patient Name: Dalila Valerio  : 1950  MRN: [id number]  Today's Date: 2020               Admit Date: 2020     Visit Dx:    ICD-10-CM ICD-9-CM   1. Acute CVA (cerebrovascular accident) (CMS/HCC) I63.9 434.91     Patient Active Problem List   Diagnosis   • Adjustment disorder with mixed anxiety and depressed mood   • Atopic rhinitis   • Coronary arteriosclerosis in native artery   • Cobalamin deficiency   • Benign colonic polyp   • Lumbar radiculopathy   • Controlled diabetes mellitus type 2 with complications (CMS/HCC)   • Binocular vision disorder with diplopia   • Dyslipidemia   • Arthropathy of hand   • Knee pain   • Cramps of lower extremity   • Low back pain   • Migraine with typical aura   • Chronic nausea   • Obstructive sleep apnea syndrome   • Palpitations   • Ventricular premature beats   • Cephalalgia   • Colonic constipation   • Neurocardiogenic syncope   • Vitamin D deficiency   • DODSON (nonalcoholic steatohepatitis)   • Fibromyalgia   • Morbidly obese (CMS/HCC)   • Polyp of colon   • Family history of colonic polyps   • Family history of malignant neoplasm of colon   • Acute CVA (cerebrovascular accident) (CMS/HCC)     Past Medical History:   Diagnosis Date   • Abnormal weight gain    • Acquired trigger finger    • Acute myocardial infarction (CMS/HCC)    • Adjustment reaction with mixed emotional features    • Arthritis    • ASHD (arteriosclerotic heart disease)    • Asthma    • B12 deficiency    • Bacterial pneumonia    • Williamson esophagus    • Barretts esophagus    • Benign colonic polyp    • Bilateral knee pain    • Birthmark     haemangioma of the bone   • Bronchiectasis (CMS/HCC)    • CHF (congestive heart failure) (CMS/HCC)    • Chronic cough    • Chronic glomerulonephritis with exudative nephritis    • Chronic lumbar radiculopathy    • Diabetes mellitus (CMS/HCC)    • Diabetic peripheral neuropathy  (CMS/HCC)    • Dyslipidemia    • Fibromyositis    • GERD (gastroesophageal reflux disease)    • Herpes zoster    • Hyperlipidemia    • Hypertension    • Lupus anticoagulant disorder (CMS/HCC)    • Mycobacterium avium complex (CMS/HCC)    • Nephrolithiasis    • SILVIANO (obstructive sleep apnea)    • Palpitations    • Premature ventricular contraction    • Slow transit constipation    • Stroke (CMS/HCC)    • Vitamin D deficiency      Past Surgical History:   Procedure Laterality Date   • APPENDECTOMY     • BRONCHOSCOPY     •  SECTION     • CHOLECYSTECTOMY     • COLONOSCOPY     • COLONOSCOPY N/A 2019    Procedure: COLONOSCOPY to cecum with cold biopsy and cold and hotsnare polypectomies;  Surgeon: Suzy Eubanks MD;  Location: [facility] ENDOSCOPY;  Service: Gastroenterology   • CORONARY ANGIOPLASTY WITH STENT PLACEMENT     • EMBOLECTOMY N/A 2020    Procedure: EMERGENT CEREBRAL ANGIOGRAM;  Surgeon: Jonh Meehan MD;  Location: [facility] OR ;  Service: Neurosurgery;  Laterality: N/A;   • ENDOSCOPY N/A 2019    Procedure: ESOPHAGOGASTRODUODENOSCOPY with cold biopsies;  Surgeon: Suzy Eubanks MD;  Location: [facility] ENDOSCOPY;  Service: Gastroenterology   • KNEE ARTHROSCOPY     • OTHER SURGICAL HISTORY      elbow surgery   • ROTATOR CUFF REPAIR     • TUBAL ABDOMINAL LIGATION          SLP EVALUATION (last 72 hours)      SLP SLC Evaluation     Row Name 20 3063          Document Type  evaluation  -OC    Subjective Information  no complaints  -OC    Patient Observations  alert;cooperative;agree to therapy  -OC    Patient Effort  good  -OC    Symptoms Noted During/After Treatment  none  -OC          Patient Profile Reviewed  yes  -OC    Precautions/Limitations, Vision  WFL;for purposes of eval  -OC    Precautions/Limitations, Hearing  WFL;for purposes of eval  -OC    Prior Level of Function-Communication  other (see comments) unknown  -OC    Plans/Goals Discussed with   patient;agreed upon  -OC    Barriers to Rehab  none identified  -OC    Patient's Goals for Discharge  return to home  -OC    Standardized Assessment Used  Cognistat  -OC          Pain Scale: Numbers, Pretreatment  0/10 - no pain  -OC    Pain Scale: Numbers, Post-Treatment  0/10 - no pain  -OC          Cognition, Comment  SLP adminstered the COGNISTAT. The patient scored within the average range for orientation, attention, comprehnsion, naming, calculations, and reasoning of similrities and judgement. the patient scorediwth in the mild-moderate severity range for repetition. the patient scorediwthin the moderate-severe severity range for memory.   -OC          SLP Diagnosis  Mild-moderate cognitive-linguistic deficits.   -OC    Rehab Potential/Prognosis  good  -OC    SLC Criteria for Skilled Therapy Interventions Met  yes  -OC    Functional Impact  difficulty completing home management task  -OC          Therapy Frequency (SLP SLC)  PRN  -OC    Predicted Duration Therapy Intervention (Days)  until discharge  -OC    Anticipated Dischage Disposition (SLP)  home with home health;anticipate therapy at next level of care  -OC          Memory Skills Selection  memory skills, SLP goal 1  -OC          Improve Memory Skills Through Goal 1 (SLP)  recalling related word lists immediately;recalling related word lists with an imposed delay;recalling unrelated word lists immediately;recalling unrelated word lists with an imposed delay;recall details from a word list;repeat list in sequential order;visual memory task;90%;with minimal cues (75-90%)  -OC    Time Frame (Memory Skills Goal 1, SLP)  by discharge  -OC      User Key  (r) = Recorded By, (t) = Taken By, (c) = Cosigned By    Initials Name Effective Dates    OC Kayla Josue MA,[facility]-SLP 06/08/18 -              EDUCATION  The patient has been educated in the following areas:     Dysphagia (Swallowing Impairment).    SLP Recommendation and Plan  SLP Diagnosis: Mild-moderate  cognitive-linguistic deficits.      SLC Criteria for Skilled Therapy Interventions Met: yes  Anticipated Dischage Disposition (SLP): home with home health, anticipate therapy at next level of care     Predicted Duration Therapy Intervention (Days): until discharge    Plan of Care Reviewed With: patient  Outcome Summary: SLP adminstered the COGNISTAT. The patient scored within the average range for orientation, attention, comprehnsion, naming, calculations, and reasoning of similrities and judgement. the patient scored within the mild-moderate severity range for repetition. the patient scorediwthin the moderate-severe severity range for memory.      SLP GOALS     Row Name 08/24/20 0830             Memory Skills Goal 1 (SLP)    Improve Memory Skills Through Goal 1 (SLP)  recalling related word lists immediately;recalling related word lists with an imposed delay;recalling unrelated word lists immediately;recalling unrelated word lists with an imposed delay;recall details from a word list;repeat list in sequential order;visual memory task;90%;with minimal cues (75-90%)  -OC      Time Frame (Memory Skills Goal 1, SLP)  by discharge  -OC        User Key  (r) = Recorded By, (t) = Taken By, (c) = Cosigned By    Initials Name Provider Type    TARA Josue, JANELL Garza CCC-SLP Speech and Language Pathologist             SLP Outcome Measures (last 72 hours)      SLP Outcome Measures     Row Name 08/24/20 0830             SLP Outcome Measures    Outcome Measure Used?  Adult NOMS  -OC         Adult FCM Scores    FCM Chosen  Memory  -OC      Memory FCM Score  3  -OC        User Key  (r) = Recorded By, (t) = Taken By, (c) = Cosigned By    Initials Name Effective Dates    Kayla Gabriel MA, CCC-SLP 06/08/18 -               Time Calculation:     Time Calculation- SLP     Row Name 08/24/20 1447             Time Calculation- SLP    SLP Start Time  0830  -OC      SLP Received On  08/24/20  -OC        User Key  (r) = Recorded By, (t) = Taken  By, (c) = Cosigned By    Initials Name Provider Type    Kayla Gabriel MA,CCC-SLP Speech and Language Pathologist          Therapy Charges for Today     Code Description Service Date Service Provider Modifiers Qty    66502617545 HC ST EVAL SPEECH AND PROD W LANG  4 8/24/2020 Kayla Josue MA,RYAN-SLP GN 1             ADULT NOMS (last 72 hours)      Adult NOMS     Row Name 08/24/20 0830                   Adult FCM Scores    FCM Chosen  Memory  -OC        Memory FCM Score  3  -OC          User Key  (r) = Recorded By, (t) = Taken By, (c) = Cosigned By    Initials Name Effective Dates    Kayla Gabriel MA,RYAN-SLP 06/08/18 -                  Kayla Josue MA, CCC-SLP  8/24/2020

## 2020-08-24 NOTE — PLAN OF CARE
Problem: Patient Care Overview  Goal: Plan of Care Review  Outcome: Ongoing (interventions implemented as appropriate)  Flowsheets (Taken 8/24/2020 1576)  Progress: improving  Plan of Care Reviewed With: patient  Outcome Summary: NIH (2). Pt able to sleep most of the hs. Possible d/c today.Will continue to monitor.     Problem: Patient Care Overview  Goal: Individualization and Mutuality  Outcome: Ongoing (interventions implemented as appropriate)     Problem: Fall Risk (Adult)  Goal: Identify Related Risk Factors and Signs and Symptoms  Outcome: Ongoing (interventions implemented as appropriate)     Problem: Stroke (Ischemic) (Adult)  Goal: Signs and Symptoms of Listed Potential Problems Will be Absent, Minimized or Managed (Stroke)  Outcome: Ongoing (interventions implemented as appropriate)

## 2020-08-24 NOTE — THERAPY TREATMENT NOTE
Patient Name: Dalila Valerio  : 1950    MRN: [id number]                              Today's Date: 2020       Admit Date: 2020    Visit Dx:     ICD-10-CM ICD-9-CM   1. Acute CVA (cerebrovascular accident) (CMS/HCC) I63.9 434.91     Patient Active Problem List   Diagnosis   • Adjustment disorder with mixed anxiety and depressed mood   • Atopic rhinitis   • Coronary arteriosclerosis in native artery   • Cobalamin deficiency   • Benign colonic polyp   • Lumbar radiculopathy   • Controlled diabetes mellitus type 2 with complications (CMS/HCC)   • Binocular vision disorder with diplopia   • Dyslipidemia   • Arthropathy of hand   • Knee pain   • Cramps of lower extremity   • Low back pain   • Migraine with typical aura   • Chronic nausea   • Obstructive sleep apnea syndrome   • Palpitations   • Ventricular premature beats   • Cephalalgia   • Colonic constipation   • Neurocardiogenic syncope   • Vitamin D deficiency   • DODSON (nonalcoholic steatohepatitis)   • Fibromyalgia   • Morbidly obese (CMS/HCC)   • Polyp of colon   • Family history of colonic polyps   • Family history of malignant neoplasm of colon   • Acute CVA (cerebrovascular accident) (CMS/HCC)     Past Medical History:   Diagnosis Date   • Abnormal weight gain    • Acquired trigger finger    • Acute myocardial infarction (CMS/HCC)    • Adjustment reaction with mixed emotional features    • Arthritis    • ASHD (arteriosclerotic heart disease)    • Asthma    • B12 deficiency    • Bacterial pneumonia    • Williamson esophagus    • Barretts esophagus    • Benign colonic polyp    • Bilateral knee pain    • Birthmark     haemangioma of the bone   • Bronchiectasis (CMS/HCC)    • CHF (congestive heart failure) (CMS/HCC)    • Chronic cough    • Chronic glomerulonephritis with exudative nephritis    • Chronic lumbar radiculopathy    • Diabetes mellitus (CMS/HCC)    • Diabetic peripheral neuropathy (CMS/HCC)    • Dyslipidemia    • Fibromyositis    • GERD  (gastroesophageal reflux disease)    • Herpes zoster    • Hyperlipidemia    • Hypertension    • Lupus anticoagulant disorder (CMS/HCC)    • Mycobacterium avium complex (CMS/HCC)    • Nephrolithiasis    • SILVIANO (obstructive sleep apnea)    • Palpitations    • Premature ventricular contraction    • Slow transit constipation    • Stroke (CMS/HCC)    • Vitamin D deficiency      Past Surgical History:   Procedure Laterality Date   • APPENDECTOMY     • BRONCHOSCOPY     •  SECTION     • CHOLECYSTECTOMY     • COLONOSCOPY     • COLONOSCOPY N/A 2019    Procedure: COLONOSCOPY to cecum with cold biopsy and cold and hotsnare polypectomies;  Surgeon: Suzy Eubanks MD;  Location: [facility] ENDOSCOPY;  Service: Gastroenterology   • CORONARY ANGIOPLASTY WITH STENT PLACEMENT     • EMBOLECTOMY N/A 2020    Procedure: EMERGENT CEREBRAL ANGIOGRAM;  Surgeon: Jonh Meehan MD;  Location: [facility] OR ;  Service: Neurosurgery;  Laterality: N/A;   • ENDOSCOPY N/A 2019    Procedure: ESOPHAGOGASTRODUODENOSCOPY with cold biopsies;  Surgeon: Suzy Eubanks MD;  Location: [facility] ENDOSCOPY;  Service: Gastroenterology   • KNEE ARTHROSCOPY     • OTHER SURGICAL HISTORY      elbow surgery   • ROTATOR CUFF REPAIR     • TUBAL ABDOMINAL LIGATION       General Information     Row Name 20          PT Evaluation Time/Intention    Document Type  therapy note (daily note)  -     Mode of Treatment  physical therapy  -     Row Name 20          General Information    Existing Precautions/Restrictions  fall  -     Row Name 20          Cognitive Assessment/Intervention- PT/OT    Orientation Status (Cognition)  oriented x 3  -       User Key  (r) = Recorded By, (t) = Taken By, (c) = Cosigned By    Initials Name Provider Type     Lori Miner PTA Physical Therapy Assistant        Mobility     Row Name 20          Bed Mobility Assessment/Treatment    Bed  Mobility Assessment/Treatment  supine-sit;sit-supine  -SM     Supine-Sit [place] (Bed Mobility)  conditional independence  -SM     Sit-Supine [place] (Bed Mobility)  conditional independence  -     Assistive Device (Bed Mobility)  bed rails;head of bed elevated  -     Row Name 08/24/20 0924          Sit-Stand Transfer    Sit-Stand [place] (Transfers)  stand by assist  -     Assistive Device (Sit-Stand Transfers)  walker, front-wheeled  -     Row Name 08/24/20 0924          Gait/Stairs Assessment/Training    [place] Level (Gait)  contact guard  -     Assistive Device (Gait)  walker, front-wheeled  -SM     Distance in Feet (Gait)  180  -SM     Pattern (Gait)  step-through  -     Deviations/Abnormal Patterns (Gait)  jose decreased;stride length decreased  -SM     Bilateral Gait Deviations  forward flexed posture  -SM     Comment (Gait/Stairs)  pt reports R side numbness, though it did not affect mobility  -SM       User Key  (r) = Recorded By, (t) = Taken By, (c) = Cosigned By    Initials Name Provider Type    Lori Prescott PTA Physical Therapy Assistant        Obj/Interventions     Row Name 08/24/20 0925          Therapeutic Exercise    Lower Extremity (Therapeutic Exercise)  LAQ (long arc quad), bilateral;marching while seated  -     Lower Extremity Range of Motion (Therapeutic Exercise)  ankle dorsiflexion/plantar flexion, bilateral  -     Exercise Type (Therapeutic Exercise)  AROM (active range of motion)  -     Position (Therapeutic Exercise)  seated  -     Sets/Reps (Therapeutic Exercise)  10 reps  -       User Key  (r) = Recorded By, (t) = Taken By, (c) = Cosigned By    Initials Name Provider Type    Lori Prescott PTA Physical Therapy Assistant        Goals/Plan    No documentation.       Clinical Impression     Row Name 08/24/20 0925          Pain Assessment    Additional Documentation  Pain Scale: Numbers Pre/Post-Treatment (Group)  -     Row  Name 08/24/20 0925          Pain Scale: Numbers Pre/Post-Treatment    Pain Scale: Numbers, Pretreatment  0/10 - no pain  -SM     Pain Scale: Numbers, Post-Treatment  0/10 - no pain  -SM     Pre/Post Treatment Pain Comment  numbness on R side  -SM     Row Name 08/24/20 0925          Positioning and Restraints    Pre-Treatment Position  in bed  -SM     Post Treatment Position  bed  -SM     In Bed  supine;call light within reach;encouraged to call for assist  -SM       User Key  (r) = Recorded By, (t) = Taken By, (c) = Cosigned By    Initials Name Provider Type    Lori Prescott PTA Physical Therapy Assistant        Outcome Measures     Row Name 08/24/20 0928          How much help from another person do you currently need...    Turning from your back to your side while in flat bed without using bedrails?  4  -SM     Moving from lying on back to sitting on the side of a flat bed without bedrails?  3  -SM     Moving to and from a bed to a chair (including a wheelchair)?  3  -SM     Standing up from a chair using your arms (e.g., wheelchair, bedside chair)?  3  -SM     Climbing 3-5 steps with a railing?  3  -SM     To walk in hospital room?  3  -SM     AM-PAC 6 Clicks Score (PT)  19  -SM     Row Name 08/24/20 0928          Functional Assessment    Outcome Measure Options  AM-PAC 6 Clicks Basic Mobility (PT)  -       User Key  (r) = Recorded By, (t) = Taken By, (c) = Cosigned By    Initials Name Provider Type    Lori Prescott PTA Physical Therapy Assistant        Physical Therapy Education                 Title: PT OT SLP Therapies (In Progress)     Topic: Physical Therapy (In Progress)     Point: Mobility training (In Progress)     Description:   Instruct learner(s) on safety and technique for assisting patient out of bed, chair or wheelchair.  Instruct in the proper use of assistive devices, such as walker, crutches, cane or brace.              Patient Friendly Description:   It's important to get  you on your feet again, but we need to do so in a way that is safe for you. Falling has serious consequences, and your personal safety is the most important thing of all.        When it's time to get out of bed, one of us or a family member will sit next to you on the bed to give you support.     If your doctor or nurse tells you to use a walker, crutches, a cane, or a brace, be sure you use it every time you get out of bed, even if you think you don't need it.    Learning Progress Summary           Patient Acceptance, E,TB,D, VU,NR by SM at 8/24/2020 0929    Acceptance, E, VU by RB at 8/22/2020 1332    Acceptance, E, NR by DO at 8/22/2020 1014    Acceptance, E,D, NR by PC at 8/21/2020 1640    Acceptance, E, DU,NR by AR at 8/20/2020 1533   Family Acceptance, E, NR by DO at 8/22/2020 1014   Significant Other Acceptance, E, DU,NR by AR at 8/20/2020 1533                   Point: Home exercise program (In Progress)     Description:   Instruct learner(s) on appropriate technique for monitoring, assisting and/or progressing patient with therapeutic exercises and activities.              Learning Progress Summary           Patient Acceptance, E,TB,D, VU,NR by SM at 8/24/2020 0929    Acceptance, E, VU by RB at 8/22/2020 1332    Acceptance, E, NR by DO at 8/22/2020 1014    Acceptance, E,D, NR by PC at 8/21/2020 1640    Acceptance, E, DU,NR by AR at 8/20/2020 1533   Family Acceptance, E, NR by DO at 8/22/2020 1014   Significant Other Acceptance, E, DU,NR by AR at 8/20/2020 1533                   Point: Body mechanics (In Progress)     Description:   Instruct learner(s) on proper positioning and spine alignment for patient and/or caregiver during mobility tasks and/or exercises.              Learning Progress Summary           Patient Acceptance, E,TB,D, VU,NR by SM at 8/24/2020 0929    Acceptance, E, VU by RB at 8/22/2020 1332    Acceptance, E, NR by DO at 8/22/2020 1014    Acceptance, E,D, NR by PC at 8/21/2020 1640     Acceptance, E, DU,NR by AR at 8/20/2020 1533   Family Acceptance, E, NR by DO at 8/22/2020 1014   Significant Other Acceptance, E, DU,NR by AR at 8/20/2020 1533                   Point: Precautions (In Progress)     Description:   Instruct learner(s) on prescribed precautions during mobility and gait tasks              Learning Progress Summary           Patient Acceptance, E,TB,D, VU,NR by SM at 8/24/2020 0929    Acceptance, E, VU by RB at 8/22/2020 1332    Acceptance, E, NR by DO at 8/22/2020 1014    Acceptance, E,D, NR by PC at 8/21/2020 1640    Acceptance, E, DU,NR by AR at 8/20/2020 1533   Family Acceptance, E, NR by DO at 8/22/2020 1014   Significant Other Acceptance, E, DU,NR by AR at 8/20/2020 1533                               User Key     Initials Effective Dates Name Provider Type Discipline     04/03/18 -  Sydnee Molina, PT Physical Therapist PT     03/07/18 -  Lori Miner, PTA Physical Therapy Assistant PT    DO 03/07/18 -  Derian Mueller, PT Physical Therapist PT    RB 01/18/17 -  Carlos Auguste RN Registered Nurse Nurse    AR 07/02/20 -  Jose Yee, PT Student PT Student PT              PT Recommendation and Plan     Outcome Summary/Treatment Plan (PT)  Anticipated Discharge Disposition (PT): home with home health  Plan of Care Reviewed With: patient  Progress: improving  Outcome Summary: Pt tolerated treatment well this date. Increased gait distance to 180ft w/ Rw and CGA. Pt reported of having numbness on R side, though it did not affect mobility. Instructed pt on a few LE seated exercises and encouraged pt to ambulate w/ nsg during the day. Patient was wearing a face mask during this therapy encounter. Therapist used appropriate personal protective equipment including eye protection, mask, and gloves.  Mask used was standard procedure mask. Appropriate PPE was worn during the entire therapy session. Hand hygiene was completed before and after therapy session. Patient is not in  enhanced droplet precautions.     Time Calculation:   PT Charges     Row Name 08/24/20 0932             Time Calculation    Start Time  0822  -      Stop Time  0845  -      Time Calculation (min)  23 min  -      PT Received On  08/24/20  -      PT - Next Appointment  08/25/20  -        User Key  (r) = Recorded By, (t) = Taken By, (c) = Cosigned By    Initials Name Provider Type    Lori Prescott PTA Physical Therapy Assistant        Therapy Charges for Today     Code Description Service Date Service Provider Modifiers Qty    58483053900 HC PT THER PROC EA 15 MIN 8/24/2020 Lori Miner PTA GP 2          PT G-Codes  Outcome Measure Options: AM-PAC 6 Clicks Basic Mobility (PT)  AM-PAC 6 Clicks Score (PT): 19  AM-PAC 6 Clicks Score (OT): 15  Modified Julissa Scale: 4 - Moderately severe disability.  Unable to walk without assistance, and unable to attend to own bodily needs without assistance.    Lori Miner PTA  8/24/2020

## 2020-08-24 NOTE — PLAN OF CARE
Problem: Patient Care Overview  Goal: Plan of Care Review  Flowsheets (Taken 8/24/2020 0377)  Plan of Care Reviewed With: patient  Outcome Summary: SLP adminstered the COGNISTAT. The patient scored within the average range for orientation, attention, comprehension, naming, calculations, and reasoning of similrities and judgement. The patient scored within the mild-moderate severity range for repetition. The patient scorediwthin the moderate-severe severity range for memory. Recommend continued speech therapy at next level of care, or home health ST. Patient in agreement with need for continued speech therapy. ST to continue to follow for cognitive-linguistic therapy with a focus on memory.     Patient was not wearing a face mask during this therapy encounter. Therapist used appropriate personal protective equipment including mask, eye protection and gloves.  Mask used was standard procedure mask. Appropriate PPE was worn during the entire therapy session. Hand hygiene was completed before and after therapy session. Patient is not   in enhanced droplet precautions.

## 2020-08-24 NOTE — NURSING NOTE
[facility] Acute Inpt Rehab Note     Rehab Admissions met with patient twice over weekend and patient adamantly wants to go home due to fear of Corona Virus.  Appears from physician documentation they are planning home today.  Spoke with VIVIANA Fatima, to inform patient not interested in coming to acute rehab.  We will sign off at this time.  Thank you for the referral.      Thanks,   Holly Miner RN  Rehab Admission Office  [phone]

## 2020-08-24 NOTE — THERAPY TREATMENT NOTE
Acute Care - Occupational Therapy Treatment Note  [facility]     Patient Name: Dalila Valerio  : 1950  MRN: [id number]  Today's Date: 2020             Admit Date: 2020       ICD-10-CM ICD-9-CM   1. Acute CVA (cerebrovascular accident) (CMS/HCC) I63.9 434.91     Patient Active Problem List   Diagnosis   • Adjustment disorder with mixed anxiety and depressed mood   • Atopic rhinitis   • Coronary arteriosclerosis in native artery   • Cobalamin deficiency   • Benign colonic polyp   • Lumbar radiculopathy   • Controlled diabetes mellitus type 2 with complications (CMS/HCC)   • Binocular vision disorder with diplopia   • Dyslipidemia   • Arthropathy of hand   • Knee pain   • Cramps of lower extremity   • Low back pain   • Migraine with typical aura   • Chronic nausea   • Obstructive sleep apnea syndrome   • Palpitations   • Ventricular premature beats   • Cephalalgia   • Colonic constipation   • Neurocardiogenic syncope   • Vitamin D deficiency   • DODSON (nonalcoholic steatohepatitis)   • Fibromyalgia   • Morbidly obese (CMS/HCC)   • Polyp of colon   • Family history of colonic polyps   • Family history of malignant neoplasm of colon   • Acute CVA (cerebrovascular accident) (CMS/HCC)     Past Medical History:   Diagnosis Date   • Abnormal weight gain    • Acquired trigger finger    • Acute myocardial infarction (CMS/HCC)    • Adjustment reaction with mixed emotional features    • Arthritis    • ASHD (arteriosclerotic heart disease)    • Asthma    • B12 deficiency    • Bacterial pneumonia    • Williamson esophagus    • Barretts esophagus    • Benign colonic polyp    • Bilateral knee pain    • Birthmark     haemangioma of the bone   • Bronchiectasis (CMS/HCC)    • CHF (congestive heart failure) (CMS/HCC)    • Chronic cough    • Chronic glomerulonephritis with exudative nephritis    • Chronic lumbar radiculopathy    • Diabetes mellitus (CMS/HCC)    • Diabetic peripheral neuropathy (CMS/HCC)    •  Dyslipidemia    • Fibromyositis    • GERD (gastroesophageal reflux disease)    • Herpes zoster    • Hyperlipidemia    • Hypertension    • Lupus anticoagulant disorder (CMS/HCC)    • Mycobacterium avium complex (CMS/HCC)    • Nephrolithiasis    • SILVIANO (obstructive sleep apnea)    • Palpitations    • Premature ventricular contraction    • Slow transit constipation    • Stroke (CMS/HCC)    • Vitamin D deficiency      Past Surgical History:   Procedure Laterality Date   • APPENDECTOMY     • BRONCHOSCOPY     •  SECTION     • CHOLECYSTECTOMY     • COLONOSCOPY     • COLONOSCOPY N/A 2019    Procedure: COLONOSCOPY to cecum with cold biopsy and cold and hotsnare polypectomies;  Surgeon: Suzy Eubanks MD;  Location: [facility] ENDOSCOPY;  Service: Gastroenterology   • CORONARY ANGIOPLASTY WITH STENT PLACEMENT     • EMBOLECTOMY N/A 2020    Procedure: EMERGENT CEREBRAL ANGIOGRAM;  Surgeon: Jonh Meehan MD;  Location: [facility] OR ;  Service: Neurosurgery;  Laterality: N/A;   • ENDOSCOPY N/A 2019    Procedure: ESOPHAGOGASTRODUODENOSCOPY with cold biopsies;  Surgeon: Suzy Eubanks MD;  Location: [facility] ENDOSCOPY;  Service: Gastroenterology   • KNEE ARTHROSCOPY     • OTHER SURGICAL HISTORY      elbow surgery   • ROTATOR CUFF REPAIR     • TUBAL ABDOMINAL LIGATION         Therapy Treatment    Rehabilitation Treatment Summary     Row Name 20 0832             Treatment Time/Intention    Discipline  occupational therapist  -CW      Document Type  therapy note (daily note)  -CW      Subjective Information  complains of;weakness  -CW      Mode of Treatment  individual therapy;occupational therapy  -CW      Patient Effort  adequate  -CW      Comment  Respiratory came during tx. Required encouragement to participate.  -CW      Existing Precautions/Restrictions  fall  -CW      Recorded by [CW] Carole Medina OTR 20 0853      Row Name 20 0832             Cognitive  Assessment/Intervention- PT/OT    Follows Commands (Cognition)  follows one step commands;verbal cues/prompting required  -CW      Safety Deficit (Cognitive)  safety precautions awareness  -CW      Recorded by [CW] Carole Medina OTR 08/24/20 0839      Row Name 08/24/20 0832             ADL Assessment/Intervention    BADL Assessment/Intervention  feeding  -CW      Recorded by [CW] Carole Medina OTR 08/24/20 0839      Row Name 08/24/20 08             Self-Feeding Assessment/Training    [place] Level (Feeding)  feeding skills;prepare tray/open items;scoop food and bring to mouth;set up  -CW      Position (Self-Feeding)  sitting up in bed  -CW      Comment (Feeding)  Set up. Some difficulty opening packages on tray.   -CW      Recorded by [CW] Carole Medina OTR 08/24/20 0839      Row Name 08/24/20 08             Motor Skills Assessment/Interventions    Additional Documentation  Therapeutic Exercise (Group);Therapeutic Exercise Interventions (Group)  -CW      Recorded by [CW] Carole Medina OTR 08/24/20 0839      Row Name 08/24/20 08             Therapeutic Exercise    Upper Extremity Range of Motion (Therapeutic Exercise)  shoulder flexion/extension, bilateral;shoulder horizontal abduction/adduction, bilateral;elbow flexion/extension, bilateral;forearm supination/pronation, bilateral;wrist flexion/extension, bilateral  -CW      Hand (Therapeutic Exercise)  finger flexion/extension, bilateral;thumb finger opposition, bilateral;hand , bilateral  -CW      Exercise Type (Therapeutic Exercise)  AROM (active range of motion) active reaching  -CW      Position (Therapeutic Exercise)  supine sitting up bed level  -CW      Sets/Reps (Therapeutic Exercise)  10x/set with rest breaks  -CW      Expected Outcome (Therapeutic Exercise)  improve functional tolerance, self-care activity;improve performance, BADLs  -CW      Recorded by [CW] Carole Medina OTR 08/24/20 0839      Row Name 08/24/20 0832              Positioning and Restraints    Pre-Treatment Position  in bed  -CW      Post Treatment Position  bed  -CW      In Bed  supine;call light within reach;encouraged to call for assist  -CW      Recorded by [CW] CarolAnujCarole, OTR 08/24/20 0839      Row Name 08/24/20 0832             Pain Scale: Numbers Pre/Post-Treatment    Pain Scale: Numbers, Pretreatment  0/10 - no pain  -CW      Pain Scale: Numbers, Post-Treatment  0/10 - no pain  -CW      Recorded by [CW] Carol Carole OTR 08/24/20 0839      Row Name 08/24/20 0832             Plan of Care Review    Plan of Care Reviewed With  patient  -CW      Recorded by [CW] Carol Carole OTR 08/24/20 0839        User Key  (r) = Recorded By, (t) = Taken By, (c) = Cosigned By    Initials Name Effective Dates Discipline    CW Anuj MedinaBARNEY garnicaR 06/08/18 -  OT             Occupational Therapy Education                 Title: PT OT SLP Therapies (In Progress)     Topic: Occupational Therapy (In Progress)     Point: ADL training (In Progress)     Description:   Instruct learner(s) on proper safety adaptation and remediation techniques during self care or transfers.   Instruct in proper use of assistive devices.              Learning Progress Summary           Patient Acceptance, E, NR by CW at 8/24/2020 0840    Comment:  Ed. for safety for tub transfers at home. Discussed DME.    Acceptance, E, VU by RB at 8/22/2020 1332    Acceptance, E,TB, VU,NR by SG at 8/21/2020 1217    Comment:  Discussed OT role and goals, BSC transfer and walker use                   Point: Home exercise program (In Progress)     Description:   Instruct learner(s) on appropriate technique for monitoring, assisting and/or progressing therapeutic exercises/activities.              Learning Progress Summary           Patient Acceptance, E, NR by CW at 8/24/2020 0840    Comment:  Ed. for safety for tub transfers at home. Discussed DME.    Acceptance, E, VU by RB at 8/22/2020 1332                   Point:  Precautions (In Progress)     Description:   Instruct learner(s) on prescribed precautions during self-care and functional transfers.              Learning Progress Summary           Patient Acceptance, E, NR by CW at 8/24/2020 0840    Comment:  Ed. for safety for tub transfers at home. Discussed DME.    Acceptance, E, VU by RB at 8/22/2020 1332                   Point: Body mechanics (In Progress)     Description:   Instruct learner(s) on proper positioning and spine alignment during self-care, functional mobility activities and/or exercises.              Learning Progress Summary           Patient Acceptance, E, NR by CW at 8/24/2020 0840    Comment:  Ed. for safety for tub transfers at home. Discussed DME.    Acceptance, E, VU by RB at 8/22/2020 1332                               User Key     Initials Effective Dates Name Provider Type Discipline     12/26/18 -  Monie Villareal, OTR Occupational Therapist OT    CW 06/08/18 -  Carole Medina, OTR Occupational Therapist OT    RB 01/18/17 -  Carlos Auguste RN Registered Nurse Nurse                OT Recommendation and Plan     Plan of Care Review  Plan of Care Reviewed With: patient  Plan of Care Reviewed With: patient  Outcome Summary: OT-No pain c/o at present, but did not get a good night sleep. Reports weakness, numbness R side. Encouragement required to participate.Respiratory came during tx. Agreed to UE therap ex. completed bed level with rest breaks. Pt. has DME tub seat and rails at home. Uses a cane. Discussed safety at home for ADL's and tub transfers.  Outcome Measures     Row Name 08/24/20 0800 08/21/20 1200          How much help from another is currently needed...    Putting on and taking off regular lower body clothing?  2  -CW  2  -SG     Bathing (including washing, rinsing, and drying)  2  -CW  2  -SG     Toileting (which includes using toilet bed pan or urinal)  2  -CW  2  -SG     Putting on and taking off regular upper body clothing  3   -CW  2  -SG     Taking care of personal grooming (such as brushing teeth)  3  -CW  2  -SG     Eating meals  3  -CW  2  -SG     AM-PAC 6 Clicks Score (OT)  15  -CW  12  -SG        Modified Columbia Scale    Modified Julissa Scale  --  4 - Moderately severe disability.  Unable to walk without assistance, and unable to attend to own bodily needs without assistance.  -SG        Functional Assessment    Outcome Measure Options  AM-PAC 6 Clicks Daily Activity (OT)  -CW  AM-PAC 6 Clicks Daily Activity (OT)  -SG       User Key  (r) = Recorded By, (t) = Taken By, (c) = Cosigned By    Initials Name Provider Type    Monie Gonzalez OTR Occupational Therapist    Carole Marlow OTR Occupational Therapist           Time Calculation:   Time Calculation- OT     Row Name 08/24/20 0849             Time Calculation- OT    OT Start Time  0804  -CW      OT Stop Time  0825  -CW      OT Time Calculation (min)  21 min  -CW      Total Timed Code Minutes- OT  21 minute(s)  -CW        User Key  (r) = Recorded By, (t) = Taken By, (c) = Cosigned By    Initials Name Provider Type    Carole Marlow OTR Occupational Therapist        Therapy Charges for Today     Code Description Service Date Service Provider Modifiers Qty    [id number]  OT THER PROC EA 15 MIN 8/24/2020 Carole Medina OTR GO 1               RA Yeh  8/24/2020

## 2020-08-24 NOTE — DISCHARGE PLACEMENT REQUEST
"Dalila Valerio (69 y.o. Female)     Date of Birth Social Security Number Address Home Phone MRN    1950  [address] [address] [phone] [id number]    Yarsani Marital Status          Yazidism Single       Admission Date Admission Type Admitting Provider Attending Provider Department, Room/Bed    8/19/20 Emergency Quang Elizabeth MD Nidadavolu, Vinay Gopal, MD [address], [address]    Discharge Date Discharge Disposition Discharge Destination                       Attending Provider:  Quang Elizabeth MD    Allergies:  Carafate [Sucralfate], Duloxetine Hcl, Morphine And Related, Nitroglycerin, Nsaids, Oxycodone, Penicillins, Statins, Viibryd [Vilazodone Hcl]    Isolation:  None   Infection:  None   Code Status:  CPR    Ht:  157.5 cm (62.01\")   Wt:  96.2 kg (212 lb 1.3 oz)    Admission Cmt:  None   Principal Problem:  None                Active Insurance as of 8/19/2020     Primary Coverage     Payor Plan Insurance Group Employer/Plan Group    MEDICARE MEDICARE A & B      Payor Plan Address Payor Plan Phone Number Payor Plan Fax Number Effective Dates    [address] [phone]  10/1/2013 - None Entered    [facility] [id number]       Subscriber Name Subscriber Birth Date Member ID       DALILA VALERIO 1950 [id number]           Secondary Coverage     Payor Plan Insurance Group Employer/Plan Group    [employer] PLAN N     Payor Plan Address Payor Plan Phone Number Payor Plan Fax Number Effective Dates    [address] [phone]  1/1/2016 - None Entered    [place] [address]       Subscriber Name Subscriber Birth Date Member ID       DALILA VALERIO 1950 [id number]           Tertiary Coverage     Payor Plan Insurance Group Employer/Plan Group    [place]      Payor Plan Address Payor Plan Phone Number Payor Plan Fax Number Effective Dates    [address] " [phone]  3/1/2017 - None Entered    [place] [id number]       Subscriber Name Subscriber Birth Date Member ID       KATY REID 1950 [id number]                 Emergency Contacts      (Rel.) Home Phone Work Phone Mobile Phone    Se Burger (Significant Other) [phone] -- [phone]

## 2020-08-24 NOTE — PLAN OF CARE
Problem: Patient Care Overview  Goal: Plan of Care Review  Outcome: Ongoing (interventions implemented as appropriate)  Flowsheets (Taken 8/24/2020 0929)  Progress: improving  Plan of Care Reviewed With: patient  Outcome Summary: Pt tolerated treatment well this date. Increased gait distance to 180ft w/ Rw and CGA. Pt reported of having numbness on R side, though it did not affect mobility. Instructed pt on a few LE seated exercises and encouraged pt to ambulate w/ nsg during the day. Patient was wearing a face mask during this therapy encounter. Therapist used appropriate personal protective equipment including eye protection, mask, and gloves.  Mask used was standard procedure mask. Appropriate PPE was worn during the entire therapy session. Hand hygiene was completed before and after therapy session. Patient is not in enhanced droplet precautions.

## 2020-08-24 NOTE — CONSULTS
"Diabetes Education  Assessment/Teaching    Patient Name:  Dalila Valerio  YOB: 1950  MRN: [id number]  Admit Date:  8/19/2020      Assessment Date:  8/24/2020    Most Recent Value   General Information    Referral From:  A1c   Height  157.5 cm (62.01\")   Weight  96.2 kg (212 lb 1.3 oz)   Weight Method  Bed scale   Diabetes History   What type of diabetes do you have?  Type 2   Length of Diabetes Diagnosis  10 + years [Pt states over 10 years]   Current DM knowledge  good   Do you test your blood sugar at home?  yes   Frequency of checks  TID   Meter type  Accuchek   Who performs the test?  self   Typical readings  120-150s   Education Preferences   Barriers to Learning  other (comment) [none noted]   Assessment Topics   Healthy Eating - Assessment  Needs education   Reducing Risk - Assessment  Needs education   DM Goals   Healthy Eating - Goal  0-30 days from discharge   Reducing Risk - Goal  30-90 days from discharge   Contact Plan  Follow-up medical care            Most Recent Value   DM Education Needs   Meter  Has own   Meter Type  Accuchek   Frequency of Testing  AC meals   Reducing Risks  A1C testing, Cardiovascular [A1c 7.9%  Pt expressed suprised at elevation.  Pt states her typical A1c is slightly above 7%.  ]   Healthy Eating  Basic meal plan provided   Healthy Coping  Appropriate   Discharge Plan  Home, Follow-up with PCP   Motivation  Strong, Engaged   Teaching Method  Handouts, Discussion, Explanation   Patient Response  Verbalized understanding            Other Comments:  Pt states she has been eating what her spouse has been making and has not been as strict with her diet as in the past.  Pt states she has been eating a lot of fruit, bananas and strawberries.  Reviewed carb content in fruit.  Discussed limiting to 45-60 grams of carbohydrates per meal.  Reviewed CVA risk factors related to diabetes.  Encouraged to continue to monitor and keep log to share w/ Dr. Valentine for possible " titration of medications/diet.  Pt verbalized understanding.          Electronically signed by:  Neisha Baiely RN  08/24/20 10:58

## 2020-08-24 NOTE — PROGRESS NOTES
PROGRESS NOTE  Patient Name: Dalila Valerio  Age/Sex: 69 y.o. female  : 1950  MRN: [id number]    Date of Admission: 2020  Date of Encounter Visit: 20   LOS: 5 days   Patient Care Team:  Cinthya Valentine APRN as PCP - General  Cinthya Valentine APRN as PCP - Family Medicine  Cinthya Valentine APRN as PCP - Claims Attributed  Devyn Velazco MD as Consulting Physician (Pain Medicine)  Bogdan Olmedo MD as Surgeon (Orthopedic Surgery)  Pedro Pablo Lomas MD as Consulting Physician (Cardiology)    Chief Complaint: Left breast swelling with hematoma    Hospital course: Admitted with acute right-sided weakness with aphasia, had acute CVA status post TPA with good clinical response.  Had an acute DVT, is on Lovenox and the plan is to transition to the new agent oral anticoagulation once her nausea and vomiting are safely controlled.  The patient reported no vomiting for the last 24 hours, she is still complaining of the loose bowel movement.  No fever or chills.  She is on oral anticoagulation and is complaining of left breast hematoma    Interval History: Tolerating p.o., positive diarrhea, on oral anticoagulation with the Eliquis.  Is complaining of discomfort and swelling in the left breast with ecchymosis and evidence of hematoma on exam.    REVIEW OF SYSTEMS:   CONSTITUTIONAL: no fever or chills  CARDIOVASCULAR: No chest pain, chest pressure or chest discomfort. No palpitations or edema.   RESPIRATORY: No shortness of breath, cough or sputum.   GASTROINTESTINAL: Diarrhea is present today, more of a loose stool but still uncomfortable.  No abdominal pain or blood.   HEMATOLOGIC: Positive bleeding and bruising especially over the left breast.     Ventilator/Non-Invasive Ventilation Settings (From admission, onward)    None            Vital Signs  Temp:  [98.7 °F (37.1 °C)-99.3 °F (37.4 °C)] 99.3 °F (37.4 °C)  Heart Rate:  [60-73] 73  Resp:  [20] 20  BP: (118-129)/(64-71) 129/71  SpO2:  [96  "%-97 %] 97 %  on    Device (Oxygen Therapy): room air    Intake/Output Summary (Last 24 hours) at 8/24/2020 1340  Last data filed at 8/24/2020 0900  Gross per 24 hour   Intake 210 ml   Output --   Net 210 ml     Flowsheet Rows      First Filed Value   Admission Height  157.5 cm (62.01\") Documented at 08/20/2020 1043   Admission Weight  85.5 kg (188 lb 9 oz) Documented at 08/19/2020 0737        Body mass index is 38.78 kg/m².      08/20/20  1043 08/22/20  0454 08/24/20  0559   Weight: 85.5 kg (188 lb 7.9 oz) (not weighed by RD) 86.4 kg (190 lb 7.6 oz) 96.2 kg (212 lb 1.3 oz)       Physical Exam:  GEN:  No acute distress, alert, cooperative, well developed   EYES:   Sclerae clear. No icterus. PERRL. Normal EOM  ENT:   External ears/nose normal, no oral lesions, no thrush, mucous membranes moist  NECK:  Supple, midline trachea, no JVD  LUNGS: Chest inspection shows evidence of ecchymosis and palpable hematoma 4 x 5 cm over the left breast , CTAB, no wheezes. No rhonchi. No crackles. Respirations regular, even and unlabored.   CV:  Regular rhythm and rate. Normal S1/S2. No murmurs, gallops, or rubs noted.  ABD:  Soft, nontender and nondistended. Normal bowel sounds. No guarding.  Patient has small spots of ecchymosis and 1 x 1 cm hematoma over the abdominal wall at the site of the previous Lovenox injection  EXT:  Moves all extremities well. No cyanosis. No redness. No edema.   Skin: Dry, intact, no bleeding    Results Review:      Results from last 7 days   Lab Units 08/20/20  0439 08/19/20  0745 08/19/20  0738   SODIUM mmol/L 141  --  142   POTASSIUM mmol/L 3.8  --  3.2*   CHLORIDE mmol/L 112*  --  106   CO2 mmol/L 21.0*  --  25.7   BUN mg/dL 11  --  15   CREATININE mg/dL 0.92 1.20 1.26*   CALCIUM mg/dL 8.5*  --  9.3   AST (SGOT) U/L 16  --  13   ALT (SGPT) U/L 18  --  12   ANION GAP mmol/L 8.0  --  10.3   ALBUMIN g/dL 3.30*  --  3.90     Results from last 7 days   Lab Units 08/19/20  0738   TROPONIN T ng/mL <0.010 "             Results from last 7 days   Lab Units 08/20/20  0439   WBC 10*3/mm3 11.00*  10.66   HEMOGLOBIN g/dL 10.7*  10.7*   HEMATOCRIT % 32.0*  31.8*   PLATELETS 10*3/mm3 180  172   MCV fL 88.6  88.6   NEUTROPHIL % % 71.5   LYMPHOCYTE % % 19.4*   MONOCYTES % % 7.9   EOSINOPHIL % % 0.5   BASOPHIL % % 0.3   IMM GRAN % % 0.4     Results from last 7 days   Lab Units 08/19/20  0738   INR  0.99   APTT seconds 23.8         Results from last 7 days   Lab Units 08/20/20  0439   CHOLESTEROL mg/dL 106   TRIGLYCERIDES mg/dL 90   HDL CHOL mg/dL 34*         Results from last 7 days   Lab Units 08/20/20  0439   HEMOGLOBIN A1C % 7.90*     No results found for: POCGLU              Results from last 7 days   Lab Units 08/19/20  0811   ADENOVIRUS DETECTION BY PCR  Not Detected   CORONAVIRUS 229E  Not Detected   CORONAVIRUS HKU1  Not Detected   CORONAVIRUS NL63  Not Detected   CORONAVIRUS OC43  Not Detected   HUMAN METAPNEUMOVIRUS  Not Detected   HUMAN RHINOVIRUS/ENTEROVIRUS  Not Detected   INFLUENZA B PCR  Not Detected   PARAINFLUENZA 1  Not Detected   PARAINFLUENZA VIRUS 2  Not Detected   PARAINFLUENZA VIRUS 3  Not Detected   PARAINFLUENZA VIRUS 4  Not Detected   BORDETELLA PERTUSSIS PCR  Not Detected   BORDETELLA PARAPERTUSSIS PCR  Not Detected   [id number]  Not Detected   CHLAMYDOPHILA PNEUMONIAE PCR  Not Detected   MYCOPLAMA PNEUMO PCR  Not Detected   INFLUENZA A H3  Not Detected   INFLUENZA A H1  Not Detected   RSV, PCR  Not Detected               Venous Doppler ultrasound 8/20/2020:  · Acute right lower extremity deep vein thrombosis noted in the gastrocnemius.  · Acute right lower extremity superficial thrombophlebitis noted in the greater saphenous (above knee).  · All other veins appeared normal bilaterally.  Imaging:   Imaging Results (All)        I reviewed the patient's new clinical results.  I personally viewed and interpreted the patient's imaging results: No new films for me to review        Medication Review:      amLODIPine 5 mg Oral Daily   apixaban 5 mg Oral Q12H   ARIPiprazole 5 mg Oral Daily   budesonide-formoterol 2 puff Inhalation BID   carbidopa-levodopa 1 tablet Oral TID   cetirizine 5 mg Oral Daily   citalopram 20 mg Oral Daily   gabapentin 400 mg Oral Q12H   iodixanol 100 mL Intra-arterial Once in imaging   lisinopril 20 mg Oral Daily   metoprolol tartrate 50 mg Oral BID   pantoprazole 40 mg Oral QAM   rosuvastatin 20 mg Oral Nightly   senna 1 tablet Oral Nightly   sodium chloride 1,000 mL Intravenous Once   sodium chloride 10 mL Intravenous Q12H   sodium chloride 100 mL/hr Intravenous Once            ASSESSMENT:   1. Acute left temporal CVA status post thrombolytic with acute aphasia and right-sided hemiparesis on presentation, resolved  2. 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 Left breast hematoma, new problem as of 8/24/2020  4. Acute encephalopathy, improved  5. Coronary artery disease with prior MI and angioplasty  6. Right lower extremity DVT on oral anticoagulation now for that and for the stroke  7. Bronchiectasis Mycobacterium avium infection  8. Williamson's esophagus with acid reflux  9. Morbid obesity  10. Obstructive sleep apnea on CPAP  11. Chronic kidney disease type II, her GFR did improve down to almost normal level over the hospital  12. Nausea and vomiting      PLAN:  Patient was supposed to go home today however with the size of the hematoma and the discomfort from it and the need to stay on the anticoagulation, we need to keep the patient in for further observation, if the hematoma is extending bigger, may need to delay the discharge and consider closing that [place] sooner if she is to come off of blood thinner.  Since the DVT is below the knee and the above the knee lesion with a superficial thrombophlebitis, we may make an argument for coming off the blood thinner sooner, however preferred to wait for the results of the  hypercoagulable work-up.  We will use ice tracking to help control the bleeding in the left breast  We will not hold on the Eliquis yet but we will repeat the hemoglobin this afternoon and will recheck in the morning.  Discussed with the patient, discussed with , they are both agreeable to hold on the discharge for the time being        Disposition: Hold on the discharge given the above new problem    Gerardo Thornton MD  08/24/20  13:40         Dictated utilizing Dragon dictation

## 2020-08-25 ENCOUNTER — READMISSION MANAGEMENT (OUTPATIENT)
Dept: CALL CENTER | Facility: HOSPITAL | Age: 70
End: 2020-08-25

## 2020-08-25 VITALS
OXYGEN SATURATION: 94 % | DIASTOLIC BLOOD PRESSURE: 56 MMHG | SYSTOLIC BLOOD PRESSURE: 121 MMHG | WEIGHT: 212.08 LBS | HEART RATE: 66 BPM | BODY MASS INDEX: 39.03 KG/M2 | HEIGHT: 62 IN | RESPIRATION RATE: 18 BRPM | TEMPERATURE: 98.2 F

## 2020-08-25 LAB
DEPRECATED RDW RBC AUTO: 45.6 FL (ref 37–54)
ERYTHROCYTE [DISTWIDTH] IN BLOOD BY AUTOMATED COUNT: 13.5 % (ref 12.3–15.4)
HCT VFR BLD AUTO: 34 % (ref 34–46.6)
HGB BLD-MCNC: 10.9 G/DL (ref 12–15.9)
MCH RBC QN AUTO: 29.4 PG (ref 26.6–33)
MCHC RBC AUTO-ENTMCNC: 32.1 G/DL (ref 31.5–35.7)
MCV RBC AUTO: 91.6 FL (ref 79–97)
PLATELET # BLD AUTO: 207 10*3/MM3 (ref 140–450)
PMV BLD AUTO: 10.2 FL (ref 6–12)
RBC # BLD AUTO: 3.71 10*6/MM3 (ref 3.77–5.28)
WBC # BLD AUTO: 8.67 10*3/MM3 (ref 3.4–10.8)

## 2020-08-25 PROCEDURE — 94799 UNLISTED PULMONARY SVC/PX: CPT

## 2020-08-25 PROCEDURE — 25010000002 ONDANSETRON PER 1 MG: Performed by: INTERNAL MEDICINE

## 2020-08-25 PROCEDURE — 97110 THERAPEUTIC EXERCISES: CPT

## 2020-08-25 PROCEDURE — 85027 COMPLETE CBC AUTOMATED: CPT | Performed by: INTERNAL MEDICINE

## 2020-08-25 RX ORDER — LOPERAMIDE HYDROCHLORIDE 2 MG/1
2 CAPSULE ORAL 4 TIMES DAILY PRN
Qty: 20 CAPSULE | Refills: 0 | Status: SHIPPED | OUTPATIENT
Start: 2020-08-25 | End: 2020-10-12

## 2020-08-25 RX ADMIN — CARBIDOPA AND LEVODOPA 1 TABLET: 10; 100 TABLET ORAL at 09:28

## 2020-08-25 RX ADMIN — GABAPENTIN 400 MG: 400 CAPSULE ORAL at 09:28

## 2020-08-25 RX ADMIN — CETIRIZINE HYDROCHLORIDE 5 MG: 10 TABLET ORAL at 09:29

## 2020-08-25 RX ADMIN — METOPROLOL TARTRATE 50 MG: 50 TABLET, FILM COATED ORAL at 09:29

## 2020-08-25 RX ADMIN — ONDANSETRON 4 MG: 2 INJECTION INTRAMUSCULAR; INTRAVENOUS at 12:42

## 2020-08-25 RX ADMIN — APIXABAN 5 MG: 5 TABLET, FILM COATED ORAL at 09:29

## 2020-08-25 RX ADMIN — BUDESONIDE AND FORMOTEROL FUMARATE DIHYDRATE 2 PUFF: 160; 4.5 AEROSOL RESPIRATORY (INHALATION) at 07:57

## 2020-08-25 RX ADMIN — SODIUM CHLORIDE, PRESERVATIVE FREE 10 ML: 5 INJECTION INTRAVENOUS at 09:28

## 2020-08-25 RX ADMIN — AMLODIPINE BESYLATE 5 MG: 5 TABLET ORAL at 09:28

## 2020-08-25 RX ADMIN — CITALOPRAM 20 MG: 20 TABLET, FILM COATED ORAL at 09:28

## 2020-08-25 RX ADMIN — LISINOPRIL 20 MG: 20 TABLET ORAL at 09:28

## 2020-08-25 RX ADMIN — PANTOPRAZOLE SODIUM 40 MG: 40 TABLET, DELAYED RELEASE ORAL at 06:12

## 2020-08-25 RX ADMIN — CARBIDOPA AND LEVODOPA 1 TABLET: 10; 100 TABLET ORAL at 16:42

## 2020-08-25 RX ADMIN — ARIPIPRAZOLE 5 MG: 5 TABLET ORAL at 09:28

## 2020-08-25 NOTE — THERAPY TREATMENT NOTE
Patient Name: Dalila Valerio  : 1950    MRN: [id number]                              Today's Date: 2020       Admit Date: 2020    Visit Dx:     ICD-10-CM ICD-9-CM   1. Acute CVA (cerebrovascular accident) (CMS/HCC) I63.9 434.91     Patient Active Problem List   Diagnosis   • Adjustment disorder with mixed anxiety and depressed mood   • Atopic rhinitis   • Coronary arteriosclerosis in native artery   • Cobalamin deficiency   • Benign colonic polyp   • Lumbar radiculopathy   • Controlled diabetes mellitus type 2 with complications (CMS/HCC)   • Binocular vision disorder with diplopia   • Dyslipidemia   • Arthropathy of hand   • Knee pain   • Cramps of lower extremity   • Low back pain   • Migraine with typical aura   • Chronic nausea   • Obstructive sleep apnea syndrome   • Palpitations   • Ventricular premature beats   • Cephalalgia   • Colonic constipation   • Neurocardiogenic syncope   • Vitamin D deficiency   • DODSON (nonalcoholic steatohepatitis)   • Fibromyalgia   • Morbidly obese (CMS/HCC)   • Polyp of colon   • Family history of colonic polyps   • Family history of malignant neoplasm of colon   • Acute CVA (cerebrovascular accident) (CMS/HCC)     Past Medical History:   Diagnosis Date   • Abnormal weight gain    • Acquired trigger finger    • Acute myocardial infarction (CMS/HCC)    • Adjustment reaction with mixed emotional features    • Arthritis    • ASHD (arteriosclerotic heart disease)    • Asthma    • B12 deficiency    • Bacterial pneumonia    • Williamson esophagus    • Barretts esophagus    • Benign colonic polyp    • Bilateral knee pain    • Birthmark     haemangioma of the bone   • Bronchiectasis (CMS/HCC)    • CHF (congestive heart failure) (CMS/HCC)    • Chronic cough    • Chronic glomerulonephritis with exudative nephritis    • Chronic lumbar radiculopathy    • Diabetes mellitus (CMS/HCC)    • Diabetic peripheral neuropathy (CMS/HCC)    • Dyslipidemia    • Fibromyositis    • GERD  (gastroesophageal reflux disease)    • Herpes zoster    • Hyperlipidemia    • Hypertension    • Lupus anticoagulant disorder (CMS/HCC)    • Mycobacterium avium complex (CMS/HCC)    • Nephrolithiasis    • SILVIANO (obstructive sleep apnea)    • Palpitations    • Premature ventricular contraction    • Slow transit constipation    • Stroke (CMS/HCC)    • Vitamin D deficiency      Past Surgical History:   Procedure Laterality Date   • APPENDECTOMY     • BRONCHOSCOPY     •  SECTION     • CHOLECYSTECTOMY     • COLONOSCOPY     • COLONOSCOPY N/A 2019    Procedure: COLONOSCOPY to cecum with cold biopsy and cold and hotsnare polypectomies;  Surgeon: Suzy Eubanks MD;  Location: [facility] ENDOSCOPY;  Service: Gastroenterology   • CORONARY ANGIOPLASTY WITH STENT PLACEMENT     • EMBOLECTOMY N/A 2020    Procedure: EMERGENT CEREBRAL ANGIOGRAM;  Surgeon: Jonh Meehan MD;  Location: [facility] OR ;  Service: Neurosurgery;  Laterality: N/A;   • ENDOSCOPY N/A 2019    Procedure: ESOPHAGOGASTRODUODENOSCOPY with cold biopsies;  Surgeon: Suzy Eubanks MD;  Location: [facility] ENDOSCOPY;  Service: Gastroenterology   • KNEE ARTHROSCOPY     • OTHER SURGICAL HISTORY      elbow surgery   • ROTATOR CUFF REPAIR     • TUBAL ABDOMINAL LIGATION       General Information     Row Name 20 0858          PT Evaluation Time/Intention    Document Type  therapy note (daily note)  -     Mode of Treatment  physical therapy  -     Row Name 2058          General Information    Existing Precautions/Restrictions  fall  -     Row Name 2058          Cognitive Assessment/Intervention- PT/OT    Orientation Status (Cognition)  oriented x 4  -       User Key  (r) = Recorded By, (t) = Taken By, (c) = Cosigned By    Initials Name Provider Type     Lori Miner PTA Physical Therapy Assistant        Mobility     Row Name 20 0858          Bed Mobility Assessment/Treatment    Bed  Mobility Assessment/Treatment  supine-sit;sit-supine  -     Supine-Sit [place] (Bed Mobility)  conditional independence  -     Sit-Supine [place] (Bed Mobility)  conditional independence  -     Assistive Device (Bed Mobility)  bed rails;head of bed elevated  -SM     Row Name 08/25/20 0858          Sit-Stand Transfer    Sit-Stand [place] (Transfers)  supervision  -     Assistive Device (Sit-Stand Transfers)  walker, front-wheeled  -SM     Row Name 08/25/20 0858          Gait/Stairs Assessment/Training    [place] Level (Gait)  stand by assist  -     Assistive Device (Gait)  walker, front-wheeled  -     Distance in Feet (Gait)  200  -SM     Pattern (Gait)  step-through  -     Deviations/Abnormal Patterns (Gait)  jose decreased;stride length decreased  -     Bilateral Gait Deviations  forward flexed posture  -     Comment (Gait/Stairs)  pt declined attempting stairs  -       User Key  (r) = Recorded By, (t) = Taken By, (c) = Cosigned By    Initials Name Provider Type    Lori Prescott PTA Physical Therapy Assistant        Obj/Interventions     Row Name 08/25/20 0859          Therapeutic Exercise    Lower Extremity (Therapeutic Exercise)  LAQ (long arc quad), bilateral;marching while seated  -     Lower Extremity Range of Motion (Therapeutic Exercise)  ankle dorsiflexion/plantar flexion, bilateral  -     Exercise Type (Therapeutic Exercise)  AROM (active range of motion)  -     Position (Therapeutic Exercise)  seated  -     Sets/Reps (Therapeutic Exercise)  10 reps  -       User Key  (r) = Recorded By, (t) = Taken By, (c) = Cosigned By    Initials Name Provider Type    Lori Prescott PTA Physical Therapy Assistant        Goals/Plan    No documentation.       Clinical Impression     Row Name 08/25/20 0900          Pain Assessment    Additional Documentation  Pain Scale: Numbers Pre/Post-Treatment (Group)  -SM     Row Name 08/25/20 0900          Pain  Scale: Numbers Pre/Post-Treatment    Pain Scale: Numbers, Pretreatment  0/10 - no pain  -SM     Pain Scale: Numbers, Post-Treatment  0/10 - no pain  -SM     Pre/Post Treatment Pain Comment  c/o her thumbnail bothering her where it is ripping; bandaid given to pt  -SM     Row Name 08/25/20 0900          Positioning and Restraints    Pre-Treatment Position  in bed  -SM     Post Treatment Position  bed  -SM     In Bed  supine;call light within reach;encouraged to call for assist  -SM       User Key  (r) = Recorded By, (t) = Taken By, (c) = Cosigned By    Initials Name Provider Type    Lori Prescott PTA Physical Therapy Assistant        Outcome Measures     Row Name 08/25/20 0901          How much help from another person do you currently need...    Turning from your back to your side while in flat bed without using bedrails?  4  -SM     Moving from lying on back to sitting on the side of a flat bed without bedrails?  3  -SM     Moving to and from a bed to a chair (including a wheelchair)?  4  -SM     Standing up from a chair using your arms (e.g., wheelchair, bedside chair)?  4  -SM     Climbing 3-5 steps with a railing?  3  -SM     To walk in hospital room?  3  -SM     AM-PAC 6 Clicks Score (PT)  21  -SM     Row Name 08/25/20 0901          Functional Assessment    Outcome Measure Options  AM-PAC 6 Clicks Basic Mobility (PT)  -SM       User Key  (r) = Recorded By, (t) = Taken By, (c) = Cosigned By    Initials Name Provider Type    Lori Prescott PTA Physical Therapy Assistant        Physical Therapy Education                 Title: PT OT SLP Therapies (In Progress)     Topic: Physical Therapy (In Progress)     Point: Mobility training (In Progress)     Description:   Instruct learner(s) on safety and technique for assisting patient out of bed, chair or wheelchair.  Instruct in the proper use of assistive devices, such as walker, crutches, cane or brace.              Patient Friendly Description:    It's important to get you on your feet again, but we need to do so in a way that is safe for you. Falling has serious consequences, and your personal safety is the most important thing of all.        When it's time to get out of bed, one of us or a family member will sit next to you on the bed to give you support.     If your doctor or nurse tells you to use a walker, crutches, a cane, or a brace, be sure you use it every time you get out of bed, even if you think you don't need it.    Learning Progress Summary           Patient Acceptance, E,TB,D, VU by  at 8/25/2020 0901    Acceptance, E,TB,D, VU,DU,NR by  at 8/25/2020 0124    Acceptance, E,TB,D, VU,NR by  at 8/24/2020 0929    Acceptance, E, VU by RB at 8/22/2020 1332    Acceptance, E, NR by DO at 8/22/2020 1014    Acceptance, E,D, NR by PC at 8/21/2020 1640    Acceptance, E, DU,NR by AR at 8/20/2020 1533   Family Acceptance, E, NR by DO at 8/22/2020 1014   Significant Other Acceptance, E, DU,NR by AR at 8/20/2020 1533                   Point: Home exercise program (In Progress)     Description:   Instruct learner(s) on appropriate technique for monitoring, assisting and/or progressing patient with therapeutic exercises and activities.              Learning Progress Summary           Patient Acceptance, E,TB,D, VU by  at 8/25/2020 0901    Acceptance, E,TB,D, VU,DU,NR by  at 8/25/2020 0124    Acceptance, E,TB,D, VU,NR by  at 8/24/2020 0929    Acceptance, E, VU by RB at 8/22/2020 1332    Acceptance, E, NR by DO at 8/22/2020 1014    Acceptance, E,D, NR by PC at 8/21/2020 1640    Acceptance, E, DU,NR by AR at 8/20/2020 1533   Family Acceptance, E, NR by DO at 8/22/2020 1014   Significant Other Acceptance, E, DU,NR by AR at 8/20/2020 1533                   Point: Body mechanics (In Progress)     Description:   Instruct learner(s) on proper positioning and spine alignment for patient and/or caregiver during mobility tasks and/or exercises.               Learning Progress Summary           Patient Acceptance, E,TB,D, VU by  at 8/25/2020 0901    Acceptance, E,TB,D, VU,DU,NR by  at 8/25/2020 0124    Acceptance, E,TB,D, VU,NR by  at 8/24/2020 0929    Acceptance, E, VU by RB at 8/22/2020 1332    Acceptance, E, NR by DO at 8/22/2020 1014    Acceptance, E,D, NR by PC at 8/21/2020 1640    Acceptance, E, DU,NR by AR at 8/20/2020 1533   Family Acceptance, E, NR by DO at 8/22/2020 1014   Significant Other Acceptance, E, DU,NR by AR at 8/20/2020 1533                   Point: Precautions (In Progress)     Description:   Instruct learner(s) on prescribed precautions during mobility and gait tasks              Learning Progress Summary           Patient Acceptance, E,TB,D, VU by  at 8/25/2020 0901    Acceptance, E,TB,D, VU,DU,NR by  at 8/25/2020 0124    Acceptance, E,TB,D, VU,NR by  at 8/24/2020 0929    Acceptance, E, VU by RB at 8/22/2020 1332    Acceptance, E, NR by DO at 8/22/2020 1014    Acceptance, E,D, NR by PC at 8/21/2020 1640    Acceptance, E, DU,NR by AR at 8/20/2020 1533   Family Acceptance, E, NR by DO at 8/22/2020 1014   Significant Other Acceptance, E, DU,NR by AR at 8/20/2020 1533                               User Key     Initials Effective Dates Name Provider Type Discipline     04/03/18 -  Sydnee Molina, PT Physical Therapist PT     04/06/17 -  Tamara Lugo, RN Registered Nurse Nurse     03/07/18 -  Lori Miner, PTA Physical Therapy Assistant PT    DO 03/07/18 -  Derian Mueller, PT Physical Therapist PT     01/18/17 -  Carlos Auguste RN Registered Nurse Nurse    AR 07/02/20 -  Jose Yee, PT Student PT Student PT              PT Recommendation and Plan     Outcome Summary/Treatment Plan (PT)  Anticipated Discharge Disposition (PT): home with home health  Plan of Care Reviewed With: patient  Progress: improving  Outcome Summary: Pt tolerated treatment well this date. Ambulated 200ft w/ Rw and SBA. Pt declined stair navigation,  though balance and strength are improving and pt only has a couple to get into her house. No concerns noted by pt. Reviewed seated LE exercises w/ pt and encouraged her to perform on own. Patient was intermittently wearing a face mask during this therapy encounter. Therapist used appropriate personal protective equipment including eye protection, mask, and gloves.  Mask used was standard procedure mask. Appropriate PPE was worn during the entire therapy session. Hand hygiene was completed before and after therapy session. Patient is not in enhanced droplet precautions.     Time Calculation:   PT Charges     Row Name 08/25/20 0904             Time Calculation    Start Time  0828  -      Stop Time  0853  -      Time Calculation (min)  25 min  -      PT Received On  08/25/20  -      PT - Next Appointment  08/26/20  -        User Key  (r) = Recorded By, (t) = Taken By, (c) = Cosigned By    Initials Name Provider Type    Lori Prescott PTA Physical Therapy Assistant        Therapy Charges for Today     Code Description Service Date Service Provider Modifiers Qty    69450205546 HC PT THER PROC EA 15 MIN 8/24/2020 Lori Miner PTA GP 2    28742226063 HC PT THER PROC EA 15 MIN 8/25/2020 Lori Miner PTA GP 2          PT G-Codes  Outcome Measure Options: AM-PAC 6 Clicks Basic Mobility (PT)  AM-PAC 6 Clicks Score (PT): 21  AM-PAC 6 Clicks Score (OT): 15  Modified Julissa Scale: 4 - Moderately severe disability.  Unable to walk without assistance, and unable to attend to own bodily needs without assistance.    Lori Miner PTA  8/25/2020

## 2020-08-25 NOTE — PLAN OF CARE
Problem: Patient Care Overview  Goal: Plan of Care Review  Outcome: Ongoing (interventions implemented as appropriate)  Flowsheets (Taken 8/25/2020 0902)  Progress: improving  Plan of Care Reviewed With: patient  Outcome Summary: Pt tolerated treatment well this date. Ambulated 200ft w/ Rw and SBA. Pt declined stair navigation, though balance and strength are improving and pt only has a couple to get into her house. No concerns noted by pt. Reviewed seated LE exercises w/ pt and encouraged her to perform on own. Patient was intermittently wearing a face mask during this therapy encounter. Therapist used appropriate personal protective equipment including eye protection, mask, and gloves.  Mask used was standard procedure mask. Appropriate PPE was worn during the entire therapy session. Hand hygiene was completed before and after therapy session. Patient is not in enhanced droplet precautions.

## 2020-08-25 NOTE — PROGRESS NOTES
"Adult Nutrition  Assessment/PES    Patient Name:  Dalila Valerio  YOB: 1950  MRN: [id number]  Admit Date:  8/19/2020    Assessment Date:  8/25/2020    Comments:  Follow up note. Pt on Regular Consistent Carb, Cardiac diet and tolerating. Visited pt this am during breakfast which pt ate almost everything. Discussed food pref's. Told pt there is a kitchen on the floor that RN can get her something to eat in the evenings when hungry. Will remain available as needed.    Reason for Assessment     Row Name 08/25/20 0820          Reason for Assessment    Reason For Assessment  follow-up protocol         Nutrition/Diet History     Row Name 08/25/20 0879          Nutrition/Diet History    Typical Food/Fluid Intake  diet advanced, po fair, \"forcing food down\" ate 100% breakfast this am and interested in an evening snack told her about kitchen on floor and that RN can get her something           Labs/Tests/Procedures/Meds     Row Name 08/25/20 0853          Labs/Procedures/Meds    Lab Results Reviewed  reviewed     Lab Results Comments  glu        Diagnostic Tests/Procedures    Diagnostic Test/Procedure Reviewed  reviewed        Medications    Pertinent Medications Reviewed  reviewed     Pertinent Medications Comments  protonix, laxative, IV at 100         Physical Findings     Row Name 08/25/20 0855          Physical Findings    Overall Physical Appearance  obese     Skin  -- jennifer 18           Nutrition Prescription Ordered     Row Name 08/25/20 0855          Nutrition Prescription PO    Current PO Diet  Regular     Common Modifiers  Cardiac;Consistent Carbohydrate         Evaluation of Received Nutrient/Fluid Intake     Row Name 08/25/20 0855          PO Evaluation    % PO Intake  75%               Problem/Interventions:  Problem 1     Row Name 08/25/20 0828          Nutrition Diagnoses Problem 1    Swallow eval status  Done               Intervention Goal     Row Name 08/25/20 0880          Intervention " Goal    General  Maintain nutrition     PO  PO intake (%)     PO Intake %  75 %     Weight  Appropriate weight loss         Nutrition Intervention     Row Name 08/25/20 0856          Nutrition Intervention    RD/Tech Action  Follow Tx progress;Care plan reviewd;Interview for preference           Education/Evaluation     Row Name 08/25/20 0856          Monitor/Evaluation    Monitor  Per protocol;PO intake           Electronically signed by:  María Morales RD  08/25/20 08:56

## 2020-08-25 NOTE — PROGRESS NOTES
Continued Stay Note  [facility]     Patient Name: Dalila Valerio  MRN: [id number]  Today's Date: 8/25/2020    Admit Date: 8/19/2020    Discharge Plan     Row Name 08/25/20 1402       Plan    Plan  Home with [facility] and following for Elquis rx    Patient/Family in Agreement with Plan  yes    Plan Comments  Spoke with Tiffany/[facility] and they can accept pt. Pt also on Eliquis, left note for MD to escribe to [facility] retail pharmacy to check price. Updated pt. Nannette rodriguez/ccp        Discharge Codes    No documentation.             Oksana Valdovinos, RN

## 2020-08-25 NOTE — PROGRESS NOTES
Continued Stay Note  [facility]     Patient Name: Dalila Valerio  MRN: [id number]  Today's Date: 8/25/2020    Admit Date: 8/19/2020    Discharge Plan     Row Name 08/25/20 1727       Plan    Plan  Home with [facility] and rx eliquis    Patient/Family in Agreement with Plan  yes    Plan Comments  Spoke with [facility] cost of eliquis is $8.95 per month but first prescription is free with coupon. CCP explained to pt and she can afford that bess. Updated LD Pugh. Plan home today with [facility]. YANELY JAFFE/CCP        Discharge Codes    No documentation.       Expected Discharge Date and Time     Expected Discharge Date Expected Discharge Time    Aug 25, 2020             Oksana Valdovinos RN

## 2020-08-25 NOTE — DISCHARGE SUMMARY
DISCHARGE SUMMARY    Patient Name: Dalila Valerio  Age/Sex: 69 y.o. female  : 1950  MRN: [id number]  Patient Care Team:  Cinthya Valentine APRN as PCP - General  Cinthya Valentine APRN as PCP - Family Medicine  Cinthya Valentine APRN as PCP - Claims Attributed  Devyn Velazco MD as Consulting Physician (Pain Medicine)  Bogdan Olmedo MD as Surgeon (Orthopedic Surgery)  Pedro Pablo Lomas MD as Consulting Physician (Cardiology)       Date of Admit: 2020  Date of Discharge:  20  Discharge Condition: Good    Discharge Diagnoses:  1. Acute left temporal CVA status post thrombolytic with acute aphasia and right-sided hemiparesis on presentation, resolved  2. 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 Left breast hematoma, new problem as of 2020  4. Acute encephalopathy, improved  5. Coronary artery disease with prior MI and angioplasty  6. Right lower extremity DVT on oral anticoagulation now for that and for the stroke  7. Bronchiectasis Mycobacterium avium infection  8. Williamson's esophagus with acid reflux  9. Morbid obesity  10. Obstructive sleep apnea on CPAP  11. Chronic kidney disease type II, her GFR did improve down to almost normal level over the hospital  12. Nausea and vomiting    History of present illness from H&P from 2020:   69-year-old morbidly obese white female with a past medical history significant for CAD status post PCI, CKD, diabetes, SILVIANO on CPAP, previous history of lupus anticoagulant not on anticoagulation who was brought into the ER with concerns of new onset dizziness and ataxia.  Patient was aphasic and having right hemiplegia after she collapsed.  She was mute on arrival and team D was called and patient was diagnosed with distal basilar thrombosis.  She was given TPA and taken for possible mechanical thrombectomy and patient was noted to have good flow in the basilar artery with recanalization post TPA and  hence was transferred to the ICU postprocedure.     Patient initially was altered and there was some concern for airway compromise but she started waking up and he is actually able to speak some now.  No significant hemiparesis noted as well.  Not able to provide much more history now due to aphasia.       I have reviewed (if any available) last discharge summaries as well as the outpatient notes from the patients other consultants available in the [facility] system as well previous notes from our group and summarized above    Hospital Course:   Dalila Valerio presented to [facility] with complaints of acute onset right-sided weakness with aphasia, had evidence of acute CVA and had a TPA with good clinical response from the neurological standpoint.  During the hospital admission she did develop acute DVT and she was started on Lovenox and was transitioned to oral anticoagulation with Eliquis.  Patient had evidence of patent foramen ovale, and she was evaluated by cardiology and she had hypercoagulable work-up, and the final decision was to wait on the results of the testing and decide on closing the PFO and coming off the anticoagulation if she has no genetic predisposition for recurrent clotting that may require lifelong treatment with anticoagulation for DVT.  If lifelong anticoagulation was necessary anyway then closure of the PFO would not provide any further protection.  That was the plan based on the cardiology consultation, the patient is however to follow-up with her cardiologist for final decision to be made after discharge.  Patient developed a hematoma over the left breast that was spontaneous, it was not triggered by any injection, it was tender and caused a large area of ecchymosis around it however she did not have any drop in her hemoglobin, the patient discharge was canceled and she was  monitored for another day, on reassessment today the size of the hematoma is smaller and  the pain is better and that took place even without having to discontinue the anticoagulation and it was felt that she should be okay to go home on the Eliquis, with instruction to report back in case of worsening symptoms or sign of bleeding which were discussed.  She had some diarrhea, and she was given some Imodium with good control, she also had some nausea and vomiting which resolved over the hospital course.    Consults:   IP CONSULT TO NEUROLOGY  IP CONSULT TO NEUROLOGY  IP CONSULT TO PULMONOLOGY  IP CONSULT TO INTENSIVIST  IP CONSULT TO NEURO CLINICAL SPECIALIST  IP CONSULT TO REHAB ADMISSION  IP CONSULT TO CASE MANAGEMENT   IP CONSULT TO DIABETES EDUCATOR  IP CONSULT TO CARDIOLOGY  IP CONSULT TO CARDIOLOGY    Significant Discharge Diagnostics   Procedures Performed:  Procedure(s):  EMERGENT CEREBRAL ANGIOGRAM       Pertinent Lab Results:  Results from last 7 days   Lab Units 08/20/20  0439 08/19/20  0745 08/19/20  0738   SODIUM mmol/L 141  --  142   POTASSIUM mmol/L 3.8  --  3.2*   CHLORIDE mmol/L 112*  --  106   CO2 mmol/L 21.0*  --  25.7   BUN mg/dL 11  --  15   CREATININE mg/dL 0.92 1.20 1.26*   GLUCOSE mg/dL 129*  --  188*   CALCIUM mg/dL 8.5*  --  9.3   AST (SGOT) U/L 16  --  13   ALT (SGPT) U/L 18  --  12     Results from last 7 days   Lab Units 08/19/20  0738   TROPONIN T ng/mL <0.010     Results from last 7 days   Lab Units 08/25/20  0518 08/24/20  1456 08/20/20  0439   WBC 10*3/mm3 8.67  --  11.00*  10.66   HEMOGLOBIN g/dL 10.9* 10.7* 10.7*  10.7*   HEMATOCRIT % 34.0 33.8* 32.0*  31.8*   PLATELETS 10*3/mm3 207  --  180  172   MCV fL 91.6  --  88.6  88.6   MCH pg 29.4  --  29.6  29.8   MCHC g/dL 32.1  --  33.4  33.6   RDW % 13.5  --  12.8  12.9   RDW-SD fl 45.6  --  41.7  42.6   MPV fL 10.2  --  10.1  10.1   NEUTROPHIL % %  --   --  71.5   LYMPHOCYTE % %  --   --  19.4*   MONOCYTES % %  --   --  7.9   EOSINOPHIL % %  --   --  0.5   BASOPHIL % %  --   --  0.3   IMM GRAN % %   --   --  0.4   NEUTROS ABS 10*3/mm3  --   --  7.63*   LYMPHS ABS 10*3/mm3  --   --  2.07   MONOS ABS 10*3/mm3  --   --  0.84   EOS ABS 10*3/mm3  --   --  0.05   BASOS ABS 10*3/mm3  --   --  0.03   IMMATURE GRANS (ABS) 10*3/mm3  --   --  0.04   NRBC /100 WBC  --   --  0.0     Results from last 7 days   Lab Units 08/19/20  0738   INR  0.99   APTT seconds 23.8         Results from last 7 days   Lab Units 08/20/20  0439   CHOLESTEROL mg/dL 106   TRIGLYCERIDES mg/dL 90   HDL CHOL mg/dL 34*   LDL CHOL mg/dL 54                                     Results from last 7 days   Lab Units 08/19/20  0811   ADENOVIRUS DETECTION BY PCR  Not Detected   CORONAVIRUS 229E  Not Detected   CORONAVIRUS HKU1  Not Detected   CORONAVIRUS NL63  Not Detected   CORONAVIRUS OC43  Not Detected   HUMAN METAPNEUMOVIRUS  Not Detected   HUMAN RHINOVIRUS/ENTEROVIRUS  Not Detected   INFLUENZA B PCR  Not Detected   PARAINFLUENZA 1  Not Detected   PARAINFLUENZA VIRUS 2  Not Detected   PARAINFLUENZA VIRUS 3  Not Detected   PARAINFLUENZA VIRUS 4  Not Detected   BORDETELLA PERTUSSIS PCR  Not Detected   BORDETELLA PARAPERTUSSIS PCR  Not Detected   [id number]  Not Detected   CHLAMYDOPHILA PNEUMONIAE PCR  Not Detected   MYCOPLAMA PNEUMO PCR  Not Detected   INFLUENZA A H3  Not Detected   INFLUENZA A H1  Not Detected   RSV, PCR  Not Detected           Imaging Results:  Imaging Results (All)     Procedure Component Value Units Date/Time    CT Head Without Contrast [[id number]] Collected:  08/21/20 0627     Updated:  08/21/20 1637    Narrative:       CT OF THE BRAIN WITHOUT CONTRAST 8/21/2020     HISTORY: Stroke.     TECHNIQUE:  Axial images were obtained through the brain without  intravenous contrast and compared to the previous study dated 8/20/2020.     FINDINGS:  Again seen is moderate size ill-defined area of low  attenuation in the left temporal occipital region consistent with an  area of acute to subacute infarction. Some of these areas  demonstrate  slightly lower density than on the 8/20/2020 study but the overall  extensiveness of the infarct is probably similar.     No new areas of infarct, mass effect or hemorrhage are seen. No midline  shift is seen.       Impression:       1. Subacute left temporal occipital infarct. Some of the areas appear  slightly lower in density than on the previous study of 8/20/2020  consistent with an evolving infarct.  2. The overall size appears similar.  3. No hemorrhage is seen.           Radiation dose reduction techniques were utilized, including automated  exposure control and exposure modulation based on body size.     This report was finalized on 8/21/2020 4:34 PM by Dr. Nikos Blanchard M.D.       XR Abdomen KUB [[id number]] Collected:  08/21/20 1405     Updated:  08/21/20 1409    Narrative:       ONE VIEW PORTABLE ABDOMEN     HISTORY: Abdominal pain.     FINDINGS: There is a small amount bowel gas scattered in the colon.  There is no evidence of obstruction or ileus. The lung bases are clear.     This report was finalized on 8/21/2020 2:06 PM by Dr. Roderick Moe M.D.       MRI Brain With & Without Contrast [[id number]] Collected:  08/20/20 0703     Updated:  08/21/20 0432    Narrative:       MRI BRAIN WITH AND WITHOUT CONTRAST-  08/19/2020     HISTORY: Stroke.     Multiple pre and post sagittal and axial images were obtained through  the brain. Previous CT of the brain dated 08/19/2020 is compared.     FINDINGS: On diffusion-weighted images, there is ill-defined increased  signal intensity tracking from the medial aspect of the left temporal  lobe posteriorly into the left posterior parietal and occipital lobe.  This is best seen on diffusion-weighted series 4 image 10 and measures  up to 6.9 cm in AP dimension by 1.8 cm in transverse dimension. This is  consistent with an area of acute infarction.     FLAIR images show minimal foci of bright signal intensity in the  bilateral cerebral hemispheres  consistent with minimal small vessel  white matter ischemic disease. No intracranial hemorrhage is seen.     Normal-appearing vascular flow voids are seen. The CT angiogram of the  head from 08/19/2020 showed some thrombus in the distal basilar artery.  This is not well seen on the MRI. Craniocervical junction and sella  appear normal.       Impression:       1. Ill-defined acute infiltrate in the left temporooccipital region as  described.  2. No hemorrhage is seen.  3. Please see additional dictation for today's CT angiogram of the head  and neck.     This report was finalized on 8/21/2020 4:29 AM by Dr. Nikos Blanchard M.D.       CT Head Without Contrast [[id number]] Collected:  08/20/20 1005     Updated:  08/20/20 1627    Narrative:       CT HEAD WITHOUT CONTRAST     HISTORY: Evaluate for hemorrhage. Stroke.     COMPARISON: CT and MRI examination of the brain 08/19/2019.     FINDINGS: An area of decreased attenuation is appreciated involving the  medial aspect of the left temporal and occipital lobes, consistent with  areas of infarction, better defined anteriorly similar in area as  compared to the MRI examination of 08/19/2020 and consistent with areas  of ischemic infarction. There is no evidence of hemorrhage,  hydrocephalus or of abnormal extra-axial fluid. No new area of decreased  attenuation to suggest acute infarction is identified.           Radiation dose reduction techniques were utilized, including automated  exposure control and exposure modulation based on body size.     This report was finalized on 8/20/2020 4:24 PM by Dr. Dallas Zhu M.D.       CT Head Without Contrast [[id number]] Collected:  08/19/20 1606     Updated:  08/20/20 0650    Narrative:       NONCONTRAST HEAD CT 08/19/2020     CLINICAL HISTORY: Patient is status post TPA and retrieval for thrombus  in distal basilar artery     TECHNIQUE: Spiral CT images were obtained from the base of the skull to  the vertex without intravenous  contrast. Images were reformatted and are  submitted in 3 mm thick axial CT section with brain algorithm and 2 mm  thick sagittal and coronal reconstructions were performed and submitted  in brain algorithm     COMPARISON: This is correlated to contrast-enhanced CT angiogram of the  head and neck and CT perfusion study of the head earlier this morning  08/19/2020 at 7:42 AM.     FINDINGS: The brain parenchyma is normal in attenuation. The ventricles  are normal in size. I see no mass effect and no midline shift. No  extra-axial fluid collections are identified. There is no evidence of  acute intracranial hemorrhage. There is minimal hyperdensity in the  cerebral vessels as a cerebral arteriogram was performed several hours  ago and is likely related to some residual circulating contrast. The  calvarium and skull base are normal in appearance. The paranasal  sinuses, mastoid air cells and middle ear cavities are clear.       Impression:       1. Essentially normal head CT with no convincing acute completed infarct  and no intracranial hemorrhage identified. If there are any symptoms  that suggest that the patient has an acute infarct, I recommend an MRI  of the brain for a more complete assessment.  2. Results were discussed with Dr. Zamora from stroke neurology by  telephone 08/19/2020 at 3:40 PM     Radiation dose reduction techniques were utilized, including automated  exposure control and exposure modulation based on body size.     This report was finalized on 8/20/2020 6:47 AM by Dr. Carlos Rivera M.D.       XR Chest 1 View [[id number]] Collected:  08/19/20 1509     Updated:  08/19/20 1514    Narrative:       XR CHEST 1 VW-     HISTORY:  Stroke.     COMPARISON:  Chest radiograph 08/19/2020.     FINDINGS:    A single portable view of the chest was obtained. The cardiac silhouette  and mediastinal and hilar contours are unchanged. There is a coronary  artery stent. There is no focal consolidation. Previously  noted  prominent interstitial opacities have improved. Pleural spaces are  clear. There is surgical material projecting over the right upper  quadrant. The visualized osseous structures are unchanged.     CONCLUSION(S):       1.  Previously noted prominent interstitial opacities have improved. No  new focal consolidation or effusion.     This report was finalized on 8/19/2020 3:11 PM by Dr. Elin Gamboa M.D.       CT Cerebral Perfusion With & Without Contrast [[id number]] Collected:  08/19/20 0910     Updated:  08/19/20 0950    Narrative:       CT ANGIOGRAMS NECK AND HEAD WITH CONTRAST AND CT PERFUSION WITH CONTRAST     HISTORY: Stroke.     Initially, a noncontrasted CT examination of the brain was performed.  There is increased attenuation appreciated related to the tip of the  basilar and extending to the proximal aspect of the P1 segments  bilaterally. This is consistent with thrombus. There is no evidence of  hemorrhage or of a focal area of decreased attenuation to suggest acute  infarction. The above information was immediately called to Dr. Zamora. Dr. Zamora requested further evaluation with CT perfusion  and CT angiography.     CT PERFUSION: The CBF imaging demonstrates an area of decreased blood  flow involving the medial aspect of the left occipital lobe measuring 5  mL in volume. There is a larger surrounding area of delayed perfusion.  Delayed perfusion involving the right cerebral hemisphere superiorly is  also appreciated. The total area of delayed perfusion is 54 mL. This  results in a mismatch ratio of 10.8.     CT ANGIOGRAM NECK AND HEAD WITH CONTRAST: The CT angiogram of the neck  and head was performed. Multiplanar as well as 3-dimensional  reconstructions were also generated.     The great vessels are arranged in a classic configuration. There is mild  calcified plaque appreciated involving the proximal aspects of the  internal carotid arteries bilaterally with 0% stenosis using  NASCET  criteria. The proximal aspects of the anterior and middle cerebral  arteries appear unremarkable.     Both vertebral arteries were opacified. The vertebral arteries are  codominant. There is no evidence of ostial stenosis. The cervical  segments of the vertebral arteries are of normal caliber. There is  thrombus present within the distal aspect of the basilar. This is more  prominent on the right. The superior cerebellar artery is not opacified  on the right. Thrombus extends superiorly extending into the proximal  aspects of the posterior cerebral arteries bilaterally. There is severe  stenosis of the proximal aspect of the right posterior cerebral artery.  There is occlusion of the left posterior cerebral artery at its origin  with decreased opacification more distally.     A standard postcontrast CT examination of the brain showed no evidence  of abnormal enhancement.       Impression:       Thrombus within the distal basilar artery extending into the posterior  cerebral arteries bilaterally, more prominent to the left. The component  involving the distal aspect of the basilar artery is more prominent to  the right where it overlies the right superior cerebral artery. The  right superior cerebellar artery is not opacified.     The noncontrasted CT examination of the brain was made available for  interpretation at 0744 hours and results called to Dr. Zamora at 0744  hours. The CT angiogram was made available for interpretation at 0752  hours and results called to Dr. Zamora at 0754 hours.                 Radiation dose reduction techniques were utilized, including automated  exposure control and exposure modulation based on body size.     This report was finalized on 8/19/2020 9:46 AM by Dr. Dallas Zhu M.D.       CT Angiogram Head [[id number]] Collected:  08/19/20 0910     Updated:  08/19/20 0950    Narrative:       CT ANGIOGRAMS NECK AND HEAD WITH CONTRAST AND CT PERFUSION WITH CONTRAST      HISTORY: Stroke.     Initially, a noncontrasted CT examination of the brain was performed.  There is increased attenuation appreciated related to the tip of the  basilar and extending to the proximal aspect of the P1 segments  bilaterally. This is consistent with thrombus. There is no evidence of  hemorrhage or of a focal area of decreased attenuation to suggest acute  infarction. The above information was immediately called to Dr. Zamora. Dr. Zamora requested further evaluation with CT perfusion  and CT angiography.     CT PERFUSION: The CBF imaging demonstrates an area of decreased blood  flow involving the medial aspect of the left occipital lobe measuring 5  mL in volume. There is a larger surrounding area of delayed perfusion.  Delayed perfusion involving the right cerebral hemisphere superiorly is  also appreciated. The total area of delayed perfusion is 54 mL. This  results in a mismatch ratio of 10.8.     CT ANGIOGRAM NECK AND HEAD WITH CONTRAST: The CT angiogram of the neck  and head was performed. Multiplanar as well as 3-dimensional  reconstructions were also generated.     The great vessels are arranged in a classic configuration. There is mild  calcified plaque appreciated involving the proximal aspects of the  internal carotid arteries bilaterally with 0% stenosis using NASCET  criteria. The proximal aspects of the anterior and middle cerebral  arteries appear unremarkable.     Both vertebral arteries were opacified. The vertebral arteries are  codominant. There is no evidence of ostial stenosis. The cervical  segments of the vertebral arteries are of normal caliber. There is  thrombus present within the distal aspect of the basilar. This is more  prominent on the right. The superior cerebellar artery is not opacified  on the right. Thrombus extends superiorly extending into the proximal  aspects of the posterior cerebral arteries bilaterally. There is severe  stenosis of the proximal aspect of  the right posterior cerebral artery.  There is occlusion of the left posterior cerebral artery at its origin  with decreased opacification more distally.     A standard postcontrast CT examination of the brain showed no evidence  of abnormal enhancement.       Impression:       Thrombus within the distal basilar artery extending into the posterior  cerebral arteries bilaterally, more prominent to the left. The component  involving the distal aspect of the basilar artery is more prominent to  the right where it overlies the right superior cerebral artery. The  right superior cerebellar artery is not opacified.     The noncontrasted CT examination of the brain was made available for  interpretation at 0744 hours and results called to Dr. Zamora at 0744  hours. The CT angiogram was made available for interpretation at 0752  hours and results called to Dr. Zamora at 0754 hours.                 Radiation dose reduction techniques were utilized, including automated  exposure control and exposure modulation based on body size.     This report was finalized on 8/19/2020 9:46 AM by Dr. Dallas Zhu M.D.       CT Angiogram Neck [[id number]] Collected:  08/19/20 0910     Updated:  08/19/20 0950    Narrative:       CT ANGIOGRAMS NECK AND HEAD WITH CONTRAST AND CT PERFUSION WITH CONTRAST     HISTORY: Stroke.     Initially, a noncontrasted CT examination of the brain was performed.  There is increased attenuation appreciated related to the tip of the  basilar and extending to the proximal aspect of the P1 segments  bilaterally. This is consistent with thrombus. There is no evidence of  hemorrhage or of a focal area of decreased attenuation to suggest acute  infarction. The above information was immediately called to Dr. Zamora. Dr. Zamora requested further evaluation with CT perfusion  and CT angiography.     CT PERFUSION: The CBF imaging demonstrates an area of decreased blood  flow involving the medial aspect of the  left occipital lobe measuring 5  mL in volume. There is a larger surrounding area of delayed perfusion.  Delayed perfusion involving the right cerebral hemisphere superiorly is  also appreciated. The total area of delayed perfusion is 54 mL. This  results in a mismatch ratio of 10.8.     CT ANGIOGRAM NECK AND HEAD WITH CONTRAST: The CT angiogram of the neck  and head was performed. Multiplanar as well as 3-dimensional  reconstructions were also generated.     The great vessels are arranged in a classic configuration. There is mild  calcified plaque appreciated involving the proximal aspects of the  internal carotid arteries bilaterally with 0% stenosis using NASCET  criteria. The proximal aspects of the anterior and middle cerebral  arteries appear unremarkable.     Both vertebral arteries were opacified. The vertebral arteries are  codominant. There is no evidence of ostial stenosis. The cervical  segments of the vertebral arteries are of normal caliber. There is  thrombus present within the distal aspect of the basilar. This is more  prominent on the right. The superior cerebellar artery is not opacified  on the right. Thrombus extends superiorly extending into the proximal  aspects of the posterior cerebral arteries bilaterally. There is severe  stenosis of the proximal aspect of the right posterior cerebral artery.  There is occlusion of the left posterior cerebral artery at its origin  with decreased opacification more distally.     A standard postcontrast CT examination of the brain showed no evidence  of abnormal enhancement.       Impression:       Thrombus within the distal basilar artery extending into the posterior  cerebral arteries bilaterally, more prominent to the left. The component  involving the distal aspect of the basilar artery is more prominent to  the right where it overlies the right superior cerebral artery. The  right superior cerebellar artery is not opacified.     The noncontrasted CT  examination of the brain was made available for  interpretation at 0744 hours and results called to Dr. Zamora at 0744  hours. The CT angiogram was made available for interpretation at 0752  hours and results called to Dr. Zamora at 0754 hours.                 Radiation dose reduction techniques were utilized, including automated  exposure control and exposure modulation based on body size.     This report was finalized on 8/19/2020 9:46 AM by Dr. Dallas Zhu M.D.       Arteriogram ([employer]) [[id number]] Resulted:  08/19/20 0929     Updated:  08/19/20 0929    Narrative:       This procedure was auto-finalized with no dictation required.    XR Chest 1 View [[id number]] Collected:  08/19/20 0820     Updated:  08/19/20 0826    Narrative:       XR CHEST 1 VW-     HISTORY:  Acute stroke protocol.     COMPARISON:  CT chest 01/08/2016. Chest radiographs 05/25/2015.     FINDINGS:    A single portable view of the chest was obtained. The cardiac silhouette  is upper normal in size. There is a coronary artery stent. Mediastinal  contours are within normal limits. There is central pulmonary venous  congestion. There are diffuse interstitial opacities throughout both  lungs. There are areas of bronchial wall thickening. There is no focal  consolidation. Pleural spaces are clear. There is multilevel  degenerative disc disease. There are surgical clips projecting over the  right upper quadrant.     CONCLUSION(S):       1.  Upper normal cardiac silhouette with central pulmonary venous  congestion and diffuse interstitial opacities suggestive of interstitial  pulmonary edema versus atypical/viral infection. No focal consolidation  or effusion.     This report was finalized on 8/19/2020 8:23 AM by Dr. Elin Gamboa M.D.             Objective:   Temp:  [98.2 °F (36.8 °C)-99.9 °F (37.7 °C)] 98.2 °F (36.8 °C)  Heart Rate:  [56-66] 66  Resp:  [18-20] 18  BP: (107-124)/(56-73) 121/56   SpO2:  [94 %-96 %] 94 %  on    Device  (Oxygen Therapy): room air  No intake or output data in the 24 hours ending 08/25/20 1709  Body mass index is 38.78 kg/m².      08/20/20  1043 08/22/20  0454 08/24/20  0559   Weight: 85.5 kg (188 lb 7.9 oz) (not weighed by RD) 86.4 kg (190 lb 7.6 oz) 96.2 kg (212 lb 1.3 oz)     Weight change:     Physical Exam:  GEN:  No acute distress, alert, cooperative, well developed   EYES:   Sclerae clear. No icterus. PERRL. Normal EOM  ENT:   External ears/nose normal, no oral lesions, no thrush, mucous membranes moist  NECK:  Supple, midline trachea, no JVD  LUNGS: Chest inspection shows evidence of ecchymosis and palpable hematoma 2 x 2 cm over the left breast , CTAB, no wheezes. No rhonchi. No crackles. Respirations regular, even and unlabored.  That hematoma is actually smaller on today's assessment compared to yesterday and less tender  CV:  Regular rhythm and rate. Normal S1/S2. No murmurs, gallops, or rubs noted.  ABD:  Soft, nontender and nondistended. Normal bowel sounds. No guarding.  Patient has small spots of ecchymosis and 1 x 1 cm hematoma over the abdominal wall at the site of the previous Lovenox injection  EXT:  Moves all extremities well. No cyanosis. No redness. No edema.   Skin: Dry, intact, no bleeding     Discharge Medications and Instructions:     Discharge Medications     Discharge Medications      New Medications      Instructions Start Date   apixaban 5 MG tablet tablet  Commonly known as:  ELIQUIS   5 mg, Oral, Every 12 Hours Scheduled      loperamide 2 MG capsule  Commonly known as:  IMODIUM   2 mg, Oral, 4 Times Daily PRN         Changes to Medications      Instructions Start Date   glimepiride 1 MG tablet  Commonly known as:  Amaryl  What changed:    · how much to take  · how to take this  · when to take this  · additional instructions   Take 2 tablet by mouth daily with the biggest of the meal of the day      metoprolol tartrate 50 MG tablet  Commonly known as:  LOPRESSOR  What changed:  when to  "take this   TAKE 1 TABLET BY MOUTH THREE TIMES DAILY      omeprazole 40 MG capsule  Commonly known as:  priLOSEC  What changed:  when to take this   TAKE 1 CAPSULE BY MOUTH TWICE DAILY         Continue These Medications      Instructions Start Date   Accu-Chek FastClix Lancets misc   USE TO TEST BLOOD SUGAR UP TO FIVE TIMES DAILY AS DIRECTED.      albuterol sulfate  (90 Base) MCG/ACT inhaler  Commonly known as:  ProAir HFA    INHALE 1 TO 2 PUFFS EVERY 4 TO 6 HOURS AS NEEDED      alendronate 70 MG tablet  Commonly known as:  FOSAMAX   TAKE 1 TABLET BY MOUTH EVERY 7 DAYS      amLODIPine 5 MG tablet  Commonly known as:  NORVASC   5 mg, Oral, Daily      ARIPiprazole 5 MG tablet  Commonly known as:  ABILIFY   5 mg, Oral, Daily      carbidopa-levodopa  MG per tablet  Commonly known as:  SINEMET   1 tablet, Oral, 3 Times Daily      citalopram 20 MG tablet  Commonly known as:  CeleXA   20 mg, Oral, Daily      cyclobenzaprine 10 MG tablet  Commonly known as:  FLEXERIL   10 mg, Oral, 3 Times Daily PRN      furosemide 20 MG tablet  Commonly known as:  LASIX   TAKE 2 TABLETS BY MOUTH IN THE MORNING AND 1 TABLET BY MOUTH EARLY AFTERNOON      gabapentin 800 MG tablet  Commonly known as:  NEURONTIN   TAKE 1 TABLET BY MOUTH THREE TIMES DAILY      glucose blood test strip   Test up to 5 times daily for low blood sugar and symptomatic glycemia      Insulin Syringe 29G X 1\" 0.3 ML misc   1 each, Does not apply, Daily With Dinner      lisinopril 20 MG tablet  Commonly known as:  PRINIVIL,ZESTRIL   20 mg, Oral, Daily      meclizine 12.5 MG tablet  Commonly known as:  ANTIVERT   12.5 mg, Oral, 3 Times Daily PRN      nateglinide 120 MG tablet  Commonly known as:  Starlix   120 mg, Oral, 3 Times Daily Before Meals      nitroglycerin 0.4 MG SL tablet  Commonly known as:  NITROSTAT   ONE TABLET UNDER TONGUE AS NEEDED FOR CHEST PAIN EVERY 5 MINUTES FOR UP TO 3 DOSES, CALL 911 IF PAIN PERSISTS      ondansetron 4 MG " tablet  Commonly known as:  ZOFRAN   4 mg, Oral, 3 Times Daily PRN      potassium chloride 8 MEQ CR tablet  Commonly known as:  KLOR-CON   TAKE 1 TABLET BY MOUTH TWICE DAILY      rosuvastatin 20 MG tablet  Commonly known as:  CRESTOR   20 mg, Oral, Daily      Sennosides 10 MG chewable tablet   Oral, As Needed      Symbicort 160-4.5 MCG/ACT inhaler  Generic drug:  budesonide-formoterol   INHALE 2 PUFFS BY MOUTH TWICE DAILY. RINSE MOUTH AFTER USE      vitamin D3 125 MCG (5000 UT) capsule capsule   5,000 Units, Oral, Daily         Stop These Medications    aspirin 81 MG EC tablet     Benadryl 25 mg capsule  Generic drug:  diphenhydrAMINE     HYDROcodone-acetaminophen  MG per tablet  Commonly known as:  NORCO     LORazepam 1 MG tablet  Commonly known as:  ATIVAN     prasugrel 10 MG tablet  Commonly known as:  EFFIENT     promethazine 25 MG tablet  Commonly known as:  PHENERGAN            Discharge Diet:    Dietary Orders (From admission, onward)     Start     Ordered    08/20/20 1536  Diet Regular; Consistent Carbohydrate, Cardiac  Diet Effective Now     Question Answer Comment   Diet Texture / Consistency Regular    Common Modifiers Consistent Carbohydrate    Common Modifiers Cardiac        08/20/20 1535                Activity at Discharge:   as tolerated    Discharge disposition: Home    Discharge Instructions and Follow ups:  She is to call and follow-up with her cardiologist within the next couple of weeks, patient was made aware that she needs the anticoagulation for more than just 1 month but she would only have 1 month on her discharge which should be enough until she had a set up appointment with her cardiologist who will take care of future refills.   Contact information for follow-up providers     Cinthya Valentine APRN .    Specialty:  Family Medicine  Contact information:  [address]  [place] [id number] [phone]                   Contact information for after-discharge care     Home  Medical Care     [facility] .    Service:  Home Health Services  Contact information:  [address]  [facility] [address] [phone]                           Future Appointments   Date Time Provider Department Center   9/1/2020  1:00 PM Cinthya Valentine APRN [facility] None   11/2/2020  1:20 PM Luis Enrique Santacruz II, MD [facility]        Medication Reconciliation: Please see electronically completed Med Rec.    Total time spent discharging patient including evaluation, medication reconciliation, arranging follow up, and post hospitalization instructions and education total time exceeds 30 minutes.     Gerardo Thornton MD  08/25/20  17:09      Dictated utilizing Dragon dictation

## 2020-08-25 NOTE — PLAN OF CARE
D/C held die to size and discomfort of hematoma in left breast, ice packs placed, on Eliquis for DVT below knee in right leg, possible need to close schulz ovale before D/C, VSS, pain and nausea medications given, [facility] 4, up to BSC, RA, SR, will CTM

## 2020-08-26 ENCOUNTER — TRANSITIONAL CARE MANAGEMENT TELEPHONE ENCOUNTER (OUTPATIENT)
Dept: CALL CENTER | Facility: HOSPITAL | Age: 70
End: 2020-08-26

## 2020-08-26 ENCOUNTER — TELEPHONE (OUTPATIENT)
Dept: NEUROLOGY | Facility: CLINIC | Age: 70
End: 2020-08-26

## 2020-08-26 NOTE — OUTREACH NOTE
Call Center TCM Note      Responses   [facility] patient discharged from?  [place]   Does the patient have one of the following disease processes/diagnoses(primary or secondary)?  Stroke (TIA)   TCM attempt successful?  No   Unsuccessful attempts  Attempt 2          Rosemary Vela RN    8/26/2020, 14:49

## 2020-08-26 NOTE — OUTREACH NOTE
Prep Survey      Responses   [facility] facility patient discharged from?  [place]   Is LACE score < 7 ?  No   Eligibility  [facility]   Date of Admission  08/19/20   Date of Discharge  08/25/20   Discharge Disposition  Home-Health Care Svc   Discharge diagnosis  CVA   COVID-19 Test Status  Negative   Does the patient have one of the following disease processes/diagnoses(primary or secondary)?  Stroke (TIA)   Does the patient have Home health ordered?  Yes   What is the Home health agency?   [facility] [place]   Is there a DME ordered?  No   Medication alerts for this patient  Meds to Beds   Prep survey completed?  Yes          Mitra Ray RN

## 2020-08-26 NOTE — OUTREACH NOTE
Call Center TCM Note      Responses   [facility] patient discharged from?  [place]   Does the patient have one of the following disease processes/diagnoses(primary or secondary)?  Stroke (TIA)   TCM attempt successful?  No   Unsuccessful attempts  Attempt 1 [Attempted patient and sig other. Unidentified voicemail for patient #, sig other number not in service. ]          Rosemary Vela RN    8/26/2020, 09:41

## 2020-08-27 ENCOUNTER — TRANSITIONAL CARE MANAGEMENT TELEPHONE ENCOUNTER (OUTPATIENT)
Dept: CALL CENTER | Facility: HOSPITAL | Age: 70
End: 2020-08-27

## 2020-08-27 NOTE — TELEPHONE ENCOUNTER
Will add to cancellation list. November is soonest available right now. Please advise and add pt to waitlist. Thankyou.

## 2020-08-27 NOTE — TELEPHONE ENCOUNTER
Called and spoke to pt. I notified her that you reviewed chart from hospital stay. We are going to keep her apt and put her on a cancellation list in case something comes available sooner. Pt states understanding and agrees. Please advise thank you.

## 2020-08-27 NOTE — PROGRESS NOTES
Case Management Discharge Note      Final Note: Home with Hindu  and s.o with marquita. pily rodriguez/[facility]p    Provided Post Acute Provider List?: Yes  Post Acute Provider List: Home Health  Provided Post Acute Provider Quality & Resource List?: Yes  Post Acute Provider Quality and Resource List: Home Health  Delivered To: Support Person, Patient  Method of Delivery: In person    Destination      No service has been selected for the patient.      Durable Medical Equipment      No service has been selected for the patient.      Dialysis/Infusion      No service has been selected for the patient.      Home Medical Care      Service Provider Request Status Selected Services Address Phone Number Fax Number    [facility] Selected Home Health Services [address] [address] [phone]       Oksana Valdovinos RN 8/24/2020 1142    1st choice if able to take                 Therapy      No service has been selected for the patient.      Community Resources      No service has been selected for the patient.        Transportation Services  Private: Car    Final Discharge Disposition Code: 06 - home with home health care

## 2020-08-27 NOTE — OUTREACH NOTE
Call Center TCM Note      Responses   [facility] patient discharged from?  [place]   Does the patient have one of the following disease processes/diagnoses(primary or secondary)?  Stroke (TIA)   TCM attempt successful?  No   Unsuccessful attempts  Attempt 3   Call Status  -- [Se's mobile number is not a working number]          Magi Hawley RN    8/27/2020, 12:09

## 2020-09-01 ENCOUNTER — READMISSION MANAGEMENT (OUTPATIENT)
Dept: CALL CENTER | Facility: HOSPITAL | Age: 70
End: 2020-09-01

## 2020-09-01 ENCOUNTER — TELEPHONE (OUTPATIENT)
Dept: FAMILY MEDICINE CLINIC | Facility: CLINIC | Age: 70
End: 2020-09-01

## 2020-09-01 ENCOUNTER — OFFICE VISIT (OUTPATIENT)
Dept: FAMILY MEDICINE CLINIC | Facility: CLINIC | Age: 70
End: 2020-09-01

## 2020-09-01 VITALS
SYSTOLIC BLOOD PRESSURE: 124 MMHG | TEMPERATURE: 98.2 F | HEART RATE: 67 BPM | DIASTOLIC BLOOD PRESSURE: 62 MMHG | OXYGEN SATURATION: 97 %

## 2020-09-01 DIAGNOSIS — E11.65 UNCONTROLLED TYPE 2 DIABETES MELLITUS WITH HYPERGLYCEMIA (HCC): ICD-10-CM

## 2020-09-01 DIAGNOSIS — Z23 IMMUNIZATION DUE: ICD-10-CM

## 2020-09-01 DIAGNOSIS — I25.10 CORONARY ARTERIOSCLEROSIS IN NATIVE ARTERY: ICD-10-CM

## 2020-09-01 DIAGNOSIS — N17.9 AKI (ACUTE KIDNEY INJURY) (HCC): Primary | ICD-10-CM

## 2020-09-01 DIAGNOSIS — I63.9 ACUTE CVA (CEREBROVASCULAR ACCIDENT) (HCC): ICD-10-CM

## 2020-09-01 PROCEDURE — 99214 OFFICE O/P EST MOD 30 MIN: CPT | Performed by: NURSE PRACTITIONER

## 2020-09-01 PROCEDURE — G0008 ADMIN INFLUENZA VIRUS VAC: HCPCS | Performed by: NURSE PRACTITIONER

## 2020-09-01 PROCEDURE — 90653 IIV ADJUVANT VACCINE IM: CPT | Performed by: NURSE PRACTITIONER

## 2020-09-01 NOTE — OUTREACH NOTE
"Stroke Week 2 Survey      Responses   [facility] patient discharged from?  [place]   Does the patient have one of the following disease processes/diagnoses(primary or secondary)?  Stroke (TIA)   Week 2 attempt successful?  No   Unsuccessful attempts  Attempt 1 [Se's mobile number is \"not in service\"]          Magi Hawley RN  "

## 2020-09-01 NOTE — PROGRESS NOTES
Subjective   Dalila Valerio is a 69 y.o. female who presents for a follow up after being admitted to [facility] from 8/19/20-/825/20 following a stroke. Currently has home health for PT. Numbness on right side now.     History of Present Illness   Had acute CVA  Secondary to occlusion. Has L sided weakness. Is able to walk slowly with aids and R hand and arm stays very cold feeling, but is able to move arm. Speech is delayed and having trouble with forgetfulness and speech. Has forgotten her phone number several times. Forgotten how to use phone.   Is on eliquis, care manager evaluated coverage and reports will be 8-13/ month. Seen today with Ed and we review the importance of not missing Eliquis doses  The following portions of the patient's history were reviewed and updated as appropriate: allergies, current medications, past family history, past medical history, past social history, past surgical history and problem list.    Review of Systems   Constitutional: Positive for fatigue. Negative for chills and fever.   Musculoskeletal: Positive for arthralgias and back pain.   Neurological: Positive for weakness and memory problem. Negative for headache.   Hematological: Negative for adenopathy. Bruises/bleeds easily.   Psychiatric/Behavioral: Positive for decreased concentration. Negative for negative for hyperactivity.     /62   Pulse 67   Temp 98.2 °F (36.8 °C) (Infrared)   SpO2 97%     Objective   Physical Exam   Constitutional: She appears well-developed and well-nourished. She appears lethargic.   Neck: Normal range of motion. Neck supple. No thyromegaly present.   Cardiovascular: Normal rate, regular rhythm and normal heart sounds.   Pulmonary/Chest: Effort normal and breath sounds normal.   Musculoskeletal: She exhibits no edema.        Right shoulder: She exhibits decreased strength.        Right elbow: She exhibits decreased range of motion. No tenderness found.        Right wrist: She exhibits normal  range of motion and no tenderness.        Right hip: She exhibits decreased strength.        Right ankle: She exhibits decreased range of motion. She exhibits normal pulse.        Left ankle: Normal.        Right hand: Decreased sensation noted. Decreased strength noted. She exhibits finger abduction and thumb/finger opposition. She exhibits no wrist extension trouble.        Right upper leg: She exhibits no tenderness.        Left upper leg: She exhibits no tenderness.        Right lower leg: She exhibits no tenderness.        Left lower leg: She exhibits no tenderness.   Lymphadenopathy:     She has no cervical adenopathy.   Neurological: She appears lethargic. She is not disoriented. A sensory deficit is present. No cranial nerve deficit. She exhibits normal muscle tone. Coordination and gait abnormal.   Weakness RUE RLE   Skin: Skin is warm and dry.   Psychiatric: She has a normal mood and affect. Her behavior is normal. Judgment and thought content normal. Her speech is delayed. Her speech is not tangential. Cognition and memory are impaired.   Nursing note and vitals reviewed.    Assessment/Plan   Problems Addressed this Visit        Cardiovascular and Mediastinum    Coronary arteriosclerosis in native artery    Acute CVA (cerebrovascular accident) (CMS/[facility])    Relevant Medications    apixaban (ELIQUIS) 5 MG tablet tablet      Other Visit Diagnoses     ROSALINE (acute kidney injury) (CMS/HCC)    -  Primary    Relevant Orders    Basic Metabolic Panel    Uncontrolled type 2 diabetes mellitus with hyperglycemia (CMS/HCC)        Relevant Orders    CBC & Differential    Basic Metabolic Panel    Immunization due        Relevant Orders    Fluad Quad 65+ yrs ([id number]) (Completed)        CAD--does have PFO that is felt contributory to stroke--to FU with her cardiologist to decide whether closure would decrease risk of recurrence. Currently anticoagulated on eliquis  CVA--[facility] is coming medically necessary  for PT, nursing and speech. Will need OT as well. Discussed some short and long term goals  ROSALINE--verify settled to baseline  DM2--consider risk/benefit of tighter control in decreasing cardiovascular risk, but consider higher goal <8 in the acute recovery phase secondary to cognitive and expressive deficits  Flu vaccine--offered 1 to Ed, declined    Current outpatient and discharge medications have been reconciled for the patient.  Reviewed by: RL Michael

## 2020-09-01 NOTE — TELEPHONE ENCOUNTER
Would add speech and OT secondary to memory, expressive aphasia, word searching. Has forgotten how to do things, like cook and operate phone

## 2020-09-02 LAB
BASOPHILS # BLD AUTO: 0.07 10*3/MM3 (ref 0–0.2)
BASOPHILS NFR BLD AUTO: 0.6 % (ref 0–1.5)
BUN SERPL-MCNC: 17 MG/DL (ref 8–23)
BUN/CREAT SERPL: 14.5 (ref 7–25)
CALCIUM SERPL-MCNC: 10.1 MG/DL (ref 8.6–10.5)
CHLORIDE SERPL-SCNC: 104 MMOL/L (ref 98–107)
CO2 SERPL-SCNC: 28.4 MMOL/L (ref 22–29)
CREAT SERPL-MCNC: 1.17 MG/DL (ref 0.57–1)
EOSINOPHIL # BLD AUTO: 0.31 10*3/MM3 (ref 0–0.4)
EOSINOPHIL NFR BLD AUTO: 2.5 % (ref 0.3–6.2)
ERYTHROCYTE [DISTWIDTH] IN BLOOD BY AUTOMATED COUNT: 13.3 % (ref 12.3–15.4)
GLUCOSE SERPL-MCNC: 175 MG/DL (ref 65–99)
HCT VFR BLD AUTO: 38.9 % (ref 34–46.6)
HGB BLD-MCNC: 12.3 G/DL (ref 12–15.9)
IMM GRANULOCYTES # BLD AUTO: 0.09 10*3/MM3 (ref 0–0.05)
IMM GRANULOCYTES NFR BLD AUTO: 0.7 % (ref 0–0.5)
LYMPHOCYTES # BLD AUTO: 2.95 10*3/MM3 (ref 0.7–3.1)
LYMPHOCYTES NFR BLD AUTO: 24.1 % (ref 19.6–45.3)
MCH RBC QN AUTO: 28.5 PG (ref 26.6–33)
MCHC RBC AUTO-ENTMCNC: 31.6 G/DL (ref 31.5–35.7)
MCV RBC AUTO: 90.3 FL (ref 79–97)
MONOCYTES # BLD AUTO: 0.92 10*3/MM3 (ref 0.1–0.9)
MONOCYTES NFR BLD AUTO: 7.5 % (ref 5–12)
NEUTROPHILS # BLD AUTO: 7.88 10*3/MM3 (ref 1.7–7)
NEUTROPHILS NFR BLD AUTO: 64.6 % (ref 42.7–76)
NRBC BLD AUTO-RTO: 0 /100 WBC (ref 0–0.2)
PLATELET # BLD AUTO: 277 10*3/MM3 (ref 140–450)
POTASSIUM SERPL-SCNC: 4.1 MMOL/L (ref 3.5–5.2)
RBC # BLD AUTO: 4.31 10*6/MM3 (ref 3.77–5.28)
SODIUM SERPL-SCNC: 141 MMOL/L (ref 136–145)
WBC # BLD AUTO: 12.22 10*3/MM3 (ref 3.4–10.8)

## 2020-09-02 NOTE — PROGRESS NOTES
Please call the patient regarding her abnormal result. Kidney function still mildly suppressed. avoidance of all NSAIDS recommended. These include ibuprofen, aleve, naproxen, meloxicam, and many others. If need otc pain relief, tylenol is ok.

## 2020-09-03 LAB
APTT HEX PL PPP: 1 SEC
APTT IMM NP PPP: ABNORMAL S
APTT PPP 1:1 SALINE: ABNORMAL S
APTT PPP: 25.5 SEC
B2 GLYCOPROT1 IGA SER-ACNC: <10 SAU
B2 GLYCOPROT1 IGG SER-ACNC: <10 SGU
B2 GLYCOPROT1 IGM SER-ACNC: <10 SMU
CARDIOLIPIN IGG SER IA-ACNC: <10 GPL
CARDIOLIPIN IGM SER IA-ACNC: <10 MPL
CONFIRM DRVVT: ABNORMAL S
INR PPP: 1.1 RATIO
LAC INTERPRETATION: ABNORMAL
PROTHROMBIN TIME: 12.1 SEC
SCREEN DRVVT/NORMAL: ABNORMAL %
SCREEN DRVVT: 34.8 SEC
THROMBIN TIME: 18 SEC

## 2020-09-04 ENCOUNTER — READMISSION MANAGEMENT (OUTPATIENT)
Dept: CALL CENTER | Facility: HOSPITAL | Age: 70
End: 2020-09-04

## 2020-09-04 NOTE — OUTREACH NOTE
Stroke Week 2 Survey      Responses   [facility] patient discharged from?  [place]   Does the patient have one of the following disease processes/diagnoses(primary or secondary)?  Stroke (TIA)   Week 2 attempt successful?  Yes   Call start time  0841   Call end time  0847   Discharge diagnosis  CVA   Is patient permission given to speak with other caregiver?  Yes   List who call center can speak with  Se Burger, sig other   Person spoke with today (if not patient) and relationship  Marcoheydi   Meds reviewed with patient/caregiver?  No [Sig other denies any medication issues or questions. States patient taking all meds as prescribed. ]   Is the patient taking all medications as directed (includes completed medication regime)?  Yes   Does the patient have a primary care provider?   Yes   Comments regarding PCP  Reports that patient has had follow up appt with PCP Cinthya SHINE since discharge.    Has the patient kept scheduled appointments due by today?  Yes   What is the Home health agency?   [facility] continued.    Does the patient have any residual symptoms from stroke/TIA?  Yes   Residual symptoms comments  Sig other reports still some issue with speech and weakness to right side.    Did the patient receive a copy of their discharge instructions?  Yes   Nursing interventions  Reviewed instructions with patient   What is the patient's perception of their health status since discharge?  Improving   Is the patient/caregiver able to teach back the hierarchy of who to call/visit for symptoms/problems? PCP, Specialist, Home health nurse, Urgent Care, ED, 911  Yes   Week 2 call completed?  Yes   Wrap up additional comments  Sig other states patient still asleep this am. Unable to cover all survey today. He states that patient is improving, HH still coming. Denies any questions or needs this am.           Rosemary Vela RN

## 2020-09-08 RX ORDER — ARIPIPRAZOLE 5 MG/1
5 TABLET ORAL DAILY
Qty: 30 TABLET | Refills: 2 | Status: SHIPPED | OUTPATIENT
Start: 2020-09-08 | End: 2020-10-12 | Stop reason: SDUPTHER

## 2020-09-10 ENCOUNTER — READMISSION MANAGEMENT (OUTPATIENT)
Dept: CALL CENTER | Facility: HOSPITAL | Age: 70
End: 2020-09-10

## 2020-09-10 NOTE — OUTREACH NOTE
"Stroke Week 3 Survey      Responses   [facility] patient discharged from?  [place]   Does the patient have one of the following disease processes/diagnoses(primary or secondary)?  Stroke (TIA)   Week 3 attempt successful?  Yes   Call start time  1034   Call end time  1038   Discharge diagnosis  CVA   Meds reviewed with patient/caregiver?  Yes   Is the patient having any side effects they believe may be caused by any medication additions or changes?  No   Does the patient have all medications ordered at discharge?  Yes   Is the patient taking all medications as directed (includes completed medication regime)?  Yes   Comments regarding appointments  Neurology appt on 11/2/20   Does the patient have an appointment with their PCP within 7 days of discharge?  Yes   Comments regarding PCP  9/1/20   Has the patient kept scheduled appointments due by today?  Yes   Psychosocial issues?  No   Does the patient require any assistance with activities of daily living such as eating, bathing, dressing, walking, etc.?  No   Does the patient have any residual symptoms from stroke/TIA?  Yes   Residual symptoms comments  Numbness   Does the patient understand the diet ordered at discharge?  Yes [\"Sugar free\"]   What is the patient's perception of their health status since discharge?  Improving   Nursing interventions  Nurse provided patient education   Is the patient able to teach back FAST for Stroke?  No [Provided education]   Is the patient/caregiver able to teach back the risk factors for a stroke?  High blood pressure-goal below 120/80, Smoking, Diabetes, High Cholesterol [diabetic]   Is the patient/caregiver able to teach back signs and symptoms related to disease process for when to call PCP?  Yes   Is the patient/caregiver able to teach back signs and symptoms related to disease process for when to call 911?  Yes   If the patient is a current smoker, are they able to teach back resources for cessation?  -- [Nonsmoker] "   Is the patient/caregiver able to teach back the hierarchy of who to call/visit for symptoms/problems? PCP, Specialist, Home health nurse, Urgent Care, ED, 911  Yes   Week 3 call completed?  Yes          Magi Hawley RN

## 2020-09-14 ENCOUNTER — TELEPHONE (OUTPATIENT)
Dept: FAMILY MEDICINE CLINIC | Facility: CLINIC | Age: 70
End: 2020-09-14

## 2020-09-14 NOTE — TELEPHONE ENCOUNTER
Patient says she was taking hydrocodone 10/325 prior to recent hospital admission. This was not on her d/c med list upon discharge so she has not been taking since then. She did not notice this until she started feeling increased pain over the weekend. Her home health nurse said it was okay for her to continue taking this but she was advised to inform PCP to make sure you agreed. Please advise.

## 2020-09-16 ENCOUNTER — TELEPHONE (OUTPATIENT)
Dept: FAMILY MEDICINE CLINIC | Facility: CLINIC | Age: 70
End: 2020-09-16

## 2020-09-16 NOTE — TELEPHONE ENCOUNTER
Spouse called to report that BP has been running low. It has been as low as 92/47. The highest it has been in the last week is 118/68. He has consulted with home health nurse who suggested if BP was below 120 to hold amlodipine and metoprolol. She did hold both medications yesterday and this morning bp was 116/65. He is worried that this is still too low for the bottom number and also wants to know if she should hold her lisinopril as well? He was told to contact pcp about home healths recommendations to make sure you agree.

## 2020-09-18 ENCOUNTER — READMISSION MANAGEMENT (OUTPATIENT)
Dept: CALL CENTER | Facility: HOSPITAL | Age: 70
End: 2020-09-18

## 2020-09-18 NOTE — OUTREACH NOTE
Stroke Week 4 Survey      Responses   [facility] patient discharged from?  [place]   Does the patient have one of the following disease processes/diagnoses(primary or secondary)?  Stroke (TIA)   Week 4 attempt successful?  Yes   Call start time  0947   Call end time  0956   Discharge diagnosis  CVA   Meds reviewed with patient/caregiver?  Yes   Is the patient having any side effects they believe may be caused by any medication additions or changes?  Yes   Side effects comments   Low b/p, two meds d/c'd   Is the patient taking all medications as directed (includes completed medication regime)?  Yes   Has the patient kept scheduled appointments due by today?  Yes   Psychosocial issues?  No   Does the patient require any assistance with activities of daily living such as eating, bathing, dressing, walking, etc.?  No   Does the patient have any residual symptoms from stroke/TIA?  Yes   Does the patient understand the diet ordered at discharge?  Yes   What is the patient's perception of their health status since discharge?  Improving [Still with weakness and neurologic issues, but walking without assistance unless she leaves the house and then uses cane.]   Is the patient able to teach back FAST for Stroke?  Yes   Is the patient/caregiver able to teach back the risk factors for a stroke?  High blood pressure-goal below 120/80, Diabetes   Week 4 Call Completed?  Yes   Would the patient like one additional call?  No   Graduated  Yes   Did the patient feel the follow up calls were helpful during their recovery period?  Yes          Rosemary Honeycutt RN

## 2020-09-22 ENCOUNTER — OFFICE VISIT (OUTPATIENT)
Dept: FAMILY MEDICINE CLINIC | Facility: CLINIC | Age: 70
End: 2020-09-22

## 2020-09-22 VITALS
HEART RATE: 65 BPM | WEIGHT: 190 LBS | TEMPERATURE: 97.7 F | HEIGHT: 62 IN | SYSTOLIC BLOOD PRESSURE: 126 MMHG | DIASTOLIC BLOOD PRESSURE: 76 MMHG | BODY MASS INDEX: 34.96 KG/M2 | OXYGEN SATURATION: 98 %

## 2020-09-22 DIAGNOSIS — I69.320 COMBINED RECEPTIVE AND EXPRESSIVE APHASIA AS LATE EFFECT OF CEREBROVASCULAR ACCIDENT (CVA): ICD-10-CM

## 2020-09-22 DIAGNOSIS — R41.89 COGNITIVE CHANGES: ICD-10-CM

## 2020-09-22 DIAGNOSIS — G89.29 HIP PAIN, CHRONIC, RIGHT: ICD-10-CM

## 2020-09-22 DIAGNOSIS — I63.9 ACUTE CVA (CEREBROVASCULAR ACCIDENT) (HCC): Primary | ICD-10-CM

## 2020-09-22 DIAGNOSIS — M25.551 HIP PAIN, CHRONIC, RIGHT: ICD-10-CM

## 2020-09-22 DIAGNOSIS — Z91.81 AT HIGH RISK FOR FALLS: ICD-10-CM

## 2020-09-22 DIAGNOSIS — M53.86 SCIATICA OF RIGHT SIDE ASSOCIATED WITH DISORDER OF LUMBAR SPINE: ICD-10-CM

## 2020-09-22 PROCEDURE — 99214 OFFICE O/P EST MOD 30 MIN: CPT | Performed by: NURSE PRACTITIONER

## 2020-09-22 RX ORDER — HYDROCODONE BITARTRATE AND ACETAMINOPHEN 10; 325 MG/1; MG/1
TABLET ORAL
COMMUNITY
Start: 2020-09-21 | End: 2022-07-02

## 2020-09-22 NOTE — PATIENT INSTRUCTIONS
Fall Prevention in the Home, Adult  Falls can cause injuries and can affect people from all age groups. There are many simple things that you can do to make your home safe and to help prevent falls. Ask for help when making these changes, if needed.  What actions can I take to prevent falls?  General instructions  · Use good lighting in all rooms. Replace any light bulbs that burn out.  · Turn on lights if it is dark. Use night-lights.  · Place frequently used items in easy-to-reach places. Lower the shelves around your home if necessary.  · Set up furniture so that there are clear paths around it. Avoid moving your furniture around.  · Remove throw rugs and other tripping hazards from the floor.  · Avoid walking on wet floors.  · Fix any uneven floor surfaces.  · Add color or contrast paint or tape to grab bars and handrails in your home. Place contrasting color strips on the first and last steps of stairways.  · When you use a stepladder, make sure that it is completely opened and that the sides are firmly locked. Have someone hold the ladder while you are using it. Do not climb a closed stepladder.  · Be aware of any and all pets.  What can I do in the bathroom?         · Keep the floor dry. Immediately clean up any water that spills onto the floor.  · Remove soap buildup in the tub or shower on a regular basis.  · Use non-skid mats or decals on the floor of the tub or shower.  · Attach bath mats securely with double-sided, non-slip rug tape.  · If you need to sit down while you are in the shower, use a plastic, non-slip stool.  · Install grab bars by the toilet and in the tub and shower. Do not use towel bars as grab bars.  What can I do in the bedroom?  · Make sure that a bedside light is easy to reach.  · Do not use oversized bedding that drapes onto the floor.  · Have a firm chair that has side arms to use for getting dressed.  What can I do in the kitchen?  · Clean up any spills right away.  · If you  need to reach for something above you, use a sturdy step stool that has a grab bar.  · Keep electrical cables out of the way.  · Do not use floor polish or wax that makes floors slippery. If you must use wax, make sure that it is non-skid floor wax.  What can I do in the stairways?  · Do not leave any items on the stairs.  · Make sure that you have a light switch at the top of the stairs and the bottom of the stairs. Have them installed if you do not have them.  · Make sure that there are handrails on both sides of the stairs. Fix handrails that are broken or loose. Make sure that handrails are as long as the stairways.  · Install non-slip stair treads on all stairs in your home.  · Avoid having throw rugs at the top or bottom of stairways, or secure the rugs with carpet tape to prevent them from moving.  · Choose a carpet design that does not hide the edge of steps on the stairway.  · Check any carpeting to make sure that it is firmly attached to the stairs. Fix any carpet that is loose or worn.  What can I do on the outside of my home?  · Use bright outdoor lighting.  · Regularly repair the edges of walkways and driveways and fix any cracks.  · Remove high doorway thresholds.  · Trim any shrubbery on the main path into your home.  · Regularly check that handrails are securely fastened and in good repair. Both sides of any steps should have handrails.  · Install guardrails along the edges of any raised decks or porches.  · Clear walkways of debris and clutter, including tools and rocks.  · Have leaves, snow, and ice cleared regularly.  · Use sand or salt on walkways during winter months.  · In the garage, clean up any spills right away, including grease or oil spills.  What other actions can I take?  · Wear closed-toe shoes that fit well and support your feet. Wear shoes that have rubber soles or low heels.  · Use mobility aids as needed, such as canes, walkers, scooters, and crutches.  · Review your medicines with  your health care provider. Some medicines can cause dizziness or changes in blood pressure, which increase your risk of falling.  Talk with your health care provider about other ways that you can decrease your risk of falls. This may include working with a physical therapist or  to improve your strength, balance, and endurance.  Where to find more information  · Centers for Disease Control and Prevention, [place]: https://www.cdc.gov  · National [place] on Aging: https://eh8jrex.jan.nih.gov  Contact a health care provider if:  · You are afraid of falling at home.  · You feel weak, drowsy, or dizzy at home.  · You fall at home.  Summary  · There are many simple things that you can do to make your home safe and to help prevent falls.  · Ways to make your home safe include removing tripping hazards and installing grab bars in the bathroom.  · Ask for help when making these changes in your home.  This information is not intended to replace advice given to you by your health care provider. Make sure you discuss any questions you have with your health care provider.  Document Released: 12/08/2003 Document Revised: 11/30/2018 Document Reviewed: 08/02/2018  Elsevier Patient Education © 2020 Elsevier Inc.      Prevención de caídas en el hogar, en adultos  Fall Prevention in the Home, Adult  Las caídas pueden causar lesiones y afectar a personas de todas las edades. Hay muchas cosas simples que puede hacer para que stoll casa sea un lugar seguro y ayudar a prevenir las caídas. Pida ayuda cuando sherley estos cambios, si la necesita.  ¿Qué medidas puedo lavern para prevenir caídas?  Instrucciones generales  · Use buena iluminación en todas las habitaciones. Reemplace las bombillas que se hayan quemado.  · Prenda las luces si está oscuro. Use luces nocturnas.  · Coloque los objetos que usa con frecuencia en lugares de fácil acceso. Baje los estantes de toda la casa de ser necesario.  · Disponga los muebles de modo de que haya  espacio para caminar a stoll alrededor. Evite cambiar los muebles de lugar.  · Quite las alfombras y todo lo que sea un riesgo de tropiezo.  · Evite caminar sobre pisos mojados.  · Arregle las superficies desparejas del piso.  · Añada pintura o cinta de contraste de colores a las barras para sostén y los pasamanos en stoll casa. Coloque tiras de contraste de color en el primer y el último escalón de las escaleras.  · Cuando use marcie vanesa de mano, asegúrese de que esté abierta por completo y de que los lados estén bry asegurados. Pídale a alguien que sostenga la vanesa mientras usted la esté usando. No suba a marcie vanesa de mano cerrada.  · Si tiene mascotas, fíjese dónde están.  ¿Qué puedo hacer en el baño?         · Mantenga el piso seco. Seque de inmediato cualquier derrame de agua en el piso.  · Elimine regularmente la acumulación de jabón en la bañera o la ducha.  · Utilice alfombras o pegatinas antideslizantes en el piso de la bañera o ducha.  · Asegure las alfombras del baño con marcie cinta antideslizante doble mackenzie para alfombras.  · Si necesita sentarse mientras se ducha, use un banco plástico antideslizante.  · Instale barras para sostén al lado del inodoro, en la bañera y en la ducha. No use los toalleros melissa barras para sostén.  ¿Qué puedo hacer en el dormitorio?  · Asegúrese de tener marcie yue de fácil acceso al lado de la cama.  · No use sábanas o mantas muy grandes que se amontonen sobre el piso.  · Tenga marcie silla firme con apoyabrazos para usar cuando se vista.  ¿Qué puedo hacer en la cocina?  · Limpie de inmediato cualquier derrame.  · Si necesita alcanzar algo que esté alto, use marcie vanesa firme con marcie moise de apoyo.  · Mantenga los cables eléctricos fuera del joyce.  · No use un pulidor o cera para pisos que dejen los pisos resbaladizos. Si debe usar cera, asegúrese de que sea cera antideslizante para pisos.  ¿Qué puedo hacer en las escaleras?  · No deje ningún objeto en las  escaleras.  · Asegúrese de tener un interruptor de yue en la parte superior e inferior de las escaleras. Si no lo tiene, instale alena.  · Asegúrese de que haya pasamanos en ambos lados de las escaleras. Repare los pasamanos que estén flojos o rotos. Asegúrese de que los pasamanos tengan la misma longitud que la vanesa.  · Instale peldaños antideslizantes en todas las escaleras de stoll casa.  · Evite colocar alfombras en la parte superior o inferior de las escaleras, o asegure las alfombras con cinta adhesiva para alfombras a fin de evitar que se muevan.  · Para la vanesa, elija un diseño de alfombra que no oculte el borde de los escalones.  · Verifique que las alfombras estén bry adheridas a las escaleras. Arregle las alfombras flojas o gastadas.  ¿Qué puedo hacer en el exterior de mi casa?  · Use marcie iluminación brillante en el exterior.  · Repare periódicamente los bordes de las aceras y las calzadas, así melissa las grietas.  · Retire los umbrales altos.  · Recorte los arbustos en el joyce principal de ingreso a stoll hogar.  · Verifique con frecuencia que los pasamanos estén bry ajustados y en buen estado. Todas las escaleras deben tener pasamanos en ambos lados.  · Instale barandillas de protección en los bordes de las martínez o galerías elevadas.  · Elimine los residuos y las cosas amontonadas en los pasillos, lo que incluye herramientas y piedras.  · Limpie regularmente las hojas, la dez y el hielo.  · Utilice arena o sal en los pasillos liz los meses de invierno.  · En el garaje, limpie de inmediato cualquier derrame, incluidos los derrames de grasa y aceite.  ¿Qué otras medidas puedo lavern?  · Use calzado cerrado en los dedos que le quede bry y le amortigüe los pies. Use calzado con suela de goma o taco bajo.  · Utilice dispositivos de movilidad, melissa bastones, andadores, patinetes con soporte para el pie y muletas.  · Revise los medicamentos con el médico. Algunos medicamentos pueden causar mareos o  cambios en la presión arterial, lo que aumenta el riesgo de caídas.  Hable con el médico sobre otras maneras de reducir el riesgo de caídas. [place] puede incluir trabajar con un fisioterapeuta o un entrenador para mejorar la fuerza, el equilibrio y la resistencia.  Dónde encontrar más información  · Centros para el Control y la Prevención de Enfermedades (Centers for Disease Control and Prevention), [place]: https://www.cdc.gov  · Instituto Nacional sobre el Envejecimiento (National [place] on Aging): https://dg4igro.jan.nih.gov  Comuníquese con un médico si:  · Tiene miedo de caerse en stoll casa.  · Se siente débil, somnoliento o mareado en stoll casa.  · Se  en stoll casa.  Resumen  · Hay muchas cosas simples que puede hacer para que stoll casa sea un lugar seguro y ayudar a prevenir las caídas.  · Algunas formas de garantizar la seguridad en stoll casa incluyen eliminar cosas que representen un riesgo de tropiezo e instalar barras para sostén en el baño.  · Pida ayuda cuando sherley estos cambios en stoll hogar.  Esta información no tiene melissa fin reemplazar el consejo del médico. Asegúrese de hacerle al médico cualquier pregunta que tenga.  Document Released: 2009 Document Revised: 10/15/2018 Document Reviewed: 10/15/2018  Elsevier Patient Education ©  Elsevier Inc.

## 2020-09-22 NOTE — PROGRESS NOTES
"Subjective   Dalila Valerio is a 69 y.o. female who presents to discuss current medications.     History of Present Illness   Was discharged home after stroke and she cannot manage her medications effectively. Ed, significant other needs help figuring out what to do. Has weaned off lisinopril and amlodipine. Continue metoprolol secondary to low blood pressure with home health care. Brings reading for me to review. No falls since stroke. Blood sugar readings 110-220  Having deep achy pain, that stabs when steps on R leg. Sees Dr. Velazco early next month.   The following portions of the patient's history were reviewed and updated as appropriate: allergies, current medications, past family history, past medical history, past social history, past surgical history and problem list.    Review of Systems   Constitutional: Positive for activity change and fatigue. Negative for appetite change, chills, fever, unexpected weight gain and unexpected weight loss.   HENT: Negative.    Respiratory: Negative.    Cardiovascular: Negative.    Endocrine: Negative.    Musculoskeletal: Positive for arthralgias, back pain and gait problem. Negative for myalgias.   Skin: Negative.    Neurological: Positive for memory problem. Negative for dizziness, tremors, seizures, facial asymmetry (improved), weakness and confusion.   Hematological: Negative.    Psychiatric/Behavioral: Positive for decreased concentration, depressed mood and stress. Negative for behavioral problems, self-injury, sleep disturbance and suicidal ideas. The patient is not nervous/anxious.      /76   Pulse 65   Temp 97.7 °F (36.5 °C) (Infrared)   Ht 157.5 cm (62\")   Wt 86.2 kg (190 lb)   SpO2 98%   BMI 34.75 kg/m²     Objective   Physical Exam  Vitals signs and nursing note reviewed.   Constitutional:       Appearance: She is well-developed.   Eyes:      Conjunctiva/sclera: Conjunctivae normal.      Pupils: Pupils are equal, round, and reactive to light. "   Neck:      Musculoskeletal: Normal range of motion and neck supple.      Thyroid: No thyromegaly.      Vascular: No JVD.      Trachea: No tracheal deviation.   Cardiovascular:      Rate and Rhythm: Normal rate and regular rhythm.      Heart sounds: Normal heart sounds.   Pulmonary:      Effort: Pulmonary effort is normal.      Breath sounds: Normal breath sounds.   Abdominal:      Palpations: Abdomen is soft.      Tenderness: There is no abdominal tenderness.   Lymphadenopathy:      Cervical: No cervical adenopathy.   Skin:     General: Skin is warm and dry.   Neurological:      Mental Status: She is alert and oriented to person, place, and time.   Psychiatric:         Mood and Affect: Mood is anxious and depressed. Affect is blunt. Affect is not labile, angry or inappropriate.         Speech: Speech is delayed. Speech is not slurred.         Behavior: Behavior normal.         Thought Content: Thought content normal. Thought content does not include homicidal or suicidal ideation. Thought content does not include homicidal or suicidal plan.         Cognition and Memory: Cognition is impaired. Memory is impaired.         Judgment: Judgment normal.       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 Medically necessary for PT, OT, Speech for stroke recovery  High risk falls--wheelchair and walker needed. Motor skills fade with fatigue. Difficulty with concentration and thought processes. Needs help with motor skills and motor skills to improve thinking post stroke  Anticoagulation--Should hold on injections for now, as on blood thinner--length of therapy to be determined by neurology.  Did have DVT as well.      jaqueline need outpt PT, OT, Speech.   Written instruction for medication and purposes given. Ed reports his English is better than his [place] and no reading difficulties. Feels satisfied with this guidance.

## 2020-09-23 DIAGNOSIS — M25.442 SWELLING OF HAND JOINT, LEFT: ICD-10-CM

## 2020-09-23 RX ORDER — GABAPENTIN 800 MG/1
TABLET ORAL
Qty: 90 TABLET | Refills: 2 | Status: SHIPPED | OUTPATIENT
Start: 2020-09-23 | End: 2020-12-21

## 2020-09-23 RX ORDER — ALENDRONATE SODIUM 70 MG/1
TABLET ORAL
Qty: 12 TABLET | Refills: 1 | Status: SHIPPED | OUTPATIENT
Start: 2020-09-23 | End: 2021-03-10

## 2020-09-24 RX ORDER — FUROSEMIDE 20 MG/1
TABLET ORAL
Qty: 270 TABLET | Refills: 0 | Status: SHIPPED | OUTPATIENT
Start: 2020-09-24 | End: 2020-12-21

## 2020-09-26 LAB — DRUGS UR: NORMAL

## 2020-09-29 ENCOUNTER — NURSE TRIAGE (OUTPATIENT)
Dept: CALL CENTER | Facility: HOSPITAL | Age: 70
End: 2020-09-29

## 2020-09-29 PROCEDURE — 99285 EMERGENCY DEPT VISIT HI MDM: CPT

## 2020-09-30 ENCOUNTER — HOSPITAL ENCOUNTER (OUTPATIENT)
Facility: HOSPITAL | Age: 70
Setting detail: OBSERVATION
Discharge: HOME | End: 2020-10-01
Attending: EMERGENCY MEDICINE | Admitting: HOSPITALIST

## 2020-09-30 ENCOUNTER — APPOINTMENT (OUTPATIENT)
Dept: MRI IMAGING | Facility: HOSPITAL | Age: 70
End: 2020-09-30

## 2020-09-30 ENCOUNTER — APPOINTMENT (OUTPATIENT)
Dept: CT IMAGING | Facility: HOSPITAL | Age: 70
End: 2020-09-30

## 2020-09-30 DIAGNOSIS — Z79.01 CHRONIC ANTICOAGULATION: ICD-10-CM

## 2020-09-30 DIAGNOSIS — R42 EPISODE OF DIZZINESS: Primary | ICD-10-CM

## 2020-09-30 DIAGNOSIS — Z86.73 RECENT CEREBROVASCULAR ACCIDENT (CVA): ICD-10-CM

## 2020-09-30 LAB
ALBUMIN SERPL-MCNC: 3.6 G/DL (ref 3.5–5.2)
ALBUMIN/GLOB SERPL: 1.2 G/DL
ALP SERPL-CCNC: 39 U/L (ref 39–117)
ALT SERPL W P-5'-P-CCNC: 8 U/L (ref 1–33)
ANION GAP SERPL CALCULATED.3IONS-SCNC: 10.1 MMOL/L (ref 5–15)
ANION GAP SERPL CALCULATED.3IONS-SCNC: 7.2 MMOL/L (ref 5–15)
AST SERPL-CCNC: 13 U/L (ref 1–32)
BASOPHILS # BLD AUTO: 0.04 10*3/MM3 (ref 0–0.2)
BASOPHILS NFR BLD AUTO: 0.4 % (ref 0–1.5)
BILIRUB SERPL-MCNC: 0.2 MG/DL (ref 0–1.2)
BUN SERPL-MCNC: 14 MG/DL (ref 8–23)
BUN SERPL-MCNC: 15 MG/DL (ref 8–23)
BUN/CREAT SERPL: 12.3 (ref 7–25)
BUN/CREAT SERPL: 12.9 (ref 7–25)
CALCIUM SPEC-SCNC: 9.2 MG/DL (ref 8.6–10.5)
CALCIUM SPEC-SCNC: 9.5 MG/DL (ref 8.6–10.5)
CHLORIDE SERPL-SCNC: 103 MMOL/L (ref 98–107)
CHLORIDE SERPL-SCNC: 105 MMOL/L (ref 98–107)
CO2 SERPL-SCNC: 28.9 MMOL/L (ref 22–29)
CO2 SERPL-SCNC: 29.8 MMOL/L (ref 22–29)
CREAT SERPL-MCNC: 1.14 MG/DL (ref 0.57–1)
CREAT SERPL-MCNC: 1.16 MG/DL (ref 0.57–1)
DEPRECATED RDW RBC AUTO: 39.4 FL (ref 37–54)
DEPRECATED RDW RBC AUTO: 42.9 FL (ref 37–54)
EOSINOPHIL # BLD AUTO: 0.35 10*3/MM3 (ref 0–0.4)
EOSINOPHIL NFR BLD AUTO: 3.7 % (ref 0.3–6.2)
ERYTHROCYTE [DISTWIDTH] IN BLOOD BY AUTOMATED COUNT: 12.7 % (ref 12.3–15.4)
ERYTHROCYTE [DISTWIDTH] IN BLOOD BY AUTOMATED COUNT: 13 % (ref 12.3–15.4)
GFR SERPL CREATININE-BSD FRML MDRD: 46 ML/MIN/1.73
GFR SERPL CREATININE-BSD FRML MDRD: 47 ML/MIN/1.73
GLOBULIN UR ELPH-MCNC: 2.9 GM/DL
GLUCOSE BLDC GLUCOMTR-MCNC: 135 MG/DL (ref 70–130)
GLUCOSE BLDC GLUCOMTR-MCNC: 145 MG/DL (ref 70–130)
GLUCOSE BLDC GLUCOMTR-MCNC: 214 MG/DL (ref 70–130)
GLUCOSE SERPL-MCNC: 111 MG/DL (ref 65–99)
GLUCOSE SERPL-MCNC: 146 MG/DL (ref 65–99)
HCT VFR BLD AUTO: 36.3 % (ref 34–46.6)
HCT VFR BLD AUTO: 36.4 % (ref 34–46.6)
HGB BLD-MCNC: 11.6 G/DL (ref 12–15.9)
HGB BLD-MCNC: 11.8 G/DL (ref 12–15.9)
IMM GRANULOCYTES # BLD AUTO: 0.04 10*3/MM3 (ref 0–0.05)
IMM GRANULOCYTES NFR BLD AUTO: 0.4 % (ref 0–0.5)
INR PPP: 1.25 (ref 0.9–1.1)
LYMPHOCYTES # BLD AUTO: 3.12 10*3/MM3 (ref 0.7–3.1)
LYMPHOCYTES NFR BLD AUTO: 33 % (ref 19.6–45.3)
MCH RBC QN AUTO: 28 PG (ref 26.6–33)
MCH RBC QN AUTO: 28.7 PG (ref 26.6–33)
MCHC RBC AUTO-ENTMCNC: 32 G/DL (ref 31.5–35.7)
MCHC RBC AUTO-ENTMCNC: 32.4 G/DL (ref 31.5–35.7)
MCV RBC AUTO: 86.5 FL (ref 79–97)
MCV RBC AUTO: 89.9 FL (ref 79–97)
MONOCYTES # BLD AUTO: 0.78 10*3/MM3 (ref 0.1–0.9)
MONOCYTES NFR BLD AUTO: 8.2 % (ref 5–12)
NEUTROPHILS NFR BLD AUTO: 5.13 10*3/MM3 (ref 1.7–7)
NEUTROPHILS NFR BLD AUTO: 54.3 % (ref 42.7–76)
NRBC BLD AUTO-RTO: 0 /100 WBC (ref 0–0.2)
PLATELET # BLD AUTO: 184 10*3/MM3 (ref 140–450)
PLATELET # BLD AUTO: 192 10*3/MM3 (ref 140–450)
PMV BLD AUTO: 10.3 FL (ref 6–12)
PMV BLD AUTO: 10.4 FL (ref 6–12)
POTASSIUM SERPL-SCNC: 3.7 MMOL/L (ref 3.5–5.2)
POTASSIUM SERPL-SCNC: 3.8 MMOL/L (ref 3.5–5.2)
PROT SERPL-MCNC: 6.5 G/DL (ref 6–8.5)
PROTHROMBIN TIME: 15.5 SECONDS (ref 11.7–14.2)
RBC # BLD AUTO: 4.04 10*6/MM3 (ref 3.77–5.28)
RBC # BLD AUTO: 4.21 10*6/MM3 (ref 3.77–5.28)
SODIUM SERPL-SCNC: 142 MMOL/L (ref 136–145)
SODIUM SERPL-SCNC: 142 MMOL/L (ref 136–145)
TROPONIN T SERPL-MCNC: <0.01 NG/ML (ref 0–0.03)
TROPONIN T SERPL-MCNC: <0.01 NG/ML (ref 0–0.03)
WBC # BLD AUTO: 9.46 10*3/MM3 (ref 3.4–10.8)
WBC # BLD AUTO: 9.79 10*3/MM3 (ref 3.4–10.8)

## 2020-09-30 PROCEDURE — 84484 ASSAY OF TROPONIN QUANT: CPT | Performed by: EMERGENCY MEDICINE

## 2020-09-30 PROCEDURE — 70450 CT HEAD/BRAIN W/O DYE: CPT

## 2020-09-30 PROCEDURE — 96361 HYDRATE IV INFUSION ADD-ON: CPT

## 2020-09-30 PROCEDURE — C9803 HOPD COVID-19 SPEC COLLECT: HCPCS

## 2020-09-30 PROCEDURE — 25010000002 LORAZEPAM PER 2 MG

## 2020-09-30 PROCEDURE — 82962 GLUCOSE BLOOD TEST: CPT

## 2020-09-30 PROCEDURE — 25010000002 LORAZEPAM PER 2 MG: Performed by: HOSPITALIST

## 2020-09-30 PROCEDURE — 70551 MRI BRAIN STEM W/O DYE: CPT

## 2020-09-30 PROCEDURE — 80053 COMPREHEN METABOLIC PANEL: CPT | Performed by: EMERGENCY MEDICINE

## 2020-09-30 PROCEDURE — 99214 OFFICE O/P EST MOD 30 MIN: CPT | Performed by: NURSE PRACTITIONER

## 2020-09-30 PROCEDURE — 85610 PROTHROMBIN TIME: CPT | Performed by: EMERGENCY MEDICINE

## 2020-09-30 PROCEDURE — 63710000001 INSULIN LISPRO (HUMAN) PER 5 UNITS: Performed by: HOSPITALIST

## 2020-09-30 PROCEDURE — 96375 TX/PRO/DX INJ NEW DRUG ADDON: CPT

## 2020-09-30 PROCEDURE — 96374 THER/PROPH/DIAG INJ IV PUSH: CPT

## 2020-09-30 PROCEDURE — 85025 COMPLETE CBC W/AUTO DIFF WBC: CPT | Performed by: EMERGENCY MEDICINE

## 2020-09-30 PROCEDURE — G0378 HOSPITAL OBSERVATION PER HR: HCPCS

## 2020-09-30 PROCEDURE — 93010 ELECTROCARDIOGRAM REPORT: CPT | Performed by: INTERNAL MEDICINE

## 2020-09-30 PROCEDURE — 85027 COMPLETE CBC AUTOMATED: CPT | Performed by: NURSE PRACTITIONER

## 2020-09-30 PROCEDURE — 96376 TX/PRO/DX INJ SAME DRUG ADON: CPT

## 2020-09-30 PROCEDURE — 94799 UNLISTED PULMONARY SVC/PX: CPT

## 2020-09-30 PROCEDURE — 84484 ASSAY OF TROPONIN QUANT: CPT | Performed by: NURSE PRACTITIONER

## 2020-09-30 PROCEDURE — 93005 ELECTROCARDIOGRAM TRACING: CPT | Performed by: NURSE PRACTITIONER

## 2020-09-30 PROCEDURE — U0004 COV-19 TEST NON-CDC HGH THRU: HCPCS | Performed by: EMERGENCY MEDICINE

## 2020-09-30 PROCEDURE — 25010000002 FENTANYL CITRATE (PF) 100 MCG/2ML SOLUTION: Performed by: EMERGENCY MEDICINE

## 2020-09-30 PROCEDURE — 94640 AIRWAY INHALATION TREATMENT: CPT

## 2020-09-30 PROCEDURE — 93005 ELECTROCARDIOGRAM TRACING: CPT | Performed by: EMERGENCY MEDICINE

## 2020-09-30 RX ORDER — FENTANYL CITRATE 50 UG/ML
50 INJECTION, SOLUTION INTRAMUSCULAR; INTRAVENOUS ONCE
Status: COMPLETED | OUTPATIENT
Start: 2020-09-30 | End: 2020-09-30

## 2020-09-30 RX ORDER — GLIPIZIDE 5 MG/1
2.5 TABLET ORAL
Status: DISCONTINUED | OUTPATIENT
Start: 2020-09-30 | End: 2020-10-01 | Stop reason: HOSPADM

## 2020-09-30 RX ORDER — SODIUM CHLORIDE 9 MG/ML
75 INJECTION, SOLUTION INTRAVENOUS CONTINUOUS
Status: DISCONTINUED | OUTPATIENT
Start: 2020-09-30 | End: 2020-09-30

## 2020-09-30 RX ORDER — DEXTROSE MONOHYDRATE 25 G/50ML
25 INJECTION, SOLUTION INTRAVENOUS
Status: DISCONTINUED | OUTPATIENT
Start: 2020-09-30 | End: 2020-10-01 | Stop reason: HOSPADM

## 2020-09-30 RX ORDER — ONDANSETRON 4 MG/1
4 TABLET, FILM COATED ORAL 3 TIMES DAILY PRN
Status: DISCONTINUED | OUTPATIENT
Start: 2020-09-30 | End: 2020-10-01 | Stop reason: HOSPADM

## 2020-09-30 RX ORDER — LOPERAMIDE HYDROCHLORIDE 2 MG/1
2 CAPSULE ORAL 4 TIMES DAILY PRN
Status: DISCONTINUED | OUTPATIENT
Start: 2020-09-30 | End: 2020-10-01 | Stop reason: HOSPADM

## 2020-09-30 RX ORDER — MECLIZINE HCL 12.5 MG/1
12.5 TABLET ORAL 3 TIMES DAILY PRN
Status: DISCONTINUED | OUTPATIENT
Start: 2020-09-30 | End: 2020-10-01 | Stop reason: HOSPADM

## 2020-09-30 RX ORDER — PANTOPRAZOLE SODIUM 40 MG/1
40 TABLET, DELAYED RELEASE ORAL
Status: DISCONTINUED | OUTPATIENT
Start: 2020-09-30 | End: 2020-10-01 | Stop reason: HOSPADM

## 2020-09-30 RX ORDER — ALBUTEROL SULFATE 2.5 MG/3ML
2.5 SOLUTION RESPIRATORY (INHALATION) EVERY 6 HOURS PRN
Status: DISCONTINUED | OUTPATIENT
Start: 2020-09-30 | End: 2020-10-01 | Stop reason: HOSPADM

## 2020-09-30 RX ORDER — NICOTINE POLACRILEX 4 MG
15 LOZENGE BUCCAL
Status: DISCONTINUED | OUTPATIENT
Start: 2020-09-30 | End: 2020-10-01 | Stop reason: HOSPADM

## 2020-09-30 RX ORDER — CALCIUM CARBONATE 200(500)MG
2 TABLET,CHEWABLE ORAL 2 TIMES DAILY PRN
Status: DISCONTINUED | OUTPATIENT
Start: 2020-09-30 | End: 2020-10-01 | Stop reason: HOSPADM

## 2020-09-30 RX ORDER — LORAZEPAM 2 MG/ML
0.5 INJECTION INTRAMUSCULAR ONCE
Status: COMPLETED | OUTPATIENT
Start: 2020-09-30 | End: 2020-09-30

## 2020-09-30 RX ORDER — ONDANSETRON 2 MG/ML
4 INJECTION INTRAMUSCULAR; INTRAVENOUS EVERY 6 HOURS PRN
Status: DISCONTINUED | OUTPATIENT
Start: 2020-09-30 | End: 2020-10-01 | Stop reason: HOSPADM

## 2020-09-30 RX ORDER — ROSUVASTATIN CALCIUM 20 MG/1
20 TABLET, COATED ORAL DAILY
Status: DISCONTINUED | OUTPATIENT
Start: 2020-09-30 | End: 2020-10-01 | Stop reason: HOSPADM

## 2020-09-30 RX ORDER — LORAZEPAM 2 MG/ML
INJECTION INTRAMUSCULAR
Status: COMPLETED
Start: 2020-09-30 | End: 2020-09-30

## 2020-09-30 RX ORDER — BUDESONIDE AND FORMOTEROL FUMARATE DIHYDRATE 160; 4.5 UG/1; UG/1
2 AEROSOL RESPIRATORY (INHALATION) 2 TIMES DAILY
Status: DISCONTINUED | OUTPATIENT
Start: 2020-09-30 | End: 2020-10-01 | Stop reason: HOSPADM

## 2020-09-30 RX ORDER — ACETAMINOPHEN 160 MG/5ML
650 SOLUTION ORAL EVERY 4 HOURS PRN
Status: DISCONTINUED | OUTPATIENT
Start: 2020-09-30 | End: 2020-10-01 | Stop reason: HOSPADM

## 2020-09-30 RX ORDER — OXYCODONE HYDROCHLORIDE AND ACETAMINOPHEN 5; 325 MG/1; MG/1
2 TABLET ORAL ONCE
Status: COMPLETED | OUTPATIENT
Start: 2020-09-30 | End: 2020-09-30

## 2020-09-30 RX ORDER — ARIPIPRAZOLE 5 MG/1
5 TABLET ORAL DAILY
Status: DISCONTINUED | OUTPATIENT
Start: 2020-09-30 | End: 2020-10-01 | Stop reason: HOSPADM

## 2020-09-30 RX ORDER — BISACODYL 5 MG/1
5 TABLET, DELAYED RELEASE ORAL DAILY PRN
Status: DISCONTINUED | OUTPATIENT
Start: 2020-09-30 | End: 2020-10-01 | Stop reason: HOSPADM

## 2020-09-30 RX ORDER — POTASSIUM CHLORIDE 750 MG/1
10 CAPSULE, EXTENDED RELEASE ORAL DAILY
Status: DISCONTINUED | OUTPATIENT
Start: 2020-09-30 | End: 2020-10-01 | Stop reason: HOSPADM

## 2020-09-30 RX ORDER — SODIUM CHLORIDE 0.9 % (FLUSH) 0.9 %
10 SYRINGE (ML) INJECTION AS NEEDED
Status: DISCONTINUED | OUTPATIENT
Start: 2020-09-30 | End: 2020-10-01 | Stop reason: HOSPADM

## 2020-09-30 RX ORDER — SODIUM CHLORIDE 0.9 % (FLUSH) 0.9 %
10 SYRINGE (ML) INJECTION EVERY 12 HOURS SCHEDULED
Status: DISCONTINUED | OUTPATIENT
Start: 2020-09-30 | End: 2020-10-01 | Stop reason: HOSPADM

## 2020-09-30 RX ORDER — METOPROLOL TARTRATE 50 MG/1
50 TABLET, FILM COATED ORAL 2 TIMES DAILY
Status: DISCONTINUED | OUTPATIENT
Start: 2020-09-30 | End: 2020-10-01 | Stop reason: HOSPADM

## 2020-09-30 RX ORDER — ACETAMINOPHEN 325 MG/1
650 TABLET ORAL EVERY 4 HOURS PRN
Status: DISCONTINUED | OUTPATIENT
Start: 2020-09-30 | End: 2020-10-01 | Stop reason: HOSPADM

## 2020-09-30 RX ORDER — CITALOPRAM 20 MG/1
20 TABLET ORAL DAILY
Status: DISCONTINUED | OUTPATIENT
Start: 2020-09-30 | End: 2020-10-01 | Stop reason: HOSPADM

## 2020-09-30 RX ORDER — HYDROCODONE BITARTRATE AND ACETAMINOPHEN 10; 325 MG/1; MG/1
1 TABLET ORAL EVERY 6 HOURS PRN
Status: DISCONTINUED | OUTPATIENT
Start: 2020-09-30 | End: 2020-10-01 | Stop reason: HOSPADM

## 2020-09-30 RX ORDER — FUROSEMIDE 40 MG/1
40 TABLET ORAL DAILY
Status: DISCONTINUED | OUTPATIENT
Start: 2020-09-30 | End: 2020-10-01 | Stop reason: HOSPADM

## 2020-09-30 RX ORDER — ACETAMINOPHEN 650 MG/1
650 SUPPOSITORY RECTAL EVERY 4 HOURS PRN
Status: DISCONTINUED | OUTPATIENT
Start: 2020-09-30 | End: 2020-10-01 | Stop reason: HOSPADM

## 2020-09-30 RX ORDER — ONDANSETRON 4 MG/1
4 TABLET, FILM COATED ORAL EVERY 6 HOURS PRN
Status: DISCONTINUED | OUTPATIENT
Start: 2020-09-30 | End: 2020-10-01 | Stop reason: HOSPADM

## 2020-09-30 RX ORDER — GABAPENTIN 400 MG/1
800 CAPSULE ORAL EVERY 8 HOURS SCHEDULED
Status: DISCONTINUED | OUTPATIENT
Start: 2020-09-30 | End: 2020-10-01 | Stop reason: HOSPADM

## 2020-09-30 RX ADMIN — ANTACID TABLETS 2 TABLET: 500 TABLET, CHEWABLE ORAL at 19:29

## 2020-09-30 RX ADMIN — APIXABAN 5 MG: 5 TABLET, FILM COATED ORAL at 20:59

## 2020-09-30 RX ADMIN — HYDROCODONE BITARTRATE AND ACETAMINOPHEN 1 TABLET: 10; 325 TABLET ORAL at 19:29

## 2020-09-30 RX ADMIN — FENTANYL CITRATE 50 MCG: 50 INJECTION, SOLUTION INTRAMUSCULAR; INTRAVENOUS at 02:25

## 2020-09-30 RX ADMIN — SODIUM CHLORIDE 500 ML: 9 INJECTION, SOLUTION INTRAVENOUS at 02:24

## 2020-09-30 RX ADMIN — CITALOPRAM 20 MG: 20 TABLET, FILM COATED ORAL at 10:06

## 2020-09-30 RX ADMIN — SODIUM CHLORIDE, PRESERVATIVE FREE 10 ML: 5 INJECTION INTRAVENOUS at 10:10

## 2020-09-30 RX ADMIN — GLIPIZIDE 2.5 MG: 5 TABLET ORAL at 10:06

## 2020-09-30 RX ADMIN — BUDESONIDE AND FORMOTEROL FUMARATE DIHYDRATE 2 PUFF: 160; 4.5 AEROSOL RESPIRATORY (INHALATION) at 19:49

## 2020-09-30 RX ADMIN — POTASSIUM CHLORIDE 10 MEQ: 10 CAPSULE, COATED, EXTENDED RELEASE ORAL at 10:07

## 2020-09-30 RX ADMIN — LORAZEPAM 0.5 MG: 2 INJECTION INTRAMUSCULAR; INTRAVENOUS at 16:19

## 2020-09-30 RX ADMIN — GABAPENTIN 800 MG: 400 CAPSULE ORAL at 21:01

## 2020-09-30 RX ADMIN — CARBIDOPA AND LEVODOPA 1 TABLET: 10; 100 TABLET ORAL at 11:18

## 2020-09-30 RX ADMIN — ROSUVASTATIN CALCIUM 20 MG: 20 TABLET, FILM COATED ORAL at 10:07

## 2020-09-30 RX ADMIN — LORAZEPAM 0.5 MG: 2 INJECTION INTRAMUSCULAR; INTRAVENOUS at 17:30

## 2020-09-30 RX ADMIN — APIXABAN 5 MG: 5 TABLET, FILM COATED ORAL at 10:08

## 2020-09-30 RX ADMIN — PANTOPRAZOLE SODIUM 40 MG: 40 TABLET, DELAYED RELEASE ORAL at 10:07

## 2020-09-30 RX ADMIN — INSULIN LISPRO 4 UNITS: 100 INJECTION, SOLUTION INTRAVENOUS; SUBCUTANEOUS at 20:58

## 2020-09-30 RX ADMIN — CARBIDOPA AND LEVODOPA 1 TABLET: 10; 100 TABLET ORAL at 20:59

## 2020-09-30 RX ADMIN — METOPROLOL TARTRATE 50 MG: 50 TABLET, FILM COATED ORAL at 10:07

## 2020-09-30 RX ADMIN — ALBUTEROL SULFATE 2.5 MG: 2.5 SOLUTION RESPIRATORY (INHALATION) at 12:11

## 2020-09-30 RX ADMIN — FUROSEMIDE 40 MG: 40 TABLET ORAL at 10:07

## 2020-09-30 RX ADMIN — METOPROLOL TARTRATE 50 MG: 50 TABLET, FILM COATED ORAL at 20:59

## 2020-09-30 RX ADMIN — PANTOPRAZOLE SODIUM 40 MG: 40 TABLET, DELAYED RELEASE ORAL at 18:39

## 2020-09-30 RX ADMIN — GABAPENTIN 800 MG: 400 CAPSULE ORAL at 15:20

## 2020-09-30 RX ADMIN — CARBIDOPA AND LEVODOPA 1 TABLET: 10; 100 TABLET ORAL at 15:23

## 2020-09-30 RX ADMIN — SODIUM CHLORIDE 75 ML/HR: 9 INJECTION, SOLUTION INTRAVENOUS at 04:40

## 2020-09-30 RX ADMIN — OXYCODONE HYDROCHLORIDE AND ACETAMINOPHEN 2 TABLET: 5; 325 TABLET ORAL at 03:07

## 2020-09-30 RX ADMIN — ARIPIPRAZOLE 5 MG: 5 TABLET ORAL at 10:07

## 2020-09-30 RX ADMIN — LORAZEPAM 0.5 MG: 2 INJECTION INTRAMUSCULAR; INTRAVENOUS at 16:45

## 2020-09-30 RX ADMIN — SODIUM CHLORIDE, PRESERVATIVE FREE 10 ML: 5 INJECTION INTRAVENOUS at 21:00

## 2020-09-30 NOTE — TELEPHONE ENCOUNTER
"Significant other states she's having dizziness like before with her stroke. Caller states dizziness resolving and was given number to her Neurologist. Significant other made aware if worse call call 911. Nurse did speak with with Ms. Valerio and she states this is similar symptom with previous stroke and after speaking with her nurse suggested calling 911. Nurse was able to understand Ms. Valerio clearly and she was able to describe dizziness and does states SO has called her Neurologist as well. Advised call 911.      Reason for Disposition  • Sounds like a life-threatening emergency to the triager    Additional Information  • Negative: [1] Weakness (i.e., paralysis, loss of muscle strength) of the face, arm or leg on one side of the body AND [2] sudden onset AND [3] present now  • Negative: [1] Numbness (i.e., loss of sensation) of the face, arm or leg on one side of the body AND [2] sudden onset AND [3] present now  • Negative: [1] Loss of speech or garbled speech AND [2] sudden onset AND [3] present now  • Negative: Difficult to awaken or acting confused (e.g., disoriented, slurred speech)  • Negative: Followed a head injury  • Negative: Followed an ear injury  • Negative: Localized weakness or numbness is main symptom  • Negative: Dizziness relates to riding in a car, going to an amusement park, etc.  • Negative: [1] Dizziness is main symptom AND [2] NO spinning sensation (i.e., vertigo)  • Negative: SEVERE dizziness (vertigo) (e.g., unable to walk without assistance)    Answer Assessment - Initial Assessment Questions  1. DESCRIPTION: \"Describe your dizziness.\"      C/o dizziness but going away now she states   2. VERTIGO: \"Do you feel like either you or the room is spinning or tilting?\"         3. LIGHTHEADED: \"Do you feel lightheaded?\" (e.g., somewhat faint, woozy, weak upon standing)      Sitting down now  4. SEVERITY: \"How bad is it?\"  \"Can you walk?\"    - MILD - Feels unsteady but walking normally.    - " "MODERATE - Feels very unsteady when walking, but not falling; interferes with normal activities (e.g., school, work) .    - SEVERE - Unable to walk without falling (requires assistance).      States going away now   5. ONSET:  \"When did the dizziness begin?\"      About one hour ago   6. AGGRAVATING FACTORS: \"Does anything make it worse?\" (e.g., standing, change in head position)        7. CAUSE: \"What do you think is causing the dizziness?\"      States she was feeling like this when she had stroke before   8. RECURRENT SYMPTOM: \"Have you had dizziness before?\" If so, ask: \"When was the last time?\" \"What happened that time?\"        9. OTHER SYMPTOMS: \"Do you have any other symptoms?\" (e.g., headache, weakness, numbness, vomiting, earache)      No other symptom c/o   10. PREGNANCY: \"Is there any chance you are pregnant?\" \"When was your last menstrual period?\"    Protocols used: DIZZINESS - VERTIGO-ADULT-AH      "

## 2020-10-01 ENCOUNTER — READMISSION MANAGEMENT (OUTPATIENT)
Dept: CALL CENTER | Facility: HOSPITAL | Age: 70
End: 2020-10-01

## 2020-10-01 VITALS
DIASTOLIC BLOOD PRESSURE: 68 MMHG | TEMPERATURE: 98.8 F | BODY MASS INDEX: 35.88 KG/M2 | HEIGHT: 61 IN | SYSTOLIC BLOOD PRESSURE: 133 MMHG | HEART RATE: 68 BPM | RESPIRATION RATE: 16 BRPM | WEIGHT: 190.03 LBS | OXYGEN SATURATION: 94 %

## 2020-10-01 LAB
GLUCOSE BLDC GLUCOMTR-MCNC: 133 MG/DL (ref 70–130)
SARS-COV-2 RNA RESP QL NAA+PROBE: NOT DETECTED

## 2020-10-01 PROCEDURE — 94799 UNLISTED PULMONARY SVC/PX: CPT

## 2020-10-01 PROCEDURE — 99214 OFFICE O/P EST MOD 30 MIN: CPT | Performed by: NURSE PRACTITIONER

## 2020-10-01 PROCEDURE — G0378 HOSPITAL OBSERVATION PER HR: HCPCS

## 2020-10-01 PROCEDURE — 82962 GLUCOSE BLOOD TEST: CPT

## 2020-10-01 RX ADMIN — FUROSEMIDE 40 MG: 40 TABLET ORAL at 08:34

## 2020-10-01 RX ADMIN — CITALOPRAM 20 MG: 20 TABLET, FILM COATED ORAL at 08:34

## 2020-10-01 RX ADMIN — ARIPIPRAZOLE 5 MG: 5 TABLET ORAL at 08:34

## 2020-10-01 RX ADMIN — POTASSIUM CHLORIDE 10 MEQ: 10 CAPSULE, COATED, EXTENDED RELEASE ORAL at 08:33

## 2020-10-01 RX ADMIN — GABAPENTIN 800 MG: 400 CAPSULE ORAL at 06:50

## 2020-10-01 RX ADMIN — GLIPIZIDE 2.5 MG: 5 TABLET ORAL at 08:34

## 2020-10-01 RX ADMIN — BUDESONIDE AND FORMOTEROL FUMARATE DIHYDRATE 2 PUFF: 160; 4.5 AEROSOL RESPIRATORY (INHALATION) at 08:14

## 2020-10-01 RX ADMIN — METOPROLOL TARTRATE 50 MG: 50 TABLET, FILM COATED ORAL at 08:34

## 2020-10-01 RX ADMIN — APIXABAN 5 MG: 5 TABLET, FILM COATED ORAL at 08:34

## 2020-10-01 RX ADMIN — PANTOPRAZOLE SODIUM 40 MG: 40 TABLET, DELAYED RELEASE ORAL at 08:33

## 2020-10-01 RX ADMIN — ROSUVASTATIN CALCIUM 20 MG: 20 TABLET, FILM COATED ORAL at 08:34

## 2020-10-01 NOTE — PLAN OF CARE
Goal Outcome Evaluation:  Plan of Care Reviewed With: patient  Progress: no change   Pt discharging home with .

## 2020-10-01 NOTE — OUTREACH NOTE
Prep Survey      Responses   [facility] patient discharged from?  [facility]   Is LACE score < 7 ?  No   Eligibility  [facility]   Date of Admission  09/30/20   Date of Discharge  10/01/20   Discharge diagnosis  Dizziness  DODSON   Does the patient have one of the following disease processes/diagnoses(primary or secondary)?  Other   Does the patient have Home health ordered?  No   Is there a DME ordered?  No   Prep survey completed?  Yes          Rosa Boucher RN

## 2020-10-01 NOTE — PLAN OF CARE
Problem: Adult Inpatient Plan of Care  Goal: Plan of Care Review  Outcome: Ongoing, Progressing  Flowsheets (Taken 10/1/2020 0625)  Progress: no change  Plan of Care Reviewed With: patient   Goal Outcome Evaluation:  Plan of Care Reviewed With: patient  Progress: no change   VSS, AOX4, room air, patient complains chest pain at beginning of shift MD notified STAT ekg NSR STAT troponin normal, PRN percocet given for headache pain 7/10, sleeping between care, ambulating no problem to bathroom, UOP adequate, will continue to monitor.

## 2020-10-01 NOTE — PROGRESS NOTES
"DOS: 10/1/2020  NAME: Dalila Valerio   : 1950  PCP: Cinthya Valentine APRN  Chief Complaint   Patient presents with   • Headache     Patient seen in follow-up today; new to me        Stroke    Subjective:  No events overnight. Patient no complaints on my exam minus fatigue.      at bedside. He feels wife is back to her neurologic baseline     Objective:  Vital signs: /68 (BP Location: Left arm, Patient Position: Lying)   Pulse 68   Temp 98.8 °F (37.1 °C) (Oral)   Resp 16   Ht 154.9 cm (61\")   Wt 86.2 kg (190 lb 0.5 oz)   SpO2 94%   BMI 35.91 kg/m²       HEENT: Normocephalic, atraumatic   COR: RRR  Resp: Even and unlabored  Extremities: Equal pulses, nondistal embolization    Neurological:   MS: AO. Language normal. No neglect. Higher integrative function normal  CN: II-XII normal except mild right sided facial weakness   Motor: 5/5, normal tone except RUE 4+/5 RLE 4+/5   Reflexes:tose down going   Sensory: Intact except mild decreased to light touch on RLE   Coordination: Normal (finger to nose)     Laboratory results:  Lab Results   Component Value Date    GLUCOSE 111 (H) 2020    CALCIUM 9.2 2020     2020    K 3.7 2020    CO2 29.8 (H) 2020     2020    BUN 15 2020    CREATININE 1.16 (H) 2020    EGFRIFAFRI 55 (L) 2020    EGFRIFNONA 46 (L) 2020    BCR 12.9 2020    ANIONGAP 7.2 2020     Lab Results   Component Value Date    WBC 9.79 2020    HGB 11.8 (L) 2020    HCT 36.4 2020    MCV 86.5 2020     2020     Lab Results   Component Value Date    CHOL 106 2020     Lab Results   Component Value Date    HDL 34 (L) 2020    HDL 47 2020    HDL 36 (L) 12/10/2018     Lab Results   Component Value Date    LDL 54 2020    LDL 73 2020     (H) 12/10/2018     Lab Results   Component Value Date    TRIG 90 2020    TRIG 101 2020    TRIG 142 " 12/10/2018     Review and interpretation of imaging:  EXAMINATION: MRI OF THE BRAIN WITHOUT CONTRAST     HISTORY: 69-year-old female with a history of dizziness, a prior stroke  and Parkinson's disease.     TECHNIQUE: Multiplanar and multisequence images of the brain were  obtained without intravenous contrast.     COMPARISON: MRI of the brain without and with contrast, 08/19/2020 and  CT of the head without contrast, 09/30/2020.      FINDINGS: There are no areas of restricted diffusion. There is a 5.6 x  1.9 cm area of increased T2 FLAIR weighted signal within the left  occipital lobe and left temporal lobe that represents an area of  subacute ischemia/infarction. The ventricles are normal in size and  contour. No midline shift or abnormal extra-axial fluid collection is  appreciated. Normal gray-white matter differentiation is otherwise  appreciated. The visualized paraspinal sinuses and mastoid air cells are  clear.     IMPRESSION:  1. There is any evolving but otherwise stable subacute left temporal  occipital area of ischemia/infarction.  2. No other evidence for an intracranial abnormality is appreciated.     This report was finalized on 9/30/2020 9:03 PM by Dr. Devyn Blake M.D.  HEAD CT WITHOUT CONTRAST     HISTORY: Vertigo. Acute left temporal stroke in August 2020.     TECHNIQUE: Noncontrast CT head is provided and is correlated with head  CT 08/21/2020 and brain MRI 08/19/2020.     Radiation dose reduction techniques were utilized, including automated  exposure control and exposure modulation based on body size.     FINDINGS: The ventricles are normal in caliber. There is less extensive  low density along the posterior medial left temporal lobe than was seen  previously. The brain parenchyma otherwise appears normal. There is no  evidence of hemorrhage or acute ischemia. Extra-axial spaces appear  normal. Bones of the skull appear normal.     IMPRESSION:  Further interval aging of the previously seen  left temporal  lobe infarction. No acute abnormality is identified.     This report was finalized on 9/30/2020 2:14 AM by Dr. Skip Flores M.D.       Impression:  · Vertigo   · Ataxia   · Post-stroke sequelae   · History of left temporal occipital infarct w/ residual right-sided weakness   · Parkinson's disease; on Sinemet     Plan:  Avoid hypotension and dehydration   Eliquis 5mg BID   Crestor 20mg daily   Neurochecks  BP control  Stroke Education  SONAM/SCDs  PT/OT/ST  Keep planned follow-up w/ Dr. Santacruz   No further neurology workup indicated. We will sign off and see again upon request.        Case reviewed w/ Dr. Jeffers attending MD and he agrees w/ plan above.     Gillian Adams APRN

## 2020-10-01 NOTE — DISCHARGE SUMMARY
[place] [facility]    Date of Discharge:  10/1/2020    PCP: Cinthya Valentine APRN    Discharge Diagnosis:   Active Hospital Problems    Diagnosis  POA   • **Dizziness [R42]  Unknown   • History of stroke [Z86.73]  Not Applicable   • DODSON (nonalcoholic steatohepatitis) [K75.81]  Yes   • Neurocardiogenic syncope [R55]  Yes   • Dyslipidemia [E78.5]  Yes   • Coronary arteriosclerosis in native artery [I25.10]  Yes   • Fibromyalgia [M79.7]  Yes      Resolved Hospital Problems   No resolved problems to display.          Consults     Date and Time Order Name Status Description    9/30/2020 0323 Inpatient Neurology Consult General Completed     9/30/2020 0245 [facility] (on-call MD unless specified) Details Completed         Hospital Course  Please see history and physical for details. Patient is a 69 y.o. female initially admitted by myself complaints of dizziness.  The patient got herself scared as she felt the symptoms were similar to her previous stroke which occurred a month prior.  Ultimately patient was brought in and we repeated an MRI as well as consulted neurology.  MRI does not demonstrate any acute changes but demonstrates evolution of the stroke.  She was maintained on her Eliquis as CT did not demonstrate any aspects of intracranial bleed.  I personally feel this patient is having issues with polypharmacy likely compounded by fibromyalgia/anxiety.  At disposition I have removed her Flexeril muscle relaxer but ultimately I have asked her to sit down with her PCP and discuss further prescribing of multiple medications.  I think she could be having issues with medication such as Abilify versus Sinemet versus Celexa versus Norco versus gabapentin and also have concerns for possible hypoglycemia from sulfonylurea.  I reduced her sulfonylurea in half while here and her sugars have ranged between 111 and 214.  Recent A1c was 7.9.  I will resume her normal regimen post  discharge.  I will defer any further changing of medications to PCP since they have much longer standing rapport with the patient.  Neurology followed in consultation and also has concerns for polypharmacy.  This patient does not have any issues with orthostasis based on her history though Sinemet can exacerbate such symptoms.  Her vital signs of remained stable as well as afebrile while here.  She is had no recurrence of previous dizziness.  I think she has some underlying anxieties as well as she did complain of some chest pain.  EKG was performed which is normal as is a cardiac troponin and she has had resolution.  Vital signs of remained stable as well as afebrile there is been no neurological deficits that are new or acute issues otherwise on this hospitalization.  She was admitted under observation status and she is stable to return to home and continue with her previously scheduled physical and occupational therapies as ordered prior to this admission.  She already has previously scheduled follow-up with neurology and that was advised to be kept.  All questions answered to the patient is no family present at bedside.  I sat down and went over her medications list with her at bedside and ultimately she is amenable to the above plan and appreciative the care she received while at [facility].      Condition on Discharge: Improved.     Temp:  [97.6 °F (36.4 °C)-99.2 °F (37.3 °C)] 97.6 °F (36.4 °C)  Heart Rate:  [62-75] 73  Resp:  [15-18] 16  BP: (116-121)/(60-72) 119/66  Body mass index is 35.91 kg/m².    Physical Exam  Constitutional:       General: She is not in acute distress.     Appearance: Normal appearance.   HENT:      Head: Normocephalic.      Nose: Nose normal.      Mouth/Throat:      Mouth: Mucous membranes are moist.      Pharynx: Oropharynx is clear.   Eyes:      General: No scleral icterus.     Conjunctiva/sclera: Conjunctivae normal.      Pupils: Pupils are equal, round, and reactive to light.     Neck:      Musculoskeletal: Neck supple.   Cardiovascular:      Rate and Rhythm: Normal rate and regular rhythm.   Pulmonary:      Effort: Pulmonary effort is normal. No respiratory distress.      Breath sounds: Normal breath sounds.   Abdominal:      General: Bowel sounds are normal.      Palpations: Abdomen is soft.      Tenderness: There is no abdominal tenderness.   Musculoskeletal:         General: No swelling.   Skin:     General: Skin is warm and dry.   Neurological:      Mental Status: She is alert and oriented to person, place, and time. Mental status is at baseline.     [unfilled]    Disposition: Home or Self Care       Discharge Medications      Changes to Medications      Instructions Start Date   glimepiride 1 MG tablet  Commonly known as: Amaryl  What changed:   · how much to take  · how to take this  · when to take this  · additional instructions   Take 2 tablet by mouth daily with the biggest of the meal of the day      metoprolol tartrate 50 MG tablet  Commonly known as: LOPRESSOR  What changed: when to take this   TAKE 1 TABLET BY MOUTH THREE TIMES DAILY      omeprazole 40 MG capsule  Commonly known as: priLOSEC  What changed: when to take this   TAKE 1 CAPSULE BY MOUTH TWICE DAILY         Continue These Medications      Instructions Start Date   Accu-Chek FastClix Lancets misc   USE TO TEST BLOOD SUGAR UP TO FIVE TIMES DAILY AS DIRECTED.      albuterol sulfate  (90 Base) MCG/ACT inhaler  Commonly known as: ProAir HFA    INHALE 1 TO 2 PUFFS EVERY 4 TO 6 HOURS AS NEEDED      alendronate 70 MG tablet  Commonly known as: FOSAMAX   TAKE 1 TABLET BY MOUTH EVERY 7 DAYS      apixaban 5 MG tablet tablet  Commonly known as: ELIQUIS   5 mg, Oral, Every 12 Hours Scheduled      ARIPiprazole 5 MG tablet  Commonly known as: ABILIFY   5 mg, Oral, Daily      carbidopa-levodopa  MG per tablet  Commonly known as: SINEMET   1 tablet, Oral, 3 Times Daily      citalopram 20 MG tablet  Commonly known as:  "CeleXA   20 mg, Oral, Daily      furosemide 20 MG tablet  Commonly known as: LASIX   TAKE 2 TABLETS BY MOUTH EVERY MORNING AND 1 TABLET IN THE EARLY AFTERNOON      gabapentin 800 MG tablet  Commonly known as: NEURONTIN   TAKE 1 TABLET BY MOUTH THREE TIMES DAILY      glucose blood test strip   Test up to 5 times daily for low blood sugar and symptomatic glycemia      HYDROcodone-acetaminophen  MG per tablet  Commonly known as: NORCO   No dose, route, or frequency recorded.      Insulin Syringe 29G X 1\" 0.3 ML misc   1 each, Does not apply, Daily With Dinner      loperamide 2 MG capsule  Commonly known as: IMODIUM   2 mg, Oral, 4 Times Daily PRN      meclizine 12.5 MG tablet  Commonly known as: ANTIVERT   12.5 mg, Oral, 3 Times Daily PRN      nateglinide 120 MG tablet  Commonly known as: Starlix   120 mg, Oral, 3 Times Daily Before Meals      nitroglycerin 0.4 MG SL tablet  Commonly known as: NITROSTAT   ONE TABLET UNDER TONGUE AS NEEDED FOR CHEST PAIN EVERY 5 MINUTES FOR UP TO 3 DOSES, CALL 911 IF PAIN PERSISTS      ondansetron 4 MG tablet  Commonly known as: ZOFRAN   4 mg, Oral, 3 Times Daily PRN      potassium chloride 8 MEQ CR tablet  Commonly known as: KLOR-CON   TAKE 1 TABLET BY MOUTH TWICE DAILY      rosuvastatin 20 MG tablet  Commonly known as: CRESTOR   20 mg, Oral, Daily      Sennosides 10 MG chewable tablet   Oral, As Needed      Symbicort 160-4.5 MCG/ACT inhaler  Generic drug: budesonide-formoterol   INHALE 2 PUFFS BY MOUTH TWICE DAILY. RINSE MOUTH AFTER USE      vitamin D3 125 MCG (5000 UT) capsule capsule   5,000 Units, Oral, Daily         Stop These Medications    cyclobenzaprine 10 MG tablet  Commonly known as: FLEXERIL             Additional Instructions for the Follow-ups that You Need to Schedule     Discharge Follow-up with PCP   As directed       Currently Documented PCP:    Cinthya Valentine APRN    PCP Phone Number:    [phone]     Follow Up Details: PCP 1 to 2 weeks.  Neurology as " previously scheduled           Follow-up Information     Cinthya Valentine APRN .    Specialty: Family Medicine  Why: PCP 1 to 2 weeks.  Neurology as previously scheduled  Contact information:  [address]  [place] [id number] [phone]                  Pending Labs     Order Current Status    COVID PRE-OP / PRE-PROCEDURE SCREENING ORDER (NO ISOLATION) - Swab, Nasopharynx In process    COVID-19,BIOTAP, NP/OP SWAB IN TRANSPORT MEDIA OR SALINE 24-36 HR TAT - Swab, Nasopharynx In process         Hermes Li MD  10/01/20  07:22 EDT    Discharge time spent greater than 30 minutes.

## 2020-10-02 ENCOUNTER — TRANSITIONAL CARE MANAGEMENT TELEPHONE ENCOUNTER (OUTPATIENT)
Dept: CALL CENTER | Facility: HOSPITAL | Age: 70
End: 2020-10-02

## 2020-10-02 NOTE — OUTREACH NOTE
Call Center TCM Note      Responses   [facility] [place] patient discharged from?  [place]   Does the patient have one of the following disease processes/diagnoses(primary or secondary)?  Other   TCM attempt successful?  Yes   Call start time  1626   Call end time  1628   Discharge diagnosis  Dizziness  DODSON   Meds reviewed with patient/caregiver?  Yes   Is the patient having any side effects they believe may be caused by any medication additions or changes?  No   Does the patient have all medications ordered at discharge?  Yes   Is the patient taking all medications as directed (includes completed medication regime)?  Yes   Does the patient have a primary care provider?   Yes   Does the patient have an appointment with their PCP within 7 days of discharge?  Greater than 7 days   Comments regarding PCP  f/u with RL Perez on 10/12   Nursing Interventions  Verified appointment date/time/provider   Has the patient kept scheduled appointments due by today?  N/A   What is the Home health agency?   [facility] continued.    Psychosocial issues?  No   Did the patient receive a copy of their discharge instructions?  Yes   Nursing interventions  Reviewed instructions with patient   What is the patient's perception of their health status since discharge?  Improving   Is the patient/caregiver able to teach back signs and symptoms related to disease process for when to call PCP?  Yes   Is the patient/caregiver able to teach back signs and symptoms related to disease process for when to call 911?  Yes   Is the patient/caregiver able to teach back the hierarchy of who to call/visit for symptoms/problems? PCP, Specialist, Home health nurse, Urgent Care, ED, 911  Yes   TCM call completed?  Yes   Wrap up additional comments  Patient says she is doing well, no questions or concerns at this time.          Reena Greer RN    10/2/2020, 16:28 EDT

## 2020-10-05 RX ORDER — GLIMEPIRIDE 1 MG/1
1 TABLET ORAL DAILY
Qty: 90 TABLET | Refills: 1 | Status: SHIPPED | OUTPATIENT
Start: 2020-10-05 | End: 2020-11-04

## 2020-10-08 ENCOUNTER — READMISSION MANAGEMENT (OUTPATIENT)
Dept: CALL CENTER | Facility: HOSPITAL | Age: 70
End: 2020-10-08

## 2020-10-08 NOTE — OUTREACH NOTE
Medical Week 2 Survey      Responses   [facility] patient discharged from?  [place]   Does the patient have one of the following disease processes/diagnoses(primary or secondary)?  Other   Week 2 attempt successful?  No   Unsuccessful attempts  Attempt 1          Berto Jc RN

## 2020-10-12 ENCOUNTER — OFFICE VISIT (OUTPATIENT)
Dept: FAMILY MEDICINE CLINIC | Facility: CLINIC | Age: 70
End: 2020-10-12

## 2020-10-12 VITALS
HEIGHT: 61 IN | DIASTOLIC BLOOD PRESSURE: 74 MMHG | BODY MASS INDEX: 35.3 KG/M2 | HEART RATE: 64 BPM | SYSTOLIC BLOOD PRESSURE: 128 MMHG | OXYGEN SATURATION: 99 % | WEIGHT: 187 LBS | TEMPERATURE: 97.5 F

## 2020-10-12 DIAGNOSIS — Z79.899 HIGH RISK MEDICATION USE: Primary | ICD-10-CM

## 2020-10-12 DIAGNOSIS — R42 DIZZINESS: ICD-10-CM

## 2020-10-12 DIAGNOSIS — M81.0 AGE-RELATED OSTEOPOROSIS WITHOUT CURRENT PATHOLOGICAL FRACTURE: ICD-10-CM

## 2020-10-12 DIAGNOSIS — Z86.73 HX OF COMPLETED STROKE: ICD-10-CM

## 2020-10-12 PROCEDURE — 99214 OFFICE O/P EST MOD 30 MIN: CPT | Performed by: NURSE PRACTITIONER

## 2020-10-12 RX ORDER — CITALOPRAM 20 MG/1
20 TABLET ORAL DAILY
Qty: 90 TABLET | Refills: 0 | Status: SHIPPED | OUTPATIENT
Start: 2020-10-12 | End: 2021-01-06

## 2020-10-12 RX ORDER — ARIPIPRAZOLE 2 MG/1
2 TABLET ORAL DAILY
Qty: 90 TABLET | Refills: 1 | Status: SHIPPED | OUTPATIENT
Start: 2020-10-12 | End: 2021-03-29

## 2020-10-12 RX ORDER — NATEGLINIDE 120 MG/1
TABLET ORAL
Qty: 90 TABLET | Refills: 3 | Status: SHIPPED | OUTPATIENT
Start: 2020-10-12 | End: 2021-01-15

## 2020-10-12 NOTE — PROGRESS NOTES
Subjective   Dalila Valerio is a 70 y.o. female who presents for a follow up after being admitted to [facility] 9/30/10/1 for stroke like symptoms.     History of Present Illness   We are trying to review possible connections between medications and symptoms as hospitalist felt connected. He stopped her flexeril. When had episode, reports  Had vertigo, but not as severe as when had stroke. BP was high. Woke feeling sickly. Episode occurred later in afternoon. Is eating regularly and did eat that day. Takes imodium very infrequently. Has notice more pain since stopping flexeril. Carbidopa, levodopa started at time of stroke. Blood sugar does drop at times to 60 and get high at times for unclear reasons. Blood sugar not <100, more than 1 x week. A few weeks ago stayed in the 200 range. Has been on abilify for at least one year. Low dose.  The following portions of the patient's history were reviewed and updated as appropriate: allergies, current medications, past family history, past medical history, past social history, past surgical history and problem list.    Review of Systems   Constitutional: Positive for activity change and fatigue. Negative for appetite change, chills, fever, unexpected weight gain and unexpected weight loss.   HENT: Negative.    Eyes: Negative for pain.   Respiratory: Negative for cough.    Cardiovascular: Positive for chest pain. Negative for palpitations and leg swelling.   Gastrointestinal: Negative.    Endocrine: Negative.    Musculoskeletal: Positive for arthralgias, back pain and gait problem. Negative for myalgias, neck pain and neck stiffness.   Skin: Negative.    Allergic/Immunologic: Positive for environmental allergies.   Neurological: Positive for speech difficulty (improving since stroke), headache and memory problem. Negative for weakness.   Hematological: Negative.    Psychiatric/Behavioral: Positive for behavioral problems, decreased concentration, depressed mood and stress.  "Negative for agitation, dysphoric mood, hallucinations, self-injury, sleep disturbance and suicidal ideas. The patient is nervous/anxious.      /74   Pulse 64   Temp 97.5 °F (36.4 °C) (Infrared)   Ht 154.9 cm (61\")   Wt 84.8 kg (187 lb)   SpO2 99%   BMI 35.33 kg/m²     Objective   Physical Exam  Constitutional:       Appearance: She is well-developed. She is not diaphoretic.   HENT:      Head: Normocephalic and atraumatic.   Neck:      Musculoskeletal: Normal range of motion and neck supple.      Thyroid: No thyromegaly.   Cardiovascular:      Rate and Rhythm: Normal rate and regular rhythm.      Pulses:           Carotid pulses are 2+ on the right side and 2+ on the left side.     Heart sounds: Normal heart sounds.   Pulmonary:      Effort: Pulmonary effort is normal.      Breath sounds: Normal breath sounds.   Lymphadenopathy:      Cervical: No cervical adenopathy.   Neurological:      Mental Status: She is alert and oriented to person, place, and time.      Cranial Nerves: Facial asymmetry (improving) present.      Sensory: Sensory deficit present.      Motor: Tremor present. No weakness, seizure activity or pronator drift.   Psychiatric:         Attention and Perception: Attention normal.         Mood and Affect: Mood is anxious and depressed.         Speech: Speech is delayed (mild and improving).         Behavior: Behavior normal.         Thought Content: Thought content normal.         Judgment: Judgment normal.       Assessment/Plan   Problems Addressed this Visit        Other    Dizziness      Other Visit Diagnoses     High risk medication use    -  Primary    Age-related osteoporosis without current pathological fracture        Relevant Orders    DEXA Bone Density Axial    Hx of completed stroke          Diagnoses       Codes Comments    High risk medication use    -  Primary ICD-10-CM: Z79.899  ICD-9-CM: V58.69     Age-related osteoporosis without current pathological fracture     ICD-10-CM: " M81.0  ICD-9-CM: 733.01     Dizziness     ICD-10-CM: R42  ICD-9-CM: 780.4     Hx of completed stroke     ICD-10-CM: Z86.73  ICD-9-CM: V12.54         High risk medication use and dizziness--Reviewed hospital records and imaging negative for acute. there was concern of polypharmacy and anxiety. Repeat MRI was negative for acute stroke. Recommended she stop flexeril. Also concern of low blood sugar as current medications.   Recommend consider holding imodium and checking sugar 2 hours after meals. Logs given. Does check sugar 2 hours after meals. Just checked and 186  Consider abilify decrease, but by database on since 6/18. Doubt contribution to symptom complex, but possible with addition of sinemet  Osteoporosis--update DEXA, consider increased risk of falls and fracture   Stroke history--appropriate concern and attention to symptoms as risk.  FU 3 months, sooner if needed or symptoms recur

## 2020-10-12 NOTE — PATIENT INSTRUCTIONS

## 2020-10-13 ENCOUNTER — READMISSION MANAGEMENT (OUTPATIENT)
Dept: CALL CENTER | Facility: HOSPITAL | Age: 70
End: 2020-10-13

## 2020-10-13 NOTE — OUTREACH NOTE
Medical Week 2 Survey      Responses   [facility] patient discharged from?  [place]   Does the patient have one of the following disease processes/diagnoses(primary or secondary)?  Other   Week 2 attempt successful?  Yes   Call start time  0750   Discharge diagnosis  Dizziness  DODSON   Call end time  0755   Meds reviewed with patient/caregiver?  Yes   Is the patient having any side effects they believe may be caused by any medication additions or changes?  No   Does the patient have all medications ordered at discharge?  Yes   Is the patient taking all medications as directed (includes completed medication regime)?  Yes   Medication comments  Ambilify dose decreased   Has the patient kept scheduled appointments due by today?  Yes   Comments  PCP 10/12/2020   What is the Home health agency?   Home Health d/c'd   What DME was ordered?  Will start OP PT today 10/13/2020   What is the patient's perception of their health status since discharge?  Improving   Week 2 Call Completed?  Yes          Faith Ferris RN

## 2020-10-16 ENCOUNTER — NURSE TRIAGE (OUTPATIENT)
Dept: CALL CENTER | Facility: HOSPITAL | Age: 70
End: 2020-10-16

## 2020-10-17 NOTE — TELEPHONE ENCOUNTER
"Caller advised Micromedex show no interaction between Eliquis and Tylenol, she should be able to take these together.     Reason for Disposition  • Caller has medication question only, adult not sick, and triager answers question    Additional Information  • Negative: Drug overdose and triager unable to answer question  • Negative: Caller requesting information unrelated to medicine  • Negative: Caller requesting a prescription for Strep throat and has a positive culture result  • Negative: Rash while taking a medication or within 3 days of stopping it  • Negative: Immunization reaction suspected  • Negative: [1] Asthma and [2] having symptoms of asthma (cough, wheezing, etc.)  • Negative: [1] Influenza symptoms AND [2] anti-viral med prescription request, such as Tamiflu  • Negative: [1] Symptom of illness (e.g., headache, abdominal pain, earache, vomiting) AND [2] more than mild  • Negative: MORE THAN A DOUBLE DOSE of a prescription or over-the-counter (OTC) drug  • Negative: [1] DOUBLE DOSE (an extra dose or lesser amount) of over-the-counter (OTC) drug AND [2] any symptoms (e.g., dizziness, nausea, pain, sleepiness)  • Negative: [1] DOUBLE DOSE (an extra dose or lesser amount) of prescription drug AND [2] any symptoms (e.g., dizziness, nausea, pain, sleepiness)  • Negative: Took another person's prescription drug  • Negative: [1] DOUBLE DOSE (an extra dose or lesser amount) of prescription drug AND [2] NO symptoms (Exception: a double dose of antibiotics)  • Negative: Diabetes drug error or overdose (e.g., took wrong type of insulin or took extra dose)  • Negative: [1] Request for URGENT new prescription or refill of \"essential\" medication (i.e., likelihood of harm to patient if not taken) AND [2] triager unable to fill per unit policy  • Negative: [1] Prescription not at pharmacy AND [2] was prescribed by PCP recently  • Negative: [1] Pharmacy calling with prescription questions AND [2] triager unable to answer " "question  • Negative: [1] Caller has URGENT medication question about med that PCP or specialist prescribed AND [2] triager unable to answer question  • Negative: [1] Caller has NON-URGENT medication question about med that PCP prescribed AND [2] triager unable to answer question  • Negative: [1] Caller requesting a NON-URGENT new prescription or refill AND [2] triager unable to refill per unit policy  • Negative: [1] Caller has medication question about med not prescribed by PCP AND [2] triager unable to answer question (e.g., compatibility with other med, storage)  • Negative: Caller requesting a CONTROLLED substance prescription refill (e.g., narcotics, ADHD medicines)  • Negative: Caller wants to use a complementary or alternative medicine  • Negative: [1] Prescription prescribed recently is not at pharmacy AND [2] triager has access to patient's EMR AND [3] prescription is recorded in the EMR  • Negative: [1] DOUBLE DOSE (an extra dose or lesser amount) of over-the-counter (OTC) drug AND [2] NO symptoms  • Negative: [1] DOUBLE DOSE (an extra dose or lesser amount) of antibiotic drug AND [2] NO symptoms    Answer Assessment - Initial Assessment Questions  1.   NAME of MEDICATION: \"What medicine are you calling about?\"      Eliquis and Tylenol  2.   QUESTION: \"What is your question?\"      Is it ok to take these together   3.   PRESCRIBING HCP: \"Who prescribed it?\" Reason: if prescribed by specialist, call should be referred to that group.      na  4. SYMPTOMS: \"Do you have any symptoms?\"      Headache   5. SEVERITY: If symptoms are present, ask \"Are they mild, moderate or severe?\"      moderate  6.  PREGNANCY:  \"Is there any chance that you are pregnant?\" \"When was your last menstrual period?\"      na    Protocols used: MEDICATION QUESTION CALL-ADULT-      "

## 2020-10-22 ENCOUNTER — READMISSION MANAGEMENT (OUTPATIENT)
Dept: CALL CENTER | Facility: HOSPITAL | Age: 70
End: 2020-10-22

## 2020-10-22 NOTE — OUTREACH NOTE
Medical Week 3 Survey      Responses   [facility] patient discharged from?  [place]   Does the patient have one of the following disease processes/diagnoses(primary or secondary)?  Other   Week 3 attempt successful?  No   Unsuccessful attempts  Attempt 1          Magi Hawley RN

## 2020-10-26 ENCOUNTER — READMISSION MANAGEMENT (OUTPATIENT)
Dept: CALL CENTER | Facility: HOSPITAL | Age: 70
End: 2020-10-26

## 2020-10-29 ENCOUNTER — READMISSION MANAGEMENT (OUTPATIENT)
Dept: CALL CENTER | Facility: HOSPITAL | Age: 70
End: 2020-10-29

## 2020-10-29 NOTE — OUTREACH NOTE
Medical Week 3 Survey      Responses   [facility] patient discharged from?  [place]   Does the patient have one of the following disease processes/diagnoses(primary or secondary)?  Other   Week 3 attempt successful?  Yes   Call start time  1756   Discharge diagnosis  Dizziness  DODSON   Is the patient taking all medications as directed (includes completed medication regime)?  Yes   Has the patient kept scheduled appointments due by today?  Yes   Psychosocial issues?  No   What is the patient's perception of their health status since discharge?  Improving   Is the patient/caregiver able to teach back signs and symptoms related to disease process for when to call PCP?  Yes   Is the patient/caregiver able to teach back signs and symptoms related to disease process for when to call 911?  Yes   Is the patient/caregiver able to teach back the hierarchy of who to call/visit for symptoms/problems? PCP, Specialist, Home health nurse, Urgent Care, ED, 911  Yes   Week 3 Call Completed?  Yes   Wrap up additional comments  Patient says she is doing well, no questions or concerns at this time.          Reena Greer RN

## 2020-11-02 ENCOUNTER — OFFICE VISIT (OUTPATIENT)
Dept: NEUROLOGY | Facility: CLINIC | Age: 70
End: 2020-11-02

## 2020-11-02 VITALS
HEART RATE: 66 BPM | HEIGHT: 61 IN | SYSTOLIC BLOOD PRESSURE: 130 MMHG | BODY MASS INDEX: 34.55 KG/M2 | WEIGHT: 183 LBS | OXYGEN SATURATION: 94 % | DIASTOLIC BLOOD PRESSURE: 76 MMHG

## 2020-11-02 DIAGNOSIS — G20 PARKINSON'S DISEASE (HCC): ICD-10-CM

## 2020-11-02 DIAGNOSIS — Z86.73 RECENT CEREBROVASCULAR ACCIDENT (CVA): Primary | ICD-10-CM

## 2020-11-02 PROCEDURE — 99214 OFFICE O/P EST MOD 30 MIN: CPT | Performed by: PSYCHIATRY & NEUROLOGY

## 2020-11-02 NOTE — PROGRESS NOTES
Chief Complaint   Patient presents with   • Parkinson's Disease       Patient ID: Dalila Valerio is a 70 y.o. female.    HPI: I had the pleasure of seeing your patient again today.  As you may know she is a 70-year-old female with history of Parkinson's disease.  She is actually status post left posterior cerebral artery territory stroke.  She continues to have weakness of the right arm and leg as well as visual disturbance.  She is taking Sinemet 10/100 3 times daily for the Parkinson's symptoms.  She has had no significant progression there.  She is still receiving physical therapy for the stroke.  She denies any recent onset of stroke like symptoms.  She is taking Eliquis.  They feel that she may need closure of a medium size PFO which did show shunting during the bubble study.  She is also taking Crestor daily.  I did review her records from the hospitalization.  We did review imaging and hospital reports together.    The following portions of the patient's history were reviewed and updated as appropriate: allergies, current medications, past family history, past medical history, past social history, past surgical history and problem list.    Review of Systems   Constitutional: Negative for activity change, appetite change and fatigue.   HENT: Negative for facial swelling, hearing loss and trouble swallowing.    Eyes: Negative for photophobia, redness and visual disturbance.   Musculoskeletal: Positive for gait problem. Negative for back pain and neck pain.   Allergic/Immunologic: Negative for environmental allergies, food allergies and immunocompromised state.   Neurological: Positive for numbness (right side of body. ). Negative for dizziness, tremors, seizures, syncope, facial asymmetry, speech difficulty, weakness, light-headedness and headaches.   Hematological: Negative for adenopathy. Does not bruise/bleed easily.   Psychiatric/Behavioral: Positive for confusion and decreased concentration. Negative  for agitation, behavioral problems, dysphoric mood, hallucinations, self-injury, sleep disturbance and suicidal ideas. The patient is not nervous/anxious and is not hyperactive.       I have reviewed the review of systems above performed by my medical assistant.      Vitals:    20 1330   BP: 130/76   Pulse: 66   SpO2: 94%       Neurologic Exam     Mental Status   Oriented to person, place, and time.   Concentration: normal.   Level of consciousness: alert  Knowledge: consistent with education (No deficits found.).     Cranial Nerves     CN II   Visual fields full to confrontation.     CN III, IV, VI   Pupils are equal, round, and reactive to light.  Extraocular motions are normal.   CN III: no CN III palsy  CN VI: no CN VI palsy    CN V   Facial sensation intact.     CN VII   Facial expression full, symmetric.     CN VIII   CN VIII normal.     CN IX, X   CN IX normal.   CN X normal.     CN XI   CN XI normal.     CN XII   CN XII normal.     Motor Exam     Strength   Right neck flexion: 5/5  Left neck flexion: 5/5  Right neck extension: 5/5  Left neck extension: 5/5  Right deltoid: 4/5  Left deltoid: 5/5  Right biceps: 4/5  Left biceps: 5/5  Right triceps: 4/5  Left triceps: 5/5  Right wrist flexion: 4/5  Left wrist flexion: 5/5  Right wrist extension: 4/5  Left wrist extension: 5/5  Right interossei: 4/5  Left interossei: 5/5  Right abdominals: 4/5  Left abdominals: 5/5  Right iliopsoas: 4/5  Left iliopsoas: 5/5  Right quadriceps: 4/5  Left quadriceps: 5/5  Right hamstrin/5  Left hamstrin/5  Right glutei: 4/5  Left glutei: 5/5  Right anterior tibial: 4/5  Left anterior tibial: 5/5  Right posterior tibial: 4/5  Left posterior tibial: 5/5  Right peroneal: 4/5  Left peroneal: 5/5  Right gastroc: 4/5  Left gastroc: 5/5    Sensory Exam   Light touch normal.   Vibration normal.     Gait, Coordination, and Reflexes     Gait  Gait: spastic    Reflexes   Right brachioradialis: 3+  Left brachioradialis: 2+  Right  biceps: 3+  Left biceps: 2+  Right triceps: 3+  Left triceps: 2+  Right patellar: 3+  Left patellar: 2+  Right achilles: 2+  Left achilles: 2+  Right : 2+  Left : 2+Station is normal.       Physical Exam  Vitals signs reviewed.   Constitutional:       Appearance: She is well-developed.   HENT:      Head: Normocephalic and atraumatic.   Eyes:      Extraocular Movements: EOM normal.      Pupils: Pupils are equal, round, and reactive to light.   Cardiovascular:      Rate and Rhythm: Normal rate and regular rhythm.   Pulmonary:      Breath sounds: Normal breath sounds.   Musculoskeletal: Normal range of motion.   Skin:     General: Skin is warm.   Neurological:      Mental Status: She is oriented to person, place, and time.      Deep Tendon Reflexes:      Reflex Scores:       Tricep reflexes are 3+ on the right side and 2+ on the left side.       Bicep reflexes are 3+ on the right side and 2+ on the left side.       Brachioradialis reflexes are 3+ on the right side and 2+ on the left side.       Patellar reflexes are 3+ on the right side and 2+ on the left side.       Achilles reflexes are 2+ on the right side and 2+ on the left side.        Procedures    Assessment/Plan: She will continue the Eliquis for now.  She is scheduled for heme-onc consultation likely for hypercoagulable studies.  She may need PFO closure if that is normal.  She will continue with the Crestor for now.  Continue Sinemet as scheduled and return to clinic in 3 months or sooner if needed.  A total of 25 minutes was spent face-to-face with the patient today.  Of that greater than 50% of this time was spent discussing signs and symptoms of Parkinson's disease, stroke, patient education, plan of care and prognosis.       Diagnoses and all orders for this visit:    1. Recent cerebrovascular accident (CVA) (Primary)    2. Parkinson's disease (CMS/[facility])           Luis Enrique Santacruz II, MD

## 2020-11-04 RX ORDER — GLIPIZIDE 5 MG/1
TABLET ORAL
Qty: 45 TABLET | Refills: 0 | Status: SHIPPED | OUTPATIENT
Start: 2020-11-04 | End: 2021-01-29

## 2020-11-04 RX ORDER — GLIPIZIDE 5 MG/1
2.5 TABLET ORAL
Qty: 30 TABLET | Refills: 0 | Status: SHIPPED | OUTPATIENT
Start: 2020-11-04 | End: 2020-11-04

## 2020-11-04 NOTE — TELEPHONE ENCOUNTER
Patient reports her blood sugar has been staying in the 200's lately. She is only taking Nateglinide 120mg tid now and thinks she may need to go back on another diabetes medication. Please advise.

## 2020-11-06 ENCOUNTER — READMISSION MANAGEMENT (OUTPATIENT)
Dept: CALL CENTER | Facility: HOSPITAL | Age: 70
End: 2020-11-06

## 2020-11-06 ENCOUNTER — TELEPHONE (OUTPATIENT)
Dept: FAMILY MEDICINE CLINIC | Facility: CLINIC | Age: 70
End: 2020-11-06

## 2020-11-06 DIAGNOSIS — M25.551 RIGHT HIP PAIN: Primary | ICD-10-CM

## 2020-11-06 PROCEDURE — 73502 X-RAY EXAM HIP UNI 2-3 VIEWS: CPT | Performed by: NURSE PRACTITIONER

## 2020-11-06 NOTE — OUTREACH NOTE
Medical Week 4 Survey      Responses   [facility] patient discharged from?  [place]   Does the patient have one of the following disease processes/diagnoses(primary or secondary)?  Other   Week 4 attempt successful?  No          Corinna Day RN

## 2020-11-08 DIAGNOSIS — G20 PARKINSON'S DISEASE (HCC): Primary | ICD-10-CM

## 2020-11-09 RX ORDER — METOPROLOL TARTRATE 50 MG/1
TABLET, FILM COATED ORAL
Qty: 270 TABLET | Refills: 0 | Status: SHIPPED | OUTPATIENT
Start: 2020-11-09 | End: 2021-02-08

## 2020-11-09 NOTE — TELEPHONE ENCOUNTER
Please advise pt last seen on 11/02/2020 and has a f/u on 02/02/2021. Please advise and approve. Thank you.

## 2020-11-09 NOTE — TELEPHONE ENCOUNTER
Patient calling to advise she is completely out of this medication. Would like refilled ASAP, at the pharmacy now.    Callback# [phone]

## 2020-11-11 ENCOUNTER — TELEPHONE (OUTPATIENT)
Dept: FAMILY MEDICINE CLINIC | Facility: CLINIC | Age: 70
End: 2020-11-11

## 2020-11-11 NOTE — TELEPHONE ENCOUNTER
[facility] physical therapist called regarding patients hip pain. She was informed that patient has been sent to [facility] for an xray. She would like an update on the results when we receive them.       [phone] Vashti

## 2020-11-12 DIAGNOSIS — R92.8 ABNORMAL MAMMOGRAM OF LEFT BREAST: Primary | ICD-10-CM

## 2020-11-25 ENCOUNTER — TELEPHONE (OUTPATIENT)
Dept: FAMILY MEDICINE CLINIC | Facility: CLINIC | Age: 70
End: 2020-11-25

## 2020-11-25 NOTE — TELEPHONE ENCOUNTER
I sent in a topical to try and would recommend ice 2 to 3 times a day and tylenol 1000mg every 6 hours as needed.  If doesn't settle next week, call back for appt with Neena

## 2020-11-25 NOTE — TELEPHONE ENCOUNTER
Patient called in stating she's still having severe right hip pain. Patient would like to speak with someone regarding what she needs to do.    Best call back # [phone]

## 2020-11-30 DIAGNOSIS — R92.8 ABNORMAL MAMMOGRAM OF LEFT BREAST: Primary | ICD-10-CM

## 2020-12-01 RX ORDER — ROSUVASTATIN CALCIUM 20 MG/1
20 TABLET, COATED ORAL DAILY
Qty: 90 TABLET | Refills: 0 | Status: SHIPPED | OUTPATIENT
Start: 2020-12-01 | End: 2021-03-01

## 2020-12-02 ENCOUNTER — OFFICE VISIT (OUTPATIENT)
Dept: FAMILY MEDICINE CLINIC | Facility: CLINIC | Age: 70
End: 2020-12-02

## 2020-12-02 VITALS
HEIGHT: 61 IN | DIASTOLIC BLOOD PRESSURE: 64 MMHG | OXYGEN SATURATION: 97 % | TEMPERATURE: 97.1 F | HEART RATE: 62 BPM | SYSTOLIC BLOOD PRESSURE: 110 MMHG | BODY MASS INDEX: 34.36 KG/M2 | WEIGHT: 182 LBS

## 2020-12-02 DIAGNOSIS — E11.65 UNCONTROLLED TYPE 2 DIABETES MELLITUS WITH HYPERGLYCEMIA (HCC): ICD-10-CM

## 2020-12-02 DIAGNOSIS — M25.551 ACUTE RIGHT HIP PAIN: Primary | ICD-10-CM

## 2020-12-02 PROCEDURE — 99213 OFFICE O/P EST LOW 20 MIN: CPT | Performed by: NURSE PRACTITIONER

## 2020-12-02 PROCEDURE — G0439 PPPS, SUBSEQ VISIT: HCPCS | Performed by: NURSE PRACTITIONER

## 2020-12-02 NOTE — PROGRESS NOTES
Subjective   Dalila Valerio is a 70 y.o. female c/o pain in hip and back    History of Present Illness   Sees pain management.  Pain seems to be getting worse since fall at [facility]. Hurts laterally, does not think fracture. Fell more on head, knee hit curb on sidewalk.   Scared to use diclofenac secondary to heart attack and stroke risk. So not treating pain. Has taken some tylenol, no ibuprofen.  time to time to address. Still taking PT. More painful to walk if sits prolonged. Deficits from stroke are improving greatly.  The following portions of the patient's history were reviewed and updated as appropriate: allergies, current medications, past family history, past medical history, past social history, past surgical history and problem list.    Review of Systems   Constitutional: Positive for activity change and fatigue. Negative for appetite change, chills and fever.   HENT: Negative.    Respiratory: Negative.    Cardiovascular: Negative.    Gastrointestinal: Negative.    Endocrine: Negative.    Musculoskeletal: Positive for arthralgias, back pain and gait problem. Negative for myalgias.   Skin: Negative.    Allergic/Immunologic: Negative.    Neurological: Positive for headache and memory problem. Negative for weakness.   Hematological: Negative.    Psychiatric/Behavioral: Positive for behavioral problems, sleep disturbance and depressed mood. Negative for suicidal ideas. The patient is nervous/anxious.        Objective   Physical Exam  Vitals signs and nursing note reviewed.   Constitutional:       Appearance: Normal appearance. She is well-developed.   Eyes:      Conjunctiva/sclera: Conjunctivae normal.      Pupils: Pupils are equal, round, and reactive to light.   Neck:      Musculoskeletal: Normal range of motion and neck supple.      Thyroid: No thyromegaly.      Vascular: No JVD.      Trachea: No tracheal deviation.   Cardiovascular:      Rate and Rhythm: Normal rate and regular rhythm.      Heart  sounds: Normal heart sounds.   Pulmonary:      Effort: Pulmonary effort is normal.      Breath sounds: Normal breath sounds.   Abdominal:      Palpations: Abdomen is soft.      Tenderness: There is no abdominal tenderness.   Feet:      Right foot:      Protective Sensation: 10 sites sensed.      Left foot:      Protective Sensation: 10 sites sensed.      Comments: See scanned diabetic foot exam  Lymphadenopathy:      Cervical: No cervical adenopathy.   Skin:     General: Skin is warm and dry.   Neurological:      Mental Status: She is alert and oriented to person, place, and time.      Sensory: No sensory deficit.      Motor: No weakness.      Gait: Gait abnormal.   Psychiatric:         Mood and Affect: Mood is anxious and depressed. Affect is not labile, blunt, angry or inappropriate.         Speech: Speech normal.         Behavior: Behavior normal.         Thought Content: Thought content does not include homicidal or suicidal ideation. Thought content does not include homicidal or suicidal plan.         Judgment: Judgment normal.       Assessment/Plan   Problems Addressed this Visit     None      Visit Diagnoses     Acute right hip pain    -  Primary    Uncontrolled type 2 diabetes mellitus with hyperglycemia (CMS/HCC)        Relevant Orders    Hemoglobin A1c (Completed)    Microalbumin / Creatinine Urine Ratio - Urine, Clean Catch (Completed)      Diagnoses       Codes Comments    Acute right hip pain    -  Primary ICD-10-CM: M25.551  ICD-9-CM: 719.45     Uncontrolled type 2 diabetes mellitus with hyperglycemia (CMS/HCC)     ICD-10-CM: E11.65  ICD-9-CM: 250.02         Right hip pain--Will try diclofenac topical for 2 weeks, if not improved in 2 weeks, recommend CT to rule out acute fracture as osteoporosis. Xrays negative for fracture  [id number]--syq to update A1c

## 2020-12-02 NOTE — PROGRESS NOTES
The ABCs of the Annual Wellness Visit  Subsequent Medicare Wellness Visit    Chief Complaint   Patient presents with   • Annual Exam     medicare   • Diabetes   • Pain     Hip Back       Subjective   History of Present Illness:  Dalila Valerio is a 70 y.o. female who presents for a Subsequent Medicare Wellness Visit.    HEALTH RISK ASSESSMENT    Recent Hospitalizations:  Recently treated at the following:  [facility]    Current Medical Providers:  Patient Care Team:  Cinthya Valentine APRN as PCP - General  Cinthya Valentine APRN as PCP - Family Medicine  Devyn Velazco MD as Consulting Physician (Pain Medicine)  Bogdan Olmedo MD as Surgeon (Orthopedic Surgery)  Pedro Pablo Lomas MD as Consulting Physician (Cardiology)    Smoking Status:  Social History     Tobacco Use   Smoking Status Former Smoker   • Quit date: 1980   • Years since quittin.9   Smokeless Tobacco Never Used       Alcohol Consumption:  Social History     Substance and Sexual Activity   Alcohol Use No       Depression Screen:   PHQ-2/PHQ-9 Depression Screening 2020   Little interest or pleasure in doing things 0   Feeling down, depressed, or hopeless 0   Trouble falling or staying asleep, or sleeping too much 0   Feeling tired or having little energy 0   Poor appetite or overeating 0   Feeling bad about yourself - or that you are a failure or have let yourself or your family down 0   Trouble concentrating on things, such as reading the newspaper or watching television 0   Moving or speaking so slowly that other people could have noticed. Or the opposite - being so fidgety or restless that you have been moving around a lot more than usual 0   Thoughts that you would be better off dead, or of hurting yourself in some way 0   Total Score 0   If you checked off any problems, how difficult have these problems made it for you to do your work, take care of things at home, or get along with other people? Not difficult at  all       Fall Risk Screen:  AMIRA Fall Risk Assessment was completed, and patient is at HIGH risk for falls. Assessment completed on:12/2/2020    Health Habits and Functional and Cognitive Screening:  Functional & Cognitive Status 12/2/2020   Do you have difficulty preparing food and eating? No   Do you have difficulty bathing yourself, getting dressed or grooming yourself? No   Do you have difficulty using the toilet? No   Do you have difficulty moving around from place to place? No   Do you have trouble with steps or getting out of a bed or a chair? No   Current Diet Well Balanced Diet   Dental Exam Up to date   Eye Exam Up to date   Exercise (times per week) 2 times per week   Current Exercises Include House Cleaning   Current Exercise Activities Include -   Do you need help using the phone?  No   Are you deaf or do you have serious difficulty hearing?  No   Do you need help with transportation? No   Do you need help shopping? No   Do you need help preparing meals?  No   Do you need help with housework?  No   Do you need help with laundry? No   Do you need help taking your medications? No   Do you need help managing money? No   Do you ever drive or ride in a car without wearing a seat belt? No   Have you felt unusual stress, anger or loneliness in the last month? No   Who do you live with? Spouse   If you need help, do you have trouble finding someone available to you? No   Have you been bothered in the last four weeks by sexual problems? No   Do you have difficulty concentrating, remembering or making decisions? No         Does the patient have evidence of cognitive impairment? Yes    Asprin use counseling:Does not need ASA (and currently is not on it)    Age-appropriate Screening Schedule:  Refer to the list below for future screening recommendations based on patient's age, sex and/or medical conditions. Orders for these recommended tests are listed in the plan section. The patient has been provided with a  written plan.    Health Maintenance   Topic Date Due   • TDAP/TD VACCINES (1 - Tdap) 10/05/1969   • DIABETIC EYE EXAM  04/16/2019   • URINE MICROALBUMIN  12/10/2019   • DIABETIC FOOT EXAM  08/27/2020   • HEMOGLOBIN A1C  02/20/2021   • LIPID PANEL  08/20/2021   • DXA SCAN  10/29/2022   • MAMMOGRAM  11/30/2022   • COLONOSCOPY  11/05/2024   • INFLUENZA VACCINE  Completed          The following portions of the patient's history were reviewed and updated as appropriate: allergies, current medications, past family history, past medical history, past social history, past surgical history and problem list.    Outpatient Medications Prior to Visit   Medication Sig Dispense Refill   • ACCU-CHEK FASTCLIX LANCETS misc USE TO TEST BLOOD SUGAR UP TO FIVE TIMES DAILY AS DIRECTED. 408 each 0   • albuterol (PROAIR HFA) 108 (90 BASE) MCG/ACT inhaler  INHALE 1 TO 2 PUFFS EVERY 4 TO 6 HOURS AS NEEDED 1 inhaler 1   • alendronate (FOSAMAX) 70 MG tablet TAKE 1 TABLET BY MOUTH EVERY 7 DAYS 12 tablet 1   • apixaban (ELIQUIS) 5 MG tablet tablet Take 1 tablet by mouth Every 12 (Twelve) Hours. Indications: Other - full anticoagulation 60 tablet 5   • ARIPiprazole (ABILIFY) 2 MG tablet Take 1 tablet by mouth Daily. 90 tablet 1   • carbidopa-levodopa (SINEMET)  MG per tablet TAKE 1 TABLET BY MOUTH THREE TIMES DAILY 90 tablet 5   • Cholecalciferol (VITAMIN D3) 5000 UNITS capsule capsule Take 1 capsule by mouth daily. 30 capsule 5   • citalopram (CeleXA) 20 MG tablet TAKE 1 TABLET BY MOUTH DAILY 90 tablet 0   • Diclofenac Sodium (Voltaren) 1 % gel gel Apply 4 g topically to the appropriate area as directed 4 (Four) Times a Day As Needed (hip pain). 100 g 2   • furosemide (LASIX) 20 MG tablet TAKE 2 TABLETS BY MOUTH EVERY MORNING AND 1 TABLET IN THE EARLY AFTERNOON 270 tablet 0   • gabapentin (NEURONTIN) 800 MG tablet TAKE 1 TABLET BY MOUTH THREE TIMES DAILY 90 tablet 2   • glipizide (GLUCOTROL) 5 MG tablet TAKE 1/2 TABLET BY MOUTH DAILY WITH  "DINNER 45 tablet 0   • glucose blood test strip Test up to 5 times daily for low blood sugar and symptomatic glycemia 450 each 3   • HYDROcodone-acetaminophen (NORCO)  MG per tablet      • metoprolol tartrate (LOPRESSOR) 50 MG tablet TAKE 1 TABLET BY MOUTH THREE TIMES DAILY 270 tablet 0   • nateglinide (STARLIX) 120 MG tablet TAKE 1 TABLET BY MOUTH THREE TIMES DAILY BEFORE MEALS 90 tablet 3   • nitroglycerin (NITROSTAT) 0.4 MG SL tablet ONE TABLET UNDER TONGUE AS NEEDED FOR CHEST PAIN EVERY 5 MINUTES FOR UP TO 3 DOSES, CALL 911 IF PAIN PERSISTS 300 tablet 5   • omeprazole (priLOSEC) 40 MG capsule TAKE 1 CAPSULE BY MOUTH TWICE DAILY (Patient taking differently: Take 40 mg by mouth 2 (two) times a day.) 180 capsule 0   • ondansetron (ZOFRAN) 4 MG tablet Take 4 mg by mouth 3 (Three) Times a Day As Needed for Nausea.  0   • potassium chloride (KLOR-CON) 8 MEQ CR tablet TAKE 1 TABLET BY MOUTH TWICE DAILY (Patient taking differently: Take 8 mEq by mouth 2 (Two) Times a Day.) 180 tablet 0   • rosuvastatin (CRESTOR) 20 MG tablet TAKE 1 TABLET BY MOUTH DAILY 90 tablet 0   • SYMBICORT 160-4.5 MCG/ACT inhaler INHALE 2 PUFFS BY MOUTH TWICE DAILY. RINSE MOUTH AFTER USE 10.2 g 2   • Insulin Syringe 29G X 1\" 0.3 ML misc 1 each daily with dinner. 100 each 0   • Sennosides 10 MG chewable tablet Chew As Needed.       No facility-administered medications prior to visit.        Patient Active Problem List   Diagnosis   • Adjustment disorder with mixed anxiety and depressed mood   • Atopic rhinitis   • Coronary arteriosclerosis in native artery   • Cobalamin deficiency   • Benign colonic polyp   • Lumbar radiculopathy   • Controlled diabetes mellitus type 2 with complications (CMS/[facility])   • Binocular vision disorder with diplopia   • Dyslipidemia   • Arthropathy of hand   • Knee pain   • Cramps of lower extremity   • Low back pain   • Migraine with typical aura   • Chronic nausea   • Obstructive sleep apnea syndrome   • " "Palpitations   • Ventricular premature beats   • Cephalalgia   • Colonic constipation   • Neurocardiogenic syncope   • Vitamin D deficiency   • DODSON (nonalcoholic steatohepatitis)   • Fibromyalgia   • Morbidly obese (CMS/HCC)   • Polyp of colon   • Family history of colonic polyps   • Family history of malignant neoplasm of colon   • Acute CVA (cerebrovascular accident) (CMS/[facility])   • Episode of dizziness   • Dizziness   • History of stroke       Advanced Care Planning:  ACP discussion was held with the patient during this visit. Patient does not have an advance directive, information provided.    Review of Systems    Compared to one year ago, the patient feels her physical health is worse.  Compared to one year ago, the patient feels her mental health is worse.    Reviewed chart for potential of high risk medication in the elderly: yes  Reviewed chart for potential of harmful drug interactions in the elderly:yes    Objective         Vitals:    12/02/20 0855   BP: 110/64   Pulse: 62   Temp: 97.1 °F (36.2 °C)   TempSrc: Infrared   SpO2: 97%   Weight: 82.6 kg (182 lb)   Height: 154.9 cm (61\")   PainSc:   6   PainLoc: Hip       Body mass index is 34.39 kg/m².  Discussed the patient's BMI with her. The BMI is above average; no BMI management plan is appropriate..    Physical Exam          Assessment/Plan   Medicare Risks and Personalized Health Plan  CMS Preventative Services Quick Reference  Advance Directive Discussion  Cardiovascular risk  Chronic Pain   Depression/Dysphoria  Diabetic Lab Screening   Fall Risk  Immunizations Discussed/Encouraged (specific immunizations; adacel Tdap )  Obesity/Overweight   Osteoprorosis Risk  Polypharmacy    The above risks/problems have been discussed with the patient.  Pertinent information has been shared with the patient in the After Visit Summary.  Follow up plans and orders are seen below in the Assessment/Plan Section.    There are no diagnoses linked to this encounter.  Follow " Up:  No follow-ups on file.     An After Visit Summary and PPPS were given to the patient.

## 2020-12-03 LAB
ALBUMIN/CREAT UR: 8 MG/G CREAT (ref 0–29)
CREAT UR-MCNC: 179.2 MG/DL
HBA1C MFR BLD: 7.4 % (ref 4.8–5.6)
MICROALBUMIN UR-MCNC: 13.8 UG/ML

## 2020-12-18 DIAGNOSIS — R60.0 LOWER EXTREMITY EDEMA: ICD-10-CM

## 2020-12-20 DIAGNOSIS — M25.442 SWELLING OF HAND JOINT, LEFT: ICD-10-CM

## 2020-12-21 RX ORDER — GABAPENTIN 800 MG/1
TABLET ORAL
Qty: 90 TABLET | Refills: 2 | Status: SHIPPED | OUTPATIENT
Start: 2020-12-21 | End: 2021-03-22

## 2020-12-21 RX ORDER — POTASSIUM CHLORIDE 600 MG/1
TABLET, FILM COATED, EXTENDED RELEASE ORAL
Qty: 180 TABLET | Refills: 0 | Status: SHIPPED | OUTPATIENT
Start: 2020-12-21 | End: 2021-03-18

## 2020-12-21 RX ORDER — FUROSEMIDE 20 MG/1
TABLET ORAL
Qty: 270 TABLET | Refills: 0 | Status: SHIPPED | OUTPATIENT
Start: 2020-12-21 | End: 2021-03-22

## 2020-12-29 ENCOUNTER — TELEPHONE (OUTPATIENT)
Dept: FAMILY MEDICINE CLINIC | Facility: CLINIC | Age: 70
End: 2020-12-29

## 2020-12-29 NOTE — TELEPHONE ENCOUNTER
[facility] with concerns of pt being more at falls risk. Pt seems to have help the entire time with balance issues and Vashti is not sure how much more she can do with pt with PT unless pt's pain reduces.

## 2021-01-06 RX ORDER — CITALOPRAM 20 MG/1
20 TABLET ORAL DAILY
Qty: 90 TABLET | Refills: 0 | Status: SHIPPED | OUTPATIENT
Start: 2021-01-06 | End: 2021-04-12

## 2021-01-11 ENCOUNTER — TELEMEDICINE (OUTPATIENT)
Dept: FAMILY MEDICINE CLINIC | Facility: CLINIC | Age: 71
End: 2021-01-11

## 2021-01-11 DIAGNOSIS — J32.2 SINUSITIS CHRONIC, ETHMOIDAL: Primary | ICD-10-CM

## 2021-01-11 PROCEDURE — 99442 PR PHYS/QHP TELEPHONE EVALUATION 11-20 MIN: CPT | Performed by: NURSE PRACTITIONER

## 2021-01-11 RX ORDER — DOXYCYCLINE HYCLATE 100 MG/1
100 CAPSULE ORAL 2 TIMES DAILY
Qty: 14 CAPSULE | Refills: 0 | Status: SHIPPED | OUTPATIENT
Start: 2021-01-11 | End: 2021-04-09

## 2021-01-11 RX ORDER — PREDNISONE 20 MG/1
20 TABLET ORAL DAILY
Qty: 3 TABLET | Refills: 0 | Status: SHIPPED | OUTPATIENT
Start: 2021-01-11 | End: 2021-02-24 | Stop reason: HOSPADM

## 2021-01-11 NOTE — PROGRESS NOTES
Subjective   Dalila Valerio is a 70 y.o. female. Telephone visit--ST    History of Present Illness   Sick for 3 weeks. Fever, stomach pain. Loose stools fever has resolved. Stuffy nose ST, PND. Has been missing PT secondary to symptoms.  with similar symptoms. Has not been anywhere but Dr and PT. Has not been anywhere. He has fever and body aches. He is not SOA. She thinks was associated with congestion. No loss of taste or smell. HA low level--no migraine. Blood sugar has been ok. She got symptoms 1st  The following portions of the patient's history were reviewed and updated as appropriate: allergies, current medications, past family history, past medical history, past social history, past surgical history and problem list.    Review of Systems   Constitutional: Positive for activity change, chills, fatigue and fever. Negative for appetite change, unexpected weight gain and unexpected weight loss.   HENT: Positive for congestion, postnasal drip, sinus pressure and sore throat. Negative for ear pain.    Respiratory: Negative for cough, chest tightness and wheezing.    Gastrointestinal: Negative.    Endocrine: Negative.    Musculoskeletal: Positive for arthralgias.   Allergic/Immunologic: Positive for environmental allergies.   Neurological: Positive for headache.   Hematological: Negative.    Psychiatric/Behavioral: Negative.      There were no vitals taken for this visit.    Objective   Physical Exam  Constitutional:       General: She is not in acute distress.     Appearance: She is well-developed.   HENT:      Head:      Comments: Sounds nasally congested  Pulmonary:      Effort: Pulmonary effort is normal.      Comments: Speaking in full sentences. No noted cough  Neurological:      Mental Status: She is alert and oriented to person, place, and time.   Psychiatric:         Behavior: Behavior normal.         Thought Content: Thought content normal.         Judgment: Judgment normal.            Assessment/Plan   Problems Addressed this Visit     None      Diagnoses    None.       As symptoms for 3 weeks and fever gone, doubt role of COVID testing at this point. Treat for sinusitis. FU if not improving. Seek care if develops any SOA    You have chosen to receive care through a telephone visit. Do you consent to use a telephone visit for your medical care today? Yes    12 min EM

## 2021-01-15 RX ORDER — NATEGLINIDE 120 MG/1
TABLET ORAL
Qty: 90 TABLET | Refills: 1 | Status: SHIPPED | OUTPATIENT
Start: 2021-01-15 | End: 2021-03-10

## 2021-01-29 RX ORDER — GLIPIZIDE 5 MG/1
TABLET ORAL
Qty: 45 TABLET | Refills: 0 | Status: SHIPPED | OUTPATIENT
Start: 2021-01-29 | End: 2021-04-12

## 2021-02-03 RX ORDER — OMEPRAZOLE 40 MG/1
CAPSULE, DELAYED RELEASE ORAL
Qty: 180 CAPSULE | Refills: 3 | Status: SHIPPED | OUTPATIENT
Start: 2021-02-03 | End: 2021-11-08

## 2021-02-08 RX ORDER — METOPROLOL TARTRATE 50 MG/1
TABLET, FILM COATED ORAL
Qty: 270 TABLET | Refills: 0 | Status: SHIPPED | OUTPATIENT
Start: 2021-02-08 | End: 2021-03-10

## 2021-02-08 NOTE — TELEPHONE ENCOUNTER
Caller: Dalila Valerio    Relationship: Self    Best call back number: [phone]    Medication needed:   Requested Prescriptions     Pending Prescriptions Disp Refills   • metoprolol tartrate (LOPRESSOR) 50 MG tablet [Pharmacy Med Name: METOPROLOL TARTRATE 50MG TABLETS] 270 tablet 0     Sig: TAKE 1 TABLET BY MOUTH THREE TIMES DAILY       When do you need the refill by: 02/08    What details did the patient provide when requesting the medication: PATIENT HAS LESS THAN THREE DAYS.    Does the patient have less than a 3 day supply:  [x] Yes  [] No    What is the patient's preferred pharmacy: [id number] [address] [place] [address] AT [facility] & [facility] [phone] [facility] [phone] FX                Your patient was seen today for a HRA visit.Please review # 2,3 and 4. He has appt with you on 10/30/ 2018 I have included a copy of my visit note, please review the note and feel free to contact me with any questions. Thank you for allowing me to participate in the care of your patients. Katherin Paul NP

## 2021-02-16 NOTE — TELEPHONE ENCOUNTER
;      [facility] Emergency Department  [facility] [place], [place] [address]  [phone]     Clinical Summary     PERSON INFORMATION   Name ANNALEE INGRAM Age  23 Years  1996 12:00 AM   Acct# [id number] Sex Male Phone [phone]   Dispo Type Home - (i.e. Home on oxygen, DME)-  Arrival 5/15/2019 1:00 PM Checkout 5/15/2019 2:30 PM    [facility] [address]      Visit Reason TOOTH PAIN     ED Physician Note      ED Time Seen By Provider Entered On:  5/15/2019 13:34     Performed On:  5/15/2019 13:34  by Kandace Gallo NP               Time Seen By Provider   Time Seen by Provider :   5/15/2019 13:34    Kandace Gallo NP - 5/15/BOBBY: [id number]  Collaborating MD: Gelacio Monreal MD, # 7 tab, 0 Refill(s), Tab          DISCHARGE INFORMATION   Discharge Disposition: Home - (i.e. Home on oxygen, DME)-      PATIENT EDUCATION INFORMATION   Instructions:       Dental Pain, Easy-to-Read   Follow up:                  With: Address: When:   Follow up with primary care provider  Within 1 to 2 days   Comments:     FOLLOW-UP WITH PRIMARY CARE PHYSICIAN    RETURN TO EMERGENCY DEPARTMENT WITH NEW OR WORSENING SYMPTOMS    -Drink plenty of fluids  -Rest  -Take Tylenol or ibuprofen for pain/fever    If you do not have a primary care doctor need assistance finding a doctor call [phone]-ADVOCATE for assistance.             Order is still good. I've faxed it over to   [facility].      DIAGNOSIS

## 2021-02-23 DIAGNOSIS — G47.01 INSOMNIA DUE TO MEDICAL CONDITION: Primary | ICD-10-CM

## 2021-02-23 RX ORDER — DOXEPIN HYDROCHLORIDE 10 MG/1
10 CAPSULE ORAL NIGHTLY PRN
Qty: 30 CAPSULE | Refills: 0 | Status: SHIPPED | OUTPATIENT
Start: 2021-02-23 | End: 2021-03-23

## 2021-02-24 ENCOUNTER — OFFICE VISIT (OUTPATIENT)
Dept: NEUROLOGY | Facility: CLINIC | Age: 71
End: 2021-02-24

## 2021-02-24 VITALS
HEART RATE: 64 BPM | HEIGHT: 61 IN | SYSTOLIC BLOOD PRESSURE: 122 MMHG | DIASTOLIC BLOOD PRESSURE: 80 MMHG | BODY MASS INDEX: 35.68 KG/M2 | WEIGHT: 189 LBS | OXYGEN SATURATION: 98 %

## 2021-02-24 DIAGNOSIS — Z79.01 CHRONIC ANTICOAGULATION: ICD-10-CM

## 2021-02-24 DIAGNOSIS — Z91.81 RISK FOR FALLS: ICD-10-CM

## 2021-02-24 DIAGNOSIS — G20 PARKINSON'S DISEASE (HCC): ICD-10-CM

## 2021-02-24 DIAGNOSIS — Z86.73 RECENT CEREBROVASCULAR ACCIDENT (CVA): Primary | ICD-10-CM

## 2021-02-24 PROCEDURE — 99215 OFFICE O/P EST HI 40 MIN: CPT | Performed by: NURSE PRACTITIONER

## 2021-02-24 RX ORDER — DOXYCYCLINE 100 MG/1
100 CAPSULE ORAL 2 TIMES DAILY
COMMUNITY
Start: 2021-01-11 | End: 2021-02-24 | Stop reason: SDUPTHER

## 2021-02-24 NOTE — PROGRESS NOTES
DOS: 2021  NAME: Dalila Valerio   : 1950  PCP: Cinthya Valentine APRN    Chief Complaint   Patient presents with   • Stroke   • Parkinson's Disease      SUBJECTIVE  Neurological Problem:  70 y.o. RHW female with stroke, HTN, HLD, CAD, B12 def, Barretts esphagus, DM, Diabetic neuropathy, SILVIANO, lupus anticoagulant, PFO, parkinsonism. She is accompanied by her spouse. Patient and problem are new to examiner. History is provided by patient, spouse and review of records that are summarized below.     Interval History:   Ms. Valerio presents today for f/u of stroke and possible Parkinson's. She has a detailed history which I have reviewed and summarized below:      -2020: Initial visit with Dr. Santacruz after being referred by her PCP with recent h/o fall and hitting head, no LOC. CT head negative. It was noted that she had some parkinsonian symptoms including Melendez, drooling, gait issues, shuffling, occasional tremor of hands at rest, initiating movements and rising from chair.  She also complained of some intermittent double vision.  She was referred to PT and had an MRI brain done that was unremarkable.     -2020: Admitted to [facility] for left PCA stroke s/p IV tPA and basilar artery recannalization.  He was also found to have a PFO with shunting and DVT.  She was on Effient plus statin PTA but she was discharged on Eliquis plus statin. There was some discussion about possible PFO closure; however, review of heme/onc notes from Dec. 2020 from her MD at [facility], recommendations are for lifelong anticoagulation d/t prothrombin gene mutation and no need for PFO closure.     -Oct. 2020: She was admitted to [facility] with transient spell of vertigo and numbness, imaging with MRI showing stable subacute left temporal occipital area of known infarct, no acute findings.  Symptoms were thought to be secondary to recrudescence of previous stroke symptoms.    -2020: F/U in  "office with Dr. Santacruz, at that time taking sinemet 10/100 TID. Stroke w/u reviewed and recommendations to continue sinemet were made.     Patient and spouse tell me today that she use on Eliquis 5 mg twice daily and crestor 20 mg, tolerating well, no signs or symptoms of bleeding.  She is to be on anticoagulation lifelong per Heme/onc notes. No new stroke/TIA symptoms, but she has residual right sided numbness, cold hand, generalized weakness but some problems with dexterity of right hand. She was getting home therapy followed by therapy at [facility] which was helping; however, due to a cold, she was missing so many therapy days, and has had a pause in her therapy. However, her main concern is that she is not sleeping, spouse states she is very sleepy during the day and naps off and on day. Patient states she has \"not slept for 3 days straight\".  Review of records indicate she is on abilify, apparently taking in the morning, also on gabapentin 800 mg TID, patient nor spouse not clear on medications, how long she has been on it or when she takes them, does not have list today.   No hallucinations, no change in personality of behavior. She is using a cane. Is unsteady but no recent falls. She endorses constipation as a chronic problem. No problems with smell. No problems swallowing. Prior to stroke she was totally independent, was driving, cooking, etc. Since the stroke spouse is cooking and doing the driving. She is able dress independently, toilet, bath and feed. They deny any other changes in health since her discharge.   She was apparently diagnosed with sleep apnea in the past, but states she would not be able to tolerate a mask and has not followed up on this.  No smoking. No recent falls, using a cane.     Review of Systems:Review of Systems   Constitutional: Positive for fatigue. Negative for activity change and appetite change.   HENT: Negative for ear pain, tinnitus and trouble swallowing.    Eyes: Negative " for photophobia, pain and visual disturbance.   Musculoskeletal: Positive for gait problem. Negative for back pain and neck pain.   Neurological: Positive for dizziness, light-headedness and headaches. Negative for tremors, seizures, syncope, facial asymmetry, speech difficulty, weakness and numbness.   Psychiatric/Behavioral: Positive for decreased concentration and sleep disturbance. Negative for agitation, behavioral problems, confusion, dysphoric mood, hallucinations, self-injury and suicidal ideas. The patient is nervous/anxious. The patient is not hyperactive.     Above ROS reviewed    The following portions of the patient's history were reviewed and updated as appropriate: allergies, current medications, past family history, past medical history, past social history, past surgical history and problem list.    Current Medications:   Current Outpatient Medications:   •  ACCU-CHEK FASTCLIX LANCETS misc, USE TO TEST BLOOD SUGAR UP TO FIVE TIMES DAILY AS DIRECTED., Disp: 408 each, Rfl: 0  •  albuterol (PROAIR HFA) 108 (90 BASE) MCG/ACT inhaler,  INHALE 1 TO 2 PUFFS EVERY 4 TO 6 HOURS AS NEEDED, Disp: 1 inhaler, Rfl: 1  •  alendronate (FOSAMAX) 70 MG tablet, TAKE 1 TABLET BY MOUTH EVERY 7 DAYS, Disp: 12 tablet, Rfl: 1  •  apixaban (ELIQUIS) 5 MG tablet tablet, Take 1 tablet by mouth Every 12 (Twelve) Hours. Indications: Other - full anticoagulation, Disp: 60 tablet, Rfl: 5  •  ARIPiprazole (ABILIFY) 2 MG tablet, Take 1 tablet by mouth Daily., Disp: 90 tablet, Rfl: 1  •  carbidopa-levodopa (SINEMET)  MG per tablet, TAKE 1 TABLET BY MOUTH THREE TIMES DAILY, Disp: 90 tablet, Rfl: 5  •  Cholecalciferol (VITAMIN D3) 5000 UNITS capsule capsule, Take 1 capsule by mouth daily., Disp: 30 capsule, Rfl: 5  •  citalopram (CeleXA) 20 MG tablet, TAKE 1 TABLET BY MOUTH DAILY, Disp: 90 tablet, Rfl: 0  •  Diclofenac Sodium (Voltaren) 1 % gel gel, Apply 4 g topically to the appropriate area as directed 4 (Four) Times a Day As  Needed (hip pain)., Disp: 100 g, Rfl: 2  •  doxepin (SINEquan) 10 MG capsule, Take 1 capsule by mouth At Night As Needed for Sleep., Disp: 30 capsule, Rfl: 0  •  doxycycline (VIBRAMYCIN) 100 MG capsule, Take 1 capsule by mouth 2 (Two) Times a Day., Disp: 14 capsule, Rfl: 0  •  furosemide (LASIX) 20 MG tablet, TAKE 2 TABLETS BY MOUTH EVERY MORNING AND 1 TABLET IN THE EARLY AFTERNOON, Disp: 270 tablet, Rfl: 0  •  gabapentin (NEURONTIN) 800 MG tablet, TAKE 1 TABLET BY MOUTH THREE TIMES DAILY, Disp: 90 tablet, Rfl: 2  •  glipizide (GLUCOTROL) 5 MG tablet, TAKE 1/2 TABLET BY MOUTH DAILY WITH DINNER, Disp: 45 tablet, Rfl: 0  •  glucose blood test strip, Test up to 5 times daily for low blood sugar and symptomatic glycemia, Disp: 450 each, Rfl: 3  •  HYDROcodone-acetaminophen (NORCO)  MG per tablet, , Disp: , Rfl:   •  metoprolol tartrate (LOPRESSOR) 50 MG tablet, TAKE 1 TABLET BY MOUTH THREE TIMES DAILY, Disp: 270 tablet, Rfl: 0  •  nateglinide (STARLIX) 120 MG tablet, TAKE 1 TABLET BY MOUTH THREE TIMES DAILY BEFORE MEALS, Disp: 90 tablet, Rfl: 1  •  nitroglycerin (NITROSTAT) 0.4 MG SL tablet, ONE TABLET UNDER TONGUE AS NEEDED FOR CHEST PAIN EVERY 5 MINUTES FOR UP TO 3 DOSES, CALL 911 IF PAIN PERSISTS, Disp: 300 tablet, Rfl: 5  •  omeprazole (priLOSEC) 40 MG capsule, TAKE 1 CAPSULE BY MOUTH TWICE DAILY, Disp: 180 capsule, Rfl: 3  •  ondansetron (ZOFRAN) 4 MG tablet, Take 4 mg by mouth 3 (Three) Times a Day As Needed for Nausea., Disp: , Rfl: 0  •  potassium chloride (KLOR-CON) 8 MEQ CR tablet, TAKE 1 TABLET BY MOUTH TWICE DAILY, Disp: 180 tablet, Rfl: 0  •  rosuvastatin (CRESTOR) 20 MG tablet, TAKE 1 TABLET BY MOUTH DAILY, Disp: 90 tablet, Rfl: 0  •  SYMBICORT 160-4.5 MCG/ACT inhaler, INHALE 2 PUFFS BY MOUTH TWICE DAILY. RINSE MOUTH AFTER USE, Disp: 10.2 g, Rfl: 2  •  predniSONE (DELTASONE) 20 MG tablet, Take 1 tablet by mouth Daily., Disp: 3 tablet, Rfl: 0  **I did not stop or change the above medications.   Patient's medication list was updated to reflect medications they have reported as currently taking, including medication changes made by other providers.    OBJECTIVE  Vitals:    02/24/21 0807   BP: 122/80   Pulse: 64   SpO2: 98%     Body mass index is 35.73 kg/m².    Diagnostics:  MRI brain 7/17/20: IMPRESSION:  1. Very minimal changes of bilateral small vessel white matter ischemic  disease.  2. No other significant findings are noted. There is no evidence of  acute infarction, hemorrhage or abnormal enhancement.  CT, CTA h/n 8/18/20: IMPRESSION:  Thrombus within the distal basilar artery extending into the posterior  cerebral arteries bilaterally, more prominent to the left. The component  involving the distal aspect of the basilar artery is more prominent to  the right where it overlies the right superior cerebral artery. The  right superior cerebellar artery is not opacified.  CT head 8/20/20: IMPRESSION:  1. Essentially normal head CT with no convincing acute completed infarct  and no intracranial hemorrhage identified. If there are any symptoms  that suggest that the patient has an acute infarct, I recommend an MRI  of the brain for a more complete assessment.  MRI brain 8/19/20: IMPRESSION:  1. Ill-defined acute infiltrate in the left temporooccipital region as  described.  2. No hemorrhage is seen.  3. Please see additional dictation for today's CT angiogram of the head  and neck.  MRI brain 9/30/20: IMPRESSION:  1. There is any evolving but otherwise stable subacute left temporal  occipital area of ischemia/infarction.  2. No other evidence for an intracranial abnormality is appreciated.  BLE Doppler 8/20/20: Interpretation Summary  · Acute right lower extremity deep vein thrombosis noted in the gastrocnemius.  · Acute right lower extremity superficial thrombophlebitis noted in the greater saphenous (above knee).  · All other veins appeared normal bilaterally   GREGORY 8/20/20: Interpretation  Summary     · Estimated EF = 60%.  · Left ventricular systolic function is normal.  · Left atrial cavity size is borderline dilated.  · Mild mitral valve regurgitation is present  · Mild tricuspid valve regurgitation is present.  · Mild pulmonic valve regurgitation is present.  GREGORY done bedside, no complications   EKG 10/1/20: SR    Laboratory Results:         Lab Results   Component Value Date    WBC 9.79 09/30/2020    HGB 11.8 (L) 09/30/2020    HCT 36.4 09/30/2020    MCV 86.5 09/30/2020     09/30/2020     Lab Results   Component Value Date    GLUCOSE 111 (H) 09/30/2020    BUN 15 09/30/2020    CREATININE 1.16 (H) 09/30/2020    EGFRIFNONA 46 (L) 09/30/2020    EGFRIFAFRI 55 (L) 09/01/2020    BCR 12.9 09/30/2020    K 3.7 09/30/2020    CO2 29.8 (H) 09/30/2020    CALCIUM 9.2 09/30/2020    PROTENTOTREF 6.7 04/14/2020    ALBUMIN 3.60 09/30/2020    LABIL2 1.6 04/14/2020    AST 13 09/30/2020    ALT 8 09/30/2020     Lab Results   Component Value Date    HGBA1C 7.40 (H) 12/02/2020     Lab Results   Component Value Date    CHOL 106 08/20/2020     Lab Results   Component Value Date    HDL 34 (L) 08/20/2020    HDL 47 04/14/2020    HDL 36 (L) 12/10/2018     Lab Results   Component Value Date    LDL 54 08/20/2020    LDL 73 04/14/2020     (H) 12/10/2018     Lab Results   Component Value Date    TRIG 90 08/20/2020    TRIG 101 04/14/2020    TRIG 142 12/10/2018     No results found for: RPR  Lab Results   Component Value Date    TSH 1.770 07/17/2018     Lab Results   Component Value Date    [id number] 707 01/24/2018       Physical Examination:   General Appearance:   Well developed, obese, well groomed, alert, and cooperative.  HEENT: Normocephalic.    Neck and Spine: Normal range of motion.  Normal alignment. No mass or tenderness.   Cardiac: Regular rate and rhythm.   Peripheral Vasculature: Radial pulses are equal and symmetric. No signs of distal embolization.  Extremities:    No edema or deformities.Winter glove on  right hand.  Skin:    No rashes or birth marks.  Psychiatric:    Good mood, normal affect. Thought process normal.    Neurological examination:  Higher Integrative  Function: Drowsy, eyes close frequently during exam but easily re-engaged with verbal stimulation.  Oriented to time, place and person. Normal registration, recall (1/3, with 3 clues), attention span and concentration. Normal language including comprehension, spontaneous speech, repetition, reading, writing, naming and vocabulary. No neglect with normal visual-spatial function and construction. Normal fund of knowledge and higher integrative function.  CN II: Pupils are equal, round, and reactive to light. Normal visual acuity and visual fields.    CN III IV VI: Extraocular movements are full without nystagmus.   CN V: Normal facial sensation and strength of muscles of mastication.  CN VII: Facial movements are symmetric. No weakness.  CN VIII:   Auditory acuity is normal.  CN IX & X:   Symmetric palatal movement.  CN XI: Sternocleidomastoid and trapezius are normal.  No weakness.  CN XII:   The tongue is midline.  No atrophy or fasciculations.  Motor: Normal strength/tone on the left. RUE at least 4/5 proximally, 4-/5 distally. Decreased hip flexors bilaterally, 3-4/5 on the right, 4/5 on left. Bradykinetic but no tremor noted.  Reflexes: 2+ in the upper and lower extremities on left, 3+ on right,   Sensation: Decreased sensation of right hand to temperature and vibration.   Station and Gait: Slow, cautious, shuffling gait, utilizing cane.    Coordination: Finger to nose test shows no dysmetria.  Heel to shin okay.    Impression:  Ms. Valerio presents for f/u for Parkinson's and left PCA stroke, embolic d/t hypercoaguable state or paradoxical embolism from DVT/PFO/ Heme onc recommending lifelong anticoagulation. She is doing well on Eliquis and statin from stroke standpoint. It's unclear if sinemet is helping her Parkinsonian features d/t the recent  overlay of stroke symptoms. Will continue current dose of sinemet and she will get back into therapy when she is over her currrent cold. For sleep, I recommend minimizing sedating medications during the day, could take her abilify at night. She may also benefit from low dose melatonin. I also strongly urged her to follow-up regarding her past sleep study, and comply with treatment if recommended.  She will follow-up here in 4 months, sooner if symptoms warrant.  Patient and spouse voiced understanding and agree with plan.    Plan:    1. Left PCA stroke s/p IV tPA and basilar embolectomy, embolic   Continue lifelong anticoagulation per heme/onc recommendations  Continue statin and f/u with PCP for continued surveillance  Monitor BP regularly  Encouraged regular physical activity as able.   Secondary stroke prevention: Ideal targets for stroke prevention would be Blood pressure < 130/80; B12 > 500 TSH in normal range and LDL < 70; HbA1c < 6.5 and smoking cessation if applicable.  Call 911 for stroke symptoms    2. Parkinson's   Continue sinemet 10/100 mg TID  Start back with Physical Therapy as soon as able  Falls precautions discussed    3. Sleep difficulties,  Avoid sedating medications during daytime  Recommend she f/u regarding her past sleep study -- likely needs CPAP  Try melatonin qhs OTC     I spent a total of 55 minutes today in reviewing records, prior diagnostics, examination of patient as well as counseling and educating patient regarding diagnoses, symptoms, reviewing diagnostics, pharmacologic treatment options, recommendations, lifestyle modifications, coordination of care and documenting plan of care.      There are no diagnoses linked to this encounter.    Coding      Dictated using Dragon

## 2021-03-01 RX ORDER — ROSUVASTATIN CALCIUM 20 MG/1
20 TABLET, COATED ORAL DAILY
Qty: 90 TABLET | Refills: 0 | Status: SHIPPED | OUTPATIENT
Start: 2021-03-01 | End: 2021-03-10

## 2021-03-02 DIAGNOSIS — Z23 IMMUNIZATION DUE: ICD-10-CM

## 2021-03-10 DIAGNOSIS — M25.442 SWELLING OF HAND JOINT, LEFT: ICD-10-CM

## 2021-03-10 DIAGNOSIS — I63.9 ACUTE CVA (CEREBROVASCULAR ACCIDENT) (HCC): ICD-10-CM

## 2021-03-10 RX ORDER — FUROSEMIDE 20 MG/1
TABLET ORAL
Qty: 270 TABLET | Refills: 0 | OUTPATIENT
Start: 2021-03-10

## 2021-03-10 RX ORDER — ROSUVASTATIN CALCIUM 20 MG/1
20 TABLET, COATED ORAL DAILY
Qty: 90 TABLET | Refills: 0 | Status: SHIPPED | OUTPATIENT
Start: 2021-03-10 | End: 2021-09-07

## 2021-03-10 RX ORDER — ARIPIPRAZOLE 2 MG/1
2 TABLET ORAL DAILY
Qty: 90 TABLET | Refills: 1 | OUTPATIENT
Start: 2021-03-10

## 2021-03-10 RX ORDER — ALENDRONATE SODIUM 70 MG/1
TABLET ORAL
Qty: 12 TABLET | Refills: 1 | Status: SHIPPED | OUTPATIENT
Start: 2021-03-10 | End: 2021-10-13

## 2021-03-10 RX ORDER — NATEGLINIDE 120 MG/1
TABLET ORAL
Qty: 90 TABLET | Refills: 1 | Status: SHIPPED | OUTPATIENT
Start: 2021-03-10 | End: 2021-04-29

## 2021-03-10 RX ORDER — GABAPENTIN 800 MG/1
TABLET ORAL
Qty: 90 TABLET | OUTPATIENT
Start: 2021-03-10

## 2021-03-10 RX ORDER — CITALOPRAM 20 MG/1
20 TABLET ORAL DAILY
Qty: 90 TABLET | Refills: 0 | OUTPATIENT
Start: 2021-03-10

## 2021-03-10 RX ORDER — GLIMEPIRIDE 1 MG/1
TABLET ORAL
Qty: 90 TABLET | Refills: 1 | OUTPATIENT
Start: 2021-03-10

## 2021-03-10 RX ORDER — METOPROLOL TARTRATE 50 MG/1
TABLET, FILM COATED ORAL
Qty: 270 TABLET | Refills: 0 | Status: SHIPPED | OUTPATIENT
Start: 2021-03-10 | End: 2021-08-10

## 2021-03-10 RX ORDER — APIXABAN 5 MG/1
TABLET, FILM COATED ORAL
Qty: 60 TABLET | Refills: 5 | OUTPATIENT
Start: 2021-03-10

## 2021-03-18 DIAGNOSIS — R60.0 LOWER EXTREMITY EDEMA: ICD-10-CM

## 2021-03-18 RX ORDER — POTASSIUM CHLORIDE 600 MG/1
TABLET, FILM COATED, EXTENDED RELEASE ORAL
Qty: 180 TABLET | Refills: 0 | Status: SHIPPED | OUTPATIENT
Start: 2021-03-18 | End: 2021-06-15

## 2021-03-21 DIAGNOSIS — I63.9 ACUTE CVA (CEREBROVASCULAR ACCIDENT) (HCC): ICD-10-CM

## 2021-03-21 DIAGNOSIS — M25.442 SWELLING OF HAND JOINT, LEFT: ICD-10-CM

## 2021-03-22 DIAGNOSIS — G47.01 INSOMNIA DUE TO MEDICAL CONDITION: ICD-10-CM

## 2021-03-22 RX ORDER — APIXABAN 5 MG/1
TABLET, FILM COATED ORAL
Qty: 60 TABLET | Refills: 5 | Status: SHIPPED | OUTPATIENT
Start: 2021-03-22 | End: 2021-09-09

## 2021-03-22 RX ORDER — FUROSEMIDE 20 MG/1
TABLET ORAL
Qty: 270 TABLET | Refills: 0 | Status: SHIPPED | OUTPATIENT
Start: 2021-03-22 | End: 2021-06-21

## 2021-03-22 RX ORDER — GABAPENTIN 800 MG/1
TABLET ORAL
Qty: 90 TABLET | Refills: 2 | Status: SHIPPED | OUTPATIENT
Start: 2021-03-22 | End: 2021-05-05 | Stop reason: HOSPADM

## 2021-03-23 RX ORDER — DOXEPIN HYDROCHLORIDE 10 MG/1
10 CAPSULE ORAL NIGHTLY PRN
Qty: 90 CAPSULE | Refills: 0 | Status: SHIPPED | OUTPATIENT
Start: 2021-03-23 | End: 2021-04-09

## 2021-03-27 ENCOUNTER — HOSPITAL ENCOUNTER (EMERGENCY)
Facility: HOSPITAL | Age: 71
Discharge: HOME | End: 2021-03-27
Attending: EMERGENCY MEDICINE | Admitting: EMERGENCY MEDICINE

## 2021-03-27 VITALS
TEMPERATURE: 96.6 F | HEART RATE: 72 BPM | RESPIRATION RATE: 18 BRPM | BODY MASS INDEX: 32.44 KG/M2 | SYSTOLIC BLOOD PRESSURE: 127 MMHG | DIASTOLIC BLOOD PRESSURE: 59 MMHG | HEIGHT: 64 IN | OXYGEN SATURATION: 94 %

## 2021-03-27 DIAGNOSIS — K08.89 TOOTH LOOSE: Primary | ICD-10-CM

## 2021-03-27 DIAGNOSIS — K04.7 DENTAL INFECTION: ICD-10-CM

## 2021-03-27 PROCEDURE — 99282 EMERGENCY DEPT VISIT SF MDM: CPT

## 2021-03-27 RX ORDER — CLINDAMYCIN HYDROCHLORIDE 300 MG/1
300 CAPSULE ORAL 3 TIMES DAILY
Qty: 15 CAPSULE | Refills: 0 | Status: SHIPPED | OUTPATIENT
Start: 2021-03-27 | End: 2021-04-01

## 2021-03-27 RX ORDER — BUPIVACAINE HYDROCHLORIDE 5 MG/ML
10 INJECTION, SOLUTION EPIDURAL; INTRACAUDAL ONCE
Status: COMPLETED | OUTPATIENT
Start: 2021-03-27 | End: 2021-03-27

## 2021-03-27 RX ADMIN — BUPIVACAINE HYDROCHLORIDE 10 ML: 5 INJECTION, SOLUTION EPIDURAL; INTRACAUDAL at 21:37

## 2021-03-29 RX ORDER — ARIPIPRAZOLE 2 MG/1
2 TABLET ORAL DAILY
Qty: 90 TABLET | Refills: 1 | Status: SHIPPED | OUTPATIENT
Start: 2021-03-29 | End: 2021-10-11

## 2021-04-01 ENCOUNTER — HOSPITAL ENCOUNTER (EMERGENCY)
Facility: HOSPITAL | Age: 71
Discharge: HOME | End: 2021-04-01
Attending: EMERGENCY MEDICINE | Admitting: EMERGENCY MEDICINE

## 2021-04-01 ENCOUNTER — APPOINTMENT (OUTPATIENT)
Dept: CT IMAGING | Facility: HOSPITAL | Age: 71
End: 2021-04-01

## 2021-04-01 ENCOUNTER — APPOINTMENT (OUTPATIENT)
Dept: GENERAL RADIOLOGY | Facility: HOSPITAL | Age: 71
End: 2021-04-01

## 2021-04-01 VITALS
HEART RATE: 71 BPM | SYSTOLIC BLOOD PRESSURE: 145 MMHG | HEIGHT: 62 IN | RESPIRATION RATE: 16 BRPM | WEIGHT: 190 LBS | OXYGEN SATURATION: 95 % | DIASTOLIC BLOOD PRESSURE: 73 MMHG | TEMPERATURE: 98.1 F | BODY MASS INDEX: 34.96 KG/M2

## 2021-04-01 DIAGNOSIS — G43.819 OTHER MIGRAINE WITHOUT STATUS MIGRAINOSUS, INTRACTABLE: Primary | ICD-10-CM

## 2021-04-01 LAB
ALBUMIN SERPL-MCNC: 4 G/DL (ref 3.5–5.2)
ALBUMIN/GLOB SERPL: 1.4 G/DL
ALP SERPL-CCNC: 50 U/L (ref 39–117)
ALT SERPL W P-5'-P-CCNC: 17 U/L (ref 1–33)
ANION GAP SERPL CALCULATED.3IONS-SCNC: 6.9 MMOL/L (ref 5–15)
AST SERPL-CCNC: 12 U/L (ref 1–32)
BASOPHILS # BLD AUTO: 0.05 10*3/MM3 (ref 0–0.2)
BASOPHILS NFR BLD AUTO: 0.5 % (ref 0–1.5)
BILIRUB SERPL-MCNC: 0.2 MG/DL (ref 0–1.2)
BUN SERPL-MCNC: 15 MG/DL (ref 8–23)
BUN/CREAT SERPL: 14.3 (ref 7–25)
CALCIUM SPEC-SCNC: 9.2 MG/DL (ref 8.6–10.5)
CHLORIDE SERPL-SCNC: 97 MMOL/L (ref 98–107)
CO2 SERPL-SCNC: 33.1 MMOL/L (ref 22–29)
CREAT SERPL-MCNC: 1.05 MG/DL (ref 0.57–1)
DEPRECATED RDW RBC AUTO: 47.8 FL (ref 37–54)
EOSINOPHIL # BLD AUTO: 0.31 10*3/MM3 (ref 0–0.4)
EOSINOPHIL NFR BLD AUTO: 3.1 % (ref 0.3–6.2)
ERYTHROCYTE [DISTWIDTH] IN BLOOD BY AUTOMATED COUNT: 15.9 % (ref 12.3–15.4)
GFR SERPL CREATININE-BSD FRML MDRD: 52 ML/MIN/1.73
GLOBULIN UR ELPH-MCNC: 2.9 GM/DL
GLUCOSE SERPL-MCNC: 115 MG/DL (ref 65–99)
HCT VFR BLD AUTO: 40.8 % (ref 34–46.6)
HGB BLD-MCNC: 12.3 G/DL (ref 12–15.9)
IMM GRANULOCYTES # BLD AUTO: 0.07 10*3/MM3 (ref 0–0.05)
IMM GRANULOCYTES NFR BLD AUTO: 0.7 % (ref 0–0.5)
INR PPP: 1.15 (ref 0.9–1.1)
LYMPHOCYTES # BLD AUTO: 3.44 10*3/MM3 (ref 0.7–3.1)
LYMPHOCYTES NFR BLD AUTO: 34.4 % (ref 19.6–45.3)
MCH RBC QN AUTO: 24.8 PG (ref 26.6–33)
MCHC RBC AUTO-ENTMCNC: 30.1 G/DL (ref 31.5–35.7)
MCV RBC AUTO: 82.4 FL (ref 79–97)
MONOCYTES # BLD AUTO: 0.93 10*3/MM3 (ref 0.1–0.9)
MONOCYTES NFR BLD AUTO: 9.3 % (ref 5–12)
NEUTROPHILS NFR BLD AUTO: 5.19 10*3/MM3 (ref 1.7–7)
NEUTROPHILS NFR BLD AUTO: 52 % (ref 42.7–76)
NRBC BLD AUTO-RTO: 0 /100 WBC (ref 0–0.2)
PLATELET # BLD AUTO: 212 10*3/MM3 (ref 140–450)
PMV BLD AUTO: 10.2 FL (ref 6–12)
POTASSIUM SERPL-SCNC: 3.9 MMOL/L (ref 3.5–5.2)
PROT SERPL-MCNC: 6.9 G/DL (ref 6–8.5)
PROTHROMBIN TIME: 14.5 SECONDS (ref 11.7–14.2)
QT INTERVAL: 415 MS
RBC # BLD AUTO: 4.95 10*6/MM3 (ref 3.77–5.28)
SODIUM SERPL-SCNC: 137 MMOL/L (ref 136–145)
TROPONIN T SERPL-MCNC: <0.01 NG/ML (ref 0–0.03)
WBC # BLD AUTO: 9.99 10*3/MM3 (ref 3.4–10.8)

## 2021-04-01 PROCEDURE — 85610 PROTHROMBIN TIME: CPT | Performed by: NURSE PRACTITIONER

## 2021-04-01 PROCEDURE — 71045 X-RAY EXAM CHEST 1 VIEW: CPT

## 2021-04-01 PROCEDURE — 96361 HYDRATE IV INFUSION ADD-ON: CPT

## 2021-04-01 PROCEDURE — 80053 COMPREHEN METABOLIC PANEL: CPT | Performed by: NURSE PRACTITIONER

## 2021-04-01 PROCEDURE — 84484 ASSAY OF TROPONIN QUANT: CPT | Performed by: NURSE PRACTITIONER

## 2021-04-01 PROCEDURE — 93005 ELECTROCARDIOGRAM TRACING: CPT | Performed by: EMERGENCY MEDICINE

## 2021-04-01 PROCEDURE — 93005 ELECTROCARDIOGRAM TRACING: CPT

## 2021-04-01 PROCEDURE — 99284 EMERGENCY DEPT VISIT MOD MDM: CPT

## 2021-04-01 PROCEDURE — 70450 CT HEAD/BRAIN W/O DYE: CPT

## 2021-04-01 PROCEDURE — 25010000002 PROCHLORPERAZINE 10 MG/2ML SOLUTION: Performed by: NURSE PRACTITIONER

## 2021-04-01 PROCEDURE — 93010 ELECTROCARDIOGRAM REPORT: CPT | Performed by: INTERNAL MEDICINE

## 2021-04-01 PROCEDURE — 85025 COMPLETE CBC W/AUTO DIFF WBC: CPT | Performed by: NURSE PRACTITIONER

## 2021-04-01 PROCEDURE — 96375 TX/PRO/DX INJ NEW DRUG ADDON: CPT

## 2021-04-01 PROCEDURE — 96374 THER/PROPH/DIAG INJ IV PUSH: CPT

## 2021-04-01 PROCEDURE — 25010000002 DIPHENHYDRAMINE PER 50 MG: Performed by: NURSE PRACTITIONER

## 2021-04-01 RX ORDER — DIPHENHYDRAMINE HYDROCHLORIDE 50 MG/ML
12.5 INJECTION INTRAMUSCULAR; INTRAVENOUS ONCE
Status: COMPLETED | OUTPATIENT
Start: 2021-04-01 | End: 2021-04-01

## 2021-04-01 RX ORDER — PROCHLORPERAZINE EDISYLATE 5 MG/ML
5 INJECTION INTRAMUSCULAR; INTRAVENOUS ONCE
Status: COMPLETED | OUTPATIENT
Start: 2021-04-01 | End: 2021-04-01

## 2021-04-01 RX ADMIN — SODIUM CHLORIDE 500 ML: 9 INJECTION, SOLUTION INTRAVENOUS at 09:47

## 2021-04-01 RX ADMIN — DIPHENHYDRAMINE HYDROCHLORIDE 12.5 MG: 50 INJECTION, SOLUTION INTRAMUSCULAR; INTRAVENOUS at 09:50

## 2021-04-01 RX ADMIN — PROCHLORPERAZINE EDISYLATE 5 MG: 5 INJECTION INTRAMUSCULAR; INTRAVENOUS at 09:48

## 2021-04-01 NOTE — ED NOTES
PPE per protocol, eye protection, mask, and gloves worn, pt in mask,        Lori Cooney RN  04/01/21 0616

## 2021-04-01 NOTE — ED NOTES
Patient was placed in face mask in first look.  Patient was wearing a face mask throughout our encounter.  I wore protective eye protection throughout the encounter.  Hand hygiene was performed before and after patient encounter.        Elizabeth Ambriz RN  04/01/21 0729

## 2021-04-01 NOTE — ED TRIAGE NOTES
"Pt to EMS triage from home c/o chest pain and \"about to pass out.\" Pt does not report pain to this RN but according to EMS pt's  says pt has chest pain. Pt reports \"feeling woozy like i'm drunk.\" Pt in NAD at this time and received 324 aspirin prior to arrival.     Patient was placed in face mask during first look triage.  Patient was wearing a face mask throughout encounter.  I wore personal protective equipment throughout the encounter.  Hand hygiene was performed before and after patient encounter.   "

## 2021-04-01 NOTE — ED PROVIDER NOTES
EMERGENCY DEPARTMENT ENCOUNTER    Room Number:  02/02  Date of encounter:  4/1/2021  PCP: Cinthya Valentine APRN  Historian: Patient     I used full protective equipment while examining this patient.  This includes face mask, gloves and protective eyewear.  I washed my hands before entering the room and immediately upon leaving the room.      HPI:  Chief Complaint: Headache  A complete HPI/ROS/PMH/PSH/SH/FH are unobtainable due to: None    Context: Dalila Valerio is a 70 y.o. female who presents to the ED c/o headache.  The patient states that she developed a headache within the last hour and within the last 12 to 24 hours she has felt more lightheaded with some dizziness and describes somewhat of an aura preceding her migraine headache.  She states that her headache is started in the back and is now moving to the front with some photophobia with light sensitivity.  She states that she has a history of having had a stroke but she is on anticoagulation which she takes regularly and is compliant with.  She has some neuro deficits that are remaining from her prior stroke that she had about a year ago.  She has a glove on her right hand for some of her sensory deprivation and she has some weakened motor development on the right side of her body.  This is her baseline.  She denies any shortness of breath or chest pain abdominal pain or dysuria or difficulty urinating.  She admits to the headache which she states started within the last hour with somewhat of an aura like presents over the last 12 to 24 hours.  Her baseline speech is somewhat slowed with some difficulty speaking fluidly however when NIH is assessed at the bedside she has no aphasia and can properly interpret imaging as well as words and statements.  She has a history of migraine headaches and atypical migraines.      PAST MEDICAL HISTORY  Active Ambulatory Problems     Diagnosis Date Noted   • Adjustment disorder with mixed anxiety and depressed mood  03/08/2016   • Atopic rhinitis 03/08/2016   • Coronary arteriosclerosis in native artery 03/08/2016   • Cobalamin deficiency 03/08/2016   • Benign colonic polyp 03/08/2016   • Lumbar radiculopathy 03/08/2016   • Controlled diabetes mellitus type 2 with complications (CMS/HCC) 03/08/2016   • Binocular vision disorder with diplopia 03/08/2016   • Dyslipidemia 03/08/2016   • Arthropathy of hand 03/08/2016   • Knee pain 03/08/2016   • Cramps of lower extremity 03/08/2016   • Low back pain 03/08/2016   • Migraine with typical aura 03/08/2016   • Chronic nausea 03/08/2016   • Obstructive sleep apnea syndrome 03/08/2016   • Palpitations 03/08/2016   • Ventricular premature beats 03/08/2016   • Cephalalgia 03/08/2016   • Colonic constipation 03/08/2016   • Neurocardiogenic syncope 03/08/2016   • Vitamin D deficiency 03/08/2016   • DODSON (nonalcoholic steatohepatitis) 04/01/2016   • Fibromyalgia 03/26/2013   • Morbidly obese (CMS/HCC) 12/10/2018   • Polyp of colon 11/01/2019   • Family history of colonic polyps 11/01/2019   • Family history of malignant neoplasm of colon 11/01/2019   • Acute CVA (cerebrovascular accident) (CMS/HCC) 08/19/2020   • Episode of dizziness 09/30/2020   • Dizziness 09/30/2020   • History of stroke 09/30/2020     Resolved Ambulatory Problems     Diagnosis Date Noted   • Flank pain 03/08/2016   • Abnormal blood sugar 03/08/2016   • Abnormal weight gain 03/08/2016   • Acquired trigger finger 03/08/2016   • Acute bronchitis 03/08/2016   • Atypical chest pain 03/08/2016   • History of Williamson's esophagus 03/08/2016   • CAP (community acquired pneumonia) 03/08/2016   • Candidiasis of skin 03/08/2016   • Diabetic peripheral neuropathy (CMS/HCC) 03/08/2016   • Breathing difficult 03/08/2016   • Dizziness 03/08/2016   • Hematuria 03/08/2016   • Muscle ache 03/08/2016   • MAC (mycobacterium avium-intracellulare complex) 03/08/2016   • Night sweats 03/08/2016   • Otitis externa 03/08/2016   • Abscess, perianal  2016   • Skin tag 2016   • Upper respiratory tract infection 2016   • Urinary hesitancy 2016   • Asthmatic breathing 2016   • Preoperative clearance 2016     Past Medical History:   Diagnosis Date   • Acute myocardial infarction (CMS/HCC)    • Adjustment reaction with mixed emotional features    • Arthritis    • ASHD (arteriosclerotic heart disease)    • Asthma    • B12 deficiency    • Bacterial pneumonia    • Williamson esophagus    • Barretts esophagus    • Bilateral knee pain    • Birthmark    • Bronchiectasis (CMS/HCC)    • CHF (congestive heart failure) (CMS/HCC)    • Chronic cough    • Chronic glomerulonephritis with exudative nephritis    • Chronic lumbar radiculopathy    • Diabetes mellitus (CMS/HCC)    • Fibromyositis    • GERD (gastroesophageal reflux disease)    • Herpes zoster    • Hyperlipidemia    • Hypertension    • Lupus anticoagulant disorder (CMS/HCC)    • Mycobacterium avium complex (CMS/HCC)    • Nephrolithiasis    • SILVIANO (obstructive sleep apnea)    • Premature ventricular contraction    • Slow transit constipation    • Stroke (CMS/HCC)          PAST SURGICAL HISTORY  Past Surgical History:   Procedure Laterality Date   • APPENDECTOMY     • BRONCHOSCOPY     •  SECTION     • CHOLECYSTECTOMY     • COLONOSCOPY     • COLONOSCOPY N/A 2019    Procedure: COLONOSCOPY to cecum with cold biopsy and cold and hotsnare polypectomies;  Surgeon: Suzy Eubanks MD;  Location: [facility] ENDOSCOPY;  Service: Gastroenterology   • CORONARY ANGIOPLASTY WITH STENT PLACEMENT     • EMBOLECTOMY N/A 2020    Procedure: EMERGENT CEREBRAL ANGIOGRAM;  Surgeon: Jonh Meehan MD;  Location[facility] OR ;  Service: Neurosurgery;  Laterality: N/A;   • ENDOSCOPY N/A 2019    Procedure: ESOPHAGOGASTRODUODENOSCOPY with cold biopsies;  Surgeon: Suzy Eubanks MD;  Location: [facility] ENDOSCOPY;  Service: Gastroenterology   • KNEE ARTHROSCOPY     • OTHER  SURGICAL HISTORY      elbow surgery   • ROTATOR CUFF REPAIR     • TUBAL ABDOMINAL LIGATION           FAMILY HISTORY  Family History   Problem Relation Age of Onset   • Colon cancer Mother    • Uterine cancer Mother    • Arthritis Mother    • Cancer Mother    • Heart disease Mother    • Migraines Mother    • Stroke Mother    • Cancer Father         malignant brain tumor   • Aneurysm Father    • Bone cancer Maternal Aunt    • Diabetes Paternal Grandmother    • Diabetes Paternal Grandfather    • Arthritis Maternal Grandmother    • Heart disease Maternal Grandmother    • Thyroid disease Maternal Grandmother          SOCIAL HISTORY  Social History     Socioeconomic History   • Marital status:      Spouse name: Not on file   • Number of children: Not on file   • Years of education: Not on file   • Highest education level: Not on file   Tobacco Use   • Smoking status: Former Smoker     Quit date: 1980     Years since quittin.2   • Smokeless tobacco: Never Used   Substance and Sexual Activity   • Alcohol use: No   • Drug use: No   • Sexual activity: Yes     Partners: Male         ALLERGIES  Carafate [sucralfate], Duloxetine hcl, Metformin, Morphine and related, Nitroglycerin, Nsaids, Oxycodone, Penicillins, Statins, and Viibryd [vilazodone hcl]       REVIEW OF SYSTEMS  Review of Systems   Cardiovascular: Negative for chest pain.   Gastrointestinal: Negative.    Neurological: Positive for dizziness, light-headedness and headaches.   All other systems reviewed and are negative.          Apart from HPI   PHYSICAL EXAM    I have reviewed the triage vital signs and nursing notes.    ED Triage Vitals   Temp Heart Rate Resp BP SpO2   21 0649 21 0559 21 0559 21 0559 21 0559   98.1 °F (36.7 °C) 60 18 131/86 99 %      Temp src Heart Rate Source Patient Position BP Location FiO2 (%)   21 0649 -- -- -- --   Oral           Physical Exam  Vitals and nursing note reviewed.    Constitutional:       General: She is not in acute distress.     Appearance: She is well-developed and normal weight. She is not ill-appearing.   HENT:      Head: Normocephalic and atraumatic.   Eyes:      Extraocular Movements: Extraocular movements intact.      Pupils: Pupils are equal, round, and reactive to light.   Cardiovascular:      Rate and Rhythm: Normal rate and regular rhythm.      Heart sounds: Normal heart sounds.   Pulmonary:      Effort: Pulmonary effort is normal.      Breath sounds: Normal breath sounds.   Musculoskeletal:      Cervical back: Normal range of motion and neck supple.   Skin:     General: Skin is warm and dry.      Capillary Refill: Capillary refill takes less than 2 seconds.   Neurological:      Mental Status: She is alert.      Cranial Nerves: Cranial nerve deficit present. No facial asymmetry.      Sensory: Sensory deficit present.      Motor: Weakness present. No tremor, atrophy, abnormal muscle tone, seizure activity or pronator drift.      Coordination: Coordination is intact.      Gait: Gait is intact.      Comments: Some baseline deficits from prior stroke, sensation is dulled on the right side, with some right sided lessened muscle strength (4/5) on right. Speech is slowed at baseline    Psychiatric:         Mood and Affect: Mood normal.         Behavior: Behavior normal.         LAB RESULTS  Recent Results (from the past 24 hour(s))   ECG 12 Lead    Collection Time: 04/01/21  6:26 AM   Result Value Ref Range    QT Interval 415 ms   Comprehensive Metabolic Panel    Collection Time: 04/01/21  7:46 AM    Specimen: Blood   Result Value Ref Range    Glucose 115 (H) 65 - 99 mg/dL    BUN 15 8 - 23 mg/dL    Creatinine 1.05 (H) 0.57 - 1.00 mg/dL    Sodium 137 136 - 145 mmol/L    Potassium 3.9 3.5 - 5.2 mmol/L    Chloride 97 (L) 98 - 107 mmol/L    CO2 33.1 (H) 22.0 - 29.0 mmol/L    Calcium 9.2 8.6 - 10.5 mg/dL    Total Protein 6.9 6.0 - 8.5 g/dL    Albumin 4.00 3.50 - 5.20 g/dL     ALT (SGPT) 17 1 - 33 U/L    AST (SGOT) 12 1 - 32 U/L    Alkaline Phosphatase 50 39 - 117 U/L    Total Bilirubin 0.2 0.0 - 1.2 mg/dL    eGFR Non African Amer 52 (L) >60 mL/min/1.73    Globulin 2.9 gm/dL    A/G Ratio 1.4 g/dL    BUN/Creatinine Ratio 14.3 7.0 - 25.0    Anion Gap 6.9 5.0 - 15.0 mmol/L   CBC Auto Differential    Collection Time: 04/01/21  7:46 AM    Specimen: Blood   Result Value Ref Range    WBC 9.99 3.40 - 10.80 10*3/mm3    RBC 4.95 3.77 - 5.28 10*6/mm3    Hemoglobin 12.3 12.0 - 15.9 g/dL    Hematocrit 40.8 34.0 - 46.6 %    MCV 82.4 79.0 - 97.0 fL    MCH 24.8 (L) 26.6 - 33.0 pg    MCHC 30.1 (L) 31.5 - 35.7 g/dL    RDW 15.9 (H) 12.3 - 15.4 %    RDW-SD 47.8 37.0 - 54.0 fl    MPV 10.2 6.0 - 12.0 fL    Platelets 212 140 - 450 10*3/mm3    Neutrophil % 52.0 42.7 - 76.0 %    Lymphocyte % 34.4 19.6 - 45.3 %    Monocyte % 9.3 5.0 - 12.0 %    Eosinophil % 3.1 0.3 - 6.2 %    Basophil % 0.5 0.0 - 1.5 %    Immature Grans % 0.7 (H) 0.0 - 0.5 %    Neutrophils, Absolute 5.19 1.70 - 7.00 10*3/mm3    Lymphocytes, Absolute 3.44 (H) 0.70 - 3.10 10*3/mm3    Monocytes, Absolute 0.93 (H) 0.10 - 0.90 10*3/mm3    Eosinophils, Absolute 0.31 0.00 - 0.40 10*3/mm3    Basophils, Absolute 0.05 0.00 - 0.20 10*3/mm3    Immature Grans, Absolute 0.07 (H) 0.00 - 0.05 10*3/mm3    nRBC 0.0 0.0 - 0.2 /100 WBC   Protime-INR    Collection Time: 04/01/21  7:46 AM    Specimen: Blood   Result Value Ref Range    Protime 14.5 (H) 11.7 - 14.2 Seconds    INR 1.15 (H) 0.90 - 1.10   Troponin    Collection Time: 04/01/21  7:46 AM    Specimen: Blood   Result Value Ref Range    Troponin T <0.010 0.000 - 0.030 ng/mL       Ordered the above labs and independently reviewed the results.      RADIOLOGY  CT Head Without Contrast    Result Date: 4/1/2021  CT HEAD WITHOUT CONTRAST  CLINICAL HISTORY: Fatigue and dizziness.  TECHNIQUE: CT scan of the head was obtained with 3 mm axial images. No intravenous contrast was administered.  COMPARISON: Comparison is made  to previous MRI of the brain dated 09/30/2020.  FINDINGS:  There is a focus of encephalomalacia within the medial portion of the left temporal and occipital lobes within the left PCA distribution which measures up to approximately 6.1 x 1.4 cm in greatest axial dimensions and the findings are compatible with a chronic infarct within the left PCA distribution. A subcentimeter chronic infarct is also appreciated within the left thalamus and the left cerebral peduncle, additionally within the posterior cranial circulation. These abnormalities were also evident on the previous brain MRI dated 09/30/2020.  The ventricles, sulci, and cisterns are otherwise age appropriate. Incidental atherosclerotic changes are appreciated within the intracranial vasculature.  Mucosal thickening is identified within the right maxillary sinus.      There are foci of chronic infarction identified within the left posterior cerebral artery distribution involving the medial aspect of left temporal and occipital lobes, left cerebral peduncle, and the left thalamus. All of these abnormalities were evident on the prior study of 09/30/2020. However, no acute intracranial abnormality is identified on the current exam. If there continues to be clinical suspicion for CT occult pathology, further evaluation could be performed with MR imaging. These findings were discussed with Nellie Heredia on 04/01/2021 at approximately 9:17 AM.  Radiation dose reduction techniques were utilized, including automated exposure control and exposure modulation based on body size.  This report was finalized on 4/1/2021 10:43 AM by Dr. Rafal Helm M.D.      XR Chest 1 View    Result Date: 4/1/2021  PORTABLE CHEST  HISTORY: Chest pain.  COMPARISON: 08/19/2020.  FINDINGS: Study is hampered somewhat by the patient's body habitus. A single view of the chest demonstrates the heart to be within normal limits in size. There is no evidence of focal infiltrate, effusion or  congestive failure.  This report was finalized on 4/1/2021 8:23 AM by Dr. Dallas Zhu M.D.        I ordered the above noted radiological studies. Reviewed by me and discussed with radiologist.  See dictation for official radiology interpretation.      PROCEDURES  Procedures      MEDICATIONS GIVEN IN ER    Medications   diphenhydrAMINE (BENADRYL) injection 12.5 mg (12.5 mg Intravenous Given 4/1/21 0950)   prochlorperazine (COMPAZINE) injection 5 mg (5 mg Intravenous Given 4/1/21 0948)   sodium chloride 0.9 % bolus 500 mL (0 mL Intravenous Stopped 4/1/21 1052)         PROGRESS, DATA ANALYSIS, CONSULTS, AND MEDICAL DECISION MAKING    All labs have been independently reviewed by me.  All radiology studies have been reviewed by me and discussed with radiologist dictating the report.   EKG's independently viewed and interpreted by me.  Discussion below represents my analysis of pertinent findings related to patient's condition, differential diagnosis, treatment plan and final disposition.      ED Course as of Apr 01 1546   Thu Apr 01, 2021   0920 Chronic infarcts, no acute new changes noted per radiology       [CD]   0921 CT as interpreted by me does not demonstrate any hemorrhagic bleed.    [CD]   0938 CT negative    [CD]   1003 Troponin T: <0.010 [CD]   1006 Patient reports that she has previously tolerated migraine cocktails without difficulty though she cannot take 25 mg of Benadryl.  I clarified if 12-1/2 mg in a 5 mg dose of Compazine would be effective and she said yes she also said that morphine gives her headaches however she tolerates Dilaudid without difficulty.    [CD]   1124 The patient has no complaints at this time though she is difficult to readily aroused the Compazine and Benadryl appear to have been effective in managing her headache pains.    [CD]      ED Course User Index  [CD] Nellie Heredia, APRN       AS OF 15:46 EDT VITALS:    BP - 145/73  HR - 71  TEMP - 98.1 °F (36.7 °C) (Oral)  O2 SATS -  95%    Reexam: On reexam the patient has been sleeping and resting comfortably and has been informed that she will be discharged and that she can continue managing her migraine related symptoms at home with alternating Tylenol naproxen    Differential Diagnoses: Cluster headache atypical migraine hemiplegic migraine basilar migraine abdominal migraine ocular migraine status migrainosus hemorrhagic bleed ischemic stroke    DIAGNOSIS  Final diagnoses:   Other migraine without status migrainosus, intractable         DISPOSITION  DISCHARGE    Patient discharged in stable condition.    Reviewed implications of results, diagnosis, meds, responsibility to follow up, warning signs and symptoms of possible worsening, potential complications and reasons to return to ER, including worsening headache changes in vision worsening symptoms.    Patient/Family voiced understanding of above instructions.    Discussed plan for discharge, as there is no emergent indication for admission. Patient referred to primary care provider for further evaluation and management of migraine headaches.  Pt is aware that discharge does not mean that nothing is wrong but it indicates no emergency is present that requires admission and they must continue care with follow-up as given below or physician of their choice.     FOLLOW-UP  Cinthya Valentine, APRN  [address] [id number] [phone]    Schedule an appointment as soon as possible for a visit   Follow-up with your primary care provider for recurrent migraine management.  Return to the ER with any worsening symptoms.         Medication List      No changes were made to your prescriptions during this visit.                Nellie Heredia, RL  04/01/21 [id number]

## 2021-04-01 NOTE — ED NOTES
Reconnected PT to Physiologic monitoring after return from restroom     Paul Senior  04/01/21 [id number]

## 2021-04-01 NOTE — ED PROVIDER NOTES
Pt presents to the ED complaining of a headache within the last 12 to 24 hours she states is worse.  She describes the pain is occipital then radiates to the top of her head.  She reports some mild photophobia but no fevers, chills or nausea and vomiting.  She states she has a distant history of migraines.  She states she had a stroke approximately 6 months ago and is on Eliquis.  She told our triage nurse that she had chest pain and then told her nurse practitioner no chest pain and then told me that she had some fleeting chest pain earlier today but none now.  The patient denies any focal neuro deficits    On exam, pt is A&Ox3. NAD  PERRL, moist mucous membranes.  Normocephalic and atraumatic  Heart is RRR. Lungs are CTAB.   Abd is soft, nontender, nondistended, bowel sounds positive.   No pedal edema.  No calf tenderness.  Patient has mild right-sided weakness from prior stroke that she states is chronic with no new changes      I agree with midlevel plan to labs, EKG and head CT provide symptom relief    PPE  Pt does not present with symptoms for COVID19; however, I was wearing a mask and goggles throughout all patient interaction.\    EKG    EKG time: 06 26  Rhythm/Rate: Normal sinus rhythm 60  No Acute Ischemia  Non-Specific ST-T changes    Unchanged compared to prior on 9/30/2020    Interpreted Contemporaneously by me.  Independently viewed by me    The patient's EKG, labs and head CT unremarkable.  The patient states that she typically gets Compazine and Benadryl for her migraine headaches and thus we will give her that and then the patient be stable for discharge home.      The TYLER and I have discussed this patient's history, physical exam, and treatment plan.  I have reviewed the documentation and personally had a face to face interaction with the patient. I affirm the documentation and agree with the treatment and plan.  The attached note describes my personal findings.           Rigoberto Mckeon,  MD  04/01/21 1616

## 2021-04-05 ENCOUNTER — EPISODE CHANGES (OUTPATIENT)
Dept: CASE MANAGEMENT | Facility: OTHER | Age: 71
End: 2021-04-05

## 2021-04-09 ENCOUNTER — OFFICE VISIT (OUTPATIENT)
Dept: FAMILY MEDICINE CLINIC | Facility: CLINIC | Age: 71
End: 2021-04-09

## 2021-04-09 VITALS
SYSTOLIC BLOOD PRESSURE: 138 MMHG | OXYGEN SATURATION: 95 % | HEART RATE: 67 BPM | TEMPERATURE: 97.5 F | WEIGHT: 198 LBS | BODY MASS INDEX: 36.44 KG/M2 | DIASTOLIC BLOOD PRESSURE: 80 MMHG | HEIGHT: 62 IN

## 2021-04-09 DIAGNOSIS — R53.82 CHRONIC FATIGUE: ICD-10-CM

## 2021-04-09 DIAGNOSIS — E55.9 VITAMIN D DEFICIENCY: ICD-10-CM

## 2021-04-09 DIAGNOSIS — G47.01 INSOMNIA DUE TO MEDICAL CONDITION: ICD-10-CM

## 2021-04-09 DIAGNOSIS — Z86.73 HX OF COMPLETED STROKE: ICD-10-CM

## 2021-04-09 DIAGNOSIS — G43.109 MIGRAINE WITH TYPICAL AURA: Primary | ICD-10-CM

## 2021-04-09 DIAGNOSIS — E11.65 UNCONTROLLED TYPE 2 DIABETES MELLITUS WITH HYPERGLYCEMIA (HCC): ICD-10-CM

## 2021-04-09 PROCEDURE — 99214 OFFICE O/P EST MOD 30 MIN: CPT | Performed by: NURSE PRACTITIONER

## 2021-04-09 RX ORDER — DOXEPIN HYDROCHLORIDE 25 MG/1
25 CAPSULE ORAL NIGHTLY
Qty: 30 CAPSULE | Refills: 0 | Status: SHIPPED | OUTPATIENT
Start: 2021-04-09 | End: 2021-04-21

## 2021-04-09 NOTE — PROGRESS NOTES
"Subjective   Dalila Valerio is a 70 y.o. female who presents for follow up after being treated in ER on 4/1/21 for migraine. Also wants to discuss weight gain.     History of Present Illness   Physically doesn't feel well secondary to weight gain  Spouse with stage 4 prostate cancer  Not sleeping well sleeps 2-3 hour then wakes up, difficult to return to sleep. Naps during day  No low blood sugars. She has been taking glimepiride, glipizide and nateglinide.   The following portions of the patient's history were reviewed and updated as appropriate: allergies, current medications, past family history, past medical history, past social history, past surgical history and problem list.    Review of Systems   Constitutional: Positive for activity change and unexpected weight change. Negative for chills and fever.   Eyes: Negative.    Respiratory: Negative.    Cardiovascular: Negative.    Gastrointestinal: Negative.    Endocrine: Negative.    Genitourinary: Negative.    Musculoskeletal: Positive for arthralgias, back pain, gait problem and myalgias. Negative for joint swelling and neck pain.   Skin: Negative.    Allergic/Immunologic: Negative.    Neurological: Positive for headaches. Negative for syncope and weakness.   Hematological: Negative.    Psychiatric/Behavioral: Positive for dysphoric mood and sleep disturbance. Negative for self-injury and suicidal ideas. The patient is nervous/anxious.      /80   Pulse 67   Temp 97.5 °F (36.4 °C) (Infrared)   Ht 157.5 cm (62\")   Wt 89.8 kg (198 lb)   SpO2 95%   BMI 36.21 kg/m²     Objective   Physical Exam  Constitutional:       Appearance: She is well-developed. She is not diaphoretic.   HENT:      Head: Normocephalic and atraumatic.   Neck:      Thyroid: No thyromegaly.   Cardiovascular:      Rate and Rhythm: Normal rate and regular rhythm.      Pulses:           Carotid pulses are 2+ on the right side and 2+ on the left side.     Heart sounds: Normal heart " sounds.   Pulmonary:      Effort: Pulmonary effort is normal.      Breath sounds: Normal breath sounds.   Musculoskeletal:      Cervical back: Normal range of motion and neck supple.   Lymphadenopathy:      Cervical: No cervical adenopathy.   Psychiatric:         Behavior: Behavior normal.         Thought Content: Thought content normal.         Judgment: Judgment normal.       Assessment/Plan   Problems Addressed this Visit        Endocrine and Metabolic    Vitamin D deficiency    Relevant Orders    Vitamin D 25 Hydroxy (Completed)       Neuro    Migraine with typical aura - Primary    Relevant Medications    doxepin (SINEquan) 25 MG capsule      Other Visit Diagnoses     Insomnia due to medical condition        Uncontrolled type 2 diabetes mellitus with hyperglycemia (CMS/[facility])        Relevant Orders    Microalbumin / Creatinine Urine Ratio - Urine, Clean Catch (Completed)    Hemoglobin A1c (Completed)    Hx of completed stroke        Chronic fatigue        Relevant Orders    Vitamin B12 (Completed)    TSH (Completed)      Diagnoses       Codes Comments    Migraine with typical aura    -  Primary ICD-10-CM: G43.109  ICD-9-CM: 346.00     Insomnia due to medical condition     ICD-10-CM: G47.01  ICD-9-CM: 327.01     Uncontrolled type 2 diabetes mellitus with hyperglycemia (CMS/HCC)     ICD-10-CM: E11.65  ICD-9-CM: 250.02     Hx of completed stroke     ICD-10-CM: Z86.73  ICD-9-CM: V12.54     Chronic fatigue     ICD-10-CM: R53.82  ICD-9-CM: 780.79     Vitamin D deficiency     ICD-10-CM: E55.9  ICD-9-CM: 268.9         Migraine--nurtec samples issued. triptan contraindicated secondary to CVD  Insomnia--likely secondary to stroke and stress. Consider melatonin  C3LP--imwbde suboptimal control--med changes pending labs  Hx stroke--residual complications of sensory coolness and cognitive changes.   Chronic fatigue--check labs  Vit D deficiency--continue replacement

## 2021-04-10 LAB
25(OH)D3+25(OH)D2 SERPL-MCNC: 43.7 NG/ML (ref 30–100)
ALBUMIN/CREAT UR: 6 MG/G CREAT (ref 0–29)
CREAT UR-MCNC: 69.9 MG/DL
HBA1C MFR BLD: 8.3 % (ref 4.8–5.6)
MICROALBUMIN UR-MCNC: 3.9 UG/ML
TSH SERPL DL<=0.005 MIU/L-ACNC: 2.96 UIU/ML (ref 0.27–4.2)
VIT B12 SERPL-MCNC: 321 PG/ML (ref 211–946)

## 2021-04-11 NOTE — PROGRESS NOTES
Please call the patient regarding her abnormal result. Weight gain likely secondary to poor blood sugar control. Stop glipizide and start relion N 8 units 2 x day. Check blood sugar 2 x day when starting and call with results. B12 deficiency, start oral daily supplement.

## 2021-04-12 RX ORDER — CITALOPRAM 20 MG/1
20 TABLET ORAL DAILY
Qty: 90 TABLET | Refills: 0 | Status: SHIPPED | OUTPATIENT
Start: 2021-04-12 | End: 2021-07-08

## 2021-04-12 RX ORDER — CALCIUM CARB/VITAMIN D3/VIT K1 500-100-40
TABLET,CHEWABLE ORAL
Qty: 100 EACH | Refills: 0 | Status: SHIPPED | OUTPATIENT
Start: 2021-04-12 | End: 2021-06-21

## 2021-04-12 RX ORDER — HUMAN INSULIN 100 [IU]/ML
8 INJECTION, SUSPENSION SUBCUTANEOUS
Qty: 5 ML | Refills: 2 | Status: SHIPPED | OUTPATIENT
Start: 2021-04-12 | End: 2021-08-12

## 2021-04-13 ENCOUNTER — PATIENT OUTREACH (OUTPATIENT)
Dept: CASE MANAGEMENT | Facility: OTHER | Age: 71
End: 2021-04-13

## 2021-04-13 NOTE — OUTREACH NOTE
Care Coordination Assessment    Documented/Reviewed By: Yvette Pugh RN Date/time: 4/13/2021  4:23 PM   Assessment completed with: patient  Enrolled in care management program: Yes  Living arrangement: spouse  Support system: family, friends  Type of residence: private residence  Home care services: No  Equipment used at home:  (Comment: glucometer)  Communication device: No  Bed or wheelchair confined: No  Inadequate nutrition: No  Medication adherence problem: Yes  Experiencing side effects from current medications: No  History of fall(s) in last 6 months: No  Difficulty keeping appointments: No  Family aware of the patient's advance care planning wishes: Yes  Samaritan or spiritual beliefs that impact treatment: No  Chronic pain: No

## 2021-04-13 NOTE — OUTREACH NOTE
Care Plan Note      Responses   Lifestyle Goals  Create a support system, Decrease stress, Eat a healthy diet, Fewer ER/urgent care visits, Have more energy, Increase physical activity, Less sadness/anxiety, Lose weight, Lower blood sugar, Medication management, Routine follow-up with doctor(s), Self monitor blood sugar   Barriers  Disease education, Not ready to change, Stress   Self Management  Daily Journaling, Dietary Changes - Eat More Fruits/Vegetables, Home Glucose Monitoring, Increase Physical Activities, Medication Adherence, Stress Management Techniques, Weight Monitoring   Suggested Appointments  Make an Appointment with Nutrition Services   Annual Wellness Visit:   Patient Has Completed   Specific Disease Process Teaching  Diabetes   Other Patient Education/Resources   24/7 [facility] Nurse Call Line, Nutrition/Diet   24/7 Nurse Call Line Education Method  Verbal   Nutrition/Diet Education Method  Send Materials [diabetic diet]   Does patient have depression diagnosis?  No   Advanced Directives:  Patient Has   Medication Adherence  Confusion - does not uderstand care plan   Goal Progress  Making Progress Toward Goal(s)   Health Literacy  Moderate        The main concerns and/or symptoms the patient would like to address are: control of diabetes, stress regarding husbands recent cancer diagnosis    Education/instruction provided by Care Coordinator: Call to pt and introduced self and role of ACM. Pt states she has recently been diagnosed with decreased B12 and has began medication to help her control this and hopefully help with her weight loss. Pt was surprised at her last visit with her PCP that her blood sugar was so high. We discussed her A1C and explained what that meant. She has been controlled on oral medication and will need to begin insulin she states this will only be for a short time. Discussed her past use of her glucometer and diet. She states that she was not using her glucometer  daily because she had been under control. She confessed to having a sweet tooth and not eating well. Her  was recently diagnosed with cancer and she is very stressed.   She had not been on any diet for sugar control. Suggested the [facility] diabetes class but she has done this in the past. Again suggested it might be helpful since it has been some time. She does not want to do this because she knows she can cut back. She has actually started drinking Coke zero instead of regular Coke. Cautioned regarding using as her primary hydration due to the sodium in diet soft drinks and suggested she use water as her primary hydration. Suggested no more than one diet soda a day and warned that diet drinks can sometimes intensify the desire for more sweets.   Pt states she does have a glucometer but does not like to stick herself. Reviewed with pt need to check her sugar before each meal and bedtime and keep a log for Dr Valentine. She states she will do so but has not received her insulin yet. She would like enrollment in the case management program and does have ACM number in case needed. Will call monthly or as needed.     Follow Up Outreach Due:monthly  Yvette Pugh RN  Ambulatory     4/13/2021, 16:29 EDT

## 2021-04-21 RX ORDER — DOXEPIN HYDROCHLORIDE 25 MG/1
25 CAPSULE ORAL NIGHTLY
Qty: 30 CAPSULE | Refills: 0 | Status: SHIPPED | OUTPATIENT
Start: 2021-04-21 | End: 2021-05-05 | Stop reason: HOSPADM

## 2021-04-23 ENCOUNTER — HOSPITAL ENCOUNTER (EMERGENCY)
Facility: HOSPITAL | Age: 71
Discharge: HOME | End: 2021-04-23
Attending: EMERGENCY MEDICINE | Admitting: EMERGENCY MEDICINE

## 2021-04-23 ENCOUNTER — APPOINTMENT (OUTPATIENT)
Dept: GENERAL RADIOLOGY | Facility: HOSPITAL | Age: 71
End: 2021-04-23

## 2021-04-23 ENCOUNTER — NURSE TRIAGE (OUTPATIENT)
Dept: CALL CENTER | Facility: HOSPITAL | Age: 71
End: 2021-04-23

## 2021-04-23 VITALS
TEMPERATURE: 98 F | DIASTOLIC BLOOD PRESSURE: 67 MMHG | HEART RATE: 70 BPM | OXYGEN SATURATION: 94 % | SYSTOLIC BLOOD PRESSURE: 134 MMHG | RESPIRATION RATE: 16 BRPM

## 2021-04-23 DIAGNOSIS — M54.41 ACUTE MIDLINE LOW BACK PAIN WITH BILATERAL SCIATICA: Primary | ICD-10-CM

## 2021-04-23 DIAGNOSIS — M54.42 ACUTE MIDLINE LOW BACK PAIN WITH BILATERAL SCIATICA: Primary | ICD-10-CM

## 2021-04-23 PROCEDURE — 96372 THER/PROPH/DIAG INJ SC/IM: CPT

## 2021-04-23 PROCEDURE — 63710000001 ONDANSETRON ODT 4 MG TABLET DISPERSIBLE: Performed by: EMERGENCY MEDICINE

## 2021-04-23 PROCEDURE — 99283 EMERGENCY DEPT VISIT LOW MDM: CPT

## 2021-04-23 PROCEDURE — 25010000002 HYDROMORPHONE 1 MG/ML SOLUTION: Performed by: EMERGENCY MEDICINE

## 2021-04-23 PROCEDURE — 63710000001 PREDNISONE PER 1 MG: Performed by: EMERGENCY MEDICINE

## 2021-04-23 PROCEDURE — 72110 X-RAY EXAM L-2 SPINE 4/>VWS: CPT

## 2021-04-23 RX ORDER — UBIDECARENONE 75 MG
50 CAPSULE ORAL DAILY
COMMUNITY

## 2021-04-23 RX ORDER — ONDANSETRON 4 MG/1
4 TABLET, ORALLY DISINTEGRATING ORAL ONCE
Status: COMPLETED | OUTPATIENT
Start: 2021-04-23 | End: 2021-04-23

## 2021-04-23 RX ORDER — METAXALONE 800 MG/1
800 TABLET ORAL EVERY 8 HOURS PRN
Qty: 15 TABLET | Refills: 0 | Status: SHIPPED | OUTPATIENT
Start: 2021-04-23 | End: 2021-05-05 | Stop reason: HOSPADM

## 2021-04-23 RX ORDER — PREDNISONE 20 MG/1
60 TABLET ORAL ONCE
Status: COMPLETED | OUTPATIENT
Start: 2021-04-23 | End: 2021-04-23

## 2021-04-23 RX ORDER — HYDROCODONE BITARTRATE AND ACETAMINOPHEN 7.5; 325 MG/1; MG/1
1 TABLET ORAL ONCE
Status: COMPLETED | OUTPATIENT
Start: 2021-04-23 | End: 2021-04-23

## 2021-04-23 RX ORDER — PREDNISONE 50 MG/1
50 TABLET ORAL DAILY
Qty: 5 TABLET | Refills: 0 | Status: SHIPPED | OUTPATIENT
Start: 2021-04-23 | End: 2021-05-05 | Stop reason: HOSPADM

## 2021-04-23 RX ORDER — METAXALONE 800 MG/1
800 TABLET ORAL ONCE
Status: COMPLETED | OUTPATIENT
Start: 2021-04-23 | End: 2021-04-23

## 2021-04-23 RX ORDER — FERROUS SULFATE 325(65) MG
325 TABLET ORAL
Status: ON HOLD | COMMUNITY
End: 2021-05-05 | Stop reason: SDUPTHER

## 2021-04-23 RX ADMIN — ONDANSETRON 4 MG: 4 TABLET, ORALLY DISINTEGRATING ORAL at 02:16

## 2021-04-23 RX ADMIN — METAXALONE 800 MG: 800 TABLET ORAL at 02:17

## 2021-04-23 RX ADMIN — HYDROCODONE BITARTRATE AND ACETAMINOPHEN 1 TABLET: 7.5; 325 TABLET ORAL at 04:43

## 2021-04-23 RX ADMIN — HYDROMORPHONE HYDROCHLORIDE 1 MG: 1 INJECTION, SOLUTION INTRAMUSCULAR; INTRAVENOUS; SUBCUTANEOUS at 02:19

## 2021-04-23 RX ADMIN — PREDNISONE 60 MG: 20 TABLET ORAL at 02:18

## 2021-04-23 NOTE — ED PROVIDER NOTES
EMERGENCY DEPARTMENT ENCOUNTER    CHIEF COMPLAINT  Chief Complaint: Low back pain  History given by: Patient  History limited by: None  Room Number: 16/16  PMD: Cinthya Valentine APRN      HPI:  Pt is a 70 y.o. female who presents complaining of low back pain that has been present for several months now but has been gradually and progressively worsening over the past 2 to 3 days.  She states that she has been unable to sleep secondary to the discomfort.  She describes the pain as a dull sensation in the low back with radiation down bilateral lower extremities.  She denies any numbness to her groin, urinary retention, fecal incontinence, or paresthesias or paralysis of the lower extremities.  She denies any recent trauma as well.  Symptoms are currently moderate in intensity.  The patient does take hydrocodone at home for her discomfort but states that it has not improved her symptoms.  Symptoms are made worse by sitting as well as moving and there have been no alleviating factors thus far.      PAST MEDICAL HISTORY  Active Ambulatory Problems     Diagnosis Date Noted   • Adjustment disorder with mixed anxiety and depressed mood 03/08/2016   • Atopic rhinitis 03/08/2016   • Coronary arteriosclerosis in native artery 03/08/2016   • Cobalamin deficiency 03/08/2016   • Benign colonic polyp 03/08/2016   • Lumbar radiculopathy 03/08/2016   • Controlled diabetes mellitus type 2 with complications (CMS/[facility]) 03/08/2016   • Binocular vision disorder with diplopia 03/08/2016   • Dyslipidemia 03/08/2016   • Arthropathy of hand 03/08/2016   • Knee pain 03/08/2016   • Cramps of lower extremity 03/08/2016   • Low back pain 03/08/2016   • Migraine with typical aura 03/08/2016   • Chronic nausea 03/08/2016   • Obstructive sleep apnea syndrome 03/08/2016   • Palpitations 03/08/2016   • Ventricular premature beats 03/08/2016   • Cephalalgia 03/08/2016   • Colonic constipation 03/08/2016   • Neurocardiogenic syncope 03/08/2016   •  Vitamin D deficiency 03/08/2016   • DODSON (nonalcoholic steatohepatitis) 04/01/2016   • Fibromyalgia 03/26/2013   • Morbidly obese (CMS/formerly [facility]) 12/10/2018   • Polyp of colon 11/01/2019   • Family history of colonic polyps 11/01/2019   • Family history of malignant neoplasm of colon 11/01/2019   • Acute CVA (cerebrovascular accident) (CMS/formerly [facility]) 08/19/2020   • Episode of dizziness 09/30/2020   • Dizziness 09/30/2020   • History of stroke 09/30/2020     Resolved Ambulatory Problems     Diagnosis Date Noted   • Flank pain 03/08/2016   • Abnormal blood sugar 03/08/2016   • Abnormal weight gain 03/08/2016   • Acquired trigger finger 03/08/2016   • Acute bronchitis 03/08/2016   • Atypical chest pain 03/08/2016   • History of Williamson's esophagus 03/08/2016   • CAP (community acquired pneumonia) 03/08/2016   • Candidiasis of skin 03/08/2016   • Diabetic peripheral neuropathy (CMS/formerly [facility]) 03/08/2016   • Breathing difficult 03/08/2016   • Dizziness 03/08/2016   • Hematuria 03/08/2016   • Muscle ache 03/08/2016   • MAC (mycobacterium avium-intracellulare complex) 03/08/2016   • Night sweats 03/08/2016   • Otitis externa 03/08/2016   • Abscess, perianal 03/08/2016   • Skin tag 03/08/2016   • Upper respiratory tract infection 03/08/2016   • Urinary hesitancy 03/08/2016   • Asthmatic breathing 03/08/2016   • Preoperative clearance 04/01/2016     Past Medical History:   Diagnosis Date   • Acute myocardial infarction (CMS/formerly [facility])    • Adjustment reaction with mixed emotional features    • Arthritis    • ASHD (arteriosclerotic heart disease)    • Asthma    • B12 deficiency    • Bacterial pneumonia    • Williamson esophagus    • Barretts esophagus    • Bilateral knee pain    • Birthmark    • Bronchiectasis (CMS/formerly [facility])    • CHF (congestive heart failure) (CMS/formerly [facility])    • Chronic cough    • Chronic glomerulonephritis with exudative nephritis    • Chronic lumbar radiculopathy    • Diabetes mellitus (CMS/formerly [facility])    • Fibromyositis    • GERD (gastroesophageal  reflux disease)    • Herpes zoster    • Hyperlipidemia    • Hypertension    • Lupus anticoagulant disorder (CMS/HCC)    • Mycobacterium avium complex (CMS/HCC)    • Nephrolithiasis    • SILVIANO (obstructive sleep apnea)    • Premature ventricular contraction    • Slow transit constipation    • Stroke (CMS/HCC)        PAST SURGICAL HISTORY  Past Surgical History:   Procedure Laterality Date   • APPENDECTOMY     • BRONCHOSCOPY     •  SECTION     • CHOLECYSTECTOMY     • COLONOSCOPY     • COLONOSCOPY N/A 2019    Procedure: COLONOSCOPY to cecum with cold biopsy and cold and hotsnare polypectomies;  Surgeon: Suzy Eubanks MD;  Location: [facility] ENDOSCOPY;  Service: Gastroenterology   • CORONARY ANGIOPLASTY WITH STENT PLACEMENT     • EMBOLECTOMY N/A 2020    Procedure: EMERGENT CEREBRAL ANGIOGRAM;  Surgeon: Jonh Meehan MD;  Location: [facility] OR ;  Service: Neurosurgery;  Laterality: N/A;   • ENDOSCOPY N/A 2019    Procedure: ESOPHAGOGASTRODUODENOSCOPY with cold biopsies;  Surgeon: Suzy Eubanks MD;  Location: [facility] ENDOSCOPY;  Service: Gastroenterology   • KNEE ARTHROSCOPY     • OTHER SURGICAL HISTORY      elbow surgery   • ROTATOR CUFF REPAIR     • TUBAL ABDOMINAL LIGATION         FAMILY HISTORY  Family History   Problem Relation Age of Onset   • Colon cancer Mother    • Uterine cancer Mother    • Arthritis Mother    • Cancer Mother    • Heart disease Mother    • Migraines Mother    • Stroke Mother    • Cancer Father         malignant brain tumor   • Aneurysm Father    • Bone cancer Maternal Aunt    • Diabetes Paternal Grandmother    • Diabetes Paternal Grandfather    • Arthritis Maternal Grandmother    • Heart disease Maternal Grandmother    • Thyroid disease Maternal Grandmother        SOCIAL HISTORY  Social History     Socioeconomic History   • Marital status:      Spouse name: Not on file   • Number of children: Not on file   • Years of education: Not on file   •  Highest education level: Not on file   Tobacco Use   • Smoking status: Former Smoker     Quit date: 1980     Years since quittin.3   • Smokeless tobacco: Never Used   Substance and Sexual Activity   • Alcohol use: No   • Drug use: No   • Sexual activity: Yes     Partners: Male       ALLERGIES  Benadryl [diphenhydramine], Carafate [sucralfate], Duloxetine hcl, Metformin, Morphine and related, Nitroglycerin, Nsaids, Oxycodone, Penicillins, Statins, and Viibryd [vilazodone hcl]    REVIEW OF SYSTEMS  Review of Systems   Constitutional: Negative for fever.   HENT: Negative for sore throat.    Eyes: Negative.    Respiratory: Negative for cough and shortness of breath.    Cardiovascular: Negative for chest pain.   Gastrointestinal: Negative for abdominal pain, diarrhea and vomiting.   Genitourinary: Negative for dysuria.   Musculoskeletal: Positive for back pain. Negative for neck pain.   Skin: Negative for rash.   Allergic/Immunologic: Negative.    Neurological: Negative for weakness, numbness and headaches.   Hematological: Negative.    Psychiatric/Behavioral: Negative.    All other systems reviewed and are negative.      PHYSICAL EXAM  ED Triage Vitals [21 0111]   Temp Heart Rate Resp BP SpO2   98 °F (36.7 °C) 70 16 134/67 94 %      Temp src Heart Rate Source Patient Position BP Location FiO2 (%)   -- -- -- -- --       Physical Exam  Vitals and nursing note reviewed.   Constitutional:       General: She is not in acute distress.  HENT:      Head: Normocephalic and atraumatic.   Eyes:      Pupils: Pupils are equal, round, and reactive to light.   Cardiovascular:      Rate and Rhythm: Normal rate and regular rhythm.      Heart sounds: Normal heart sounds.   Pulmonary:      Effort: Pulmonary effort is normal. No respiratory distress.      Breath sounds: Normal breath sounds.   Abdominal:      Palpations: Abdomen is soft.      Tenderness: There is no abdominal tenderness. There is no guarding or rebound.    Musculoskeletal:         General: Normal range of motion.      Cervical back: Normal range of motion and neck supple.      Comments: Mild tenderness to palpation to the lumbar spine without step-off or deformity.   Skin:     General: Skin is warm and dry.      Findings: No rash.   Neurological:      Mental Status: She is alert and oriented to person, place, and time.      Sensory: Sensation is intact.   Psychiatric:         Mood and Affect: Mood and affect normal.         LAB RESULTS  Lab Results (last 24 hours)     ** No results found for the last 24 hours. **          I ordered the above labs and reviewed the results    RADIOLOGY  XR Spine Lumbar Complete 4+VW   Final Result   No acute findings.       This report was finalized on 4/23/2021 2:45 AM by Dr. Diandra Mcmahon M.D.               I ordered the above noted radiological studies. Interpreted by radiologist.  Reviewed by me in PACS.       PROCEDURES  Procedures      PROGRESS AND CONSULTS     The patient was wearing a facemask upon entrance into the room and remained in such throughout their visit.  I was wearing PPE including a facemask, eye protection, as well as gloves at any point entering the room and throughout the visit    0430   upon reevaluation, the patient is currently sleeping and appears much more comfortable.  I did inform her that x-ray is unremarkable and that we will treat her as a lumbar radiculopathy/acute sciatica today.  She will need to follow-up with her pain management provider for any opiate administration changes but I will prescribe muscle relaxers as well as steroids.  The patient is in agreement with the plan and all questions been answered.      MEDICAL DECISION MAKING  Results were reviewed/discussed with the patient and they were also made aware of online access. Pt also made aware that some labs, such as cultures, will not be resulted during ER visit and follow up with PMD is necessary.     MDM  Number of Diagnoses or  Management Options     Amount and/or Complexity of Data Reviewed  Tests in the radiology section of CPT®: ordered and reviewed  Tests in the medicine section of CPT®: ordered and reviewed  Review and summarize past medical records: yes (Upon medical records review, the patient was last seen and evaluated on 4/1/2021 secondary to a migraine headache.)  Independent visualization of images, tracings, or specimens: yes (Unremarkable lumbar x-ray)           DIAGNOSIS  Final diagnoses:   Acute midline low back pain with bilateral sciatica       DISPOSITION  DISCHARGE    Patient discharged in stable condition.    Reviewed implications of results, diagnosis, meds, responsibility to follow up, warning signs and symptoms of possible worsening, potential complications and reasons to return to ER.    Patient/Family voiced understanding of above instructions.    Discussed plan for discharge, as there is no emergent indication for admission. Patient referred to primary care provider for BP management due to today's BP. Pt/family is agreeable and understands need for follow up and repeat testing.  Pt is aware that discharge does not mean that nothing is wrong but it indicates no emergency is present that requires admission and they must continue care with follow-up as given below or physician of their choice.     FOLLOW-UP  Cinthya Valentine, APRN  [address] [id number]  [phone]    Schedule an appointment as soon as possible for a visit            Medication List      New Prescriptions    metaxalone 800 MG tablet  Commonly known as: SKELAXIN  Take 1 tablet by mouth Every 8 (Eight) Hours As Needed for Muscle Spasms.     predniSONE 50 MG tablet  Commonly known as: DELTASONE  Take 1 tablet by mouth Daily.           Where to Get Your Medications      These medications were sent to [id number] - [facility] [address] [facility] AT [facility] [phone]  -  [phone] FX  [address], [facility] [address]    Phone: [phone]   · metaxalone 800 MG tablet  · predniSONE 50 MG tablet           Latest Documented Vital Signs:  As of 06:42 EDT  BP- 134/67 HR- 70 Temp- 98 °F (36.7 °C) O2 sat- 94%         Michael Miner MD  04/23/21 0678

## 2021-04-23 NOTE — TELEPHONE ENCOUNTER
"Caller is unable to afford the Metaxalone, wanting something less expensive, 93 dollars, her insurance stated non formulary, Will be emailing case managment  Reason for Disposition  • [1] Caller has URGENT medication question about med that PCP or specialist prescribed AND [2] triager unable to answer question    Additional Information  • Negative: Drug overdose and triager unable to answer question  • Negative: Caller requesting information unrelated to medicine  • Negative: Caller requesting a prescription for Strep throat and has a positive culture result  • Negative: Rash while taking a medication or within 3 days of stopping it  • Negative: Immunization reaction suspected  • Negative: [1] Asthma and [2] having symptoms of asthma (cough, wheezing, etc.)  • Negative: [1] Influenza symptoms AND [2] anti-viral med prescription request, such as Tamiflu  • Negative: [1] Symptom of illness (e.g., headache, abdominal pain, earache, vomiting) AND [2] more than mild  • Negative: MORE THAN A DOUBLE DOSE of a prescription or over-the-counter (OTC) drug  • Negative: [1] DOUBLE DOSE (an extra dose or lesser amount) of over-the-counter (OTC) drug AND [2] any symptoms (e.g., dizziness, nausea, pain, sleepiness)  • Negative: [1] DOUBLE DOSE (an extra dose or lesser amount) of prescription drug AND [2] any symptoms (e.g., dizziness, nausea, pain, sleepiness)  • Negative: Took another person's prescription drug  • Negative: [1] DOUBLE DOSE (an extra dose or lesser amount) of prescription drug AND [2] NO symptoms (Exception: a double dose of antibiotics)  • Negative: Diabetes drug error or overdose (e.g., took wrong type of insulin or took extra dose)  • Negative: [1] Request for URGENT new prescription or refill of \"essential\" medication (i.e., likelihood of harm to patient if not taken) AND [2] triager unable to fill per unit policy  • Negative: [1] Prescription not at pharmacy AND [2] was prescribed by PCP recently  • Negative: " "[1] Pharmacy calling with prescription questions AND [2] triager unable to answer question    Answer Assessment - Initial Assessment Questions  1.   NAME of MEDICATION: \"What medicine are you calling about?\"   Skelaxin  2.   QUESTION: \"What is your question?\"    Unable to afford  3.   PRESCRIBING HCP: \"Who prescribed it?\" Reason: if prescribed by specialist, call should be referred to that group.   Dr. Miner  4. SYMPTOMS: \"Do you have any symptoms?\"   Sciatia  5. SEVERITY: If symptoms are present, ask \"Are they mild, moderate or severe?\"  Moderate to severe  6.  PREGNANCY:  \"Is there any chance that you are pregnant?\" \"When was your last menstrual period?\"     no    Protocols used: MEDICATION QUESTION CALL-ADULT-      "

## 2021-04-23 NOTE — ED NOTES
"Back pain for \"awhile\" but for the last few night unable to sleep. Pain is now radiating down both legs R>L. Denies loss of sensation or increased weakness in lower extremities. Denies loss of bowel or bladder control.    Masked upon arrival. This nurse wore appropriate PPE during all interactions with this patient.       Jessica Kraus RN  04/23/21 0104    "

## 2021-04-24 NOTE — TELEPHONE ENCOUNTER
Reason for Disposition  • [1] Blood glucose > 300 mg/dL (16.7 mmol/L) AND [2] uses insulin (e.g., insulin-dependent, all people with type 1 diabetes)    Additional Information  • Negative: Unconscious or difficult to awaken  • Negative: Acting confused (e.g., disoriented, slurred speech)  • Negative: Very weak (e.g., can't stand)  • Negative: Sounds like a life-threatening emergency to the triager  • Negative: [1] Vomiting AND [2] signs of dehydration (e.g., very dry mouth, lightheaded, dark urine)  • Negative: [1] Blood glucose > 240 mg/dL (13.3 mmol/L) AND [2] rapid breathing  • Negative: Blood glucose > 500 mg/dL (27.8 mmol/L)  • Negative: [1] Blood glucose > 240 mg/dL (13.3 mmol/L) AND [2] urine ketones moderate-large (or more than 1+)  • Negative: [1] Blood glucose > 240 mg/dL (13.3 mmol/L) AND [2] blood ketones > 1.4 mmol/L  • Negative: [1] Blood glucose > 240 mg/dL (13.3 mmol/L) AND [2] vomiting AND [3] unable to check for ketones (in blood or urine)  • Negative: [1] New onset diabetes suspected (e.g., frequent urination, weak, weight loss) AND [2] vomiting or rapid breathing  • Negative: Vomiting lasts > 4 hours  • Negative: Patient sounds very sick or weak to the triager  • Negative: Fever > 100.4 F (38.0 C)  • Negative: Blood glucose > 400 mg/dL (22.2 mmol/L)  • Negative: [1] Blood glucose > 300 mg/dL (16.7 mmol/L) AND [2] two or more times in a row  • Negative: Urine ketones moderate - large (or blood ketones > 1.4 mmol/L)  • Negative: [1] Caller has URGENT medication or insulin pump question AND [2] triager unable to answer question  • Negative: [1] Symptoms of high blood sugar (e.g., frequent urination, weak, weight loss) AND [2] not able to test blood glucose  • Negative: New onset diabetes suspected (e.g., frequent urination, weakness, weight loss)  • Negative: [1] Caller has NON-URGENT medication or insulin pump question AND [2] triager unable to answer question    Answer Assessment - Initial  "Assessment Questions  1. BLOOD GLUCOSE: \"What is your blood glucose level?\"       378 after prednisone  2. ONSET: \"When did you check the blood glucose?\"      Prior to call  3. USUAL RANGE: \"What is your glucose level usually?\" (e.g., usual fasting morning value, usual evening value)      unknown  4. KETONES: \"Do you check for ketones (urine or blood test strips)?\" If yes, ask: \"What does the test show now?\"       na  5. TYPE 1 or 2:  \"Do you know what type of diabetes you have?\"  (e.g., Type 1, Type 2, Gestational; doesn't know)       T2DM  6. INSULIN: \"Do you take insulin?\" \"What type of insulin(s) do you use? What is the mode of delivery? (syringe, pen; injection or pump)?\"       novolin-N prescribed, but, has not started using  7. DIABETES PILLS: \"Do you take any pills for your diabetes?\" If yes, ask: \"Have you missed taking any pills recently?\"      no  8. OTHER SYMPTOMS: \"Do you have any symptoms?\" (e.g., fever, frequent urination, difficulty breathing, dizziness, weakness, vomiting)      dizziness  9. PREGNANCY: \"Is there any chance you are pregnant?\" \"When was your last menstrual period?\"      na    Protocols used: DIABETES - HIGH BLOOD SUGAR-ADULT-AH      "

## 2021-04-29 ENCOUNTER — HOSPITAL ENCOUNTER (OUTPATIENT)
Facility: HOSPITAL | Age: 71
Setting detail: OBSERVATION
Discharge: HOME | End: 2021-05-05
Attending: EMERGENCY MEDICINE | Admitting: HOSPITALIST

## 2021-04-29 ENCOUNTER — NURSE TRIAGE (OUTPATIENT)
Dept: CALL CENTER | Facility: HOSPITAL | Age: 71
End: 2021-04-29

## 2021-04-29 ENCOUNTER — APPOINTMENT (OUTPATIENT)
Dept: GENERAL RADIOLOGY | Facility: HOSPITAL | Age: 71
End: 2021-04-29

## 2021-04-29 ENCOUNTER — APPOINTMENT (OUTPATIENT)
Dept: CT IMAGING | Facility: HOSPITAL | Age: 71
End: 2021-04-29

## 2021-04-29 DIAGNOSIS — G20 PARKINSON'S DISEASE (HCC): ICD-10-CM

## 2021-04-29 DIAGNOSIS — R50.9 FEVER, UNSPECIFIED FEVER CAUSE: Primary | ICD-10-CM

## 2021-04-29 DIAGNOSIS — Z86.73 HISTORY OF CVA (CEREBROVASCULAR ACCIDENT): ICD-10-CM

## 2021-04-29 DIAGNOSIS — E11.9 TYPE 2 DIABETES MELLITUS WITHOUT COMPLICATION, WITHOUT LONG-TERM CURRENT USE OF INSULIN (HCC): ICD-10-CM

## 2021-04-29 DIAGNOSIS — N39.0 URINARY TRACT INFECTION WITHOUT HEMATURIA, SITE UNSPECIFIED: ICD-10-CM

## 2021-04-29 LAB
APTT PPP: 26.3 SECONDS (ref 22.7–35.4)
BASOPHILS # BLD AUTO: 0.06 10*3/MM3 (ref 0–0.2)
BASOPHILS NFR BLD AUTO: 0.5 % (ref 0–1.5)
DEPRECATED RDW RBC AUTO: 47.3 FL (ref 37–54)
EOSINOPHIL # BLD AUTO: 0.24 10*3/MM3 (ref 0–0.4)
EOSINOPHIL NFR BLD AUTO: 2.1 % (ref 0.3–6.2)
ERYTHROCYTE [DISTWIDTH] IN BLOOD BY AUTOMATED COUNT: 17.2 % (ref 12.3–15.4)
HCT VFR BLD AUTO: 41.4 % (ref 34–46.6)
HGB BLD-MCNC: 13.4 G/DL (ref 12–15.9)
IMM GRANULOCYTES # BLD AUTO: 0.27 10*3/MM3 (ref 0–0.05)
IMM GRANULOCYTES NFR BLD AUTO: 2.4 % (ref 0–0.5)
INR PPP: 1.19 (ref 0.9–1.1)
LYMPHOCYTES # BLD AUTO: 2.98 10*3/MM3 (ref 0.7–3.1)
LYMPHOCYTES NFR BLD AUTO: 26.3 % (ref 19.6–45.3)
MCH RBC QN AUTO: 25.9 PG (ref 26.6–33)
MCHC RBC AUTO-ENTMCNC: 32.4 G/DL (ref 31.5–35.7)
MCV RBC AUTO: 80.1 FL (ref 79–97)
MONOCYTES # BLD AUTO: 1.05 10*3/MM3 (ref 0.1–0.9)
MONOCYTES NFR BLD AUTO: 9.3 % (ref 5–12)
NEUTROPHILS NFR BLD AUTO: 59.4 % (ref 42.7–76)
NEUTROPHILS NFR BLD AUTO: 6.71 10*3/MM3 (ref 1.7–7)
NRBC BLD AUTO-RTO: 0 /100 WBC (ref 0–0.2)
PLATELET # BLD AUTO: 208 10*3/MM3 (ref 140–450)
PMV BLD AUTO: 9.7 FL (ref 6–12)
PROTHROMBIN TIME: 14.9 SECONDS (ref 11.7–14.2)
RBC # BLD AUTO: 5.17 10*6/MM3 (ref 3.77–5.28)
WBC # BLD AUTO: 11.31 10*3/MM3 (ref 3.4–10.8)

## 2021-04-29 PROCEDURE — 85610 PROTHROMBIN TIME: CPT | Performed by: PHYSICIAN ASSISTANT

## 2021-04-29 PROCEDURE — 84145 PROCALCITONIN (PCT): CPT | Performed by: PHYSICIAN ASSISTANT

## 2021-04-29 PROCEDURE — 93005 ELECTROCARDIOGRAM TRACING: CPT | Performed by: PHYSICIAN ASSISTANT

## 2021-04-29 PROCEDURE — 70450 CT HEAD/BRAIN W/O DYE: CPT

## 2021-04-29 PROCEDURE — 83605 ASSAY OF LACTIC ACID: CPT | Performed by: PHYSICIAN ASSISTANT

## 2021-04-29 PROCEDURE — 83690 ASSAY OF LIPASE: CPT | Performed by: PHYSICIAN ASSISTANT

## 2021-04-29 PROCEDURE — 85730 THROMBOPLASTIN TIME PARTIAL: CPT | Performed by: PHYSICIAN ASSISTANT

## 2021-04-29 PROCEDURE — 99285 EMERGENCY DEPT VISIT HI MDM: CPT

## 2021-04-29 PROCEDURE — 71045 X-RAY EXAM CHEST 1 VIEW: CPT

## 2021-04-29 PROCEDURE — 93010 ELECTROCARDIOGRAM REPORT: CPT | Performed by: INTERNAL MEDICINE

## 2021-04-29 PROCEDURE — 80053 COMPREHEN METABOLIC PANEL: CPT | Performed by: PHYSICIAN ASSISTANT

## 2021-04-29 PROCEDURE — 83880 ASSAY OF NATRIURETIC PEPTIDE: CPT | Performed by: PHYSICIAN ASSISTANT

## 2021-04-29 PROCEDURE — 85025 COMPLETE CBC W/AUTO DIFF WBC: CPT | Performed by: PHYSICIAN ASSISTANT

## 2021-04-29 PROCEDURE — 0202U NFCT DS 22 TRGT SARS-COV-2: CPT | Performed by: PHYSICIAN ASSISTANT

## 2021-04-29 PROCEDURE — 84484 ASSAY OF TROPONIN QUANT: CPT | Performed by: PHYSICIAN ASSISTANT

## 2021-04-29 RX ORDER — ACETAMINOPHEN 500 MG
1000 TABLET ORAL ONCE
Status: COMPLETED | OUTPATIENT
Start: 2021-04-29 | End: 2021-04-29

## 2021-04-29 RX ORDER — SODIUM CHLORIDE 0.9 % (FLUSH) 0.9 %
10 SYRINGE (ML) INJECTION AS NEEDED
Status: DISCONTINUED | OUTPATIENT
Start: 2021-04-29 | End: 2021-05-05 | Stop reason: HOSPADM

## 2021-04-29 RX ORDER — GLIPIZIDE 5 MG/1
TABLET ORAL
Qty: 45 TABLET | Refills: 0 | Status: SHIPPED | OUTPATIENT
Start: 2021-04-29 | End: 2021-08-10

## 2021-04-29 RX ADMIN — ACETAMINOPHEN 1000 MG: 500 TABLET, FILM COATED ORAL at 23:32

## 2021-04-29 RX ADMIN — SODIUM CHLORIDE 500 ML: 9 INJECTION, SOLUTION INTRAVENOUS at 23:32

## 2021-04-29 NOTE — TELEPHONE ENCOUNTER
Please advise. Pt needs refill on sinemet. Has follow up on 06/30/2021. Please advise and approve thank you.

## 2021-04-30 ENCOUNTER — APPOINTMENT (OUTPATIENT)
Dept: MRI IMAGING | Facility: HOSPITAL | Age: 71
End: 2021-04-30

## 2021-04-30 ENCOUNTER — APPOINTMENT (OUTPATIENT)
Dept: GENERAL RADIOLOGY | Facility: HOSPITAL | Age: 71
End: 2021-04-30

## 2021-04-30 PROBLEM — M25.511 CHRONIC RIGHT SHOULDER PAIN: Status: ACTIVE | Noted: 2021-04-30

## 2021-04-30 PROBLEM — G93.41 ENCEPHALOPATHY, METABOLIC: Status: ACTIVE | Noted: 2021-04-30

## 2021-04-30 PROBLEM — N39.0 ACUTE UTI (URINARY TRACT INFECTION): Status: ACTIVE | Noted: 2021-04-30

## 2021-04-30 PROBLEM — G43.009 MIGRAINE WITHOUT AURA OR STATUS MIGRAINOSUS: Status: ACTIVE | Noted: 2021-04-30

## 2021-04-30 PROBLEM — G20 PARKINSON DISEASE: Status: ACTIVE | Noted: 2021-04-30

## 2021-04-30 PROBLEM — G89.29 CHRONIC RIGHT SHOULDER PAIN: Status: ACTIVE | Noted: 2021-04-30

## 2021-04-30 PROBLEM — R50.9 FEVER: Status: ACTIVE | Noted: 2021-04-30

## 2021-04-30 LAB
ALBUMIN SERPL-MCNC: 3.9 G/DL (ref 3.5–5.2)
ALBUMIN/GLOB SERPL: 1.4 G/DL
ALP SERPL-CCNC: 45 U/L (ref 39–117)
ALT SERPL W P-5'-P-CCNC: 12 U/L (ref 1–33)
ANION GAP SERPL CALCULATED.3IONS-SCNC: 11.1 MMOL/L (ref 5–15)
ANION GAP SERPL CALCULATED.3IONS-SCNC: 11.2 MMOL/L (ref 5–15)
AST SERPL-CCNC: 14 U/L (ref 1–32)
B PARAPERT DNA SPEC QL NAA+PROBE: NOT DETECTED
B PERT DNA SPEC QL NAA+PROBE: NOT DETECTED
BACTERIA UR QL AUTO: ABNORMAL /HPF
BILIRUB SERPL-MCNC: 0.3 MG/DL (ref 0–1.2)
BILIRUB UR QL STRIP: NEGATIVE
BUN SERPL-MCNC: 14 MG/DL (ref 8–23)
BUN SERPL-MCNC: 16 MG/DL (ref 8–23)
BUN/CREAT SERPL: 14.3 (ref 7–25)
BUN/CREAT SERPL: 14.5 (ref 7–25)
C PNEUM DNA NPH QL NAA+NON-PROBE: NOT DETECTED
CALCIUM SPEC-SCNC: 8.5 MG/DL (ref 8.6–10.5)
CALCIUM SPEC-SCNC: 9 MG/DL (ref 8.6–10.5)
CHLORIDE SERPL-SCNC: 96 MMOL/L (ref 98–107)
CHLORIDE SERPL-SCNC: 99 MMOL/L (ref 98–107)
CLARITY UR: CLEAR
CO2 SERPL-SCNC: 28.8 MMOL/L (ref 22–29)
CO2 SERPL-SCNC: 31.9 MMOL/L (ref 22–29)
COLOR UR: YELLOW
CREAT SERPL-MCNC: 0.98 MG/DL (ref 0.57–1)
CREAT SERPL-MCNC: 1.1 MG/DL (ref 0.57–1)
D-LACTATE SERPL-SCNC: 1.8 MMOL/L (ref 0.5–2)
DEPRECATED RDW RBC AUTO: 49.9 FL (ref 37–54)
ERYTHROCYTE [DISTWIDTH] IN BLOOD BY AUTOMATED COUNT: 17.3 % (ref 12.3–15.4)
FLUAV SUBTYP SPEC NAA+PROBE: NOT DETECTED
FLUBV RNA ISLT QL NAA+PROBE: NOT DETECTED
GFR SERPL CREATININE-BSD FRML MDRD: 49 ML/MIN/1.73
GFR SERPL CREATININE-BSD FRML MDRD: 56 ML/MIN/1.73
GLOBULIN UR ELPH-MCNC: 2.8 GM/DL
GLUCOSE BLDC GLUCOMTR-MCNC: 143 MG/DL (ref 70–130)
GLUCOSE BLDC GLUCOMTR-MCNC: 143 MG/DL (ref 70–130)
GLUCOSE BLDC GLUCOMTR-MCNC: 206 MG/DL (ref 70–130)
GLUCOSE BLDC GLUCOMTR-MCNC: 219 MG/DL (ref 70–130)
GLUCOSE SERPL-MCNC: 142 MG/DL (ref 65–99)
GLUCOSE SERPL-MCNC: 166 MG/DL (ref 65–99)
GLUCOSE UR STRIP-MCNC: NEGATIVE MG/DL
HADV DNA SPEC NAA+PROBE: NOT DETECTED
HCOV 229E RNA SPEC QL NAA+PROBE: NOT DETECTED
HCOV HKU1 RNA SPEC QL NAA+PROBE: NOT DETECTED
HCOV NL63 RNA SPEC QL NAA+PROBE: NOT DETECTED
HCOV OC43 RNA SPEC QL NAA+PROBE: NOT DETECTED
HCT VFR BLD AUTO: 42.1 % (ref 34–46.6)
HGB BLD-MCNC: 13.2 G/DL (ref 12–15.9)
HGB UR QL STRIP.AUTO: ABNORMAL
HMPV RNA NPH QL NAA+NON-PROBE: NOT DETECTED
HPIV1 RNA SPEC QL NAA+PROBE: NOT DETECTED
HPIV2 RNA SPEC QL NAA+PROBE: NOT DETECTED
HPIV3 RNA NPH QL NAA+PROBE: NOT DETECTED
HPIV4 P GENE NPH QL NAA+PROBE: NOT DETECTED
HYALINE CASTS UR QL AUTO: ABNORMAL /LPF
KETONES UR QL STRIP: NEGATIVE
LEUKOCYTE ESTERASE UR QL STRIP.AUTO: ABNORMAL
LIPASE SERPL-CCNC: 51 U/L (ref 13–60)
M PNEUMO IGG SER IA-ACNC: NOT DETECTED
MCH RBC QN AUTO: 25.8 PG (ref 26.6–33)
MCHC RBC AUTO-ENTMCNC: 31.4 G/DL (ref 31.5–35.7)
MCV RBC AUTO: 82.4 FL (ref 79–97)
NITRITE UR QL STRIP: NEGATIVE
NT-PROBNP SERPL-MCNC: 241.7 PG/ML (ref 0–900)
PH UR STRIP.AUTO: 6.5 [PH] (ref 5–8)
PLATELET # BLD AUTO: 189 10*3/MM3 (ref 140–450)
PMV BLD AUTO: 9.8 FL (ref 6–12)
POTASSIUM SERPL-SCNC: 3.6 MMOL/L (ref 3.5–5.2)
POTASSIUM SERPL-SCNC: 3.7 MMOL/L (ref 3.5–5.2)
PROCALCITONIN SERPL-MCNC: 0.14 NG/ML (ref 0–0.25)
PROT SERPL-MCNC: 6.7 G/DL (ref 6–8.5)
PROT UR QL STRIP: NEGATIVE
QT INTERVAL: 356 MS
RBC # BLD AUTO: 5.11 10*6/MM3 (ref 3.77–5.28)
RBC # UR: ABNORMAL /HPF
REF LAB TEST METHOD: ABNORMAL
RHINOVIRUS RNA SPEC NAA+PROBE: NOT DETECTED
RSV RNA NPH QL NAA+NON-PROBE: NOT DETECTED
SARS-COV-2 RNA NPH QL NAA+NON-PROBE: NOT DETECTED
SODIUM SERPL-SCNC: 139 MMOL/L (ref 136–145)
SODIUM SERPL-SCNC: 139 MMOL/L (ref 136–145)
SP GR UR STRIP: 1.01 (ref 1–1.03)
SQUAMOUS #/AREA URNS HPF: ABNORMAL /HPF
TROPONIN T SERPL-MCNC: <0.01 NG/ML (ref 0–0.03)
TROPONIN T SERPL-MCNC: <0.01 NG/ML (ref 0–0.03)
UROBILINOGEN UR QL STRIP: ABNORMAL
WBC # BLD AUTO: 10.36 10*3/MM3 (ref 3.4–10.8)
WBC UR QL AUTO: ABNORMAL /HPF

## 2021-04-30 PROCEDURE — G0378 HOSPITAL OBSERVATION PER HR: HCPCS

## 2021-04-30 PROCEDURE — 84484 ASSAY OF TROPONIN QUANT: CPT | Performed by: NURSE PRACTITIONER

## 2021-04-30 PROCEDURE — 63710000001 ONDANSETRON PER 8 MG: Performed by: NURSE PRACTITIONER

## 2021-04-30 PROCEDURE — 25010000002 CEFTRIAXONE PER 250 MG: Performed by: PHYSICIAN ASSISTANT

## 2021-04-30 PROCEDURE — 87040 BLOOD CULTURE FOR BACTERIA: CPT | Performed by: PHYSICIAN ASSISTANT

## 2021-04-30 PROCEDURE — 96365 THER/PROPH/DIAG IV INF INIT: CPT

## 2021-04-30 PROCEDURE — 0 GADOBENATE DIMEGLUMINE 529 MG/ML SOLUTION: Performed by: HOSPITALIST

## 2021-04-30 PROCEDURE — 82962 GLUCOSE BLOOD TEST: CPT

## 2021-04-30 PROCEDURE — 85027 COMPLETE CBC AUTOMATED: CPT | Performed by: NURSE PRACTITIONER

## 2021-04-30 PROCEDURE — 36415 COLL VENOUS BLD VENIPUNCTURE: CPT

## 2021-04-30 PROCEDURE — 63710000001 INSULIN LISPRO (HUMAN) PER 5 UNITS: Performed by: NURSE PRACTITIONER

## 2021-04-30 PROCEDURE — 80048 BASIC METABOLIC PNL TOTAL CA: CPT | Performed by: NURSE PRACTITIONER

## 2021-04-30 PROCEDURE — A9577 INJ MULTIHANCE: HCPCS | Performed by: HOSPITALIST

## 2021-04-30 PROCEDURE — 81001 URINALYSIS AUTO W/SCOPE: CPT | Performed by: PHYSICIAN ASSISTANT

## 2021-04-30 PROCEDURE — 25010000002 KETOROLAC TROMETHAMINE PER 15 MG: Performed by: PSYCHIATRY & NEUROLOGY

## 2021-04-30 PROCEDURE — 70553 MRI BRAIN STEM W/O & W/DYE: CPT

## 2021-04-30 PROCEDURE — 96375 TX/PRO/DX INJ NEW DRUG ADDON: CPT

## 2021-04-30 PROCEDURE — 36415 COLL VENOUS BLD VENIPUNCTURE: CPT | Performed by: NURSE PRACTITIONER

## 2021-04-30 PROCEDURE — 99214 OFFICE O/P EST MOD 30 MIN: CPT | Performed by: PSYCHIATRY & NEUROLOGY

## 2021-04-30 PROCEDURE — 73030 X-RAY EXAM OF SHOULDER: CPT

## 2021-04-30 RX ORDER — HYDROCODONE BITARTRATE AND ACETAMINOPHEN 5; 325 MG/1; MG/1
1 TABLET ORAL EVERY 6 HOURS PRN
Status: DISCONTINUED | OUTPATIENT
Start: 2021-04-30 | End: 2021-05-05 | Stop reason: HOSPADM

## 2021-04-30 RX ORDER — CALCIUM CARBONATE 200(500)MG
2 TABLET,CHEWABLE ORAL 2 TIMES DAILY PRN
Status: DISCONTINUED | OUTPATIENT
Start: 2021-04-30 | End: 2021-05-05 | Stop reason: HOSPADM

## 2021-04-30 RX ORDER — ONDANSETRON 4 MG/1
4 TABLET, FILM COATED ORAL EVERY 6 HOURS PRN
Status: DISCONTINUED | OUTPATIENT
Start: 2021-04-30 | End: 2021-05-05 | Stop reason: HOSPADM

## 2021-04-30 RX ORDER — CEFTRIAXONE SODIUM 1 G/50ML
1 INJECTION, SOLUTION INTRAVENOUS ONCE
Status: COMPLETED | OUTPATIENT
Start: 2021-04-30 | End: 2021-04-30

## 2021-04-30 RX ORDER — ACETAMINOPHEN 650 MG/1
650 SUPPOSITORY RECTAL EVERY 4 HOURS PRN
Status: DISCONTINUED | OUTPATIENT
Start: 2021-04-30 | End: 2021-05-05 | Stop reason: HOSPADM

## 2021-04-30 RX ORDER — PANTOPRAZOLE SODIUM 40 MG/1
40 TABLET, DELAYED RELEASE ORAL
Status: DISCONTINUED | OUTPATIENT
Start: 2021-04-30 | End: 2021-05-05 | Stop reason: HOSPADM

## 2021-04-30 RX ORDER — POTASSIUM CHLORIDE 750 MG/1
10 TABLET, FILM COATED, EXTENDED RELEASE ORAL DAILY
Status: DISCONTINUED | OUTPATIENT
Start: 2021-04-30 | End: 2021-05-05 | Stop reason: HOSPADM

## 2021-04-30 RX ORDER — METOPROLOL TARTRATE 50 MG/1
50 TABLET, FILM COATED ORAL 3 TIMES DAILY
Status: DISCONTINUED | OUTPATIENT
Start: 2021-04-30 | End: 2021-05-05 | Stop reason: HOSPADM

## 2021-04-30 RX ORDER — NICOTINE POLACRILEX 4 MG
15 LOZENGE BUCCAL
Status: DISCONTINUED | OUTPATIENT
Start: 2021-04-30 | End: 2021-05-05 | Stop reason: HOSPADM

## 2021-04-30 RX ORDER — FERROUS SULFATE 325(65) MG
325 TABLET ORAL
Status: DISCONTINUED | OUTPATIENT
Start: 2021-04-30 | End: 2021-05-04

## 2021-04-30 RX ORDER — ROSUVASTATIN CALCIUM 20 MG/1
20 TABLET, COATED ORAL DAILY
Status: DISCONTINUED | OUTPATIENT
Start: 2021-04-30 | End: 2021-05-05 | Stop reason: HOSPADM

## 2021-04-30 RX ORDER — BUDESONIDE AND FORMOTEROL FUMARATE DIHYDRATE 160; 4.5 UG/1; UG/1
2 AEROSOL RESPIRATORY (INHALATION) 2 TIMES DAILY
Status: DISCONTINUED | OUTPATIENT
Start: 2021-04-30 | End: 2021-05-05 | Stop reason: HOSPADM

## 2021-04-30 RX ORDER — KETOROLAC TROMETHAMINE 30 MG/ML
30 INJECTION, SOLUTION INTRAMUSCULAR; INTRAVENOUS EVERY 6 HOURS PRN
Status: DISCONTINUED | OUTPATIENT
Start: 2021-04-30 | End: 2021-05-05 | Stop reason: HOSPADM

## 2021-04-30 RX ORDER — ACETAMINOPHEN 160 MG/5ML
650 SOLUTION ORAL EVERY 4 HOURS PRN
Status: DISCONTINUED | OUTPATIENT
Start: 2021-04-30 | End: 2021-05-05 | Stop reason: HOSPADM

## 2021-04-30 RX ORDER — ARIPIPRAZOLE 2 MG/1
2 TABLET ORAL DAILY
Status: DISCONTINUED | OUTPATIENT
Start: 2021-04-30 | End: 2021-05-05 | Stop reason: HOSPADM

## 2021-04-30 RX ORDER — DOXEPIN HYDROCHLORIDE 25 MG/1
25 CAPSULE ORAL NIGHTLY
Status: DISCONTINUED | OUTPATIENT
Start: 2021-04-30 | End: 2021-05-02

## 2021-04-30 RX ORDER — CEFTRIAXONE SODIUM 1 G/50ML
1 INJECTION, SOLUTION INTRAVENOUS EVERY 24 HOURS
Status: DISCONTINUED | OUTPATIENT
Start: 2021-05-01 | End: 2021-05-02

## 2021-04-30 RX ORDER — FUROSEMIDE 40 MG/1
40 TABLET ORAL DAILY
Status: DISCONTINUED | OUTPATIENT
Start: 2021-04-30 | End: 2021-05-05 | Stop reason: HOSPADM

## 2021-04-30 RX ORDER — ACETAMINOPHEN 325 MG/1
650 TABLET ORAL EVERY 4 HOURS PRN
Status: DISCONTINUED | OUTPATIENT
Start: 2021-04-30 | End: 2021-05-05 | Stop reason: HOSPADM

## 2021-04-30 RX ORDER — CITALOPRAM 20 MG/1
20 TABLET ORAL DAILY
Status: DISCONTINUED | OUTPATIENT
Start: 2021-04-30 | End: 2021-05-05 | Stop reason: HOSPADM

## 2021-04-30 RX ORDER — FUROSEMIDE 20 MG/1
20 TABLET ORAL DAILY
Status: DISCONTINUED | OUTPATIENT
Start: 2021-04-30 | End: 2021-05-05 | Stop reason: HOSPADM

## 2021-04-30 RX ORDER — UBIDECARENONE 75 MG
50 CAPSULE ORAL DAILY
Status: DISCONTINUED | OUTPATIENT
Start: 2021-04-30 | End: 2021-05-05 | Stop reason: HOSPADM

## 2021-04-30 RX ORDER — SODIUM CHLORIDE 0.9 % (FLUSH) 0.9 %
10 SYRINGE (ML) INJECTION EVERY 12 HOURS SCHEDULED
Status: DISCONTINUED | OUTPATIENT
Start: 2021-04-30 | End: 2021-05-05 | Stop reason: HOSPADM

## 2021-04-30 RX ORDER — SODIUM CHLORIDE 0.9 % (FLUSH) 0.9 %
10 SYRINGE (ML) INJECTION AS NEEDED
Status: DISCONTINUED | OUTPATIENT
Start: 2021-04-30 | End: 2021-05-05 | Stop reason: HOSPADM

## 2021-04-30 RX ORDER — DEXTROSE MONOHYDRATE 25 G/50ML
25 INJECTION, SOLUTION INTRAVENOUS
Status: DISCONTINUED | OUTPATIENT
Start: 2021-04-30 | End: 2021-05-05 | Stop reason: HOSPADM

## 2021-04-30 RX ORDER — ONDANSETRON 2 MG/ML
4 INJECTION INTRAMUSCULAR; INTRAVENOUS EVERY 6 HOURS PRN
Status: DISCONTINUED | OUTPATIENT
Start: 2021-04-30 | End: 2021-05-05 | Stop reason: HOSPADM

## 2021-04-30 RX ORDER — INSULIN LISPRO 100 [IU]/ML
0-9 INJECTION, SOLUTION INTRAVENOUS; SUBCUTANEOUS
Status: DISCONTINUED | OUTPATIENT
Start: 2021-04-30 | End: 2021-05-04

## 2021-04-30 RX ADMIN — HYDROCODONE BITARTRATE AND ACETAMINOPHEN 1 TABLET: 5; 325 TABLET ORAL at 22:03

## 2021-04-30 RX ADMIN — CITALOPRAM 20 MG: 20 TABLET, FILM COATED ORAL at 11:16

## 2021-04-30 RX ADMIN — FUROSEMIDE 20 MG: 20 TABLET ORAL at 16:47

## 2021-04-30 RX ADMIN — METOPROLOL TARTRATE 50 MG: 50 TABLET, FILM COATED ORAL at 11:20

## 2021-04-30 RX ADMIN — ONDANSETRON HYDROCHLORIDE 4 MG: 4 TABLET, FILM COATED ORAL at 13:25

## 2021-04-30 RX ADMIN — KETOROLAC TROMETHAMINE 30 MG: 30 INJECTION, SOLUTION INTRAMUSCULAR; INTRAVENOUS at 16:47

## 2021-04-30 RX ADMIN — ROSUVASTATIN CALCIUM 20 MG: 20 TABLET, FILM COATED ORAL at 11:16

## 2021-04-30 RX ADMIN — CEFTRIAXONE SODIUM 1 G: 1 INJECTION, SOLUTION INTRAVENOUS at 02:52

## 2021-04-30 RX ADMIN — INSULIN LISPRO 4 UNITS: 100 INJECTION, SOLUTION INTRAVENOUS; SUBCUTANEOUS at 21:58

## 2021-04-30 RX ADMIN — Medication 50 MCG: at 11:16

## 2021-04-30 RX ADMIN — CARBIDOPA AND LEVODOPA 1 TABLET: 10; 100 TABLET ORAL at 11:16

## 2021-04-30 RX ADMIN — APIXABAN 5 MG: 5 TABLET, FILM COATED ORAL at 11:17

## 2021-04-30 RX ADMIN — PANTOPRAZOLE SODIUM 40 MG: 40 TABLET, DELAYED RELEASE ORAL at 16:47

## 2021-04-30 RX ADMIN — APIXABAN 5 MG: 5 TABLET, FILM COATED ORAL at 21:58

## 2021-04-30 RX ADMIN — METOPROLOL TARTRATE 50 MG: 50 TABLET, FILM COATED ORAL at 16:47

## 2021-04-30 RX ADMIN — SODIUM CHLORIDE, PRESERVATIVE FREE 10 ML: 5 INJECTION INTRAVENOUS at 00:35

## 2021-04-30 RX ADMIN — FERROUS SULFATE TAB 325 MG (65 MG ELEMENTAL FE) 325 MG: 325 (65 FE) TAB at 11:20

## 2021-04-30 RX ADMIN — ARIPIPRAZOLE 2 MG: 2 TABLET ORAL at 11:16

## 2021-04-30 RX ADMIN — METOPROLOL TARTRATE 50 MG: 50 TABLET, FILM COATED ORAL at 21:58

## 2021-04-30 RX ADMIN — POTASSIUM CHLORIDE 10 MEQ: 750 TABLET, EXTENDED RELEASE ORAL at 11:21

## 2021-04-30 RX ADMIN — CARBIDOPA AND LEVODOPA 1 TABLET: 10; 100 TABLET ORAL at 16:47

## 2021-04-30 RX ADMIN — PANTOPRAZOLE SODIUM 40 MG: 40 TABLET, DELAYED RELEASE ORAL at 11:20

## 2021-04-30 RX ADMIN — INSULIN LISPRO 4 UNITS: 100 INJECTION, SOLUTION INTRAVENOUS; SUBCUTANEOUS at 17:07

## 2021-04-30 RX ADMIN — SODIUM CHLORIDE, PRESERVATIVE FREE 10 ML: 5 INJECTION INTRAVENOUS at 11:17

## 2021-04-30 RX ADMIN — SODIUM CHLORIDE, PRESERVATIVE FREE 10 ML: 5 INJECTION INTRAVENOUS at 21:58

## 2021-04-30 RX ADMIN — FERROUS SULFATE TAB 325 MG (65 MG ELEMENTAL FE) 325 MG: 325 (65 FE) TAB at 17:07

## 2021-04-30 RX ADMIN — GADOBENATE DIMEGLUMINE 20 ML: 529 INJECTION, SOLUTION INTRAVENOUS at 20:35

## 2021-04-30 RX ADMIN — CARBIDOPA AND LEVODOPA 1 TABLET: 10; 100 TABLET ORAL at 21:58

## 2021-04-30 RX ADMIN — DOXEPIN HYDROCHLORIDE 25 MG: 25 CAPSULE ORAL at 21:58

## 2021-04-30 RX ADMIN — HYDROCODONE BITARTRATE AND ACETAMINOPHEN 1 TABLET: 5; 325 TABLET ORAL at 13:25

## 2021-04-30 RX ADMIN — FUROSEMIDE 40 MG: 40 TABLET ORAL at 11:16

## 2021-04-30 RX ADMIN — Medication 2 TABLET: at 17:07

## 2021-04-30 NOTE — TELEPHONE ENCOUNTER
"    Reason for Disposition  • SEVERE dizziness (e.g., unable to stand, requires support to walk, feels like passing out now)    Additional Information  • Negative: Severe difficulty breathing (e.g., struggling for each breath, speaks in single words)  • Negative: [1] Difficulty breathing or swallowing AND [2] started suddenly after medicine, an allergic food or bee sting  • Negative: Shock suspected (e.g., cold/pale/clammy skin, too weak to stand, low BP, rapid pulse)  • Negative: Difficult to awaken or acting confused (e.g., disoriented, slurred speech)  • Negative: [1] Weakness (i.e., paralysis, loss of muscle strength) of the face, arm or leg on one side of the body AND [2] sudden onset AND [3] present now  • Negative: [1] Numbness (i.e., loss of sensation) of the face, arm or leg on one side of the body AND [2] sudden onset AND [3] present now  • Negative: [1] Loss of speech or garbled speech AND [2] sudden onset AND [3] present now  • Negative: Overdose (accidental or intentional) of medications  • Negative: [1] Fainted > 15 minutes ago AND [2] still feels too weak or dizzy to stand  • Negative: Heart beating < 50 beats per minute OR > 140 beats per minute  • Negative: Sounds like a life-threatening emergency to the triager  • Negative: Chest pain  • Negative: Rectal bleeding, bloody stool, or tarry-black stool  • Negative: [1] Vomiting AND [2] contains red blood or black (\"coffee ground\") material  • Negative: Vomiting is main symptom  • Negative: Diarrhea is main symptom  • Negative: Headache is main symptom  • Negative: Patient states that he/she is having an anxiety/panic attack  • Negative: Dizziness from low blood sugar (i.e., < 60 mg/dl or 3.5 mmol/l)  • Negative: Dizziness is described as a spinning sensation (i.e., vertigo)  • Negative: Heat exhaustion suspected (i.e., dehydration from heat exposure)  • Negative: Difficulty breathing  • Negative: Extra heart beats OR irregular heart beating  (i.e., " "\"palpitations\")  • Negative: [1] Drinking very little AND [2] dehydration suspected (e.g., no urine > 12 hours, very dry mouth, very lightheaded)    Answer Assessment - Initial Assessment Questions  1. DESCRIPTION: \"Describe your dizziness.\"       Can not get up, very dizzy and nauseous headache  2. LIGHTHEADED: \"Do you feel lightheaded?\" (e.g., somewhat faint, woozy, weak upon standing)      yes  3. VERTIGO: \"Do you feel like either you or the room is spinning or tilting?\" (i.e. vertigo)      n/a  4. SEVERITY: \"How bad is it?\"  \"Do you feel like you are going to faint?\" \"Can you stand and walk?\"    - MILD - walking normally    - MODERATE - interferes with normal activities (e.g., work, school)     - SEVERE - unable to stand, requires support to walk, feels like passing out now.       mod  5. ONSET:  \"When did the dizziness begin?\"      *today  6. AGGRAVATING FACTORS: \"Does anything make it worse?\" (e.g., standing, change in head position)      n/a  7. HEART RATE: \"Can you tell me your heart rate?\" \"How many beats in 15 seconds?\"  (Note: not all patients can do this)        n/a  8. CAUSE: \"What do you think is causing the dizziness?\"      Unknown, heart dx and on eliquis,  stated she had a stroke recently  9. RECURRENT SYMPTOM: \"Have you had dizziness before?\" If so, ask: \"When was the last time?\" \"What happened that time?\"      n/a  10. OTHER SYMPTOMS: \"Do you have any other symptoms?\" (e.g., fever, chest pain, vomiting, diarrhea, bleeding)        See above  11. PREGNANCY: \"Is there any chance you are pregnant?\" \"When was your last menstrual period?\"        no    Protocols used: DIZZINESS - LIGHTHEADEDNESS-ADULT-AH      "

## 2021-05-01 LAB
ANION GAP SERPL CALCULATED.3IONS-SCNC: 12.4 MMOL/L (ref 5–15)
BUN SERPL-MCNC: 17 MG/DL (ref 8–23)
BUN/CREAT SERPL: 15 (ref 7–25)
CALCIUM SPEC-SCNC: 8.9 MG/DL (ref 8.6–10.5)
CHLORIDE SERPL-SCNC: 98 MMOL/L (ref 98–107)
CO2 SERPL-SCNC: 24.6 MMOL/L (ref 22–29)
CREAT SERPL-MCNC: 1.13 MG/DL (ref 0.57–1)
DEPRECATED RDW RBC AUTO: 50.2 FL (ref 37–54)
ERYTHROCYTE [DISTWIDTH] IN BLOOD BY AUTOMATED COUNT: 17.5 % (ref 12.3–15.4)
GFR SERPL CREATININE-BSD FRML MDRD: 47 ML/MIN/1.73
GLUCOSE BLDC GLUCOMTR-MCNC: 172 MG/DL (ref 70–130)
GLUCOSE BLDC GLUCOMTR-MCNC: 190 MG/DL (ref 70–130)
GLUCOSE BLDC GLUCOMTR-MCNC: 255 MG/DL (ref 70–130)
GLUCOSE BLDC GLUCOMTR-MCNC: 311 MG/DL (ref 70–130)
GLUCOSE SERPL-MCNC: 255 MG/DL (ref 65–99)
HCT VFR BLD AUTO: 41.4 % (ref 34–46.6)
HGB BLD-MCNC: 12.9 G/DL (ref 12–15.9)
MCH RBC QN AUTO: 25.9 PG (ref 26.6–33)
MCHC RBC AUTO-ENTMCNC: 31.2 G/DL (ref 31.5–35.7)
MCV RBC AUTO: 83 FL (ref 79–97)
PLATELET # BLD AUTO: 183 10*3/MM3 (ref 140–450)
PMV BLD AUTO: 10.3 FL (ref 6–12)
POTASSIUM SERPL-SCNC: 3.7 MMOL/L (ref 3.5–5.2)
RBC # BLD AUTO: 4.99 10*6/MM3 (ref 3.77–5.28)
SODIUM SERPL-SCNC: 135 MMOL/L (ref 136–145)
WBC # BLD AUTO: 12.72 10*3/MM3 (ref 3.4–10.8)

## 2021-05-01 PROCEDURE — 96366 THER/PROPH/DIAG IV INF ADDON: CPT

## 2021-05-01 PROCEDURE — 97535 SELF CARE MNGMENT TRAINING: CPT

## 2021-05-01 PROCEDURE — 82962 GLUCOSE BLOOD TEST: CPT

## 2021-05-01 PROCEDURE — 99214 OFFICE O/P EST MOD 30 MIN: CPT | Performed by: NURSE PRACTITIONER

## 2021-05-01 PROCEDURE — 96376 TX/PRO/DX INJ SAME DRUG ADON: CPT

## 2021-05-01 PROCEDURE — 94799 UNLISTED PULMONARY SVC/PX: CPT

## 2021-05-01 PROCEDURE — 25010000002 KETOROLAC TROMETHAMINE PER 15 MG: Performed by: PSYCHIATRY & NEUROLOGY

## 2021-05-01 PROCEDURE — 94640 AIRWAY INHALATION TREATMENT: CPT

## 2021-05-01 PROCEDURE — G0378 HOSPITAL OBSERVATION PER HR: HCPCS

## 2021-05-01 PROCEDURE — 25010000002 CEFTRIAXONE PER 250 MG: Performed by: NURSE PRACTITIONER

## 2021-05-01 PROCEDURE — 63710000001 INSULIN LISPRO (HUMAN) PER 5 UNITS: Performed by: NURSE PRACTITIONER

## 2021-05-01 PROCEDURE — 63710000001 ONDANSETRON PER 8 MG: Performed by: NURSE PRACTITIONER

## 2021-05-01 PROCEDURE — 80048 BASIC METABOLIC PNL TOTAL CA: CPT | Performed by: NURSE PRACTITIONER

## 2021-05-01 PROCEDURE — 85027 COMPLETE CBC AUTOMATED: CPT | Performed by: NURSE PRACTITIONER

## 2021-05-01 PROCEDURE — 97165 OT EVAL LOW COMPLEX 30 MIN: CPT

## 2021-05-01 RX ADMIN — METOPROLOL TARTRATE 50 MG: 50 TABLET, FILM COATED ORAL at 17:11

## 2021-05-01 RX ADMIN — ROSUVASTATIN CALCIUM 20 MG: 20 TABLET, FILM COATED ORAL at 08:43

## 2021-05-01 RX ADMIN — POTASSIUM CHLORIDE 10 MEQ: 750 TABLET, EXTENDED RELEASE ORAL at 08:41

## 2021-05-01 RX ADMIN — METOPROLOL TARTRATE 50 MG: 50 TABLET, FILM COATED ORAL at 08:41

## 2021-05-01 RX ADMIN — DOXEPIN HYDROCHLORIDE 25 MG: 25 CAPSULE ORAL at 21:05

## 2021-05-01 RX ADMIN — BUDESONIDE AND FORMOTEROL FUMARATE DIHYDRATE 2 PUFF: 160; 4.5 AEROSOL RESPIRATORY (INHALATION) at 10:26

## 2021-05-01 RX ADMIN — INSULIN LISPRO 2 UNITS: 100 INJECTION, SOLUTION INTRAVENOUS; SUBCUTANEOUS at 11:53

## 2021-05-01 RX ADMIN — SODIUM CHLORIDE, PRESERVATIVE FREE 10 ML: 5 INJECTION INTRAVENOUS at 21:04

## 2021-05-01 RX ADMIN — HYDROCODONE BITARTRATE AND ACETAMINOPHEN 1 TABLET: 5; 325 TABLET ORAL at 03:43

## 2021-05-01 RX ADMIN — APIXABAN 5 MG: 5 TABLET, FILM COATED ORAL at 21:04

## 2021-05-01 RX ADMIN — PANTOPRAZOLE SODIUM 40 MG: 40 TABLET, DELAYED RELEASE ORAL at 06:29

## 2021-05-01 RX ADMIN — CARBIDOPA AND LEVODOPA 1 TABLET: 10; 100 TABLET ORAL at 08:44

## 2021-05-01 RX ADMIN — FUROSEMIDE 40 MG: 40 TABLET ORAL at 08:42

## 2021-05-01 RX ADMIN — BUDESONIDE AND FORMOTEROL FUMARATE DIHYDRATE 2 PUFF: 160; 4.5 AEROSOL RESPIRATORY (INHALATION) at 20:27

## 2021-05-01 RX ADMIN — FUROSEMIDE 20 MG: 20 TABLET ORAL at 17:11

## 2021-05-01 RX ADMIN — PANTOPRAZOLE SODIUM 40 MG: 40 TABLET, DELAYED RELEASE ORAL at 17:11

## 2021-05-01 RX ADMIN — CEFTRIAXONE SODIUM 1 G: 1 INJECTION, SOLUTION INTRAVENOUS at 03:36

## 2021-05-01 RX ADMIN — INSULIN LISPRO 2 UNITS: 100 INJECTION, SOLUTION INTRAVENOUS; SUBCUTANEOUS at 17:10

## 2021-05-01 RX ADMIN — CARBIDOPA AND LEVODOPA 1 TABLET: 10; 100 TABLET ORAL at 17:10

## 2021-05-01 RX ADMIN — HYDROCODONE BITARTRATE AND ACETAMINOPHEN 1 TABLET: 5; 325 TABLET ORAL at 11:58

## 2021-05-01 RX ADMIN — CITALOPRAM 20 MG: 20 TABLET, FILM COATED ORAL at 08:41

## 2021-05-01 RX ADMIN — Medication 2 TABLET: at 09:42

## 2021-05-01 RX ADMIN — FERROUS SULFATE TAB 325 MG (65 MG ELEMENTAL FE) 325 MG: 325 (65 FE) TAB at 17:11

## 2021-05-01 RX ADMIN — Medication 50 MCG: at 08:43

## 2021-05-01 RX ADMIN — CARBIDOPA AND LEVODOPA 1 TABLET: 10; 100 TABLET ORAL at 21:04

## 2021-05-01 RX ADMIN — SODIUM CHLORIDE, PRESERVATIVE FREE 10 ML: 5 INJECTION INTRAVENOUS at 08:44

## 2021-05-01 RX ADMIN — METOPROLOL TARTRATE 50 MG: 50 TABLET, FILM COATED ORAL at 21:04

## 2021-05-01 RX ADMIN — INSULIN LISPRO 7 UNITS: 100 INJECTION, SOLUTION INTRAVENOUS; SUBCUTANEOUS at 06:29

## 2021-05-01 RX ADMIN — ONDANSETRON HYDROCHLORIDE 4 MG: 4 TABLET, FILM COATED ORAL at 03:36

## 2021-05-01 RX ADMIN — INSULIN LISPRO 6 UNITS: 100 INJECTION, SOLUTION INTRAVENOUS; SUBCUTANEOUS at 23:32

## 2021-05-01 RX ADMIN — APIXABAN 5 MG: 5 TABLET, FILM COATED ORAL at 08:43

## 2021-05-01 RX ADMIN — FERROUS SULFATE TAB 325 MG (65 MG ELEMENTAL FE) 325 MG: 325 (65 FE) TAB at 08:42

## 2021-05-01 RX ADMIN — ONDANSETRON HYDROCHLORIDE 4 MG: 4 TABLET, FILM COATED ORAL at 08:49

## 2021-05-01 RX ADMIN — ARIPIPRAZOLE 2 MG: 2 TABLET ORAL at 08:44

## 2021-05-01 RX ADMIN — FERROUS SULFATE TAB 325 MG (65 MG ELEMENTAL FE) 325 MG: 325 (65 FE) TAB at 11:53

## 2021-05-01 RX ADMIN — KETOROLAC TROMETHAMINE 30 MG: 30 INJECTION, SOLUTION INTRAMUSCULAR; INTRAVENOUS at 01:18

## 2021-05-02 LAB
AMMONIA BLD-SCNC: 20 UMOL/L (ref 11–51)
GLUCOSE BLDC GLUCOMTR-MCNC: 176 MG/DL (ref 70–130)
GLUCOSE BLDC GLUCOMTR-MCNC: 183 MG/DL (ref 70–130)
GLUCOSE BLDC GLUCOMTR-MCNC: 199 MG/DL (ref 70–130)
GLUCOSE BLDC GLUCOMTR-MCNC: 200 MG/DL (ref 70–130)

## 2021-05-02 PROCEDURE — 94799 UNLISTED PULMONARY SVC/PX: CPT

## 2021-05-02 PROCEDURE — 25010000002 CEFTRIAXONE PER 250 MG: Performed by: NURSE PRACTITIONER

## 2021-05-02 PROCEDURE — 82962 GLUCOSE BLOOD TEST: CPT

## 2021-05-02 PROCEDURE — G0378 HOSPITAL OBSERVATION PER HR: HCPCS

## 2021-05-02 PROCEDURE — 63710000001 ONDANSETRON PER 8 MG: Performed by: NURSE PRACTITIONER

## 2021-05-02 PROCEDURE — 63710000001 INSULIN LISPRO (HUMAN) PER 5 UNITS: Performed by: NURSE PRACTITIONER

## 2021-05-02 PROCEDURE — 82140 ASSAY OF AMMONIA: CPT | Performed by: INTERNAL MEDICINE

## 2021-05-02 RX ORDER — FLUTICASONE PROPIONATE 50 MCG
2 SPRAY, SUSPENSION (ML) NASAL DAILY
Status: DISCONTINUED | OUTPATIENT
Start: 2021-05-02 | End: 2021-05-05 | Stop reason: HOSPADM

## 2021-05-02 RX ADMIN — METOPROLOL TARTRATE 50 MG: 50 TABLET, FILM COATED ORAL at 21:40

## 2021-05-02 RX ADMIN — ARIPIPRAZOLE 2 MG: 2 TABLET ORAL at 08:53

## 2021-05-02 RX ADMIN — FUROSEMIDE 40 MG: 40 TABLET ORAL at 08:53

## 2021-05-02 RX ADMIN — ROSUVASTATIN CALCIUM 20 MG: 20 TABLET, FILM COATED ORAL at 08:53

## 2021-05-02 RX ADMIN — INSULIN LISPRO 2 UNITS: 100 INJECTION, SOLUTION INTRAVENOUS; SUBCUTANEOUS at 12:49

## 2021-05-02 RX ADMIN — CARBIDOPA AND LEVODOPA 1 TABLET: 10; 100 TABLET ORAL at 08:53

## 2021-05-02 RX ADMIN — METOPROLOL TARTRATE 50 MG: 50 TABLET, FILM COATED ORAL at 16:40

## 2021-05-02 RX ADMIN — METOPROLOL TARTRATE 50 MG: 50 TABLET, FILM COATED ORAL at 08:52

## 2021-05-02 RX ADMIN — CARBIDOPA AND LEVODOPA 1 TABLET: 10; 100 TABLET ORAL at 16:40

## 2021-05-02 RX ADMIN — INSULIN LISPRO 2 UNITS: 100 INJECTION, SOLUTION INTRAVENOUS; SUBCUTANEOUS at 08:52

## 2021-05-02 RX ADMIN — APIXABAN 5 MG: 5 TABLET, FILM COATED ORAL at 08:53

## 2021-05-02 RX ADMIN — CITALOPRAM 20 MG: 20 TABLET, FILM COATED ORAL at 08:53

## 2021-05-02 RX ADMIN — BUDESONIDE AND FORMOTEROL FUMARATE DIHYDRATE 2 PUFF: 160; 4.5 AEROSOL RESPIRATORY (INHALATION) at 10:42

## 2021-05-02 RX ADMIN — FERROUS SULFATE TAB 325 MG (65 MG ELEMENTAL FE) 325 MG: 325 (65 FE) TAB at 11:02

## 2021-05-02 RX ADMIN — Medication 2 TABLET: at 17:48

## 2021-05-02 RX ADMIN — CARBIDOPA AND LEVODOPA 1 TABLET: 10; 100 TABLET ORAL at 21:40

## 2021-05-02 RX ADMIN — INSULIN LISPRO 4 UNITS: 100 INJECTION, SOLUTION INTRAVENOUS; SUBCUTANEOUS at 17:44

## 2021-05-02 RX ADMIN — APIXABAN 5 MG: 5 TABLET, FILM COATED ORAL at 21:40

## 2021-05-02 RX ADMIN — POTASSIUM CHLORIDE 10 MEQ: 750 TABLET, EXTENDED RELEASE ORAL at 08:52

## 2021-05-02 RX ADMIN — FERROUS SULFATE TAB 325 MG (65 MG ELEMENTAL FE) 325 MG: 325 (65 FE) TAB at 08:54

## 2021-05-02 RX ADMIN — SODIUM CHLORIDE, PRESERVATIVE FREE 10 ML: 5 INJECTION INTRAVENOUS at 21:40

## 2021-05-02 RX ADMIN — INSULIN LISPRO 2 UNITS: 100 INJECTION, SOLUTION INTRAVENOUS; SUBCUTANEOUS at 21:45

## 2021-05-02 RX ADMIN — PANTOPRAZOLE SODIUM 40 MG: 40 TABLET, DELAYED RELEASE ORAL at 06:50

## 2021-05-02 RX ADMIN — CEFTRIAXONE SODIUM 1 G: 1 INJECTION, SOLUTION INTRAVENOUS at 03:58

## 2021-05-02 RX ADMIN — FUROSEMIDE 20 MG: 20 TABLET ORAL at 16:40

## 2021-05-02 RX ADMIN — Medication 50 MCG: at 08:52

## 2021-05-02 RX ADMIN — BUDESONIDE AND FORMOTEROL FUMARATE DIHYDRATE 2 PUFF: 160; 4.5 AEROSOL RESPIRATORY (INHALATION) at 20:34

## 2021-05-02 RX ADMIN — FLUTICASONE PROPIONATE 2 SPRAY: 50 SPRAY, METERED NASAL at 16:40

## 2021-05-02 RX ADMIN — Medication 2 TABLET: at 00:36

## 2021-05-02 RX ADMIN — PANTOPRAZOLE SODIUM 40 MG: 40 TABLET, DELAYED RELEASE ORAL at 16:40

## 2021-05-02 RX ADMIN — SODIUM CHLORIDE, PRESERVATIVE FREE 10 ML: 5 INJECTION INTRAVENOUS at 08:53

## 2021-05-02 RX ADMIN — ONDANSETRON HYDROCHLORIDE 4 MG: 4 TABLET, FILM COATED ORAL at 11:05

## 2021-05-03 PROBLEM — R50.9 FEVER: Status: RESOLVED | Noted: 2021-04-30 | Resolved: 2021-05-03

## 2021-05-03 PROBLEM — N39.0 ACUTE UTI (URINARY TRACT INFECTION): Status: RESOLVED | Noted: 2021-04-30 | Resolved: 2021-05-03

## 2021-05-03 LAB
ADV 40+41 DNA STL QL NAA+NON-PROBE: NOT DETECTED
ANION GAP SERPL CALCULATED.3IONS-SCNC: 13.4 MMOL/L (ref 5–15)
ASTRO TYP 1-8 RNA STL QL NAA+NON-PROBE: NOT DETECTED
BASOPHILS # BLD AUTO: 0.05 10*3/MM3 (ref 0–0.2)
BASOPHILS NFR BLD AUTO: 0.4 % (ref 0–1.5)
BUN SERPL-MCNC: 12 MG/DL (ref 8–23)
BUN/CREAT SERPL: 12.8 (ref 7–25)
C CAYETANENSIS DNA STL QL NAA+NON-PROBE: NOT DETECTED
C COLI+JEJ+UPSA DNA STL QL NAA+NON-PROBE: NOT DETECTED
C DIFF TOX GENS STL QL NAA+PROBE: NEGATIVE
CALCIUM SPEC-SCNC: 9.4 MG/DL (ref 8.6–10.5)
CHLORIDE SERPL-SCNC: 99 MMOL/L (ref 98–107)
CO2 SERPL-SCNC: 26.6 MMOL/L (ref 22–29)
CREAT SERPL-MCNC: 0.94 MG/DL (ref 0.57–1)
CRYPTOSP DNA STL QL NAA+NON-PROBE: NOT DETECTED
DEPRECATED RDW RBC AUTO: 50 FL (ref 37–54)
E HISTOLYT DNA STL QL NAA+NON-PROBE: NOT DETECTED
EAEC PAA PLAS AGGR+AATA ST NAA+NON-PRB: NOT DETECTED
EC STX1+STX2 GENES STL QL NAA+NON-PROBE: NOT DETECTED
EOSINOPHIL # BLD AUTO: 0.23 10*3/MM3 (ref 0–0.4)
EOSINOPHIL NFR BLD AUTO: 2 % (ref 0.3–6.2)
EPEC EAE GENE STL QL NAA+NON-PROBE: NOT DETECTED
ERYTHROCYTE [DISTWIDTH] IN BLOOD BY AUTOMATED COUNT: 18 % (ref 12.3–15.4)
ETEC LTA+ST1A+ST1B TOX ST NAA+NON-PROBE: NOT DETECTED
G LAMBLIA DNA STL QL NAA+NON-PROBE: NOT DETECTED
GFR SERPL CREATININE-BSD FRML MDRD: 59 ML/MIN/1.73
GLUCOSE BLDC GLUCOMTR-MCNC: 169 MG/DL (ref 70–130)
GLUCOSE BLDC GLUCOMTR-MCNC: 192 MG/DL (ref 70–130)
GLUCOSE BLDC GLUCOMTR-MCNC: 276 MG/DL (ref 70–130)
GLUCOSE BLDC GLUCOMTR-MCNC: 291 MG/DL (ref 70–130)
GLUCOSE BLDC GLUCOMTR-MCNC: 303 MG/DL (ref 70–130)
GLUCOSE SERPL-MCNC: 194 MG/DL (ref 65–99)
HCT VFR BLD AUTO: 41.9 % (ref 34–46.6)
HGB BLD-MCNC: 13.6 G/DL (ref 12–15.9)
IMM GRANULOCYTES # BLD AUTO: 0.14 10*3/MM3 (ref 0–0.05)
IMM GRANULOCYTES NFR BLD AUTO: 1.2 % (ref 0–0.5)
LYMPHOCYTES # BLD AUTO: 2.86 10*3/MM3 (ref 0.7–3.1)
LYMPHOCYTES NFR BLD AUTO: 24.9 % (ref 19.6–45.3)
MAGNESIUM SERPL-MCNC: 1.9 MG/DL (ref 1.6–2.4)
MCH RBC QN AUTO: 26.1 PG (ref 26.6–33)
MCHC RBC AUTO-ENTMCNC: 32.5 G/DL (ref 31.5–35.7)
MCV RBC AUTO: 80.3 FL (ref 79–97)
MONOCYTES # BLD AUTO: 1.14 10*3/MM3 (ref 0.1–0.9)
MONOCYTES NFR BLD AUTO: 9.9 % (ref 5–12)
NEUTROPHILS NFR BLD AUTO: 61.6 % (ref 42.7–76)
NEUTROPHILS NFR BLD AUTO: 7.07 10*3/MM3 (ref 1.7–7)
NOROVIRUS GI+II RNA STL QL NAA+NON-PROBE: NOT DETECTED
NRBC BLD AUTO-RTO: 0 /100 WBC (ref 0–0.2)
P SHIGELLOIDES DNA STL QL NAA+NON-PROBE: NOT DETECTED
PLATELET # BLD AUTO: 180 10*3/MM3 (ref 140–450)
PMV BLD AUTO: 10.3 FL (ref 6–12)
POTASSIUM SERPL-SCNC: 3.5 MMOL/L (ref 3.5–5.2)
RBC # BLD AUTO: 5.22 10*6/MM3 (ref 3.77–5.28)
RVA RNA STL QL NAA+NON-PROBE: NOT DETECTED
S ENT+BONG DNA STL QL NAA+NON-PROBE: NOT DETECTED
SAPO I+II+IV+V RNA STL QL NAA+NON-PROBE: NOT DETECTED
SHIGELLA SP+EIEC IPAH ST NAA+NON-PROBE: NOT DETECTED
SODIUM SERPL-SCNC: 139 MMOL/L (ref 136–145)
V CHOL+PARA+VUL DNA STL QL NAA+NON-PROBE: NOT DETECTED
V CHOLERAE DNA STL QL NAA+NON-PROBE: NOT DETECTED
WBC # BLD AUTO: 11.49 10*3/MM3 (ref 3.4–10.8)
Y ENTEROCOL DNA STL QL NAA+NON-PROBE: NOT DETECTED

## 2021-05-03 PROCEDURE — 63710000001 INSULIN LISPRO (HUMAN) PER 5 UNITS: Performed by: NURSE PRACTITIONER

## 2021-05-03 PROCEDURE — 80048 BASIC METABOLIC PNL TOTAL CA: CPT | Performed by: INTERNAL MEDICINE

## 2021-05-03 PROCEDURE — G0378 HOSPITAL OBSERVATION PER HR: HCPCS

## 2021-05-03 PROCEDURE — 25010000002 ONDANSETRON PER 1 MG: Performed by: NURSE PRACTITIONER

## 2021-05-03 PROCEDURE — 94799 UNLISTED PULMONARY SVC/PX: CPT

## 2021-05-03 PROCEDURE — 83735 ASSAY OF MAGNESIUM: CPT | Performed by: INTERNAL MEDICINE

## 2021-05-03 PROCEDURE — 85025 COMPLETE CBC W/AUTO DIFF WBC: CPT | Performed by: INTERNAL MEDICINE

## 2021-05-03 PROCEDURE — 0097U HC BIOFIRE FILMARRAY GI PANEL: CPT | Performed by: NURSE PRACTITIONER

## 2021-05-03 PROCEDURE — 87493 C DIFF AMPLIFIED PROBE: CPT | Performed by: NURSE PRACTITIONER

## 2021-05-03 PROCEDURE — 82962 GLUCOSE BLOOD TEST: CPT

## 2021-05-03 PROCEDURE — 96375 TX/PRO/DX INJ NEW DRUG ADDON: CPT

## 2021-05-03 PROCEDURE — 63710000001 ONDANSETRON PER 8 MG: Performed by: NURSE PRACTITIONER

## 2021-05-03 RX ORDER — NATEGLINIDE 120 MG/1
TABLET ORAL
Qty: 90 TABLET | Refills: 1 | OUTPATIENT
Start: 2021-05-03

## 2021-05-03 RX ORDER — LOPERAMIDE HYDROCHLORIDE 2 MG/1
2 CAPSULE ORAL 4 TIMES DAILY PRN
Start: 2021-05-03 | End: 2021-11-26

## 2021-05-03 RX ORDER — LOPERAMIDE HYDROCHLORIDE 2 MG/1
2 CAPSULE ORAL 4 TIMES DAILY PRN
Status: DISCONTINUED | OUTPATIENT
Start: 2021-05-03 | End: 2021-05-05 | Stop reason: HOSPADM

## 2021-05-03 RX ORDER — ONDANSETRON 4 MG/1
4 TABLET, FILM COATED ORAL EVERY 6 HOURS PRN
Qty: 30 TABLET | Refills: 0 | Status: SHIPPED | OUTPATIENT
Start: 2021-05-03

## 2021-05-03 RX ADMIN — PANTOPRAZOLE SODIUM 40 MG: 40 TABLET, DELAYED RELEASE ORAL at 06:53

## 2021-05-03 RX ADMIN — CARBIDOPA AND LEVODOPA 1 TABLET: 10; 100 TABLET ORAL at 20:40

## 2021-05-03 RX ADMIN — CARBIDOPA AND LEVODOPA 1 TABLET: 10; 100 TABLET ORAL at 09:18

## 2021-05-03 RX ADMIN — INSULIN LISPRO 2 UNITS: 100 INJECTION, SOLUTION INTRAVENOUS; SUBCUTANEOUS at 06:53

## 2021-05-03 RX ADMIN — BUDESONIDE AND FORMOTEROL FUMARATE DIHYDRATE 2 PUFF: 160; 4.5 AEROSOL RESPIRATORY (INHALATION) at 09:27

## 2021-05-03 RX ADMIN — FUROSEMIDE 40 MG: 40 TABLET ORAL at 09:17

## 2021-05-03 RX ADMIN — LOPERAMIDE HYDROCHLORIDE 2 MG: 2 CAPSULE ORAL at 12:03

## 2021-05-03 RX ADMIN — ONDANSETRON HYDROCHLORIDE 4 MG: 4 TABLET, FILM COATED ORAL at 04:15

## 2021-05-03 RX ADMIN — FUROSEMIDE 20 MG: 20 TABLET ORAL at 16:17

## 2021-05-03 RX ADMIN — INSULIN LISPRO 7 UNITS: 100 INJECTION, SOLUTION INTRAVENOUS; SUBCUTANEOUS at 11:46

## 2021-05-03 RX ADMIN — SODIUM CHLORIDE, PRESERVATIVE FREE 10 ML: 5 INJECTION INTRAVENOUS at 20:40

## 2021-05-03 RX ADMIN — BUDESONIDE AND FORMOTEROL FUMARATE DIHYDRATE 2 PUFF: 160; 4.5 AEROSOL RESPIRATORY (INHALATION) at 20:12

## 2021-05-03 RX ADMIN — FERROUS SULFATE TAB 325 MG (65 MG ELEMENTAL FE) 325 MG: 325 (65 FE) TAB at 16:18

## 2021-05-03 RX ADMIN — CITALOPRAM 20 MG: 20 TABLET, FILM COATED ORAL at 09:17

## 2021-05-03 RX ADMIN — APIXABAN 5 MG: 5 TABLET, FILM COATED ORAL at 20:40

## 2021-05-03 RX ADMIN — FERROUS SULFATE TAB 325 MG (65 MG ELEMENTAL FE) 325 MG: 325 (65 FE) TAB at 11:46

## 2021-05-03 RX ADMIN — APIXABAN 5 MG: 5 TABLET, FILM COATED ORAL at 09:17

## 2021-05-03 RX ADMIN — ARIPIPRAZOLE 2 MG: 2 TABLET ORAL at 09:18

## 2021-05-03 RX ADMIN — ONDANSETRON 4 MG: 2 INJECTION INTRAMUSCULAR; INTRAVENOUS at 13:07

## 2021-05-03 RX ADMIN — INSULIN LISPRO 2 UNITS: 100 INJECTION, SOLUTION INTRAVENOUS; SUBCUTANEOUS at 16:19

## 2021-05-03 RX ADMIN — SODIUM CHLORIDE, PRESERVATIVE FREE 10 ML: 5 INJECTION INTRAVENOUS at 13:07

## 2021-05-03 RX ADMIN — PANTOPRAZOLE SODIUM 40 MG: 40 TABLET, DELAYED RELEASE ORAL at 16:16

## 2021-05-03 RX ADMIN — ROSUVASTATIN CALCIUM 20 MG: 20 TABLET, FILM COATED ORAL at 09:17

## 2021-05-03 RX ADMIN — METOPROLOL TARTRATE 50 MG: 50 TABLET, FILM COATED ORAL at 16:16

## 2021-05-03 RX ADMIN — Medication 50 MCG: at 09:18

## 2021-05-03 RX ADMIN — FERROUS SULFATE TAB 325 MG (65 MG ELEMENTAL FE) 325 MG: 325 (65 FE) TAB at 09:17

## 2021-05-03 RX ADMIN — CARBIDOPA AND LEVODOPA 1 TABLET: 10; 100 TABLET ORAL at 16:17

## 2021-05-03 RX ADMIN — POTASSIUM CHLORIDE 10 MEQ: 750 TABLET, EXTENDED RELEASE ORAL at 09:18

## 2021-05-03 RX ADMIN — METOPROLOL TARTRATE 50 MG: 50 TABLET, FILM COATED ORAL at 21:42

## 2021-05-03 RX ADMIN — FLUTICASONE PROPIONATE 2 SPRAY: 50 SPRAY, METERED NASAL at 09:16

## 2021-05-03 RX ADMIN — INSULIN LISPRO 6 UNITS: 100 INJECTION, SOLUTION INTRAVENOUS; SUBCUTANEOUS at 21:42

## 2021-05-03 RX ADMIN — METOPROLOL TARTRATE 50 MG: 50 TABLET, FILM COATED ORAL at 09:18

## 2021-05-04 PROBLEM — E87.6 HYPOKALEMIA: Status: ACTIVE | Noted: 2021-05-04

## 2021-05-04 LAB
ANION GAP SERPL CALCULATED.3IONS-SCNC: 13.2 MMOL/L (ref 5–15)
BASOPHILS # BLD AUTO: 0.08 10*3/MM3 (ref 0–0.2)
BASOPHILS NFR BLD AUTO: 0.6 % (ref 0–1.5)
BUN SERPL-MCNC: 12 MG/DL (ref 8–23)
BUN/CREAT SERPL: 11.7 (ref 7–25)
CALCIUM SPEC-SCNC: 9.5 MG/DL (ref 8.6–10.5)
CHLORIDE SERPL-SCNC: 98 MMOL/L (ref 98–107)
CO2 SERPL-SCNC: 24.8 MMOL/L (ref 22–29)
CREAT SERPL-MCNC: 1.03 MG/DL (ref 0.57–1)
DEPRECATED RDW RBC AUTO: 50.3 FL (ref 37–54)
EOSINOPHIL # BLD AUTO: 0.3 10*3/MM3 (ref 0–0.4)
EOSINOPHIL NFR BLD AUTO: 2.4 % (ref 0.3–6.2)
ERYTHROCYTE [DISTWIDTH] IN BLOOD BY AUTOMATED COUNT: 18.4 % (ref 12.3–15.4)
GFR SERPL CREATININE-BSD FRML MDRD: 53 ML/MIN/1.73
GLUCOSE BLDC GLUCOMTR-MCNC: 148 MG/DL (ref 70–130)
GLUCOSE BLDC GLUCOMTR-MCNC: 169 MG/DL (ref 70–130)
GLUCOSE BLDC GLUCOMTR-MCNC: 210 MG/DL (ref 70–130)
GLUCOSE BLDC GLUCOMTR-MCNC: 350 MG/DL (ref 70–130)
GLUCOSE SERPL-MCNC: 145 MG/DL (ref 65–99)
HCT VFR BLD AUTO: 42 % (ref 34–46.6)
HGB BLD-MCNC: 13.6 G/DL (ref 12–15.9)
IMM GRANULOCYTES # BLD AUTO: 0.15 10*3/MM3 (ref 0–0.05)
IMM GRANULOCYTES NFR BLD AUTO: 1.2 % (ref 0–0.5)
LYMPHOCYTES # BLD AUTO: 3.36 10*3/MM3 (ref 0.7–3.1)
LYMPHOCYTES NFR BLD AUTO: 26.3 % (ref 19.6–45.3)
MCH RBC QN AUTO: 26 PG (ref 26.6–33)
MCHC RBC AUTO-ENTMCNC: 32.4 G/DL (ref 31.5–35.7)
MCV RBC AUTO: 80.3 FL (ref 79–97)
MONOCYTES # BLD AUTO: 1.16 10*3/MM3 (ref 0.1–0.9)
MONOCYTES NFR BLD AUTO: 9.1 % (ref 5–12)
NEUTROPHILS NFR BLD AUTO: 60.4 % (ref 42.7–76)
NEUTROPHILS NFR BLD AUTO: 7.71 10*3/MM3 (ref 1.7–7)
NRBC BLD AUTO-RTO: 0 /100 WBC (ref 0–0.2)
PLATELET # BLD AUTO: 188 10*3/MM3 (ref 140–450)
PMV BLD AUTO: 10.5 FL (ref 6–12)
POTASSIUM SERPL-SCNC: 3.3 MMOL/L (ref 3.5–5.2)
RBC # BLD AUTO: 5.23 10*6/MM3 (ref 3.77–5.28)
SODIUM SERPL-SCNC: 136 MMOL/L (ref 136–145)
WBC # BLD AUTO: 12.76 10*3/MM3 (ref 3.4–10.8)

## 2021-05-04 PROCEDURE — 94799 UNLISTED PULMONARY SVC/PX: CPT

## 2021-05-04 PROCEDURE — 94760 N-INVAS EAR/PLS OXIMETRY 1: CPT

## 2021-05-04 PROCEDURE — 80048 BASIC METABOLIC PNL TOTAL CA: CPT | Performed by: INTERNAL MEDICINE

## 2021-05-04 PROCEDURE — 85025 COMPLETE CBC W/AUTO DIFF WBC: CPT | Performed by: INTERNAL MEDICINE

## 2021-05-04 PROCEDURE — 63710000001 INSULIN LISPRO (HUMAN) PER 5 UNITS: Performed by: NURSE PRACTITIONER

## 2021-05-04 PROCEDURE — 63710000001 ONDANSETRON PER 8 MG: Performed by: NURSE PRACTITIONER

## 2021-05-04 PROCEDURE — 96376 TX/PRO/DX INJ SAME DRUG ADON: CPT

## 2021-05-04 PROCEDURE — 25010000002 KETOROLAC TROMETHAMINE PER 15 MG: Performed by: PSYCHIATRY & NEUROLOGY

## 2021-05-04 PROCEDURE — G0378 HOSPITAL OBSERVATION PER HR: HCPCS

## 2021-05-04 PROCEDURE — 82962 GLUCOSE BLOOD TEST: CPT

## 2021-05-04 RX ORDER — POTASSIUM CHLORIDE 750 MG/1
20 TABLET, FILM COATED, EXTENDED RELEASE ORAL ONCE
Status: COMPLETED | OUTPATIENT
Start: 2021-05-04 | End: 2021-05-04

## 2021-05-04 RX ORDER — INSULIN LISPRO 100 [IU]/ML
0-14 INJECTION, SOLUTION INTRAVENOUS; SUBCUTANEOUS
Status: DISCONTINUED | OUTPATIENT
Start: 2021-05-04 | End: 2021-05-05 | Stop reason: HOSPADM

## 2021-05-04 RX ORDER — FERROUS SULFATE 325(65) MG
325 TABLET ORAL
Status: DISCONTINUED | OUTPATIENT
Start: 2021-05-05 | End: 2021-05-05 | Stop reason: HOSPADM

## 2021-05-04 RX ADMIN — SODIUM CHLORIDE, PRESERVATIVE FREE 10 ML: 5 INJECTION INTRAVENOUS at 08:37

## 2021-05-04 RX ADMIN — PANTOPRAZOLE SODIUM 40 MG: 40 TABLET, DELAYED RELEASE ORAL at 16:23

## 2021-05-04 RX ADMIN — Medication 50 MCG: at 08:33

## 2021-05-04 RX ADMIN — INSULIN LISPRO 3 UNITS: 100 INJECTION, SOLUTION INTRAVENOUS; SUBCUTANEOUS at 17:58

## 2021-05-04 RX ADMIN — METOPROLOL TARTRATE 50 MG: 50 TABLET, FILM COATED ORAL at 20:51

## 2021-05-04 RX ADMIN — CARBIDOPA AND LEVODOPA 1 TABLET: 10; 100 TABLET ORAL at 20:51

## 2021-05-04 RX ADMIN — ARIPIPRAZOLE 2 MG: 2 TABLET ORAL at 08:33

## 2021-05-04 RX ADMIN — APIXABAN 5 MG: 5 TABLET, FILM COATED ORAL at 20:52

## 2021-05-04 RX ADMIN — POTASSIUM CHLORIDE 20 MEQ: 750 TABLET, EXTENDED RELEASE ORAL at 12:59

## 2021-05-04 RX ADMIN — ONDANSETRON HYDROCHLORIDE 4 MG: 4 TABLET, FILM COATED ORAL at 08:32

## 2021-05-04 RX ADMIN — CARBIDOPA AND LEVODOPA 1 TABLET: 10; 100 TABLET ORAL at 08:34

## 2021-05-04 RX ADMIN — CARBIDOPA AND LEVODOPA 1 TABLET: 10; 100 TABLET ORAL at 16:24

## 2021-05-04 RX ADMIN — ROSUVASTATIN CALCIUM 20 MG: 20 TABLET, FILM COATED ORAL at 08:32

## 2021-05-04 RX ADMIN — METOPROLOL TARTRATE 50 MG: 50 TABLET, FILM COATED ORAL at 08:32

## 2021-05-04 RX ADMIN — POTASSIUM CHLORIDE 10 MEQ: 750 TABLET, EXTENDED RELEASE ORAL at 08:32

## 2021-05-04 RX ADMIN — PANTOPRAZOLE SODIUM 40 MG: 40 TABLET, DELAYED RELEASE ORAL at 06:53

## 2021-05-04 RX ADMIN — APIXABAN 5 MG: 5 TABLET, FILM COATED ORAL at 08:32

## 2021-05-04 RX ADMIN — SODIUM CHLORIDE, PRESERVATIVE FREE 10 ML: 5 INJECTION INTRAVENOUS at 20:56

## 2021-05-04 RX ADMIN — HYDROCODONE BITARTRATE AND ACETAMINOPHEN 1 TABLET: 5; 325 TABLET ORAL at 20:51

## 2021-05-04 RX ADMIN — FUROSEMIDE 40 MG: 40 TABLET ORAL at 08:33

## 2021-05-04 RX ADMIN — Medication 2 TABLET: at 18:01

## 2021-05-04 RX ADMIN — ONDANSETRON HYDROCHLORIDE 4 MG: 4 TABLET, FILM COATED ORAL at 16:24

## 2021-05-04 RX ADMIN — BUDESONIDE AND FORMOTEROL FUMARATE DIHYDRATE 2 PUFF: 160; 4.5 AEROSOL RESPIRATORY (INHALATION) at 20:14

## 2021-05-04 RX ADMIN — FERROUS SULFATE TAB 325 MG (65 MG ELEMENTAL FE) 325 MG: 325 (65 FE) TAB at 08:32

## 2021-05-04 RX ADMIN — METOPROLOL TARTRATE 50 MG: 50 TABLET, FILM COATED ORAL at 16:24

## 2021-05-04 RX ADMIN — BUDESONIDE AND FORMOTEROL FUMARATE DIHYDRATE 2 PUFF: 160; 4.5 AEROSOL RESPIRATORY (INHALATION) at 07:47

## 2021-05-04 RX ADMIN — CITALOPRAM 20 MG: 20 TABLET, FILM COATED ORAL at 08:32

## 2021-05-04 RX ADMIN — INSULIN LISPRO 8 UNITS: 100 INJECTION, SOLUTION INTRAVENOUS; SUBCUTANEOUS at 12:59

## 2021-05-04 RX ADMIN — FLUTICASONE PROPIONATE 2 SPRAY: 50 SPRAY, METERED NASAL at 08:33

## 2021-05-04 RX ADMIN — KETOROLAC TROMETHAMINE 30 MG: 30 INJECTION, SOLUTION INTRAMUSCULAR; INTRAVENOUS at 14:43

## 2021-05-05 ENCOUNTER — READMISSION MANAGEMENT (OUTPATIENT)
Dept: CALL CENTER | Facility: HOSPITAL | Age: 71
End: 2021-05-05

## 2021-05-05 VITALS
TEMPERATURE: 97.5 F | WEIGHT: 203.26 LBS | HEART RATE: 82 BPM | BODY MASS INDEX: 36.02 KG/M2 | RESPIRATION RATE: 18 BRPM | HEIGHT: 63 IN | DIASTOLIC BLOOD PRESSURE: 69 MMHG | SYSTOLIC BLOOD PRESSURE: 141 MMHG | OXYGEN SATURATION: 95 %

## 2021-05-05 LAB
ANION GAP SERPL CALCULATED.3IONS-SCNC: 12.4 MMOL/L (ref 5–15)
BACTERIA SPEC AEROBE CULT: NORMAL
BACTERIA SPEC AEROBE CULT: NORMAL
BUN SERPL-MCNC: 14 MG/DL (ref 8–23)
BUN/CREAT SERPL: 13.1 (ref 7–25)
CALCIUM SPEC-SCNC: 9.4 MG/DL (ref 8.6–10.5)
CHLORIDE SERPL-SCNC: 99 MMOL/L (ref 98–107)
CO2 SERPL-SCNC: 25.6 MMOL/L (ref 22–29)
CREAT SERPL-MCNC: 1.07 MG/DL (ref 0.57–1)
DEPRECATED RDW RBC AUTO: 51 FL (ref 37–54)
ERYTHROCYTE [DISTWIDTH] IN BLOOD BY AUTOMATED COUNT: 18.4 % (ref 12.3–15.4)
GFR SERPL CREATININE-BSD FRML MDRD: 50 ML/MIN/1.73
GLUCOSE BLDC GLUCOMTR-MCNC: 172 MG/DL (ref 70–130)
GLUCOSE BLDC GLUCOMTR-MCNC: 178 MG/DL (ref 70–130)
GLUCOSE SERPL-MCNC: 171 MG/DL (ref 65–99)
HCT VFR BLD AUTO: 40.7 % (ref 34–46.6)
HGB BLD-MCNC: 12.9 G/DL (ref 12–15.9)
MCH RBC QN AUTO: 25.3 PG (ref 26.6–33)
MCHC RBC AUTO-ENTMCNC: 31.7 G/DL (ref 31.5–35.7)
MCV RBC AUTO: 79.8 FL (ref 79–97)
PLATELET # BLD AUTO: 180 10*3/MM3 (ref 140–450)
PMV BLD AUTO: 10.1 FL (ref 6–12)
POTASSIUM SERPL-SCNC: 3.7 MMOL/L (ref 3.5–5.2)
RBC # BLD AUTO: 5.1 10*6/MM3 (ref 3.77–5.28)
SODIUM SERPL-SCNC: 137 MMOL/L (ref 136–145)
WBC # BLD AUTO: 12.76 10*3/MM3 (ref 3.4–10.8)

## 2021-05-05 PROCEDURE — 63710000001 INSULIN LISPRO (HUMAN) PER 5 UNITS: Performed by: NURSE PRACTITIONER

## 2021-05-05 PROCEDURE — 80048 BASIC METABOLIC PNL TOTAL CA: CPT | Performed by: NURSE PRACTITIONER

## 2021-05-05 PROCEDURE — 63710000001 ONDANSETRON PER 8 MG: Performed by: NURSE PRACTITIONER

## 2021-05-05 PROCEDURE — 82962 GLUCOSE BLOOD TEST: CPT

## 2021-05-05 PROCEDURE — 85027 COMPLETE CBC AUTOMATED: CPT | Performed by: NURSE PRACTITIONER

## 2021-05-05 PROCEDURE — G0378 HOSPITAL OBSERVATION PER HR: HCPCS

## 2021-05-05 RX ORDER — FERROUS SULFATE 325(65) MG
325 TABLET ORAL
Start: 2021-05-05 | End: 2022-04-05

## 2021-05-05 RX ADMIN — FERROUS SULFATE TAB 325 MG (65 MG ELEMENTAL FE) 325 MG: 325 (65 FE) TAB at 08:05

## 2021-05-05 RX ADMIN — METOPROLOL TARTRATE 50 MG: 50 TABLET, FILM COATED ORAL at 08:05

## 2021-05-05 RX ADMIN — CARBIDOPA AND LEVODOPA 1 TABLET: 10; 100 TABLET ORAL at 08:06

## 2021-05-05 RX ADMIN — APIXABAN 5 MG: 5 TABLET, FILM COATED ORAL at 08:05

## 2021-05-05 RX ADMIN — ROSUVASTATIN CALCIUM 20 MG: 20 TABLET, FILM COATED ORAL at 08:05

## 2021-05-05 RX ADMIN — CITALOPRAM 20 MG: 20 TABLET, FILM COATED ORAL at 08:05

## 2021-05-05 RX ADMIN — ARIPIPRAZOLE 2 MG: 2 TABLET ORAL at 08:05

## 2021-05-05 RX ADMIN — ONDANSETRON HYDROCHLORIDE 4 MG: 4 TABLET, FILM COATED ORAL at 08:06

## 2021-05-05 RX ADMIN — FLUTICASONE PROPIONATE 2 SPRAY: 50 SPRAY, METERED NASAL at 08:06

## 2021-05-05 RX ADMIN — PANTOPRAZOLE SODIUM 40 MG: 40 TABLET, DELAYED RELEASE ORAL at 08:04

## 2021-05-05 RX ADMIN — INSULIN LISPRO 3 UNITS: 100 INJECTION, SOLUTION INTRAVENOUS; SUBCUTANEOUS at 08:04

## 2021-05-05 RX ADMIN — POTASSIUM CHLORIDE 10 MEQ: 750 TABLET, EXTENDED RELEASE ORAL at 08:05

## 2021-05-05 RX ADMIN — INSULIN LISPRO 3 UNITS: 100 INJECTION, SOLUTION INTRAVENOUS; SUBCUTANEOUS at 12:05

## 2021-05-05 RX ADMIN — SODIUM CHLORIDE, PRESERVATIVE FREE 10 ML: 5 INJECTION INTRAVENOUS at 08:04

## 2021-05-05 RX ADMIN — Medication 2 TABLET: at 12:05

## 2021-05-05 NOTE — OUTREACH NOTE
Prep Survey      Responses   [facility] patient discharged from?  [place]   Is LACE score < 7 ?  No   Emergency Room discharge w/ pulse ox?  No   Eligibility  [facility]   Date of Admission  04/29/21   Date of Discharge  05/05/21   Discharge Disposition  Home or Self Care   Discharge diagnosis  Encephalopathy, metabolic,    Acute UTI    Does the patient have one of the following disease processes/diagnoses(primary or secondary)?  Other   Does the patient have Home health ordered?  Yes   What is the Home health agency?   [facility]   Is there a DME ordered?  No   Prep survey completed?  Yes          Teetee Capellan RN

## 2021-05-06 ENCOUNTER — TRANSITIONAL CARE MANAGEMENT TELEPHONE ENCOUNTER (OUTPATIENT)
Dept: CALL CENTER | Facility: HOSPITAL | Age: 71
End: 2021-05-06

## 2021-05-06 NOTE — OUTREACH NOTE
Call Center TCM Note      Responses   [facility] [place] patient discharged from?  [place]   Does the patient have one of the following disease processes/diagnoses(primary or secondary)?  Other   TCM attempt successful?  No   Unsuccessful attempts  Attempt 2          Ruth Hoang RN    5/6/2021, 11:36 EDT

## 2021-05-06 NOTE — OUTREACH NOTE
Call Center TCM Note      Responses   [facility] patient discharged from?  [place]   Does the patient have one of the following disease processes/diagnoses(primary or secondary)?  Other   TCM attempt successful?  No [Verbal Release- Edward ]   Unsuccessful attempts  Attempt 1          Ruth Hoang RN    5/6/2021, 11:14 EDT

## 2021-05-07 ENCOUNTER — TRANSITIONAL CARE MANAGEMENT TELEPHONE ENCOUNTER (OUTPATIENT)
Dept: CALL CENTER | Facility: HOSPITAL | Age: 71
End: 2021-05-07

## 2021-05-07 NOTE — OUTREACH NOTE
Call Center TCM Note      Responses   [facility] patient discharged from?  [place]   Does the patient have one of the following disease processes/diagnoses(primary or secondary)?  Other   TCM attempt successful?  No [Edward-SO]   Unsuccessful attempts  Attempt 3          Magi Hawley RN    5/7/2021, 11:01 EDT

## 2021-05-12 ENCOUNTER — READMISSION MANAGEMENT (OUTPATIENT)
Dept: CALL CENTER | Facility: HOSPITAL | Age: 71
End: 2021-05-12

## 2021-05-14 ENCOUNTER — OFFICE VISIT (OUTPATIENT)
Dept: FAMILY MEDICINE CLINIC | Facility: CLINIC | Age: 71
End: 2021-05-14

## 2021-05-14 VITALS
SYSTOLIC BLOOD PRESSURE: 110 MMHG | BODY MASS INDEX: 33.66 KG/M2 | HEIGHT: 63 IN | DIASTOLIC BLOOD PRESSURE: 60 MMHG | WEIGHT: 190 LBS | HEART RATE: 68 BPM | OXYGEN SATURATION: 96 %

## 2021-05-14 DIAGNOSIS — R41.0 DELIRIUM: Primary | ICD-10-CM

## 2021-05-14 DIAGNOSIS — N39.0 ACUTE UTI: ICD-10-CM

## 2021-05-14 DIAGNOSIS — G43.109 MIGRAINE WITH AURA AND WITHOUT STATUS MIGRAINOSUS, NOT INTRACTABLE: ICD-10-CM

## 2021-05-14 DIAGNOSIS — E11.65 TYPE 2 DIABETES MELLITUS WITH HYPERGLYCEMIA, WITH LONG-TERM CURRENT USE OF INSULIN (HCC): ICD-10-CM

## 2021-05-14 DIAGNOSIS — Z79.4 TYPE 2 DIABETES MELLITUS WITH HYPERGLYCEMIA, WITH LONG-TERM CURRENT USE OF INSULIN (HCC): ICD-10-CM

## 2021-05-14 PROBLEM — Q21.12 PATENT FORAMEN OVALE: Status: ACTIVE | Noted: 2020-10-19

## 2021-05-14 PROBLEM — H81.10 BENIGN PAROXYSMAL POSITIONAL VERTIGO: Status: ACTIVE | Noted: 2017-11-02

## 2021-05-14 PROBLEM — I21.9 MYOCARDIAL INFARCTION: Status: ACTIVE | Noted: 2017-05-09

## 2021-05-14 PROBLEM — G45.9 TRANSIENT CEREBRAL ISCHEMIA: Status: ACTIVE | Noted: 2017-11-02

## 2021-05-14 PROBLEM — J44.9 CHRONIC OBSTRUCTIVE PULMONARY DISEASE (HCC): Status: ACTIVE | Noted: 2017-05-09

## 2021-05-14 PROCEDURE — 99214 OFFICE O/P EST MOD 30 MIN: CPT | Performed by: FAMILY MEDICINE

## 2021-05-14 RX ORDER — NITROFURANTOIN 25; 75 MG/1; MG/1
100 CAPSULE ORAL 2 TIMES DAILY
Qty: 14 CAPSULE | Refills: 0 | Status: SHIPPED | OUTPATIENT
Start: 2021-05-14 | End: 2021-09-17

## 2021-05-14 NOTE — PROGRESS NOTES
Subjective   Dalila Javed is a 70 y.o. female. Presents today for   Chief Complaint   Patient presents with   • Dizziness     ER Follow up    • Loss of Consciousness     feels like it - ER follow up    • Nausea     ER follow up        Ms. Javed is a 70 y.o. female with a history of numerous medical issues including diabetes, morbid obesity, CAD who presented to [facility] initially complaining of confusion & lethargy with fever.  Please see the admitting history and physical for further details.  She was found to have AMS with possible UTI and polypharmacy and was admitted to the hospital for further evaluation and treatment.  She was started on antibiotics, completing 3 day course. Cultures remained no growth. Neurology followed. There was no evidence of acute intracranial findings on imaging. She was treated for migraine and given prescription for Nurtec ODT at discharge. No further neuro workup was indicated. Neurology may consider occipital nerve block as outpatient if headaches continue but anticoagulation would need to be held. Home medications were adjusted. She has returned to baseline in regards to cognition. She has had intermittent nausea with 1 episode of vomiting and diarrhea. Stool studies were negative. Overall symptoms have improved but she still has decreased appetite. Recommend follow up with PCP in 1 week for follow up. WBC is stable. She is afebrile. Medically she is stable for discharge.  Dizziness  This is a recurrent problem. The current episode started 1 to 4 weeks ago. The problem occurs intermittently. The problem has been waxing and waning. Associated symptoms include nausea. Pertinent negatives include no chills. Treatments tried: better after treatment.   Urinary Tract Infection   This is a recurrent problem. The current episode started 1 to 4 weeks ago. The problem occurs intermittently. The problem has been waxing and waning. The quality of the pain is  described as burning. The patient is experiencing no pain. There has been no fever. Associated symptoms include frequency, nausea and urgency. Pertinent negatives include no chills. Treatments tried: completed abx, felt better and now worse again. Her past medical history is significant for recurrent UTIs.   Diabetes  She presents for her follow-up diabetic visit. She has type 2 diabetes mellitus. Her disease course has been fluctuating (100s to 300s). Hypoglycemia symptoms include dizziness. Current diabetic treatment includes insulin injections.   Migraine   This is a chronic problem. The current episode started more than 1 month ago. Associated symptoms include dizziness and nausea. Treatments tried: better now. Her past medical history is significant for migraine headaches.   Diarrhea   This is a new problem. The current episode started 1 to 4 weeks ago. Pertinent negatives include no chills. She has tried change of diet and increased fluids for the symptoms. The treatment provided moderate (reports resolved;  GI profile and c. diff negative) relief.       Review of Systems   Constitutional: Negative for chills.   Gastrointestinal: Positive for diarrhea and nausea.   Genitourinary: Positive for frequency and urgency.   Neurological: Positive for dizziness.       Patient Active Problem List   Diagnosis   • Adjustment disorder with mixed anxiety and depressed mood   • Atopic rhinitis   • Coronary arteriosclerosis in native artery   • Cobalamin deficiency   • Benign colonic polyp   • Lumbar radiculopathy   • Controlled diabetes mellitus type 2 with complications (CMS/HCC)   • Binocular vision disorder with diplopia   • Dyslipidemia   • Arthropathy of hand   • Knee pain   • Cramps of lower extremity   • Low back pain   • Chronic nausea   • Obstructive sleep apnea syndrome   • Palpitations   • Ventricular premature beats   • Cephalalgia   • Colonic constipation   • Neurocardiogenic syncope   • Vitamin D deficiency    • DODSON (nonalcoholic steatohepatitis)   • Fibromyalgia   • Morbidly obese (CMS/HCC)   • Polyp of colon   • Family history of colonic polyps   • Family history of malignant neoplasm of colon   • Acute CVA (cerebrovascular accident) (CMS/HCC)   • Episode of dizziness   • Dizziness   • History of stroke   • Chronic right shoulder pain   • Encephalopathy, metabolic   • Migraine without aura or status migrainosus   • Parkinson disease (CMS/HCC)   • Hypokalemia   • Benign paroxysmal positional vertigo   • Chronic obstructive pulmonary disease (CMS/HCC)   • Gastroesophageal reflux disease   • History of percutaneous transluminal coronary angioplasty   • Hypercholesterolemia   • Hypertensive disorder   • Myocardial infarction (CMS/HCC)   • Occipital neuralgia   • Patent foramen ovale   • Transient cerebral ischemia       Social History     Socioeconomic History   • Marital status:      Spouse name: Not on file   • Number of children: Not on file   • Years of education: Not on file   • Highest education level: Not on file   Tobacco Use   • Smoking status: Former Smoker     Quit date: 1980     Years since quittin.3   • Smokeless tobacco: Never Used   Substance and Sexual Activity   • Alcohol use: No   • Drug use: No   • Sexual activity: Yes     Partners: Male       Allergies   Allergen Reactions   • Duloxetine Hcl Hallucinations   • Viibryd [Vilazodone Hcl] Hallucinations   • Metformin Unknown - High Severity   • Morphine And Related    • Nsaids    • Oxycodone    • Penicillins    • Statins    • Benadryl [Diphenhydramine] Other (See Comments)     shaking   • Carafate [Sucralfate] GI Intolerance       Current Outpatient Medications on File Prior to Visit   Medication Sig Dispense Refill   • ACCU-CHEK FASTCLIX LANCETS misc USE TO TEST BLOOD SUGAR UP TO FIVE TIMES DAILY AS DIRECTED. 408 each 0   • albuterol (PROAIR HFA) 108 (90 BASE) MCG/ACT inhaler  INHALE 1 TO 2 PUFFS EVERY 4 TO 6 HOURS AS NEEDED 1 inhaler 1  "  • alendronate (FOSAMAX) 70 MG tablet TAKE 1 TABLET BY MOUTH EVERY 7 DAYS 12 tablet 1   • ARIPiprazole (ABILIFY) 2 MG tablet TAKE 1 TABLET BY MOUTH DAILY 90 tablet 1   • carbidopa-levodopa (SINEMET)  MG per tablet TAKE 1 TABLET BY MOUTH THREE TIMES DAILY 90 tablet 5   • Cholecalciferol (VITAMIN D3) 5000 UNITS capsule capsule Take 1 capsule by mouth daily. 30 capsule 5   • citalopram (CeleXA) 20 MG tablet TAKE 1 TABLET BY MOUTH DAILY 90 tablet 0   • Diclofenac Sodium (Voltaren) 1 % gel gel Apply 4 g topically to the appropriate area as directed 4 (Four) Times a Day As Needed (hip pain). 100 g 2   • Eliquis 5 MG tablet tablet TAKE 1 TABLET BY MOUTH TWICE DAILY 60 tablet 5   • ferrous sulfate 325 (65 FE) MG tablet Take 1 tablet by mouth Daily With Breakfast.     • furosemide (LASIX) 20 MG tablet TAKE 2 TABLETS BY MOUTH EVERY MORNING AND 1 TABLET IN THE EARLY AFTERNOON 270 tablet 0   • glipizide (GLUCOTROL) 5 MG tablet TAKE 1/2 TABLET BY MOUTH DAILY WITH DINNER 45 tablet 0   • glucose blood test strip Test up to 5 times daily for low blood sugar and symptomatic glycemia 450 each 3   • HYDROcodone-acetaminophen (NORCO)  MG per tablet      • insulin NPH (NovoLIN N ReliOn) 100 UNIT/ML injection Inject 8 Units under the skin into the appropriate area as directed 2 (Two) Times a Day Before Meals. 5 mL 2   • Insulin Syringe 31G X 5/16\" 1 ML misc Use as directed to inject relion twice daily 100 each 0   • loperamide (IMODIUM) 2 MG capsule Take 1 capsule by mouth 4 (Four) Times a Day As Needed for Diarrhea.     • metoprolol tartrate (LOPRESSOR) 50 MG tablet TAKE 1 TABLET BY MOUTH THREE TIMES DAILY 270 tablet 0   • omeprazole (priLOSEC) 40 MG capsule TAKE 1 CAPSULE BY MOUTH TWICE DAILY 180 capsule 3   • ondansetron (ZOFRAN) 4 MG tablet Take 1 tablet by mouth Every 6 (Six) Hours As Needed for Nausea or Vomiting. 30 tablet 0   • potassium chloride (KLOR-CON) 8 MEQ CR tablet TAKE 1 TABLET BY MOUTH TWICE DAILY 180 " "tablet 0   • Rimegepant Sulfate (NURTEC) 75 MG tablet dispersible tablet Take 1 tablet by mouth Daily As Needed (migraine headache). 30 tablet 1   • rosuvastatin (CRESTOR) 20 MG tablet TAKE 1 TABLET BY MOUTH DAILY 90 tablet 0   • SYMBICORT 160-4.5 MCG/ACT inhaler INHALE 2 PUFFS BY MOUTH TWICE DAILY. RINSE MOUTH AFTER USE 10.2 g 2   • vitamin B-12 (CYANOCOBALAMIN) 100 MCG tablet Take 50 mcg by mouth Daily.     • nitroglycerin (NITROSTAT) 0.4 MG SL tablet ONE TABLET UNDER TONGUE AS NEEDED FOR CHEST PAIN EVERY 5 MINUTES FOR UP TO 3 DOSES, CALL 911 IF PAIN PERSISTS 300 tablet 5     No current facility-administered medications on file prior to visit.       Objective   Vitals:    05/14/21 1055   BP: 110/60   Pulse: 68   SpO2: 96%   Weight: 86.2 kg (190 lb)   Height: 160 cm (63\")     Body mass index is 33.66 kg/m².    Physical Exam  Study Result    Narrative & Impression   MRI BRAIN WITH AND WITHOUT CONTRAST     CLINICAL HISTORY: Worsening headache following stroke.     TECHNIQUE: MRI of the brain was obtained with sagittal T1, axial pre and  postgadolinium T1, coronal postgadolinium T1, axial FLAIR, axial T2,  axial diffusion, and axial susceptibility weighted images.     COMPARISON: Comparison is made to previous MRI of the brain dated  09/30/2020.     FINDINGS:      There is a tiny dural-based extra-axial mass along the medial aspect of  the left occipital lobe which measures up to approximately 4 x 4 mm in  greatest transverse and craniocaudal dimensions and approximately 5 mm  in greatest AP dimensions. This lesion is most compatible with a tiny  meningioma.     There are findings compatible with a chronic infarct within the left PCA  distribution involving the medial aspects of the left temporal and  occipital lobes. This focus of encephalomalacia measures up to 5.0 x 2.0  cm in greatest axial dimensions. There is a small chronic infarct within  the left thalamus which measures up to approximately 11 x 2 mm " in  greatest axial dimensions. Mild changes of chronic small vessel ischemic  phenomena are appreciated. There is a punctate focus of susceptibility  artifact within the corticomedullary interface of the right occipital  lobe which is compatible with a chronic microhemorrhage which in turn is  likely due to underlying amyloid. Overall, no significant interval  change is seen since the prior exam.     Otherwise, the ventricles, sulci, and cisterns are age-appropriate.  There are no abnormal foci of restricted diffusion. The midline  intracranial anatomy is within normal limits. The major intracranial  flow-related signal voids are within normal limits. No abnormal areas of  contrast enhancement are identified within the brain parenchyma.     Incidental note is made of a small right mastoid effusion. There is mild  mucosal thickening appreciated within the ethmoid air cells.     IMPRESSION:     Stable findings when compared to the previous head CT dated 09/30/2020.  No acute intracranial pathology is evident.     Findings compatible with a tiny 4 x 4 x 5 mm meningioma arising from the  left aspect of the posterior portion of the falx cerebri along the  medial portion of the left occipital lobe.     Again noted are findings compatible with chronic infarcts involving the  medial portions of the left temporal and occipital lobes within the left  PCA distribution as well as the left thalamus also within the posterior  cranial circulation distribution.     A punctate focus of susceptibility artifact is seen within the  corticomedullary interface of the right occipital lobe compatible with a  chronic microhemorrhage which in turn is likely due to underlying  amyloid.     This report was finalized on 5/1/2021 11:17 AM by Dr. Rafal Helm M.D.     Component      Latest Ref Rng & Units 5/5/2021   Glucose      65 - 99 mg/dL 171 (H)   BUN      8 - 23 mg/dL 14   Creatinine      0.57 - 1.00 mg/dL 1.07 (H)   Sodium      136 - 145  mmol/L 137   Potassium      3.5 - 5.2 mmol/L 3.7   Chloride      98 - 107 mmol/L 99   CO2      22.0 - 29.0 mmol/L 25.6   Calcium      8.6 - 10.5 mg/dL 9.4   eGFR Non African Am      >60 mL/min/1.73 50 (L)   BUN/Creatinine Ratio      7.0 - 25.0 13.1   Anion Gap      5.0 - 15.0 mmol/L 12.4     Assessment/Plan   Diagnoses and all orders for this visit:    1. Delirium (Primary)    2. Acute UTI  -     nitrofurantoin, macrocrystal-monohydrate, (Macrobid) 100 MG capsule; Take 1 capsule by mouth 2 (Two) Times a Day.  Dispense: 14 capsule; Refill: 0  -     Urine Culture - , Urine, Clean Catch    3. Migraine with aura and without status migrainosus, not intractable    4. Type 2 diabetes mellitus with hyperglycemia, with long-term current use of insulin (CMS/[facility])    Diarrhea resolved  dm2 not cotnrolled, work on diet and will tx for infection;  Did not think could give specimen but was able to leave urine for culture  Migraine has settle some  Delirium has cleared;  MRI did note prior infarcts, will need work on tight blood sugar and lipid control  We discussed polypharmacy though most meds needed for her co-morbidities;  We discusse dmood medications,but relates intolerant of going off as severely depressed and feels better on.           -Follow up: 2 months to 3 months and prn

## 2021-05-16 LAB
BACTERIA UR CULT: NORMAL
BACTERIA UR CULT: NORMAL

## 2021-05-17 ENCOUNTER — PATIENT OUTREACH (OUTPATIENT)
Dept: CASE MANAGEMENT | Facility: OTHER | Age: 71
End: 2021-05-17

## 2021-05-17 NOTE — OUTREACH NOTE
The main concerns and/or symptoms the patient would like to address are: nausea    Education/instruction provided by Care Coordinator: Call to pt to FU recent hospitalization. Pt states she isnot feeling very well. She has had nausea since discharge and is finding it hard to eat. She does have zofran and prilosec but states she does not like to take a lot of medication. Discussed her recent need for antibiotics. She is about to finish her last one in the next few days. She did see Dr Brar Friday and discussed both her nausea and diarrhea. Suggested she discuss possible initiation of a probiotic on her next visit 6/2. She is monitoring her glucose and this morning it was 180. Encouraged to stay well hydrated and to take her zofran when she is nauseated. Also encouraged her to eat well during those times she is able to eat to maintain good nutrition. Also suggested she track what she is eating to see if any one food is making her nauseated.  Pt states her spouse is with her during the day. He does have cancer and she is worried about him. She feels guilty that he is taking care of her because she is ill. Pt allowed to vent. She denies any other questions or concerns at this time. She does have [facility] number in case needed.     Follow Up Outreach Due: monthly    Yvette Pugh RN  Ambulatory     5/17/2021, 11:35 EDT

## 2021-05-19 ENCOUNTER — TELEPHONE (OUTPATIENT)
Dept: FAMILY MEDICINE CLINIC | Facility: CLINIC | Age: 71
End: 2021-05-19

## 2021-05-19 NOTE — TELEPHONE ENCOUNTER
PATIENT CALLING IN REGARDS TO SPEAK WITH DR FAIRBANKS ABOUT STOPPINGTHE NEW MEDICATION SHE IS ON DUE TO ESPHOGAUS PAIN. PLEASE ADVISE, THANK YOU!

## 2021-05-19 NOTE — TELEPHONE ENCOUNTER
"She had small amount of normal bo on culture, so essentially negative.  However, urine cultures can be negative though true UTI 10 to 15% of the time.  Unfortunately, many antibiotics have GI side effects.  She can stop if she wants.  If still symptoms, may want to culture again.  RRJ      Pt says she was given nitrofurantoin for UTI at recent visit but this is causing an upset stomach and \"esophagus issues\" so she is stopping. She wants to know if her urine culture results are back and if she should take something else or can she just stop?   "

## 2021-05-21 NOTE — TELEPHONE ENCOUNTER
Patient informed, she states UTI symptoms seem to be resolved but she will call back if they return.

## 2021-05-27 ENCOUNTER — PATIENT OUTREACH (OUTPATIENT)
Dept: CASE MANAGEMENT | Facility: OTHER | Age: 71
End: 2021-05-27

## 2021-05-27 NOTE — OUTREACH NOTE
The main concerns and/or symptoms the patient would like to address are: new issues regarding husbands care    Education/instruction provided by Care Coordinator: Call to pt who states she is doing well although they just got some disturbing information. Her  did see nephrologist today and will need to start dialysis and possible kidney transplant. Unfortunately due to COVID quarantine she was unable to go in with him. She is quite distressed at this time. Encouraged her to look at his AVS for information and for any additional questions to call MD office. Pt states she plans on doing so but is having difficulty with the idea of dialysis for him. She states they have discussed dialysis at home. Given some information verbally regarding peritoneal dialysis and education prior to. Allowed to vent. Pt with no questions regarding her own care. Will call back next month. She does have [facility] number in case needed.     Follow Up Outreach Due: 1 month    Yvette Pugh RN  Ambulatory     5/27/2021, 11:48 EDT     Pt on 1L NC with no distress.

## 2021-06-15 DIAGNOSIS — R60.0 LOWER EXTREMITY EDEMA: ICD-10-CM

## 2021-06-15 RX ORDER — POTASSIUM CHLORIDE 600 MG/1
TABLET, FILM COATED, EXTENDED RELEASE ORAL
Qty: 180 TABLET | Refills: 0 | Status: SHIPPED | OUTPATIENT
Start: 2021-06-15 | End: 2021-09-09

## 2021-06-17 ENCOUNTER — TELEPHONE (OUTPATIENT)
Dept: FAMILY MEDICINE CLINIC | Facility: CLINIC | Age: 71
End: 2021-06-17

## 2021-06-21 RX ORDER — CALCIUM CARB/VITAMIN D3/VIT K1 500-100-40
TABLET,CHEWABLE ORAL
Qty: 100 EACH | Refills: 0 | Status: SHIPPED | OUTPATIENT
Start: 2021-06-21 | End: 2021-08-19

## 2021-06-21 RX ORDER — FUROSEMIDE 20 MG/1
TABLET ORAL
Qty: 270 TABLET | Refills: 0 | Status: SHIPPED | OUTPATIENT
Start: 2021-06-21 | End: 2021-09-09

## 2021-07-01 ENCOUNTER — TELEPHONE (OUTPATIENT)
Dept: FAMILY MEDICINE CLINIC | Facility: CLINIC | Age: 71
End: 2021-07-01

## 2021-07-01 DIAGNOSIS — M79.641 PAIN IN BOTH HANDS: ICD-10-CM

## 2021-07-01 DIAGNOSIS — M79.642 PAIN IN BOTH HANDS: ICD-10-CM

## 2021-07-01 DIAGNOSIS — M25.551 RIGHT HIP PAIN: Primary | ICD-10-CM

## 2021-07-01 NOTE — TELEPHONE ENCOUNTER
Caller: Dalila Sigala    Relationship: Self    Best call back number: [phone]    What is the medical concern/diagnosis: RIGHT SIDE BODY PAIN AND DIFFICULTY WITH MOVEMENT.  SHE ALSO STATES SHE IS HAVING PROBLEM WITH USE OF HANDS,    What specialty or service is being requested: PHYSICAL THERAPY    Any additional details: PATIENT WOULD LIKE TO GO TO [facility].    PLEASE ADVISE.

## 2021-07-08 RX ORDER — CITALOPRAM 20 MG/1
20 TABLET ORAL DAILY
Qty: 90 TABLET | Refills: 0 | Status: SHIPPED | OUTPATIENT
Start: 2021-07-08 | End: 2021-10-11

## 2021-07-14 ENCOUNTER — PATIENT OUTREACH (OUTPATIENT)
Dept: CASE MANAGEMENT | Facility: OTHER | Age: 71
End: 2021-07-14

## 2021-07-14 NOTE — OUTREACH NOTE
Ambulatory Case Management Note    Call to pt who states she is doing well except for the pain and numbness that goes up her arm and shoulder. This also gets very numb. She has used gabapentin in the past but this was stopped due to her confusion while in the hospital. She has an appt with PCP soon and will discuss this as well as outpt PT.     Pt states since her stroke she is unable to remember day to day things. Discussed short term memory loss related to strokes and head injuries. Suggested use of daily calenders and journals to remember appts and to write down things she want to remember. She states her  is always home with her and he does fix her medi planner for her weekly. She states he is very protective of her. She does worry about what might happen if he were to become incapacitated. Suggested she discuss this with him and make sure he writes down things she would need to know. Pt denies any other issues and will discuss above with PCP.        Yvette Pugh RN  Ambulatory Case Management    7/14/2021, 14:41 EDT

## 2021-07-22 NOTE — TELEPHONE ENCOUNTER
Caller: Dalila Sigala     Relationship: Self     Best call back number: [phone]     What is the medical concern/diagnosis: RIGHT SIDE BODY PAIN AND DIFFICULTY WITH MOVEMENT.  SHE ALSO STATES SHE IS HAVING PROBLEM WITH USE OF HANDS,     What specialty or service is being requested: PHYSICAL THERAPY     Any additional details: PATIENT WOULD LIKE TO GO TO [facility].

## 2021-08-04 ENCOUNTER — OFFICE VISIT (OUTPATIENT)
Dept: FAMILY MEDICINE CLINIC | Facility: CLINIC | Age: 71
End: 2021-08-04

## 2021-08-04 VITALS
SYSTOLIC BLOOD PRESSURE: 116 MMHG | OXYGEN SATURATION: 96 % | WEIGHT: 187 LBS | HEIGHT: 63 IN | BODY MASS INDEX: 33.13 KG/M2 | DIASTOLIC BLOOD PRESSURE: 64 MMHG | HEART RATE: 64 BPM

## 2021-08-04 DIAGNOSIS — M79.601 ARM PAIN, ANTERIOR, RIGHT: ICD-10-CM

## 2021-08-04 DIAGNOSIS — R41.840 CONCENTRATION DEFICIT: ICD-10-CM

## 2021-08-04 DIAGNOSIS — Z86.73 HISTORY OF STROKE: Primary | ICD-10-CM

## 2021-08-04 DIAGNOSIS — G47.01 INSOMNIA DUE TO MEDICAL CONDITION: ICD-10-CM

## 2021-08-04 DIAGNOSIS — M79.7 FIBROMYALGIA: ICD-10-CM

## 2021-08-04 DIAGNOSIS — E11.65 TYPE 2 DIABETES MELLITUS WITH HYPERGLYCEMIA, WITH LONG-TERM CURRENT USE OF INSULIN (HCC): ICD-10-CM

## 2021-08-04 DIAGNOSIS — Z79.4 TYPE 2 DIABETES MELLITUS WITH HYPERGLYCEMIA, WITH LONG-TERM CURRENT USE OF INSULIN (HCC): ICD-10-CM

## 2021-08-04 DIAGNOSIS — R26.9 GAIT ABNORMALITY: ICD-10-CM

## 2021-08-04 PROCEDURE — 99214 OFFICE O/P EST MOD 30 MIN: CPT | Performed by: NURSE PRACTITIONER

## 2021-08-04 RX ORDER — GABAPENTIN 300 MG/1
300 CAPSULE ORAL NIGHTLY PRN
Qty: 30 CAPSULE | Refills: 0 | Status: ON HOLD | OUTPATIENT
Start: 2021-08-04 | End: 2022-07-15

## 2021-08-04 RX ORDER — PHENOL 1.4 %
10 AEROSOL, SPRAY (ML) MUCOUS MEMBRANE
COMMUNITY
End: 2022-07-12

## 2021-08-04 RX ORDER — GABAPENTIN 100 MG/1
100 CAPSULE ORAL DAILY
COMMUNITY
End: 2021-08-04 | Stop reason: SDUPTHER

## 2021-08-04 RX ORDER — LAMOTRIGINE 25 MG/1
25 TABLET ORAL 2 TIMES DAILY
Status: ON HOLD | COMMUNITY
Start: 2021-06-24 | End: 2022-07-15

## 2021-08-04 NOTE — PROGRESS NOTES
"Chief Complaint  Depression, Diabetes, Hyperlipidemia, and Hypertension    Subjective        Having a hard time getting out and walking. R side is painful and cold since the stroke. Feels tight and can't bend. Shoulder moves pretty well, but tight in the biceps area.   Not sleeping. Back on lower dose gabapentin, but not helping  Seen today with Ed  TV at night constant distraction. Used to love to read.   Cautiously increase gabapentin to 300mg--had mental status decompensation on higher dose post stroke.   Taking NPH 8 units 2 x day, at night 106, can wake from low to 176  Dalila Burger presents to [facility]  History of Present Illness  See above  Objective   Vital Signs:   /64   Pulse 64   Ht 160 cm (62.99\")   Wt 84.8 kg (187 lb)   SpO2 96%   BMI 33.13 kg/m²     Physical Exam  Constitutional:       Appearance: She is well-developed. She is not diaphoretic.   HENT:      Head: Normocephalic and atraumatic.   Neck:      Thyroid: No thyromegaly.   Cardiovascular:      Rate and Rhythm: Normal rate and regular rhythm.      Pulses:           Carotid pulses are 2+ on the right side and 2+ on the left side.     Heart sounds: Normal heart sounds.   Pulmonary:      Effort: Pulmonary effort is normal.      Breath sounds: Normal breath sounds.   Musculoskeletal:      Right shoulder: Tenderness present. No bony tenderness. Decreased range of motion. Normal strength.      Right upper arm: Tenderness (biceps groove) present.      Cervical back: Normal range of motion and neck supple.   Lymphadenopathy:      Cervical: No cervical adenopathy.   Psychiatric:         Behavior: Behavior normal.         Thought Content: Thought content normal.         Judgment: Judgment normal.        Result Review :{Labs  Result Review  Imaging  Med Tab  Media  Procedures :23}   The following data was reviewed by: RL Dia on 08/04/2021:  Electrolytes    Electrolytes 5/3/21 " 5/4/21 5/5/21   Sodium 139 136 137   Potassium 3.5 3.3 (A) 3.7   Chloride 99 98 99   Calcium 9.4 9.5 9.4   (A) Abnormal value       Comments are available for some flowsheets but are not being displayed.           Data reviewed: Recent hospitalization notes [facility], [facility]          Assessment and Plan    Diagnoses and all orders for this visit:    1. History of stroke (Primary)  -     Ambulatory Referral to Physical Therapy  -     Ambulatory Referral to Speech Therapy    2. Fibromyalgia  -     Ambulatory Referral to Physical Therapy    3. Arm pain, anterior, right  -     Ambulatory Referral to Physical Therapy    4. Insomnia due to medical condition  -     gabapentin (NEURONTIN) 300 MG capsule; Take 1 capsule by mouth At Night As Needed (sleep).  Dispense: 30 capsule; Refill: 0    5. Concentration deficit  -     Ambulatory Referral to Speech Therapy    6. Gait abnormality  -     Ambulatory Referral to Physical Therapy    7. Type 2 diabetes mellitus with hyperglycemia, with long-term current use of insulin (CMS/[facility])  -     Hemoglobin A1c      Hx stroke--expressive aphasia and trouble processing. Instructions written and reviewed with Ed. Recommend PT and speech  FMS--PT is standard of care  Arm pain--imaging if PT not settling  Insomnia--increase gabapentin--monitor closely for side effects.   Gait abnormality--consider increased stability with additional aids. Cane insufficient for balance issues now  A8SQ--cx having hypoglycemia--recommend titrating insulin down by 2 units 2 x week to keep fasting about 120      I spent 34 minutes caring for Dalila on this date of service. This time includes time spent by me in the following activities:preparing for the visit, reviewing tests, performing a medically appropriate examination and/or evaluation , counseling and educating the patient/family/caregiver, ordering medications, tests, or procedures and documenting information in the medical record  Follow Up   Return in about 3  months (around 11/4/2021).  Patient was given instructions and counseling regarding her condition or for health maintenance advice. Please see specific information pulled into the AVS if appropriate.

## 2021-08-05 LAB — HBA1C MFR BLD: 8 % (ref 4.8–5.6)

## 2021-08-05 NOTE — PROGRESS NOTES
Please call the patient regarding her abnormal result. Some improvement with A1c 8, changes to insulin as discussed.

## 2021-08-10 DIAGNOSIS — G20 PARKINSON'S DISEASE (HCC): ICD-10-CM

## 2021-08-10 RX ORDER — GLIPIZIDE 5 MG/1
TABLET ORAL
Qty: 45 TABLET | Refills: 0 | Status: SHIPPED | OUTPATIENT
Start: 2021-08-10 | End: 2021-10-29 | Stop reason: SDUPTHER

## 2021-08-10 RX ORDER — METOPROLOL TARTRATE 50 MG/1
TABLET, FILM COATED ORAL
Qty: 270 TABLET | Refills: 0 | Status: SHIPPED | OUTPATIENT
Start: 2021-08-10 | End: 2021-11-08

## 2021-08-12 RX ORDER — HUMAN INSULIN 100 [IU]/ML
INJECTION, SUSPENSION SUBCUTANEOUS
Qty: 10 ML | Refills: 1 | Status: SHIPPED | OUTPATIENT
Start: 2021-08-12 | End: 2021-10-29 | Stop reason: SDUPTHER

## 2021-08-19 RX ORDER — CALCIUM CARB/VITAMIN D3/VIT K1 500-100-40
TABLET,CHEWABLE ORAL
Qty: 100 EACH | Refills: 0 | Status: SHIPPED | OUTPATIENT
Start: 2021-08-19 | End: 2021-10-12

## 2021-08-20 ENCOUNTER — PATIENT OUTREACH (OUTPATIENT)
Dept: CASE MANAGEMENT | Facility: OTHER | Age: 71
End: 2021-08-20

## 2021-08-20 NOTE — OUTREACH NOTE
Patient Outreach      Ambulatory Case Management Note    Call placed to pt who states she is still having a lot of pain. She was restarted on her Gabapentin and that seems to be helping. Pt is to start outpt PT this week and is very hopeful this will help. She is going to start outpt ST as well due to continued memory issues. She states the last couple of years feel foggy like she has been in a dream. Pt praised for her continued work and positive outlook. Encouraged to just focus on each day and do her best  Her AIC this month was 8.0.. No questions or concerns at this time.       Yvette Pugh RN  Ambulatory Case Management    8/20/2021, 14:09 EDT

## 2021-08-30 DIAGNOSIS — M79.641 PAIN IN BOTH HANDS: ICD-10-CM

## 2021-08-30 DIAGNOSIS — M79.601 ARM PAIN, ANTERIOR, RIGHT: Primary | ICD-10-CM

## 2021-08-30 DIAGNOSIS — M79.7 FIBROMYALGIA: ICD-10-CM

## 2021-08-30 DIAGNOSIS — M79.642 PAIN IN BOTH HANDS: ICD-10-CM

## 2021-08-30 DIAGNOSIS — Z86.73 HISTORY OF STROKE: ICD-10-CM

## 2021-09-05 NOTE — TELEPHONE ENCOUNTER
Patient informed. She will call to arrange a FU appt.  
Patient says she went to [facility]'s ER over the weekend for a migraine. She is not supposed to take any NSAIDS and after talking with a pharmacist she could not find anything safe to take for the pain. The ER gave her medication and asked her to contact PCP for a prescription to take next time this happens. Please advise.  
The only one I see is Dr. Lugo in 2016. Would need follow up in office for migraine medication  
Bought in by daughter (Lv Potter [phone]) pt landed at 6pm at [facility] from a flight from [facility]. He was Diagnosed with "mini strokes" in [facility] because he has been decorticating 3 days ago.  In triage, pt is aaox4, ambulatory and complaining of left shoulder pain.  He denies recollection of the events his daughter speaks of.  He voiced left shoulder and left hip pain.  He has h/o HTN only, as per daughter

## 2021-09-07 RX ORDER — ROSUVASTATIN CALCIUM 20 MG/1
20 TABLET, COATED ORAL DAILY
Qty: 90 TABLET | Refills: 0 | Status: SHIPPED | OUTPATIENT
Start: 2021-09-07 | End: 2021-11-29

## 2021-09-09 DIAGNOSIS — R60.0 LOWER EXTREMITY EDEMA: ICD-10-CM

## 2021-09-09 DIAGNOSIS — I63.9 ACUTE CVA (CEREBROVASCULAR ACCIDENT) (HCC): ICD-10-CM

## 2021-09-09 RX ORDER — FUROSEMIDE 20 MG/1
TABLET ORAL
Qty: 270 TABLET | Refills: 0 | Status: SHIPPED | OUTPATIENT
Start: 2021-09-09 | End: 2021-12-08

## 2021-09-09 RX ORDER — POTASSIUM CHLORIDE 600 MG/1
TABLET, FILM COATED, EXTENDED RELEASE ORAL
Qty: 180 TABLET | Refills: 0 | Status: SHIPPED | OUTPATIENT
Start: 2021-09-09 | End: 2021-12-08

## 2021-09-09 RX ORDER — APIXABAN 5 MG/1
TABLET, FILM COATED ORAL
Qty: 60 TABLET | Refills: 5 | Status: SHIPPED | OUTPATIENT
Start: 2021-09-09 | End: 2022-03-08

## 2021-09-11 ENCOUNTER — HOSPITAL ENCOUNTER (EMERGENCY)
Facility: HOSPITAL | Age: 71
Discharge: HOME | End: 2021-09-11
Attending: EMERGENCY MEDICINE | Admitting: EMERGENCY MEDICINE

## 2021-09-11 ENCOUNTER — APPOINTMENT (OUTPATIENT)
Dept: GENERAL RADIOLOGY | Facility: HOSPITAL | Age: 71
End: 2021-09-11

## 2021-09-11 VITALS
DIASTOLIC BLOOD PRESSURE: 70 MMHG | RESPIRATION RATE: 18 BRPM | WEIGHT: 182 LBS | SYSTOLIC BLOOD PRESSURE: 132 MMHG | BODY MASS INDEX: 34.36 KG/M2 | OXYGEN SATURATION: 94 % | HEART RATE: 60 BPM | TEMPERATURE: 97.2 F | HEIGHT: 61 IN

## 2021-09-11 DIAGNOSIS — M54.16 LUMBAR RADICULOPATHY: Primary | ICD-10-CM

## 2021-09-11 PROCEDURE — 96372 THER/PROPH/DIAG INJ SC/IM: CPT

## 2021-09-11 PROCEDURE — 99283 EMERGENCY DEPT VISIT LOW MDM: CPT

## 2021-09-11 PROCEDURE — 25010000002 HYDROMORPHONE 1 MG/ML SOLUTION: Performed by: EMERGENCY MEDICINE

## 2021-09-11 PROCEDURE — 63710000001 PREDNISONE PER 1 MG: Performed by: EMERGENCY MEDICINE

## 2021-09-11 PROCEDURE — 72110 X-RAY EXAM L-2 SPINE 4/>VWS: CPT

## 2021-09-11 RX ORDER — PREDNISONE 20 MG/1
60 TABLET ORAL ONCE
Status: COMPLETED | OUTPATIENT
Start: 2021-09-11 | End: 2021-09-11

## 2021-09-11 RX ORDER — PREDNISONE 10 MG/1
20 TABLET ORAL DAILY
Qty: 10 TABLET | Refills: 0 | Status: SHIPPED | OUTPATIENT
Start: 2021-09-11 | End: 2021-09-17

## 2021-09-11 RX ADMIN — PREDNISONE 60 MG: 20 TABLET ORAL at 12:31

## 2021-09-11 RX ADMIN — HYDROMORPHONE HYDROCHLORIDE 1 MG: 1 INJECTION, SOLUTION INTRAMUSCULAR; INTRAVENOUS; SUBCUTANEOUS at 11:15

## 2021-09-11 NOTE — ED TRIAGE NOTES
"Pt presents to ED with complaints of low back pain that shoots down her right leg x2 days. Pt states sometimes it shoots down her left leg but the right side bothers her the most.     Pt denies any fall or trauma. Pt states she was diagnosed with sciatica \"many moons ago.\"  Pt states her hydrocodone \"isnt even touching the pain.\"       RN wore appropriate PPE during entire encounter with patient. Patient compliant with wearing mask at this time. Proper hand hygiene performed before encounter, as deemed necessary during encounter and after completion of encounter with patient.    "

## 2021-09-11 NOTE — ED PROVIDER NOTES
EMERGENCY DEPARTMENT ENCOUNTER    Room Number:  40/40  Date of encounter:  9/23/2021  PCP: Cinthya Valentine APRN  Historian: Patient     I used full protective equipment while examining this patient.  This includes face mask, gloves and protective eyewear.  I washed my hands before entering the room and immediately upon leaving the room      HPI:  Chief Complaint: Low back pain rating to right leg  A complete HPI/ROS/PMH/PSH/SH/FH are unobtainable due to: None    Context: Dalila Burger is a 70 y.o. female who presents to the ED c/o low back pain rating to the right leg.  Patient reports pain over the last several days.  Pain is severe at its worst and it starts in the lower back and radiates down the outside of the right leg.  Pain is worsened by nothing, improved by nothing.  Patient denies any injury and states she cannot recall having similar symptoms in the past.  Patient does take hydrocodone 10 mg 3 times daily related to fibromyalgia and is followed by pain management.  Patient denies any increased weakness although she does have chronic right-sided weakness from prior stroke.  Patient is anticoagulated on Eliquis and has a history of insulin-dependent diabetes.      MEDICAL RECORD REVIEW  I did review prior medical records note patient was seen in April of this year with low back pain and bilateral sciatica.  She had plain films in the ED that did not show significant pathology.  She was treated with IM narcotic pain medicine in the ER and discharged home on steroids.  Patient was hospitalized about 1 week later with nonspecific encephalopathy without clear cause.    PAST MEDICAL HISTORY  Active Ambulatory Problems     Diagnosis Date Noted   • Adjustment disorder with mixed anxiety and depressed mood 03/08/2016   • Atopic rhinitis 03/08/2016   • Coronary arteriosclerosis in native artery 03/08/2016   • Cobalamin deficiency 03/08/2016   • Benign colonic polyp 03/08/2016   • Lumbar radiculopathy  03/08/2016   • Controlled diabetes mellitus type 2 with complications (CMS/HCC) 03/08/2016   • Binocular vision disorder with diplopia 03/08/2016   • Dyslipidemia 03/08/2016   • Arthropathy of hand 03/08/2016   • Knee pain 03/08/2016   • Cramps of lower extremity 03/08/2016   • Low back pain 03/08/2016   • Chronic nausea 03/08/2016   • Obstructive sleep apnea syndrome 03/08/2016   • Palpitations 03/08/2016   • Ventricular premature beats 03/08/2016   • Cephalalgia 03/08/2016   • Colonic constipation 03/08/2016   • Neurocardiogenic syncope 03/08/2016   • Vitamin D deficiency 03/08/2016   • DODSON (nonalcoholic steatohepatitis) 04/01/2016   • Fibromyalgia 03/26/2013   • Morbidly obese (CMS/HCC) 12/10/2018   • Polyp of colon 11/01/2019   • Family history of colonic polyps 11/01/2019   • Family history of malignant neoplasm of colon 11/01/2019   • Acute CVA (cerebrovascular accident) (CMS/HCC) 08/19/2020   • Episode of dizziness 09/30/2020   • Dizziness 09/30/2020   • History of stroke 09/30/2020   • Chronic right shoulder pain 04/30/2021   • Encephalopathy, metabolic 04/30/2021   • Migraine without aura or status migrainosus 04/30/2021   • Parkinson disease (CMS/HCC) 04/30/2021   • Hypokalemia 05/04/2021   • Benign paroxysmal positional vertigo 11/02/2017   • Chronic obstructive pulmonary disease (CMS/HCC) 05/09/2017   • Gastroesophageal reflux disease 09/24/2015   • History of percutaneous transluminal coronary angioplasty 10/18/2016   • Hypercholesterolemia 09/24/2015   • Hypertensive disorder 09/24/2015   • Myocardial infarction (CMS/HCC) 05/09/2017   • Occipital neuralgia 09/23/2015   • Patent foramen ovale 10/19/2020   • Transient cerebral ischemia 11/02/2017   • Arm pain, anterior, right 08/04/2021     Resolved Ambulatory Problems     Diagnosis Date Noted   • Flank pain 03/08/2016   • Abnormal blood sugar 03/08/2016   • Abnormal weight gain 03/08/2016   • Acquired trigger finger 03/08/2016   • Acute bronchitis  2016   • Atypical chest pain 2016   • History of Williamson's esophagus 2016   • CAP (community acquired pneumonia) 2016   • Candidiasis of skin 2016   • Diabetic peripheral neuropathy (CMS/HCC) 2016   • Breathing difficult 2016   • Dizziness 2016   • Hematuria 2016   • Migraine with typical aura 2016   • Muscle ache 2016   • MAC (mycobacterium avium-intracellulare complex) 2016   • Night sweats 2016   • Otitis externa 2016   • Abscess, perianal 2016   • Skin tag 2016   • Upper respiratory tract infection 2016   • Urinary hesitancy 2016   • Asthmatic breathing 2016   • Preoperative clearance 2016   • Fever 2021   • Acute UTI (urinary tract infection) 2021     Past Medical History:   Diagnosis Date   • Acute myocardial infarction (CMS/[facility])    • Adjustment reaction with mixed emotional features    • Arthritis    • ASHD (arteriosclerotic heart disease)    • Asthma    • B12 deficiency    • Bacterial pneumonia    • Williamson esophagus    • Barretts esophagus    • Bilateral knee pain    • Birthmark    • Bronchiectasis (CMS/HCC)    • CHF (congestive heart failure) (CMS/[facility])    • Chronic cough    • Chronic glomerulonephritis with exudative nephritis    • Chronic lumbar radiculopathy    • Diabetes mellitus (CMS/HCC)    • Fibromyositis    • GERD (gastroesophageal reflux disease)    • Herpes zoster    • Hyperlipidemia    • Hypertension    • Lupus anticoagulant disorder (CMS/HCC)    • Mycobacterium avium complex (CMS/HCC)    • Nephrolithiasis    • SILVIANO (obstructive sleep apnea)    • Premature ventricular contraction    • Slow transit constipation    • Stroke (CMS/HCC)          PAST SURGICAL HISTORY  Past Surgical History:   Procedure Laterality Date   • APPENDECTOMY     • BRONCHOSCOPY     •  SECTION     • CHOLECYSTECTOMY     • COLONOSCOPY     • COLONOSCOPY N/A 2019    Procedure:  COLONOSCOPY to cecum with cold biopsy and cold and hotsnare polypectomies;  Surgeon: Suzy Eubanks MD;  Location: [facility] ENDOSCOPY;  Service: Gastroenterology   • CORONARY ANGIOPLASTY WITH STENT PLACEMENT     • EMBOLECTOMY N/A 2020    Procedure: EMERGENT CEREBRAL ANGIOGRAM;  Surgeon: Jonh Meehan MD;  Location: [facility] HYBRID OR ;  Service: Neurosurgery;  Laterality: N/A;   • ENDOSCOPY N/A 2019    Procedure: ESOPHAGOGASTRODUODENOSCOPY with cold biopsies;  Surgeon: Suzy Eubanks MD;  Location: [facility] ENDOSCOPY;  Service: Gastroenterology   • KNEE ARTHROSCOPY     • OTHER SURGICAL HISTORY      elbow surgery   • ROTATOR CUFF REPAIR     • TUBAL ABDOMINAL LIGATION           FAMILY HISTORY  Family History   Problem Relation Age of Onset   • Colon cancer Mother    • Uterine cancer Mother    • Arthritis Mother    • Cancer Mother    • Heart disease Mother    • Migraines Mother    • Stroke Mother    • Cancer Father         malignant brain tumor   • Aneurysm Father    • Bone cancer Maternal Aunt    • Diabetes Paternal Grandmother    • Diabetes Paternal Grandfather    • Arthritis Maternal Grandmother    • Heart disease Maternal Grandmother    • Thyroid disease Maternal Grandmother          SOCIAL HISTORY  Social History     Socioeconomic History   • Marital status:      Spouse name: Not on file   • Number of children: Not on file   • Years of education: Not on file   • Highest education level: Not on file   Tobacco Use   • Smoking status: Former Smoker     Quit date: 1980     Years since quittin.7   • Smokeless tobacco: Never Used   Substance and Sexual Activity   • Alcohol use: No   • Drug use: No   • Sexual activity: Yes     Partners: Male         ALLERGIES  Duloxetine hcl, Viibryd [vilazodone hcl], Metformin, Morphine and related, Nsaids, Oxycodone, Penicillins, Statins, Benadryl [diphenhydramine], and Carafate [sucralfate]       REVIEW OF SYSTEMS  Review of Systems    Constitutional: Negative for fever.   HENT: Negative.  Negative for sore throat.    Eyes: Negative.    Respiratory: Negative.  Negative for cough.    Cardiovascular: Negative.  Negative for chest pain.   Gastrointestinal: Negative.    Genitourinary: Negative.  Negative for dysuria.   Musculoskeletal: Negative.  Back pain: Back pain rating to right lower extremity as per HPI.   Skin: Negative.  Negative for rash.   Neurological: Positive for weakness (Chronic right-sided weakness, unchanged baseline). Negative for headaches.   All other systems reviewed and are negative.          PHYSICAL EXAM    I have reviewed the triage vital signs and nursing notes.    ED Triage Vitals [09/11/21 0637]   Temp Heart Rate Resp BP SpO2   97.2 °F (36.2 °C) 68 16 130/67 94 %      Temp src Heart Rate Source Patient Position BP Location FiO2 (%)   Tympanic Monitor -- Right arm --       Physical Exam  GENERAL: Alert female in no obvious distress.  Triage vitals reviewed and are unremarkable  HENT: nares patent  EYES: no scleral icterus  CV: regular rhythm, regular rate-no murmur  RESPIRATORY: normal effort, clear to auscultation bilaterally  ABDOMEN: soft, nontender to palpation  MUSCULOSKELETAL: Spine-diffuse lumbar tenderness to palpation.  Tenderness is noted along the lumbar in the midline and also in the right lower back.  Extremities-no segment swelling or tenderness to palpation.  Straight leg raise and cross straight leg raise test are negative  NEURO: Strength mild chronic right-sided weakness, unchanged from baseline. sensation and coordination are grossly intact.  Speech and mentation are unremarkable  SKIN: warm, dry      LAB RESULTS  No results found for this or any previous visit (from the past 24 hour(s)).    Ordered the above labs and independently reviewed the results.      RADIOLOGY  No Radiology Exams Resulted Within Past 24 Hours    I ordered the above noted radiological studies. Reviewed by me and discussed with  "radiologist.  See dictation for official radiology interpretation.      PROCEDURES  Procedures      MEDICATIONS GIVEN IN ER    Medications   HYDROmorphone (DILAUDID) injection 1 mg (1 mg Intramuscular Given 9/11/21 1115)   predniSONE (DELTASONE) tablet 60 mg (60 mg Oral Given 9/11/21 1231)         PROGRESS, DATA ANALYSIS, CONSULTS, AND MEDICAL DECISION MAKING    All labs have been independently reviewed by me.  All radiology studies have been reviewed by me and discussed with radiologist dictating the report.   EKG's independently viewed and interpreted by me.  Discussion below represents my analysis of pertinent findings related to patient's condition, differential diagnosis, treatment plan and final disposition.      ED Course as of Sep 23 1758   Sat Sep 11, 2021   0956 [id number]-old female with history of fibromyalgia and chronic pain presents with right leg and right lower back pain.  Neuro exam shows chronic weakness which is near baseline according to patient.  She has not had incontinence of bowel or bladder.  At this point I believe we are dealing most likely with lumbar radiculopathy.  I see no \"red flags\" to suggest need for MRI or emergent neurosurgery consultation at this time.  Will treat pain with IM Dilaudid and get plain films of the back.  Would likely consider steroid course and referral to pain management for possible epidural injections if symptoms persist.    [DB]   1112 Plain films of the lumbar spine show no obvious fracture or acute deformity.    [DB]   1142 Pain improved after IV medications.  I discussed results of x-rays with patient and  at bedside.  I told them that she may need to follow-up with her pain management doctor for consideration of epidural injections.  We will give a short course of prednisone to help with likely lumbar radiculopathy.    [DB]      ED Course User Index  [DB] Berto Marina MD       AS OF 17:58 EDT VITALS:    BP - 132/70  HR - 60  TEMP - 97.2 °F " (36.2 °C) (Tympanic)  O2 SATS - 94%      DIAGNOSIS  Final diagnoses:   Lumbar radiculopathy         DISPOSITION  DISCHARGE    Patient discharged in stable condition.    Reviewed implications of results, diagnosis, meds, responsibility to follow up, warning signs and symptoms of possible worsening, potential complications and reasons to return to ER, including increased pain, numbness/weakness, fever or as needed.    Patient/Family voiced understanding of above instructions.    Discussed plan for discharge, as there is no emergent indication for admission. Patient referred to primary care provider for BP management due to today's BP. Pt/family is agreeable and understands need for follow up and repeat testing.  Pt is aware that discharge does not mean that nothing is wrong but it indicates no emergency is present that requires admission and they must continue care with follow-up as given below or physician of their choice.     FOLLOW-UP  Cinthya Valentine, APRN  [address] [id number] [phone]    In 1 week  If Not Better         Medication List      Changed    NovoLIN N 100 UNIT/ML injection  Generic drug: insulin NPH  INJECT 8 UNITS UNDER THE SKIN TWICE DAILY BEFORE MEALS  What changed: See the new instructions.                 Berto Marina MD  09/11/21 1422       Berto Marina MD  09/23/21 [id number]

## 2021-09-14 ENCOUNTER — APPOINTMENT (OUTPATIENT)
Dept: CT IMAGING | Facility: HOSPITAL | Age: 71
End: 2021-09-14

## 2021-09-14 ENCOUNTER — HOSPITAL ENCOUNTER (EMERGENCY)
Facility: HOSPITAL | Age: 71
Discharge: HOME | End: 2021-09-14
Attending: EMERGENCY MEDICINE | Admitting: EMERGENCY MEDICINE

## 2021-09-14 VITALS
DIASTOLIC BLOOD PRESSURE: 75 MMHG | HEART RATE: 55 BPM | RESPIRATION RATE: 16 BRPM | SYSTOLIC BLOOD PRESSURE: 142 MMHG | TEMPERATURE: 97.7 F | HEIGHT: 61 IN | BODY MASS INDEX: 34.39 KG/M2 | OXYGEN SATURATION: 94 %

## 2021-09-14 DIAGNOSIS — S05.12XA PERIORBITAL CONTUSION OF LEFT EYE, INITIAL ENCOUNTER: ICD-10-CM

## 2021-09-14 DIAGNOSIS — R55 SYNCOPE AND COLLAPSE: Primary | ICD-10-CM

## 2021-09-14 DIAGNOSIS — S09.90XA CLOSED HEAD INJURY, INITIAL ENCOUNTER: ICD-10-CM

## 2021-09-14 LAB
ALBUMIN SERPL-MCNC: 4.3 G/DL (ref 3.5–5.2)
ALBUMIN/GLOB SERPL: 1.7 G/DL
ALP SERPL-CCNC: 36 U/L (ref 39–117)
ALT SERPL W P-5'-P-CCNC: 19 U/L (ref 1–33)
ANION GAP SERPL CALCULATED.3IONS-SCNC: 8.8 MMOL/L (ref 5–15)
AST SERPL-CCNC: 14 U/L (ref 1–32)
BASOPHILS # BLD AUTO: 0.03 10*3/MM3 (ref 0–0.2)
BASOPHILS NFR BLD AUTO: 0.3 % (ref 0–1.5)
BILIRUB SERPL-MCNC: 0.2 MG/DL (ref 0–1.2)
BUN SERPL-MCNC: 15 MG/DL (ref 8–23)
BUN/CREAT SERPL: 17 (ref 7–25)
CALCIUM SPEC-SCNC: 9.2 MG/DL (ref 8.6–10.5)
CHLORIDE SERPL-SCNC: 102 MMOL/L (ref 98–107)
CO2 SERPL-SCNC: 27.2 MMOL/L (ref 22–29)
CREAT SERPL-MCNC: 0.88 MG/DL (ref 0.57–1)
DEPRECATED RDW RBC AUTO: 43.9 FL (ref 37–54)
EOSINOPHIL # BLD AUTO: 0.01 10*3/MM3 (ref 0–0.4)
EOSINOPHIL NFR BLD AUTO: 0.1 % (ref 0.3–6.2)
ERYTHROCYTE [DISTWIDTH] IN BLOOD BY AUTOMATED COUNT: 13 % (ref 12.3–15.4)
GFR SERPL CREATININE-BSD FRML MDRD: 63 ML/MIN/1.73
GLOBULIN UR ELPH-MCNC: 2.5 GM/DL
GLUCOSE SERPL-MCNC: 210 MG/DL (ref 65–99)
HCT VFR BLD AUTO: 43.6 % (ref 34–46.6)
HGB BLD-MCNC: 14.6 G/DL (ref 12–15.9)
HOLD SPECIMEN: NORMAL
HOLD SPECIMEN: NORMAL
IMM GRANULOCYTES # BLD AUTO: 0.07 10*3/MM3 (ref 0–0.05)
IMM GRANULOCYTES NFR BLD AUTO: 0.7 % (ref 0–0.5)
LYMPHOCYTES # BLD AUTO: 1.65 10*3/MM3 (ref 0.7–3.1)
LYMPHOCYTES NFR BLD AUTO: 15.4 % (ref 19.6–45.3)
MCH RBC QN AUTO: 30.5 PG (ref 26.6–33)
MCHC RBC AUTO-ENTMCNC: 33.5 G/DL (ref 31.5–35.7)
MCV RBC AUTO: 91 FL (ref 79–97)
MONOCYTES # BLD AUTO: 0.37 10*3/MM3 (ref 0.1–0.9)
MONOCYTES NFR BLD AUTO: 3.5 % (ref 5–12)
NEUTROPHILS NFR BLD AUTO: 8.55 10*3/MM3 (ref 1.7–7)
NEUTROPHILS NFR BLD AUTO: 80 % (ref 42.7–76)
NRBC BLD AUTO-RTO: 0 /100 WBC (ref 0–0.2)
PLATELET # BLD AUTO: 194 10*3/MM3 (ref 140–450)
PMV BLD AUTO: 10.5 FL (ref 6–12)
POTASSIUM SERPL-SCNC: 4.4 MMOL/L (ref 3.5–5.2)
PROT SERPL-MCNC: 6.8 G/DL (ref 6–8.5)
QT INTERVAL: 473 MS
RBC # BLD AUTO: 4.79 10*6/MM3 (ref 3.77–5.28)
SODIUM SERPL-SCNC: 138 MMOL/L (ref 136–145)
TROPONIN T SERPL-MCNC: <0.01 NG/ML (ref 0–0.03)
WBC # BLD AUTO: 10.68 10*3/MM3 (ref 3.4–10.8)
WHOLE BLOOD HOLD SPECIMEN: NORMAL
WHOLE BLOOD HOLD SPECIMEN: NORMAL

## 2021-09-14 PROCEDURE — 93005 ELECTROCARDIOGRAM TRACING: CPT | Performed by: EMERGENCY MEDICINE

## 2021-09-14 PROCEDURE — 99283 EMERGENCY DEPT VISIT LOW MDM: CPT

## 2021-09-14 PROCEDURE — 72125 CT NECK SPINE W/O DYE: CPT

## 2021-09-14 PROCEDURE — 84484 ASSAY OF TROPONIN QUANT: CPT | Performed by: EMERGENCY MEDICINE

## 2021-09-14 PROCEDURE — 70450 CT HEAD/BRAIN W/O DYE: CPT

## 2021-09-14 PROCEDURE — 93010 ELECTROCARDIOGRAM REPORT: CPT | Performed by: INTERNAL MEDICINE

## 2021-09-14 PROCEDURE — 85025 COMPLETE CBC W/AUTO DIFF WBC: CPT | Performed by: EMERGENCY MEDICINE

## 2021-09-14 PROCEDURE — 80053 COMPREHEN METABOLIC PANEL: CPT | Performed by: EMERGENCY MEDICINE

## 2021-09-14 RX ORDER — SODIUM CHLORIDE 0.9 % (FLUSH) 0.9 %
10 SYRINGE (ML) INJECTION AS NEEDED
Status: DISCONTINUED | OUTPATIENT
Start: 2021-09-14 | End: 2021-09-14 | Stop reason: HOSPADM

## 2021-09-14 RX ADMIN — SODIUM CHLORIDE 1000 ML: 9 INJECTION, SOLUTION INTRAVENOUS at 05:15

## 2021-09-14 NOTE — ED NOTES
Pt to triage from home via [employer] [id number] with c/o fall.  Pt got up to go to bathroom and fell, hitting her head.  Pt is on eliquis.  Pt has hematoma noted above left eye.  Pt denies loc initially, but now is unsure. Pt wearing mask in triage.  Triage personnel wore appropriate PPE       Barbara Barrow RN  09/14/21 0251

## 2021-09-14 NOTE — ED PROVIDER NOTES
EMERGENCY DEPARTMENT ENCOUNTER    CHIEF COMPLAINT  Chief Complaint: Syncope/head trauma  History given by: Patient  History limited by: None  Room Number: 16/16  PMD: Cinthya Valentine APRN      HPI:  Pt is a 70 y.o. female who presents complaining of sudden onset of a probable syncopal event that occurred while at home.  The patient states that she was using the restroom and sitting on the toilet when the next thing she knows is that she is waking up on the floor with a headache.  She states that she obviously fell over striking her head on the ground.  She does complain of a mild left-sided headache that is dull in nature and nonradiating.  She states it is currently mild to moderate in intensity.  She denies pain in the neck, chest pain, shortness of breath, dizziness, or nausea and vomiting.      Progression: improved  Aggravating Factors: none  Alleviating Factors: none  Previous Episodes: none  Treatment before arrival: none    PAST MEDICAL HISTORY  Active Ambulatory Problems     Diagnosis Date Noted   • Adjustment disorder with mixed anxiety and depressed mood 03/08/2016   • Atopic rhinitis 03/08/2016   • Coronary arteriosclerosis in native artery 03/08/2016   • Cobalamin deficiency 03/08/2016   • Benign colonic polyp 03/08/2016   • Lumbar radiculopathy 03/08/2016   • Controlled diabetes mellitus type 2 with complications (CMS/[facility]) 03/08/2016   • Binocular vision disorder with diplopia 03/08/2016   • Dyslipidemia 03/08/2016   • Arthropathy of hand 03/08/2016   • Knee pain 03/08/2016   • Cramps of lower extremity 03/08/2016   • Low back pain 03/08/2016   • Chronic nausea 03/08/2016   • Obstructive sleep apnea syndrome 03/08/2016   • Palpitations 03/08/2016   • Ventricular premature beats 03/08/2016   • Cephalalgia 03/08/2016   • Colonic constipation 03/08/2016   • Neurocardiogenic syncope 03/08/2016   • Vitamin D deficiency 03/08/2016   • DODSON (nonalcoholic steatohepatitis) 04/01/2016   • Fibromyalgia  03/26/2013   • Morbidly obese (CMS/HCC) 12/10/2018   • Polyp of colon 11/01/2019   • Family history of colonic polyps 11/01/2019   • Family history of malignant neoplasm of colon 11/01/2019   • Acute CVA (cerebrovascular accident) (CMS/HCC) 08/19/2020   • Episode of dizziness 09/30/2020   • Dizziness 09/30/2020   • History of stroke 09/30/2020   • Chronic right shoulder pain 04/30/2021   • Encephalopathy, metabolic 04/30/2021   • Migraine without aura or status migrainosus 04/30/2021   • Parkinson disease (CMS/HCC) 04/30/2021   • Hypokalemia 05/04/2021   • Benign paroxysmal positional vertigo 11/02/2017   • Chronic obstructive pulmonary disease (CMS/HCC) 05/09/2017   • Gastroesophageal reflux disease 09/24/2015   • History of percutaneous transluminal coronary angioplasty 10/18/2016   • Hypercholesterolemia 09/24/2015   • Hypertensive disorder 09/24/2015   • Myocardial infarction (CMS/HCC) 05/09/2017   • Occipital neuralgia 09/23/2015   • Patent foramen ovale 10/19/2020   • Transient cerebral ischemia 11/02/2017   • Arm pain, anterior, right 08/04/2021     Resolved Ambulatory Problems     Diagnosis Date Noted   • Flank pain 03/08/2016   • Abnormal blood sugar 03/08/2016   • Abnormal weight gain 03/08/2016   • Acquired trigger finger 03/08/2016   • Acute bronchitis 03/08/2016   • Atypical chest pain 03/08/2016   • History of Williamson's esophagus 03/08/2016   • CAP (community acquired pneumonia) 03/08/2016   • Candidiasis of skin 03/08/2016   • Diabetic peripheral neuropathy (CMS/HCC) 03/08/2016   • Breathing difficult 03/08/2016   • Dizziness 03/08/2016   • Hematuria 03/08/2016   • Migraine with typical aura 03/08/2016   • Muscle ache 03/08/2016   • MAC (mycobacterium avium-intracellulare complex) 03/08/2016   • Night sweats 03/08/2016   • Otitis externa 03/08/2016   • Abscess, perianal 03/08/2016   • Skin tag 03/08/2016   • Upper respiratory tract infection 03/08/2016   • Urinary hesitancy 03/08/2016   • Asthmatic  breathing 2016   • Preoperative clearance 2016   • Fever 2021   • Acute UTI (urinary tract infection) 2021     Past Medical History:   Diagnosis Date   • Acute myocardial infarction (CMS/HCC)    • Adjustment reaction with mixed emotional features    • Arthritis    • ASHD (arteriosclerotic heart disease)    • Asthma    • B12 deficiency    • Bacterial pneumonia    • Williamson esophagus    • Barretts esophagus    • Bilateral knee pain    • Birthmark    • Bronchiectasis (CMS/HCC)    • CHF (congestive heart failure) (CMS/HCC)    • Chronic cough    • Chronic glomerulonephritis with exudative nephritis    • Chronic lumbar radiculopathy    • Diabetes mellitus (CMS/HCC)    • Fibromyositis    • GERD (gastroesophageal reflux disease)    • Herpes zoster    • Hyperlipidemia    • Hypertension    • Lupus anticoagulant disorder (CMS/HCC)    • Mycobacterium avium complex (CMS/HCC)    • Nephrolithiasis    • SILVIANO (obstructive sleep apnea)    • Premature ventricular contraction    • Slow transit constipation    • Stroke (CMS/HCC)        PAST SURGICAL HISTORY  Past Surgical History:   Procedure Laterality Date   • APPENDECTOMY     • BRONCHOSCOPY     •  SECTION     • CHOLECYSTECTOMY     • COLONOSCOPY     • COLONOSCOPY N/A 2019    Procedure: COLONOSCOPY to cecum with cold biopsy and cold and hotsnare polypectomies;  Surgeon: Suzy Eubanks MD;  [place] [facility];  Service: Gastroenterology   • CORONARY ANGIOPLASTY WITH STENT PLACEMENT     • EMBOLECTOMY N/A 2020    Procedure: EMERGENT CEREBRAL ANGIOGRAM;  Surgeon: Jonh Meehan MD;  Location: [facility] OR ;  Service: Neurosurgery;  Laterality: N/A;   • ENDOSCOPY N/A 2019    Procedure: ESOPHAGOGASTRODUODENOSCOPY with cold biopsies;  Surgeon: Suzy Eubanks MD;  [place] [facility];  Service: Gastroenterology   • KNEE ARTHROSCOPY     • OTHER SURGICAL HISTORY      elbow surgery   • ROTATOR CUFF REPAIR     •  TUBAL ABDOMINAL LIGATION         FAMILY HISTORY  Family History   Problem Relation Age of Onset   • Colon cancer Mother    • Uterine cancer Mother    • Arthritis Mother    • Cancer Mother    • Heart disease Mother    • Migraines Mother    • Stroke Mother    • Cancer Father         malignant brain tumor   • Aneurysm Father    • Bone cancer Maternal Aunt    • Diabetes Paternal Grandmother    • Diabetes Paternal Grandfather    • Arthritis Maternal Grandmother    • Heart disease Maternal Grandmother    • Thyroid disease Maternal Grandmother        SOCIAL HISTORY  Social History     Socioeconomic History   • Marital status:      Spouse name: Not on file   • Number of children: Not on file   • Years of education: Not on file   • Highest education level: Not on file   Tobacco Use   • Smoking status: Former Smoker     Quit date: 1980     Years since quittin.7   • Smokeless tobacco: Never Used   Substance and Sexual Activity   • Alcohol use: No   • Drug use: No   • Sexual activity: Yes     Partners: Male       ALLERGIES  Duloxetine hcl, Viibryd [vilazodone hcl], Metformin, Morphine and related, Nsaids, Oxycodone, Penicillins, Statins, Benadryl [diphenhydramine], and Carafate [sucralfate]    REVIEW OF SYSTEMS  Review of Systems   Constitutional: Negative for fever.   HENT: Negative for sore throat.    Eyes: Negative.    Respiratory: Negative for cough and shortness of breath.    Cardiovascular: Negative for chest pain.   Gastrointestinal: Negative for abdominal pain, diarrhea and vomiting.   Genitourinary: Negative for dysuria.   Musculoskeletal: Negative for neck pain.   Skin: Negative for rash.   Allergic/Immunologic: Negative.    Neurological: Positive for syncope and headaches. Negative for weakness and numbness.   Hematological: Negative.    Psychiatric/Behavioral: Negative.    All other systems reviewed and are negative.      PHYSICAL EXAM  ED Triage Vitals [21 0250]   Temp Heart Rate Resp BP SpO2    97.7 °F (36.5 °C) 64 16 134/70 95 %      Temp src Heart Rate Source Patient Position BP Location FiO2 (%)   Oral Monitor -- -- --       Physical Exam  Vitals and nursing note reviewed.   Constitutional:       General: She is not in acute distress.  HENT:      Head: Normocephalic.      Comments: Left-sided periorbital contusion  Eyes:      Pupils: Pupils are equal, round, and reactive to light.   Cardiovascular:      Rate and Rhythm: Normal rate and regular rhythm.      Heart sounds: Normal heart sounds.   Pulmonary:      Effort: Pulmonary effort is normal. No respiratory distress.      Breath sounds: Normal breath sounds.   Abdominal:      Palpations: Abdomen is soft.      Tenderness: There is no abdominal tenderness. There is no guarding or rebound.   Musculoskeletal:         General: Normal range of motion.      Cervical back: Normal range of motion and neck supple.   Skin:     General: Skin is warm and dry.      Findings: No rash.   Neurological:      Mental Status: She is alert and oriented to person, place, and time.      Sensory: Sensation is intact.   Psychiatric:         Mood and Affect: Mood and affect normal.         LAB RESULTS  Lab Results (last 24 hours)     Procedure Component Value Units Date/Time    CBC & Differential [[id number]]  (Abnormal) Collected: 09/14/21 0445    Specimen: Blood Updated: 09/14/21 0503    Narrative:      The following orders were created for panel order CBC & Differential.  Procedure                               Abnormality         Status                     ---------                               -----------         ------                     CBC Auto Differential[[id number]]        Abnormal            Final result                 Please view results for these tests on the individual orders.    Comprehensive Metabolic Panel [[id number]]  (Abnormal) Collected: 09/14/21 0445    Specimen: Blood Updated: 09/14/21 0521     Glucose 210 mg/dL      BUN 15 mg/dL      Creatinine 0.88  mg/dL      Sodium 138 mmol/L      Potassium 4.4 mmol/L      Chloride 102 mmol/L      CO2 27.2 mmol/L      Calcium 9.2 mg/dL      Total Protein 6.8 g/dL      Albumin 4.30 g/dL      ALT (SGPT) 19 U/L      AST (SGOT) 14 U/L      Alkaline Phosphatase 36 U/L      Total Bilirubin 0.2 mg/dL      eGFR Non African Amer 63 mL/min/1.73      Globulin 2.5 gm/dL      A/G Ratio 1.7 g/dL      BUN/Creatinine Ratio 17.0     Anion Gap 8.8 mmol/L     Narrative:      GFR Normal >60  Chronic Kidney Disease <60  Kidney Failure <15      Troponin [[id number]]  (Normal) Collected: 09/14/21 0445    Specimen: Blood Updated: 09/14/21 0521     Troponin T <0.010 ng/mL     Narrative:      Troponin T Reference Range:  <= 0.03 ng/mL-   Negative for AMI  >0.03 ng/mL-     Abnormal for myocardial necrosis.  Clinicians would have to utilize clinical acumen, EKG, Troponin and serial changes to determine if it is an Acute Myocardial Infarction or myocardial injury due to an underlying chronic condition.       Results may be falsely decreased if patient taking Biotin.      CBC Auto Differential [[id number]]  (Abnormal) Collected: 09/14/21 0445    Specimen: Blood Updated: 09/14/21 0509     WBC 10.68 10*3/mm3      RBC 4.79 10*6/mm3      Hemoglobin 14.6 g/dL      Hematocrit 43.6 %      MCV 91.0 fL      MCH 30.5 pg      MCHC 33.5 g/dL      RDW 13.0 %      RDW-SD 43.9 fl      MPV 10.5 fL      Platelets 194 10*3/mm3      Neutrophil % 80.0 %      Lymphocyte % 15.4 %      Monocyte % 3.5 %      Eosinophil % 0.1 %      Basophil % 0.3 %      Immature Grans % 0.7 %      Neutrophils, Absolute 8.55 10*3/mm3      Lymphocytes, Absolute 1.65 10*3/mm3      Monocytes, Absolute 0.37 10*3/mm3      Eosinophils, Absolute 0.01 10*3/mm3      Basophils, Absolute 0.03 10*3/mm3      Immature Grans, Absolute 0.07 10*3/mm3      nRBC 0.0 /100 WBC           I ordered the above labs and reviewed the results    RADIOLOGY  CT Head Without Contrast   Final Result         Electronically signed  by Dacia Simmons MD on 09-14-21 at 0429      CT Cervical Spine Without Contrast   Final Result         Electronically signed by Dacia Simmons MD on 09-14-21 at 0434           I ordered the above noted radiological studies. Interpreted by radiologist.  Reviewed by me in PACS.       PROCEDURES  Procedures  EKG          EKG time: 0513  Rhythm/Rate: sinus bradycardia, 53  P waves and OK: nml  QRS, axis: nml, nml   ST and T waves: nml     Interpreted Contemporaneously by me, independently viewed  unchanged compared to prior 4/29/21      PROGRESS AND CONSULTS     The patient was wearing a facemask upon entrance into the room and remained in such throughout their visit.  I was wearing PPE including a facemask, eye protection, as well as gloves at any point entering the room and throughout the visit    0600  On reevaluation, the patient is resting comfortably without acute complaints and with stable vital signs.  I did inform her that work-up in the emergency room is unremarkable including CT scan of the head as well as cervical spine.  EKG and laboratory results are also unremarkable.  She will be stable for discharge and all questions have been answered.    MEDICAL DECISION MAKING  Results were reviewed/discussed with the patient and they were also made aware of online access. Pt also made aware that some labs, such as cultures, will not be resulted during ER visit and follow up with PMD is necessary.     MDM  Number of Diagnoses or Management Options     Amount and/or Complexity of Data Reviewed  Tests in the radiology section of CPT®: ordered and reviewed  Review and summarize past medical records: yes (Upon medical records review, the patient was last seen and evaluated on 9/11/2021 secondary to lumbar radiculopathy.)           DIAGNOSIS  Final diagnoses:   Syncope and collapse   Closed head injury, initial encounter   Periorbital contusion of left eye, initial encounter       DISPOSITION  DISCHARGE    Patient discharged  in stable condition.    Reviewed implications of results, diagnosis, meds, responsibility to follow up, warning signs and symptoms of possible worsening, potential complications and reasons to return to ER.    Patient/Family voiced understanding of above instructions.    Discussed plan for discharge, as there is no emergent indication for admission. Patient referred to primary care provider for BP management due to today's BP. Pt/family is agreeable and understands need for follow up and repeat testing.  Pt is aware that discharge does not mean that nothing is wrong but it indicates no emergency is present that requires admission and they must continue care with follow-up as given below or physician of their choice.     FOLLOW-UP  Cinthya Valentine, APRN  [address] [address]  [phone]    Schedule an appointment as soon as possible for a visit            Medication List      No changes were made to your prescriptions during this visit.           Latest Documented Vital Signs:  As of 06:24 EDT  BP- 134/72 HR- 51 Temp- 97.7 °F (36.5 °C) (Oral) O2 sat- 95%         Michael Miner MD  09/14/21 [id number]

## 2021-09-17 ENCOUNTER — OFFICE VISIT (OUTPATIENT)
Dept: FAMILY MEDICINE CLINIC | Facility: CLINIC | Age: 71
End: 2021-09-17

## 2021-09-17 VITALS
HEIGHT: 61 IN | HEART RATE: 62 BPM | DIASTOLIC BLOOD PRESSURE: 76 MMHG | WEIGHT: 187 LBS | OXYGEN SATURATION: 98 % | TEMPERATURE: 97.1 F | SYSTOLIC BLOOD PRESSURE: 138 MMHG | BODY MASS INDEX: 35.3 KG/M2

## 2021-09-17 DIAGNOSIS — Z86.73 HX OF COMPLETED STROKE: ICD-10-CM

## 2021-09-17 DIAGNOSIS — G47.01 INSOMNIA DUE TO MEDICAL CONDITION: ICD-10-CM
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
## 2021-09-17 DIAGNOSIS — Z79.899 HIGH RISK MEDICATION USE: ICD-10-CM

## 2021-09-17 DIAGNOSIS — E11.65 UNCONTROLLED TYPE 2 DIABETES MELLITUS WITH HYPERGLYCEMIA (HCC): ICD-10-CM

## 2021-09-17 DIAGNOSIS — R41.89 COGNITIVE CHANGES: ICD-10-CM

## 2021-09-17 DIAGNOSIS — M53.86 SCIATICA OF RIGHT SIDE ASSOCIATED WITH DISORDER OF LUMBAR SPINE: ICD-10-CM

## 2021-09-17 DIAGNOSIS — Z91.81 AT HIGH RISK FOR FALLS: ICD-10-CM

## 2021-09-17 PROCEDURE — 99215 OFFICE O/P EST HI 40 MIN: CPT | Performed by: NURSE PRACTITIONER

## 2021-09-17 NOTE — PROGRESS NOTES
Subjective   Dalila Burger is a 70 y.o. female who presents for follow up after being treated in ER on 9/11/21 for back pain and 9/14/21 for syncope and collapse.     History of Present Illness   Reports currently feeling sore from the fall with contusion to L orbit.  Syncope was not witnessed, but spouse was asleep. [place] thump from fall. Did scream immediately after fall. Ed Thinks she fell asleep on toilet. This happens more frequently of late.   Reading gets fuzzy since stroke. No changes since fall. Hopeful to get recovery with vision. Sounds like has declined prisms.   Not sleeping well at night. Immediately sleepy on couch during day after sits to watch TV. No longer regularly reads for entertainment. Ed worried about her trip to [place], as friend doesn't know her health history as well and he has been personally managing her diabetes since the stroke as she has memory and visual issues since. Hypersensitivity to R hand and R sided weakness.   May have to cancel trip to see girlfriend in [facility]  Got steroids for lumbar radiculopathy and blood sugar trended up.  has been increasing insulin and trending back down.   Too exhausted during the night. Sleeps during day short naps 1-1 1/2 hours. Sleep restriction has not been helpful in the past. Has rails on toilet. Visual stimulation makes tired. Has tried herbal teas. No history of shift work. Did work for  in the past, so very long days and would work to midnight most times.  No palpitations in several years.  In bed by 10pm, has turned TV off in the past, but typically does not  Ed recalls she was taken off doxepin and gabapentin when in hospital. The hospitalization was in late April/early May when I was off on medical leave. We did an extensive review of records back to then  The following portions of the patient's history were reviewed and updated as appropriate: allergies, current medications, past family history, past  "medical history, past social history, past surgical history and problem list.    Review of Systems   Constitutional: Positive for activity change and unexpected weight change. Negative for chills and fever.   Eyes: Positive for visual disturbance.   Respiratory: Negative.    Cardiovascular: Negative for chest pain and palpitations.   Endocrine: Negative.    Genitourinary: Negative.    Musculoskeletal: Positive for arthralgias and gait problem.   Skin: Positive for wound.   Allergic/Immunologic: Negative.    Neurological: Positive for tremors, weakness and headaches. Negative for dizziness.   Hematological: Negative.    Psychiatric/Behavioral: Positive for dysphoric mood. The patient is nervous/anxious.      /76   Pulse 62   Temp 97.1 °F (36.2 °C) (Infrared)   Ht 154.9 cm (61\")   Wt 84.8 kg (187 lb)   SpO2 98%   BMI 35.33 kg/m²     Objective   Physical Exam  Constitutional:       Appearance: She is well-developed. She is not diaphoretic.   HENT:      Head: Normocephalic and atraumatic.   Neck:      Thyroid: No thyromegaly.   Cardiovascular:      Rate and Rhythm: Normal rate and regular rhythm.      Pulses:           Carotid pulses are 2+ on the right side and 2+ on the left side.     Heart sounds: Normal heart sounds.   Pulmonary:      Effort: Pulmonary effort is normal.      Breath sounds: Normal breath sounds.   Musculoskeletal:      Right hand: Tenderness present. Normal range of motion. Decreased sensation of the ulnar distribution, median distribution and radial distribution. Normal capillary refill.      Cervical back: Normal range of motion and neck supple.   Lymphadenopathy:      Cervical: No cervical adenopathy.   Psychiatric:         Behavior: Behavior normal.         Thought Content: Thought content normal.         Judgment: Judgment normal.       Assessment/Plan   Problems Addressed this Visit     None      Visit Diagnoses     Fall, sequela    -  Primary    At high risk for falls        " Sciatica of right side associated with disorder of lumbar spine        Cognitive changes        Hx of completed stroke        Uncontrolled type 2 diabetes mellitus with hyperglycemia (CMS/[facility])        High risk medication use        Insomnia due to medical condition          Diagnoses       Codes Comments    Fall, sequela    -  Primary ICD-10-CM: W19.XXXS  ICD-9-CM: 909.4, E929.3     At high risk for falls     ICD-10-CM: Z91.81  ICD-9-CM: V15.88     Sciatica of right side associated with disorder of lumbar spine     ICD-10-CM: M53.86  ICD-9-CM: 724.3     Cognitive changes     ICD-10-CM: R41.89  ICD-9-CM: 799.59     Hx of completed stroke     ICD-10-CM: Z86.73  ICD-9-CM: V12.54     Uncontrolled type 2 diabetes mellitus with hyperglycemia (CMS/[facility])     ICD-10-CM: E11.65  ICD-9-CM: 250.02     High risk medication use     ICD-10-CM: Z79.899  ICD-9-CM: V58.69     Insomnia due to medical condition     ICD-10-CM: G47.01  ICD-9-CM: 327.01         Fall--favor associated with sleep on toilet. If palpitations occur, should consider repeat holter as last in 2014  High risk for falls secondary to visual changes, cognitive changes, and motor deficits. Discussed falls precautions  Sciatica--chronic--would avoid steroid exposure in the future  Cognitive changes--would not recommend air travel alone at this point as Ed is caregiver and keeps her on track  Hx stroke--discussed how visual deficits increase falls risk  L9WG--Xz is adjusting insulin appropriately--recommend update monitoring October  High risk medication use--already on opiates and gabapentin through pain management  Insomnia--ER records from 9/11 and 9/14/2021 reviewed and lumbar plain films with nothing acute. CT head and neck with no acute after syncope.   Would recommend TV off and possible sound machine for sleep. Would not add more medication secondary to sensitivity to medications after stroke. Was on doxepin but had metabolic encephalopathy. Try sleep  restriction.     GINA Report:    Patient has completed prescribing agreement detailing terms of continued prescribing of controlled substances, including monitoring GINA reports, urine drug screening, and pill counts if necessary. The patient is aware that inappropriate use will result in cessation of prescribing such medications.    GINA report has been reviewed by: RL Dia on 09/17/21.  The report was not scanned into the patient's chart.    Date of last GINA:  9/17/2021    History and physical exam exhibit continued increased risk and appropriate use of controlled substances.    This document is intended for medical expert use only. Reading of this document by patients and/or patient's family without participating medical staff guidance may result in misinterpretation and unintended morbidity.  Any interpretation of such data is the responsibility of the patient and/or family member responsible for the patient in concert with their primary or specialist providers, not to be left for sources of online searches such as [employer], [facility] or similar queries. Relying on these approaches to knowledge may result in misinterpretation, misguided goals of care and even death should patients or family members try recommendations outside of the realm of professional medical care in a supervised way.    Please allow 3-5 business days for recommendations based on new results    Go to the ER for any possible lifethreatening symptoms such as chest pain or shortness of air.     I personally spent 95 minutes reviewing the chart before the visit, time with the patient, and time documenting the visit. Was face to face for 65 minutes

## 2021-09-24 ENCOUNTER — PATIENT OUTREACH (OUTPATIENT)
Dept: CASE MANAGEMENT | Facility: OTHER | Age: 71
End: 2021-09-24

## 2021-09-24 NOTE — OUTREACH NOTE
Patient Outreach    Ambulatory Case Management Note    Call placed to pt to Fu recent ED visit. Pt states she is doing well. They are not sure if she fell asleep or passed out while on the commode. She does have some bruising and is sore but feels she is ok. She is now fearful this could happen again. Discussed fall prevention. Suggested she make sure to always rise slowly from a falling position. Discussed vaso vagal response and need to keep cool wash cloth close by if she feels she will have to strain for a bowel movement.     Discuss fall precautions in general. She does keep her cane with her at all times. She avoids steps unless her spouse is with her.    Discussed her blood sugars. She states this has been elevated lately. She feels this is a result of recent steroid use and need to gain control again of her food intake. Pt denies any questions at this time. Will continue to call monthly.         Yvette Pugh RN  Ambulatory Case Management    9/24/2021, 11:36 EDT

## 2021-10-08 RX ORDER — MIRTAZAPINE 7.5 MG/1
7.5 TABLET, FILM COATED ORAL NIGHTLY
Qty: 30 TABLET | Refills: 0 | Status: SHIPPED | OUTPATIENT
Start: 2021-10-08 | End: 2021-11-02

## 2021-10-11 RX ORDER — ARIPIPRAZOLE 2 MG/1
2 TABLET ORAL DAILY
Qty: 90 TABLET | Refills: 1 | Status: SHIPPED | OUTPATIENT
Start: 2021-10-11 | End: 2022-04-10

## 2021-10-11 RX ORDER — CITALOPRAM 20 MG/1
20 TABLET ORAL DAILY
Qty: 90 TABLET | Refills: 0 | Status: SHIPPED | OUTPATIENT
Start: 2021-10-11 | End: 2022-01-07

## 2021-10-12 ENCOUNTER — TELEPHONE (OUTPATIENT)
Dept: FAMILY MEDICINE CLINIC | Facility: CLINIC | Age: 71
End: 2021-10-12

## 2021-10-12 RX ORDER — CALCIUM CARB/VITAMIN D3/VIT K1 500-100-40
TABLET,CHEWABLE ORAL
Qty: 100 EACH | Refills: 0 | Status: SHIPPED | OUTPATIENT
Start: 2021-10-12 | End: 2022-01-18

## 2021-10-12 NOTE — TELEPHONE ENCOUNTER
Pt called stating her blood sugar has been running anywhere between 200 and 400, sometimes higher. She has not changed her diet or any medication. 3:45am today sugar was 252. 7:35am it was 238 and she had not eaten anything. Starting to skip meals because she is worried about these numbers. Still using novolin at 8 units twice daily and glipizide 2.5mg daily. She had a steroid injection 6 weeks ago but none since.

## 2021-10-12 NOTE — TELEPHONE ENCOUNTER
Have Ed increase insulin by 2 units 2 x weekly adjust dose until fasting closer to 150. Call if concerns. FU 1 month

## 2021-10-13 RX ORDER — ALENDRONATE SODIUM 70 MG/1
TABLET ORAL
Qty: 12 TABLET | Refills: 1 | Status: SHIPPED | OUTPATIENT
Start: 2021-10-13 | End: 2022-03-24

## 2021-10-19 NOTE — TELEPHONE ENCOUNTER
Pt called back stating this cannot wait until tomorrow, she is in pain and wants this addressed today.    Referred To Asc For Closure Text (Leave Blank If You Do Not Want): After obtaining clear surgical margins the patient was sent to an ASC for surgical repair.  The patient understands they will receive post-surgical care and follow-up from the ASC physician.

## 2021-10-29 ENCOUNTER — OFFICE VISIT (OUTPATIENT)
Dept: FAMILY MEDICINE CLINIC | Facility: CLINIC | Age: 71
End: 2021-10-29

## 2021-10-29 VITALS
DIASTOLIC BLOOD PRESSURE: 72 MMHG | SYSTOLIC BLOOD PRESSURE: 142 MMHG | TEMPERATURE: 97.5 F | HEIGHT: 61 IN | BODY MASS INDEX: 35.87 KG/M2 | OXYGEN SATURATION: 96 % | WEIGHT: 190 LBS | HEART RATE: 56 BPM

## 2021-10-29 DIAGNOSIS — Z23 IMMUNIZATION DUE: ICD-10-CM

## 2021-10-29 DIAGNOSIS — M79.7 FIBROMYALGIA: ICD-10-CM

## 2021-10-29 DIAGNOSIS — E11.65 TYPE 2 DIABETES MELLITUS WITH HYPERGLYCEMIA, WITH LONG-TERM CURRENT USE OF INSULIN (HCC): Primary | ICD-10-CM

## 2021-10-29 DIAGNOSIS — R19.7 DIARRHEA OF PRESUMED INFECTIOUS ORIGIN: ICD-10-CM

## 2021-10-29 DIAGNOSIS — Z79.4 TYPE 2 DIABETES MELLITUS WITH HYPERGLYCEMIA, WITH LONG-TERM CURRENT USE OF INSULIN (HCC): Primary | ICD-10-CM

## 2021-10-29 DIAGNOSIS — E78.5 DYSLIPIDEMIA: ICD-10-CM

## 2021-10-29 DIAGNOSIS — Z86.73 HISTORY OF STROKE: ICD-10-CM

## 2021-10-29 PROCEDURE — 90686 IIV4 VACC NO PRSV 0.5 ML IM: CPT | Performed by: NURSE PRACTITIONER

## 2021-10-29 PROCEDURE — 99214 OFFICE O/P EST MOD 30 MIN: CPT | Performed by: NURSE PRACTITIONER

## 2021-10-29 PROCEDURE — G0008 ADMIN INFLUENZA VIRUS VAC: HCPCS | Performed by: NURSE PRACTITIONER

## 2021-10-29 RX ORDER — HUMAN INSULIN 100 [IU]/ML
16 INJECTION, SUSPENSION SUBCUTANEOUS
Qty: 10 ML | Refills: 1 | Status: SHIPPED | OUTPATIENT
Start: 2021-10-29 | End: 2021-11-01 | Stop reason: SDUPTHER

## 2021-10-29 RX ORDER — GLIPIZIDE 5 MG/1
2.5 TABLET ORAL
Qty: 90 TABLET | Refills: 0 | Status: SHIPPED | OUTPATIENT
Start: 2021-10-29 | End: 2022-01-05 | Stop reason: SDUPTHER

## 2021-10-29 NOTE — PROGRESS NOTES
"Subjective   Dalila Javed is a 71 y.o. female who presents to discuss elevated blood sugar. Has increased insulin to 14 units. Was recently sick and taking over the counter medication.     History of Present Illness   Had upper respiratory illness, tested for flu and diarrhea. Anything she eats causes diarrhea. Having 2-3 episodes/day. Fever is resolved, always low grade. Did have a bad cough, improved. Was taking robitussin.   Sugar is between 200-300 after meals. Previously 160-180 after meals. Scares her secondary to FH diabetes with amputations.  Today checked fasting and 167. Checking only after meals.  Taking 14 units novolin N BID breakfast and dinner. Not eating differently. 6 pepsi zero daily. Doesn't like water. No low blood sugar.   The following portions of the patient's history were reviewed and updated as appropriate: allergies, current medications, past family history, past medical history, past social history, past surgical history and problem list.    Review of Systems   Constitutional: Negative for activity change and unexpected weight change.   HENT: Negative.    Eyes: Negative.  Negative for visual disturbance.   Respiratory: Negative.    Cardiovascular: Negative.  Negative for chest pain.   Gastrointestinal: Negative.  Negative for constipation and diarrhea.   Endocrine: Negative.    Genitourinary: Negative for difficulty urinating and frequency.   Musculoskeletal: Positive for gait problem.   Skin: Negative.    Neurological: Positive for weakness and numbness. Negative for headaches.   Hematological: Negative.    Psychiatric/Behavioral: Positive for behavioral problems and decreased concentration. Negative for dysphoric mood.     /72   Pulse 56   Temp 97.5 °F (36.4 °C) (Infrared)   Ht 154.9 cm (61\")   Wt 86.2 kg (190 lb)   SpO2 96%   BMI 35.90 kg/m²     Objective   Physical Exam  Vitals and nursing note reviewed.   Constitutional:       General: She is not in acute " distress.     Appearance: She is well-developed. She is not diaphoretic.   HENT:      Head: Normocephalic and atraumatic.   Neck:      Thyroid: No thyromegaly.   Cardiovascular:      Rate and Rhythm: Normal rate and regular rhythm.      Pulses:           Carotid pulses are 2+ on the right side and 2+ on the left side.     Heart sounds: Normal heart sounds.   Pulmonary:      Effort: Pulmonary effort is normal.      Breath sounds: Normal breath sounds.   Musculoskeletal:      Right hand: Normal sensation (allodynia).      Cervical back: Normal range of motion and neck supple.   Lymphadenopathy:      Cervical: No cervical adenopathy.   Psychiatric:         Behavior: Behavior normal.         Thought Content: Thought content normal.         Judgment: Judgment normal.       Assessment/Plan   Problems Addressed this Visit        Cardiac and Vasculature    Dyslipidemia    Relevant Orders    Lipid Panel       Musculoskeletal and Injuries    Fibromyalgia    Relevant Orders    CK       Neuro    History of stroke    Relevant Orders    Lipid Panel      Other Visit Diagnoses     Type 2 diabetes mellitus with hyperglycemia, with long-term current use of insulin (HCC)    -  Primary    Relevant Medications    insulin NPH (NovoLIN N) 100 UNIT/ML injection    glipizide (GLUCOTROL) 5 MG tablet    Other Relevant Orders    Comprehensive Metabolic Panel    Lipid Panel    Hemoglobin A1c    C-Peptide    Diarrhea of presumed infectious origin          Diagnoses       Codes Comments    Type 2 diabetes mellitus with hyperglycemia, with long-term current use of insulin (HCC)    -  Primary ICD-10-CM: E11.65, Z79.4  ICD-9-CM: 250.00, 790.29, V58.67     Fibromyalgia     ICD-10-CM: M79.7  ICD-9-CM: 729.1     History of stroke     ICD-10-CM: Z86.73  ICD-9-CM: V12.54     Dyslipidemia     ICD-10-CM: E78.5  ICD-9-CM: 272.4     Diarrhea of presumed infectious origin     ICD-10-CM: R19.7  ICD-9-CM: 009.3         B4UL--Lrvlh start checking fasting first  thing in the morning. And continue increasing dose of N by 2 units, 1 x weekly to get fasting about 150. Increase glipizide to 2.5 mg BID--update monitoring.   FMS--continue work with pain management  History of stroke--much help from spouse as cognitive changes since stroke. Able to handle basic ADLs only, significant sleep disturbance, but also significant falls risk.  HLD--continue crestor--no side effects--update monitoring  Diarrhea--if not settling, would start probiotic. If worsening would consider testing for C diff, but as decreased to 3 x daily, hold testing for now.     This document is intended for medical expert use only. Reading of this document by patients and/or patient's family without participating medical staff guidance may result in misinterpretation and unintended morbidity.  Any interpretation of such data is the responsibility of the patient and/or family member responsible for the patient in concert with their primary or specialist providers, not to be left for sources of online searches such as [employer], [employer] or similar queries. Relying on these approaches to knowledge may result in misinterpretation, misguided goals of care and even death should patients or family members try recommendations outside of the realm of professional medical care in a supervised way.    Please allow 3-5 business days for recommendations based on new results    Go to the ER for any possible lifethreatening symptoms such as chest pain or shortness of air.    I personally spent 35 minutes reviewing the chart before the visit, time with the patient, and time documenting the visit.

## 2021-10-30 LAB
ALBUMIN SERPL-MCNC: 4.1 G/DL (ref 3.7–4.7)
ALBUMIN/GLOB SERPL: 1.6 {RATIO} (ref 1.2–2.2)
ALP SERPL-CCNC: 37 IU/L (ref 44–121)
ALT SERPL-CCNC: 16 IU/L (ref 0–32)
AST SERPL-CCNC: 19 IU/L (ref 0–40)
BILIRUB SERPL-MCNC: 0.3 MG/DL (ref 0–1.2)
BUN SERPL-MCNC: 14 MG/DL (ref 8–27)
BUN/CREAT SERPL: 13 (ref 12–28)
C PEPTIDE SERPL-MCNC: 6.6 NG/ML (ref 1.1–4.4)
CALCIUM SERPL-MCNC: 9.6 MG/DL (ref 8.7–10.3)
CHLORIDE SERPL-SCNC: 99 MMOL/L (ref 96–106)
CHOLEST SERPL-MCNC: 133 MG/DL (ref 100–199)
CK SERPL-CCNC: 163 U/L (ref 32–182)
CO2 SERPL-SCNC: 27 MMOL/L (ref 20–29)
CREAT SERPL-MCNC: 1.04 MG/DL (ref 0.57–1)
GLOBULIN SER CALC-MCNC: 2.6 G/DL (ref 1.5–4.5)
GLUCOSE SERPL-MCNC: 238 MG/DL (ref 65–99)
HBA1C MFR BLD: 8.9 % (ref 4.8–5.6)
HDLC SERPL-MCNC: 26 MG/DL
LDLC SERPL CALC-MCNC: 69 MG/DL (ref 0–99)
POTASSIUM SERPL-SCNC: 4.5 MMOL/L (ref 3.5–5.2)
PROT SERPL-MCNC: 6.7 G/DL (ref 6–8.5)
SODIUM SERPL-SCNC: 140 MMOL/L (ref 134–144)
TRIGL SERPL-MCNC: 228 MG/DL (ref 0–149)
VLDLC SERPL CALC-MCNC: 38 MG/DL (ref 5–40)

## 2021-10-31 NOTE — PROGRESS NOTES
Please call the patient regarding her abnormal result. Blood sugar up, as expected with A1c 8.9 . Changes as discussed. FU 3 months

## 2021-11-01 DIAGNOSIS — E11.65 TYPE 2 DIABETES MELLITUS WITH HYPERGLYCEMIA, WITH LONG-TERM CURRENT USE OF INSULIN (HCC): ICD-10-CM

## 2021-11-01 DIAGNOSIS — Z79.4 TYPE 2 DIABETES MELLITUS WITH HYPERGLYCEMIA, WITH LONG-TERM CURRENT USE OF INSULIN (HCC): ICD-10-CM

## 2021-11-01 RX ORDER — HUMAN INSULIN 100 [IU]/ML
16 INJECTION, SUSPENSION SUBCUTANEOUS
Qty: 10 ML | Refills: 1 | Status: SHIPPED | OUTPATIENT
Start: 2021-11-01 | End: 2021-11-02 | Stop reason: SDUPTHER

## 2021-11-01 NOTE — TELEPHONE ENCOUNTER
Caller: Dalila Javed    Relationship: Self      Medication requested (name and dosage):   Requested Prescriptions:   Requested Prescriptions     Pending Prescriptions Disp Refills   • insulin NPH (NovoLIN N) 100 UNIT/ML injection 10 mL 1     Sig: Inject 16 Units under the skin into the appropriate area as directed 2 (Two) Times a Day Before Meals.        Pharmacy where request should be sent: [id number] [place], [place] - [address] [facility] AT [address] [place] [phone] [facility] [phone]        Best call back number: [phone]    Does the patient have less than a 3 day supply:  [x] Yes  [] No    Nicolasa Ferro, [facility] Rep   11/01/21 11:48 EDT       ”

## 2021-11-02 DIAGNOSIS — Z79.4 TYPE 2 DIABETES MELLITUS WITH HYPERGLYCEMIA, WITH LONG-TERM CURRENT USE OF INSULIN (HCC): ICD-10-CM

## 2021-11-02 DIAGNOSIS — E11.65 TYPE 2 DIABETES MELLITUS WITH HYPERGLYCEMIA, WITH LONG-TERM CURRENT USE OF INSULIN (HCC): ICD-10-CM

## 2021-11-02 RX ORDER — HUMAN INSULIN 100 [IU]/ML
16 INJECTION, SUSPENSION SUBCUTANEOUS
Qty: 10 ML | Refills: 1 | Status: SHIPPED | OUTPATIENT
Start: 2021-11-02 | End: 2022-02-26

## 2021-11-02 RX ORDER — MIRTAZAPINE 7.5 MG/1
7.5 TABLET, FILM COATED ORAL NIGHTLY
Qty: 90 TABLET | Refills: 0 | Status: SHIPPED | OUTPATIENT
Start: 2021-11-02 | End: 2022-01-27

## 2021-11-02 NOTE — TELEPHONE ENCOUNTER
Caller: Dalila Javed    Relationship to patient: Self    Best call back number: [phone] (H)    Patient is needing: PATIENT CALLING TO CHECK THE STATUS OF MEDICATION REFILL PATIENT STATES WAS TOLD BY PHARMACY THEY ARE UNABLE TO FILL MEDICATION REQUEST DUE TO IT BEING TO SOON PATIENT STATES MEDICATION WAS USED FASTER DUE TO INCREASE OF DOSAGE FROM 8 UNITS TO 16 UNITS    PATIENT STATES ONLY HAS ENOUGH FOR TODAY AND TOMORROW    [facility] #[id number] - [address] AT [facility] & [facility] - [phone] [facility] [phone]   [phone]

## 2021-11-02 NOTE — TELEPHONE ENCOUNTER
Pharmacy says insurance will not pay for insulin until tomorrow, they may consider payment today if the script it sent back thru to indicate that patient is titrating up and list the max dose in the sig.

## 2021-11-08 DIAGNOSIS — IMO0002 UNCONTROLLED DIABETES MELLITUS: ICD-10-CM

## 2021-11-08 DIAGNOSIS — E11.649 HYPOGLYCEMIA ASSOCIATED WITH TYPE 2 DIABETES MELLITUS (HCC): ICD-10-CM

## 2021-11-08 RX ORDER — BLOOD-GLUCOSE METER
KIT MISCELLANEOUS
Qty: 450 EACH | Refills: 0 | Status: SHIPPED | OUTPATIENT
Start: 2021-11-08 | End: 2022-06-21 | Stop reason: SDUPTHER

## 2021-11-08 RX ORDER — OMEPRAZOLE 40 MG/1
CAPSULE, DELAYED RELEASE ORAL
Qty: 180 CAPSULE | Refills: 3 | Status: SHIPPED | OUTPATIENT
Start: 2021-11-08

## 2021-11-08 RX ORDER — METOPROLOL TARTRATE 50 MG/1
TABLET, FILM COATED ORAL
Qty: 270 TABLET | Refills: 0 | Status: SHIPPED | OUTPATIENT
Start: 2021-11-08 | End: 2022-02-07

## 2021-11-26 ENCOUNTER — APPOINTMENT (OUTPATIENT)
Dept: GENERAL RADIOLOGY | Facility: HOSPITAL | Age: 71
End: 2021-11-26

## 2021-11-26 ENCOUNTER — HOSPITAL ENCOUNTER (EMERGENCY)
Facility: HOSPITAL | Age: 71
Discharge: HOME | End: 2021-11-26
Attending: EMERGENCY MEDICINE | Admitting: EMERGENCY MEDICINE

## 2021-11-26 VITALS
HEART RATE: 68 BPM | OXYGEN SATURATION: 97 % | SYSTOLIC BLOOD PRESSURE: 149 MMHG | TEMPERATURE: 97.6 F | DIASTOLIC BLOOD PRESSURE: 78 MMHG | RESPIRATION RATE: 17 BRPM

## 2021-11-26 DIAGNOSIS — R19.7 DIARRHEA, UNSPECIFIED TYPE: ICD-10-CM

## 2021-11-26 DIAGNOSIS — N18.9 CHRONIC RENAL IMPAIRMENT, UNSPECIFIED CKD STAGE: ICD-10-CM

## 2021-11-26 DIAGNOSIS — J06.9 UPPER RESPIRATORY TRACT INFECTION, UNSPECIFIED TYPE: Primary | ICD-10-CM

## 2021-11-26 LAB
ALBUMIN SERPL-MCNC: 4.4 G/DL (ref 3.5–5.2)
ALBUMIN/GLOB SERPL: 1.5 G/DL
ALP SERPL-CCNC: 41 U/L (ref 39–117)
ALT SERPL W P-5'-P-CCNC: 16 U/L (ref 1–33)
ANION GAP SERPL CALCULATED.3IONS-SCNC: 10.7 MMOL/L (ref 5–15)
AST SERPL-CCNC: 18 U/L (ref 1–32)
BASOPHILS # BLD AUTO: 0.05 10*3/MM3 (ref 0–0.2)
BASOPHILS NFR BLD AUTO: 0.5 % (ref 0–1.5)
BILIRUB SERPL-MCNC: 0.2 MG/DL (ref 0–1.2)
BUN SERPL-MCNC: 13 MG/DL (ref 8–23)
BUN/CREAT SERPL: 12.1 (ref 7–25)
CALCIUM SPEC-SCNC: 9.6 MG/DL (ref 8.6–10.5)
CHLORIDE SERPL-SCNC: 98 MMOL/L (ref 98–107)
CO2 SERPL-SCNC: 29.3 MMOL/L (ref 22–29)
CREAT SERPL-MCNC: 1.07 MG/DL (ref 0.57–1)
D-LACTATE SERPL-SCNC: 1.3 MMOL/L (ref 0.5–2)
DEPRECATED RDW RBC AUTO: 40.4 FL (ref 37–54)
EOSINOPHIL # BLD AUTO: 0.32 10*3/MM3 (ref 0–0.4)
EOSINOPHIL NFR BLD AUTO: 3.4 % (ref 0.3–6.2)
ERYTHROCYTE [DISTWIDTH] IN BLOOD BY AUTOMATED COUNT: 12 % (ref 12.3–15.4)
GFR SERPL CREATININE-BSD FRML MDRD: 50 ML/MIN/1.73
GLOBULIN UR ELPH-MCNC: 3 GM/DL
GLUCOSE SERPL-MCNC: 224 MG/DL (ref 65–99)
HCT VFR BLD AUTO: 43 % (ref 34–46.6)
HGB BLD-MCNC: 14.5 G/DL (ref 12–15.9)
IMM GRANULOCYTES # BLD AUTO: 0.04 10*3/MM3 (ref 0–0.05)
IMM GRANULOCYTES NFR BLD AUTO: 0.4 % (ref 0–0.5)
LYMPHOCYTES # BLD AUTO: 2.74 10*3/MM3 (ref 0.7–3.1)
LYMPHOCYTES NFR BLD AUTO: 28.9 % (ref 19.6–45.3)
MCH RBC QN AUTO: 30.8 PG (ref 26.6–33)
MCHC RBC AUTO-ENTMCNC: 33.7 G/DL (ref 31.5–35.7)
MCV RBC AUTO: 91.3 FL (ref 79–97)
MONOCYTES # BLD AUTO: 1.01 10*3/MM3 (ref 0.1–0.9)
MONOCYTES NFR BLD AUTO: 10.6 % (ref 5–12)
NEUTROPHILS NFR BLD AUTO: 5.33 10*3/MM3 (ref 1.7–7)
NEUTROPHILS NFR BLD AUTO: 56.2 % (ref 42.7–76)
NRBC BLD AUTO-RTO: 0 /100 WBC (ref 0–0.2)
NT-PROBNP SERPL-MCNC: 131 PG/ML (ref 0–900)
PLATELET # BLD AUTO: 159 10*3/MM3 (ref 140–450)
PMV BLD AUTO: 10.1 FL (ref 6–12)
POTASSIUM SERPL-SCNC: 3.8 MMOL/L (ref 3.5–5.2)
PROCALCITONIN SERPL-MCNC: 0.11 NG/ML (ref 0–0.25)
PROT SERPL-MCNC: 7.4 G/DL (ref 6–8.5)
RBC # BLD AUTO: 4.71 10*6/MM3 (ref 3.77–5.28)
SARS-COV-2 ORF1AB RESP QL NAA+PROBE: NOT DETECTED
SODIUM SERPL-SCNC: 138 MMOL/L (ref 136–145)
TROPONIN T SERPL-MCNC: <0.01 NG/ML (ref 0–0.03)
WBC NRBC COR # BLD: 9.49 10*3/MM3 (ref 3.4–10.8)

## 2021-11-26 PROCEDURE — 99283 EMERGENCY DEPT VISIT LOW MDM: CPT

## 2021-11-26 PROCEDURE — 84484 ASSAY OF TROPONIN QUANT: CPT | Performed by: EMERGENCY MEDICINE

## 2021-11-26 PROCEDURE — 83880 ASSAY OF NATRIURETIC PEPTIDE: CPT | Performed by: EMERGENCY MEDICINE

## 2021-11-26 PROCEDURE — 85025 COMPLETE CBC W/AUTO DIFF WBC: CPT | Performed by: EMERGENCY MEDICINE

## 2021-11-26 PROCEDURE — 71045 X-RAY EXAM CHEST 1 VIEW: CPT

## 2021-11-26 PROCEDURE — 84145 PROCALCITONIN (PCT): CPT | Performed by: EMERGENCY MEDICINE

## 2021-11-26 PROCEDURE — 93010 ELECTROCARDIOGRAM REPORT: CPT | Performed by: INTERNAL MEDICINE

## 2021-11-26 PROCEDURE — 93005 ELECTROCARDIOGRAM TRACING: CPT | Performed by: EMERGENCY MEDICINE

## 2021-11-26 PROCEDURE — 80053 COMPREHEN METABOLIC PANEL: CPT | Performed by: EMERGENCY MEDICINE

## 2021-11-26 PROCEDURE — 36415 COLL VENOUS BLD VENIPUNCTURE: CPT

## 2021-11-26 PROCEDURE — C9803 HOPD COVID-19 SPEC COLLECT: HCPCS | Performed by: EMERGENCY MEDICINE

## 2021-11-26 PROCEDURE — U0005 INFEC AGEN DETEC AMPLI PROBE: HCPCS | Performed by: EMERGENCY MEDICINE

## 2021-11-26 PROCEDURE — U0004 COV-19 TEST NON-CDC HGH THRU: HCPCS | Performed by: EMERGENCY MEDICINE

## 2021-11-26 PROCEDURE — 83605 ASSAY OF LACTIC ACID: CPT | Performed by: EMERGENCY MEDICINE

## 2021-11-26 RX ORDER — DIPHENOXYLATE HYDROCHLORIDE AND ATROPINE SULFATE 2.5; .025 MG/1; MG/1
1 TABLET ORAL 4 TIMES DAILY PRN
Qty: 12 TABLET | Refills: 0 | Status: SHIPPED | OUTPATIENT
Start: 2021-11-26

## 2021-11-26 RX ORDER — SODIUM CHLORIDE 0.9 % (FLUSH) 0.9 %
10 SYRINGE (ML) INJECTION AS NEEDED
Status: DISCONTINUED | OUTPATIENT
Start: 2021-11-26 | End: 2021-11-26 | Stop reason: HOSPADM

## 2021-11-26 RX ADMIN — SODIUM CHLORIDE, POTASSIUM CHLORIDE, SODIUM LACTATE AND CALCIUM CHLORIDE 1000 ML: 600; 310; 30; 20 INJECTION, SOLUTION INTRAVENOUS at 17:30

## 2021-11-27 LAB — QT INTERVAL: 417 MS

## 2021-11-29 DIAGNOSIS — R92.8 ABNORMAL SCREENING MAMMOGRAM: Primary | ICD-10-CM

## 2021-11-29 RX ORDER — ROSUVASTATIN CALCIUM 20 MG/1
20 TABLET, COATED ORAL DAILY
Qty: 90 TABLET | Refills: 0 | Status: SHIPPED | OUTPATIENT
Start: 2021-11-29 | End: 2022-02-21

## 2021-12-02 ENCOUNTER — TELEPHONE (OUTPATIENT)
Dept: FAMILY MEDICINE CLINIC | Facility: CLINIC | Age: 71
End: 2021-12-02

## 2021-12-02 NOTE — TELEPHONE ENCOUNTER
Patient says she has had a productive cough for 3 weeks. She went to ER on 11/26/21 and tested negative for covid. She was dx with URI and was given Lomotil because she also has diarrhea. She was not given anything for the cough and would like to know if something can be called in for this. She has had a fever off and on during all of this also. Currently using [facility] on [facility].

## 2021-12-03 RX ORDER — DOXYCYCLINE HYCLATE 100 MG/1
100 CAPSULE ORAL 2 TIMES DAILY
Qty: 20 CAPSULE | Refills: 0 | Status: SHIPPED | OUTPATIENT
Start: 2021-12-03 | End: 2022-01-05

## 2021-12-03 RX ORDER — DEXTROMETHORPHAN HYDROBROMIDE AND PROMETHAZINE HYDROCHLORIDE 15; 6.25 MG/5ML; MG/5ML
5 SYRUP ORAL 4 TIMES DAILY PRN
Qty: 180 ML | Refills: 0 | Status: SHIPPED | OUTPATIENT
Start: 2021-12-03 | End: 2022-01-05

## 2021-12-08 DIAGNOSIS — R60.0 LOWER EXTREMITY EDEMA: ICD-10-CM

## 2021-12-08 RX ORDER — FUROSEMIDE 20 MG/1
TABLET ORAL
Qty: 270 TABLET | Refills: 1 | Status: SHIPPED | OUTPATIENT
Start: 2021-12-08 | End: 2022-06-07

## 2021-12-08 RX ORDER — POTASSIUM CHLORIDE 600 MG/1
TABLET, FILM COATED, EXTENDED RELEASE ORAL
Qty: 180 TABLET | Refills: 1 | Status: SHIPPED | OUTPATIENT
Start: 2021-12-08 | End: 2022-06-07

## 2021-12-10 ENCOUNTER — OFFICE VISIT (OUTPATIENT)
Dept: FAMILY MEDICINE CLINIC | Facility: CLINIC | Age: 71
End: 2021-12-10

## 2021-12-10 VITALS
DIASTOLIC BLOOD PRESSURE: 74 MMHG | TEMPERATURE: 97.3 F | OXYGEN SATURATION: 97 % | HEART RATE: 80 BPM | HEIGHT: 61 IN | SYSTOLIC BLOOD PRESSURE: 142 MMHG | BODY MASS INDEX: 35.68 KG/M2 | WEIGHT: 189 LBS

## 2021-12-10 DIAGNOSIS — R06.2 WHEEZING: Primary | ICD-10-CM

## 2021-12-10 DIAGNOSIS — K59.1 FUNCTIONAL DIARRHEA: ICD-10-CM

## 2021-12-10 DIAGNOSIS — J34.89 NASAL DISCHARGE: ICD-10-CM

## 2021-12-10 PROCEDURE — 99213 OFFICE O/P EST LOW 20 MIN: CPT | Performed by: NURSE PRACTITIONER

## 2021-12-10 PROCEDURE — 71046 X-RAY EXAM CHEST 2 VIEWS: CPT | Performed by: NURSE PRACTITIONER

## 2021-12-10 PROCEDURE — 96372 THER/PROPH/DIAG INJ SC/IM: CPT | Performed by: NURSE PRACTITIONER

## 2021-12-10 RX ORDER — GUAIFENESIN/DEXTROMETHORPHAN 100-10MG/5
5 SYRUP ORAL 3 TIMES DAILY PRN
Qty: 236 ML | Refills: 0 | Status: SHIPPED | OUTPATIENT
Start: 2021-12-10 | End: 2022-01-05

## 2021-12-10 RX ORDER — TRIAMCINOLONE ACETONIDE 40 MG/ML
40 INJECTION, SUSPENSION INTRA-ARTICULAR; INTRAMUSCULAR ONCE
Status: COMPLETED | OUTPATIENT
Start: 2021-12-10 | End: 2021-12-10

## 2021-12-10 RX ADMIN — TRIAMCINOLONE ACETONIDE 40 MG: 40 INJECTION, SUSPENSION INTRA-ARTICULAR; INTRAMUSCULAR at 13:50

## 2021-12-10 NOTE — PROGRESS NOTES
"Subjective   Dalila Javed is a 71 y.o. female who presents c/o cough, congestion, wheezing x 2 months. Has been to ER and UC and dx with URI both times. covid tests negative. Is currently on doxcycline.     History of Present Illness   Last fever 98.8 yesterday, nasal congestion, can't lay down, as increased PND and cough. Seen today with spouse caretaker.  When pressed fever 98.8 a few days ago. 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 Is not taking antihistamines or symptom controllers. Symptoms fairly steady x 8 weeks  The following portions of the patient's history were reviewed and updated as appropriate: allergies, current medications, past family history, past medical history, past social history, past surgical history and problem list.    Review of Systems   Constitutional: Positive for chills and fever.   HENT: Positive for postnasal drip and sore throat. Negative for ear pain and rhinorrhea.    Respiratory: Positive for cough, shortness of breath and wheezing.    Cardiovascular: Negative for chest pain.   Musculoskeletal: Positive for myalgias.   Skin: Negative for rash.   Neurological: Positive for headaches.     /74   Pulse 80   Temp 97.3 °F (36.3 °C) (Infrared)   Ht 154.9 cm (61\")   Wt 85.7 kg (189 lb)   SpO2 97%   BMI 35.71 kg/m²       Objective   Physical Exam  Constitutional:       Appearance: She is well-developed. She is not diaphoretic.   HENT:      Head: Normocephalic and atraumatic.   Neck:      Thyroid: No thyromegaly.   Cardiovascular:      Rate and Rhythm: Normal rate and regular rhythm.      Pulses:           Carotid pulses are 2+ on the right side and 2+ on the left side.     Heart sounds: Normal heart sounds.   Pulmonary:      Effort: Pulmonary effort is normal.      Breath sounds: Examination of the right-middle field reveals wheezing. Examination of the left-middle field reveals wheezing. Examination of the " right-lower field reveals wheezing. Examination of the left-lower field reveals wheezing. Wheezing present.   Musculoskeletal:      Cervical back: Normal range of motion and neck supple.   Lymphadenopathy:      Cervical: No cervical adenopathy.   Psychiatric:         Behavior: Behavior normal.         Thought Content: Thought content normal.         Judgment: Judgment normal.         Assessment/Plan   Problems Addressed this Visit     None      Visit Diagnoses     Wheezing    -  Primary    Relevant Medications    triamcinolone acetonide (KENALOG-40) injection 40 mg (Completed)    guaiFENesin-dextromethorphan (ROBITUSSIN DM) 100-10 MG/5ML syrup    Other Relevant Orders    XR Chest PA & Lateral    Functional diarrhea        Relevant Orders    Clostridium Difficile Toxin, PCR - Stool, Per Rectum    Nasal discharge        Relevant Medications    guaiFENesin-dextromethorphan (ROBITUSSIN DM) 100-10 MG/5ML syrup      Diagnoses       Codes Comments    Wheezing    -  Primary ICD-10-CM: R06.2  ICD-9-CM: 786.07     Functional diarrhea     ICD-10-CM: K59.1  ICD-9-CM: 564.5     Nasal discharge     ICD-10-CM: J34.89  ICD-9-CM: 478.19         Wheezing--CXR--NAD--comparison 11/26/2021, similar. Will change cough medication to assist in thinning discharge and increase albuterol use to 4 x day while wheeze. Steroid injection to help with wheezing  Diarrhea--will return sample, rule out c diff as freq antibiotic uses. Treatment pending results  Nasal discharge--start antihistamine       Answers for HPI/ROS submitted by the patient on 12/10/2021  What is the primary reason for your visit?: Cough    Reviewed UC records 10/22 COVID and Flu negative  Reviewed ER records 10/26 COVID negative, CXR clear, EKG no acute

## 2021-12-17 ENCOUNTER — PATIENT OUTREACH (OUTPATIENT)
Dept: CASE MANAGEMENT | Facility: OTHER | Age: 71
End: 2021-12-17

## 2021-12-17 NOTE — OUTREACH NOTE
Patient Outreach    Ambulatory Case Management Note    Call placed to pt who states she is finally feeling better. She has had Bronchitis for about 8 weeks and has been on multiple antibiotics. She states she finally feels like she is getting over it. Pt states her she always has stomach issues when she is on antibiotics. Discussed use of probiotics and how they work. Suggested discussing with her PCP.  Pt states she has several coffee cans full of her insulin needles and does not know what to do with them. Informed pt to take to any healthcare facility or pharmacy and they will be able to take care of this for her and will give her an actual biohazard container as well.  Denies any questions or concerns. Long enjoyable conversation with pt.         Yvette Pugh RN  Ambulatory Case Management    12/17/2021, 12:05 EST

## 2021-12-21 ENCOUNTER — TELEPHONE (OUTPATIENT)
Dept: FAMILY MEDICINE CLINIC | Facility: CLINIC | Age: 71
End: 2021-12-21

## 2021-12-21 DIAGNOSIS — R92.8 ABNORMALITY OF LEFT BREAST ON SCREENING MAMMOGRAM: Primary | ICD-10-CM

## 2021-12-21 DIAGNOSIS — Z12.31 SCREENING MAMMOGRAM FOR BREAST CANCER: ICD-10-CM

## 2021-12-21 NOTE — TELEPHONE ENCOUNTER
DXP says patient is scheduled for a follow up mammogram this Thursday and they need the order faxed over. She is getting a diagnostic niyah mammo and US left breast. Okay for orders? Orders were placed last month but at the time only a diagnostic left breast mammo was requested. Her annual is due so they will be doing bilateral now.

## 2021-12-27 ENCOUNTER — HOSPITAL ENCOUNTER (OUTPATIENT)
Facility: HOSPITAL | Age: 71
Setting detail: OBSERVATION
Discharge: HOME | End: 2021-12-28
Attending: EMERGENCY MEDICINE | Admitting: EMERGENCY MEDICINE

## 2021-12-27 ENCOUNTER — APPOINTMENT (OUTPATIENT)
Dept: CT IMAGING | Facility: HOSPITAL | Age: 71
End: 2021-12-27

## 2021-12-27 ENCOUNTER — APPOINTMENT (OUTPATIENT)
Dept: GENERAL RADIOLOGY | Facility: HOSPITAL | Age: 71
End: 2021-12-27

## 2021-12-27 DIAGNOSIS — R55 SYNCOPE AND COLLAPSE: Primary | ICD-10-CM

## 2021-12-27 PROBLEM — G45.9 TIA (TRANSIENT ISCHEMIC ATTACK): Status: ACTIVE | Noted: 2021-12-27

## 2021-12-27 LAB
ALBUMIN SERPL-MCNC: 4.6 G/DL (ref 3.5–5.2)
ALBUMIN/GLOB SERPL: 1.6 G/DL
ALP SERPL-CCNC: 42 U/L (ref 39–117)
ALT SERPL W P-5'-P-CCNC: 21 U/L (ref 1–33)
ANION GAP SERPL CALCULATED.3IONS-SCNC: 10.5 MMOL/L (ref 5–15)
AST SERPL-CCNC: 19 U/L (ref 1–32)
BACTERIA UR QL AUTO: ABNORMAL /HPF
BASOPHILS # BLD AUTO: 0.07 10*3/MM3 (ref 0–0.2)
BASOPHILS NFR BLD AUTO: 0.6 % (ref 0–1.5)
BILIRUB SERPL-MCNC: 0.3 MG/DL (ref 0–1.2)
BILIRUB UR QL STRIP: NEGATIVE
BUN SERPL-MCNC: 13 MG/DL (ref 8–23)
BUN/CREAT SERPL: 12.9 (ref 7–25)
CALCIUM SPEC-SCNC: 10.2 MG/DL (ref 8.6–10.5)
CHLORIDE SERPL-SCNC: 100 MMOL/L (ref 98–107)
CLARITY UR: CLEAR
CO2 SERPL-SCNC: 30.5 MMOL/L (ref 22–29)
COLOR UR: YELLOW
CREAT SERPL-MCNC: 1.01 MG/DL (ref 0.57–1)
DEPRECATED RDW RBC AUTO: 40.6 FL (ref 37–54)
EOSINOPHIL # BLD AUTO: 0.29 10*3/MM3 (ref 0–0.4)
EOSINOPHIL NFR BLD AUTO: 2.3 % (ref 0.3–6.2)
ERYTHROCYTE [DISTWIDTH] IN BLOOD BY AUTOMATED COUNT: 12.3 % (ref 12.3–15.4)
GFR SERPL CREATININE-BSD FRML MDRD: 54 ML/MIN/1.73
GLOBULIN UR ELPH-MCNC: 2.8 GM/DL
GLUCOSE SERPL-MCNC: 100 MG/DL (ref 65–99)
GLUCOSE UR STRIP-MCNC: NEGATIVE MG/DL
HCT VFR BLD AUTO: 47.3 % (ref 34–46.6)
HGB BLD-MCNC: 15.6 G/DL (ref 12–15.9)
HGB UR QL STRIP.AUTO: ABNORMAL
HOLD SPECIMEN: NORMAL
HOLD SPECIMEN: NORMAL
HYALINE CASTS UR QL AUTO: ABNORMAL /LPF
IMM GRANULOCYTES # BLD AUTO: 0.04 10*3/MM3 (ref 0–0.05)
IMM GRANULOCYTES NFR BLD AUTO: 0.3 % (ref 0–0.5)
KETONES UR QL STRIP: NEGATIVE
LEUKOCYTE ESTERASE UR QL STRIP.AUTO: ABNORMAL
LYMPHOCYTES # BLD AUTO: 3.93 10*3/MM3 (ref 0.7–3.1)
LYMPHOCYTES NFR BLD AUTO: 31.6 % (ref 19.6–45.3)
MAGNESIUM SERPL-MCNC: 1.9 MG/DL (ref 1.6–2.4)
MCH RBC QN AUTO: 29.8 PG (ref 26.6–33)
MCHC RBC AUTO-ENTMCNC: 33 G/DL (ref 31.5–35.7)
MCV RBC AUTO: 90.4 FL (ref 79–97)
MONOCYTES # BLD AUTO: 0.85 10*3/MM3 (ref 0.1–0.9)
MONOCYTES NFR BLD AUTO: 6.8 % (ref 5–12)
NEUTROPHILS NFR BLD AUTO: 58.4 % (ref 42.7–76)
NEUTROPHILS NFR BLD AUTO: 7.26 10*3/MM3 (ref 1.7–7)
NITRITE UR QL STRIP: NEGATIVE
NRBC BLD AUTO-RTO: 0 /100 WBC (ref 0–0.2)
PH UR STRIP.AUTO: <=5 [PH] (ref 5–8)
PLATELET # BLD AUTO: 201 10*3/MM3 (ref 140–450)
PMV BLD AUTO: 10.1 FL (ref 6–12)
POTASSIUM SERPL-SCNC: 4 MMOL/L (ref 3.5–5.2)
PROT SERPL-MCNC: 7.4 G/DL (ref 6–8.5)
PROT UR QL STRIP: NEGATIVE
RBC # BLD AUTO: 5.23 10*6/MM3 (ref 3.77–5.28)
RBC # UR STRIP: ABNORMAL /HPF
REF LAB TEST METHOD: ABNORMAL
SARS-COV-2 RNA PNL SPEC NAA+PROBE: NOT DETECTED
SODIUM SERPL-SCNC: 141 MMOL/L (ref 136–145)
SP GR UR STRIP: 1.01 (ref 1–1.03)
SQUAMOUS #/AREA URNS HPF: ABNORMAL /HPF
TROPONIN T SERPL-MCNC: <0.01 NG/ML (ref 0–0.03)
UROBILINOGEN UR QL STRIP: ABNORMAL
WBC # UR STRIP: ABNORMAL /HPF
WBC NRBC COR # BLD: 12.44 10*3/MM3 (ref 3.4–10.8)
WHOLE BLOOD HOLD SPECIMEN: NORMAL
WHOLE BLOOD HOLD SPECIMEN: NORMAL

## 2021-12-27 PROCEDURE — 85025 COMPLETE CBC W/AUTO DIFF WBC: CPT

## 2021-12-27 PROCEDURE — 80053 COMPREHEN METABOLIC PANEL: CPT

## 2021-12-27 PROCEDURE — 93005 ELECTROCARDIOGRAM TRACING: CPT | Performed by: EMERGENCY MEDICINE

## 2021-12-27 PROCEDURE — 93005 ELECTROCARDIOGRAM TRACING: CPT

## 2021-12-27 PROCEDURE — 87635 SARS-COV-2 COVID-19 AMP PRB: CPT | Performed by: PHYSICIAN ASSISTANT

## 2021-12-27 PROCEDURE — 70450 CT HEAD/BRAIN W/O DYE: CPT

## 2021-12-27 PROCEDURE — 71045 X-RAY EXAM CHEST 1 VIEW: CPT

## 2021-12-27 PROCEDURE — 83735 ASSAY OF MAGNESIUM: CPT

## 2021-12-27 PROCEDURE — G0378 HOSPITAL OBSERVATION PER HR: HCPCS

## 2021-12-27 PROCEDURE — 81001 URINALYSIS AUTO W/SCOPE: CPT | Performed by: EMERGENCY MEDICINE

## 2021-12-27 PROCEDURE — 99284 EMERGENCY DEPT VISIT MOD MDM: CPT

## 2021-12-27 PROCEDURE — 84484 ASSAY OF TROPONIN QUANT: CPT

## 2021-12-27 PROCEDURE — 93010 ELECTROCARDIOGRAM REPORT: CPT | Performed by: INTERNAL MEDICINE

## 2021-12-27 RX ORDER — SODIUM CHLORIDE 0.9 % (FLUSH) 0.9 %
10 SYRINGE (ML) INJECTION AS NEEDED
Status: DISCONTINUED | OUTPATIENT
Start: 2021-12-27 | End: 2021-12-28 | Stop reason: HOSPADM

## 2021-12-28 ENCOUNTER — APPOINTMENT (OUTPATIENT)
Dept: CARDIOLOGY | Facility: HOSPITAL | Age: 71
End: 2021-12-28

## 2021-12-28 ENCOUNTER — READMISSION MANAGEMENT (OUTPATIENT)
Dept: CALL CENTER | Facility: HOSPITAL | Age: 71
End: 2021-12-28

## 2021-12-28 ENCOUNTER — APPOINTMENT (OUTPATIENT)
Dept: NEUROLOGY | Facility: HOSPITAL | Age: 71
End: 2021-12-28

## 2021-12-28 ENCOUNTER — APPOINTMENT (OUTPATIENT)
Dept: MRI IMAGING | Facility: HOSPITAL | Age: 71
End: 2021-12-28

## 2021-12-28 ENCOUNTER — APPOINTMENT (OUTPATIENT)
Dept: CT IMAGING | Facility: HOSPITAL | Age: 71
End: 2021-12-28

## 2021-12-28 VITALS
HEIGHT: 62 IN | SYSTOLIC BLOOD PRESSURE: 147 MMHG | BODY MASS INDEX: 33.68 KG/M2 | DIASTOLIC BLOOD PRESSURE: 83 MMHG | HEART RATE: 74 BPM | OXYGEN SATURATION: 96 % | RESPIRATION RATE: 18 BRPM | TEMPERATURE: 98.4 F | WEIGHT: 183 LBS

## 2021-12-28 LAB
ALBUMIN SERPL-MCNC: 3.8 G/DL (ref 3.5–5.2)
ALBUMIN/GLOB SERPL: 1.5 G/DL
ALP SERPL-CCNC: 38 U/L (ref 39–117)
ALT SERPL W P-5'-P-CCNC: 17 U/L (ref 1–33)
ANION GAP SERPL CALCULATED.3IONS-SCNC: 8.7 MMOL/L (ref 5–15)
AORTIC DIMENSIONLESS INDEX: 0.9 (DI)
APTT PPP: 28.8 SECONDS (ref 22.7–35.4)
AST SERPL-CCNC: 16 U/L (ref 1–32)
BH CV ECHO MEAS - ACS: 2 CM
BH CV ECHO MEAS - AO MAX PG (FULL): 0.69 MMHG
BH CV ECHO MEAS - AO MAX PG: 10.4 MMHG
BH CV ECHO MEAS - AO MEAN PG (FULL): 1.9 MMHG
BH CV ECHO MEAS - AO MEAN PG: 5.5 MMHG
BH CV ECHO MEAS - AO ROOT AREA (BSA CORRECTED): 1.5
BH CV ECHO MEAS - AO ROOT AREA: 6.3 CM^2
BH CV ECHO MEAS - AO ROOT DIAM: 2.8 CM
BH CV ECHO MEAS - AO V2 MAX: 161.5 CM/SEC
BH CV ECHO MEAS - AO V2 MEAN: 109 CM/SEC
BH CV ECHO MEAS - AO V2 VTI: 33.7 CM
BH CV ECHO MEAS - AVA(I,A): 3 CM^2
BH CV ECHO MEAS - AVA(I,D): 3 CM^2
BH CV ECHO MEAS - AVA(V,A): 3.2 CM^2
BH CV ECHO MEAS - AVA(V,D): 3.2 CM^2
BH CV ECHO MEAS - BSA(HAYCOCK): 1.9 M^2
BH CV ECHO MEAS - BSA: 1.8 M^2
BH CV ECHO MEAS - BZI_BMI: 33.5 KILOGRAMS/M^2
BH CV ECHO MEAS - BZI_METRIC_HEIGHT: 157.5 CM
BH CV ECHO MEAS - BZI_METRIC_WEIGHT: 83 KG
BH CV ECHO MEAS - EDV(CUBED): 121.7 ML
BH CV ECHO MEAS - EDV(MOD-SP2): 40 ML
BH CV ECHO MEAS - EDV(MOD-SP4): 35 ML
BH CV ECHO MEAS - EDV(TEICH): 115.8 ML
BH CV ECHO MEAS - EF(CUBED): 77.3 %
BH CV ECHO MEAS - EF(MOD-BP): 65.4 %
BH CV ECHO MEAS - EF(MOD-SP2): 70 %
BH CV ECHO MEAS - EF(MOD-SP4): 60 %
BH CV ECHO MEAS - EF(TEICH): 69.2 %
BH CV ECHO MEAS - ESV(CUBED): 27.6 ML
BH CV ECHO MEAS - ESV(MOD-SP2): 12 ML
BH CV ECHO MEAS - ESV(MOD-SP4): 14 ML
BH CV ECHO MEAS - ESV(TEICH): 35.6 ML
BH CV ECHO MEAS - FS: 39 %
BH CV ECHO MEAS - IVS/LVPW: 0.9
BH CV ECHO MEAS - IVSD: 0.93 CM
BH CV ECHO MEAS - LAT PEAK E' VEL: 7.3 CM/SEC
BH CV ECHO MEAS - LV DIASTOLIC VOL/BSA (35-75): 19 ML/M^2
BH CV ECHO MEAS - LV MASS(C)D: 175.1 GRAMS
BH CV ECHO MEAS - LV MASS(C)DI: 95.1 GRAMS/M^2
BH CV ECHO MEAS - LV MAX PG: 9.7 MMHG
BH CV ECHO MEAS - LV MEAN PG: 3.5 MMHG
BH CV ECHO MEAS - LV SYSTOLIC VOL/BSA (12-30): 7.6 ML/M^2
BH CV ECHO MEAS - LV V1 MAX: 156.1 CM/SEC
BH CV ECHO MEAS - LV V1 MEAN: 83.1 CM/SEC
BH CV ECHO MEAS - LV V1 VTI: 30.3 CM
BH CV ECHO MEAS - LVIDD: 5 CM
BH CV ECHO MEAS - LVIDS: 3 CM
BH CV ECHO MEAS - LVLD AP2: 5.9 CM
BH CV ECHO MEAS - LVLD AP4: 5.7 CM
BH CV ECHO MEAS - LVLS AP2: 4.5 CM
BH CV ECHO MEAS - LVLS AP4: 4.9 CM
BH CV ECHO MEAS - LVOT AREA (M): 3.5 CM^2
BH CV ECHO MEAS - LVOT AREA: 3.3 CM^2
BH CV ECHO MEAS - LVOT DIAM: 2.1 CM
BH CV ECHO MEAS - LVPWD: 1 CM
BH CV ECHO MEAS - MED PEAK E' VEL: 7 CM/SEC
BH CV ECHO MEAS - MR MAX PG: 42.8 MMHG
BH CV ECHO MEAS - MR MAX VEL: 327.1 CM/SEC
BH CV ECHO MEAS - MV A MAX VEL: 74.1 CM/SEC
BH CV ECHO MEAS - MV DEC SLOPE: 309.5 CM/SEC^2
BH CV ECHO MEAS - MV DEC TIME: 217 SEC
BH CV ECHO MEAS - MV E MAX VEL: 78.8 CM/SEC
BH CV ECHO MEAS - MV E/A: 1.1
BH CV ECHO MEAS - MV MAX PG: 3.2 MMHG
BH CV ECHO MEAS - MV MEAN PG: 1.3 MMHG
BH CV ECHO MEAS - MV P1/2T MAX VEL: 88.2 CM/SEC
BH CV ECHO MEAS - MV P1/2T: 83.5 MSEC
BH CV ECHO MEAS - MV V2 MAX: 89.6 CM/SEC
BH CV ECHO MEAS - MV V2 MEAN: 53.8 CM/SEC
BH CV ECHO MEAS - MV V2 VTI: 29.8 CM
BH CV ECHO MEAS - MVA P1/2T LCG: 2.5 CM^2
BH CV ECHO MEAS - MVA(P1/2T): 2.6 CM^2
BH CV ECHO MEAS - MVA(VTI): 3.4 CM^2
BH CV ECHO MEAS - PA MAX PG (FULL): 1.3 MMHG
BH CV ECHO MEAS - PA MAX PG: 2.7 MMHG
BH CV ECHO MEAS - PA V2 MAX: 82.8 CM/SEC
BH CV ECHO MEAS - PVA(V,A): 2.8 CM^2
BH CV ECHO MEAS - PVA(V,D): 2.8 CM^2
BH CV ECHO MEAS - QP/QS: 0.48
BH CV ECHO MEAS - RAP SYSTOLE: 8 MMHG
BH CV ECHO MEAS - RV MAX PG: 1.5 MMHG
BH CV ECHO MEAS - RV MEAN PG: 0.68 MMHG
BH CV ECHO MEAS - RV V1 MAX: 60.4 CM/SEC
BH CV ECHO MEAS - RV V1 MEAN: 38.3 CM/SEC
BH CV ECHO MEAS - RV V1 VTI: 12.5 CM
BH CV ECHO MEAS - RVOT AREA: 3.9 CM^2
BH CV ECHO MEAS - RVOT DIAM: 2.2 CM
BH CV ECHO MEAS - RVSP: 22 MMHG
BH CV ECHO MEAS - SI(AO): 116.1 ML/M^2
BH CV ECHO MEAS - SI(CUBED): 51.1 ML/M^2
BH CV ECHO MEAS - SI(LVOT): 54.3 ML/M^2
BH CV ECHO MEAS - SI(MOD-SP2): 15.2 ML/M^2
BH CV ECHO MEAS - SI(MOD-SP4): 11.4 ML/M^2
BH CV ECHO MEAS - SI(TEICH): 43.6 ML/M^2
BH CV ECHO MEAS - SV(AO): 213.6 ML
BH CV ECHO MEAS - SV(CUBED): 94.1 ML
BH CV ECHO MEAS - SV(LVOT): 100 ML
BH CV ECHO MEAS - SV(MOD-SP2): 28 ML
BH CV ECHO MEAS - SV(MOD-SP4): 21 ML
BH CV ECHO MEAS - SV(RVOT): 48.3 ML
BH CV ECHO MEAS - SV(TEICH): 80.2 ML
BH CV ECHO MEAS - TAPSE (>1.6): 1.8 CM
BH CV ECHO MEAS - TR MAX VEL: 214.5 CM/SEC
BH CV ECHO MEASUREMENTS AVERAGE E/E' RATIO: 11.02
BH CV XLRA - RV BASE: 3.3 CM
BH CV XLRA - TDI S': 12.1 CM/SEC
BILIRUB SERPL-MCNC: 0.3 MG/DL (ref 0–1.2)
BUN SERPL-MCNC: 14 MG/DL (ref 8–23)
BUN/CREAT SERPL: 14.3 (ref 7–25)
CALCIUM SPEC-SCNC: 9.4 MG/DL (ref 8.6–10.5)
CHLORIDE SERPL-SCNC: 100 MMOL/L (ref 98–107)
CO2 SERPL-SCNC: 29.3 MMOL/L (ref 22–29)
CREAT SERPL-MCNC: 0.98 MG/DL (ref 0.57–1)
DEPRECATED RDW RBC AUTO: 39.6 FL (ref 37–54)
ERYTHROCYTE [DISTWIDTH] IN BLOOD BY AUTOMATED COUNT: 12.1 % (ref 12.3–15.4)
GFR SERPL CREATININE-BSD FRML MDRD: 56 ML/MIN/1.73
GLOBULIN UR ELPH-MCNC: 2.5 GM/DL
GLUCOSE BLDC GLUCOMTR-MCNC: 128 MG/DL (ref 70–130)
GLUCOSE BLDC GLUCOMTR-MCNC: 153 MG/DL (ref 70–130)
GLUCOSE BLDC GLUCOMTR-MCNC: 186 MG/DL (ref 70–130)
GLUCOSE SERPL-MCNC: 227 MG/DL (ref 65–99)
HBA1C MFR BLD: 9.25 % (ref 4.8–5.6)
HCT VFR BLD AUTO: 41.3 % (ref 34–46.6)
HGB BLD-MCNC: 14.1 G/DL (ref 12–15.9)
INR PPP: 1.27 (ref 0.9–1.1)
LEFT ATRIUM VOLUME INDEX: 26 ML/M2
LV EF 2D ECHO EST: 65 %
MCH RBC QN AUTO: 30.7 PG (ref 26.6–33)
MCHC RBC AUTO-ENTMCNC: 34.1 G/DL (ref 31.5–35.7)
MCV RBC AUTO: 90 FL (ref 79–97)
PLATELET # BLD AUTO: 172 10*3/MM3 (ref 140–450)
PMV BLD AUTO: 10.4 FL (ref 6–12)
POTASSIUM SERPL-SCNC: 3.6 MMOL/L (ref 3.5–5.2)
PROT SERPL-MCNC: 6.3 G/DL (ref 6–8.5)
PROTHROMBIN TIME: 15.7 SECONDS (ref 11.7–14.2)
QT INTERVAL: 424 MS
RBC # BLD AUTO: 4.59 10*6/MM3 (ref 3.77–5.28)
SINUS: 2.6 CM
SODIUM SERPL-SCNC: 138 MMOL/L (ref 136–145)
TROPONIN T SERPL-MCNC: <0.01 NG/ML (ref 0–0.03)
WBC NRBC COR # BLD: 10.54 10*3/MM3 (ref 3.4–10.8)

## 2021-12-28 PROCEDURE — 70498 CT ANGIOGRAPHY NECK: CPT

## 2021-12-28 PROCEDURE — 85027 COMPLETE CBC AUTOMATED: CPT | Performed by: NURSE PRACTITIONER

## 2021-12-28 PROCEDURE — 84484 ASSAY OF TROPONIN QUANT: CPT | Performed by: NURSE PRACTITIONER

## 2021-12-28 PROCEDURE — 99214 OFFICE O/P EST MOD 30 MIN: CPT | Performed by: PSYCHIATRY & NEUROLOGY

## 2021-12-28 PROCEDURE — 83036 HEMOGLOBIN GLYCOSYLATED A1C: CPT | Performed by: NURSE PRACTITIONER

## 2021-12-28 PROCEDURE — 95816 EEG AWAKE AND DROWSY: CPT | Performed by: PSYCHIATRY & NEUROLOGY

## 2021-12-28 PROCEDURE — A9577 INJ MULTIHANCE: HCPCS | Performed by: EMERGENCY MEDICINE

## 2021-12-28 PROCEDURE — 85610 PROTHROMBIN TIME: CPT | Performed by: NURSE PRACTITIONER

## 2021-12-28 PROCEDURE — G0378 HOSPITAL OBSERVATION PER HR: HCPCS

## 2021-12-28 PROCEDURE — 93306 TTE W/DOPPLER COMPLETE: CPT

## 2021-12-28 PROCEDURE — 63710000001 INSULIN ISOPHANE HUMAN PER 5 UNITS: Performed by: NURSE PRACTITIONER

## 2021-12-28 PROCEDURE — 70553 MRI BRAIN STEM W/O & W/DYE: CPT

## 2021-12-28 PROCEDURE — 0 IOPAMIDOL PER 1 ML: Performed by: EMERGENCY MEDICINE

## 2021-12-28 PROCEDURE — 25010000002 LORAZEPAM PER 2 MG: Performed by: NURSE PRACTITIONER

## 2021-12-28 PROCEDURE — 82962 GLUCOSE BLOOD TEST: CPT

## 2021-12-28 PROCEDURE — 96374 THER/PROPH/DIAG INJ IV PUSH: CPT

## 2021-12-28 PROCEDURE — 70496 CT ANGIOGRAPHY HEAD: CPT

## 2021-12-28 PROCEDURE — 93306 TTE W/DOPPLER COMPLETE: CPT | Performed by: INTERNAL MEDICINE

## 2021-12-28 PROCEDURE — 0 GADOBENATE DIMEGLUMINE 529 MG/ML SOLUTION: Performed by: EMERGENCY MEDICINE

## 2021-12-28 PROCEDURE — 95816 EEG AWAKE AND DROWSY: CPT

## 2021-12-28 PROCEDURE — 85730 THROMBOPLASTIN TIME PARTIAL: CPT | Performed by: NURSE PRACTITIONER

## 2021-12-28 PROCEDURE — 80053 COMPREHEN METABOLIC PANEL: CPT | Performed by: NURSE PRACTITIONER

## 2021-12-28 RX ORDER — ACETAMINOPHEN 500 MG
1000 TABLET ORAL ONCE
Status: COMPLETED | OUTPATIENT
Start: 2021-12-28 | End: 2021-12-28

## 2021-12-28 RX ORDER — MIRTAZAPINE 15 MG/1
7.5 TABLET, FILM COATED ORAL NIGHTLY
Status: DISCONTINUED | OUTPATIENT
Start: 2021-12-28 | End: 2021-12-28 | Stop reason: HOSPADM

## 2021-12-28 RX ORDER — PANTOPRAZOLE SODIUM 40 MG/1
40 TABLET, DELAYED RELEASE ORAL EVERY MORNING
Refills: 3 | Status: DISCONTINUED | OUTPATIENT
Start: 2021-12-28 | End: 2021-12-28 | Stop reason: HOSPADM

## 2021-12-28 RX ORDER — SODIUM CHLORIDE 9 MG/ML
100 INJECTION, SOLUTION INTRAVENOUS CONTINUOUS
Status: DISCONTINUED | OUTPATIENT
Start: 2021-12-28 | End: 2021-12-28 | Stop reason: HOSPADM

## 2021-12-28 RX ORDER — CITALOPRAM 20 MG/1
20 TABLET ORAL DAILY
Status: DISCONTINUED | OUTPATIENT
Start: 2021-12-28 | End: 2021-12-28 | Stop reason: HOSPADM

## 2021-12-28 RX ORDER — POTASSIUM CHLORIDE 750 MG/1
10 TABLET, FILM COATED, EXTENDED RELEASE ORAL 2 TIMES DAILY
Status: DISCONTINUED | OUTPATIENT
Start: 2021-12-28 | End: 2021-12-28 | Stop reason: HOSPADM

## 2021-12-28 RX ORDER — ONDANSETRON 2 MG/ML
4 INJECTION INTRAMUSCULAR; INTRAVENOUS EVERY 6 HOURS PRN
Status: DISCONTINUED | OUTPATIENT
Start: 2021-12-28 | End: 2021-12-28 | Stop reason: HOSPADM

## 2021-12-28 RX ORDER — FUROSEMIDE 20 MG/1
20 TABLET ORAL
Status: DISCONTINUED | OUTPATIENT
Start: 2021-12-28 | End: 2021-12-28 | Stop reason: HOSPADM

## 2021-12-28 RX ORDER — ROSUVASTATIN CALCIUM 20 MG/1
20 TABLET, COATED ORAL DAILY
Status: DISCONTINUED | OUTPATIENT
Start: 2021-12-28 | End: 2021-12-28 | Stop reason: HOSPADM

## 2021-12-28 RX ORDER — LORAZEPAM 2 MG/ML
1 INJECTION INTRAMUSCULAR ONCE
Status: COMPLETED | OUTPATIENT
Start: 2021-12-28 | End: 2021-12-28

## 2021-12-28 RX ORDER — FERROUS SULFATE 325(65) MG
325 TABLET ORAL
Status: DISCONTINUED | OUTPATIENT
Start: 2021-12-28 | End: 2021-12-28 | Stop reason: HOSPADM

## 2021-12-28 RX ORDER — ARIPIPRAZOLE 2 MG/1
2 TABLET ORAL EVERY EVENING
Status: DISCONTINUED | OUTPATIENT
Start: 2021-12-28 | End: 2021-12-28 | Stop reason: HOSPADM

## 2021-12-28 RX ADMIN — FUROSEMIDE 20 MG: 20 TABLET ORAL at 10:23

## 2021-12-28 RX ADMIN — ROSUVASTATIN CALCIUM 20 MG: 20 TABLET, FILM COATED ORAL at 10:23

## 2021-12-28 RX ADMIN — METOPROLOL TARTRATE 50 MG: 25 TABLET ORAL at 10:23

## 2021-12-28 RX ADMIN — GADOBENATE DIMEGLUMINE 17 ML: 529 INJECTION, SOLUTION INTRAVENOUS at 07:59

## 2021-12-28 RX ADMIN — LORAZEPAM 1 MG: 2 INJECTION INTRAMUSCULAR; INTRAVENOUS at 07:23

## 2021-12-28 RX ADMIN — POTASSIUM CHLORIDE 10 MEQ: 10 TABLET, EXTENDED RELEASE ORAL at 10:23

## 2021-12-28 RX ADMIN — IOPAMIDOL 95 ML: 755 INJECTION, SOLUTION INTRAVENOUS at 08:37

## 2021-12-28 RX ADMIN — SODIUM CHLORIDE 100 ML/HR: 9 INJECTION, SOLUTION INTRAVENOUS at 05:20

## 2021-12-28 RX ADMIN — PANTOPRAZOLE SODIUM 40 MG: 40 TABLET, DELAYED RELEASE ORAL at 10:23

## 2021-12-28 RX ADMIN — INSULIN HUMAN 16 UNITS: 100 INJECTION, SUSPENSION SUBCUTANEOUS at 10:23

## 2021-12-28 RX ADMIN — FERROUS SULFATE TAB 325 MG (65 MG ELEMENTAL FE) 325 MG: 325 (65 FE) TAB at 10:23

## 2021-12-28 RX ADMIN — CARBIDOPA AND LEVODOPA 1 TABLET: 10; 100 TABLET ORAL at 10:23

## 2021-12-28 RX ADMIN — CITALOPRAM 20 MG: 20 TABLET, FILM COATED ORAL at 10:23

## 2021-12-28 RX ADMIN — ACETAMINOPHEN 1000 MG: 500 TABLET, FILM COATED ORAL at 00:35

## 2021-12-29 ENCOUNTER — TRANSITIONAL CARE MANAGEMENT TELEPHONE ENCOUNTER (OUTPATIENT)
Dept: CALL CENTER | Facility: HOSPITAL | Age: 71
End: 2021-12-29

## 2021-12-29 NOTE — OUTREACH NOTE
Call Center TCM Note      Responses   [facility] patient discharged from? [place]   Does the patient have one of the following disease processes/diagnoses(primary or secondary)? Stroke (TIA)   TCM attempt successful? Yes   Call start time 1422   Call Status Left message   Call end time 1430   Discharge diagnosis TIA   Person spoke with today (if not patient) and relationship Patient and Edward-number in Epic is correct   Meds reviewed with patient/caregiver? Yes   Is the patient having any side effects they believe may be caused by any medication additions or changes? No   Does the patient have all medications ordered at discharge? N/A   Is the patient taking all medications as directed (includes completed medication regime)? Yes   Does the patient have a primary care provider?  Yes   Does the patient have an appointment with their PCP within 7 days of discharge? Greater than 7 days   Comments regarding PCP Hospital d/c f/u appt is on 1/5/22 at 1:30 pm    What is preventing the patient from scheduling follow up appointments within 7 days of discharge? --  [First appt patient was agreeable to]   Nursing Interventions Verified appointment date/time/provider   Has the patient kept scheduled appointments due by today? N/A   Psychosocial issues? No   Does the patient require any assistance with activities of daily living such as eating, bathing, dressing, walking, etc.? No   Does the patient have any residual symptoms from stroke/TIA? No   Does the patient understand the diet ordered at discharge? Yes   Did the patient receive a copy of their discharge instructions? Yes   Nursing interventions Reviewed instructions with patient   What is the patient's perception of their health status since discharge? Improving   Nursing interventions Nurse provided patient education   Is the patient able to teach back FAST for Stroke? No  [Provided education]   Is the patient/caregiver able to teach back the risk factors for a  stroke? High blood pressure-goal below 120/80,  Smoking,  Diabetes,  High Cholesterol,  History of TIAs   Is the patient/caregiver able to teach back signs and symptoms related to disease process for when to call PCP? Yes   Is the patient/caregiver able to teach back signs and symptoms related to disease process for when to call 911? Yes   Is the patient/caregiver able to teach back the hierarchy of who to call/visit for symptoms/problems? PCP, Specialist, Home health nurse, Urgent Care, ED, 911 Yes   TCM call completed? Yes          Magi Hawley RN    12/29/2021, 14:36 EST

## 2021-12-29 NOTE — OUTREACH NOTE
Prep Survey      Responses   [facility] patient discharged from? [place]   Is LACE score < 7 ? No   Emergency Room discharge w/ pulse ox? No   Eligibility [facility]   Date of Admission 12/27/21   Date of Discharge 12/28/21   Discharge Disposition Home or Self Care   Discharge diagnosis TIA   Does the patient have one of the following disease processes/diagnoses(primary or secondary)? Stroke (TIA)   Does the patient have Home health ordered? No   Is there a DME ordered? No   Prep survey completed? Yes          Teetee Capellan RN

## 2022-01-05 ENCOUNTER — OFFICE VISIT (OUTPATIENT)
Dept: FAMILY MEDICINE CLINIC | Facility: CLINIC | Age: 72
End: 2022-01-05

## 2022-01-05 VITALS
WEIGHT: 188 LBS | SYSTOLIC BLOOD PRESSURE: 138 MMHG | DIASTOLIC BLOOD PRESSURE: 80 MMHG | OXYGEN SATURATION: 96 % | TEMPERATURE: 97.3 F | HEART RATE: 59 BPM | BODY MASS INDEX: 34.6 KG/M2 | HEIGHT: 62 IN

## 2022-01-05 DIAGNOSIS — Z79.4 TYPE 2 DIABETES MELLITUS WITH HYPERGLYCEMIA, WITH LONG-TERM CURRENT USE OF INSULIN: ICD-10-CM

## 2022-01-05 DIAGNOSIS — E66.01 MORBIDLY OBESE: ICD-10-CM

## 2022-01-05 DIAGNOSIS — J42 CHRONIC BRONCHITIS, UNSPECIFIED CHRONIC BRONCHITIS TYPE: ICD-10-CM

## 2022-01-05 DIAGNOSIS — I25.10 CORONARY ARTERIOSCLEROSIS IN NATIVE ARTERY: Primary | ICD-10-CM

## 2022-01-05 DIAGNOSIS — E11.65 TYPE 2 DIABETES MELLITUS WITH HYPERGLYCEMIA, WITH LONG-TERM CURRENT USE OF INSULIN: ICD-10-CM

## 2022-01-05 PROCEDURE — 1111F DSCHRG MED/CURRENT MED MERGE: CPT | Performed by: NURSE PRACTITIONER

## 2022-01-05 PROCEDURE — 99495 TRANSJ CARE MGMT MOD F2F 14D: CPT | Performed by: NURSE PRACTITIONER

## 2022-01-05 RX ORDER — NITROGLYCERIN 0.4 MG/1
0.4 TABLET SUBLINGUAL
Qty: 30 TABLET | Refills: 0 | Status: SHIPPED | OUTPATIENT
Start: 2022-01-05

## 2022-01-05 RX ORDER — GLIPIZIDE 5 MG/1
5 TABLET ORAL
Qty: 180 TABLET | Refills: 0 | Status: SHIPPED | OUTPATIENT
Start: 2022-01-05 | End: 2022-04-14

## 2022-01-05 NOTE — PROGRESS NOTES
"Subjective   Dalila Javed is a 71 y.o. female who presents for follow up after being admitted to [facility] 12/27/21-12/28/21 for syncope and collapse. Was sent home and told all tests normal but still having chest pain at times and dizziness. Also wants to discuss weight loss.     History of Present Illness   a1c 12/2021 9.3, 113 3 am, 141 after breakfast. At times 2 hours after meal and 200s  Chest pain scares her. Her nitroglycerin is very old and doesn't use. Ed makes her go get evaluated, but scared to go to hospital and wait times very long  She wants to lose weight  The following portions of the patient's history were reviewed and updated as appropriate: allergies, current medications, past family history, past medical history, past social history, past surgical history and problem list.    Review of Systems   Constitutional: Positive for unexpected weight change. Negative for activity change, appetite change, chills and fever.   HENT: Negative.    Eyes: Negative.    Respiratory: Positive for cough.    Cardiovascular: Positive for chest pain.   Gastrointestinal: Negative.    Endocrine: Negative.    Genitourinary: Negative.    Musculoskeletal: Positive for arthralgias and back pain.   Allergic/Immunologic: Negative.    Neurological: Negative.         Post stroke sequelae, excessive coolness   Hematological: Negative.    Psychiatric/Behavioral: Negative.      /80   Pulse 59   Temp 97.3 °F (36.3 °C) (Infrared)   Ht 157.5 cm (62\")   Wt 85.3 kg (188 lb)   SpO2 96%   BMI 34.39 kg/m²     Objective   Physical Exam  Vitals and nursing note reviewed.   Constitutional:       Appearance: She is well-developed. She is not diaphoretic.   HENT:      Head: Normocephalic and atraumatic.   Neck:      Thyroid: No thyromegaly.   Cardiovascular:      Rate and Rhythm: Normal rate and regular rhythm.      Pulses:           Carotid pulses are 2+ on the right side and 2+ on the left side.     Heart sounds: Normal " heart sounds.   Pulmonary:      Effort: Pulmonary effort is normal.      Breath sounds: Normal breath sounds.   Musculoskeletal:      Right hand: Tenderness present. No swelling. Decreased range of motion. Normal pulse.      Cervical back: Normal range of motion and neck supple.   Lymphadenopathy:      Cervical: No cervical adenopathy.   Psychiatric:         Attention and Perception: Attention normal.         Mood and Affect: Mood is not anxious.         Speech: Speech is delayed.         Behavior: Behavior normal.         Thought Content: Thought content normal.         Judgment: Judgment normal.       Assessment/Plan   Problems Addressed this Visit        Cardiac and Vasculature    Coronary arteriosclerosis in native artery - Primary    Relevant Medications    nitroglycerin (NITROSTAT) 0.4 MG SL tablet       Endocrine and Metabolic    Morbidly obese ([facility])       Pulmonary and Pneumonias    Chronic obstructive pulmonary disease ([facility])      Other Visit Diagnoses     Type 2 diabetes mellitus with hyperglycemia, with long-term current use of insulin ([facility])        Relevant Medications    glipizide (GLUCOTROL) 5 MG tablet      Diagnoses       Codes Comments    Coronary arteriosclerosis in native artery    -  Primary ICD-10-CM: I25.10  ICD-9-CM: 414.01     Type 2 diabetes mellitus with hyperglycemia, with long-term current use of insulin ([facility])     ICD-10-CM: E11.65, Z79.4  ICD-9-CM: 250.00, 790.29, V58.67     Chronic bronchitis, unspecified chronic bronchitis type ([facility])     ICD-10-CM: J42  ICD-9-CM: 491.9     Morbidly obese ([facility])     ICD-10-CM: E66.01  ICD-9-CM: 278.01         DM2--Would not increase insulin further. Increase glipizide, continue nateglinide. Work on pepsi zero intake as making body crave sugar. FU 3 months  CAD--Start tNG for CP, ER if x 3 without relief. Does have CAD stent. If relief consider imdur or ranexa. FU [facility]  COPD--better control with more regular inhaler use  Obesity--favor better control of  post meal blood sugar to assist. Cautions of hypoglycemia reviewed. To call right away if occurring    This document is intended for medical expert use only. Reading of this document by patients and/or patient's family without participating medical staff guidance may result in misinterpretation and unintended morbidity.  Any interpretation of such data is the responsibility of the patient and/or family member responsible for the patient in concert with their primary or specialist providers, not to be left for sources of online searches such as [employer], [employer] or similar queries. Relying on these approaches to knowledge may result in misinterpretation, misguided goals of care and even death should patients or family members try recommendations outside of the realm of professional medical care in a supervised way.    Please allow 3-5 business days for recommendations based on new results    Go to the ER for any possible lifethreatening symptoms such as chest pain or shortness of air.     I personally spent 25 minutes reviewing the chart before the visit, time with the patient, and time documenting the visit.

## 2022-01-07 ENCOUNTER — READMISSION MANAGEMENT (OUTPATIENT)
Dept: CALL CENTER | Facility: HOSPITAL | Age: 72
End: 2022-01-07

## 2022-01-07 RX ORDER — CITALOPRAM 20 MG/1
20 TABLET ORAL DAILY
Qty: 90 TABLET | Refills: 0 | Status: SHIPPED | OUTPATIENT
Start: 2022-01-07 | End: 2022-04-07

## 2022-01-07 NOTE — OUTREACH NOTE
Stroke Week 2 Survey      Responses   [facility] patient discharged from? [place]   Does the patient have one of the following disease processes/diagnoses(primary or secondary)? Stroke (TIA)   Week 2 attempt successful? Yes   Call start time 0758   Call end time 0805   Meds reviewed with patient/caregiver? Yes   Is the patient taking all medications as directed (includes completed medication regime)? Yes   Has the patient kept scheduled appointments due by today? Yes   Does the patient require any assistance with activities of daily living such as eating, bathing, dressing, walking, etc.? Yes   ADL comments uses cane or walker, right side is still numb,can use it but numb, cannot make a fist on right side no    Does the patient have any residual symptoms from stroke/TIA? Yes   Residual symptoms comments right sided numbness   Does the patient understand the diet ordered at discharge? Yes   Comments PT comes,    What is the patient's perception of their health status since discharge? Improving   Is the patient able to teach back FAST for Stroke? Yes   Is the patient/caregiver able to teach back the risk factors for a stroke? High blood pressure-goal below 120/80,  Diabetes,  High Cholesterol   Week 2 call completed? Yes   Wrap up additional comments Pt. states,she si doing well, still has residual and has had chest every since stroke, nothing new, just chest pain and her Drs. are aware.           Patti Reynolds RN

## 2022-01-11 ENCOUNTER — TELEPHONE (OUTPATIENT)
Dept: FAMILY MEDICINE CLINIC | Facility: CLINIC | Age: 72
End: 2022-01-11

## 2022-01-11 NOTE — TELEPHONE ENCOUNTER
Patient says she accidentally missed an appointment with Dr Velazco. She has rescheduled this and it is not until 2-1-22. She will be out of medication by then and Dr Velazco will not refill until she is seen. She does not like this policy and feels it shows mistrust in her relationship with her doctor. She does not misuse the pain medication. She wants to know if she could start getting the pain med refills from you instead of going to pain management? She only goes there to get refills only. Nothing else is ever done while she is there per her report .

## 2022-01-17 ENCOUNTER — READMISSION MANAGEMENT (OUTPATIENT)
Dept: CALL CENTER | Facility: HOSPITAL | Age: 72
End: 2022-01-17

## 2022-01-17 NOTE — OUTREACH NOTE
Stroke Week 3 Survey      Responses   [facility], [place], operated by [facility] patient discharged from? [place]   Does the patient have one of the following disease processes/diagnoses(primary or secondary)? Stroke (TIA)   Week 3 attempt successful? No   Unsuccessful attempts Attempt 1          Prateek Parham RN

## 2022-01-18 RX ORDER — CALCIUM CARB/VITAMIN D3/VIT K1 500-100-40
TABLET,CHEWABLE ORAL
Qty: 100 EACH | Refills: 0 | Status: SHIPPED | OUTPATIENT
Start: 2022-01-18 | End: 2022-03-18

## 2022-01-19 ENCOUNTER — READMISSION MANAGEMENT (OUTPATIENT)
Dept: CALL CENTER | Facility: HOSPITAL | Age: 72
End: 2022-01-19

## 2022-01-19 NOTE — OUTREACH NOTE
Stroke Week 3 Survey      Responses   [facility]   Does the patient have one of the following disease processes/diagnoses(primary or secondary)? Stroke (TIA)   Week 3 attempt successful? Yes   Call start time 1707   Call end time 1719   Discharge diagnosis TIA   Person spoke with today (if not patient) and relationship Patient    Meds reviewed with patient/caregiver? Yes   Is the patient taking all medications as directed (includes completed medication regime)? Yes   Does the patient have a primary care provider?  Yes   Comments regarding PCP Patient has had f/u with PCP since discharge.    Has the patient kept scheduled appointments due by today? Yes   Comments Patient has not had cardiology or neurology f/u since discharge.    Has home health visited the patient within 72 hours of discharge? N/A   Psychosocial issues? No   Does the patient require any assistance with activities of daily living such as eating, bathing, dressing, walking, etc.? No   ADL comments Patient states that she uses a cane and can get from room to room OK. Independent with bathing, dressing. She reports that her  does help her with the outside steps.    Does the patient have any residual symptoms from stroke/TIA? Yes   Residual symptoms comments Patient reports that she continues to have some residual numbness to right leg.    Does the patient understand the diet ordered at discharge? Yes   Did the patient receive a copy of their discharge instructions? Yes   Nursing interventions Reviewed instructions with patient   What is the patient's perception of their health status since discharge? Improving   Nursing interventions Nurse provided patient education   Is the patient able to teach back FAST for Stroke? Yes   Is the patient/caregiver able to teach back the risk factors for a stroke? History of Afib,  Diabetes   Is the patient/caregiver able to teach back signs and symptoms related to  disease process for when to call PCP? Yes   Is the patient/caregiver able to teach back signs and symptoms related to disease process for when to call 911? Yes   If the patient is a current smoker, are they able to teach back resources for cessation? Not a smoker   Is the patient/caregiver able to teach back the hierarchy of who to call/visit for symptoms/problems? PCP, Specialist, Home health nurse, Urgent Care, ED, 911 Yes   Week 3 call completed? Yes          Rosemary Vela RN

## 2022-01-27 RX ORDER — MIRTAZAPINE 7.5 MG/1
7.5 TABLET, FILM COATED ORAL NIGHTLY
Qty: 90 TABLET | Refills: 0 | Status: SHIPPED | OUTPATIENT
Start: 2022-01-27 | End: 2022-04-26

## 2022-01-28 ENCOUNTER — READMISSION MANAGEMENT (OUTPATIENT)
Dept: CALL CENTER | Facility: HOSPITAL | Age: 72
End: 2022-01-28

## 2022-01-28 NOTE — OUTREACH NOTE
Stroke Week 4 Survey      Responses   [facility] patient discharged from? [place]   Does the patient have one of the following disease processes/diagnoses(primary or secondary)? Stroke (TIA)   Week 4 attempt successful? Yes   Call start time 1314   Call end time 1319   Discharge diagnosis TIA   Meds reviewed with patient/caregiver? Yes   Is the patient taking all medications as directed (includes completed medication regime)? Yes   Has the patient kept scheduled appointments due by today? Yes   Is the patient still receiving Home Health Services? N/A   Does the patient require any assistance with activities of daily living such as eating, bathing, dressing, walking, etc.? No   Does the patient have any residual symptoms from stroke/TIA? No   Does the patient understand the diet ordered at discharge? Yes   What is the patient's perception of their health status since discharge? New symptoms unrelated to diagnosis  [Not feeling too good. She's had diarrhea and has been lightheaded. ]   Is the patient able to teach back FAST for Stroke? Yes   Is the patient/caregiver able to teach back the risk factors for a stroke? High Cholesterol   Week 4 Call Completed? Yes   Would the patient like one additional call? No   Graduated Yes   Is the patient interested in additional calls from an ambulatory ?  NOTE:  applies to high risk patients requiring additional follow-up. No   Did the patient feel the follow up calls were helpful during their recovery period? Yes   Was the number of calls appropriate? Yes          Magi Hawley RN

## 2022-02-03 ENCOUNTER — PATIENT OUTREACH (OUTPATIENT)
Dept: CASE MANAGEMENT | Facility: OTHER | Age: 72
End: 2022-02-03

## 2022-02-03 NOTE — OUTREACH NOTE
Patient Outreach    Ambulatory Case Management Note    Call placed to pt for monthly follow up. Pt was in the hospital overnight in December for TIA like symptoms. Pt states she hs not had any issues since. She has followed up with her PCP. Pt verbalizes instructions given at that time. Pt had to get off there phone abruptly due to an incoming call from her spouse. States no issues at this time. Will call again next month.        Yvette Pugh RN  Ambulatory Case Management    2/3/2022, 11:01 EST

## 2022-02-06 DIAGNOSIS — G20 PARKINSON'S DISEASE: ICD-10-CM

## 2022-02-07 RX ORDER — METOPROLOL TARTRATE 50 MG/1
TABLET, FILM COATED ORAL
Qty: 270 TABLET | Refills: 0 | Status: SHIPPED | OUTPATIENT
Start: 2022-02-07 | End: 2022-05-07

## 2022-02-21 RX ORDER — ROSUVASTATIN CALCIUM 20 MG/1
20 TABLET, COATED ORAL DAILY
Qty: 90 TABLET | Refills: 1 | Status: SHIPPED | OUTPATIENT
Start: 2022-02-21

## 2022-02-25 DIAGNOSIS — E11.65 TYPE 2 DIABETES MELLITUS WITH HYPERGLYCEMIA, WITH LONG-TERM CURRENT USE OF INSULIN: ICD-10-CM

## 2022-02-25 DIAGNOSIS — Z79.4 TYPE 2 DIABETES MELLITUS WITH HYPERGLYCEMIA, WITH LONG-TERM CURRENT USE OF INSULIN: ICD-10-CM

## 2022-02-26 ENCOUNTER — APPOINTMENT (OUTPATIENT)
Dept: GENERAL RADIOLOGY | Facility: HOSPITAL | Age: 72
End: 2022-02-26

## 2022-02-26 ENCOUNTER — HOSPITAL ENCOUNTER (EMERGENCY)
Facility: HOSPITAL | Age: 72
Discharge: HOME | End: 2022-02-26
Attending: EMERGENCY MEDICINE | Admitting: EMERGENCY MEDICINE

## 2022-02-26 VITALS
HEART RATE: 61 BPM | TEMPERATURE: 97.7 F | SYSTOLIC BLOOD PRESSURE: 129 MMHG | DIASTOLIC BLOOD PRESSURE: 58 MMHG | RESPIRATION RATE: 18 BRPM | OXYGEN SATURATION: 95 %

## 2022-02-26 DIAGNOSIS — M54.41 LOW BACK PAIN WITH RIGHT-SIDED SCIATICA, UNSPECIFIED BACK PAIN LATERALITY, UNSPECIFIED CHRONICITY: Primary | ICD-10-CM

## 2022-02-26 PROCEDURE — 72072 X-RAY EXAM THORAC SPINE 3VWS: CPT

## 2022-02-26 PROCEDURE — 96374 THER/PROPH/DIAG INJ IV PUSH: CPT

## 2022-02-26 PROCEDURE — 25010000002 ONDANSETRON PER 1 MG: Performed by: EMERGENCY MEDICINE

## 2022-02-26 PROCEDURE — 96375 TX/PRO/DX INJ NEW DRUG ADDON: CPT

## 2022-02-26 PROCEDURE — 72110 X-RAY EXAM L-2 SPINE 4/>VWS: CPT

## 2022-02-26 PROCEDURE — 99283 EMERGENCY DEPT VISIT LOW MDM: CPT

## 2022-02-26 PROCEDURE — 25010000002 HYDROMORPHONE 1 MG/ML SOLUTION: Performed by: EMERGENCY MEDICINE

## 2022-02-26 RX ORDER — METHOCARBAMOL 750 MG/1
750 TABLET, FILM COATED ORAL 3 TIMES DAILY
Qty: 15 TABLET | Refills: 0 | Status: SHIPPED | OUTPATIENT
Start: 2022-02-26

## 2022-02-26 RX ORDER — METHYLPREDNISOLONE 4 MG/1
TABLET ORAL
Qty: 1 EACH | Refills: 0 | Status: SHIPPED | OUTPATIENT
Start: 2022-02-26 | End: 2022-05-03

## 2022-02-26 RX ORDER — HUMAN INSULIN 100 [IU]/ML
14 INJECTION, SUSPENSION SUBCUTANEOUS
Qty: 10 ML | Refills: 1 | Status: SHIPPED | OUTPATIENT
Start: 2022-02-26 | End: 2022-05-01

## 2022-02-26 RX ORDER — ONDANSETRON 2 MG/ML
4 INJECTION INTRAMUSCULAR; INTRAVENOUS ONCE
Status: COMPLETED | OUTPATIENT
Start: 2022-02-26 | End: 2022-02-26

## 2022-02-26 RX ADMIN — ONDANSETRON 4 MG: 2 INJECTION INTRAMUSCULAR; INTRAVENOUS at 21:17

## 2022-02-26 RX ADMIN — HYDROMORPHONE HYDROCHLORIDE 1 MG: 1 INJECTION, SOLUTION INTRAMUSCULAR; INTRAVENOUS; SUBCUTANEOUS at 21:18

## 2022-02-28 ENCOUNTER — HOSPITAL ENCOUNTER (EMERGENCY)
Facility: HOSPITAL | Age: 72
Discharge: HOME | End: 2022-02-28
Attending: EMERGENCY MEDICINE | Admitting: EMERGENCY MEDICINE

## 2022-02-28 VITALS
HEART RATE: 66 BPM | TEMPERATURE: 98.2 F | RESPIRATION RATE: 18 BRPM | SYSTOLIC BLOOD PRESSURE: 131 MMHG | DIASTOLIC BLOOD PRESSURE: 65 MMHG | OXYGEN SATURATION: 93 %

## 2022-02-28 DIAGNOSIS — M54.41 CHRONIC RIGHT-SIDED LOW BACK PAIN WITH RIGHT-SIDED SCIATICA: Primary | ICD-10-CM

## 2022-02-28 DIAGNOSIS — G89.29 CHRONIC RIGHT-SIDED LOW BACK PAIN WITH RIGHT-SIDED SCIATICA: Primary | ICD-10-CM

## 2022-02-28 PROCEDURE — 25010000002 HYDROMORPHONE 1 MG/ML SOLUTION: Performed by: EMERGENCY MEDICINE

## 2022-02-28 PROCEDURE — 96372 THER/PROPH/DIAG INJ SC/IM: CPT

## 2022-02-28 PROCEDURE — 99284 EMERGENCY DEPT VISIT MOD MDM: CPT

## 2022-02-28 PROCEDURE — 99283 EMERGENCY DEPT VISIT LOW MDM: CPT

## 2022-02-28 RX ORDER — BACLOFEN 10 MG/1
10 TABLET ORAL ONCE
Status: COMPLETED | OUTPATIENT
Start: 2022-02-28 | End: 2022-02-28

## 2022-02-28 RX ADMIN — BACLOFEN 10 MG: 10 TABLET ORAL at 14:48

## 2022-02-28 RX ADMIN — HYDROMORPHONE HYDROCHLORIDE 1 MG: 1 INJECTION, SOLUTION INTRAMUSCULAR; INTRAVENOUS; SUBCUTANEOUS at 12:34

## 2022-03-08 DIAGNOSIS — I63.9 ACUTE CVA (CEREBROVASCULAR ACCIDENT): ICD-10-CM

## 2022-03-08 RX ORDER — APIXABAN 5 MG/1
TABLET, FILM COATED ORAL
Qty: 60 TABLET | Refills: 5 | Status: SHIPPED | OUTPATIENT
Start: 2022-03-08

## 2022-03-15 ENCOUNTER — PATIENT OUTREACH (OUTPATIENT)
Dept: CASE MANAGEMENT | Facility: OTHER | Age: 72
End: 2022-03-15

## 2022-03-15 NOTE — OUTREACH NOTE
Patient Outreach    AMBULATORY CASE MANAGEMENT NOTE    Name and Relationship of Patient/Support Person: Dalila Javed - Self    Call to pt for monthly check in. Pt states she continues to have a great deal of pain. She has been to the ED twice. She also had injections last week by Dr Velazco. She states her pain is still unbearable. She has attempted to call Dr Velazco office several times with no call back. She states that Dr Velazco had mentioned she may need an MRI and feels this needs to be ordered due to intensity of pain. This pain is different than what she has had in the past. Suggested if no call back she might check with PCP office. The office may be able to assist reaching physician or Cinthya Valentine may want to see her. She is going to give Dr Velazco until end of day today. If no return call she will call PCP office.. ACM will call next week to see what has been done and if needed try to coordinate care.     Education Documentation  No documentation found.        PRISCILLA MATAMOROS  Ambulatory Case Management    3/15/2022, 16:04 EDT

## 2022-03-16 ENCOUNTER — TELEPHONE (OUTPATIENT)
Dept: NEUROLOGY | Facility: CLINIC | Age: 72
End: 2022-03-16

## 2022-03-16 NOTE — TELEPHONE ENCOUNTER
Called pt and r/s with LQ, as she is established with her. Added to the wait list.   Informed pt she would need to contact pain management or PCP regarding back pain. Pt sts she can't get a hold of pain management office. I advised to call PCP. Pt verbalized understanding.

## 2022-03-16 NOTE — TELEPHONE ENCOUNTER
Provider: BONI MORENO BUT SCHEDULED TO SEE MARJORIE MONTANA 3-23-22  Caller: PATIENT  Relationship to Patient: SELF  Pharmacy: NA  Phone Number: [phone]  Reason for Call: PATIENT STATES SHE IS IN A LOT OF PAIN AND NEEDS SOONER APPOINTMENT WITH MARJORIE MONTANA. SHE SAYS SHE DOESN'T WANT TO GO BACK TO ER AS THEY DO NOT HELP WITH HER PAIN. SHE IS ASKING FOR A  CALL BACK. PLEASE ADVISE.   When was the patient last seen: 2-24-21

## 2022-03-16 NOTE — TELEPHONE ENCOUNTER
Pt has been seen in ED recently a couple of times for back pain. There is also right-sided numbness secondary to previous stroke. She is established with pain management.   She has hx of stroke and has seen Dr Santacruz and KOFI for this dx. You saw her in the ED in December for transient alteration of awareness. It looks like she was scheduled with you for a hosp f/u.  She is wanting in sooner than her next week appt with you for back pain.     Are you wanting to see for the initial hosp f/u? Are you willing to see for back pain?

## 2022-03-18 RX ORDER — CALCIUM CARB/VITAMIN D3/VIT K1 500-100-40
TABLET,CHEWABLE ORAL
Qty: 100 EACH | Refills: 0 | Status: SHIPPED | OUTPATIENT
Start: 2022-03-18 | End: 2022-03-18

## 2022-03-18 RX ORDER — CALCIUM CARB/VITAMIN D3/VIT K1 500-100-40
TABLET,CHEWABLE ORAL
Qty: 180 EACH | Refills: 0 | Status: SHIPPED | OUTPATIENT
Start: 2022-03-18 | End: 2022-06-22

## 2022-03-21 RX ORDER — BUDESONIDE AND FORMOTEROL FUMARATE DIHYDRATE 160; 4.5 UG/1; UG/1
2 AEROSOL RESPIRATORY (INHALATION) 2 TIMES DAILY
Qty: 10.2 G | Refills: 2 | Status: SHIPPED | OUTPATIENT
Start: 2022-03-21 | End: 2022-06-16

## 2022-03-21 NOTE — TELEPHONE ENCOUNTER
Caller: Dalila Javed    Relationship: Self    Best call back number: [phone]    Requested Prescriptions:   Requested Prescriptions     Pending Prescriptions Disp Refills   • budesonide-formoterol (Symbicort) 160-4.5 MCG/ACT inhaler 10.2 g 2     Sig: Inhale 2 puffs 2 (Two) Times a Day. Rinse mouth after each use        Pharmacy where request should be sent: [id number] [address] [place] [address] AT [facility] [phone] [facility] [phone] FX     Additional details provided by patient: PATIENT STATES SHE IS OUT    Does the patient have less than a 3 day supply:  [x] Yes  [] No    Wilmar Smith Rep   03/21/22 09:38 EDT

## 2022-03-23 ENCOUNTER — PATIENT OUTREACH (OUTPATIENT)
Dept: CASE MANAGEMENT | Facility: OTHER | Age: 72
End: 2022-03-23

## 2022-03-23 NOTE — OUTREACH NOTE
AMBULATORY CASE MANAGEMENT NOTE    Name and Relationship of Patient/Support Person: Dalila Javed L - Self    Patient Outreach    Call placed to patient to follow up significant pain at last call and inability to reach pain management office. She does have an appointment with Dr Velazco and PCP. Denies additional needs at this time.     Education Documentation  unresolved or worsening symptoms, taught by Yvette Pugh, RN at 3/23/2022  1:35 PM.  Learner: Patient  Readiness: Eager  Method: Explanation  Response: Demonstrated Understanding    pain management, taught by Yvette Pugh, RN at 3/23/2022  1:35 PM.  Learner: Patient  Readiness: Eager  Method: Explanation  Response: Demonstrated Understanding    medication management, taught by Yvette Pugh, RN at 3/23/2022  1:35 PM.  Learner: Patient  Readiness: Eager  Method: Explanation  Response: Demonstrated Understanding          YVETTE MATAMOROS  Ambulatory Case Management    3/23/2022, 13:36 EDT   Problem: Nutrition  Goal: Optimal nutrition therapy  Outcome: Ongoing  Note: Nutrition Problem #1: Inadequate oral intake  Intervention: Food and/or Nutrient Delivery: Continue Current Diet,Continue Oral Nutrition Supplement  Nutritional Goals: pt will consistently consume >50% PO intake of meals and ONS through adm

## 2022-03-24 RX ORDER — ALENDRONATE SODIUM 70 MG/1
TABLET ORAL
Qty: 12 TABLET | Refills: 1 | Status: SHIPPED | OUTPATIENT
Start: 2022-03-24

## 2022-04-01 RX ORDER — GUAIFENESIN 200 MG/1
400 TABLET ORAL EVERY 4 HOURS PRN
Qty: 40 TABLET | Refills: 0 | Status: SHIPPED | OUTPATIENT
Start: 2022-04-01 | End: 2022-04-05 | Stop reason: SDUPTHER

## 2022-04-05 ENCOUNTER — OFFICE VISIT (OUTPATIENT)
Dept: FAMILY MEDICINE CLINIC | Facility: CLINIC | Age: 72
End: 2022-04-05

## 2022-04-05 VITALS
RESPIRATION RATE: 20 BRPM | HEIGHT: 62 IN | DIASTOLIC BLOOD PRESSURE: 76 MMHG | BODY MASS INDEX: 36.62 KG/M2 | HEART RATE: 66 BPM | OXYGEN SATURATION: 97 % | SYSTOLIC BLOOD PRESSURE: 142 MMHG | TEMPERATURE: 96.3 F | WEIGHT: 199 LBS

## 2022-04-05 DIAGNOSIS — Z79.4 TYPE 2 DIABETES MELLITUS WITH HYPERGLYCEMIA, WITH LONG-TERM CURRENT USE OF INSULIN: Primary | ICD-10-CM

## 2022-04-05 DIAGNOSIS — R63.5 WEIGHT GAIN, ABNORMAL: ICD-10-CM

## 2022-04-05 DIAGNOSIS — J44.1 CHRONIC OBSTRUCTIVE PULMONARY DISEASE WITH ACUTE EXACERBATION: ICD-10-CM

## 2022-04-05 DIAGNOSIS — E11.649 HYPOGLYCEMIA ASSOCIATED WITH TYPE 2 DIABETES MELLITUS: ICD-10-CM

## 2022-04-05 DIAGNOSIS — E11.65 TYPE 2 DIABETES MELLITUS WITH HYPERGLYCEMIA, WITH LONG-TERM CURRENT USE OF INSULIN: Primary | ICD-10-CM

## 2022-04-05 DIAGNOSIS — S32.010A COMPRESSION FRACTURE OF L1 VERTEBRA, INITIAL ENCOUNTER: ICD-10-CM

## 2022-04-05 DIAGNOSIS — M54.16 LUMBAR RADICULOPATHY: ICD-10-CM

## 2022-04-05 DIAGNOSIS — I25.10 CORONARY ARTERIOSCLEROSIS IN NATIVE ARTERY: ICD-10-CM

## 2022-04-05 PROCEDURE — 99214 OFFICE O/P EST MOD 30 MIN: CPT | Performed by: NURSE PRACTITIONER

## 2022-04-05 RX ORDER — GUAIFENESIN 200 MG/1
400 TABLET ORAL EVERY 4 HOURS PRN
Qty: 40 TABLET | Refills: 0 | Status: SHIPPED | OUTPATIENT
Start: 2022-04-05

## 2022-04-05 RX ORDER — DOXYCYCLINE HYCLATE 100 MG/1
100 CAPSULE ORAL 2 TIMES DAILY
Qty: 14 CAPSULE | Refills: 0 | Status: SHIPPED | OUTPATIENT
Start: 2022-04-05 | End: 2022-05-13

## 2022-04-05 NOTE — PROGRESS NOTES
Subjective   Dalila Javed is a 71 y.o. female.   Pain        Diabetes        Back Pain DISCUSS MRI LOW BLOOD SUGAR 54 THIS AM        Fatigue        Headache CONFUSION       Anxiety        Shortness of Breath         History of Present Illness   Pain--has been in pain management for several years. Last visit with pain management was in March, SEVERE low back pain x about 6 weeks after a twisting motion. Reports was in so much pain that couldn't understand what was said. Sees Dr. Velazco in 2 days. Last facet injection 3/8/2022. He ordered MRI    Diabetes with low blood sugar 1 x this morning, was not feeling bad. Ed adjusts insulin dose, 14 units for 1-2 months, minimal adjustments  Taking glipizide 2 x day, unsure why was low today    Fatigue--poor sleep, naps during day    Headache and confusion--much worse post stroke, worse since increased pain    Anxiety--many med sensitivities. (viibryd, duloxetine) on citalopram with mirtazapine low dose--thinks this anxiety is due to horrible pain    Shortness of breath--does have coronary stenosis not amenable to stenting. Onset SOA about 3 weeks like bad cold with coughing and wheezing. Is using inhaler at home. Low grade fever last week (early). Coughs more with moving around. Cough is productive    The following portions of the patient's history were reviewed and updated as appropriate: allergies, current medications, past family history, past medical history, past social history, past surgical history and problem list.    Review of Systems   Constitutional: Positive for unexpected weight gain. Negative for chills and fever.   HENT: Positive for congestion, postnasal drip and sore throat. Negative for trouble swallowing.    Respiratory: Positive for cough and wheezing.    Endocrine: Negative.    Musculoskeletal: Positive for arthralgias, back pain, gait problem and myalgias.        R hand allodynia since stroke, wearing glove   Skin: Negative.   "  Allergic/Immunologic: Negative.    Neurological: Positive for weakness and headache. Negative for speech difficulty and numbness.   Hematological: Negative.    Psychiatric/Behavioral: Positive for agitation, behavioral problems, decreased concentration, sleep disturbance and stress. Negative for self-injury and suicidal ideas. The patient is nervous/anxious.      /76 (BP Location: Left arm, Patient Position: Sitting, Cuff Size: Adult)   Pulse 66   Temp 96.3 °F (35.7 °C) (Temporal)   Resp 20   Ht 157.5 cm (62\")   Wt 90.3 kg (199 lb)   SpO2 97%   BMI 36.40 kg/m²     Objective   Physical Exam  Constitutional:       Appearance: She is well-developed. She is not toxic-appearing or diaphoretic.   HENT:      Head: Normocephalic and atraumatic.   Neck:      Thyroid: No thyromegaly.   Cardiovascular:      Rate and Rhythm: Normal rate and regular rhythm.      Pulses:           Carotid pulses are 2+ on the right side and 2+ on the left side.     Heart sounds: Normal heart sounds.   Pulmonary:      Effort: Pulmonary effort is normal.      Breath sounds: Examination of the right-upper field reveals wheezing. Examination of the left-upper field reveals wheezing. Examination of the right-middle field reveals wheezing. Examination of the left-middle field reveals wheezing. Examination of the right-lower field reveals wheezing and rhonchi. Examination of the left-lower field reveals wheezing and rhonchi. Wheezing and rhonchi present.   Musculoskeletal:      Cervical back: Normal range of motion and neck supple.      Lumbar back: Spasms, tenderness and bony tenderness present. Normal range of motion. Negative right straight leg raise test and negative left straight leg raise test.      Right knee: Bony tenderness present.      Left knee: Bony tenderness present.   Lymphadenopathy:      Cervical: No cervical adenopathy.   Skin:     General: Skin is warm and dry.   Psychiatric:         Mood and Affect: Mood is anxious " and depressed.         Behavior: Behavior normal.         Thought Content: Thought content normal.         Judgment: Judgment normal.       Assessment/Plan   Problems Addressed this Visit        Cardiac and Vasculature    Coronary arteriosclerosis in native artery       Neuro    Lumbar radiculopathy       Pulmonary and Pneumonias    Chronic obstructive pulmonary disease (HCC)    Relevant Medications    guaiFENesin 200 MG tablet    doxycycline (VIBRAMYCIN) 100 MG capsule      Other Visit Diagnoses     Type 2 diabetes mellitus with hyperglycemia, with long-term current use of insulin (HCC)    -  Primary    Relevant Orders    Basic Metabolic Panel (Completed)    Hemoglobin A1c (Completed)    Hypoglycemia associated with type 2 diabetes mellitus (HCC)        Relevant Orders    Basic Metabolic Panel (Completed)    Hemoglobin A1c (Completed)    Weight gain, abnormal        Relevant Orders    TSH (Completed)    Compression fracture of L1 vertebra, initial encounter ([facility])          Diagnoses       Codes Comments    Type 2 diabetes mellitus with hyperglycemia, with long-term current use of insulin ([facility])    -  Primary ICD-10-CM: E11.65, Z79.4  ICD-9-CM: 250.00, 790.29, V58.67     Hypoglycemia associated with type 2 diabetes mellitus ([facility])     ICD-10-CM: E11.649  ICD-9-CM: 250.80     Lumbar radiculopathy     ICD-10-CM: M54.16  ICD-9-CM: 724.4     Coronary arteriosclerosis in native artery     ICD-10-CM: I25.10  ICD-9-CM: 414.01     Weight gain, abnormal     ICD-10-CM: R63.5  ICD-9-CM: 783.1     Compression fracture of L1 vertebra, initial encounter ([facility])     ICD-10-CM: S32.010A  ICD-9-CM: 805.4     Chronic obstructive pulmonary disease with acute exacerbation ([facility])     ICD-10-CM: J44.1  ICD-9-CM: 491.21         T2DM--slow titration of insulin as hypoglycemia. Ed to continue careful titration. He is doing well. Discussed how to manage low blood sugar if occurs. Cautions of risk hypoglycemia  Lumbar radiculopathy--continue with  pain management  CAD--discussed risk with active lesions--ER if CP sustained, NTG use discussed  Weight gain abnormal--check TSH  Compression Fx--3/24/2022--MRI with acute L1 compression fracture 33% loss vertebral height--expect to discuss vertebroplasty vs epidural to address   COPD--exacerbation, start doxycycline and hold vitamins and minerals while on antibiotics    This document is intended for medical expert use only. Reading of this document by patients and/or patient's family without participating medical staff guidance may result in misinterpretation and unintended morbidity.  Any interpretation of such data is the responsibility of the patient and/or family member responsible for the patient in concert with their primary or specialist providers, not to be left for sources of online searches such as [employer], [employer] or similar queries. Relying on these approaches to knowledge may result in misinterpretation, misguided goals of care and even death should patients or family members try recommendations outside of the realm of professional medical care in a supervised way.    Please allow 3-5 business days for recommendations based on new results    Go to the ER for any possible lifethreatening symptoms such as chest pain or shortness of air.

## 2022-04-06 LAB
BUN SERPL-MCNC: 14 MG/DL (ref 8–27)
BUN/CREAT SERPL: 13 (ref 12–28)
CALCIUM SERPL-MCNC: 9.6 MG/DL (ref 8.7–10.3)
CHLORIDE SERPL-SCNC: 97 MMOL/L (ref 96–106)
CO2 SERPL-SCNC: 27 MMOL/L (ref 20–29)
CREAT SERPL-MCNC: 1.05 MG/DL (ref 0.57–1)
EGFRCR SERPLBLD CKD-EPI 2021: 56 ML/MIN/1.73
GLUCOSE SERPL-MCNC: 236 MG/DL (ref 65–99)
HBA1C MFR BLD: 9.2 % (ref 4.8–5.6)
POTASSIUM SERPL-SCNC: 3.8 MMOL/L (ref 3.5–5.2)
SODIUM SERPL-SCNC: 139 MMOL/L (ref 134–144)
TSH SERPL DL<=0.005 MIU/L-ACNC: 1.83 UIU/ML (ref 0.45–4.5)

## 2022-04-07 RX ORDER — CITALOPRAM 20 MG/1
20 TABLET ORAL DAILY
Qty: 90 TABLET | Refills: 0 | Status: SHIPPED | OUTPATIENT
Start: 2022-04-07 | End: 2022-07-19

## 2022-04-10 RX ORDER — ARIPIPRAZOLE 2 MG/1
2 TABLET ORAL EVERY EVENING
Qty: 90 TABLET | Refills: 0 | Status: SHIPPED | OUTPATIENT
Start: 2022-04-10 | End: 2022-07-11

## 2022-04-14 DIAGNOSIS — E11.65 TYPE 2 DIABETES MELLITUS WITH HYPERGLYCEMIA, WITH LONG-TERM CURRENT USE OF INSULIN: ICD-10-CM

## 2022-04-14 DIAGNOSIS — Z79.4 TYPE 2 DIABETES MELLITUS WITH HYPERGLYCEMIA, WITH LONG-TERM CURRENT USE OF INSULIN: ICD-10-CM

## 2022-04-14 RX ORDER — GLIPIZIDE 5 MG/1
TABLET ORAL
Qty: 180 TABLET | Refills: 0 | Status: SHIPPED | OUTPATIENT
Start: 2022-04-14

## 2022-04-15 NOTE — PROGRESS NOTES
A1c still high at 9.2. start checking blood sugar 2 hours after dinner or biggest meal of day. May need to consider adding mealtime insulin. Bring blood sugar logs in 1 month

## 2022-04-19 ENCOUNTER — OFFICE VISIT (OUTPATIENT)
Dept: NEUROLOGY | Facility: CLINIC | Age: 72
End: 2022-04-19

## 2022-04-19 ENCOUNTER — LAB (OUTPATIENT)
Dept: LAB | Facility: HOSPITAL | Age: 72
End: 2022-04-19

## 2022-04-19 VITALS
OXYGEN SATURATION: 98 % | HEIGHT: 62 IN | DIASTOLIC BLOOD PRESSURE: 78 MMHG | WEIGHT: 199.8 LBS | BODY MASS INDEX: 36.77 KG/M2 | HEART RATE: 74 BPM | SYSTOLIC BLOOD PRESSURE: 148 MMHG

## 2022-04-19 DIAGNOSIS — E53.8 LOW VITAMIN B12 LEVEL: ICD-10-CM

## 2022-04-19 DIAGNOSIS — I67.9 CEREBROVASCULAR DISEASE: ICD-10-CM

## 2022-04-19 DIAGNOSIS — E11.649 UNCONTROLLED TYPE 2 DIABETES MELLITUS WITH HYPOGLYCEMIA, UNSPECIFIED HYPOGLYCEMIA COMA STATUS: ICD-10-CM

## 2022-04-19 DIAGNOSIS — G20 PARKINSON DISEASE: ICD-10-CM

## 2022-04-19 DIAGNOSIS — G89.29 CHRONIC BACK PAIN, UNSPECIFIED BACK LOCATION, UNSPECIFIED BACK PAIN LATERALITY: ICD-10-CM

## 2022-04-19 DIAGNOSIS — M54.9 CHRONIC BACK PAIN, UNSPECIFIED BACK LOCATION, UNSPECIFIED BACK PAIN LATERALITY: ICD-10-CM

## 2022-04-19 LAB — VIT B12 BLD-MCNC: >2000 PG/ML (ref 211–946)

## 2022-04-19 PROCEDURE — 82607 VITAMIN B-12: CPT

## 2022-04-19 PROCEDURE — 99215 OFFICE O/P EST HI 40 MIN: CPT | Performed by: NURSE PRACTITIONER

## 2022-04-19 PROCEDURE — 36415 COLL VENOUS BLD VENIPUNCTURE: CPT

## 2022-04-19 NOTE — PROGRESS NOTES
DOS: 2022  NAME: Dalila Javed   : 1950  PCP: Cinthya Valentine APRN    Chief Complaint   Patient presents with   • Stroke      SUBJECTIVE  Neurological Problem:  71 y.o. RHW female with stroke (left PCA infarct in 2020), HTN, HLD, CAD, B12 def, Barretts esphagus, DM, Diabetic neuropathy, SILVIANO, lupus anticoagulant, PFO, parkinsonism. She is accompanied by her spouse.    Interval History:   **For detailed previous history, please see progress note dated 2021.    Ms. 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 She was last seen by me in the office in 2021, doing well on Eliquis and statin from stroke standpoint, with residual right hemiparesis and right-sided sensory deficits. She did remain on sinemet at that time.     She has been lost to f/u and unfortunately had several hospitalizations since her last office visit. She was admitted in April/2021 with confusion/lethargy as well as c/o headache. Her imaging was negative for acute stroke, she was started on nurtec for migraine. There were some concerns regarding polypharmacy contributing to her lethargy as she was noted to be on hydrocodone, gabapentin and skelaxin.      he was seen more recently by our inpatient neurology service when she presented to the ED after being found on the couch by her spouse, ostly unresponsive, significant difficulty to arouse. No witnessed seizure activity and no new findings on MRI brain, showing chronic left PCA stroke affecting the medial temporal lobe. Vessel imaging with no flow-limiting stenosis. It was suspected that she may have had a focal seizure with some postictal weakness. Her EEG was normal. She was not started on any AEDs as she had not had any previous or recurrent episodes.     Patient and spouse present today, she denies any new stroke/TIA  "symptoms but does continue with residual vision issues, has double vision on her right (she is followed by ophthalmology) which she has had intermittently since her stroke. She also has residual sensory deficits/hyperesthesias on the right with some mild weakness. She is currently wearing a glove on her right hand because \"it is always so cold\".  Her main complaint today is he continued back pain, she did visit the ER in Feb of this year for back pain after lifting a table, She sees Dr. Wilson, with pain management, had an epidural about 3 weeks ago, helped briefly, but then pain returns, she apparently had an MRI spine and has an L1 compression fracture, was referred for surgical opinion. She is in pain today and uncomfortable with exam. She denies any recurrent seizure-like episodes, no syncopal events, no episodes of LOC or extreme lethargy. She continues on Elqiuis 5 mg BID, has to be off this for her ESIs but otherwise is compliant. She also continues on crestor 20 mg daily. Review of her recent lab work from 4/5 shows a BMP with elevated glucose at 236, Cr 1.05, EGFR 56; A1C 9.2;TSH 1.83; last lipid panel is from Oct 2021 with total 133, HDL 26, LDL 69, . She denies any other changes in her health. She denies any recent falls. She is using a rollator today, some difficulty d/t back pain.     Review of Systems:Review of Systems   Constitutional: Negative for activity change, appetite change, chills, diaphoresis, fatigue, fever and unexpected weight change.   HENT: Negative for congestion, dental problem, drooling, ear discharge, ear pain, facial swelling, hearing loss, mouth sores, nosebleeds, postnasal drip, rhinorrhea, sinus pressure, sinus pain, sneezing, sore throat, tinnitus, trouble swallowing and voice change.    Eyes: Negative for photophobia, pain, discharge, redness, itching and visual disturbance.   Musculoskeletal: Positive for back pain and gait problem. Negative for arthralgias, joint " swelling, myalgias, neck pain and neck stiffness.   Neurological: Positive for headaches. Negative for dizziness, tremors, seizures, syncope, facial asymmetry, speech difficulty, weakness, light-headedness and numbness.   Psychiatric/Behavioral: Positive for sleep disturbance. Negative for agitation, behavioral problems, confusion, decreased concentration, dysphoric mood, hallucinations, self-injury and suicidal ideas. The patient is not nervous/anxious and is not hyperactive.     Above ROS reviewed    The following portions of the patient's history were reviewed and updated as appropriate: allergies, current medications, past family history, past medical history, past social history, past surgical history and problem list.    Current Medications:   Current Outpatient Medications:   •  ACCU-CHEK FASTCLIX LANCETS misc, USE TO TEST BLOOD SUGAR UP TO FIVE TIMES DAILY AS DIRECTED., Disp: 408 each, Rfl: 0  •  albuterol (PROAIR HFA) 108 (90 BASE) MCG/ACT inhaler,  INHALE 1 TO 2 PUFFS EVERY 4 TO 6 HOURS AS NEEDED, Disp: 1 inhaler, Rfl: 1  •  alendronate (FOSAMAX) 70 MG tablet, TAKE 1 TABLET BY MOUTH EVERY 7 DAYS, Disp: 12 tablet, Rfl: 1  •  ARIPiprazole (ABILIFY) 2 MG tablet, Take 1 tablet by mouth Every Evening., Disp: 90 tablet, Rfl: 0  •  budesonide-formoterol (Symbicort) 160-4.5 MCG/ACT inhaler, Inhale 2 puffs 2 (Two) Times a Day. Rinse mouth after each use, Disp: 10.2 g, Rfl: 2  •  carbidopa-levodopa (SINEMET)  MG per tablet, TAKE 1 TABLET BY MOUTH THREE TIMES DAILY, Disp: 90 tablet, Rfl: 5  •  Cholecalciferol (VITAMIN D3) 5000 UNITS capsule capsule, Take 1 capsule by mouth daily., Disp: 30 capsule, Rfl: 5  •  citalopram (CeleXA) 20 MG tablet, TAKE 1 TABLET BY MOUTH DAILY, Disp: 90 tablet, Rfl: 0  •  Eliquis 5 MG tablet tablet, TAKE 1 TABLET BY MOUTH TWICE DAILY, Disp: 60 tablet, Rfl: 5  •  furosemide (LASIX) 20 MG tablet, TAKE 2 TABLETS BY MOUTH EVERY MORNING AND 1 TABLET BY MOUTH EVERY AFTERNOON, Disp: 270  "tablet, Rfl: 1  •  gabapentin (NEURONTIN) 300 MG capsule, Take 1 capsule by mouth At Night As Needed (sleep). (Patient taking differently: Take 300 mg by mouth At Night As Needed (sleep). PATIENT IS TAKING TID), Disp: 30 capsule, Rfl: 0  •  glipizide (GLUCOTROL) 5 MG tablet, TAKE 1 TABLET BY MOUTH TWICE DAILY BEFORE MEALS, Disp: 180 tablet, Rfl: 0  •  glucose blood (FREESTYLE LITE) test strip, USE TO TEST FIVE TIMES DAILY, Disp: 450 each, Rfl: 0  •  HYDROcodone-acetaminophen (NORCO)  MG per tablet, , Disp: , Rfl:   •  insulin NPH (NovoLIN N) 100 UNIT/ML injection, Inject 14 Units under the skin into the appropriate area as directed 2 (Two) Times a Day Before Meals. Increase by 2u wkly, max 30 u/day for a fasting of <150, Disp: 10 mL, Rfl: 1  •  Insulin Syringe 31G X 5/16\" 1 ML misc, USE TWICE DAILY AS DIRECTED, Disp: 180 each, Rfl: 0  •  lamoTRIgine (LaMICtal) 25 MG tablet, PATIENT IS TAKING ONE TABLET TWICE A DAY, Disp: , Rfl:   •  Melatonin 10 MG tablet, Take 10 mg by mouth., Disp: , Rfl:   •  methocarbamol (ROBAXIN) 750 MG tablet, Take 1 tablet by mouth 3 (Three) Times a Day., Disp: 15 tablet, Rfl: 0  •  metoprolol tartrate (LOPRESSOR) 50 MG tablet, TAKE 1 TABLET BY MOUTH THREE TIMES DAILY, Disp: 270 tablet, Rfl: 0  •  mirtazapine (REMERON) 7.5 MG tablet, TAKE 1 TABLET BY MOUTH EVERY NIGHT, Disp: 90 tablet, Rfl: 0  •  nitroglycerin (NITROSTAT) 0.4 MG SL tablet, Take 1 tablet by mouth Every 5 (Five) Minutes As Needed for Chest Pain. Take no more than 3 doses in 15 minutes., Disp: 30 tablet, Rfl: 0  •  omeprazole (priLOSEC) 40 MG capsule, TAKE 1 CAPSULE BY MOUTH TWICE DAILY, Disp: 180 capsule, Rfl: 3  •  ondansetron (ZOFRAN) 4 MG tablet, Take 1 tablet by mouth Every 6 (Six) Hours As Needed for Nausea or Vomiting., Disp: 30 tablet, Rfl: 0  •  potassium chloride (KLOR-CON) 8 MEQ CR tablet, TAKE 1 TABLET BY MOUTH TWICE DAILY, Disp: 180 tablet, Rfl: 1  •  Rimegepant Sulfate (NURTEC) 75 MG tablet dispersible " tablet, Take 1 tablet by mouth Daily As Needed (migraine headache)., Disp: 30 tablet, Rfl: 1  •  rosuvastatin (CRESTOR) 20 MG tablet, TAKE 1 TABLET BY MOUTH DAILY, Disp: 90 tablet, Rfl: 1  •  vitamin B-12 (CYANOCOBALAMIN) 100 MCG tablet, Take 50 mcg by mouth Daily., Disp: , Rfl:   •  diphenoxylate-atropine (LOMOTIL) 2.5-0.025 MG per tablet, Take 1 tablet by mouth 4 (Four) Times a Day As Needed for Diarrhea., Disp: 12 tablet, Rfl: 0  •  doxycycline (VIBRAMYCIN) 100 MG capsule, Take 1 capsule by mouth 2 (Two) Times a Day., Disp: 14 capsule, Rfl: 0  •  guaiFENesin 200 MG tablet, Take 2 tablets by mouth Every 4 (Four) Hours As Needed for Cough., Disp: 40 tablet, Rfl: 0  •  methylPREDNISolone (MEDROL) 4 MG dose pack, Take as directed on package instructions., Disp: 1 each, Rfl: 0  **I did not stop or change the above medications.  Patient's medication list was updated to reflect medications they have reported as currently taking, including medication changes made by other providers.    OBJECTIVE  Vitals:    04/19/22 1438   BP: 148/78   Pulse: 74   SpO2: 98%     Body mass index is 36.53 kg/m².    Diagnostics:  MRI x 2, CTA as noted above.    Laboratory Results:         Lab Results   Component Value Date    WBC 10.54 12/28/2021    HGB 14.1 12/28/2021    HCT 41.3 12/28/2021    MCV 90.0 12/28/2021     12/28/2021     Lab Results   Component Value Date    GLUCOSE 236 (H) 04/05/2022    BUN 14 04/05/2022    CREATININE 1.05 (H) 04/05/2022    EGFRIFNONA 56 (L) 12/28/2021    EGFRIFAFRI 62 10/29/2021    BCR 13 04/05/2022    K 3.8 04/05/2022    CO2 27 04/05/2022    CALCIUM 9.6 04/05/2022    PROTENTOTREF 6.7 10/29/2021    ALBUMIN 3.80 12/28/2021    LABIL2 1.6 10/29/2021    AST 16 12/28/2021    ALT 17 12/28/2021     Lab Results   Component Value Date    HGBA1C 9.2 (H) 04/05/2022     Lab Results   Component Value Date    CHOL 106 08/20/2020     Lab Results   Component Value Date    HDL 26 (L) 10/29/2021    HDL 34 (L) 08/20/2020     HDL 47 04/14/2020     Lab Results   Component Value Date    LDL 69 10/29/2021    LDL 54 08/20/2020    LDL 73 04/14/2020     Lab Results   Component Value Date    TRIG 228 (H) 10/29/2021    TRIG 90 08/20/2020    TRIG 101 04/14/2020     No results found for: RPR  Lab Results   Component Value Date    TSH 1.830 04/05/2022     Lab Results   Component Value Date    [id number] 321 04/09/2021       Physical Exam:  GENERAL: NAD, overweight  HEENT: Normocephalic, atraumatic   COR: RRR  Resp: Even and unlabored  Extremities: Glove on right hand d/t it being persistently cold. Decreased ROM of arms/shoulder d/t back pain.   Skin: No rashes, lesions or ulcers.  Psychiatric: Normal mood and affect.    Neurological:   MS: AO. Language normal. No neglect. Follows all commands.  CN: II-XII grossly normal except for subtle disconjugate gaze, decreased sensation of right face.  Motor: Normal strength and tone on the left; right side at least 4/5. Decreased hip flexors bilaterally, 4-/5. NO tremors noted.   Sensory: Intact to light touch in arms and legs on the left; Right side with hyperesthesias in hand.   Station and Gait: Slow, cautious, antalgic gait, utilizing rollator today  Coordination: Normal finger to nose bilaterally    Impression/Plan:     1. Cereberovascular disease, h/o left PCA embolic stroke s/p IV tPA with recanalization of basilar after tPA  Continue lifelong anticoagulation per hematology recommendations  DM: Not well controlled as evidenced by recent A1C at 9.2, recommend aggressive control of blood sugars  HLD: Continue Liptior, f/u with PCP for continued surveillance  SILVIANO: Recommend f/u, compliance with CPAP if indicated  Previously low B12:  Will recheck today  Secondary stroke prevention: Ideal targets for stroke prevention would be Blood pressure < 130/80; B12 > 500 TSH in normal range and LDL < 70; HbA1c < 6.5 and smoking cessation if applicable.  Call 911 for stroke symptoms    2. Suspected focal seizure  -- no recurrence, no indication for AED at this time. Patient/spouse will continue to monitor    3. Parkinson's  --- no significant change from previous; however, overlay of her severe back pain today make evaluation difficult. She will continue sinemet 10/100 mg TID for now. Hopefully be able to start back up with PT as soon as able from a back standpoint.     Follow-up in 6 months, sooner if symptoms warrant.    I spent a total of 40 minutes today in reviewing records, prior diagnostics, examination of patient as well as counseling and educating patient regarding diagnoses, symptoms, reviewing diagnostics with patient, pharmacologic treatment options including purpose, risk, benefits, possible side-effects, recommendations, lifestyle modifications, coordination of care and documenting plan of care.            There are no diagnoses linked to this encounter.    Coding      Dictated using Dragon

## 2022-04-22 ENCOUNTER — PATIENT OUTREACH (OUTPATIENT)
Dept: CASE MANAGEMENT | Facility: OTHER | Age: 72
End: 2022-04-22

## 2022-04-22 NOTE — OUTREACH NOTE
Patient Outreach    AMBULATORY CASE MANAGEMENT NOTE    Name and Relationship of Patient/Support Person: Tegan Dalila L - Self    Call placed to patient for monthly check in. Patient reports being bedridden since falling and breaking her back. She is scheduled for surgery in June. Pain does ease if she can find a good position.   Discussed patients blood sugar. She is keeping a log for PCP. She states her blood sugar can range anywhere from 80 to 300. Denies finding any trend in decrease. With the 80 she does feel shaky. Discussed role of activity with diabetes.   At this time she denies any needs. She does have ACM number if needed.     Education Documentation  pain management, taught by Yvette Pugh, RN at 4/22/2022 10:35 AM.  Learner: Patient  Readiness: Acceptance  Method: Explanation, Teach Back  Response: Verbalizes Understanding    Blood Glucose Monitoring, taught by Yvette Pugh, RN at 4/22/2022 10:35 AM.  Learner: Patient  Readiness: Acceptance  Method: Explanation, Teach Back  Response: Verbalizes Understanding    Frequency of Testing, taught by Yvette Pugh, RN at 4/22/2022 10:35 AM.  Learner: Patient  Readiness: Acceptance  Method: Explanation, Teach Back  Response: Verbalizes Understanding    Diabetes, Type 2, taught by Yvette Pugh, RN at 4/22/2022 10:35 AM.  Learner: Patient  Readiness: Acceptance  Method: Explanation, Teach Back  Response: Verbalizes Understanding          YVETTE MATAMOROS  Ambulatory Case Management    4/22/2022, 10:36 EDT

## 2022-04-25 ENCOUNTER — APPOINTMENT (OUTPATIENT)
Dept: GENERAL RADIOLOGY | Facility: HOSPITAL | Age: 72
End: 2022-04-25

## 2022-04-25 ENCOUNTER — NURSE TRIAGE (OUTPATIENT)
Dept: CALL CENTER | Facility: HOSPITAL | Age: 72
End: 2022-04-25

## 2022-04-25 ENCOUNTER — HOSPITAL ENCOUNTER (EMERGENCY)
Facility: HOSPITAL | Age: 72
Discharge: HOME | End: 2022-04-25
Attending: EMERGENCY MEDICINE | Admitting: EMERGENCY MEDICINE

## 2022-04-25 VITALS
DIASTOLIC BLOOD PRESSURE: 81 MMHG | BODY MASS INDEX: 37.57 KG/M2 | HEART RATE: 57 BPM | SYSTOLIC BLOOD PRESSURE: 153 MMHG | OXYGEN SATURATION: 99 % | HEIGHT: 61 IN | RESPIRATION RATE: 16 BRPM | TEMPERATURE: 98.1 F | WEIGHT: 199 LBS

## 2022-04-25 DIAGNOSIS — G89.29 CHRONIC MIDLINE BACK PAIN, UNSPECIFIED BACK LOCATION: ICD-10-CM

## 2022-04-25 DIAGNOSIS — S32.010A CLOSED COMPRESSION FRACTURE OF BODY OF L1 VERTEBRA: Primary | ICD-10-CM

## 2022-04-25 DIAGNOSIS — M54.9 CHRONIC MIDLINE BACK PAIN, UNSPECIFIED BACK LOCATION: ICD-10-CM

## 2022-04-25 DIAGNOSIS — S22.070A COMPRESSION FRACTURE OF T9 VERTEBRA, INITIAL ENCOUNTER: ICD-10-CM

## 2022-04-25 PROCEDURE — 25010000002 HYDROMORPHONE 1 MG/ML SOLUTION: Performed by: EMERGENCY MEDICINE

## 2022-04-25 PROCEDURE — 72110 X-RAY EXAM L-2 SPINE 4/>VWS: CPT

## 2022-04-25 PROCEDURE — 96372 THER/PROPH/DIAG INJ SC/IM: CPT

## 2022-04-25 PROCEDURE — 72072 X-RAY EXAM THORAC SPINE 3VWS: CPT

## 2022-04-25 PROCEDURE — 99283 EMERGENCY DEPT VISIT LOW MDM: CPT

## 2022-04-25 PROCEDURE — 96374 THER/PROPH/DIAG INJ IV PUSH: CPT

## 2022-04-25 RX ORDER — SODIUM CHLORIDE 0.9 % (FLUSH) 0.9 %
10 SYRINGE (ML) INJECTION AS NEEDED
Status: DISCONTINUED | OUTPATIENT
Start: 2022-04-25 | End: 2022-04-25 | Stop reason: HOSPADM

## 2022-04-25 RX ADMIN — HYDROMORPHONE HYDROCHLORIDE 1 MG: 1 INJECTION, SOLUTION INTRAMUSCULAR; INTRAVENOUS; SUBCUTANEOUS at 20:27

## 2022-04-25 RX ADMIN — HYDROMORPHONE HYDROCHLORIDE 1 MG: 1 INJECTION, SOLUTION INTRAMUSCULAR; INTRAVENOUS; SUBCUTANEOUS at 19:49

## 2022-04-25 NOTE — ED NOTES
Pt presents with lower back pain x8 weeks, unrelieved by rest. Pt reports  Hx of stroke, right side effected. Pt denies fall or current injury

## 2022-04-25 NOTE — ED TRIAGE NOTES
Patient to ed from home via ems with complaints of acute on chronic lower back pain. Patient has had multiple MRIs and is on pain management.     I wore full protective equipment throughout this patient encounter including a face mask, goggles, and gloves. Hand hygiene was performed before donning protective equipment and after removal when leaving the room.

## 2022-04-25 NOTE — TELEPHONE ENCOUNTER
"Received call from [facility] care [facility] for neurology the patient was experiencing severe back pain and has called for ambulance but stated to the  \"do I have to die to get some help or kill myself\".  I immediately took the call and the patient states she was just hurting so badly she did not mean it and the ambulance is there now so she will go with them to the hospital. I called [facility] dispatch and spoke with dispatcher Loreta and her verified that she did indeed call 911 and medical was dispatched to her home.     Reason for Disposition  • Patient is threatening suicide now    Additional Information  • Negative: Patient attempted suicide    Answer Assessment - Initial Assessment Questions  1. MAIN CONCERN: \"What happened that made you call today?\"      Patient told the hub  that she has called an ambulance for her severe back pain and \"what does she need to do wait until she dies or until she kills herself to get anyone to help her\"   2. RISK OF HARM - SUICIDAL IDEATION:  \"Do you ever have thoughts of hurting or killing yourself?\"  (e.g., yes, no, no but preoccupation with thoughts about death)    - WISH TO BE DEAD:  \"Have you wished you were dead or wished you could go to sleep and not wake up?\"    - INTENT:  \"Have you had any thoughts of hurting or killing yourself?\" (e.g., yes, no, N/A) If Yes, ask: \"Are you having these thoughts about killing yourself right NOW?\"    - PLAN: \"Have you thought about how you might do this?\" \"Do you have a specific plan for how you would do this?\" (e.g., gun, knife, overdose, no plan, N/A)    - ACCESS: If yes to PLAN, \"Do you have access to ?\" (e.g., pills, gun in house, knife in kitchen)      I spoke with the caller she states the ambulance is here now \"you know when you are in so much pain you say things you dont mean\"   3. RISK OF HARM - SUICIDE ATTEMPT: \"Have you tried to harm yourself recently?\" If Yes, ask: When was this?\"        no  4. RISK OF HARM - " "SUICIDAL BEHAVIOR: \"Have you ever done anything, started to do anything, or prepared to do anything to end your life?\" (e.g., collected pills, bought a gun, wrote a suicide note, cut yourself, started but changed your mind)      She made statement only but did not mention a plan and ambulance was there within 10 seconds of me taking over the call  5. EVENTS AND STRESSORS: \"Has there been any new stress or recent changes in your life?\" (e.g., recent loss of loved one, negative event, homelessness)      Debilitating pain  6. FUNCTIONAL IMPAIRMENT: \"How have things been going for you overall? Have you had more difficulty than usual doing your normal daily activities?\"  (e.g., better, same, worse; self-care, school, work, interactions)      unknown  7. SUPPORT: \"Who is with you now?\" \"Who do you live with?\" \"Do you have family or friends who you can talk to?\"       unknown  8. THERAPIST: \"Do you have a counselor or therapist? Name?\"      unknown  9. ALCOHOL USE OR SUBSTANCE USE (DRUG USE): \"Do you drink alcohol or use any illegal drugs?\"      unknown  10. OTHER: \"Do you have any other physical symptoms right now?\" (e.g., fever)         unknown  11. PREGNANCY or POSTPARTUM: \"Is there any chance you are pregnant?\" \"When was your last menstrual period?\" \"Were you recently pregnant?\" \"When did you give birth?\"        na    Protocols used: SUICIDE CONCERNS-ADULT-[place]      "

## 2022-04-26 ENCOUNTER — TELEPHONE (OUTPATIENT)
Dept: NEUROSURGERY | Facility: CLINIC | Age: 72
End: 2022-04-26

## 2022-04-26 RX ORDER — MIRTAZAPINE 7.5 MG/1
7.5 TABLET, FILM COATED ORAL NIGHTLY
Qty: 90 TABLET | Refills: 0 | Status: SHIPPED | OUTPATIENT
Start: 2022-04-26

## 2022-04-26 NOTE — TELEPHONE ENCOUNTER
Caller: KATY    Relationship: SELF    Best call back number:    EDWARD     What is the best time to reach you:     Who are you requesting to speak with (clinical staff, provider,  specific staff member):   Do you know the name of the person who called:     What was the call regarding:     PT WAS IN THE ED DEPT ON 4/25/22    PT IS ESTABLISHED PT OF [facility].   PT NEEDS TO HAVE A FOLLOW UP APPT        PLEASE CALL PT  THANK YOU

## 2022-04-26 NOTE — ED PROVIDER NOTES
EMERGENCY DEPARTMENT ENCOUNTER    Room Number:  21/21  Date of encounter:  4/25/2022  PCP: Cinthya Valentine APRN  Historian: Patient,       HPI:  Chief Complaint: Back pain  A complete HPI/ROS/PMH/PSH/SH/FH are unobtainable due to: None    Context: Dalila Javed is a 71 y.o. female who presents to the ED c/o back pain.  She complains of having mid back pain that is constant and severe.  Worse with movement.  She attributes the pain to fall she has had over the past couple months.  She had an MRI performed of her lumbar spine 1 month ago on March 24 where she was found to have a L1 compression fracture with 33% loss of height.  She states that she is here today because she cannot take the pain anymore and cannot wait until her appointments with a spine surgeon who she wants to have do surgery on her back.  She is currently followed in pain management but states that pain management treatment with hydrocodone and gabapentin is not working.  She has had no changes in her bowel or bladder.  No weakness or sensory changes.  No fever.      PAST MEDICAL HISTORY  Active Ambulatory Problems     Diagnosis Date Noted   • Adjustment disorder with mixed anxiety and depressed mood 03/08/2016   • Atopic rhinitis 03/08/2016   • Coronary arteriosclerosis in native artery 03/08/2016   • Cobalamin deficiency 03/08/2016   • Benign colonic polyp 03/08/2016   • Lumbar radiculopathy 03/08/2016   • Controlled diabetes mellitus type 2 with complications (HCC) 03/08/2016   • Binocular vision disorder with diplopia 03/08/2016   • Dyslipidemia 03/08/2016   • Arthropathy of hand 03/08/2016   • Knee pain 03/08/2016   • Cramps of lower extremity 03/08/2016   • Low back pain 03/08/2016   • Chronic nausea 03/08/2016   • Obstructive sleep apnea syndrome 03/08/2016   • Palpitations 03/08/2016   • Ventricular premature beats 03/08/2016   • Cephalalgia 03/08/2016   • Colonic constipation 03/08/2016   • Neurocardiogenic syncope  03/08/2016   • Vitamin D deficiency 03/08/2016   • DODSON (nonalcoholic steatohepatitis) 04/01/2016   • Fibromyalgia 03/26/2013   • Morbidly obese (Formerly [facility]) 12/10/2018   • Polyp of colon 11/01/2019   • Family history of colonic polyps 11/01/2019   • Family history of malignant neoplasm of colon 11/01/2019   • Acute CVA (cerebrovascular accident) (Formerly [facility]) 08/19/2020   • Episode of dizziness 09/30/2020   • Dizziness 09/30/2020   • History of stroke 09/30/2020   • Chronic right shoulder pain 04/30/2021   • Encephalopathy, metabolic 04/30/2021   • Migraine without aura or status migrainosus 04/30/2021   • Parkinson disease (Former[facility]) 04/30/2021   • Hypokalemia 05/04/2021   • Benign paroxysmal positional vertigo 11/02/2017   • Chronic obstructive pulmonary disease (Former[facility]) 05/09/2017   • Gastroesophageal reflux disease 09/24/2015   • History of percutaneous transluminal coronary angioplasty 10/18/2016   • Hypercholesterolemia 09/24/2015   • Hypertensive disorder 09/24/2015   • Myocardial infarction (Former[facility]) 05/09/2017   • Occipital neuralgia 09/23/2015   • Patent foramen ovale 10/19/2020   • Transient cerebral ischemia 11/02/2017   • Arm pain, anterior, right 08/04/2021   • TIA (transient ischemic attack) 12/27/2021     Resolved Ambulatory Problems     Diagnosis Date Noted   • Flank pain 03/08/2016   • Abnormal blood sugar 03/08/2016   • Abnormal weight gain 03/08/2016   • Acquired trigger finger 03/08/2016   • Acute bronchitis 03/08/2016   • Atypical chest pain 03/08/2016   • History of Williamson's esophagus 03/08/2016   • CAP (community acquired pneumonia) 03/08/2016   • Candidiasis of skin 03/08/2016   • Diabetic peripheral neuropathy ([facility]) 03/08/2016   • Breathing difficult 03/08/2016   • Dizziness 03/08/2016   • Hematuria 03/08/2016   • Migraine with typical aura 03/08/2016   • Muscle ache 03/08/2016   • MAC (mycobacterium avium-intracellulare complex) 03/08/2016   • Night sweats 03/08/2016   • Otitis externa 03/08/2016   • Abscess,  perianal 2016   • Skin tag 2016   • Upper respiratory tract infection 2016   • Urinary hesitancy 2016   • Asthmatic breathing 2016   • Preoperative clearance 2016   • Fever 2021   • Acute UTI (urinary tract infection) 2021     Past Medical History:   Diagnosis Date   • Acute myocardial infarction (HCC)    • Adjustment reaction with mixed emotional features    • Arthritis    • ASHD (arteriosclerotic heart disease)    • Asthma    • B12 deficiency    • Bacterial pneumonia    • Williamson esophagus    • Barretts esophagus    • Bilateral knee pain    • Birthmark    • Bronchiectasis (HCC)    • CHF (congestive heart failure) (HCC)    • Chronic cough    • Chronic glomerulonephritis with exudative nephritis    • Chronic lumbar radiculopathy    • Diabetes mellitus (HCC)    • Fibromyositis    • GERD (gastroesophageal reflux disease)    • Herpes zoster    • Hyperlipidemia    • Hypertension    • Lupus anticoagulant disorder (HCC)    • Mycobacterium avium complex (HCC)    • Nephrolithiasis    • SILVIANO (obstructive sleep apnea)    • Premature ventricular contraction    • Slow transit constipation    • Stroke (HCC)          PAST SURGICAL HISTORY  Past Surgical History:   Procedure Laterality Date   • APPENDECTOMY     • BRONCHOSCOPY     •  SECTION     • CHOLECYSTECTOMY     • COLONOSCOPY     • COLONOSCOPY N/A 2019    Procedure: COLONOSCOPY to cecum with cold biopsy and cold and hotsnare polypectomies;  Surgeon: Suzy Eubanks MD;  Location: [facility] ENDOSCOPY;  Service: Gastroenterology   • CORONARY ANGIOPLASTY WITH STENT PLACEMENT     • EMBOLECTOMY N/A 2020    Procedure: EMERGENT CEREBRAL ANGIOGRAM;  Surgeon: Jonh Meehan MD;  [facility] ;  Service: Neurosurgery;  Laterality: N/A;   • ENDOSCOPY N/A 2019    Procedure: ESOPHAGOGASTRODUODENOSCOPY with cold biopsies;  Surgeon: Suzy Eubanks MD;  Location: [facility] ENDOSCOPY;  Service:  Gastroenterology   • KNEE ARTHROSCOPY     • OTHER SURGICAL HISTORY      elbow surgery   • ROTATOR CUFF REPAIR     • TUBAL ABDOMINAL LIGATION           FAMILY HISTORY  Family History   Problem Relation Age of Onset   • Colon cancer Mother    • Uterine cancer Mother    • Arthritis Mother    • Cancer Mother    • Heart disease Mother    • Migraines Mother    • Stroke Mother    • Cancer Father         malignant brain tumor   • Aneurysm Father    • Bone cancer Maternal Aunt    • Diabetes Paternal Grandmother    • Diabetes Paternal Grandfather    • Arthritis Maternal Grandmother    • Heart disease Maternal Grandmother    • Thyroid disease Maternal Grandmother          SOCIAL HISTORY  Social History     Socioeconomic History   • Marital status:    Tobacco Use   • Smoking status: Former Smoker     Quit date: 1980     Years since quittin.3   • Smokeless tobacco: Never Used   Vaping Use   • Vaping Use: Never used   Substance and Sexual Activity   • Alcohol use: No   • Drug use: No   • Sexual activity: Yes     Partners: Male         ALLERGIES  Duloxetine hcl, Viibryd [vilazodone hcl], Metformin, Morphine and related, Nsaids, Oxycodone, Penicillins, Statins, Benadryl [diphenhydramine], and Carafate [sucralfate]        REVIEW OF SYSTEMS  Review of Systems     All systems reviewed and negative except for those discussed in HPI.       PHYSICAL EXAM    I have reviewed the triage vital signs and nursing notes.    ED Triage Vitals [22 1631]   Temp Heart Rate Resp BP SpO2   98.1 °F (36.7 °C) 64 18 152/82 96 %      Temp src Heart Rate Source Patient Position BP Location FiO2 (%)   Tympanic -- -- -- --       Physical Exam  GENERAL: not distressed  HENT: nares patent  EYES: no scleral icterus  CV: regular rhythm, regular rate  RESPIRATORY: normal effort  ABDOMEN: soft, nontender  MUSCULOSKELETAL: no deformity  NEURO: alert, moves all extremities, follows commands  5/5 strength to hip flexion, knee extension/flexion,  dorsiflexion, plantarflexion, and EHL  SILT at bilateral superficial peroneal, deep peroneal, sural, and saphenous nerves  2+ Reflexes  SKIN: warm, dry        LAB RESULTS  No results found for this or any previous visit (from the past 24 hour(s)).    Ordered the above labs and independently reviewed the results.        RADIOLOGY  XR Spine Lumbar Complete 4+VW, XR Spine Thoracic 3 View    Result Date: 4/25/2022  LUMBAR SPINE THORACIC SPINE X-RAYS  HISTORY: Back pain.  TECHNIQUE: Four views of the thoracic spine and five views of the lumbar spine are provided and correlated with lumbar x-ray February 26, 2022.  FINDINGS: There has been interval development of an L1 superior endplate compression deformity with mild loss of height anteriorly and no loss of height posteriorly. Superior endplate appears sclerotic, but the exact age of the defect is not known. Vertebral height elsewhere throughout the lumbar spine appears normal. More questionable is slight concavity and loss of height anteriorly at the T9 superior endplate, new in comparison with the previous exam. This is seen on both thoracic and lumbar spine x-rays. Other thoracic vertebrae appear normal. No bone lesion is identified. There are clips from cholecystectomy.      In comparison with x-rays from the end of February, there is a new, mild anterior superior L1 endplate fracture and questionably a new mild T9 superior plate compression fracture.  This report was finalized on 4/25/2022 7:58 PM by Dr. Skip Flores M.D.        I ordered the above noted radiological studies. Reviewed by me and discussed with radiologist.  See dictation for official radiology interpretation.      PROCEDURES    Procedures      MEDICATIONS GIVEN IN ER    Medications   sodium chloride 0.9 % flush 10 mL (has no administration in time range)   HYDROmorphone (DILAUDID) injection 1 mg (1 mg Intramuscular Given 4/25/22 1949)   HYDROmorphone (DILAUDID) injection 1 mg (1 mg Intravenous  Given 4/25/22 2027)         PROGRESS, DATA ANALYSIS, CONSULTS, AND MEDICAL DECISION MAKING    All labs have been independently reviewed by me.  All radiology studies have been reviewed by me and discussed with radiologist dictating the report.   EKG's independently viewed and interpreted by me.  Discussion below represents my analysis of pertinent findings related to patient's condition, differential diagnosis, treatment plan and final disposition.    Patient has chronic back pain.  This is been ongoing for quite some time and she is dealt the L1 compression fracture for at least 1 month and has likely had the T9 fracture for equally as long.  No new red flags.  She has a reassuring neurological examination.    She is in pain management.  I will not prescribe additional pain medications for home.  I did discuss the case with Dr. Medrano, neurosurgery.  No new recommendations at this time.             PPE: The patient wore a surgical mask throughout the entire patient encounter. I wore an N95.    AS OF 21:18 EDT VITALS:    BP - 152/79  HR - 60  TEMP - 98.1 °F (36.7 °C) (Tympanic)  O2 SATS - 94%        DIAGNOSIS  Final diagnoses:   Closed compression fracture of body of L1 vertebra (HCC)   Compression fracture of T9 vertebra, initial encounter ([facility])   Chronic midline back pain, unspecified back location         DISPOSITION  DISCHARGE    FOLLOW-UP  Baldo Eng MD  [address] [id number] [phone]    Schedule an appointment as soon as possible for a visit   As needed         Medication List      Changed    gabapentin 300 MG capsule  Commonly known as: NEURONTIN  Take 1 capsule by mouth At Night As Needed (sleep).  What changed: additional instructions                     Shai Flores II, MD  04/25/22 7861

## 2022-05-01 DIAGNOSIS — E11.65 TYPE 2 DIABETES MELLITUS WITH HYPERGLYCEMIA, WITH LONG-TERM CURRENT USE OF INSULIN: ICD-10-CM

## 2022-05-01 DIAGNOSIS — Z79.4 TYPE 2 DIABETES MELLITUS WITH HYPERGLYCEMIA, WITH LONG-TERM CURRENT USE OF INSULIN: ICD-10-CM

## 2022-05-01 RX ORDER — HUMAN INSULIN 100 [IU]/ML
INJECTION, SUSPENSION SUBCUTANEOUS
Qty: 10 ML | Refills: 1 | Status: ON HOLD | OUTPATIENT
Start: 2022-05-01 | End: 2022-07-18

## 2022-05-03 ENCOUNTER — TELEPHONE (OUTPATIENT)
Dept: FAMILY MEDICINE CLINIC | Facility: CLINIC | Age: 72
End: 2022-05-03

## 2022-05-03 ENCOUNTER — OFFICE VISIT (OUTPATIENT)
Dept: NEUROSURGERY | Facility: CLINIC | Age: 72
End: 2022-05-03

## 2022-05-03 VITALS
HEIGHT: 61 IN | OXYGEN SATURATION: 94 % | WEIGHT: 203 LBS | HEART RATE: 82 BPM | SYSTOLIC BLOOD PRESSURE: 110 MMHG | TEMPERATURE: 97.1 F | RESPIRATION RATE: 16 BRPM | DIASTOLIC BLOOD PRESSURE: 76 MMHG | BODY MASS INDEX: 38.33 KG/M2

## 2022-05-03 DIAGNOSIS — M54.16 LUMBAR RADICULOPATHY: ICD-10-CM

## 2022-05-03 DIAGNOSIS — S32.010G COMPRESSION FRACTURE OF L1 VERTEBRA WITH DELAYED HEALING: Primary | ICD-10-CM

## 2022-05-03 DIAGNOSIS — M80.08XG PATHOLOGICAL FRACTURE OF VERTEBRA DUE TO AGE-RELATED OSTEOPOROSIS WITH DELAYED HEALING, SUBSEQUENT ENCOUNTER: ICD-10-CM

## 2022-05-03 PROCEDURE — 99214 OFFICE O/P EST MOD 30 MIN: CPT | Performed by: RADIOLOGY

## 2022-05-03 NOTE — TELEPHONE ENCOUNTER
PATIENT NEEDS TO BE OFF ELIQUIS FOR 2 DAYS PRIOR TO SURGERY WITH DR. WOOD.. OFFICE CALLED ASKING IF THAT WAS OK. PLEASE ADVISE.

## 2022-05-05 DIAGNOSIS — S32.010G COMPRESSION FRACTURE OF L1 VERTEBRA WITH DELAYED HEALING: Primary | ICD-10-CM

## 2022-05-05 DIAGNOSIS — R93.7 ABNORMAL FINDINGS ON DIAGNOSTIC IMAGING OF OTHER PARTS OF MUSCULOSKELETAL SYSTEM: ICD-10-CM

## 2022-05-05 DIAGNOSIS — M80.08XA AGE-RELATED OSTEOPOROSIS WITH CURRENT PATHOLOGICAL FRACTURE, VERTEBRA(E), INITIAL ENCOUNTER FOR FRACTURE: ICD-10-CM

## 2022-05-05 DIAGNOSIS — Z01.818 PRE-OP TESTING: Primary | ICD-10-CM

## 2022-05-06 ENCOUNTER — LAB (OUTPATIENT)
Dept: LAB | Facility: HOSPITAL | Age: 72
End: 2022-05-06

## 2022-05-06 DIAGNOSIS — S32.010G COMPRESSION FRACTURE OF L1 VERTEBRA WITH DELAYED HEALING: ICD-10-CM

## 2022-05-06 DIAGNOSIS — R93.7 ABNORMAL FINDINGS ON DIAGNOSTIC IMAGING OF OTHER PARTS OF MUSCULOSKELETAL SYSTEM: ICD-10-CM

## 2022-05-06 DIAGNOSIS — Z01.818 PRE-OP TESTING: ICD-10-CM

## 2022-05-06 LAB
ANION GAP SERPL CALCULATED.3IONS-SCNC: 10 MMOL/L (ref 5–15)
BASOPHILS # BLD AUTO: 0.05 10*3/MM3 (ref 0–0.2)
BASOPHILS NFR BLD AUTO: 0.5 % (ref 0–1.5)
BUN SERPL-MCNC: 11 MG/DL (ref 8–23)
BUN/CREAT SERPL: 12.5 (ref 7–25)
CALCIUM SPEC-SCNC: 9.4 MG/DL (ref 8.6–10.5)
CHLORIDE SERPL-SCNC: 102 MMOL/L (ref 98–107)
CO2 SERPL-SCNC: 29 MMOL/L (ref 22–29)
CREAT SERPL-MCNC: 0.88 MG/DL (ref 0.57–1)
DEPRECATED RDW RBC AUTO: 44.4 FL (ref 37–54)
EGFRCR SERPLBLD CKD-EPI 2021: 69.5 ML/MIN/1.73
EOSINOPHIL # BLD AUTO: 0.34 10*3/MM3 (ref 0–0.4)
EOSINOPHIL NFR BLD AUTO: 3.1 % (ref 0.3–6.2)
ERYTHROCYTE [DISTWIDTH] IN BLOOD BY AUTOMATED COUNT: 12.9 % (ref 12.3–15.4)
GLUCOSE SERPL-MCNC: 89 MG/DL (ref 65–99)
HCT VFR BLD AUTO: 44.5 % (ref 34–46.6)
HGB BLD-MCNC: 14.4 G/DL (ref 12–15.9)
IMM GRANULOCYTES # BLD AUTO: 0.05 10*3/MM3 (ref 0–0.05)
IMM GRANULOCYTES NFR BLD AUTO: 0.5 % (ref 0–0.5)
LYMPHOCYTES # BLD AUTO: 3.23 10*3/MM3 (ref 0.7–3.1)
LYMPHOCYTES NFR BLD AUTO: 29.7 % (ref 19.6–45.3)
MCH RBC QN AUTO: 30.1 PG (ref 26.6–33)
MCHC RBC AUTO-ENTMCNC: 32.4 G/DL (ref 31.5–35.7)
MCV RBC AUTO: 92.9 FL (ref 79–97)
MONOCYTES # BLD AUTO: 0.74 10*3/MM3 (ref 0.1–0.9)
MONOCYTES NFR BLD AUTO: 6.8 % (ref 5–12)
NEUTROPHILS NFR BLD AUTO: 59.4 % (ref 42.7–76)
NEUTROPHILS NFR BLD AUTO: 6.46 10*3/MM3 (ref 1.7–7)
NRBC BLD AUTO-RTO: 0 /100 WBC (ref 0–0.2)
PLATELET # BLD AUTO: 209 10*3/MM3 (ref 140–450)
PMV BLD AUTO: 9.8 FL (ref 6–12)
POTASSIUM SERPL-SCNC: 3.8 MMOL/L (ref 3.5–5.2)
RBC # BLD AUTO: 4.79 10*6/MM3 (ref 3.77–5.28)
SARS-COV-2 ORF1AB RESP QL NAA+PROBE: NOT DETECTED
SODIUM SERPL-SCNC: 141 MMOL/L (ref 136–145)
WBC NRBC COR # BLD: 10.87 10*3/MM3 (ref 3.4–10.8)

## 2022-05-06 PROCEDURE — 80048 BASIC METABOLIC PNL TOTAL CA: CPT

## 2022-05-06 PROCEDURE — C9803 HOPD COVID-19 SPEC COLLECT: HCPCS

## 2022-05-06 PROCEDURE — 85025 COMPLETE CBC W/AUTO DIFF WBC: CPT

## 2022-05-06 PROCEDURE — U0005 INFEC AGEN DETEC AMPLI PROBE: HCPCS

## 2022-05-06 PROCEDURE — 36415 COLL VENOUS BLD VENIPUNCTURE: CPT

## 2022-05-06 PROCEDURE — U0004 COV-19 TEST NON-CDC HGH THRU: HCPCS

## 2022-05-06 NOTE — PROGRESS NOTES
05/09/22 0001   Pre-Procedure Phone Call   Procedure Time Verified Yes   Arrival Time 0800   Procedure Location Verified Yes   Medical History Reviewed No   NPO Status Reinforced Yes   Ride and Caregiver Arranged Yes   Patient Knows to Bring Current Medications Yes   Bring Outside Films Requested No   Instructed to hold eliquis for 2 days prior to procedure

## 2022-05-07 RX ORDER — METOPROLOL TARTRATE 50 MG/1
TABLET, FILM COATED ORAL
Qty: 270 TABLET | Refills: 0 | Status: SHIPPED | OUTPATIENT
Start: 2022-05-07

## 2022-05-09 ENCOUNTER — HOSPITAL ENCOUNTER (OUTPATIENT)
Dept: INTERVENTIONAL RADIOLOGY/VASCULAR | Facility: HOSPITAL | Age: 72
Discharge: HOME | End: 2022-05-09

## 2022-05-09 ENCOUNTER — ANESTHESIA (OUTPATIENT)
Dept: INTERVENTIONAL RADIOLOGY/VASCULAR | Facility: HOSPITAL | Age: 72
End: 2022-05-09

## 2022-05-09 ENCOUNTER — ANESTHESIA EVENT (OUTPATIENT)
Dept: INTERVENTIONAL RADIOLOGY/VASCULAR | Facility: HOSPITAL | Age: 72
End: 2022-05-09

## 2022-05-09 VITALS
WEIGHT: 199 LBS | HEART RATE: 70 BPM | SYSTOLIC BLOOD PRESSURE: 118 MMHG | TEMPERATURE: 98.2 F | DIASTOLIC BLOOD PRESSURE: 51 MMHG | OXYGEN SATURATION: 93 % | HEIGHT: 60 IN | BODY MASS INDEX: 39.07 KG/M2 | RESPIRATION RATE: 18 BRPM

## 2022-05-09 DIAGNOSIS — M80.08XA AGE-RELATED OSTEOPOROSIS WITH CURRENT PATHOLOGICAL FRACTURE, VERTEBRA(E), INITIAL ENCOUNTER FOR FRACTURE: ICD-10-CM

## 2022-05-09 DIAGNOSIS — S32.010G COMPRESSION FRACTURE OF L1 VERTEBRA WITH DELAYED HEALING: ICD-10-CM

## 2022-05-09 LAB — GLUCOSE BLDC GLUCOMTR-MCNC: 106 MG/DL (ref 70–130)

## 2022-05-09 PROCEDURE — A9270 NON-COVERED ITEM OR SERVICE: HCPCS | Performed by: ANESTHESIOLOGY

## 2022-05-09 PROCEDURE — 88342 IMHCHEM/IMCYTCHM 1ST ANTB: CPT | Performed by: RADIOLOGY

## 2022-05-09 PROCEDURE — 25010000002 ONDANSETRON PER 1 MG: Performed by: ANESTHESIOLOGY

## 2022-05-09 PROCEDURE — 88311 DECALCIFY TISSUE: CPT | Performed by: RADIOLOGY

## 2022-05-09 PROCEDURE — 25010000002 PROPOFOL 10 MG/ML EMULSION: Performed by: ANESTHESIOLOGY

## 2022-05-09 PROCEDURE — 88307 TISSUE EXAM BY PATHOLOGIST: CPT | Performed by: RADIOLOGY

## 2022-05-09 PROCEDURE — C1713 ANCHOR/SCREW BN/BN,TIS/BN: HCPCS

## 2022-05-09 PROCEDURE — 25010000002 FENTANYL CITRATE (PF) 50 MCG/ML SOLUTION: Performed by: ANESTHESIOLOGY

## 2022-05-09 PROCEDURE — 22514 PERQ VERTEBRAL AUGMENTATION: CPT | Performed by: RADIOLOGY

## 2022-05-09 PROCEDURE — 63710000001 HYDROCODONE-ACETAMINOPHEN 7.5-325 MG TABLET: Performed by: ANESTHESIOLOGY

## 2022-05-09 PROCEDURE — 82962 GLUCOSE BLOOD TEST: CPT

## 2022-05-09 PROCEDURE — 0 LIDOCAINE 1 % SOLUTION: Performed by: RADIOLOGY

## 2022-05-09 PROCEDURE — 22514 PERQ VERTEBRAL AUGMENTATION: CPT

## 2022-05-09 PROCEDURE — 25010000002 PHENYLEPHRINE 10 MG/ML SOLUTION: Performed by: ANESTHESIOLOGY

## 2022-05-09 PROCEDURE — 25010000002 NEOSTIGMINE 5 MG/10ML SOLUTION: Performed by: ANESTHESIOLOGY

## 2022-05-09 RX ORDER — FENTANYL CITRATE 50 UG/ML
50 INJECTION, SOLUTION INTRAMUSCULAR; INTRAVENOUS
Status: DISCONTINUED | OUTPATIENT
Start: 2022-05-09 | End: 2022-05-10 | Stop reason: HOSPADM

## 2022-05-09 RX ORDER — HYDROCODONE BITARTRATE AND ACETAMINOPHEN 7.5; 325 MG/1; MG/1
1 TABLET ORAL ONCE AS NEEDED
Status: COMPLETED | OUTPATIENT
Start: 2022-05-09 | End: 2022-05-09

## 2022-05-09 RX ORDER — ONDANSETRON 2 MG/ML
4 INJECTION INTRAMUSCULAR; INTRAVENOUS ONCE AS NEEDED
Status: DISCONTINUED | OUTPATIENT
Start: 2022-05-09 | End: 2022-05-10 | Stop reason: HOSPADM

## 2022-05-09 RX ORDER — SODIUM CHLORIDE, SODIUM LACTATE, POTASSIUM CHLORIDE, CALCIUM CHLORIDE 600; 310; 30; 20 MG/100ML; MG/100ML; MG/100ML; MG/100ML
9 INJECTION, SOLUTION INTRAVENOUS CONTINUOUS
Status: DISCONTINUED | OUTPATIENT
Start: 2022-05-09 | End: 2022-05-10 | Stop reason: HOSPADM

## 2022-05-09 RX ORDER — SODIUM CHLORIDE 0.9 % (FLUSH) 0.9 %
3 SYRINGE (ML) INJECTION EVERY 12 HOURS SCHEDULED
Status: DISCONTINUED | OUTPATIENT
Start: 2022-05-09 | End: 2022-05-10 | Stop reason: HOSPADM

## 2022-05-09 RX ORDER — LIDOCAINE HYDROCHLORIDE 10 MG/ML
INJECTION, SOLUTION INFILTRATION; PERINEURAL
Status: COMPLETED | OUTPATIENT
Start: 2022-05-09 | End: 2022-05-09

## 2022-05-09 RX ORDER — PROMETHAZINE HYDROCHLORIDE 25 MG/1
25 TABLET ORAL ONCE AS NEEDED
Status: DISCONTINUED | OUTPATIENT
Start: 2022-05-09 | End: 2022-05-10 | Stop reason: HOSPADM

## 2022-05-09 RX ORDER — LABETALOL HYDROCHLORIDE 5 MG/ML
5 INJECTION, SOLUTION INTRAVENOUS
Status: DISCONTINUED | OUTPATIENT
Start: 2022-05-09 | End: 2022-05-10 | Stop reason: HOSPADM

## 2022-05-09 RX ORDER — OXYCODONE AND ACETAMINOPHEN 7.5; 325 MG/1; MG/1
1 TABLET ORAL EVERY 4 HOURS PRN
Status: CANCELLED | OUTPATIENT
Start: 2022-05-09 | End: 2022-05-16

## 2022-05-09 RX ORDER — HYDROMORPHONE HYDROCHLORIDE 1 MG/ML
0.5 INJECTION, SOLUTION INTRAMUSCULAR; INTRAVENOUS; SUBCUTANEOUS
Status: DISCONTINUED | OUTPATIENT
Start: 2022-05-09 | End: 2022-05-10 | Stop reason: HOSPADM

## 2022-05-09 RX ORDER — SODIUM CHLORIDE 0.9 % (FLUSH) 0.9 %
10 SYRINGE (ML) INJECTION EVERY 12 HOURS SCHEDULED
Status: DISCONTINUED | OUTPATIENT
Start: 2022-05-09 | End: 2022-05-10 | Stop reason: HOSPADM

## 2022-05-09 RX ORDER — ONDANSETRON 2 MG/ML
INJECTION INTRAMUSCULAR; INTRAVENOUS AS NEEDED
Status: DISCONTINUED | OUTPATIENT
Start: 2022-05-09 | End: 2022-05-09 | Stop reason: SURG

## 2022-05-09 RX ORDER — CLINDAMYCIN PHOSPHATE 900 MG/50ML
900 INJECTION INTRAVENOUS ONCE
Status: COMPLETED | OUTPATIENT
Start: 2022-05-09 | End: 2022-05-09

## 2022-05-09 RX ORDER — NALOXONE HCL 0.4 MG/ML
0.2 VIAL (ML) INJECTION AS NEEDED
Status: DISCONTINUED | OUTPATIENT
Start: 2022-05-09 | End: 2022-05-10 | Stop reason: HOSPADM

## 2022-05-09 RX ORDER — IBUPROFEN 200 MG
600 TABLET ORAL ONCE AS NEEDED
Status: CANCELLED | OUTPATIENT
Start: 2022-05-09

## 2022-05-09 RX ORDER — LIDOCAINE HYDROCHLORIDE 10 MG/ML
0.5 INJECTION, SOLUTION EPIDURAL; INFILTRATION; INTRACAUDAL; PERINEURAL ONCE AS NEEDED
Status: DISCONTINUED | OUTPATIENT
Start: 2022-05-09 | End: 2022-05-10 | Stop reason: HOSPADM

## 2022-05-09 RX ORDER — NEOSTIGMINE METHYLSULFATE 0.5 MG/ML
INJECTION, SOLUTION INTRAVENOUS AS NEEDED
Status: DISCONTINUED | OUTPATIENT
Start: 2022-05-09 | End: 2022-05-09 | Stop reason: SURG

## 2022-05-09 RX ORDER — EPHEDRINE SULFATE 50 MG/ML
5 INJECTION, SOLUTION INTRAVENOUS ONCE AS NEEDED
Status: DISCONTINUED | OUTPATIENT
Start: 2022-05-09 | End: 2022-05-10 | Stop reason: HOSPADM

## 2022-05-09 RX ORDER — FAMOTIDINE 10 MG/ML
20 INJECTION, SOLUTION INTRAVENOUS ONCE
Status: COMPLETED | OUTPATIENT
Start: 2022-05-09 | End: 2022-05-09

## 2022-05-09 RX ORDER — SODIUM CHLORIDE 0.9 % (FLUSH) 0.9 %
3-10 SYRINGE (ML) INJECTION AS NEEDED
Status: DISCONTINUED | OUTPATIENT
Start: 2022-05-09 | End: 2022-05-10 | Stop reason: HOSPADM

## 2022-05-09 RX ORDER — PROPOFOL 10 MG/ML
VIAL (ML) INTRAVENOUS AS NEEDED
Status: DISCONTINUED | OUTPATIENT
Start: 2022-05-09 | End: 2022-05-09 | Stop reason: SURG

## 2022-05-09 RX ORDER — EPHEDRINE SULFATE 50 MG/ML
INJECTION, SOLUTION INTRAVENOUS AS NEEDED
Status: DISCONTINUED | OUTPATIENT
Start: 2022-05-09 | End: 2022-05-09 | Stop reason: SURG

## 2022-05-09 RX ORDER — GLYCOPYRROLATE 0.2 MG/ML
INJECTION INTRAMUSCULAR; INTRAVENOUS AS NEEDED
Status: DISCONTINUED | OUTPATIENT
Start: 2022-05-09 | End: 2022-05-09 | Stop reason: SURG

## 2022-05-09 RX ORDER — ROCURONIUM BROMIDE 10 MG/ML
INJECTION, SOLUTION INTRAVENOUS AS NEEDED
Status: DISCONTINUED | OUTPATIENT
Start: 2022-05-09 | End: 2022-05-09 | Stop reason: SURG

## 2022-05-09 RX ORDER — SODIUM CHLORIDE 0.9 % (FLUSH) 0.9 %
10 SYRINGE (ML) INJECTION AS NEEDED
Status: DISCONTINUED | OUTPATIENT
Start: 2022-05-09 | End: 2022-05-10 | Stop reason: HOSPADM

## 2022-05-09 RX ORDER — DIPHENHYDRAMINE HCL 25 MG
25 CAPSULE ORAL
Status: DISCONTINUED | OUTPATIENT
Start: 2022-05-09 | End: 2022-05-10 | Stop reason: HOSPADM

## 2022-05-09 RX ORDER — HYDRALAZINE HYDROCHLORIDE 20 MG/ML
5 INJECTION INTRAMUSCULAR; INTRAVENOUS
Status: DISCONTINUED | OUTPATIENT
Start: 2022-05-09 | End: 2022-05-10 | Stop reason: HOSPADM

## 2022-05-09 RX ORDER — DIPHENHYDRAMINE HYDROCHLORIDE 50 MG/ML
12.5 INJECTION INTRAMUSCULAR; INTRAVENOUS
Status: DISCONTINUED | OUTPATIENT
Start: 2022-05-09 | End: 2022-05-10 | Stop reason: HOSPADM

## 2022-05-09 RX ORDER — LIDOCAINE HYDROCHLORIDE 20 MG/ML
INJECTION, SOLUTION INFILTRATION; PERINEURAL AS NEEDED
Status: DISCONTINUED | OUTPATIENT
Start: 2022-05-09 | End: 2022-05-09 | Stop reason: SURG

## 2022-05-09 RX ORDER — PHENYLEPHRINE HYDROCHLORIDE 10 MG/ML
INJECTION INTRAVENOUS AS NEEDED
Status: DISCONTINUED | OUTPATIENT
Start: 2022-05-09 | End: 2022-05-09 | Stop reason: SURG

## 2022-05-09 RX ORDER — PROMETHAZINE HYDROCHLORIDE 25 MG/1
25 SUPPOSITORY RECTAL ONCE AS NEEDED
Status: DISCONTINUED | OUTPATIENT
Start: 2022-05-09 | End: 2022-05-10 | Stop reason: HOSPADM

## 2022-05-09 RX ORDER — FLUMAZENIL 0.1 MG/ML
0.2 INJECTION INTRAVENOUS AS NEEDED
Status: DISCONTINUED | OUTPATIENT
Start: 2022-05-09 | End: 2022-05-10 | Stop reason: HOSPADM

## 2022-05-09 RX ADMIN — GLYCOPYRROLATE 0.6 MG: 0.2 INJECTION INTRAMUSCULAR; INTRAVENOUS at 09:56

## 2022-05-09 RX ADMIN — EPHEDRINE SULFATE 10 MG: 50 INJECTION INTRAVENOUS at 09:40

## 2022-05-09 RX ADMIN — CLINDAMYCIN IN 5 PERCENT DEXTROSE 900 MG: 18 INJECTION, SOLUTION INTRAVENOUS at 09:10

## 2022-05-09 RX ADMIN — ONDANSETRON 4 MG: 2 INJECTION INTRAMUSCULAR; INTRAVENOUS at 09:34

## 2022-05-09 RX ADMIN — GLYCOPYRROLATE 0.2 MG: 0.2 INJECTION INTRAMUSCULAR; INTRAVENOUS at 09:46

## 2022-05-09 RX ADMIN — NEOSTIGMINE METHYLSULFATE 4 MG: 0.5 INJECTION INTRAVENOUS at 09:56

## 2022-05-09 RX ADMIN — LIDOCAINE HYDROCHLORIDE 20 ML: 10 INJECTION, SOLUTION INFILTRATION; PERINEURAL at 09:40

## 2022-05-09 RX ADMIN — LIDOCAINE HYDROCHLORIDE 100 MG: 20 INJECTION, SOLUTION INFILTRATION; PERINEURAL at 09:15

## 2022-05-09 RX ADMIN — ROCURONIUM BROMIDE 30 MG: 50 INJECTION INTRAVENOUS at 09:15

## 2022-05-09 RX ADMIN — Medication 3 ML: at 08:55

## 2022-05-09 RX ADMIN — FENTANYL CITRATE 50 MCG: 0.05 INJECTION, SOLUTION INTRAMUSCULAR; INTRAVENOUS at 08:56

## 2022-05-09 RX ADMIN — EPHEDRINE SULFATE 10 MG: 50 INJECTION INTRAVENOUS at 09:34

## 2022-05-09 RX ADMIN — PHENYLEPHRINE HYDROCHLORIDE 150 MCG: 10 INJECTION, SOLUTION INTRAVENOUS at 09:48

## 2022-05-09 RX ADMIN — FENTANYL CITRATE 50 MCG: 0.05 INJECTION, SOLUTION INTRAMUSCULAR; INTRAVENOUS at 10:04

## 2022-05-09 RX ADMIN — PROPOFOL 150 MG: 10 INJECTION, EMULSION INTRAVENOUS at 09:15

## 2022-05-09 RX ADMIN — FENTANYL CITRATE 50 MCG: 0.05 INJECTION, SOLUTION INTRAMUSCULAR; INTRAVENOUS at 09:15

## 2022-05-09 RX ADMIN — SODIUM CHLORIDE, POTASSIUM CHLORIDE, SODIUM LACTATE AND CALCIUM CHLORIDE 9 ML/HR: 600; 310; 30; 20 INJECTION, SOLUTION INTRAVENOUS at 08:55

## 2022-05-09 RX ADMIN — HYDROCODONE BITARTRATE AND ACETAMINOPHEN 1 TABLET: 7.5; 325 TABLET ORAL at 12:16

## 2022-05-09 RX ADMIN — FAMOTIDINE 20 MG: 10 INJECTION, SOLUTION INTRAVENOUS at 08:55

## 2022-05-09 RX ADMIN — PROPOFOL 30 MG: 10 INJECTION, EMULSION INTRAVENOUS at 10:04

## 2022-05-09 NOTE — PROGRESS NOTES
Subjective   Patient ID: Dalila Javed is a 71 y.o. female is here today for a P/O 1 L1 kyphoplasty done on 5/9/22.  Today pt reports some back pain with numbness and weakness on the R side due to a stroke.    71 y.o. female who presented to the ED c/o back pain after falling.  She complained of having mid back pain that was constant and severe measuring with 10 out of 10 on the pain scale despite narcotic pain medicines.  Worse with movement.  She attributed the pain to a fall she had over the past couple months.  She had an MRI performed of her lumbar spine and was found to have a L1 compression fracture with 33% loss of height.  She underwent a bilateral balloon kyphoplasty on 5/9/2022 and returns today for her follow-up visit with marked improvement in her pain and mobility.  She is still followed in pain management for her long-term spondylosis and lumbar radiculopathy which is a long-term problem unrelated to her fracture.  She has had no changes in her bowel or bladder.  No weakness or sensory changes.  No fever reported postoperatively.         The following portions of the patient's history were reviewed and updated as appropriate: allergies, current medications, past family history, past medical history, past social history, past surgical history and problem list.    Review of Systems   Constitutional: Negative for chills and fever.   Gastrointestinal:        No b/b incontinence   Genitourinary: Negative for difficulty urinating and enuresis.   Musculoskeletal: Positive for back pain (upper back). Negative for gait problem.   Skin: Negative for wound.   Neurological: Positive for weakness (R side) and numbness (R sided stroke related).   Psychiatric/Behavioral: Negative for sleep disturbance.       Objective    Vitals:    05/19/22 1059   BP: 106/70   Pulse: 68   Resp: 16   Temp: 97.1 °F (36.2 °C)   SpO2: 96%     Body mass index is 38.28 kg/m².    Physical Exam  Vitals and nursing note reviewed.    Constitutional:       General: She is not in acute distress.     Appearance: She is obese.   Cardiovascular:      Rate and Rhythm: Normal rate.   Pulmonary:      Effort: Pulmonary effort is normal.   Musculoskeletal:      Lumbar back: No spasms, tenderness or bony tenderness.        Back:    Skin:     General: Skin is warm and dry.   Neurological:      General: No focal deficit present.      Mental Status: She is alert and oriented to person, place, and time.      Cranial Nerves: No cranial nerve deficit.      Sensory: No sensory deficit.      Motor: No weakness.      Coordination: Coordination normal.   Psychiatric:         Mood and Affect: Mood normal.         Behavior: Behavior normal.         Thought Content: Thought content normal.         Judgment: Judgment normal.       Neurologic Exam     Mental Status   Oriented to person, place, and time.       Assessment & Plan   Independent Review of Radiographic Studies:    The L1 kyphoplasty dated 2022 was reviewed with the patient and shows bilateral balloon inflation with cement deposition.  Good deposition of the cement is seen.  No significant complication identified.  Medical Decision Makin-year-old female with osteoporosis and an L1 compression fracture now status post bilateral balloon kyphoplasty with pain resolution and mobility improvement.  Patient was instructed to be careful when bending, lifting and twisting as another fracture can occur.  Her other cerebrovascular risk factors are medically managed and no new neurologic complaints are present.  She only needs to follow-up if another fracture occurs.  Some physical therapy was requested for her due to the length of her previous immobility.  Diagnoses and all orders for this visit:    1. Pathological fracture of vertebra due to age-related osteoporosis with delayed healing, subsequent encounter (Primary)    2. Compression fracture of L1 vertebra with delayed healing    3.  Hypercholesterolemia    4. Hypertension, unspecified type      Return if symptoms worsen or fail to improve.  Physical therapy consult placed.               Answers for HPI/ROS submitted by the patient on 5/17/2022  Please describe your symptoms.: Post-op appt.  Have you had these symptoms before?: No  How long have you been having these symptoms?: 5-7 days  Please list any medications you are currently taking for this condition.: On-file. No changes.  Please describe any probable cause for these symptoms. : Post-op  What is the primary reason for your visit?: Other

## 2022-05-09 NOTE — POST-PROCEDURE NOTE
Pre-Op Dx: Lumbar 1 VCF    Post-Op Dx: Same    Procedure: L1 Balloon Kyphoplasty    Findings:  L1 VCF with bilateral balloon inflation with minimal expansion on the right and curette had to be employed. Successful cement deployment bilaterally with no significant extravasation seen. Official report to follow.    General ETT anethesia    No complications encountered.    EBL: 4cc

## 2022-05-09 NOTE — ANESTHESIA PROCEDURE NOTES
Airway  Urgency: elective    Date/Time: 5/9/2022 9:15 AM  End Time:5/9/2022 9:18 AM  Airway not difficult    General Information and Staff    Patient location during procedure: OR  Anesthesiologist: Cristóbal Zarate MD    Indications and Patient Condition  Indications for airway management: airway protection    Preoxygenated: yes  MILS not maintained throughout  Mask difficulty assessment: 2 - vent by mask + OA or adjuvant +/- NMBA    Final Airway Details  Final airway type: endotracheal airway      Successful airway: ETT  Cuffed: yes   Successful intubation technique: direct laryngoscopy  Endotracheal tube insertion site: oral  Blade: Charla  Blade size: 3  ETT size (mm): 7.0  Cormack-Lehane Classification: grade IIa - partial view of glottis  Placement verified by: chest auscultation and capnometry   Cuff volume (mL): 10  Measured from: teeth  ETT/EBT  to teeth (cm): 19  Number of attempts at approach: 1  Assessment: lips, teeth, and gum same as pre-op and atraumatic intubation    Additional Comments  Smooth IV induction, eyes taped, EZ mask with OAW, DL, ETT placed/secured, ETCO2>20x6.

## 2022-05-09 NOTE — ANESTHESIA PREPROCEDURE EVALUATION
Anesthesia Evaluation     Patient summary reviewed and Nursing notes reviewed   no history of anesthetic complications:  NPO Solid Status: > 8 hours  NPO Liquid Status: > 2 hours           Airway   Mallampati: III  TM distance: >3 FB  Neck ROM: full  Possible difficult intubation and Large neck circumference  Dental - normal exam     Pulmonary - normal exam   (+) pneumonia resolved , a smoker Former, COPD mild, asthma,sleep apnea on CPAP,   (-) lung cancer  Cardiovascular - normal exam  Exercise tolerance: good (4-7 METS)    ECG reviewed  PT is on anticoagulation therapy  Patient on routine beta blocker and Beta blocker given within 24 hours of surgery  Rhythm: regular  Rate: normal    (+) hypertension well controlled 2 medications or greater, past MI  >12 months, CAD, cardiac stents more than 12 months ago CHF , hyperlipidemia,     ROS comment: TTE 12/2021:  ·Estimated right ventricular systolic pressure from tricuspid regurgitation is normal (<35 mmHg).  ·Estimated left ventricular EF = 65% Left ventricular systolic function is normal.  ·Left ventricular diastolic function was normal.  ·Saline test results are negative.        Neuro/Psych  (+) TIA, CVA residual symptoms, headaches, dizziness/light headedness, syncope, numbness, psychiatric history,    (-) seizures  GI/Hepatic/Renal/Endo    (+) obesity,  GERD well controlled,  hepatitis, liver disease, renal disease stones and CRI, diabetes mellitus type 2 well controlled using insulin,     Musculoskeletal     (+) myalgias,   Abdominal   (+) obese,     Abdomen: soft.   Substance History - negative use     OB/GYN negative ob/gyn ROS         Other - negative ROS                       Anesthesia Plan    ASA 3     general     intravenous induction     Anesthetic plan, all risks, benefits, and alternatives have been provided, discussed and informed consent has been obtained with: patient and spouse/significant other.        CODE STATUS:

## 2022-05-09 NOTE — ANESTHESIA POSTPROCEDURE EVALUATION
"Patient: Dalila Javed    Procedure Summary     Date: 05/09/22 Room / Location: [facility] INTERVENTIONAL    Anesthesia Start: 0910 Anesthesia Stop: 1024    Procedure: IR KYPHOPLASTY LUMBAR Diagnosis:       Compression fracture of L1 vertebra with delayed healing      Age-related osteoporosis with current pathological fracture, vertebra(e), initial encounter for fracture (HCC)      (VCF)    Scheduled Providers:  Provider: Cristóbal Zarate MD    Anesthesia Type: general ASA Status: 3          Anesthesia Type: general    Vitals  Vitals Value Taken Time   /51 05/09/22 1201   Temp     Pulse 89 05/09/22 1207   Resp     SpO2 91 % 05/09/22 1206   Vitals shown include unvalidated device data.        Post Anesthesia Care and Evaluation    Patient location during evaluation: bedside  Patient participation: complete - patient participated  Level of consciousness: awake and alert  Pain management: adequate  Airway patency: patent  Anesthetic complications: No anesthetic complications  PONV Status: none  Cardiovascular status: acceptable  Respiratory status: acceptable  Hydration status: acceptable    Comments: /59   Pulse 88   Temp 36.8 °C (98.2 °F) (Oral)   Resp 18   Ht 152.4 cm (60\")   Wt 90.3 kg (199 lb)   SpO2 94%   BMI 38.86 kg/m²         "

## 2022-05-10 LAB
LAB AP CASE REPORT: NORMAL
LAB AP SPECIAL STAINS: NORMAL
PATH REPORT.FINAL DX SPEC: NORMAL
PATH REPORT.GROSS SPEC: NORMAL

## 2022-05-13 ENCOUNTER — OFFICE VISIT (OUTPATIENT)
Dept: FAMILY MEDICINE CLINIC | Facility: CLINIC | Age: 72
End: 2022-05-13

## 2022-05-13 VITALS
BODY MASS INDEX: 38.48 KG/M2 | OXYGEN SATURATION: 94 % | DIASTOLIC BLOOD PRESSURE: 74 MMHG | TEMPERATURE: 96.9 F | WEIGHT: 196 LBS | HEIGHT: 60 IN | HEART RATE: 71 BPM | SYSTOLIC BLOOD PRESSURE: 126 MMHG

## 2022-05-13 DIAGNOSIS — Z86.73 HISTORY OF STROKE: ICD-10-CM

## 2022-05-13 DIAGNOSIS — E66.01 MORBIDLY OBESE: ICD-10-CM

## 2022-05-13 DIAGNOSIS — E11.649 HYPOGLYCEMIA ASSOCIATED WITH TYPE 2 DIABETES MELLITUS: ICD-10-CM

## 2022-05-13 DIAGNOSIS — M80.08XG PATHOLOGICAL FRACTURE OF VERTEBRA DUE TO AGE-RELATED OSTEOPOROSIS WITH DELAYED HEALING, SUBSEQUENT ENCOUNTER: Primary | ICD-10-CM

## 2022-05-13 PROCEDURE — 99214 OFFICE O/P EST MOD 30 MIN: CPT | Performed by: NURSE PRACTITIONER

## 2022-05-13 NOTE — PROGRESS NOTES
"Subjective   Dalila Javed is a 71 y.o. female. Here for post surgery follow up.     History of Present Illness   Hard to sit or stand up after back surgery. Having incisional pain and pain inside  Eating 3 small meals daily. Diabetes ranging 157, 127 this morning, highest 207 14 units insulin typically.  Occasionally 2 hours after dinner 137, then 100  Having weight gain, wants to address.  The following portions of the patient's history were reviewed and updated as appropriate: allergies, current medications, past family history, past medical history, past social history, past surgical history and problem list.    Review of Systems   Constitutional: Positive for activity change, fatigue and unexpected weight gain. Negative for chills, diaphoresis and fever.   Gastrointestinal: Negative for constipation (no bowel or bladder changes) and diarrhea.   Endocrine: Negative.    Genitourinary: Negative for urinary incontinence.   Musculoskeletal: Positive for arthralgias and back pain. Negative for gait problem.   Skin: Negative.    Neurological: Negative for weakness, numbness and headache.   Psychiatric/Behavioral: Positive for sleep disturbance, depressed mood and stress. The patient is nervous/anxious.      /74   Pulse 71   Temp 96.9 °F (36.1 °C) (Infrared)   Ht 152.4 cm (60\")   Wt 88.9 kg (196 lb)   SpO2 94%   BMI 38.28 kg/m²     Objective   Physical Exam  Constitutional:       Appearance: She is well-developed. She is not diaphoretic.   HENT:      Head: Normocephalic and atraumatic.   Neck:      Thyroid: No thyromegaly.   Cardiovascular:      Rate and Rhythm: Normal rate and regular rhythm.      Pulses:           Carotid pulses are 2+ on the right side and 2+ on the left side.     Heart sounds: Normal heart sounds.   Pulmonary:      Effort: Pulmonary effort is normal.      Breath sounds: Normal breath sounds.   Musculoskeletal:      Cervical back: Normal range of motion and neck supple.      " Thoracic back: Tenderness present.      Comments: Thoracic incisions dry and intact. No erythema or induration   Lymphadenopathy:      Cervical: No cervical adenopathy.   Psychiatric:         Behavior: Behavior normal.         Thought Content: Thought content normal.         Judgment: Judgment normal.           Assessment & Plan   Problems Addressed this Visit        Endocrine and Metabolic    Morbidly obese (HCC)       Musculoskeletal and Injuries    Pathological fracture of vertebra due to osteoporosis with delayed healing - Primary       Neuro    History of stroke      Other Visit Diagnoses     Hypoglycemia associated with type 2 diabetes mellitus ([facility])          Diagnoses       Codes Comments    Pathological fracture of vertebra due to age-related osteoporosis with delayed healing, subsequent encounter    -  Primary ICD-10-CM: M80.08XG  ICD-9-CM: V54.27, 733.01     History of stroke     ICD-10-CM: Z86.73  ICD-9-CM: V12.54     Morbidly obese ([facility])     ICD-10-CM: E66.01  ICD-9-CM: 278.01     Hypoglycemia associated with type 2 diabetes mellitus ([facility])     ICD-10-CM: E11.649  ICD-9-CM: 250.80         Thoracic pain--await full healing after kyphoplasty.  Stroke Hx--tight goals--BP, eliquis, cholesterol control  Obesity--Does not want to decrease abilify as helped. And does not want gabapentin changed. Tight glucose control and diet control recommended. Has had hypoglycemia. Reinforce teaching for management. Would increase protein intake for weight loss. Sugar is well controlled. To continue testing 3-5 x daily secondary to hypoglycemia    GINA Report:      As part of this patient's treatment plan, I am prescribing controlled substances. The patient has been made aware of appropriate use of such medications, including potential risk of somnolence, limited ability to drive and /or work safely, and potential for dependence or overdose. It has also been made clear that these medications are for use by this patient  only, without concomitant use of alcohol or other substances unless prescribed.    Patient has completed prescribing agreement detailing terms of continued prescribing of controlled substances, including monitoring GINA reports, urine drug screening, and pill counts if necessary. The patient is aware that inappropriate use will result in cessation of prescribing such medications.    GINA report has been reviewed by: RL Dia on 06/22/22.  The report was not scanned into the patient's chart.    Date of last GINA:  5/21/2022    History and physical exam exhibit continued safe and appropriate use of controlled substances. Gabapentin change noted by patient is documented. Under management of Dr. Velazco. With stroke Hx especially, close observation for side effects.     This document is intended for medical expert use only. Reading of this document by patients and/or patient's family without participating medical staff guidance may result in misinterpretation and unintended morbidity.  Any interpretation of such data is the responsibility of the patient and/or family member responsible for the patient in concert with their primary or specialist providers, not to be left for sources of online searches such as [employer], [employer] or similar queries. Relying on these approaches to knowledge may result in misinterpretation, misguided goals of care and even death should patients or family members try recommendations outside of the realm of professional medical care in a supervised way.    Please allow 3-5 business days for recommendations based on new results    Go to the ER for any possible lifethreatening symptoms such as chest pain or shortness of air.

## 2022-05-19 ENCOUNTER — OFFICE VISIT (OUTPATIENT)
Dept: NEUROSURGERY | Facility: CLINIC | Age: 72
End: 2022-05-19

## 2022-05-19 ENCOUNTER — TELEPHONE (OUTPATIENT)
Dept: FAMILY MEDICINE CLINIC | Facility: CLINIC | Age: 72
End: 2022-05-19

## 2022-05-19 VITALS
BODY MASS INDEX: 38.48 KG/M2 | SYSTOLIC BLOOD PRESSURE: 106 MMHG | HEART RATE: 68 BPM | TEMPERATURE: 97.1 F | OXYGEN SATURATION: 96 % | RESPIRATION RATE: 16 BRPM | HEIGHT: 60 IN | DIASTOLIC BLOOD PRESSURE: 70 MMHG | WEIGHT: 196 LBS

## 2022-05-19 DIAGNOSIS — M54.50 CHRONIC BILATERAL LOW BACK PAIN WITHOUT SCIATICA: Primary | ICD-10-CM

## 2022-05-19 DIAGNOSIS — S32.010G COMPRESSION FRACTURE OF L1 VERTEBRA WITH DELAYED HEALING: ICD-10-CM

## 2022-05-19 DIAGNOSIS — G89.29 CHRONIC BILATERAL LOW BACK PAIN WITHOUT SCIATICA: Primary | ICD-10-CM

## 2022-05-19 DIAGNOSIS — M80.08XG PATHOLOGICAL FRACTURE OF VERTEBRA DUE TO AGE-RELATED OSTEOPOROSIS WITH DELAYED HEALING, SUBSEQUENT ENCOUNTER: Primary | ICD-10-CM

## 2022-05-19 DIAGNOSIS — I10 HYPERTENSION, UNSPECIFIED TYPE: ICD-10-CM

## 2022-05-19 DIAGNOSIS — E78.00 HYPERCHOLESTEROLEMIA: ICD-10-CM

## 2022-05-19 PROCEDURE — 99024 POSTOP FOLLOW-UP VISIT: CPT | Performed by: RADIOLOGY

## 2022-05-19 RX ORDER — DEXTROMETHORPHAN HYDROBROMIDE AND PROMETHAZINE HYDROCHLORIDE 15; 6.25 MG/5ML; MG/5ML
5 SYRUP ORAL 4 TIMES DAILY PRN
Qty: 180 ML | Refills: 0 | Status: ON HOLD | OUTPATIENT
Start: 2022-05-19 | End: 2022-07-15

## 2022-05-19 RX ORDER — DOXYCYCLINE HYCLATE 100 MG/1
100 CAPSULE ORAL 2 TIMES DAILY
Qty: 20 CAPSULE | Refills: 0 | Status: ON HOLD | OUTPATIENT
Start: 2022-05-19 | End: 2022-07-15

## 2022-05-19 NOTE — TELEPHONE ENCOUNTER
PATIENT IS HAVING ISSUES WITH COUGH AND CONGESTION, IT HAS BEEN GOING ON FOR ALMOST 2 WEEKS. SHE WAS COVID TESTED A WEEK AGO AND IT WAS NEGATIVE. PLEASE ADVISE.

## 2022-05-19 NOTE — TELEPHONE ENCOUNTER
PATIENT STATES: THAT SHE HAS A COUGH AND WOULD LIKE A CALL BACK TO SEE WHAT SHE CAN DO OR IF SOMETHING CAN BE CALLED IN PLEASE ADVISE      PATIENT CAN BE REACHED ON:[phone]    PHARMACY [id number] - [place], [place] - [address] AT [facility] - [phone] [facility] [phone]   [phone]

## 2022-05-27 ENCOUNTER — PATIENT OUTREACH (OUTPATIENT)
Dept: CASE MANAGEMENT | Facility: OTHER | Age: 72
End: 2022-05-27

## 2022-05-27 ENCOUNTER — TELEPHONE (OUTPATIENT)
Dept: FAMILY MEDICINE CLINIC | Facility: CLINIC | Age: 72
End: 2022-05-27

## 2022-05-27 DIAGNOSIS — R05.9 COUGH: Primary | ICD-10-CM

## 2022-05-27 RX ORDER — BENZONATATE 100 MG/1
100 CAPSULE ORAL 3 TIMES DAILY PRN
Qty: 21 CAPSULE | Refills: 0 | Status: ON HOLD | OUTPATIENT
Start: 2022-05-27 | End: 2022-07-15

## 2022-05-27 NOTE — TELEPHONE ENCOUNTER
PATIENT WAS WANTING TO KNOW IF THERE WAS SOMETHING SHE COULD TAKE OR HAVE, IT HAS BEEN ONGOING FOR 3 WEEKS, AND STATED AT ONE POINT SHE WAS COUGHING BLOOD BECAUSE OF THE AMOUNT OF COUGHING. PLEASE ADVISE.

## 2022-05-27 NOTE — OUTREACH NOTE
Patient Outreach    AMBULATORY CASE MANAGEMENT NOTE    Name and Relationship of Patient/Support Person: Dalila Javed L - Self    Call placed to patient who states she is doing well.     Education Documentation  pain management, taught by Yvette Pugh, RN at 5/27/2022  1:42 PM.  Learner: Patient  Readiness: Acceptance  Method: Explanation  Response: Verbalizes Understanding    Provider Follow-Up, taught by Yvette Pugh, RN at 5/27/2022  1:42 PM.  Learner: Patient  Readiness: Acceptance  Method: Explanation  Response: Verbalizes Understanding    Activity, taught by Yvette Pugh, RN at 5/27/2022  1:42 PM.  Learner: Patient  Readiness: Acceptance  Method: Explanation  Response: Verbalizes Understanding    Signs/Symptoms, taught by Yvette Pugh, RN at 5/27/2022  1:42 PM.  Learner: Patient  Readiness: Acceptance  Method: Explanation  Response: Verbalizes Understanding          YVETTE MATAMOROS  Ambulatory Case Management    5/27/2022, 13:42 EDT

## 2022-06-02 ENCOUNTER — TELEPHONE (OUTPATIENT)
Dept: NEUROSURGERY | Facility: CLINIC | Age: 72
End: 2022-06-02

## 2022-06-02 NOTE — TELEPHONE ENCOUNTER
Caller: CHINO    Relationship: NINO QUIGLEY PHYSICAL THERAPIST     Best call back number: [phone]    What form or medical record are you requesting: LAST OV NOTE 5/19/2022 , SURGERY NOTE 08/19/2020  AND PROTOCOL TO FOLLOW    Who is requesting this form or medical record from you: PHYSICAL THERAPIST     How would you like to receive the form or medical records (pick-up, mail, fax): FAX  If fax, what is the fax number: [phone]    Timeframe paperwork needed: ASAP    Additional notes:  CHINO CALLED FROM [facility] REQUESTING THE SURGERY AND OV NOTES FOR PATIENT. ALONG WITH THE PROTOCOL TO FOLLOW . PLEASE FAX     THANK YOU

## 2022-06-07 DIAGNOSIS — R60.0 LOWER EXTREMITY EDEMA: ICD-10-CM

## 2022-06-07 RX ORDER — POTASSIUM CHLORIDE 600 MG/1
TABLET, FILM COATED, EXTENDED RELEASE ORAL
Qty: 180 TABLET | Refills: 1 | Status: SHIPPED | OUTPATIENT
Start: 2022-06-07

## 2022-06-07 RX ORDER — FUROSEMIDE 20 MG/1
TABLET ORAL
Qty: 270 TABLET | Refills: 1 | Status: SHIPPED | OUTPATIENT
Start: 2022-06-07

## 2022-06-10 ENCOUNTER — TELEPHONE (OUTPATIENT)
Dept: FAMILY MEDICINE CLINIC | Facility: CLINIC | Age: 72
End: 2022-06-10

## 2022-06-16 RX ORDER — BUDESONIDE AND FORMOTEROL FUMARATE DIHYDRATE 160; 4.5 UG/1; UG/1
AEROSOL RESPIRATORY (INHALATION)
Qty: 10.2 G | Refills: 2 | Status: SHIPPED | OUTPATIENT
Start: 2022-06-16

## 2022-06-21 DIAGNOSIS — E11.69 TYPE 2 DIABETES MELLITUS WITH OTHER SPECIFIED COMPLICATION, WITH LONG-TERM CURRENT USE OF INSULIN: ICD-10-CM

## 2022-06-21 DIAGNOSIS — E11.649 HYPOGLYCEMIA ASSOCIATED WITH TYPE 2 DIABETES MELLITUS: ICD-10-CM

## 2022-06-21 DIAGNOSIS — Z79.4 TYPE 2 DIABETES MELLITUS WITH OTHER SPECIFIED COMPLICATION, WITH LONG-TERM CURRENT USE OF INSULIN: ICD-10-CM

## 2022-06-21 RX ORDER — BLOOD-GLUCOSE METER
KIT MISCELLANEOUS
Qty: 450 EACH | Refills: 0 | Status: SHIPPED | OUTPATIENT
Start: 2022-06-21 | End: 2022-06-21 | Stop reason: SDUPTHER

## 2022-06-21 RX ORDER — BLOOD-GLUCOSE METER
KIT MISCELLANEOUS
Qty: 450 EACH | Refills: 0 | Status: SHIPPED | OUTPATIENT
Start: 2022-06-21

## 2022-06-21 NOTE — TELEPHONE ENCOUNTER
[facility]

## 2022-06-22 RX ORDER — CALCIUM CARB/VITAMIN D3/VIT K1 500-100-40
TABLET,CHEWABLE ORAL
Qty: 180 EACH | Refills: 0 | Status: SHIPPED | OUTPATIENT
Start: 2022-06-22

## 2022-06-28 ENCOUNTER — PATIENT OUTREACH (OUTPATIENT)
Dept: CASE MANAGEMENT | Facility: OTHER | Age: 72
End: 2022-06-28

## 2022-06-28 NOTE — OUTREACH NOTE
Patient Outreach    AMBULATORY CASE MANAGEMENT NOTE    Name and Relationship of Patient/Support Person: Dalila Javed L - Self    Monthly call to patient who states she is doing better. She is going to out patient PT and will be discharged soon. Disappointed that she is not feeling stronger. Reminded patient how far she has come in the past few months. Encouraged to try to increase in home activity every few days to increase endurance.   She is concerned about her weigh and difficulty getting this managed. She feels this is probably related to her decreased activity. Discussed usual diet and encouraged her to manage h her carbohydrates as instructed in diabetic class. She would like a refresher. ACM offered to send message to PCP for upcoming appointment.  She states her  does most of the cooking. Encouraged her to ask him to stay for meeting as well.   She states her blood sugar is doing well. She states ii is not where she would like this. Suggested she take her glucose more frequently to help her manage more effectively. Reinforced need to take to PCP appointment as well. Offered to send her information via my chart. She would like this sent to her email as well. Suggested some of the diabetic apps that may help her as well.     Education Documentation  Benefits, taught by Yvette Pugh, RN at 6/28/2022  2:29 PM.  Learner: Patient  Readiness: Acceptance  Method: Explanation, Teach Back  Response: Needs Reinforcement    Carbohydrate Counting, taught by Yvette Pugh, RN at 6/28/2022  2:29 PM.  Learner: Patient  Readiness: Acceptance  Method: Explanation, Teach Back  Response: Needs Reinforcement    Monitoring Carbohydrate Intake, taught by Yvette Pugh, RN at 6/28/2022  2:29 PM.  Learner: Patient  Readiness: Acceptance  Method: Explanation, Teach Back  Response: Needs Reinforcement    Carbohydrate-Containing Foods, taught by Yvette Pugh, RN at 6/28/2022  2:29 PM.  Learner:  Patient  Readiness: Acceptance  Method: Explanation, Teach Back  Response: Needs Reinforcement    Blood Glucose Monitoring, taught by Yvette Pugh, RN at 6/28/2022  2:29 PM.  Learner: Patient  Readiness: Acceptance  Method: Explanation, Teach Back  Response: Needs Reinforcement    Frequency of Testing, taught by Yvette Pugh, RN at 6/28/2022  2:29 PM.  Learner: Patient  Readiness: Acceptance  Method: Explanation, Teach Back  Response: Needs Reinforcement    Diabetes, Type 2, taught by Yvette Pugh, RN at 6/28/2022  2:29 PM.  Learner: Patient  Readiness: Acceptance  Method: Explanation, Teach Back  Response: Needs Reinforcement          YVETTE MATAMOROS  Ambulatory Case Management    6/28/2022, 14:29 EDT

## 2022-06-30 ENCOUNTER — APPOINTMENT (OUTPATIENT)
Dept: GENERAL RADIOLOGY | Facility: HOSPITAL | Age: 72
End: 2022-06-30

## 2022-06-30 ENCOUNTER — HOSPITAL ENCOUNTER (EMERGENCY)
Facility: HOSPITAL | Age: 72
Discharge: HOME | End: 2022-06-30
Attending: EMERGENCY MEDICINE | Admitting: EMERGENCY MEDICINE

## 2022-06-30 VITALS
TEMPERATURE: 98.8 F | SYSTOLIC BLOOD PRESSURE: 143 MMHG | OXYGEN SATURATION: 98 % | HEART RATE: 64 BPM | RESPIRATION RATE: 20 BRPM | DIASTOLIC BLOOD PRESSURE: 72 MMHG

## 2022-06-30 DIAGNOSIS — M79.601 RIGHT ARM PAIN: ICD-10-CM
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
## 2022-06-30 DIAGNOSIS — S42.291A OTHER CLOSED DISPLACED FRACTURE OF PROXIMAL END OF RIGHT HUMERUS, INITIAL ENCOUNTER: ICD-10-CM

## 2022-06-30 PROCEDURE — 25010000002 HYDROMORPHONE PER 4 MG: Performed by: NURSE PRACTITIONER

## 2022-06-30 PROCEDURE — 73060 X-RAY EXAM OF HUMERUS: CPT

## 2022-06-30 PROCEDURE — 73030 X-RAY EXAM OF SHOULDER: CPT

## 2022-06-30 PROCEDURE — 96372 THER/PROPH/DIAG INJ SC/IM: CPT

## 2022-06-30 PROCEDURE — 99283 EMERGENCY DEPT VISIT LOW MDM: CPT

## 2022-06-30 RX ORDER — HYDROCODONE BITARTRATE AND ACETAMINOPHEN 5; 325 MG/1; MG/1
1 TABLET ORAL ONCE
Status: COMPLETED | OUTPATIENT
Start: 2022-06-30 | End: 2022-06-30

## 2022-06-30 RX ORDER — HYDROMORPHONE HYDROCHLORIDE 1 MG/ML
0.5 INJECTION, SOLUTION INTRAMUSCULAR; INTRAVENOUS; SUBCUTANEOUS ONCE
Status: COMPLETED | OUTPATIENT
Start: 2022-06-30 | End: 2022-06-30

## 2022-06-30 RX ADMIN — HYDROCODONE BITARTRATE AND ACETAMINOPHEN 1 TABLET: 5; 325 TABLET ORAL at 18:02

## 2022-06-30 RX ADMIN — HYDROMORPHONE HYDROCHLORIDE 0.5 MG: 1 INJECTION, SOLUTION INTRAMUSCULAR; INTRAVENOUS; SUBCUTANEOUS at 18:55

## 2022-07-02 ENCOUNTER — HOSPITAL ENCOUNTER (EMERGENCY)
Facility: HOSPITAL | Age: 72
Discharge: HOME | End: 2022-07-02
Attending: EMERGENCY MEDICINE | Admitting: EMERGENCY MEDICINE

## 2022-07-02 ENCOUNTER — APPOINTMENT (OUTPATIENT)
Dept: GENERAL RADIOLOGY | Facility: HOSPITAL | Age: 72
End: 2022-07-02

## 2022-07-02 VITALS
SYSTOLIC BLOOD PRESSURE: 135 MMHG | RESPIRATION RATE: 18 BRPM | TEMPERATURE: 97.4 F | HEART RATE: 85 BPM | OXYGEN SATURATION: 96 % | DIASTOLIC BLOOD PRESSURE: 74 MMHG

## 2022-07-02 DIAGNOSIS — R52 INTRACTABLE PAIN: ICD-10-CM

## 2022-07-02 DIAGNOSIS — S42.201A CLOSED FRACTURE OF PROXIMAL END OF RIGHT HUMERUS, UNSPECIFIED FRACTURE MORPHOLOGY, INITIAL ENCOUNTER: Primary | ICD-10-CM

## 2022-07-02 PROCEDURE — 99283 EMERGENCY DEPT VISIT LOW MDM: CPT

## 2022-07-02 PROCEDURE — 73060 X-RAY EXAM OF HUMERUS: CPT

## 2022-07-02 PROCEDURE — 96372 THER/PROPH/DIAG INJ SC/IM: CPT

## 2022-07-02 PROCEDURE — 25010000002 HYDROMORPHONE 1 MG/ML SOLUTION: Performed by: EMERGENCY MEDICINE

## 2022-07-02 RX ORDER — HYDROCODONE BITARTRATE AND ACETAMINOPHEN 7.5; 325 MG/1; MG/1
1 TABLET ORAL ONCE
Status: COMPLETED | OUTPATIENT
Start: 2022-07-02 | End: 2022-07-02

## 2022-07-02 RX ORDER — HYDROCODONE BITARTRATE AND ACETAMINOPHEN 5; 325 MG/1; MG/1
1-2 TABLET ORAL EVERY 6 HOURS PRN
Qty: 20 TABLET | Refills: 0 | Status: SHIPPED | OUTPATIENT
Start: 2022-07-02

## 2022-07-02 RX ADMIN — HYDROMORPHONE HYDROCHLORIDE 1 MG: 1 INJECTION, SOLUTION INTRAMUSCULAR; INTRAVENOUS; SUBCUTANEOUS at 18:19

## 2022-07-02 RX ADMIN — HYDROCODONE BITARTRATE AND ACETAMINOPHEN 1 TABLET: 7.5; 325 TABLET ORAL at 18:19

## 2022-07-02 NOTE — ED TRIAGE NOTES
Pt has known rt humerus fx dx on 6/30/22 states pain has increased. Pt took norco PTA states not helping. Pt has sling on. Can not get into orthopedic until next week.

## 2022-07-03 NOTE — ED PROVIDER NOTES
EMERGENCY DEPARTMENT ENCOUNTER    Room Number:  B04/04  Date seen:  7/2/2022  PCP: Cinthya Valentine APRN  Historian: Patient      HPI:  Chief Complaint: Right shoulder pain  A complete HPI/ROS/PMH/PSH/SH/FH are unobtainable due to: Nothing  Context: Dalila Javed is a 71 y.o. female who presents to the ED c/o acute pain in her right shoulder.  She reports that she recently fell and fractured her right shoulder.  She was prescribed some hydrocodone for this pain but she reports that that has not been controlling her pain.  She reports that she does have hydrocodone prescribed to her at home and she oftentimes will only take this just 3 times a day but sometimes requires it 4 times a day.  She denies recurrent fall.  She denies fever or chills.  She has had a prior stroke affecting her right side.  She reports that she has been having some spasms of pain in her right arm.            PAST MEDICAL HISTORY  Active Ambulatory Problems     Diagnosis Date Noted   • Adjustment disorder with mixed anxiety and depressed mood 03/08/2016   • Atopic rhinitis 03/08/2016   • Coronary arteriosclerosis in native artery 03/08/2016   • Cobalamin deficiency 03/08/2016   • Benign colonic polyp 03/08/2016   • Lumbar radiculopathy 03/08/2016   • Controlled diabetes mellitus type 2 with complications ([facility]) 03/08/2016   • Binocular vision disorder with diplopia 03/08/2016   • Dyslipidemia 03/08/2016   • Arthropathy of hand 03/08/2016   • Knee pain 03/08/2016   • Cramps of lower extremity 03/08/2016   • Low back pain 03/08/2016   • Chronic nausea 03/08/2016   • Obstructive sleep apnea syndrome 03/08/2016   • Palpitations 03/08/2016   • Ventricular premature beats 03/08/2016   • Cephalalgia 03/08/2016   • Colonic constipation 03/08/2016   • Neurocardiogenic syncope 03/08/2016   • Vitamin D deficiency 03/08/2016   • DODSON (nonalcoholic steatohepatitis) 04/01/2016   • Fibromyalgia 03/26/2013   • Morbidly obese (HCC) 12/10/2018   •  Polyp of colon 11/01/2019   • Family history of colonic polyps 11/01/2019   • Family history of malignant neoplasm of colon 11/01/2019   • Acute CVA (cerebrovascular accident) ([facility]) 08/19/2020   • Episode of dizziness 09/30/2020   • Dizziness 09/30/2020   • History of stroke 09/30/2020   • Chronic right shoulder pain 04/30/2021   • Encephalopathy, metabolic 04/30/2021   • Migraine without aura or status migrainosus 04/30/2021   • Parkinson disease ([facility]) 04/30/2021   • Hypokalemia 05/04/2021   • Benign paroxysmal positional vertigo 11/02/2017   • Chronic obstructive pulmonary disease ([facility]) 05/09/2017   • Gastroesophageal reflux disease 09/24/2015   • History of percutaneous transluminal coronary angioplasty 10/18/2016   • Hypercholesterolemia 09/24/2015   • Hypertensive disorder 09/24/2015   • Myocardial infarction ([facility]) 05/09/2017   • Occipital neuralgia 09/23/2015   • Patent foramen ovale 10/19/2020   • Transient cerebral ischemia 11/02/2017   • Arm pain, anterior, right 08/04/2021   • TIA (transient ischemic attack) 12/27/2021   • Pathological fracture of vertebra due to osteoporosis with delayed healing 05/03/2022   • Compression fracture of L1 vertebra with delayed healing 05/03/2022     Resolved Ambulatory Problems     Diagnosis Date Noted   • Flank pain 03/08/2016   • Abnormal blood sugar 03/08/2016   • Abnormal weight gain 03/08/2016   • Acquired trigger finger 03/08/2016   • Acute bronchitis 03/08/2016   • Atypical chest pain 03/08/2016   • History of Williamson's esophagus 03/08/2016   • CAP (community acquired pneumonia) 03/08/2016   • Candidiasis of skin 03/08/2016   • Diabetic peripheral neuropathy ([facility]) 03/08/2016   • Breathing difficult 03/08/2016   • Dizziness 03/08/2016   • Hematuria 03/08/2016   • Migraine with typical aura 03/08/2016   • Muscle ache 03/08/2016   • MAC (mycobacterium avium-intracellulare complex) 03/08/2016   • Night sweats 03/08/2016   • Otitis externa 03/08/2016   • Abscess, perianal  2016   • Skin tag 2016   • Upper respiratory tract infection 2016   • Urinary hesitancy 2016   • Asthmatic breathing 2016   • Preoperative clearance 2016   • Fever 2021   • Acute UTI (urinary tract infection) 2021     Past Medical History:   Diagnosis Date   • Acute myocardial infarction (HCC)    • Adjustment reaction with mixed emotional features    • Arthritis    • ASHD (arteriosclerotic heart disease)    • Asthma    • B12 deficiency    • Bacterial pneumonia    • Williamson esophagus    • Barretts esophagus    • Bilateral knee pain    • Birthmark    • Bronchiectasis (HCC)    • CHF (congestive heart failure) (HCC)    • Chronic cough    • Chronic glomerulonephritis with exudative nephritis    • Chronic lumbar radiculopathy    • Diabetes mellitus (HCC)    • Fibromyositis    • GERD (gastroesophageal reflux disease)    • Herpes zoster    • Hyperlipidemia    • Hypertension    • Lupus anticoagulant disorder (HCC)    • Mycobacterium avium complex (HCC)    • Nephrolithiasis    • SILVIANO (obstructive sleep apnea)    • Premature ventricular contraction    • Slow transit constipation    • Stroke (HCC)          PAST SURGICAL HISTORY  Past Surgical History:   Procedure Laterality Date   • APPENDECTOMY     • BRONCHOSCOPY     •  SECTION     • CHOLECYSTECTOMY     • COLONOSCOPY     • COLONOSCOPY N/A 2019    Procedure: COLONOSCOPY to cecum with cold biopsy and cold and hotsnare polypectomies;  Surgeon: Suzy Eubanks MD;  Location: [facility] ENDOSCOPY;  Service: Gastroenterology   • CORONARY ANGIOPLASTY WITH STENT PLACEMENT     • EMBOLECTOMY N/A 2020    Procedure: EMERGENT CEREBRAL ANGIOGRAM;  Surgeon: Jonh Meehan MD;  Location: [facility] OR ;  Service: Neurosurgery;  Laterality: N/A;   • ENDOSCOPY N/A 2019    Procedure: ESOPHAGOGASTRODUODENOSCOPY with cold biopsies;  Surgeon: Suzy Eubanks MD;  Location: [facility] ENDOSCOPY;  Service:  Gastroenterology   • KNEE ARTHROSCOPY     • OTHER SURGICAL HISTORY      elbow surgery   • ROTATOR CUFF REPAIR     • TUBAL ABDOMINAL LIGATION           FAMILY HISTORY  Family History   Problem Relation Age of Onset   • Colon cancer Mother    • Uterine cancer Mother    • Arthritis Mother    • Cancer Mother    • Heart disease Mother    • Migraines Mother    • Stroke Mother    • Cancer Father         malignant brain tumor   • Aneurysm Father    • Bone cancer Maternal Aunt    • Diabetes Paternal Grandmother    • Diabetes Paternal Grandfather    • Arthritis Maternal Grandmother    • Heart disease Maternal Grandmother    • Thyroid disease Maternal Grandmother          SOCIAL HISTORY  Social History     Socioeconomic History   • Marital status:    Tobacco Use   • Smoking status: Former Smoker     Quit date: 1980     Years since quittin.5   • Smokeless tobacco: Never Used   Vaping Use   • Vaping Use: Never used   Substance and Sexual Activity   • Alcohol use: No   • Drug use: No   • Sexual activity: Yes     Partners: Male         ALLERGIES  Duloxetine hcl, Viibryd [vilazodone hcl], Metformin, Morphine and related, Nsaids, Oxycodone, Statins, Benadryl [diphenhydramine], Carafate [sucralfate], and Penicillins        REVIEW OF SYSTEMS  Review of Systems   Review of all 14 systems is negative other than stated in the HPI above.      PHYSICAL EXAM  ED Triage Vitals [22 1557]   Temp Heart Rate Resp BP SpO2   97.4 °F (36.3 °C) 81 18 154/65 96 %      Temp src Heart Rate Source Patient Position BP Location FiO2 (%)   -- -- -- -- --         GENERAL: Awake and alert, no acute distress  HENT: nares patent  EYES: no scleral icterus, EOMI  CV: regular rhythm, normal rate  RESPIRATORY: normal effort, nasal cannula oxygen in place, minimal wheezing noted  ABDOMEN: soft, nondistended  MUSCULOSKELETAL: no deformity.  Right upper extremity is immobilized in a sling.  2+ right radial pulse.  Right forearm compartments are  soft and compressible.    NEURO: alert, moves all extremities, follows commands, normal sensation to light touch throughout the right upper extremity  PSYCH:  calm, cooperative  SKIN: warm, dry    Vital signs and nursing notes reviewed.          LAB RESULTS  No results found for this or any previous visit (from the past 24 hour(s)).    Ordered the above labs and reviewed the results.        RADIOLOGY  XR Humerus Right    Result Date: 7/2/2022  PROCEDURE:  XR HUMERUS RIGHT-  HISTORY: Worsening pain, known humeral fracture.  COMPARISON: Right humerus radiographs 06/30/2022  FINDINGS:   3 views of the right humerus were obtained. Evaluation is limited by poor penetration due to patient body habitus. There is redemonstration of a comminuted displaced right humeral head fracture, which is grossly similar to 06/30/2022 when accounting for differences in patient positioning.  This report was finalized on 7/2/2022 6:02 PM by Dr. Elin Gamboa M.D.        Ordered the above noted radiological studies. Reviewed by me in PACS.            PROCEDURES  Procedures              MEDICATIONS GIVEN IN ER  Medications   HYDROmorphone (DILAUDID) injection 1 mg (1 mg Intramuscular Given 7/2/22 1819)   HYDROcodone-acetaminophen (NORCO) 7.5-325 MG per tablet 1 tablet (1 tablet Oral Given 7/2/22 1819)                   MEDICAL DECISION MAKING, PROGRESS, and CONSULTS    All labs have been independently reviewed by me.  All radiology studies have been reviewed by me and discussed with radiologist dictating the report.   EKG's independently viewed and interpreted by me.  Discussion below represents my analysis of pertinent findings related to patient's condition, differential diagnosis, treatment plan and final disposition.      Differential diagnosis includes but is not limited to:  Acute pain exacerbation  Humerus dislocation        ED Course as of 07/02/22 2334   Sat Jul 02, 2022   1756 Plain films of the right shoulder again demonstrate a  proximal right humerus fracture, similar to the prior study from 6/30/2022. [JR]   1073 Patient reports that her pain has not been adequately controlled with the hydrocodone 5 mg tablet she was prescribed previously.  I have ordered hydromorphone 1 mg intramuscular to be administered here in the emergency department.  I will prescribe her additional hydrocodone 5 mg tablets with instructions to take 1 to 2 tablets every 6 hours as needed for pain.  She plans to follow-up with orthopedics early this week. [JR]      ED Course User Index  [JR] Donaldo Kraus MD              I wore an N95 mask, face shield, and gloves during this patient encounter.  Patient also wearing a surgical mask.  Hand hygeine performed before and after seeing the patient.    DIAGNOSIS  Final diagnoses:   Closed fracture of proximal end of right humerus, unspecified fracture morphology, initial encounter   Intractable pain         DISPOSITION  DISCHARGE    Patient discharged in stable condition.    Reviewed implications of results, diagnosis, meds, responsibility to follow up, warning signs and symptoms of possible worsening, potential complications and reasons to return to ER.    Patient/Family voiced understanding of above instructions.    Discussed plan for discharge, as there is no emergent indication for admission. Patient referred to primary care provider for BP management due to today's BP. Pt/family is agreeable and understands need for follow up and repeat testing.  Pt is aware that discharge does not mean that nothing is wrong but it indicates no emergency is present that requires admission and they must continue care with follow-up as given below or physician of their choice.     FOLLOW-UP  Cinthya Valentine, APRN  [address]  [facility] [address] [phone]    Schedule an appointment as soon as possible for a visit       Dk Mckeon II, MD  [address] [facility] [address]  [facility]  [id number] [phone]    Schedule an appointment as soon as possible for a visit            Medication List      New Prescriptions    HYDROcodone-acetaminophen 5-325 MG per tablet  Commonly known as: NORCO  Take 1-2 tablets by mouth Every 6 (Six) Hours As Needed for Moderate Pain .  Replaces: HYDROcodone-acetaminophen  MG per tablet        Changed    gabapentin 300 MG capsule  Commonly known as: NEURONTIN  Take 1 capsule by mouth At Night As Needed (sleep).  What changed: additional instructions        Stop    HYDROcodone-acetaminophen  MG per tablet  Commonly known as: NORCO  Replaced by: HYDROcodone-acetaminophen 5-325 MG per tablet           Where to Get Your Medications      These medications were sent to [id number] - [place], [place] - [address] AT [facility] & [facility] - [phone]  - [phone]   [address] [place] [address]    Phone: [phone]   · HYDROcodone-acetaminophen 5-325 MG per tablet                   Latest Documented Vital Signs:  As of 23:34 EDT  BP- 135/74 HR- 85 Temp- 97.4 °F (36.3 °C) O2 sat- 96%        --    Please note that portions of this were completed with a voice recognition program.            Donaldo Kraus MD  07/02/22 8733

## 2022-07-11 RX ORDER — ARIPIPRAZOLE 2 MG/1
2 TABLET ORAL EVERY EVENING
Qty: 90 TABLET | Refills: 3 | Status: SHIPPED | OUTPATIENT
Start: 2022-07-11

## 2022-07-12 ENCOUNTER — TRANSCRIBE ORDERS (OUTPATIENT)
Dept: ADMINISTRATIVE | Facility: HOSPITAL | Age: 72
End: 2022-07-12

## 2022-07-12 ENCOUNTER — PRE-ADMISSION TESTING (OUTPATIENT)
Dept: PREADMISSION TESTING | Facility: HOSPITAL | Age: 72
End: 2022-07-12

## 2022-07-12 VITALS
RESPIRATION RATE: 20 BRPM | SYSTOLIC BLOOD PRESSURE: 158 MMHG | BODY MASS INDEX: 35.3 KG/M2 | WEIGHT: 187 LBS | OXYGEN SATURATION: 93 % | DIASTOLIC BLOOD PRESSURE: 85 MMHG | TEMPERATURE: 99.1 F | HEART RATE: 80 BPM | HEIGHT: 61 IN

## 2022-07-12 DIAGNOSIS — Z01.818 OTHER SPECIFIED PRE-OPERATIVE EXAMINATION: Primary | ICD-10-CM

## 2022-07-12 LAB
ALBUMIN SERPL-MCNC: 3.9 G/DL (ref 3.5–5.2)
ALBUMIN/GLOB SERPL: 1.3 G/DL
ALP SERPL-CCNC: 69 U/L (ref 39–117)
ALT SERPL W P-5'-P-CCNC: 15 U/L (ref 1–33)
ANION GAP SERPL CALCULATED.3IONS-SCNC: 13.3 MMOL/L (ref 5–15)
APTT PPP: 26.5 SECONDS (ref 22.7–35.4)
AST SERPL-CCNC: 17 U/L (ref 1–32)
BACTERIA UR QL AUTO: NORMAL /HPF
BASOPHILS # BLD AUTO: 0.05 10*3/MM3 (ref 0–0.2)
BASOPHILS NFR BLD AUTO: 0.5 % (ref 0–1.5)
BILIRUB SERPL-MCNC: 0.3 MG/DL (ref 0–1.2)
BILIRUB UR QL STRIP: NEGATIVE
BUN SERPL-MCNC: 11 MG/DL (ref 8–23)
BUN/CREAT SERPL: 12.1 (ref 7–25)
CALCIUM SPEC-SCNC: 9.2 MG/DL (ref 8.6–10.5)
CHLORIDE SERPL-SCNC: 97 MMOL/L (ref 98–107)
CLARITY UR: CLEAR
CO2 SERPL-SCNC: 27.7 MMOL/L (ref 22–29)
COLOR UR: YELLOW
CREAT SERPL-MCNC: 0.91 MG/DL (ref 0.57–1)
DEPRECATED RDW RBC AUTO: 39.2 FL (ref 37–54)
EGFRCR SERPLBLD CKD-EPI 2021: 66.8 ML/MIN/1.73
EOSINOPHIL # BLD AUTO: 0.41 10*3/MM3 (ref 0–0.4)
EOSINOPHIL NFR BLD AUTO: 4 % (ref 0.3–6.2)
ERYTHROCYTE [DISTWIDTH] IN BLOOD BY AUTOMATED COUNT: 12.1 % (ref 12.3–15.4)
GLOBULIN UR ELPH-MCNC: 3 GM/DL
GLUCOSE SERPL-MCNC: 294 MG/DL (ref 65–99)
GLUCOSE UR STRIP-MCNC: NEGATIVE MG/DL
HCT VFR BLD AUTO: 39.6 % (ref 34–46.6)
HGB BLD-MCNC: 13.4 G/DL (ref 12–15.9)
HGB UR QL STRIP.AUTO: NEGATIVE
HYALINE CASTS UR QL AUTO: NORMAL /LPF
IMM GRANULOCYTES # BLD AUTO: 0.13 10*3/MM3 (ref 0–0.05)
IMM GRANULOCYTES NFR BLD AUTO: 1.3 % (ref 0–0.5)
INR PPP: 1.1 (ref 0.9–1.1)
KETONES UR QL STRIP: NEGATIVE
LEUKOCYTE ESTERASE UR QL STRIP.AUTO: ABNORMAL
LYMPHOCYTES # BLD AUTO: 2.89 10*3/MM3 (ref 0.7–3.1)
LYMPHOCYTES NFR BLD AUTO: 28.3 % (ref 19.6–45.3)
MCH RBC QN AUTO: 30.3 PG (ref 26.6–33)
MCHC RBC AUTO-ENTMCNC: 33.8 G/DL (ref 31.5–35.7)
MCV RBC AUTO: 89.6 FL (ref 79–97)
MONOCYTES # BLD AUTO: 0.65 10*3/MM3 (ref 0.1–0.9)
MONOCYTES NFR BLD AUTO: 6.4 % (ref 5–12)
NEUTROPHILS NFR BLD AUTO: 59.5 % (ref 42.7–76)
NEUTROPHILS NFR BLD AUTO: 6.07 10*3/MM3 (ref 1.7–7)
NITRITE UR QL STRIP: NEGATIVE
NRBC BLD AUTO-RTO: 0 /100 WBC (ref 0–0.2)
PH UR STRIP.AUTO: <=5 [PH] (ref 5–8)
PLATELET # BLD AUTO: 218 10*3/MM3 (ref 140–450)
PMV BLD AUTO: 10.2 FL (ref 6–12)
POTASSIUM SERPL-SCNC: 3.4 MMOL/L (ref 3.5–5.2)
PROT SERPL-MCNC: 6.9 G/DL (ref 6–8.5)
PROT UR QL STRIP: NEGATIVE
PROTHROMBIN TIME: 14.1 SECONDS (ref 11.7–14.2)
QT INTERVAL: 395 MS
RBC # BLD AUTO: 4.42 10*6/MM3 (ref 3.77–5.28)
RBC # UR STRIP: NORMAL /HPF
REF LAB TEST METHOD: NORMAL
SARS-COV-2 ORF1AB RESP QL NAA+PROBE: NOT DETECTED
SODIUM SERPL-SCNC: 138 MMOL/L (ref 136–145)
SP GR UR STRIP: 1.02 (ref 1–1.03)
SQUAMOUS #/AREA URNS HPF: NORMAL /HPF
UROBILINOGEN UR QL STRIP: ABNORMAL
WBC # UR STRIP: NORMAL /HPF
WBC NRBC COR # BLD: 10.2 10*3/MM3 (ref 3.4–10.8)

## 2022-07-12 PROCEDURE — 93010 ELECTROCARDIOGRAM REPORT: CPT | Performed by: INTERNAL MEDICINE

## 2022-07-12 PROCEDURE — C9803 HOPD COVID-19 SPEC COLLECT: HCPCS | Performed by: NURSE PRACTITIONER

## 2022-07-12 PROCEDURE — U0004 COV-19 TEST NON-CDC HGH THRU: HCPCS | Performed by: NURSE PRACTITIONER

## 2022-07-12 PROCEDURE — 85730 THROMBOPLASTIN TIME PARTIAL: CPT

## 2022-07-12 PROCEDURE — 81001 URINALYSIS AUTO W/SCOPE: CPT

## 2022-07-12 PROCEDURE — 36415 COLL VENOUS BLD VENIPUNCTURE: CPT

## 2022-07-12 PROCEDURE — 80053 COMPREHEN METABOLIC PANEL: CPT

## 2022-07-12 PROCEDURE — 93005 ELECTROCARDIOGRAM TRACING: CPT

## 2022-07-12 PROCEDURE — 85610 PROTHROMBIN TIME: CPT

## 2022-07-12 PROCEDURE — U0005 INFEC AGEN DETEC AMPLI PROBE: HCPCS | Performed by: NURSE PRACTITIONER

## 2022-07-12 PROCEDURE — 85025 COMPLETE CBC W/AUTO DIFF WBC: CPT

## 2022-07-12 RX ORDER — CHLORHEXIDINE GLUCONATE 500 MG/1
CLOTH TOPICAL
COMMUNITY

## 2022-07-12 RX ORDER — OXYCODONE HYDROCHLORIDE AND ACETAMINOPHEN 5; 325 MG/1; MG/1
1 TABLET ORAL EVERY 4 HOURS PRN
Status: ON HOLD | COMMUNITY
End: 2023-03-25

## 2022-07-12 RX ORDER — FUROSEMIDE 20 MG/1
20 TABLET ORAL
Status: ON HOLD | COMMUNITY
End: 2023-03-25

## 2022-07-12 NOTE — DISCHARGE INSTRUCTIONS
Take the following medications the morning of surgery:    SYMBICORT, OMEPRAZOLE, METOPROLOL, LAMICTAL, GABAPENTIN, CELEXA, SINEMET    If you are on prescription narcotic pain medication to control your pain you may also take that medication the morning of surgery.    General Instructions:  Do not eat solid food after midnight the night before surgery.  You may drink clear liquids day of surgery but must stop at least one hour before your hospital arrival time.  It is beneficial for you to have a clear drink that contains carbohydrates the day of surgery.  We suggest a 12 to 20 ounce bottle of G2 or Powerade Zero for diabetic patients.     Clear liquids are liquids you can see through.  Nothing red in color.     Plain water                               Sports drinks  Sodas                                   Gelatin (Jell-O)  Fruit juices without pulp such as white grape juice and apple juice  Popsicles that contain no fruit or yogurt  Tea or coffee (no cream or milk added)  Gatorade / Powerade  G2 / Powerade Zero      Bring any papers given to you in the doctor’s office.  Wear clean comfortable clothes.  Do not wear contact lenses, false eyelashes or make-up.  Bring a case for your glasses.   Remove all piercings.  Leave jewelry and any other valuables at home.  The Pre-Admission Testing nurse will instruct you to bring medications if unable to obtain an accurate list in Pre-Admission Testing.    REPORT TO SURGERY ENTRANCE ON 7- AT 0515 AM          Preventing a Surgical Site Infection:  For 2 to 3 days before surgery, avoid shaving with a razor because the razor can irritate skin and make it easier to develop an infection.    Any areas of open skin can increase the risk of a post-operative wound infection by allowing bacteria to enter and travel throughout the body.  Notify your surgeon if you have any skin wounds / rashes even if it is not near the expected surgical site.  The area will need assessed to  determine if surgery should be delayed until it is healed.  The night prior to surgery shower using a fresh bar of anti-bacterial soap (such as Dial) and clean washcloth.  Sleep in a clean bed with clean clothing.  Do not allow pets to sleep with you.  Shower on the morning of surgery using a fresh bar of anti-bacterial soap (such as Dial) and clean washcloth.  Dry with a clean towel and dress in clean clothing.  Ask your surgeon if you will be receiving antibiotics prior to surgery.  Make sure you, your family, and all healthcare providers clean their hands with soap and water or an alcohol based hand  before caring for you or your wound.    Day of surgery:  Your arrival time is approximately two hours before your scheduled surgery time.  Upon arrival, a Pre-op nurse and Anesthesiologist will review your health history, obtain vital signs, and answer questions you may have.  The only belongings needed at this time will be a list of your home medications and if applicable your C-PAP/BI-PAP machine.  A Pre-op nurse will start an IV and you may receive medication in preparation for surgery, including something to help you relax.     Please be aware that surgery does come with discomfort.  We want to make every effort to control your discomfort so please discuss any uncontrolled symptoms with your nurse.   Your doctor will most likely have prescribed pain medications.      CHLORHEXIDINE CLOTH INSTRUCTIONS  The morning of surgery follow these instructions using the Chlorhexidine cloths you've been given.  These steps reduce bacteria on the body.  Do not use the cloths near your eyes, ears mouth, genitalia or on open wounds.  Throw the cloths away after use but do not try to flush them down a toilet.      Open and remove one cloth at a time from the package.    Leave the cloth unfolded and begin the bathing.  Massage the skin with the cloths using gentle pressure to remove bacteria.  Do not scrub harshly.    Follow the steps below with one 2% CHG cloth per area (6 total cloths).  One cloth for neck, shoulders and chest.  One cloth for both arms, hands, fingers and underarms (do underarms last).  One cloth for the abdomen followed by groin.  One cloth for right leg and foot including between the toes.  One cloth for left leg and foot including between the toes.  The last cloth is to be used for the back of the neck, back and buttocks.    Allow the CHG to air dry 3 minutes on the skin which will give it time to work and decrease the chance of irritation.  The skin may feel sticky until it is dry.  Do not rinse with water or any other liquid or you will lose the beneficial effects of the CHG.  If mild skin irritation occurs, do rinse the skin to remove the CHG.  Report this to the nurse at time of admission.  Do not apply lotions, creams, ointments, deodorants or perfumes after using the clothes. Dress in clean clothes before coming to the hospital.    BACTROBAN NASAL OINTMENT  There are many germs normally in your nose. Bactroban is an ointment that will help reduce these germs. Please follow these instructions for Bactroban use:      __1__The day before surgery in the morning  Dmzv__7-80-64______    __2__The day before surgery in the evening              Wuzt__7-22-45______    __3__The day of surgery in the morning    Uhad___1-27-75_____    **Squirt ½ package of Bactroban Ointment onto a cotton applicator and apply to inside of 1st nostril.  Squirt the remaining Bactroban and apply to the inside of the other nostril.        If you are staying overnight following surgery, you will be transported to your hospital room following the recovery period.  [facility] has all private rooms.    If you have any questions please call Pre-Admission Testing at [phone].  Deductibles and co-payments are collected on the day of service. Please be prepared to pay the required co-pay, deductible or deposit on the day  of service as defined by your plan.    Patient Education for Self-Quarantine Process    Following your COVID testing, we strongly recommend that you wear a mask when you are with other people and practice social distancing.   Limit your activities to only required outings.  Wash your hands with soap and water frequently for at least 20 seconds.   Avoid touching your eyes, nose and mouth with unwashed hands.  Do not share anything - utensils, drinking glasses, food from the same bowl.   Sanitize household surfaces daily. Include all high touch areas (door handles, light switches, phones, countertops, etc.)    Call your surgeon immediately if you experience any of the following symptoms:  Sore Throat  Shortness of Breath or difficulty breathing  Cough  Chills  Body soreness or muscle pain  Headache  Fever  New loss of taste or smell  Do not arrive for your surgery ill.  Your procedure will need to be rescheduled to another time.  You will need to call your physician before the day of surgery to avoid any unnecessary exposure to hospital staff as well as other patients.

## 2022-07-13 ENCOUNTER — APPOINTMENT (OUTPATIENT)
Dept: LAB | Facility: HOSPITAL | Age: 72
End: 2022-07-13

## 2022-07-15 ENCOUNTER — ANESTHESIA EVENT (OUTPATIENT)
Dept: PERIOP | Facility: HOSPITAL | Age: 72
End: 2022-07-15

## 2022-07-15 ENCOUNTER — HOSPITAL ENCOUNTER (INPATIENT)
Facility: HOSPITAL | Age: 72
LOS: 3 days | Discharge: HOME HEALTH | End: 2022-07-18
Attending: ORTHOPAEDIC SURGERY | Admitting: ORTHOPAEDIC SURGERY

## 2022-07-15 ENCOUNTER — ANESTHESIA (OUTPATIENT)
Dept: PERIOP | Facility: HOSPITAL | Age: 72
End: 2022-07-15

## 2022-07-15 ENCOUNTER — APPOINTMENT (OUTPATIENT)
Dept: GENERAL RADIOLOGY | Facility: HOSPITAL | Age: 72
End: 2022-07-15

## 2022-07-15 PROBLEM — Z96.611 STATUS POST REVERSE ARTHROPLASTY OF RIGHT SHOULDER: Status: ACTIVE | Noted: 2022-07-15

## 2022-07-15 LAB
ANION GAP SERPL CALCULATED.3IONS-SCNC: 10 MMOL/L (ref 5–15)
BUN SERPL-MCNC: 11 MG/DL (ref 8–23)
BUN/CREAT SERPL: 11.2 (ref 7–25)
CALCIUM SPEC-SCNC: 9.4 MG/DL (ref 8.6–10.5)
CHLORIDE SERPL-SCNC: 98 MMOL/L (ref 98–107)
CO2 SERPL-SCNC: 28 MMOL/L (ref 22–29)
CREAT SERPL-MCNC: 0.98 MG/DL (ref 0.57–1)
EGFRCR SERPLBLD CKD-EPI 2021: 61.1 ML/MIN/1.73
GLUCOSE BLDC GLUCOMTR-MCNC: 191 MG/DL (ref 70–130)
GLUCOSE BLDC GLUCOMTR-MCNC: 195 MG/DL (ref 70–130)
GLUCOSE SERPL-MCNC: 324 MG/DL (ref 65–99)
HCT VFR BLD AUTO: 40.3 % (ref 34–46.6)
HGB BLD-MCNC: 13.2 G/DL (ref 12–15.9)
POTASSIUM SERPL-SCNC: 4.3 MMOL/L (ref 3.5–5.2)
SODIUM SERPL-SCNC: 136 MMOL/L (ref 136–145)

## 2022-07-15 PROCEDURE — 80048 BASIC METABOLIC PNL TOTAL CA: CPT | Performed by: ORTHOPAEDIC SURGERY

## 2022-07-15 PROCEDURE — 25010000002 CEFAZOLIN IN DEXTROSE 2-4 GM/100ML-% SOLUTION: Performed by: ORTHOPAEDIC SURGERY

## 2022-07-15 PROCEDURE — C1713 ANCHOR/SCREW BN/BN,TIS/BN: HCPCS | Performed by: ORTHOPAEDIC SURGERY

## 2022-07-15 PROCEDURE — 25010000002 DEXAMETHASONE PER 1 MG: Performed by: NURSE ANESTHETIST, CERTIFIED REGISTERED

## 2022-07-15 PROCEDURE — 82962 GLUCOSE BLOOD TEST: CPT

## 2022-07-15 PROCEDURE — 25010000002 PHENYLEPHRINE 10 MG/ML SOLUTION: Performed by: NURSE ANESTHETIST, CERTIFIED REGISTERED

## 2022-07-15 PROCEDURE — 0RRJ00Z REPLACEMENT OF RIGHT SHOULDER JOINT WITH REVERSE BALL AND SOCKET SYNTHETIC SUBSTITUTE, OPEN APPROACH: ICD-10-PCS | Performed by: ORTHOPAEDIC SURGERY

## 2022-07-15 PROCEDURE — 25010000002 PHENYLEPHRINE 10 MG/ML SOLUTION 5 ML VIAL: Performed by: NURSE ANESTHETIST, CERTIFIED REGISTERED

## 2022-07-15 PROCEDURE — C1776 JOINT DEVICE (IMPLANTABLE): HCPCS | Performed by: ORTHOPAEDIC SURGERY

## 2022-07-15 PROCEDURE — 85014 HEMATOCRIT: CPT | Performed by: ORTHOPAEDIC SURGERY

## 2022-07-15 PROCEDURE — 25010000002 FENTANYL CITRATE (PF) 50 MCG/ML SOLUTION: Performed by: ANESTHESIOLOGY

## 2022-07-15 PROCEDURE — 25010000002 ONDANSETRON PER 1 MG: Performed by: ANESTHESIOLOGY

## 2022-07-15 PROCEDURE — 25010000002 PROPOFOL 10 MG/ML EMULSION: Performed by: NURSE ANESTHETIST, CERTIFIED REGISTERED

## 2022-07-15 PROCEDURE — 76942 ECHO GUIDE FOR BIOPSY: CPT | Performed by: ORTHOPAEDIC SURGERY

## 2022-07-15 PROCEDURE — 25010000002 ROPIVACAINE PER 1 MG: Performed by: ANESTHESIOLOGY

## 2022-07-15 PROCEDURE — 25010000002 MIDAZOLAM PER 1 MG: Performed by: ANESTHESIOLOGY

## 2022-07-15 PROCEDURE — 25010000002 NEOSTIGMINE 10 MG/10ML SOLUTION: Performed by: ANESTHESIOLOGY

## 2022-07-15 PROCEDURE — 85018 HEMOGLOBIN: CPT | Performed by: ORTHOPAEDIC SURGERY

## 2022-07-15 PROCEDURE — 73020 X-RAY EXAM OF SHOULDER: CPT

## 2022-07-15 PROCEDURE — 25010000002 CEFAZOLIN PER 500 MG: Performed by: ORTHOPAEDIC SURGERY

## 2022-07-15 DEVICE — RSS 4.5MM STAR SCREW AND CAP; 25MM LENGTH
Type: IMPLANTABLE DEVICE | Site: ARM | Status: FUNCTIONAL
Brand: TITAN™

## 2022-07-15 DEVICE — IMPLANTABLE DEVICE: Type: IMPLANTABLE DEVICE | Site: SHOULDER | Status: FUNCTIONAL

## 2022-07-15 DEVICE — SUT FW 5 W .5 CIR CUT NDL 48M AR7211: Type: IMPLANTABLE DEVICE | Site: ARM | Status: FUNCTIONAL

## 2022-07-15 DEVICE — CMT BONE PALACOS R HI/VISC 1X40: Type: IMPLANTABLE DEVICE | Site: SHOULDER | Status: FUNCTIONAL

## 2022-07-15 DEVICE — RSS 4.5MM STAR SCREW AND CAP; 20MM LENGTH
Type: IMPLANTABLE DEVICE | Site: ARM | Status: FUNCTIONAL
Brand: TITAN™

## 2022-07-15 DEVICE — RSS GLENOSPHERE, CONCENTRIC-S, 2MM
Type: IMPLANTABLE DEVICE | Site: SHOULDER | Status: FUNCTIONAL
Brand: TITAN™

## 2022-07-15 DEVICE — BASEPLT GLEN RSS NEG/S TI 27.3X22.8X2MM: Type: IMPLANTABLE DEVICE | Site: SHOULDER | Status: FUNCTIONAL

## 2022-07-15 DEVICE — THE TITAN REVERSE SHOULDER HUMERAL BODY IS MADE OF TITANIUM. IT IS DESIGNED FOR POTENTIAL INCREASED ROM AND MINIMIZED SCAPULAR NOTCHING, AS WELL AS INCREASED VISUAL AND ACCESS TO GLENOID. THE ASYMMATRIX COATING ALLOWS FOR AN ALL PRESS FIT HUMERAL COMPONENT AND SECONDARY FIXATION. A TITANIUM SCREW IS INCLUDED FOR FIXATION.
Type: IMPLANTABLE DEVICE | Site: SHOULDER | Status: FUNCTIONAL
Brand: TITAN™ REVERSE SHOULDER SYSTEM

## 2022-07-15 DEVICE — DEV CONTRL TISS STRATAFIX SPIRAL MNCRYL UD 3/0 PLS 30CM: Type: IMPLANTABLE DEVICE | Site: ARM | Status: FUNCTIONAL

## 2022-07-15 DEVICE — IMPLANTABLE DEVICE: Type: IMPLANTABLE DEVICE | Site: ARM | Status: FUNCTIONAL

## 2022-07-15 RX ORDER — DEXAMETHASONE SODIUM PHOSPHATE 10 MG/ML
INJECTION INTRAMUSCULAR; INTRAVENOUS AS NEEDED
Status: DISCONTINUED | OUTPATIENT
Start: 2022-07-15 | End: 2022-07-15 | Stop reason: SURG

## 2022-07-15 RX ORDER — DIPHENOXYLATE HYDROCHLORIDE AND ATROPINE SULFATE 2.5; .025 MG/1; MG/1
1 TABLET ORAL 4 TIMES DAILY PRN
Status: DISCONTINUED | OUTPATIENT
Start: 2022-07-15 | End: 2022-07-18 | Stop reason: HOSPADM

## 2022-07-15 RX ORDER — NALOXONE HCL 0.4 MG/ML
0.1 VIAL (ML) INJECTION
Status: DISCONTINUED | OUTPATIENT
Start: 2022-07-15 | End: 2022-07-18 | Stop reason: HOSPADM

## 2022-07-15 RX ORDER — DIPHENHYDRAMINE HCL 25 MG
25 CAPSULE ORAL EVERY 6 HOURS PRN
Status: DISCONTINUED | OUTPATIENT
Start: 2022-07-15 | End: 2022-07-18 | Stop reason: HOSPADM

## 2022-07-15 RX ORDER — AMOXICILLIN 250 MG
2 CAPSULE ORAL NIGHTLY PRN
Status: DISCONTINUED | OUTPATIENT
Start: 2022-07-15 | End: 2022-07-18 | Stop reason: HOSPADM

## 2022-07-15 RX ORDER — LABETALOL HYDROCHLORIDE 5 MG/ML
5 INJECTION, SOLUTION INTRAVENOUS
Status: DISCONTINUED | OUTPATIENT
Start: 2022-07-15 | End: 2022-07-15 | Stop reason: HOSPADM

## 2022-07-15 RX ORDER — ONDANSETRON 2 MG/ML
4 INJECTION INTRAMUSCULAR; INTRAVENOUS ONCE AS NEEDED
Status: DISCONTINUED | OUTPATIENT
Start: 2022-07-15 | End: 2022-07-15 | Stop reason: HOSPADM

## 2022-07-15 RX ORDER — PROMETHAZINE HYDROCHLORIDE 12.5 MG/1
12.5 TABLET ORAL EVERY 6 HOURS PRN
Status: DISCONTINUED | OUTPATIENT
Start: 2022-07-15 | End: 2022-07-18 | Stop reason: HOSPADM

## 2022-07-15 RX ORDER — DIPHENHYDRAMINE HYDROCHLORIDE 50 MG/ML
25 INJECTION INTRAMUSCULAR; INTRAVENOUS EVERY 6 HOURS PRN
Status: DISCONTINUED | OUTPATIENT
Start: 2022-07-15 | End: 2022-07-18 | Stop reason: HOSPADM

## 2022-07-15 RX ORDER — FAMOTIDINE 10 MG/ML
20 INJECTION, SOLUTION INTRAVENOUS ONCE
Status: COMPLETED | OUTPATIENT
Start: 2022-07-15 | End: 2022-07-15

## 2022-07-15 RX ORDER — SODIUM CHLORIDE 0.9 % (FLUSH) 0.9 %
3-10 SYRINGE (ML) INJECTION AS NEEDED
Status: DISCONTINUED | OUTPATIENT
Start: 2022-07-15 | End: 2022-07-15 | Stop reason: HOSPADM

## 2022-07-15 RX ORDER — CELECOXIB 200 MG/1
200 CAPSULE ORAL ONCE
Status: COMPLETED | OUTPATIENT
Start: 2022-07-15 | End: 2022-07-15

## 2022-07-15 RX ORDER — LIDOCAINE HYDROCHLORIDE 20 MG/ML
INJECTION, SOLUTION INFILTRATION; PERINEURAL AS NEEDED
Status: DISCONTINUED | OUTPATIENT
Start: 2022-07-15 | End: 2022-07-15 | Stop reason: SURG

## 2022-07-15 RX ORDER — HYDROCODONE BITARTRATE AND ACETAMINOPHEN 7.5; 325 MG/1; MG/1
1 TABLET ORAL ONCE AS NEEDED
Status: DISCONTINUED | OUTPATIENT
Start: 2022-07-15 | End: 2022-07-15 | Stop reason: HOSPADM

## 2022-07-15 RX ORDER — SODIUM CHLORIDE 0.9 % (FLUSH) 0.9 %
10 SYRINGE (ML) INJECTION EVERY 12 HOURS SCHEDULED
Status: DISCONTINUED | OUTPATIENT
Start: 2022-07-15 | End: 2022-07-18 | Stop reason: HOSPADM

## 2022-07-15 RX ORDER — METOPROLOL TARTRATE 50 MG/1
50 TABLET, FILM COATED ORAL 2 TIMES DAILY
Status: DISCONTINUED | OUTPATIENT
Start: 2022-07-15 | End: 2022-07-18 | Stop reason: HOSPADM

## 2022-07-15 RX ORDER — HYDROMORPHONE HYDROCHLORIDE 1 MG/ML
0.5 INJECTION, SOLUTION INTRAMUSCULAR; INTRAVENOUS; SUBCUTANEOUS
Status: DISCONTINUED | OUTPATIENT
Start: 2022-07-15 | End: 2022-07-15 | Stop reason: HOSPADM

## 2022-07-15 RX ORDER — OXYCODONE HYDROCHLORIDE AND ACETAMINOPHEN 5; 325 MG/1; MG/1
1 TABLET ORAL EVERY 4 HOURS PRN
Status: DISCONTINUED | OUTPATIENT
Start: 2022-07-15 | End: 2022-07-17

## 2022-07-15 RX ORDER — MIRTAZAPINE 15 MG/1
7.5 TABLET, FILM COATED ORAL NIGHTLY
Status: DISCONTINUED | OUTPATIENT
Start: 2022-07-15 | End: 2022-07-18 | Stop reason: HOSPADM

## 2022-07-15 RX ORDER — SODIUM CHLORIDE 0.9 % (FLUSH) 0.9 %
3 SYRINGE (ML) INJECTION EVERY 12 HOURS SCHEDULED
Status: DISCONTINUED | OUTPATIENT
Start: 2022-07-15 | End: 2022-07-15 | Stop reason: HOSPADM

## 2022-07-15 RX ORDER — ONDANSETRON 4 MG/1
4 TABLET, FILM COATED ORAL EVERY 6 HOURS PRN
Status: DISCONTINUED | OUTPATIENT
Start: 2022-07-15 | End: 2022-07-18 | Stop reason: HOSPADM

## 2022-07-15 RX ORDER — OXYCODONE AND ACETAMINOPHEN 7.5; 325 MG/1; MG/1
1 TABLET ORAL EVERY 4 HOURS PRN
Status: DISCONTINUED | OUTPATIENT
Start: 2022-07-15 | End: 2022-07-15 | Stop reason: HOSPADM

## 2022-07-15 RX ORDER — MAGNESIUM HYDROXIDE 1200 MG/15ML
LIQUID ORAL AS NEEDED
Status: DISCONTINUED | OUTPATIENT
Start: 2022-07-15 | End: 2022-07-15 | Stop reason: HOSPADM

## 2022-07-15 RX ORDER — OXYCODONE HYDROCHLORIDE AND ACETAMINOPHEN 5; 325 MG/1; MG/1
2 TABLET ORAL EVERY 4 HOURS PRN
Status: DISCONTINUED | OUTPATIENT
Start: 2022-07-15 | End: 2022-07-18

## 2022-07-15 RX ORDER — SODIUM CHLORIDE 0.9 % (FLUSH) 0.9 %
10 SYRINGE (ML) INJECTION AS NEEDED
Status: DISCONTINUED | OUTPATIENT
Start: 2022-07-15 | End: 2022-07-18 | Stop reason: HOSPADM

## 2022-07-15 RX ORDER — GABAPENTIN 400 MG/1
400 CAPSULE ORAL 3 TIMES DAILY
COMMUNITY
Start: 2022-06-12

## 2022-07-15 RX ORDER — DOCUSATE SODIUM 100 MG/1
100 CAPSULE, LIQUID FILLED ORAL 2 TIMES DAILY PRN
Status: DISCONTINUED | OUTPATIENT
Start: 2022-07-15 | End: 2022-07-18 | Stop reason: HOSPADM

## 2022-07-15 RX ORDER — LAMOTRIGINE 25 MG/1
25 TABLET ORAL 2 TIMES DAILY
Status: DISCONTINUED | OUTPATIENT
Start: 2022-07-15 | End: 2022-07-18 | Stop reason: HOSPADM

## 2022-07-15 RX ORDER — CITALOPRAM 20 MG/1
20 TABLET ORAL DAILY
Status: DISCONTINUED | OUTPATIENT
Start: 2022-07-15 | End: 2022-07-18 | Stop reason: HOSPADM

## 2022-07-15 RX ORDER — GLYCOPYRROLATE 0.2 MG/ML
INJECTION INTRAMUSCULAR; INTRAVENOUS AS NEEDED
Status: DISCONTINUED | OUTPATIENT
Start: 2022-07-15 | End: 2022-07-15 | Stop reason: SURG

## 2022-07-15 RX ORDER — FUROSEMIDE 40 MG/1
40 TABLET ORAL EVERY MORNING
Status: DISCONTINUED | OUTPATIENT
Start: 2022-07-16 | End: 2022-07-18 | Stop reason: HOSPADM

## 2022-07-15 RX ORDER — PROMETHAZINE HYDROCHLORIDE 25 MG/1
25 TABLET ORAL ONCE AS NEEDED
Status: DISCONTINUED | OUTPATIENT
Start: 2022-07-15 | End: 2022-07-15 | Stop reason: HOSPADM

## 2022-07-15 RX ORDER — PROMETHAZINE HYDROCHLORIDE 12.5 MG/1
12.5 SUPPOSITORY RECTAL EVERY 6 HOURS PRN
Status: DISCONTINUED | OUTPATIENT
Start: 2022-07-15 | End: 2022-07-18 | Stop reason: HOSPADM

## 2022-07-15 RX ORDER — SODIUM CHLORIDE, SODIUM LACTATE, POTASSIUM CHLORIDE, CALCIUM CHLORIDE 600; 310; 30; 20 MG/100ML; MG/100ML; MG/100ML; MG/100ML
9 INJECTION, SOLUTION INTRAVENOUS CONTINUOUS PRN
Status: DISCONTINUED | OUTPATIENT
Start: 2022-07-15 | End: 2022-07-15 | Stop reason: HOSPADM

## 2022-07-15 RX ORDER — OXYCODONE HYDROCHLORIDE 5 MG/1
5 TABLET ORAL ONCE
Status: COMPLETED | OUTPATIENT
Start: 2022-07-15 | End: 2022-07-15

## 2022-07-15 RX ORDER — ACETAMINOPHEN 500 MG
1000 TABLET ORAL ONCE
Status: COMPLETED | OUTPATIENT
Start: 2022-07-15 | End: 2022-07-15

## 2022-07-15 RX ORDER — HYDRALAZINE HYDROCHLORIDE 20 MG/ML
5 INJECTION INTRAMUSCULAR; INTRAVENOUS
Status: DISCONTINUED | OUTPATIENT
Start: 2022-07-15 | End: 2022-07-15 | Stop reason: HOSPADM

## 2022-07-15 RX ORDER — FAMOTIDINE 20 MG/1
40 TABLET, FILM COATED ORAL DAILY
Status: DISCONTINUED | OUTPATIENT
Start: 2022-07-15 | End: 2022-07-18 | Stop reason: HOSPADM

## 2022-07-15 RX ORDER — ARIPIPRAZOLE 2 MG/1
2 TABLET ORAL EVERY EVENING
Status: DISCONTINUED | OUTPATIENT
Start: 2022-07-15 | End: 2022-07-18 | Stop reason: HOSPADM

## 2022-07-15 RX ORDER — ONDANSETRON 2 MG/ML
INJECTION INTRAMUSCULAR; INTRAVENOUS AS NEEDED
Status: DISCONTINUED | OUTPATIENT
Start: 2022-07-15 | End: 2022-07-15 | Stop reason: SURG

## 2022-07-15 RX ORDER — GLIPIZIDE 5 MG/1
5 TABLET ORAL
Status: DISCONTINUED | OUTPATIENT
Start: 2022-07-16 | End: 2022-07-18 | Stop reason: HOSPADM

## 2022-07-15 RX ORDER — SODIUM CHLORIDE 9 MG/ML
100 INJECTION, SOLUTION INTRAVENOUS CONTINUOUS
Status: DISCONTINUED | OUTPATIENT
Start: 2022-07-15 | End: 2022-07-18 | Stop reason: HOSPADM

## 2022-07-15 RX ORDER — EPHEDRINE SULFATE 50 MG/ML
5 INJECTION, SOLUTION INTRAVENOUS ONCE AS NEEDED
Status: DISCONTINUED | OUTPATIENT
Start: 2022-07-15 | End: 2022-07-15 | Stop reason: HOSPADM

## 2022-07-15 RX ORDER — TRANEXAMIC ACID 100 MG/ML
1000 INJECTION, SOLUTION INTRAVENOUS ONCE
Status: DISCONTINUED | OUTPATIENT
Start: 2022-07-15 | End: 2022-07-18 | Stop reason: HOSPADM

## 2022-07-15 RX ORDER — FENTANYL CITRATE 50 UG/ML
50 INJECTION, SOLUTION INTRAMUSCULAR; INTRAVENOUS
Status: DISCONTINUED | OUTPATIENT
Start: 2022-07-15 | End: 2022-07-15 | Stop reason: HOSPADM

## 2022-07-15 RX ORDER — ONDANSETRON 2 MG/ML
4 INJECTION INTRAMUSCULAR; INTRAVENOUS EVERY 6 HOURS PRN
Status: DISCONTINUED | OUTPATIENT
Start: 2022-07-15 | End: 2022-07-18 | Stop reason: HOSPADM

## 2022-07-15 RX ORDER — PROPOFOL 10 MG/ML
VIAL (ML) INTRAVENOUS AS NEEDED
Status: DISCONTINUED | OUTPATIENT
Start: 2022-07-15 | End: 2022-07-15 | Stop reason: SURG

## 2022-07-15 RX ORDER — MIDAZOLAM HYDROCHLORIDE 1 MG/ML
0.5 INJECTION INTRAMUSCULAR; INTRAVENOUS
Status: DISCONTINUED | OUTPATIENT
Start: 2022-07-15 | End: 2022-07-15 | Stop reason: HOSPADM

## 2022-07-15 RX ORDER — EPHEDRINE SULFATE 50 MG/ML
INJECTION, SOLUTION INTRAVENOUS AS NEEDED
Status: DISCONTINUED | OUTPATIENT
Start: 2022-07-15 | End: 2022-07-15 | Stop reason: SURG

## 2022-07-15 RX ORDER — FLUMAZENIL 0.1 MG/ML
0.2 INJECTION INTRAVENOUS AS NEEDED
Status: DISCONTINUED | OUTPATIENT
Start: 2022-07-15 | End: 2022-07-15 | Stop reason: HOSPADM

## 2022-07-15 RX ORDER — NALOXONE HCL 0.4 MG/ML
0.2 VIAL (ML) INJECTION AS NEEDED
Status: DISCONTINUED | OUTPATIENT
Start: 2022-07-15 | End: 2022-07-15 | Stop reason: HOSPADM

## 2022-07-15 RX ORDER — ROCURONIUM BROMIDE 10 MG/ML
INJECTION, SOLUTION INTRAVENOUS AS NEEDED
Status: DISCONTINUED | OUTPATIENT
Start: 2022-07-15 | End: 2022-07-15 | Stop reason: SURG

## 2022-07-15 RX ORDER — ROPIVACAINE HYDROCHLORIDE 5 MG/ML
INJECTION, SOLUTION EPIDURAL; INFILTRATION; PERINEURAL
Status: COMPLETED | OUTPATIENT
Start: 2022-07-15 | End: 2022-07-15

## 2022-07-15 RX ORDER — PHENYLEPHRINE HYDROCHLORIDE 10 MG/ML
INJECTION INTRAVENOUS AS NEEDED
Status: DISCONTINUED | OUTPATIENT
Start: 2022-07-15 | End: 2022-07-15 | Stop reason: SURG

## 2022-07-15 RX ORDER — PROMETHAZINE HYDROCHLORIDE 25 MG/1
25 SUPPOSITORY RECTAL ONCE AS NEEDED
Status: DISCONTINUED | OUTPATIENT
Start: 2022-07-15 | End: 2022-07-15 | Stop reason: HOSPADM

## 2022-07-15 RX ORDER — CEFAZOLIN SODIUM 2 G/100ML
2 INJECTION, SOLUTION INTRAVENOUS ONCE
Status: COMPLETED | OUTPATIENT
Start: 2022-07-15 | End: 2022-07-15

## 2022-07-15 RX ORDER — CEFAZOLIN SODIUM 2 G/100ML
2 INJECTION, SOLUTION INTRAVENOUS EVERY 8 HOURS
Status: COMPLETED | OUTPATIENT
Start: 2022-07-15 | End: 2022-07-16

## 2022-07-15 RX ORDER — NEOSTIGMINE METHYLSULFATE 1 MG/ML
INJECTION, SOLUTION INTRAVENOUS AS NEEDED
Status: DISCONTINUED | OUTPATIENT
Start: 2022-07-15 | End: 2022-07-15 | Stop reason: SURG

## 2022-07-15 RX ORDER — METHOCARBAMOL 750 MG/1
750 TABLET, FILM COATED ORAL 3 TIMES DAILY
Status: DISCONTINUED | OUTPATIENT
Start: 2022-07-15 | End: 2022-07-18 | Stop reason: HOSPADM

## 2022-07-15 RX ADMIN — CITALOPRAM 20 MG: 20 TABLET, FILM COATED ORAL at 22:59

## 2022-07-15 RX ADMIN — GLYCOPYRROLATE 0.8 MG: 0.2 INJECTION INTRAMUSCULAR; INTRAVENOUS at 14:50

## 2022-07-15 RX ADMIN — FAMOTIDINE 20 MG: 10 INJECTION, SOLUTION INTRAVENOUS at 12:19

## 2022-07-15 RX ADMIN — FAMOTIDINE 40 MG: 20 TABLET ORAL at 20:05

## 2022-07-15 RX ADMIN — PHENYLEPHRINE HYDROCHLORIDE 0.5 MCG/KG/MIN: 10 INJECTION INTRAVENOUS at 13:35

## 2022-07-15 RX ADMIN — ARIPIPRAZOLE 2 MG: 2 TABLET ORAL at 21:50

## 2022-07-15 RX ADMIN — CARBIDOPA AND LEVODOPA 1 TABLET: 10; 100 TABLET ORAL at 21:50

## 2022-07-15 RX ADMIN — ROPIVACAINE HYDROCHLORIDE 30 ML: 5 INJECTION, SOLUTION EPIDURAL; INFILTRATION; PERINEURAL at 12:44

## 2022-07-15 RX ADMIN — ACETAMINOPHEN 1000 MG: 500 TABLET ORAL at 11:40

## 2022-07-15 RX ADMIN — CEFAZOLIN 2 G: 10 INJECTION, POWDER, FOR SOLUTION INTRAVENOUS at 13:18

## 2022-07-15 RX ADMIN — METOPROLOL TARTRATE 50 MG: 50 TABLET, FILM COATED ORAL at 20:05

## 2022-07-15 RX ADMIN — DEXAMETHASONE SODIUM PHOSPHATE 8 MG: 10 INJECTION INTRAMUSCULAR; INTRAVENOUS at 13:59

## 2022-07-15 RX ADMIN — EPHEDRINE SULFATE 10 MG: 50 INJECTION INTRAVENOUS at 14:44

## 2022-07-15 RX ADMIN — MIDAZOLAM 0.5 MG: 1 INJECTION INTRAMUSCULAR; INTRAVENOUS at 12:43

## 2022-07-15 RX ADMIN — CEFAZOLIN SODIUM 2 G: 2 INJECTION, SOLUTION INTRAVENOUS at 21:50

## 2022-07-15 RX ADMIN — PHENYLEPHRINE HYDROCHLORIDE 100 MCG: 10 INJECTION INTRAVENOUS at 14:11

## 2022-07-15 RX ADMIN — ROCURONIUM BROMIDE 40 MG: 50 INJECTION INTRAVENOUS at 13:35

## 2022-07-15 RX ADMIN — OXYCODONE AND ACETAMINOPHEN 1 TABLET: 5; 325 TABLET ORAL at 20:05

## 2022-07-15 RX ADMIN — NEOSTIGMINE METHYLSULFATE 4 MG: 1 INJECTION INTRAVENOUS at 14:50

## 2022-07-15 RX ADMIN — LIDOCAINE HYDROCHLORIDE 80 MG: 20 INJECTION, SOLUTION INFILTRATION; PERINEURAL at 13:35

## 2022-07-15 RX ADMIN — FENTANYL CITRATE 50 MCG: 50 INJECTION INTRAMUSCULAR; INTRAVENOUS at 12:40

## 2022-07-15 RX ADMIN — SODIUM CHLORIDE, POTASSIUM CHLORIDE, SODIUM LACTATE AND CALCIUM CHLORIDE 9 ML/HR: 600; 310; 30; 20 INJECTION, SOLUTION INTRAVENOUS at 12:19

## 2022-07-15 RX ADMIN — ONDANSETRON 4 MG: 2 INJECTION INTRAMUSCULAR; INTRAVENOUS at 14:48

## 2022-07-15 RX ADMIN — CELECOXIB 200 MG: 200 CAPSULE ORAL at 11:39

## 2022-07-15 RX ADMIN — PROPOFOL 200 MG: 10 INJECTION, EMULSION INTRAVENOUS at 13:35

## 2022-07-15 RX ADMIN — MIRTAZAPINE 7.5 MG: 15 TABLET, FILM COATED ORAL at 22:58

## 2022-07-15 RX ADMIN — Medication 10 ML: at 21:50

## 2022-07-15 RX ADMIN — METHOCARBAMOL TABLETS 750 MG: 750 TABLET, COATED ORAL at 22:58

## 2022-07-15 RX ADMIN — APIXABAN 5 MG: 5 TABLET, FILM COATED ORAL at 22:59

## 2022-07-15 RX ADMIN — OXYCODONE 5 MG: 5 TABLET ORAL at 11:40

## 2022-07-15 NOTE — ANESTHESIA PROCEDURE NOTES
Peripheral Block      Patient reassessed immediately prior to procedure    Patient location during procedure: pre-op  Start time: 7/15/2022 12:41 PM  Stop time: 7/15/2022 12:44 PM  Reason for block: at surgeon's request and post-op pain management  Performed by  Anesthesiologist: Rolf Chu MD  Preanesthetic Checklist  Completed: patient identified, IV checked, site marked, risks and benefits discussed, surgical consent, monitors and equipment checked, pre-op evaluation and timeout performed  Prep:  Sterile barriers:cap, gloves and mask  Prep: ChloraPrep  Patient monitoring: blood pressure monitoring, continuous pulse oximetry and EKG  Procedure    Sedation: yes    Guidance:ultrasound guided    ULTRASOUND INTERPRETATION.  Using ultrasound guidance a 21 G gauge needle was placed in close proximity to the brachial plexus nerve, at which point, under ultrasound guidance anesthetic was injected in the area of the nerve and spread of the anesthesia was seen on ultrasound in close proximity thereto.  There were no abnormalities seen on ultrasound; a digital image was taken; and the patient tolerated the procedure with no complications. Images:still images obtained, printed/placed on chart    Laterality:right  Block Type:interscalene  Injection Technique:single-shotNeedle Gauge:21 G  Loss of resistance: normal.    Medications Used: ropivacaine (NAROPIN) 0.5 % injection, 30 mL  Med administered at 7/15/2022 12:44 PM      Medications  Comment:Ultrasound Interpretation:  Ultrasound guidance utilized for visualization of needle approach to nerve plexus and verification of local anesthetic disbursement to surrounding tissues. Photo printed and placed on chart for reference.    Post Assessment  Injection Assessment: negative aspiration for heme, no paresthesia on injection and incremental injection  Patient Tolerance:comfortable throughout block  Complications:no

## 2022-07-15 NOTE — ANESTHESIA PREPROCEDURE EVALUATION
Anesthesia Evaluation     NPO Solid Status: > 8 hours  NPO Liquid Status: > 2 hours           Airway   Mallampati: III  no difficulty expected  Dental      Pulmonary - normal exam   (+) pneumonia , a smoker Former, COPD, asthma,sleep apnea,     PE comment: nonlabored  Cardiovascular - normal exam  Exercise tolerance: poor (<4 METS)    Rhythm: regular  Rate: normal    (+) hypertension, past MI (last MI ~2yrs ago) , CAD, cardiac stents (multiple, last ~2yrs ago) dysrhythmias (palpitations) PVC, CHF , hyperlipidemia,     ROS comment: PFO    Neuro/Psych  (+) TIA, CVA (~2yrs ago; residual R-sided weakness & parethesias), headaches, dizziness/light headedness, syncope, numbness,      ROS Comment: Parkinson's disease  GI/Hepatic/Renal/Endo    (+) morbid obesity, GERD,  liver disease fatty liver disease, renal disease stones, diabetes mellitus type 2,     Musculoskeletal     (+) arthralgias, back pain, chronic pain, myalgias, neck pain,       ROS comment: R humerus fracture  Abdominal    Substance History      OB/GYN          Other   arthritis,                      Anesthesia Plan    ASA 4     general     (ISB for post-op pain psr)  intravenous induction     Anesthetic plan, risks, benefits, and alternatives have been provided, discussed and informed consent has been obtained with: patient.        CODE STATUS:

## 2022-07-15 NOTE — H&P
Orthopaedic Surgery  History & Physical For Reverse TSA  Dr. KYRIE Mckeon II  [phone]    HPI:  Patient is a 71 y.o.  or  female who presents for right shoulder arthroplasty.  This pathology was not amenable to nonoperative treatment. The patient wishes to continue with reverse total shoulder arthroplasty.     MEDICAL HISTORY  Past Medical History:   Diagnosis Date   • Abnormal weight gain    • Acquired trigger finger    • Acute myocardial infarction (HCC)    • Adjustment reaction with mixed emotional features    • Arthritis    • ASHD (arteriosclerotic heart disease)    • Asthma    • B12 deficiency    • Bacterial pneumonia    • Williamson esophagus    • Benign colonic polyp    • Bilateral knee pain    • Birthmark     haemangioma of the bone   • Bronchiectasis ([facility])    • CHF (congestive heart failure) ([facility])    • Chronic cough    • Chronic glomerulonephritis with exudative nephritis    • Chronic lumbar radiculopathy    • Diabetes mellitus ([facility])    • Diabetic peripheral neuropathy ([facility])    • Dyslipidemia    • Fibromyositis    • Fracture, humerus 2022    RIGHT   • GERD (gastroesophageal reflux disease)    • Herpes zoster    • Hyperlipidemia    • Hypertension    • Lupus anticoagulant disorder ([facility])    • Mycobacterium avium complex ([facility])    • Nephrolithiasis    • SILVIANO (obstructive sleep apnea)     DOESN'T USE HER MACHINE   • Palpitations    • Premature ventricular contraction    • Slow transit constipation    • Stroke ([facility]) 2020    RT SIDED WEAKNESS   • Vitamin D deficiency    ·   Past Surgical History:   Procedure Laterality Date   • APPENDECTOMY     • BRONCHOSCOPY     •  SECTION     • CHOLECYSTECTOMY     • COLONOSCOPY     • COLONOSCOPY N/A 2019    Procedure: COLONOSCOPY to cecum with cold biopsy and cold and hotsnare polypectomies;  Surgeon: Suzy Eubanks MD;  Location: [facility] ENDOSCOPY;  Service: Gastroenterology   • CORONARY ANGIOPLASTY WITH STENT PLACEMENT     •  EMBOLECTOMY N/A 8/19/2020    Procedure: EMERGENT CEREBRAL ANGIOGRAM;  Surgeon: Jonh Meehan MD;  Location: [facility] OR 18/19;  Service: Neurosurgery;  Laterality: N/A;   • ENDOSCOPY N/A 11/5/2019    Procedure: ESOPHAGOGASTRODUODENOSCOPY with cold biopsies;  Surgeon: Suzy Eubanks MD;  Location: [facility] ENDOSCOPY;  Service: Gastroenterology   • KNEE ARTHROSCOPY     • OTHER SURGICAL HISTORY      elbow surgery   • ROTATOR CUFF REPAIR     • TUBAL ABDOMINAL LIGATION     ·   Prior to Admission medications    Medication Sig Start Date End Date Taking? Authorizing Provider   ACCU-CHEK FASTCLIX LANCETS misc USE TO TEST BLOOD SUGAR UP TO FIVE TIMES DAILY AS DIRECTED. 11/6/17   Cinthya Valentine APRN   albuterol (PROAIR HFA) 108 (90 BASE) MCG/ACT inhaler  INHALE 1 TO 2 PUFFS EVERY 4 TO 6 HOURS AS NEEDED  Patient taking differently: 2 puffs Every 4 (Four) Hours As Needed.  INHALE 1 TO 2 PUFFS EVERY 4 TO 6 HOURS AS NEEDED 9/23/16   Cinthya Valentine APRN   alendronate (FOSAMAX) 70 MG tablet TAKE 1 TABLET BY MOUTH EVERY 7 DAYS  Patient taking differently: Take 70 mg by mouth Every 7 (Seven) Days. Takes on wed 3/24/22   Cinthya Valentine APRN   ARIPiprazole (ABILIFY) 2 MG tablet TAKE 1 TABLET BY MOUTH EVERY EVENING 7/11/22   Cinthya Valentine APRN   benzonatate (Tessalon Perles) 100 MG capsule Take 1 capsule by mouth 3 (Three) Times a Day As Needed for Cough. 5/27/22   Cinthya Valentine APRN   carbidopa-levodopa (SINEMET)  MG per tablet TAKE 1 TABLET BY MOUTH THREE TIMES DAILY  Patient taking differently: Take 1 tablet by mouth 3 (Three) Times a Day. 2/7/22   Luis Enrique Santacruz II, MD   Chlorhexidine Gluconate Cloth 2 % pads Apply  topically. Use as directed preop    Provider, MD Ruperto   Cholecalciferol (VITAMIN D3) 5000 UNITS capsule capsule Take 1 capsule by mouth daily. 5/23/16   Cinthya Valentine APRN   citalopram (CeleXA) 20 MG tablet TAKE 1 TABLET BY MOUTH DAILY 4/7/22   Cinthya Valentine, APRN  "  diphenoxylate-atropine (LOMOTIL) 2.5-0.025 MG per tablet Take 1 tablet by mouth 4 (Four) Times a Day As Needed for Diarrhea. 11/26/21   Asher Fenton MD   doxycycline (VIBRAMYCIN) 100 MG capsule Take 1 capsule by mouth 2 (Two) Times a Day.  Patient taking differently: Take 100 mg by mouth As Needed. 5/19/22   Carlos Brar DO   Eliquis 5 MG tablet tablet TAKE 1 TABLET BY MOUTH TWICE DAILY  Patient taking differently: Take 5 mg by mouth Every 12 (Twelve) Hours. 3/8/22   Cinthya Valentine APRN   furosemide (LASIX) 20 MG tablet TAKE 2 TABLETS BY MOUTH EVERY MORNING AND 1 TABLET BY MOUTH EVERY AFTERNOON  Patient taking differently: Take 40 mg by mouth Every Morning. TAKE 2 TABLETS BY MOUTH EVERY MORNING AND 1 TABLET BY MOUTH EVERY AFTERNOON 6/7/22   Cinthya Valentine APRN   furosemide (LASIX) 20 MG tablet Take 20 mg by mouth. TAKES I N THE AFTERNOON    ProviderRuperto MD   gabapentin (NEURONTIN) 300 MG capsule Take 1 capsule by mouth At Night As Needed (sleep).  Patient taking differently: Take 300 mg by mouth At Night As Needed (sleep). PATIENT IS TAKING TID 8/4/21   Cinthya Valentine APRN   glipizide (GLUCOTROL) 5 MG tablet TAKE 1 TABLET BY MOUTH TWICE DAILY BEFORE MEALS  Patient taking differently: Take 5 mg by mouth 2 (Two) Times a Day Before Meals. 4/14/22   Cinthya Valentine APRN   glucose blood (FREESTYLE LITE) test strip USE TO TEST FIVE TIMES DAILY 6/21/22   Cinthya Valentine APRN   guaiFENesin 200 MG tablet Take 2 tablets by mouth Every 4 (Four) Hours As Needed for Cough. 4/5/22   Cinthya Valentine APRN   HYDROcodone-acetaminophen (NORCO) 5-325 MG per tablet Take 1-2 tablets by mouth Every 6 (Six) Hours As Needed for Moderate Pain . 7/2/22   Donaldo Kraus MD   Insulin Syringe 31G X 5/16\" 1 ML misc USE TWICE DAILY AS DIRECTED 6/22/22   Edwardo Tolliver APRN   lamoTRIgine (LaMICtal) 25 MG tablet Take 25 mg by mouth 2 (Two) Times a Day. PATIENT IS TAKING ONE TABLET TWICE A DAY " 6/24/21   Ruperto Moreno MD   methocarbamol (ROBAXIN) 750 MG tablet Take 1 tablet by mouth 3 (Three) Times a Day. 2/26/22   Moreno Rojas III, PA   metoprolol tartrate (LOPRESSOR) 50 MG tablet TAKE 1 TABLET BY MOUTH THREE TIMES DAILY  Patient taking differently: Take 50 mg by mouth 2 (Two) Times a Day. 5/7/22   Cinthya Valentine APRN   mirtazapine (REMERON) 7.5 MG tablet TAKE 1 TABLET BY MOUTH EVERY NIGHT 4/26/22   Cinthya Valentine APRN   mupirocin (BACTROBAN) 2 % nasal ointment into the nostril(s) as directed by provider. Use as directed preop    Ruperto Moreno MD   nitroglycerin (NITROSTAT) 0.4 MG SL tablet Take 1 tablet by mouth Every 5 (Five) Minutes As Needed for Chest Pain. Take no more than 3 doses in 15 minutes. 1/5/22   Cinthya Valentine APRN   NovoLIN N 100 UNIT/ML injection ADMINISTER 14 UNITS UNDER THE SKIN TWICE DAILY BEFORE MEALS AS DIRECTED. INCREASE BY 2 UNIT WEEKLY. MAX 30 UNIT PER DAY FOR A FASTING OF<150  Patient taking differently: Inject  under the skin into the appropriate area as directed. 5/1/22   Cinthya Valentine APRN   omeprazole (priLOSEC) 40 MG capsule TAKE 1 CAPSULE BY MOUTH TWICE DAILY  Patient taking differently: Take 40 mg by mouth Daily. 11/8/21   Cinthya Valentine APRN   ondansetron (ZOFRAN) 4 MG tablet Take 1 tablet by mouth Every 6 (Six) Hours As Needed for Nausea or Vomiting. 5/3/21   Cinthya Miner APRN   oxyCODONE-acetaminophen (PERCOCET) 5-325 MG per tablet Take 1 tablet by mouth Every 4 (Four) Hours As Needed.    ProviderRuperto MD   potassium chloride (KLOR-CON) 8 MEQ CR tablet TAKE 1 TABLET BY MOUTH TWICE DAILY  Patient taking differently: Take 8 mEq by mouth 2 (Two) Times a Day. 6/7/22   Cinthya Valentine APRN   promethazine-dextromethorphan (PROMETHAZINE-DM) 6.25-15 MG/5ML syrup Take 5 mL by mouth 4 (Four) Times a Day As Needed for Cough. 5/19/22   Carlos Brar DO   Rimegepant Sulfate (NURTEC) 75 MG tablet dispersible  "tablet Take 1 tablet by mouth Daily As Needed (migraine headache). 21   Vasu Henson MD   rosuvastatin (CRESTOR) 20 MG tablet TAKE 1 TABLET BY MOUTH DAILY 22   Cinthya Valentine APRN   Symbicort 160-4.5 MCG/ACT inhaler INHALE 2 PUFFS BY MOUTH TWICE DAILY. RINSE MOUTH AFTER USE  Patient taking differently: Inhale 2 puffs 2 (Two) Times a Day. 22   Cinthya Valentine APRN   vitamin B-12 (CYANOCOBALAMIN) 100 MCG tablet Take 50 mcg by mouth Daily.    Provider, MD Ruperto   ·   Allergies   Allergen Reactions   • Duloxetine Hcl Hallucinations   • Viibryd [Vilazodone Hcl] Hallucinations   • Nsaids Other (See Comments)     Doesn't remember   • Benadryl [Diphenhydramine] Other (See Comments)     shaking   • Carafate [Sucralfate] GI Intolerance   • Metformin Other (See Comments)     Doesn't remember   • Morphine And Related Nausea And Vomiting   • Oxycodone Nausea And Vomiting   • Penicillins Rash     Fever, \"can't breathe\"   • Statins Other (See Comments)     Can't remember   ·   Most Recent Immunizations   Administered Date(s) Administered   • COVID-19 (PFIZER) PURPLE CAP 2021   • FluLaval/Fluarix/Fluzone >6 10/29/2021   • FluMist 2-49yrs 2015   • Fluad Quad 65+ 2020   • Fluzone High Dose =>65 Years ([employer] ONLY) 2019   • Pneumococcal Polysaccharide (PPSV23) 11/15/2016   • Pneumococcal, Unspecified 2015   ·   Social History     Tobacco Use   • Smoking status: Former Smoker     Quit date: 1980     Years since quittin.5   • Smokeless tobacco: Never Used   Substance Use Topics   • Alcohol use: No   ·    Social History     Substance and Sexual Activity   Drug Use No   ·     REVIEW OF SYSTEMS:  · Head: negative for headache  · Respiratory: negative for shortness of breath.   · Cardiovascular: negative for chest pain.   · Gastrointestinal: negative abdominal pain.   · Neurological: negative for LOC  · Psychiatric/Behavioral: negative for memory loss.   · All other " systems reviewed and are negative    VITALS: There were no vitals taken for this visit. There is no height or weight on file to calculate BMI.    PHYSICAL EXAM:   · CONSTITUTIONAL: A&Ox3, No acute distress  · LUNGS: Equal chest rise, no shortness of air  · CARDIOVASCULAR: palpable peripheral pulses  · SKIN: no skin lesions in the area examined  · LYMPH: no lymphadenopathy in the area examined  · EXTREMITY: Upper arm  · Pulses:  Brisk Capillary Refill  · Sensation: Intact to the hand and fingers  · Motor: Able to wiggle fingers and thumb    RADIOLOGY REVIEW:   No radiology results for the last 7 days    LABS:   Results for the past 24 hours: No results found for this or any previous visit (from the past 24 hour(s)).    IMPRESSION:  Patient is a 71 y.o.  or  female with aright shoulder Non Reconstructable Proximal Humerus Fracture    PLAN:   · Surgery: Right Reverse Total Shoulder Arthroplasty  · Consent: The risks and benefits of operative versus nonoperative treatment were discussed. The patient elected to undergo operative treatment of their shoulder. The risks discussed included but were not limited to blood clots, MI, stroke, other medical complications, infection, malunion/nonunion, damage to neurovascular structures, continued pain, stiffness, hardware prominence, loss of range of motion, wound healing problems, need for further procedures, and and risk of anesthesia. No guarantees were made   · Disposition: Surgery     Dk Mckeon II, MD  Orthopaedic Surgery  [facility]

## 2022-07-15 NOTE — OP NOTE
Reverse Total Shoulder Operative Note  Dr. KYRIE Mckeon II  [phone]    PATIENT NAME: Dalila Javed  MRN: [id number]  : 1950 AGE: 71 y.o. GENDER: female  DATE OF OPERATION: 7/15/2022  PREOPERATIVE DIAGNOSIS: Proximal humerus fracture  POSTOPERATIVE DIAGNOSIS: same  OPERATION PERFORMED: Right Reverse Total Shoulder   SURGEON: Dk Mckeon MD  Circulator: Martina Long, RN  Scrub Person: Rodolfo Mckeon; Elin Braun  Vendor Representative: Skip Miller  ANESTHESIA: General with Block  ASSISTANT: Lois Herrmann. This case would not have been possible without another set of skilled surgical hands for retraction, use of instrumentation, and general assistance.  This assistance was vital to the success of the case.   ESTIMATED BLOOD LOSS: 100cc  SPONGE AND NEEDLE COUNT: Correct  COMPONENTS:   · Titan reverse shoulder system  · RSS glenoid baseplate-S  · Multiple screws  · Glenosphere, concentric-S  · Reverse shoulder system reverse body small and body screw  · TSS cemented stem size 12  · RSS Hx L liner standard +3    PERTINENT FINDINGS: Proximal humerus fracture    DETAILS OF PROCEDURE:  The patient was met in the preoperative area. The site was marked. The consent and H&P were reviewed. The patient was then wheeled back to the operative suite and transferred to the operative table. The patient underwent anesthesia. Surgical alcohol was used to thoroughly clean the operative area. The arm was prepped and draped in the normal sterile fashion which included ChloraPrep and multiple layers of sterile draping.  A surgical timeout was performed in which administration of preoperative antibiotics as well as the surgical site were confirmed.    An deltopectoral approach to the proximal humerus was then utilized.  Care was taken to avoid damage to the cephalic vein.  Dissection was carried down to the proximal humerus where retractors were placed appropriately.        Fracture excision: The  comminuted fracture of the proximal humerus was then excised in pieces, taking care to keep any bony pieces that were still attached to the rotator cuff tendons., Glenoid preparation: After removal of the labrum, the guidepin was passed into the glenoid and then a reamer was used to remove the cartilage.  The central hole was then drilled and this was followed by the inferior and superior holes.  The bony bridge was connected using a rongeur.  The glenoid baseplate was then secured down to the bone.  Screws were then passed into the baseplate after measuring there necessary length.  Afterwards, excess bone and soft tissue was cleared out around the baseplate and the glenosphere was then inserted and tapped down.  It was ensured to be well fixated., Cemented humeral stem: I then turned my attention to the humerus.  Due to the quality of the bone, a cemented preparation was utilized.  The canal was prepped and broached.  After adequate fill, the humerus was trialed.  I did attempt to recreate normal anatomy including 5.6 mm from the insertion of the pectoralis tendon to the top of the prosthesis.  Proper retroversion was measured and the shoulder was reduced.  The stability and tightness was assessed.  The shoulder was trialed until adequate stability was achieved without over stuffing the shoulder to limit motion.  The shoulder was then dislocated while real implants were opened.  Cement was mixed and the components were assembled.  The humeral stem was then cemented in position and held until the cement was fully dry.  Afterwards, the shoulder prosthesis was trialed 1 final time using the trial polyethylene insert.  After adequate stability and range of motion was determined, the real polyethylene was opened and inserted.  The humerus was then relocated., Supraspinatus repair: #5 FiberWire was passed through the remaining supraspinatus tendon and any bony attachment in a [place] fashion and this was passed through  "the corresponding holes in the stem. This was then tied down to the prosthesis under tension., Subscap repair: #5 FiberWire was passed through the remaining subscapularis tendon and any bony attachment in a [place] fashion and this was passed through the corresponding holes in the stem.  This was then tied down to the prosthesis under tension. and No injection: The patient had a preoperative block and no injection was performed.  The shoulder was thoroughly irrigated.    The deltopectoral interval was then closed with a running stitch.  The subcu and skin was then closed in layers.  A sterile dressing was then applied.  The patient was awoken from anesthesia and moved over to the bed.  The patient was then taken the PACU in good condition.      R \"Jose\" Mike OBRIEN MD  Orthopaedic Surgery  [facility]  [phone]      "

## 2022-07-15 NOTE — ANESTHESIA POSTPROCEDURE EVALUATION
Patient: Dalila Javed    Procedure Summary     Date: 07/15/22 Room / Location:  [facility] [address] [place] [address] [facility]    Anesthesia Start: 1325 Anesthesia Stop: 1510    Procedure: TOTAL SHOULDER REVERSE ARTHROPLASTY (Right Arm Upper) Diagnosis:     Surgeons: Dk Mckeon II, MD Provider: Rolf Chu MD    Anesthesia Type: general ASA Status: 4          Anesthesia Type: general    Vitals  Vitals Value Taken Time   /73 07/15/22 1600   Temp 36.8 °C (98.2 °F) 07/15/22 1510   Pulse 88 07/15/22 1700   Resp 21 07/15/22 1600   SpO2 98 % 07/15/22 1700   Vitals shown include unvalidated device data.        Post Anesthesia Care and Evaluation    Patient location during evaluation: bedside  Patient participation: complete - patient participated  Level of consciousness: awake  Pain management: adequate    Airway patency: patent  Anesthetic complications: No anesthetic complications    Cardiovascular status: acceptable  Respiratory status: acceptable  Hydration status: acceptable    Comments: */73 (BP Location: Left arm, Patient Position: Lying)   Pulse 86   Temp 36.8 °C (98.2 °F) (Oral)   Resp 21   SpO2 95%

## 2022-07-15 NOTE — ANESTHESIA PROCEDURE NOTES
Airway  Urgency: elective    Date/Time: 7/15/2022 1:38 PM  Airway not difficult    General Information and Staff    Patient location during procedure: OR  CRNA/CAA: Arielle Campos CRNA    Indications and Patient Condition  Indications for airway management: airway protection    Preoxygenated: yes  Mask difficulty assessment: 2 - vent by mask + OA or adjuvant +/- NMBA    Final Airway Details  Final airway type: endotracheal airway      Successful airway: ETT  Cuffed: yes   Successful intubation technique: direct laryngoscopy  Endotracheal tube insertion site: oral  Blade: Miner  Blade size: 2  ETT size (mm): 7.0  Cormack-Lehane Classification: grade I - full view of glottis  Placement verified by: chest auscultation and capnometry   Cuff volume (mL): 8  Number of attempts at approach: 1  Assessment: lips, teeth, and gum same as pre-op and atraumatic intubation    Additional Comments  PreO2 with 100% O2;  FeO2 >85%;  sniff position; easy mask ventilation;  Intubated with no difficultly after eyes taped; Appears atraumatic;  Lips and teeth intact as preop condition;  Airway secured. Connected to ventilator.

## 2022-07-16 DIAGNOSIS — Z79.4 TYPE 2 DIABETES MELLITUS WITH HYPERGLYCEMIA, WITH LONG-TERM CURRENT USE OF INSULIN: ICD-10-CM

## 2022-07-16 DIAGNOSIS — E11.65 TYPE 2 DIABETES MELLITUS WITH HYPERGLYCEMIA, WITH LONG-TERM CURRENT USE OF INSULIN: ICD-10-CM

## 2022-07-16 PROCEDURE — 25010000002 CEFAZOLIN IN DEXTROSE 2-4 GM/100ML-% SOLUTION: Performed by: ORTHOPAEDIC SURGERY

## 2022-07-16 PROCEDURE — 97535 SELF CARE MNGMENT TRAINING: CPT

## 2022-07-16 PROCEDURE — 25010000002 MORPHINE PER 10 MG: Performed by: STUDENT IN AN ORGANIZED HEALTH CARE EDUCATION/TRAINING PROGRAM

## 2022-07-16 PROCEDURE — 97166 OT EVAL MOD COMPLEX 45 MIN: CPT

## 2022-07-16 PROCEDURE — 63710000001 ONDANSETRON PER 8 MG: Performed by: ORTHOPAEDIC SURGERY

## 2022-07-16 PROCEDURE — 97530 THERAPEUTIC ACTIVITIES: CPT

## 2022-07-16 PROCEDURE — 97162 PT EVAL MOD COMPLEX 30 MIN: CPT

## 2022-07-16 PROCEDURE — 25010000002 HYDROMORPHONE 1 MG/ML SOLUTION: Performed by: ORTHOPAEDIC SURGERY

## 2022-07-16 PROCEDURE — 97116 GAIT TRAINING THERAPY: CPT

## 2022-07-16 RX ORDER — MORPHINE SULFATE 4 MG/ML
2 INJECTION, SOLUTION INTRAMUSCULAR; INTRAVENOUS ONCE AS NEEDED
Status: COMPLETED | OUTPATIENT
Start: 2022-07-16 | End: 2022-07-16

## 2022-07-16 RX ORDER — OXYCODONE HYDROCHLORIDE AND ACETAMINOPHEN 5; 325 MG/1; MG/1
1 TABLET ORAL EVERY 4 HOURS PRN
Qty: 50 TABLET | Refills: 0 | Status: ON HOLD | OUTPATIENT
Start: 2022-07-16 | End: 2023-03-25

## 2022-07-16 RX ADMIN — OXYCODONE AND ACETAMINOPHEN 1 TABLET: 5; 325 TABLET ORAL at 04:23

## 2022-07-16 RX ADMIN — MIRTAZAPINE 7.5 MG: 15 TABLET, FILM COATED ORAL at 21:35

## 2022-07-16 RX ADMIN — CARBIDOPA AND LEVODOPA 1 TABLET: 10; 100 TABLET ORAL at 09:58

## 2022-07-16 RX ADMIN — CARBIDOPA AND LEVODOPA 1 TABLET: 10; 100 TABLET ORAL at 21:35

## 2022-07-16 RX ADMIN — LAMOTRIGINE 25 MG: 25 TABLET ORAL at 09:58

## 2022-07-16 RX ADMIN — Medication 10 ML: at 21:36

## 2022-07-16 RX ADMIN — ARIPIPRAZOLE 2 MG: 2 TABLET ORAL at 16:12

## 2022-07-16 RX ADMIN — GLIPIZIDE 5 MG: 5 TABLET ORAL at 09:58

## 2022-07-16 RX ADMIN — MORPHINE SULFATE 2 MG: 4 INJECTION, SOLUTION INTRAMUSCULAR; INTRAVENOUS at 16:20

## 2022-07-16 RX ADMIN — CARBIDOPA AND LEVODOPA 1 TABLET: 10; 100 TABLET ORAL at 16:12

## 2022-07-16 RX ADMIN — GLIPIZIDE 5 MG: 5 TABLET ORAL at 16:14

## 2022-07-16 RX ADMIN — METHOCARBAMOL TABLETS 750 MG: 750 TABLET, COATED ORAL at 09:58

## 2022-07-16 RX ADMIN — ONDANSETRON HYDROCHLORIDE 4 MG: 4 TABLET, FILM COATED ORAL at 01:51

## 2022-07-16 RX ADMIN — HYDROMORPHONE HYDROCHLORIDE 2 MG: 1 INJECTION, SOLUTION INTRAMUSCULAR; INTRAVENOUS; SUBCUTANEOUS at 20:42

## 2022-07-16 RX ADMIN — FAMOTIDINE 40 MG: 20 TABLET ORAL at 09:58

## 2022-07-16 RX ADMIN — ONDANSETRON HYDROCHLORIDE 4 MG: 4 TABLET, FILM COATED ORAL at 09:58

## 2022-07-16 RX ADMIN — OXYCODONE AND ACETAMINOPHEN 2 TABLET: 5; 325 TABLET ORAL at 19:33

## 2022-07-16 RX ADMIN — Medication 10 ML: at 09:59

## 2022-07-16 RX ADMIN — CEFAZOLIN SODIUM 2 G: 2 INJECTION, SOLUTION INTRAVENOUS at 04:23

## 2022-07-16 RX ADMIN — METOPROLOL TARTRATE 50 MG: 50 TABLET, FILM COATED ORAL at 09:58

## 2022-07-16 RX ADMIN — METHOCARBAMOL TABLETS 750 MG: 750 TABLET, COATED ORAL at 21:36

## 2022-07-16 RX ADMIN — FUROSEMIDE 40 MG: 40 TABLET ORAL at 09:59

## 2022-07-16 RX ADMIN — APIXABAN 5 MG: 5 TABLET, FILM COATED ORAL at 09:59

## 2022-07-16 RX ADMIN — OXYCODONE AND ACETAMINOPHEN 2 TABLET: 5; 325 TABLET ORAL at 09:58

## 2022-07-16 RX ADMIN — METOPROLOL TARTRATE 50 MG: 50 TABLET, FILM COATED ORAL at 21:35

## 2022-07-16 RX ADMIN — METHOCARBAMOL TABLETS 750 MG: 750 TABLET, COATED ORAL at 16:12

## 2022-07-16 RX ADMIN — APIXABAN 5 MG: 5 TABLET, FILM COATED ORAL at 21:35

## 2022-07-16 NOTE — CONSULTS
Patient Name:  Dalila Javed  YOB: 1950  MRN:  [id number]  Admit Date:  7/15/2022  Patient Care Team:  Cinthya Valentine APRN as PCP - General  Cinthya Valentine APRN as PCP - Family Medicine  Devyn Velazco MD as Consulting Physician (Pain Medicine)  Bogdan Olmedo MD as Surgeon (Orthopedic Surgery)  Pedro Pablo Lomas MD as Consulting Physician (Cardiology)  Yvette Pugh RN as Ambulatory  ([facility])      Subjective   History Present Illness     No chief complaint on file.    This is a 71-year-old woman with a past medical history of CVA with residual right-sided deficits, COPD, type 2 diabetes, coronary artery status post PCI who comes to the hospital after experiencing mechanical fall resulting in right shoulder fracture.  She underwent surgical intervention on 7/15.  From surgical standpoint she is stable for discharge however she is complaining of a lot of pain, and states that she does not feel safe going home today.    Review of Systems   Constitutional: Positive for fatigue. Negative for diaphoresis and fever.   Respiratory: Negative for chest tightness and shortness of breath.    Cardiovascular: Negative for chest pain and palpitations.   Gastrointestinal: Negative for abdominal pain and constipation.   Genitourinary: Negative for dysuria and urgency.   Musculoskeletal: Positive for back pain. Negative for arthralgias and neck stiffness.   Skin: Negative for color change and wound.   Neurological: Positive for weakness and numbness.   Psychiatric/Behavioral: The patient is nervous/anxious.         Personal History     Past Medical History:   Diagnosis Date   • Abnormal weight gain    • Acquired trigger finger    • Acute myocardial infarction (HCC)    • Adjustment reaction with mixed emotional features    • Arthritis    • ASHD (arteriosclerotic heart disease)    • Asthma    • B12 deficiency    • Bacterial pneumonia    • Williamson esophagus    • Benign  colonic polyp    • Bilateral knee pain    • Birthmark     haemangioma of the bone   • Bronchiectasis (HCC)    • CHF (congestive heart failure) ([facility])    • Chronic cough    • Chronic glomerulonephritis with exudative nephritis    • Chronic lumbar radiculopathy    • Diabetes mellitus (HCC)    • Diabetic peripheral neuropathy (HCC)    • Dyslipidemia    • Fibromyositis    • Fracture, humerus 2022    RIGHT   • GERD (gastroesophageal reflux disease)    • Herpes zoster    • Hyperlipidemia    • Hypertension    • Lupus anticoagulant disorder ([facility])    • Mycobacterium avium complex ([facility])    • Nephrolithiasis    • SILVIANO (obstructive sleep apnea)     DOESN'T USE HER MACHINE   • Palpitations    • Premature ventricular contraction    • Slow transit constipation    • Stroke ([facility])     RT SIDED WEAKNESS   • Vitamin D deficiency      Past Surgical History:   Procedure Laterality Date   • APPENDECTOMY     • BRONCHOSCOPY     •  SECTION     • CHOLECYSTECTOMY     • COLONOSCOPY     • COLONOSCOPY N/A 2019    Procedure: COLONOSCOPY to cecum with cold biopsy and cold and hotsnare polypectomies;  Surgeon: Suzy Eubanks MD;  Location: [facility] ENDOSCOPY;  Service: Gastroenterology   • CORONARY ANGIOPLASTY WITH STENT PLACEMENT     • EMBOLECTOMY N/A 2020    Procedure: EMERGENT CEREBRAL ANGIOGRAM;  Surgeon: Jonh Meehan MD;  Location: [facility] OR ;  Service: Neurosurgery;  Laterality: N/A;   • ENDOSCOPY N/A 2019    Procedure: ESOPHAGOGASTRODUODENOSCOPY with cold biopsies;  Surgeon: Suzy Eubanks MD;  Location: [facility] ENDOSCOPY;  Service: Gastroenterology   • KNEE ARTHROSCOPY     • OTHER SURGICAL HISTORY      elbow surgery   • ROTATOR CUFF REPAIR     • TUBAL ABDOMINAL LIGATION       Family History   Problem Relation Age of Onset   • Colon cancer Mother    • Uterine cancer Mother    • Arthritis Mother    • Cancer Mother    • Heart disease Mother    • Migraines Mother    • Stroke Mother    •  Cancer Father         malignant brain tumor   • Aneurysm Father    • Bone cancer Maternal Aunt    • Arthritis Maternal Grandmother    • Heart disease Maternal Grandmother    • Thyroid disease Maternal Grandmother    • Diabetes Paternal Grandmother    • Diabetes Paternal Grandfather    • Malig Hyperthermia Neg Hx      Social History     Tobacco Use   • Smoking status: Former Smoker     Quit date: 1980     Years since quittin.5   • Smokeless tobacco: Never Used   Vaping Use   • Vaping Use: Never used   Substance Use Topics   • Alcohol use: No   • Drug use: No     No current facility-administered medications on file prior to encounter.     Current Outpatient Medications on File Prior to Encounter   Medication Sig Dispense Refill   • alendronate (FOSAMAX) 70 MG tablet TAKE 1 TABLET BY MOUTH EVERY 7 DAYS (Patient taking differently: Take 70 mg by mouth Every 7 (Seven) Days. Takes on wed) 12 tablet 1   • carbidopa-levodopa (SINEMET)  MG per tablet TAKE 1 TABLET BY MOUTH THREE TIMES DAILY (Patient taking differently: Take 1 tablet by mouth 3 (Three) Times a Day.) 90 tablet 5   • Cholecalciferol (VITAMIN D3) 5000 UNITS capsule capsule Take 1 capsule by mouth daily. 30 capsule 5   • citalopram (CeleXA) 20 MG tablet TAKE 1 TABLET BY MOUTH DAILY 90 tablet 0   • diphenoxylate-atropine (LOMOTIL) 2.5-0.025 MG per tablet Take 1 tablet by mouth 4 (Four) Times a Day As Needed for Diarrhea. 12 tablet 0   • Eliquis 5 MG tablet tablet TAKE 1 TABLET BY MOUTH TWICE DAILY (Patient taking differently: Take 5 mg by mouth Every 12 (Twelve) Hours.) 60 tablet 5   • furosemide (LASIX) 20 MG tablet TAKE 2 TABLETS BY MOUTH EVERY MORNING AND 1 TABLET BY MOUTH EVERY AFTERNOON (Patient taking differently: Take 40 mg by mouth Every Morning. TAKE 2 TABLETS BY MOUTH EVERY MORNING AND 1 TABLET BY MOUTH EVERY AFTERNOON) 270 tablet 1   • gabapentin (NEURONTIN) 400 MG capsule      • glipizide (GLUCOTROL) 5 MG tablet TAKE 1 TABLET BY MOUTH  TWICE DAILY BEFORE MEALS (Patient taking differently: Take 5 mg by mouth 2 (Two) Times a Day Before Meals.) 180 tablet 0   • guaiFENesin 200 MG tablet Take 2 tablets by mouth Every 4 (Four) Hours As Needed for Cough. 40 tablet 0   • HYDROcodone-acetaminophen (NORCO) 5-325 MG per tablet Take 1-2 tablets by mouth Every 6 (Six) Hours As Needed for Moderate Pain . 20 tablet 0   • methocarbamol (ROBAXIN) 750 MG tablet Take 1 tablet by mouth 3 (Three) Times a Day. 15 tablet 0   • metoprolol tartrate (LOPRESSOR) 50 MG tablet TAKE 1 TABLET BY MOUTH THREE TIMES DAILY (Patient taking differently: Take 50 mg by mouth 2 (Two) Times a Day.) 270 tablet 0   • nitroglycerin (NITROSTAT) 0.4 MG SL tablet Take 1 tablet by mouth Every 5 (Five) Minutes As Needed for Chest Pain. Take no more than 3 doses in 15 minutes. 30 tablet 0   • NovoLIN N 100 UNIT/ML injection ADMINISTER 14 UNITS UNDER THE SKIN TWICE DAILY BEFORE MEALS AS DIRECTED. INCREASE BY 2 UNIT WEEKLY. MAX 30 UNIT PER DAY FOR A FASTING OF<150 (Patient taking differently: Inject  under the skin into the appropriate area as directed.) 10 mL 1   • omeprazole (priLOSEC) 40 MG capsule TAKE 1 CAPSULE BY MOUTH TWICE DAILY (Patient taking differently: Take 40 mg by mouth Daily.) 180 capsule 3   • potassium chloride (KLOR-CON) 8 MEQ CR tablet TAKE 1 TABLET BY MOUTH TWICE DAILY (Patient taking differently: Take 8 mEq by mouth 2 (Two) Times a Day.) 180 tablet 1   • rosuvastatin (CRESTOR) 20 MG tablet TAKE 1 TABLET BY MOUTH DAILY 90 tablet 1   • Symbicort 160-4.5 MCG/ACT inhaler INHALE 2 PUFFS BY MOUTH TWICE DAILY. RINSE MOUTH AFTER USE (Patient taking differently: Inhale 2 puffs 2 (Two) Times a Day.) 10.2 g 2   • vitamin B-12 (CYANOCOBALAMIN) 100 MCG tablet Take 50 mcg by mouth Daily.     • ACCU-CHEK FASTCLIX LANCETS misc USE TO TEST BLOOD SUGAR UP TO FIVE TIMES DAILY AS DIRECTED. 408 each 0   • albuterol (PROAIR HFA) 108 (90 BASE) MCG/ACT inhaler  INHALE 1 TO 2 PUFFS EVERY 4 TO 6 HOURS  "AS NEEDED (Patient taking differently: 2 puffs Every 4 (Four) Hours As Needed.  INHALE 1 TO 2 PUFFS EVERY 4 TO 6 HOURS AS NEEDED) 1 inhaler 1   • glucose blood (FREESTYLE LITE) test strip USE TO TEST FIVE TIMES DAILY 450 each 0   • Insulin Syringe 31G X 5/16\" 1 ML misc USE TWICE DAILY AS DIRECTED 180 each 0   • mirtazapine (REMERON) 7.5 MG tablet TAKE 1 TABLET BY MOUTH EVERY NIGHT 90 tablet 0   • ondansetron (ZOFRAN) 4 MG tablet Take 1 tablet by mouth Every 6 (Six) Hours As Needed for Nausea or Vomiting. 30 tablet 0     Allergies   Allergen Reactions   • Duloxetine Hcl Hallucinations   • Viibryd [Vilazodone Hcl] Hallucinations   • Nsaids Other (See Comments)     Doesn't remember   • Benadryl [Diphenhydramine] Other (See Comments)     shaking   • Carafate [Sucralfate] GI Intolerance   • Metformin Other (See Comments)     Doesn't remember   • Morphine And Related Nausea And Vomiting   • Oxycodone Nausea And Vomiting   • Penicillins Rash     Fever, \"can't breathe\"   • Statins Other (See Comments)     Can't remember       Objective    Objective     Vital Signs  Temp:  [97.1 °F (36.2 °C)-98.4 °F (36.9 °C)] 97.1 °F (36.2 °C)  Heart Rate:  [] 80  Resp:  [16-22] 16  BP: (112-155)/(63-81) 112/72  SpO2:  [92 %-97 %] 93 %  on  Flow (L/min):  [2-4] 2;   Device (Oxygen Therapy): room air  Body mass index is 35.33 kg/m².    Physical Exam  Constitutional:       Appearance: Normal appearance.      Comments: Chronically ill appearing    HENT:      Head: Normocephalic and atraumatic.   Cardiovascular:      Rate and Rhythm: Normal rate and regular rhythm.   Pulmonary:      Effort: Pulmonary effort is normal. No respiratory distress.      Comments: On room air   Abdominal:      General: Abdomen is flat. There is no distension.      Tenderness: There is no abdominal tenderness.   Musculoskeletal:         General: Signs of injury present. No deformity.      Comments: RUE in sling. Right hand in glove    Skin:     General: Skin is " warm and dry.   Neurological:      General: No focal deficit present.      Mental Status: She is alert and oriented to person, place, and time.      Comments: + right sided weakness          Results Review:  I reviewed the patient's new clinical results.  I reviewed the patient's new imaging results and agree with the interpretation.  I reviewed the patient's other test results and agree with the interpretation  I personally viewed and interpreted the patient's EKG/Telemetry data  Discussed with ED provider.    Lab Results (last 24 hours)     Procedure Component Value Units Date/Time    POC Glucose Once [[id number]]  (Abnormal) Collected: 07/15/22 1513    Specimen: Blood Updated: 07/15/22 1514     Glucose 195 mg/dL      Comment: Meter: [id number] : [id number] Angel FLORES       Basic Metabolic Panel [[id number]]  (Abnormal) Collected: 07/15/22 2117    Specimen: Blood Updated: 07/15/22 2202     Glucose 324 mg/dL      BUN 11 mg/dL      Creatinine 0.98 mg/dL      Sodium 136 mmol/L      Potassium 4.3 mmol/L      Chloride 98 mmol/L      CO2 28.0 mmol/L      Calcium 9.4 mg/dL      BUN/Creatinine Ratio 11.2     Anion Gap 10.0 mmol/L      eGFR 61.1 mL/min/1.73      Comment: National Kidney Foundation and American Society of Nephrology (ASN) Task Force recommended calculation based on the Chronic Kidney Disease Epidemiology Collaboration (CKD-EPI) equation refit without adjustment for race.       Narrative:      GFR Normal >60  Chronic Kidney Disease <60  Kidney Failure <15      Hemoglobin & Hematocrit, Blood [[id number]]  (Normal) Collected: 07/15/22 2117    Specimen: Blood Updated: 07/15/22 2137     Hemoglobin 13.2 g/dL      Hematocrit 40.3 %           Imaging Results (Last 24 Hours)     Procedure Component Value Units Date/Time    XR Shoulder 1 View Right [[id number]] Collected: 07/15/22 1556     Updated: 07/15/22 1600    Narrative:      XR SHOULDER 1 VW RIGHT-     INDICATIONS: Postoperative evaluation.      TECHNIQUE: Frontal views of the right shoulder     COMPARISON: 06/30/2022     FINDINGS:      Intact appearing right shoulder arthroplasty hardware is seen with  adjacent surgical soft tissue gas.            Impression:         Postsurgical changes.           This report was finalized on 7/15/2022 3:57 PM by Dr. Luis Enrique Deras M.D.             Results for orders placed during the hospital encounter of 12/27/21    Adult Transthoracic Echo Complete W/ Cont if Necessary Per Protocol (With Agitated Saline)    Interpretation Summary  · Estimated right ventricular systolic pressure from tricuspid regurgitation is normal (<35 mmHg).  · Estimated left ventricular EF = 65% Left ventricular systolic function is normal.  · Left ventricular diastolic function was normal.  · Saline test results are negative.      No orders to display        Assessment/Plan     Active Hospital Problems    Diagnosis  POA   • Status post reverse arthroplasty of right shoulder [Z96.611]  Not Applicable      Resolved Hospital Problems   No resolved problems to display.     Type 2 diabetes  Takes glipizide 5 mg twice a day at home. OK to continue. Don't anticipate needing any additional imaging procedures     History of CVA  With right sided deficits  Resume home meds     Chronic medical problems  takes lasix at home. Most recent TTE wnl  Continue lasix, metoprolol .     Status post right shoulder arthroplasty  States that her pain is not adequately controlled.  One time dose of morphine  PT/OT consult     I discussed the patient's findings and my recommendations with patient and nursing staff.    VTE Prophylaxis - Eliquis (home med).  Code Status - Full code.       Rajan Cedeño MD  [facility]  07/16/22  13:27 EDT

## 2022-07-16 NOTE — PLAN OF CARE
Goal Outcome Evaluation:  Plan of Care Reviewed With: patient           Outcome Evaluation: Pt is a 72 yo F POD 1 R reverse TSA. Pt lives with her spouse and reports independence at BL with both a rwx and cane. Pt reports hx of CVA and residual R deficits. Pt wearing a R winter glove 2/2 reported sensation deficits. Pt presents to PT with impaired strength, endurance, and balance limiting overall mobility. Pt is NWB RUE and unable to use a rwx at this time, but declines holding onto PT's hand to mimic a cane. Pt with c/o SOA when sitting EOB, but agreeable to some ambulation in her room. Pt ambulated to bathroom with CGA and completed toilet transfer. Pt requested seated rest break at EOB, but then able to walk additional 20ft in room, requesting return to supine. O2 sats 86-87% on room air with mobility. Pt able to return to supine with SBA and cues to scoot up to HOB. Pt will continue to benefit from skilled PT to address functional deficits and improve overall mobility. Anticipate D/C to SNF vs home with HHPT and 24/7 care as pt unable to independently perform daily tasks and unsafe with mobility at this time. PT will continue to progress mobility as tolerated.

## 2022-07-16 NOTE — PLAN OF CARE
Goal Outcome Evaluation:Pt VSS, afebrile, pt very tearful and c/o pain, spouse at bedside all day, Order was placed for morphine IV once, but upon entering the room, pt's spouse stated that she was in so much pain and had demanded that he give her a percocet from her MTB supply. Pt and spouse both instructed not to take any medication unless given by staff. Pt later called this nurse to room requesting morphine IV. Pt and spouse informed that this is a one time dose. Pt later noted during shift change to be up in room ambulating around by herself without difficulty after spouse had left.

## 2022-07-16 NOTE — DISCHARGE SUMMARY
Orthopaedic Discharge Summary  Dr. KYRIE Fatima” [facility]  [phone]    NAME: Dalila OmalleyLowdot PCP: Cinthya Valentine APRN   :  MRN: [id number]  [id number] LOS:  ADMIT: 1 days  7/15/2022   AGE/SEX: 71 y.o. 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.611]    · Surgery Performed: MS RECONSTR TOTAL SHOULDER IMPLANT [85774] (TOTAL SHOULDER REVERSE ARTHROPLASTY)    · Discharge Medications:         Discharge Medications      Changes to Medications      Instructions Start Date   albuterol sulfate  (90 Base) MCG/ACT inhaler  Commonly known as: ProAir HFA  What changed:   · how much to take  · when to take this  · reasons to take this    INHALE 1 TO 2 PUFFS EVERY 4 TO 6 HOURS AS NEEDED      alendronate 70 MG tablet  Commonly known as: FOSAMAX  What changed: additional instructions   TAKE 1 TABLET BY MOUTH EVERY 7 DAYS      Eliquis 5 MG tablet tablet  Generic drug: apixaban  What changed:   · how much to take  · when to take this   TAKE 1 TABLET BY MOUTH TWICE DAILY      furosemide 20 MG tablet  Commonly known as: LASIX  What changed: Another medication with the same name was changed. Make sure you understand how and when to take each.   20 mg, Oral, TAKES I N THE AFTERNOON      furosemide 20 MG tablet  Commonly known as: LASIX  What changed: See the new instructions.   TAKE 2 TABLETS BY MOUTH EVERY MORNING AND 1 TABLET BY MOUTH EVERY AFTERNOON      metoprolol tartrate 50 MG tablet  Commonly known as: LOPRESSOR  What changed: when to take this   TAKE 1 TABLET BY MOUTH THREE TIMES DAILY      NovoLIN N 100 UNIT/ML injection  Generic drug: insulin NPH  What changed: See the new instructions.   ADMINISTER 14 UNITS UNDER THE SKIN TWICE DAILY BEFORE MEALS AS DIRECTED. INCREASE BY 2 UNIT WEEKLY. MAX 30 UNIT PER DAY FOR A FASTING OF<150      omeprazole 40 MG capsule  Commonly known as: priLOSEC  What changed: when to take this   TAKE 1 CAPSULE BY MOUTH  "TWICE DAILY      oxyCODONE-acetaminophen 5-325 MG per tablet  Commonly known as: PERCOCET  What changed: Another medication with the same name was added. Make sure you understand how and when to take each.   1 tablet, Oral, Every 4 Hours PRN      oxyCODONE-acetaminophen 5-325 MG per tablet  Commonly known as: PERCOCET  What changed: You were already taking a medication with the same name, and this prescription was added. Make sure you understand how and when to take each.   1 tablet, Oral, Every 4 Hours PRN      Symbicort 160-4.5 MCG/ACT inhaler  Generic drug: budesonide-formoterol  What changed:   · when to take this  · additional instructions   INHALE 2 PUFFS BY MOUTH TWICE DAILY. RINSE MOUTH AFTER USE         Continue These Medications      Instructions Start Date   Accu-Chek FastClix Lancets misc   USE TO TEST BLOOD SUGAR UP TO FIVE TIMES DAILY AS DIRECTED.      ARIPiprazole 2 MG tablet  Commonly known as: ABILIFY   2 mg, Oral, Every Evening      carbidopa-levodopa  MG per tablet  Commonly known as: SINEMET   TAKE 1 TABLET BY MOUTH THREE TIMES DAILY      Chlorhexidine Gluconate Cloth 2 % pads   Apply externally, Use as directed preop      citalopram 20 MG tablet  Commonly known as: CeleXA   20 mg, Oral, Daily      diphenoxylate-atropine 2.5-0.025 MG per tablet  Commonly known as: LOMOTIL   1 tablet, Oral, 4 Times Daily PRN      FREESTYLE LITE test strip  Generic drug: glucose blood   USE TO TEST FIVE TIMES DAILY      gabapentin 400 MG capsule  Commonly known as: NEURONTIN   No dose, route, or frequency recorded.      glipizide 5 MG tablet  Commonly known as: GLUCOTROL   TAKE 1 TABLET BY MOUTH TWICE DAILY BEFORE MEALS      guaiFENesin 200 MG tablet   400 mg, Oral, Every 4 Hours PRN      HYDROcodone-acetaminophen 5-325 MG per tablet  Commonly known as: NORCO   1-2 tablets, Oral, Every 6 Hours PRN      Insulin Syringe 31G X 5/16\" 1 ML misc   USE TWICE DAILY AS DIRECTED      methocarbamol 750 MG " "tablet  Commonly known as: ROBAXIN   750 mg, Oral, 3 Times Daily      mirtazapine 7.5 MG tablet  Commonly known as: REMERON   7.5 mg, Oral, Nightly      mupirocin 2 % nasal ointment  Commonly known as: BACTROBAN   Nasal, Use as directed preop      nitroglycerin 0.4 MG SL tablet  Commonly known as: NITROSTAT   0.4 mg, Oral, Every 5 Minutes PRN, Take no more than 3 doses in 15 minutes.      ondansetron 4 MG tablet  Commonly known as: ZOFRAN   4 mg, Oral, Every 6 Hours PRN      potassium chloride 8 MEQ CR tablet  Commonly known as: KLOR-CON   TAKE 1 TABLET BY MOUTH TWICE DAILY      rosuvastatin 20 MG tablet  Commonly known as: CRESTOR   20 mg, Oral, Daily      vitamin B-12 100 MCG tablet  Commonly known as: CYANOCOBALAMIN   50 mcg, Oral, Daily      vitamin D3 125 MCG (5000 UT) capsule capsule   5,000 Units, Oral, Daily             · Vitals:     Vitals:    07/15/22 1730 07/15/22 1914 07/15/22 2208 07/15/22 2319   BP:  135/77  155/81   BP Location:  Left arm  Left arm   Patient Position:  Lying  Lying   Pulse:  104  102   Resp:  16  16   Temp: 98.1 °F (36.7 °C) 98.4 °F (36.9 °C)  97.4 °F (36.3 °C)   TempSrc: Oral Oral  Oral   SpO2:  94%  92%   Weight:   84.8 kg (187 lb)    Height:   154.9 cm (61\")        · Labs:      Admission on 07/15/2022   Component Date Value Ref Range Status   • Glucose 07/15/2022 191 (A) 70 - 130 mg/dL Final    Meter: [id number] : [id number] Silvia Guzmán RN   • Glucose 07/15/2022 195 (A) 70 - 130 mg/dL Final    Meter: [id number] : [id number] Angel FLORES   • Glucose 07/15/2022 324 (A) 65 - 99 mg/dL Final   • BUN 07/15/2022 11  8 - 23 mg/dL Final   • Creatinine 07/15/2022 0.98  0.57 - 1.00 mg/dL Final   • Sodium 07/15/2022 136  136 - 145 mmol/L Final   • Potassium 07/15/2022 4.3  3.5 - 5.2 mmol/L Final   • Chloride 07/15/2022 98  98 - 107 mmol/L Final   • CO2 07/15/2022 28.0  22.0 - 29.0 mmol/L Final   • Calcium 07/15/2022 9.4  8.6 - 10.5 mg/dL Final   • BUN/Creatinine Ratio " "07/15/2022 11.2  7.0 - 25.0 Final   • Anion Gap 07/15/2022 10.0  5.0 - 15.0 mmol/L Final   • eGFR 07/15/2022 61.1  >60.0 mL/min/1.73 Final    National Kidney Foundation and American Society of Nephrology (ASN) Task Force recommended calculation based on the Chronic Kidney Disease Epidemiology Collaboration (CKD-EPI) equation refit without adjustment for race.   • Hemoglobin 07/15/2022 13.2  12.0 - 15.9 g/dL Final   • Hematocrit 07/15/2022 40.3  34.0 - 46.6 % Final        No results found.    · Hospital Course:   71 y.o. 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 Post-operatively the patient transferred to the floor where the patient underwent mobilization therapy. Opioids were titrated to achieve appropriate pain management to allow for participation in mobilization exercises. Vital signs and laboratory values are now within safe parameters for discharge. The dressings and/or incision is intact without signs or symptoms of active infection. Operative extremity neurovascular status remains intact as compared to the preoperative exam. Appropriate education re: incision care, activity levels, medications, and follow up visits was completed and all questions were answered. The patient is now deemed stable for discharge.    HOME: The patient progressed well with physical therapy. There were cleared for discharge to home. The patietn was sent home in good condition}.       R \"Jose\" Mike OBRIEN MD  Orthopaedic Surgery  [facility]  [phone]                                             "

## 2022-07-16 NOTE — THERAPY EVALUATION
Patient Name: Dalila Javed  : 1950    MRN: [id number]                              Today's Date: 2022       Admit Date: 7/15/2022    Visit Dx: No diagnosis found.  Patient Active Problem List   Diagnosis   • Adjustment disorder with mixed anxiety and depressed mood   • Atopic rhinitis   • Coronary arteriosclerosis in native artery   • Cobalamin deficiency   • Benign colonic polyp   • Lumbar radiculopathy   • Controlled diabetes mellitus type 2 with complications ([facility])   • Binocular vision disorder with diplopia   • Dyslipidemia   • Arthropathy of hand   • Knee pain   • Cramps of lower extremity   • Low back pain   • Chronic nausea   • Obstructive sleep apnea syndrome   • Palpitations   • Ventricular premature beats   • Cephalalgia   • Colonic constipation   • Neurocardiogenic syncope   • Vitamin D deficiency   • DODSON (nonalcoholic steatohepatitis)   • Fibromyalgia   • Morbidly obese ([facility])   • Polyp of colon   • Family history of colonic polyps   • Family history of malignant neoplasm of colon   • Acute CVA (cerebrovascular accident) ([facility])   • Episode of dizziness   • Dizziness   • History of stroke   • Chronic right shoulder pain   • Encephalopathy, metabolic   • Migraine without aura or status migrainosus   • Parkinson disease ([facility])   • Hypokalemia   • Benign paroxysmal positional vertigo   • Chronic obstructive pulmonary disease ([facility])   • Gastroesophageal reflux disease   • History of percutaneous transluminal coronary angioplasty   • Hypercholesterolemia   • Hypertensive disorder   • Myocardial infarction ([facility])   • Occipital neuralgia   • Patent foramen ovale   • Transient cerebral ischemia   • Arm pain, anterior, right   • TIA (transient ischemic attack)   • Pathological fracture of vertebra due to osteoporosis with delayed healing   • Compression fracture of L1 vertebra with delayed healing   • Status post reverse arthroplasty of right shoulder     Past Medical History:   Diagnosis Date    • Abnormal weight gain    • Acquired trigger finger    • Acute myocardial infarction ([facility])    • Adjustment reaction with mixed emotional features    • Arthritis    • ASHD (arteriosclerotic heart disease)    • Asthma    • B12 deficiency    • Bacterial pneumonia    • Williamson esophagus    • Benign colonic polyp    • Bilateral knee pain    • Birthmark     haemangioma of the bone   • Bronchiectasis ([facility])    • CHF (congestive heart failure) ([facility])    • Chronic cough    • Chronic glomerulonephritis with exudative nephritis    • Chronic lumbar radiculopathy    • Diabetes mellitus ([facility])    • Diabetic peripheral neuropathy ([facility])    • Dyslipidemia    • Fibromyositis    • Fracture, humerus 2022    RIGHT   • GERD (gastroesophageal reflux disease)    • Herpes zoster    • Hyperlipidemia    • Hypertension    • Lupus anticoagulant disorder ([facility])    • Mycobacterium avium complex ([facility])    • Nephrolithiasis    • SILVIANO (obstructive sleep apnea)     DOESN'T USE HER MACHINE   • Palpitations    • Premature ventricular contraction    • Slow transit constipation    • Stroke ([facility]) 2020    RT SIDED WEAKNESS   • Vitamin D deficiency      Past Surgical History:   Procedure Laterality Date   • APPENDECTOMY     • BRONCHOSCOPY     •  SECTION     • CHOLECYSTECTOMY     • COLONOSCOPY     • COLONOSCOPY N/A 2019    Procedure: COLONOSCOPY to cecum with cold biopsy and cold and hotsnare polypectomies;  Surgeon: Suzy Eubanks MD;  Location: [facility] ENDOSCOPY;  Service: Gastroenterology   • CORONARY ANGIOPLASTY WITH STENT PLACEMENT     • EMBOLECTOMY N/A 2020    Procedure: EMERGENT CEREBRAL ANGIOGRAM;  Surgeon: Jonh Meehan MD;  Location: [facility] OR ;  Service: Neurosurgery;  Laterality: N/A;   • ENDOSCOPY N/A 2019    Procedure: ESOPHAGOGASTRODUODENOSCOPY with cold biopsies;  Surgeon: Suzy Eubanks MD;  Location: [facility] ENDOSCOPY;  Service: Gastroenterology   • KNEE ARTHROSCOPY     • OTHER SURGICAL  HISTORY      elbow surgery   • ROTATOR CUFF REPAIR     • TUBAL ABDOMINAL LIGATION        General Information     [facility] Name 07/16/22 1253          Physical Therapy Time and Intention    Document Type evaluation  -     Mode of Treatment co-treatment;physical therapy;occupational therapy  -[facility] Name 07/16/22 1253          General Information    Patient Profile Reviewed yes  -     Prior Level of Function independent:;gait;transfer;bed mobility  -     Existing Precautions/Restrictions fall;non-weight bearing;shoulder  -     Barriers to Rehab medically complex;previous functional deficit;cognitive status  -[facility] Name 07/16/22 1253          Living Environment    People in Home spouse  -[facility] Name 07/16/22 1253          Cognition    Orientation Status (Cognition) oriented x 3  -[facility] Name 07/16/22 1253          Safety Issues, Functional Mobility    Impairments Affecting Function (Mobility) balance;cognition;endurance/activity tolerance;pain;strength;shortness of breath;sensation/sensory awareness;range of motion (ROM)  -     Cognitive Impairments, Mobility Safety/Performance awareness, need for assistance;insight into deficits/self-awareness;problem-solving/reasoning;judgment;safety precaution awareness;safety precaution follow-through;sequencing abilities  -           User Key  (r) = Recorded By, (t) = Taken By, (c) = Cosigned By    Initials Name Provider Type     Marie Lugo PT Physical Therapist               Mobility     [facility] Name 07/16/22 1253          Bed Mobility    Comment, (Bed Mobility) NT - sitting EOB with OT on arrival  -[facility] Name 07/16/22 1253          Sit-Stand Transfer    Sit-Stand [place] (Transfers) contact guard;verbal cues  -[facility] Name 07/16/22 1253          Gait/Stairs (Locomotion)    [place] Level (Gait) verbal cues;contact guard  -     Distance in Feet (Gait) 10ft + 10ft + 20ft  -     Deviations/Abnormal Patterns (Gait) base of support,  narrow;jose decreased;gait speed decreased;stride length decreased;weight shifting decreased  -     Bilateral Gait Deviations heel strike decreased  -     [place] Level (Stairs) not tested  -[facility] Name 07/16/22 1253          Mobility    Extremity Weight-bearing Status right upper extremity  -     Right Upper Extremity (Weight-bearing Status) non weight-bearing (NWB)  -           User Key  (r) = Recorded By, (t) = Taken By, (c) = Cosigned By    Initials Name Provider Type     Marie Lugo PT Physical Therapist               Obj/Interventions     [facility] Name 07/16/22 1254          Range of Motion Comprehensive    General Range of Motion bilateral lower extremity ROM WFL  -[facility] Name 07/16/22 1254          Strength Comprehensive (MMT)    General Manual Muscle Testing (MMT) Assessment lower extremity strength deficits identified  -     Comment, General Manual Muscle Testing (MMT) Assessment Generalized weakness, BLE grossly 4-/5  -[facility] Name 07/16/22 1254          Balance    Balance Assessment sitting static balance;sitting dynamic balance;standing static balance;standing dynamic balance  -     Static Sitting Balance supervision  -     Dynamic Sitting Balance supervision  -     Position, Sitting Balance unsupported;sitting edge of bed  -     Static Standing Balance contact guard;verbal cues  -     Dynamic Standing Balance contact guard;verbal cues  -     Position/Device Used, Standing Balance unsupported  -BH     Row Name 07/16/22 1254          Sensory Assessment (Somatosensory)    Sensory Assessment (Somatosensory) LE sensation intact  -           User Key  (r) = Recorded By, (t) = Taken By, (c) = Cosigned By    Initials Name Provider Type     Marie Lugo PT Physical Therapist               Goals/Plan     [facility] Name 07/16/22 1303          Bed Mobility Goal 1 (PT)    Activity/Assistive Device (Bed Mobility Goal 1, PT) bed mobility activities, all  -      [place] Level/Cues Needed (Bed Mobility Goal 1, PT) contact guard required  -     Time Frame (Bed Mobility Goal 1, PT) 1 week  -     Row Name 07/16/22 1300          Transfer Goal 1 (PT)    Activity/Assistive Device (Transfer Goal 1, PT) transfers, all  -     [place] Level/Cues Needed (Transfer Goal 1, PT) supervision required  -     Time Frame (Transfer Goal 1, PT) 1 week  -     Row Name 07/16/22 1307          Gait Training Goal 1 (PT)    Activity/Assistive Device (Gait Training Goal 1, PT) gait (walking locomotion)  -     [place] Level (Gait Training Goal 1, PT) standby assist  -     Distance (Gait Training Goal 1, PT) 100ft  -     Time Frame (Gait Training Goal 1, PT) 1 week  -     Row Name 07/16/22 1306          Therapy Assessment/Plan (PT)    Planned Therapy Interventions (PT) balance training;bed mobility training;gait training;home exercise program;patient/family education;strengthening;transfer training  -           User Key  (r) = Recorded By, (t) = Taken By, (c) = Cosigned By    Initials Name Provider Type     Marie Lugo, PT Physical Therapist               Clinical Impression     Row Name 07/16/22 1258          Pain    Pretreatment Pain Rating 3/10  -     Posttreatment Pain Rating 8/10  -     Pain Location - back;shoulder  -     Pain Intervention(s) Repositioned;Cold applied;Ambulation/increased activity  -     Row Name 07/16/22 1259          Plan of Care Review    Plan of Care Reviewed With patient  -     Outcome Evaluation Pt is a 70 yo F POD 1 R reverse TSA. Pt lives with her spouse and reports independence at BL with both a rwx and cane. Pt reports hx of CVA and residual R deficits. Pt wearing a R winter glove 2/2 reported sensation deficits. Pt presents to PT with impaired strength, endurance, and balance limiting overall mobility. Pt is NWB RUE and unable to use a rwx at this time, but declines holding onto PT's hand to mimic a cane. Pt with c/o  SOA when sitting EOB, but agreeable to some ambulation in her room. Pt ambulated to bathroom with CGA and completed toilet transfer. Pt requested seated rest break at EOB, but then able to walk additional 20ft in room, requesting return to supine. O2 sats 86-87% on room air with mobility. Pt able to return to supine with SBA and cues to scoot up to HOB. Pt will continue to benefit from skilled PT to address functional deficits and improve overall mobility. Anticipate D/C to SNF vs home with HHPT and 24/7 care as pt unable to independently perform daily tasks and unsafe with mobility at this time. PT will continue to progress mobility as tolerated.  -     Row Name 07/16/22 3703          Therapy Assessment/Plan (PT)    Patient/Family Therapy Goals Statement (PT) Return to PLOF  -     Rehab Potential (PT) good, to achieve stated therapy goals  -     Criteria for Skilled Interventions Met (PT) yes  -     Therapy Frequency (PT) daily  -     Row Name 07/16/22 1255          Vital Signs    O2 Delivery Pre Treatment room air  -     Intra SpO2 (%) 86  -     O2 Delivery Intra Treatment room air  -BH     Post SpO2 (%) 90  -BH     O2 Delivery Post Treatment room air  -     Row Name 07/16/22 1252          Positioning and Restraints    Pre-Treatment Position in bed  Sitting EOB with OT  -     Post Treatment Position bed  -BH     In Bed supine;call light within reach;encouraged to call for assist;exit alarm on  -           User Key  (r) = Recorded By, (t) = Taken By, (c) = Cosigned By    Initials Name Provider Type     Marie Lugo, PT Physical Therapist               Outcome Measures     Row Name 07/16/22 3199          How much help from another person do you currently need...    Turning from your back to your side while in flat bed without using bedrails? 3  -BH     Moving from lying on back to sitting on the side of a flat bed without bedrails? 2  -BH     Moving to and from a bed to a chair (including a  wheelchair)? 3  -     Standing up from a chair using your arms (e.g., wheelchair, bedside chair)? 3  -     Climbing 3-5 steps with a railing? 2  -     To walk in hospital room? 2  -     AM-PAC 6 Clicks Score (PT) 15  -     Highest level of mobility 4 --> Transferred to chair/commode  -     Row Name 07/16/22 0939          Modified Drain Scale    Modified [place] Scale 4 - Moderately severe disability.  Unable to walk without assistance, and unable to attend to own bodily needs without assistance.  -     Row Name 07/16/22 1304 07/16/22 0939       Functional Assessment    Outcome Measure Options AM-PAC 6 Clicks Basic Mobility (PT)  - AM-PAC 6 Clicks Daily Activity (OT);Modified [place]  -          User Key  (r) = Recorded By, (t) = Taken By, (c) = Cosigned By    Initials Name Provider Type     Aishwarya Gallardo, OT Occupational Therapist     Marie Lugo, PT Physical Therapist                             Physical Therapy Education                 Title: PT OT SLP Therapies (In Progress)     Topic: Physical Therapy (Done)     Point: Mobility training (Done)     Learning Progress Summary           Patient Acceptance, E,TB,D, VU,NR by  at 7/16/2022 1304                   Point: Home exercise program (Done)     Learning Progress Summary           Patient Acceptance, E,TB,D, VU,NR by  at 7/16/2022 1304                   Point: Body mechanics (Done)     Learning Progress Summary           Patient Acceptance, E,TB,D, VU,NR by  at 7/16/2022 1304                   Point: Precautions (Done)     Learning Progress Summary           Patient Acceptance, E,TB,D, VU,NR by  at 7/16/2022 1304                               User Key     Initials Effective Dates Name Provider Type Discipline     04/08/22 -  Marie Lugo, PT Physical Therapist PT              PT Recommendation and Plan  Planned Therapy Interventions (PT): balance training, bed mobility training, gait training, home exercise program,  patient/family education, strengthening, transfer training  Plan of Care Reviewed With: patient  Outcome Evaluation: Pt is a 72 yo F POD 1 R reverse TSA. Pt lives with her spouse and reports independence at BL with both a rwx and cane. Pt reports hx of CVA and residual R deficits. Pt wearing a R winter glove 2/2 reported sensation deficits. Pt presents to PT with impaired strength, endurance, and balance limiting overall mobility. Pt is NWB RUE and unable to use a rwx at this time, but declines holding onto PT's hand to mimic a cane. Pt with c/o SOA when sitting EOB, but agreeable to some ambulation in her room. Pt ambulated to bathroom with CGA and completed toilet transfer. Pt requested seated rest break at EOB, but then able to walk additional 20ft in room, requesting return to supine. O2 sats 86-87% on room air with mobility. Pt able to return to supine with SBA and cues to scoot up to HOB. Pt will continue to benefit from skilled PT to address functional deficits and improve overall mobility. Anticipate D/C to SNF vs home with HHPT and 24/7 care as pt unable to independently perform daily tasks and unsafe with mobility at this time. PT will continue to progress mobility as tolerated.     Time Calculation:    PT Charges     Row Name 07/16/22 1305             Time Calculation    Start Time 0853  -      Stop Time 0907  -      Time Calculation (min) 14 min  -      PT Received On 07/16/22  -      PT - Next Appointment 07/17/22  -      PT Goal Re-Cert Due Date 07/23/22  -              Time Calculation- PT    Total Timed Code Minutes- PT 10 minute(s)  -              Timed Charges    43233 - Gait Training Minutes  8  -      91405 - PT Therapeutic Activity Minutes 2  -BH              Untimed Charges    PT Eval/Re-eval Minutes 5  -BH              Total Minutes    Timed Charges Total Minutes 10  -BH      Untimed Charges Total Minutes 5  -BH       Total Minutes 15  -BH            User Key  (r) = Recorded  By, (t) = Taken By, (c) = Cosigned By    Initials Name Provider Type     Marie Lugo, PT Physical Therapist              Therapy Charges for Today     Code Description Service Date Service Provider Modifiers Qty    75708139601 HC GAIT TRAINING EA 15 MIN 7/16/2022 Marie Lugo, PT GP 1    96356832015 HC PT EVAL MOD COMPLEXITY 2 7/16/2022 Marie Lugo, PT GP 1          PT G-Codes  Outcome Measure Options: AM-PAC 6 Clicks Basic Mobility (PT)  AM-PAC 6 Clicks Score (PT): 15  AM-PAC 6 Clicks Score (OT): 13  Modified [place] Scale: 4 - Moderately severe disability.  Unable to walk without assistance, and unable to attend to own bodily needs without assistance.    Marie Lugo, PT  7/16/2022

## 2022-07-16 NOTE — THERAPY EVALUATION
Patient Name: Dalila Javed  : 1950    MRN: [id number]                              Today's Date: 2022       Admit Date: 7/15/2022    Visit Dx: No diagnosis found.  Patient Active Problem List   Diagnosis   • Adjustment disorder with mixed anxiety and depressed mood   • Atopic rhinitis   • Coronary arteriosclerosis in native artery   • Cobalamin deficiency   • Benign colonic polyp   • Lumbar radiculopathy   • Controlled diabetes mellitus type 2 with complications ([facility])   • Binocular vision disorder with diplopia   • Dyslipidemia   • Arthropathy of hand   • Knee pain   • Cramps of lower extremity   • Low back pain   • Chronic nausea   • Obstructive sleep apnea syndrome   • Palpitations   • Ventricular premature beats   • Cephalalgia   • Colonic constipation   • Neurocardiogenic syncope   • Vitamin D deficiency   • DODSON (nonalcoholic steatohepatitis)   • Fibromyalgia   • Morbidly obese ([facility])   • Polyp of colon   • Family history of colonic polyps   • Family history of malignant neoplasm of colon   • Acute CVA (cerebrovascular accident) ([facility])   • Episode of dizziness   • Dizziness   • History of stroke   • Chronic right shoulder pain   • Encephalopathy, metabolic   • Migraine without aura or status migrainosus   • Parkinson disease ([facility])   • Hypokalemia   • Benign paroxysmal positional vertigo   • Chronic obstructive pulmonary disease ([facility])   • Gastroesophageal reflux disease   • History of percutaneous transluminal coronary angioplasty   • Hypercholesterolemia   • Hypertensive disorder   • Myocardial infarction ([facility])   • Occipital neuralgia   • Patent foramen ovale   • Transient cerebral ischemia   • Arm pain, anterior, right   • TIA (transient ischemic attack)   • Pathological fracture of vertebra due to osteoporosis with delayed healing   • Compression fracture of L1 vertebra with delayed healing   • Status post reverse arthroplasty of right shoulder     Past Medical History:   Diagnosis Date    • Abnormal weight gain    • Acquired trigger finger    • Acute myocardial infarction ([facility])    • Adjustment reaction with mixed emotional features    • Arthritis    • ASHD (arteriosclerotic heart disease)    • Asthma    • B12 deficiency    • Bacterial pneumonia    • Williamson esophagus    • Benign colonic polyp    • Bilateral knee pain    • Birthmark     haemangioma of the bone   • Bronchiectasis ([facility])    • CHF (congestive heart failure) ([facility])    • Chronic cough    • Chronic glomerulonephritis with exudative nephritis    • Chronic lumbar radiculopathy    • Diabetes mellitus ([facility])    • Diabetic peripheral neuropathy ([facility])    • Dyslipidemia    • Fibromyositis    • Fracture, humerus 2022    RIGHT   • GERD (gastroesophageal reflux disease)    • Herpes zoster    • Hyperlipidemia    • Hypertension    • Lupus anticoagulant disorder ([facility])    • Mycobacterium avium complex ([facility])    • Nephrolithiasis    • SILVIANO (obstructive sleep apnea)     DOESN'T USE HER MACHINE   • Palpitations    • Premature ventricular contraction    • Slow transit constipation    • Stroke ([facility]) 2020    RT SIDED WEAKNESS   • Vitamin D deficiency      Past Surgical History:   Procedure Laterality Date   • APPENDECTOMY     • BRONCHOSCOPY     •  SECTION     • CHOLECYSTECTOMY     • COLONOSCOPY     • COLONOSCOPY N/A 2019    Procedure: COLONOSCOPY to cecum with cold biopsy and cold and hotsnare polypectomies;  Surgeon: Suzy Eubanks MD;  Location: [facility] ENDOSCOPY;  Service: Gastroenterology   • CORONARY ANGIOPLASTY WITH STENT PLACEMENT     • EMBOLECTOMY N/A 2020    Procedure: EMERGENT CEREBRAL ANGIOGRAM;  Surgeon: Jonh Meehan MD;  Location: [facility] OR ;  Service: Neurosurgery;  Laterality: N/A;   • ENDOSCOPY N/A 2019    Procedure: ESOPHAGOGASTRODUODENOSCOPY with cold biopsies;  Surgeon: Suzy Eubanks MD;  Location: [facility] ENDOSCOPY;  Service: Gastroenterology   • KNEE ARTHROSCOPY     • OTHER SURGICAL  HISTORY      elbow surgery   • ROTATOR CUFF REPAIR     • TUBAL ABDOMINAL LIGATION        General Information     Row Name 07/16/22 0932          OT Time and Intention    Document Type evaluation  -RB     Mode of Treatment co-treatment;physical therapy;occupational therapy  -RB     Row Name 07/16/22 0932          General Information    Patient Profile Reviewed yes  -RB     Prior Level of Function independent:;ADL's;transfer  -RB     Existing Precautions/Restrictions fall;non-weight bearing;shoulder  -RB     Barriers to Rehab medically complex;previous functional deficit;cognitive status  -RB     Row Name 07/16/22 0932          Living Environment    People in Home spouse  -RB     Row Name 07/16/22 0932          Cognition    Orientation Status (Cognition) oriented x 3;other (see comments)  general confusion present - ? memory deficits  -RB     Row Name 07/16/22 0932          Safety Issues, Functional Mobility    Safety Issues Affecting Function (Mobility) awareness of need for assistance;insight into deficits/self-awareness;judgment;problem-solving;safety precaution awareness;safety precautions follow-through/compliance;sequencing abilities  -RB     Impairments Affecting Function (Mobility) balance;cognition;endurance/activity tolerance;pain;strength;shortness of breath;sensation/sensory awareness;range of motion (ROM)  -RB           User Key  (r) = Recorded By, (t) = Taken By, (c) = Cosigned By    Initials Name Provider Type    RB Aishwarya Gallardo OT Occupational Therapist                 Mobility/ADL's     Row Name 07/16/22 0933          Bed Mobility    Bed Mobility sit-supine  -RB     Sit-Supine [place] (Bed Mobility) contact guard;verbal cues  -RB     Bed Mobility, Safety Issues decreased use of arms for pushing/pulling;decreased use of legs for bridging/pushing  -RB     Assistive Device (Bed Mobility) bed rails  -RB     Row Name 07/16/22 0933          Transfers    Transfers sit-stand transfer;stand-sit  transfer;toilet transfer  -RB     Sit-Stand [place] (Transfers) contact guard;verbal cues  -RB     Stand-Sit [place] (Transfers) contact guard;verbal cues  -RB     [place] Level (Toilet Transfer) contact guard;verbal cues  -RB     Row Name 07/16/22 0933          Toilet Transfer    Type (Toilet Transfer) stand-sit;sit-stand  -RB     [facility] Name 07/16/22 0933          Functional Mobility    Functional Mobility- Ind. Level verbal cues required;contact guard assist  -RB     Functional Mobility- Safety Issues balance decreased during turns  -RB     Functional Mobility- Comment only tolerated short mobility to the bathroom and room curtains  -RB     Row Name 07/16/22 0933          Activities of Daily Living    BADL Assessment/Intervention toileting  -RB     [facility] Name 07/16/22 0933          Toileting Assessment/Training    [place] Level (Toileting) toileting skills;verbal cues;contact guard assist  -RB     Position (Toileting) supported sitting;supported standing  -RB           User Key  (r) = Recorded By, (t) = Taken By, (c) = Cosigned By    Initials Name Provider Type    RB Aishwarya Gallardo OT Occupational Therapist               Obj/Interventions     Row Name 07/16/22 0935          Sensory Assessment (Somatosensory)    Sensory Assessment R side baseline sensation deficits from a prior stroke. Pt wears a winter glove on that hand.  -[facility] Name 07/16/22 0935          Vision Assessment/Intervention    Visual Impairment/Limitations WFL  -[facility] Name 07/16/22 0935          Range of Motion Comprehensive    Comment, General Range of Motion RUE nerve block remains partially intact - Unable to fully make a fist or bend her arm into flexion/extension of the wrist and bicep  -[facility] Name 07/16/22 0935          Strength Comprehensive (MMT)    Comment, General Manual Muscle Testing (MMT) Assessment Prior weakness in her RUE from her stroke. No active shoulder ROM completed this AM 2/2 to precautions -  R hand, wrist and elbow AAROM WFL  -RB     Row Name 07/16/22 0935          Shoulder (Therapeutic Exercise)    Shoulder (Therapeutic Exercise) pendulum exercises  -RB     Shoulder Pendulum Exercises (Therapeutic Exercise) right;standing;30 seconds duration  -RB     Row Name 07/16/22 0935          Elbow/Forearm (Therapeutic Exercise)    Elbow/Forearm (Therapeutic Exercise) AAROM (active assistive range of motion)  -RB     Elbow/Forearm AAROM (Therapeutic Exercise) right;flexion;extension;supination;pronation;10 repetitions  -RB     Row Name 07/16/22 0935          Motor Skills    Therapeutic Exercise shoulder;elbow/forearm  -RB     Row Name 07/16/22 0935          Balance    Comment, Balance Supervision for sitting balance at the EOB. CGA for standing balance.  -RB           User Key  (r) = Recorded By, (t) = Taken By, (c) = Cosigned By    Initials Name Provider Type    Aishwarya Martinez OT Occupational Therapist               Goals/Plan     Row Name 07/16/22 0938          Bed Mobility Goal 1 (OT)    Activity/Assistive Device (Bed Mobility Goal 1, OT) bed mobility activities, all  -RB     [place] Level/Cues Needed (Bed Mobility Goal 1, OT) supervision required  -RB     Time Frame (Bed Mobility Goal 1, OT) short term goal (STG);2 weeks  -RB     Progress/Outcomes (Bed Mobility Goal 1, OT) continuing progress toward goal  -RB     Row Name 07/16/22 0938          Transfer Goal 1 (OT)    Activity/Assistive Device (Transfer Goal 1, OT) transfers, all  -RB     [place] Level/Cues Needed (Transfer Goal 1, OT) supervision required  -RB     Time Frame (Transfer Goal 1, OT) short term goal (STG);2 weeks  -RB     Progress/Outcome (Transfer Goal 1, OT) continuing progress toward goal  -RB     Row Name 07/16/22 0938          Dressing Goal 1 (OT)    Activity/Device (Dressing Goal 1, OT) dressing skills, all  -RB     [place]/Cues Needed (Dressing Goal 1, OT) supervision required  -RB     Time Frame (Dressing Goal 1,  OT) short term goal (STG);2 weeks  -RB     Progress/Outcome (Dressing Goal 1, OT) continuing progress toward goal  -RB     Row Name 07/16/22 0938          Toileting Goal 1 (OT)    Activity/Device (Toileting Goal 1, OT) toileting skills, all  -RB     [place] Level/Cues Needed (Toileting Goal 1, OT) supervision required  -RB     Time Frame (Toileting Goal 1, OT) short term goal (STG);2 weeks  -RB     Progress/Outcome (Toileting Goal 1, OT) continuing progress toward goal  -RB     Row Name 07/16/22 0938          Grooming Goal 1 (OT)    Activity/Device (Grooming Goal 1, OT) grooming skills, all  -RB     [place] (Grooming Goal 1, OT) supervision required  -RB     Time Frame (Grooming Goal 1, OT) short term goal (STG);2 weeks  -RB     Progress/Outcome (Grooming Goal 1, OT) continuing progress toward goal  -RB     Row Name 07/16/22 0938          ROM Goal 1 (OT)    ROM Goal 1 (OT) Pt to demo RUE HEP and sling management with Mod I.  -RB     Time Frame (ROM Goal 1, OT) short term goal (STG);2 weeks  -RB     Progress/Outcome (ROM Goal 1, OT) continuing progress toward goal  -RB     Row Name 07/16/22 0938          Therapy Assessment/Plan (OT)    Planned Therapy Interventions (OT) transfer/mobility retraining;ROM/therapeutic exercise;strengthening exercise;activity tolerance training;adaptive equipment training;BADL retraining;cognitive/visual perception retraining;functional balance retraining;occupation/activity based interventions;patient/caregiver education/training  -RB           User Key  (r) = Recorded By, (t) = Taken By, (c) = Cosigned By    Initials Name Provider Type    RB Aishwarya Gallardo, OT Occupational Therapist               Clinical Impression     Row Name 07/16/22 0937          Pain Assessment    Pretreatment Pain Rating 3/10  -RB     Posttreatment Pain Rating 8/10  -RB     Pain Location - back;shoulder  -RB     Pain Intervention(s) Repositioned;Cold pack;Elevated;Rest  -RB     Row Name 07/16/22 0937           Plan of Care Review    Plan of Care Reviewed With patient  -RB     Progress no change  -RB     Outcome Evaluation The pt is s/p R total reverse shouldler arthroplasty performed by Dr. Mckeon. The pt reports she has had a prior stroke and remains with R side deficits. She reports independence at home and she uses both a cane and walker. She wears a winter glove on her R hand due to sensation deficits. Nerve block partially remains intact. She c/o choking on her food while sitting at the EOB, lots of coughing noticed initially. O2 sats on room air at 90%. She briefly participated in her post op HEP and education provided on positioning, safety and camille dressing of her extremity when she is d/c back to her home. The pt appears confused at times and unable to relay back information. No family at bedside. PT entered the room and the pt mobilized with us to the bathroom with CGA. O2 with exertion 86-87% on room air. SOB reported. She tolerated mobility to her room curtains and requested to return to supine. O2 from supine 90%. RN notified of all events. Will continue to follow.     Patient was not wearing a face mask during this therapy encounter. Therapist used appropriate personal protective equipment including mask and gloves. Mask used was standard procedure mask. Appropriate PPE was worn during the entire therapy session. Hand hygiene was completed before and after therapy session. Patient is not in enhanced droplet precautions.  -RB     Row Name 07/16/22 0937          Therapy Assessment/Plan (OT)    Rehab Potential (OT) good, to achieve stated therapy goals  -RB     Criteria for Skilled Therapeutic Interventions Met (OT) yes;skilled treatment is necessary  -RB     Therapy Frequency (OT) 5 times/wk  -RB     Row Name 07/16/22 0937          Therapy Plan Review/Discharge Plan (OT)    Anticipated Discharge Disposition (OT) skilled nursing facility;home with 24/7 care;home with outpatient therapy services  -RB      Row Name 07/16/22 0937          Vital Signs    Pre SpO2 (%) 91  -RB     O2 Delivery Pre Treatment room air  -RB     Intra SpO2 (%) 86  -RB     O2 Delivery Intra Treatment room air  -RB     Post SpO2 (%) 90  -RB     O2 Delivery Post Treatment room air  -RB     Pre Patient Position Sitting  -RB     Intra Patient Position Standing  -RB     Post Patient Position Supine  -RB     Row Name 07/16/22 0937          Positioning and Restraints    Pre-Treatment Position in bed  -RB     Post Treatment Position bed  -RB     In Bed notified nsg;supine;fowlers;call light within reach;encouraged to call for assist;exit alarm on  -RB           User Key  (r) = Recorded By, (t) = Taken By, (c) = Cosigned By    Initials Name Provider Type    Aishwarya Martinez OT Occupational Therapist               Outcome Measures     Row Name 07/16/22 0939          How much help from another is currently needed...    Putting on and taking off regular lower body clothing? 1  -RB     Bathing (including washing, rinsing, and drying) 2  -RB     Toileting (which includes using toilet bed pan or urinal) 2  -RB     Putting on and taking off regular upper body clothing 2  -RB     Taking care of personal grooming (such as brushing teeth) 3  -RB     Eating meals 3  -RB     AM-PAC 6 Clicks Score (OT) 13  -RB     Row Name 07/16/22 0939          Modified Julissa Scale    Modified Wayne Scale 4 - Moderately severe disability.  Unable to walk without assistance, and unable to attend to own bodily needs without assistance.  -RB     Row Name 07/16/22 0939          Functional Assessment    Outcome Measure Options AM-PAC 6 Clicks Daily Activity (OT);Modified Wayne  -RB           User Key  (r) = Recorded By, (t) = Taken By, (c) = Cosigned By    Initials Name Provider Type    Aishwarya Martinez OT Occupational Therapist                Occupational Therapy Education                 Title: PT OT SLP Therapies (In Progress)     Topic: Occupational Therapy (Not  Started)     Point: ADL training (Not Started)     Description:   Instruct learner(s) on proper safety adaptation and remediation techniques during self care or transfers.   Instruct in proper use of assistive devices.              Learner Progress:  Not documented in this visit.          Point: Home exercise program (Not Started)     Description:   Instruct learner(s) on appropriate technique for monitoring, assisting and/or progressing therapeutic exercises/activities.              Learner Progress:  Not documented in this visit.          Point: Precautions (Not Started)     Description:   Instruct learner(s) on prescribed precautions during self-care and functional transfers.              Learner Progress:  Not documented in this visit.          Point: Body mechanics (Not Started)     Description:   Instruct learner(s) on proper positioning and spine alignment during self-care, functional mobility activities and/or exercises.              Learner Progress:  Not documented in this visit.                          OT Recommendation and Plan  Planned Therapy Interventions (OT): transfer/mobility retraining, ROM/therapeutic exercise, strengthening exercise, activity tolerance training, adaptive equipment training, BADL retraining, cognitive/visual perception retraining, functional balance retraining, occupation/activity based interventions, patient/caregiver education/training  Therapy Frequency (OT): 5 times/wk  Plan of Care Review  Plan of Care Reviewed With: patient  Progress: no change  Outcome Evaluation: The pt is s/p R total reverse shouldler arthroplasty performed by Dr. Mckeon. The pt reports she has had a prior stroke and remains with R side deficits. She reports independence at home and she uses both a cane and walker. She wears a winter glove on her R hand due to sensation deficits. Nerve block partially remains intact. She c/o choking on her food while sitting at the EOB, lots of coughing noticed  initially. O2 sats on room air at 90%. She briefly participated in her post op HEP and education provided on positioning, safety and camille dressing of her extremity when she is d/c back to her home. The pt appears confused at times and unable to relay back information. No family at bedside. PT entered the room and the pt mobilized with us to the bathroom with CGA. O2 with exertion 86-87% on room air. SOB reported. She tolerated mobility to her room curtains and requested to return to supine. O2 from supine 90%. RN notified of all events. Will continue to follow.     Patient was not wearing a face mask during this therapy encounter. Therapist used appropriate personal protective equipment including mask and gloves. Mask used was standard procedure mask. Appropriate PPE was worn during the entire therapy session. Hand hygiene was completed before and after therapy session. Patient is not in enhanced droplet precautions.     Time Calculation:    Time Calculation- OT     Row Name 07/16/22 0931             Time Calculation- OT    OT Start Time 0837  -RB      OT Stop Time 0913  -RB      OT Time Calculation (min) 36 min  -RB      Total Timed Code Minutes- OT 25 minute(s)  -RB      OT Received On 07/16/22  -RB      OT - Next Appointment 07/18/22  -RB      OT Goal Re-Cert Due Date 07/30/22  -RB              Timed Charges    11650 - OT Therapeutic Activity Minutes 10  -RB      50390 - OT Self Care/Mgmt Minutes 15  -RB              Untimed Charges    OT Eval/Re-eval Minutes 11  -RB              Total Minutes    Timed Charges Total Minutes 25  -RB      Untimed Charges Total Minutes 11  -RB       Total Minutes 36  -RB            User Key  (r) = Recorded By, (t) = Taken By, (c) = Cosigned By    Initials Name Provider Type    Aishwarya Martinez OT Occupational Therapist              Therapy Charges for Today     Code Description Service Date Service Provider Modifiers Qty    68020827518 HC OT EVAL MOD COMPLEXITY 2 7/16/2022  Aishwarya Gallardo, OT GO 1    57181953413 HC OT SELF CARE/MGMT/TRAIN EA 15 MIN 7/16/2022 Aishwarya Gallardo OT GO 1    86000150537 HC OT THERAPEUTIC ACT EA 15 MIN 7/16/2022 Aishwarya Gallardo OT GO 1               Aishwarya Gallardo OT  7/16/2022

## 2022-07-16 NOTE — PLAN OF CARE
Goal Outcome Evaluation:  Plan of Care Reviewed With: patient        Progress: improving  Outcome Evaluation: Pt remains stable. Bed alarm refused because of urinary frequency, ambulating well. Sling to RUE, dressing is cdi. Voiding frequenty but bladder scan volume low. Block remains in effect to RUE. Pain well controlled, percocet given x1. C/O nausea, zofran given with relief. Educated on diabetes management. Plans to dc home today. WCTM.

## 2022-07-16 NOTE — DISCHARGE INSTRUCTIONS
Reverse TSA Discharge Instructions  Dr. KYRIE Fatima” Mike OBRIEN  [phone]    INCISION CARE  Wash your hands prior to dressing changes  The sling should be left in place until seen in the office unless changing the dressing or performing hygeine. New dry dressings can be placed over the incision daily starting on postop day #5. Dressings should be changed daily until seen in the office or the incision had no drainage.   No creams or ointments to the incision until 4 weeks post op.   May remove dressing once the incision is completely free of drainage. But need to wait at least a 2 weeks after surgery.  Do not touch or pick at the incision  Check incision every day and notify surgeon immediately if any of the following signs or symptoms are seen:  Increase in redness  Increase in swelling around the incision and of the entire extremity  Increase in pain  NEW drainage or oozing from the incision  Pulling apart of the edges of the incision  Increase in overall body temperature (greater than 100.4 degrees)  Your surgeon will instruct you regarding suture or staple removal. In general, this happens at the 2-week post op appointment.    ACTIVITIES  Exercises:  Physical therapy will begin at the discretion of the surgeon. You may have to wait some time before beginning therapy to allow for healing.   Keep the arm in the sling at all times unless showering.  Use cold packs for 20-30 minutes approximately 5 times per day.  Do not work on range of motion exercises of the shoulder until instructed to do so.   Activities of Daily Living:   The incision cannot get wet after surgery which usually prevents any showering. It is ok to shower starting on postoperative day #5 assuming no drainage from the incision. Do not rub the incision site. Allow soapy water to rinse the shoulder. A new dry dressing should be applied afterwards. May shower and let water run over the incision if there is no drainage and the wound is well healing.    No tub baths, hot tubs, or swimming pools for 4 weeks.     Restrictions  Weight: Do not put weight on the arm or hand until instructed to do so. Your surgeon will discuss with restrictions in terms of activities allowed. In general anything more strenuous than holding a pen or piece of paper should be avoided, the other arm should do the vast majority of the work.   Driving: Many patients have questions about when it is safe to return to driving. The answer is that this is extremely variable. It depends on the extent of the surgery, as well as how quickly you heal. Until you can safely navigate the steering wheel, and are off of all narcotics, driving is not permitted. Your surgeon cannot “clear” you to return to driving, only you can make the decision when you feel it is safe.     Medications  Anticoagulants: After upper extremity surgery most patients do not require an anticoagulant unless you have another injury that will be keeping you from mobilizing. Lower extremity surgery typically does require use of an anticoagulation medicine.   IF YOU HAD LOWER EXTREMITY SURGERY AND ARE NOT DISCHARGED HOME WITH ANY ANTICOAGULANT MEDICINE YOU SHOULD TAKE ASPIRIN 325mg DAILY FOR 30 DAYS POSTOPERATIVELY.  If you are discharged home with an anticoagulant such as Aspirin, Xarelto, Eliquis, Coumadin, or Lovenox, follow these simple instructions:   Notify surgeon immediately if any sarah bleeding is noted in the urine, stool, emesis, or from the nose or the incision. Blood in the stool will often appear as black rather than red. Blood in urine may appear as pink. Blood in emesis may appear as brown/black like coffee grounds.  You will need to apply pressure for longer periods of time to any cuts or abrasions to stop bleeding  Avoid alcohol while taking anticoagulants  Most anticoagulants are to be taken for 30 days postoperatively. After this time, you may stop using them unless instructed otherwise.   If you were already  taking an anticoagulant (commonly Aspirin, Coumadin, or Plavix) you will likely be resuming your normal dose postoperatively and will be continuing that medication at the discretion of the prescribing physician.  Stool Softeners: You will be at greater risk of constipation after surgery due to being less mobile and the pain medications.  Take stool softeners as needed. Over the counter Colace 100 mg 1-2 capsules twice daily can be taken.  If stools become too loose or too frequent, please decreases the dosage or stop the stool softener.  If constipation occurs despite use of stool softeners, you are to continue the stool softeners and add a laxative (Milk of Magnesia 1 ounce daily as needed)  Drink plenty of fluids, and eat fruits and vegetables during your recovery time. Getting up and mobilizing will help the bowels to recover their regular function, as will weaning off of all narcotics when the pain becomes tolerable.  Pain Medications utilized after surgery are narcotics. This is some general information about these medications.  CLASSIFICATION: Pain medications are called Opioids and are narcotics  LEGALITIES: It is illegal to share narcotics with others  DRIVING: it is illegal to drive while under the influence of narcotics. Doing so is a DUI.  POTENTIAL SIDE EFFECTS: nausea, vomiting, itching, dizziness, drowsiness, dry mouth, constipation, and difficulty urinating.  POTENTIAL ADVERSE EFFECTS:  Opioid tolerance can develop with use of pain medications and this simply means that it requires more and more of the medication to control pain. However, this is seen more in patients that use opioids for longer periods of time.  Opioid dependence can develop with use of Opioids. People with opioid dependence will experience withdrawal symptoms upon cessation of the medication.  Opioid addiction can develop with use of Opioids. The incidence of this is very unlikely in patients who take the medications as ordered and  stop the medications as instructed.  Opioid overdose can be dangerous, but is unlikely when the medication is taken as ordered and stopped when ordered. It is important not to mix opioids with alcohol as this can lead to over sedation and respiratory difficulty.  DOSAGE:  After the initial surgical pain begins to resolve, you may begin to decrease the pain medication. By the end of a few weeks, you should be off of pain medications.  Refills will not be given by the office during evening hours, on weekends, or after 6 weeks post-op. You are responsible for weaning off of pain medication. You can increase the time between narcotic pills, taking one every 4 then 6 then 8 hours and so on.  To seek refills on pain medications during the initial 6-week post-operative period, you must call the office to request the refill. The office will then notify you when to  the prescription. DO NOT wait until you are out of the medication to request a refill. Prescriptions will not be filled over the weekend and depending on the schedule, it may take a couple days for the prescription to be available. Someone will have to pick the prescription in person at the office.    FOLLOW-UP VISITS  You will need to follow up in the office with your surgeon in 2 weeks, or as instructed elsewhere in your discharge paperwork. Please call this number [phone] to schedule this appointment. If you are going to an SNF facility, they will arrange for you to follow up in the office.   If you have any concerns or suspected complications prior to your follow up visit, please call the office. Do not wait until your appointment time if you suspect complications. These will need to be addressed in the office promptly.    Dk Mckeon II, MD  Orthopaedic Surgery  [facility]

## 2022-07-16 NOTE — PLAN OF CARE
The pt is s/p R total reverse shouldler arthroplasty performed by Dr. Mckeon. The pt reports she has had a prior stroke and remains with R side deficits. She reports independence at home and she uses both a cane and walker. She wears a winter glove on her R hand due to sensation deficits. Nerve block partially remains intact. She c/o choking on her food while sitting at the EOB, lots of coughing noticed initially. O2 sats on room air at 90%. She briefly participated in her post op HEP and education provided on positioning, safety and camille dressing of her extremity when she is d/c back to her home. The pt appears confused at times and unable to relay back information. No family at bedside. PT entered the room and the pt mobilized with us to the bathroom with CGA. O2 with exertion 86-87% on room air. SOB reported. She tolerated mobility to her room curtains and requested to return to supine. O2 from supine 90%. RN notified of all events. Pt requesting to remain admitted. Will continue to follow.    Patient was not wearing a face mask during this therapy encounter. Therapist used appropriate personal protective equipment including mask and gloves. Mask used was standard procedure mask. Appropriate PPE was worn during the entire therapy session. Hand hygiene was completed before and after therapy session. Patient is not in enhanced droplet precautions.

## 2022-07-16 NOTE — PLAN OF CARE
Goal Outcome Evaluation:              Outcome Evaluation: PT arrived to floor alert and O x 4, pain 4/10, new ice pack applied to RUE.  at BS, VSS, verbalized anesthetic  remains present affect. Ambulated to bed from stretcher.

## 2022-07-17 LAB
ANION GAP SERPL CALCULATED.3IONS-SCNC: 12 MMOL/L (ref 5–15)
BUN SERPL-MCNC: 13 MG/DL (ref 8–23)
BUN/CREAT SERPL: 14.4 (ref 7–25)
CALCIUM SPEC-SCNC: 9.1 MG/DL (ref 8.6–10.5)
CHLORIDE SERPL-SCNC: 96 MMOL/L (ref 98–107)
CO2 SERPL-SCNC: 27 MMOL/L (ref 22–29)
CREAT SERPL-MCNC: 0.9 MG/DL (ref 0.57–1)
DEPRECATED RDW RBC AUTO: 39.6 FL (ref 37–54)
EGFRCR SERPLBLD CKD-EPI 2021: 67.6 ML/MIN/1.73
ERYTHROCYTE [DISTWIDTH] IN BLOOD BY AUTOMATED COUNT: 12.4 % (ref 12.3–15.4)
GLUCOSE SERPL-MCNC: 187 MG/DL (ref 65–99)
HCT VFR BLD AUTO: 38.9 % (ref 34–46.6)
HGB BLD-MCNC: 13.2 G/DL (ref 12–15.9)
MCH RBC QN AUTO: 30.5 PG (ref 26.6–33)
MCHC RBC AUTO-ENTMCNC: 33.9 G/DL (ref 31.5–35.7)
MCV RBC AUTO: 89.8 FL (ref 79–97)
PLATELET # BLD AUTO: 216 10*3/MM3 (ref 140–450)
PMV BLD AUTO: 10 FL (ref 6–12)
POTASSIUM SERPL-SCNC: 3.9 MMOL/L (ref 3.5–5.2)
RBC # BLD AUTO: 4.33 10*6/MM3 (ref 3.77–5.28)
SODIUM SERPL-SCNC: 135 MMOL/L (ref 136–145)
WBC NRBC COR # BLD: 15.7 10*3/MM3 (ref 3.4–10.8)

## 2022-07-17 PROCEDURE — 85027 COMPLETE CBC AUTOMATED: CPT | Performed by: STUDENT IN AN ORGANIZED HEALTH CARE EDUCATION/TRAINING PROGRAM

## 2022-07-17 PROCEDURE — 97110 THERAPEUTIC EXERCISES: CPT

## 2022-07-17 PROCEDURE — 80048 BASIC METABOLIC PNL TOTAL CA: CPT | Performed by: STUDENT IN AN ORGANIZED HEALTH CARE EDUCATION/TRAINING PROGRAM

## 2022-07-17 PROCEDURE — 25010000002 ONDANSETRON PER 1 MG: Performed by: ORTHOPAEDIC SURGERY

## 2022-07-17 RX ORDER — ACETAMINOPHEN 325 MG/1
650 TABLET ORAL EVERY 4 HOURS PRN
Status: DISCONTINUED | OUTPATIENT
Start: 2022-07-17 | End: 2022-07-18 | Stop reason: HOSPADM

## 2022-07-17 RX ORDER — OLANZAPINE 10 MG/1
5 INJECTION, POWDER, LYOPHILIZED, FOR SOLUTION INTRAMUSCULAR ONCE AS NEEDED
Status: COMPLETED | OUTPATIENT
Start: 2022-07-17 | End: 2022-07-17

## 2022-07-17 RX ADMIN — CITALOPRAM 20 MG: 20 TABLET, FILM COATED ORAL at 09:51

## 2022-07-17 RX ADMIN — MIRTAZAPINE 7.5 MG: 15 TABLET, FILM COATED ORAL at 20:52

## 2022-07-17 RX ADMIN — METHOCARBAMOL TABLETS 750 MG: 750 TABLET, COATED ORAL at 09:51

## 2022-07-17 RX ADMIN — GLIPIZIDE 5 MG: 5 TABLET ORAL at 18:13

## 2022-07-17 RX ADMIN — METOPROLOL TARTRATE 50 MG: 50 TABLET, FILM COATED ORAL at 09:51

## 2022-07-17 RX ADMIN — ACETAMINOPHEN 650 MG: 325 TABLET, FILM COATED ORAL at 18:23

## 2022-07-17 RX ADMIN — Medication 10 ML: at 20:52

## 2022-07-17 RX ADMIN — ACETAMINOPHEN 650 MG: 325 TABLET, FILM COATED ORAL at 22:29

## 2022-07-17 RX ADMIN — OLANZAPINE 5 MG: 10 INJECTION, POWDER, FOR SOLUTION INTRAMUSCULAR at 01:14

## 2022-07-17 RX ADMIN — GLIPIZIDE 5 MG: 5 TABLET ORAL at 09:51

## 2022-07-17 RX ADMIN — METOPROLOL TARTRATE 50 MG: 50 TABLET, FILM COATED ORAL at 20:52

## 2022-07-17 RX ADMIN — METHOCARBAMOL TABLETS 750 MG: 750 TABLET, COATED ORAL at 16:40

## 2022-07-17 RX ADMIN — LAMOTRIGINE 25 MG: 25 TABLET ORAL at 20:52

## 2022-07-17 RX ADMIN — FUROSEMIDE 40 MG: 40 TABLET ORAL at 09:51

## 2022-07-17 RX ADMIN — CARBIDOPA AND LEVODOPA 1 TABLET: 10; 100 TABLET ORAL at 20:52

## 2022-07-17 RX ADMIN — FAMOTIDINE 40 MG: 20 TABLET ORAL at 09:51

## 2022-07-17 RX ADMIN — LAMOTRIGINE 25 MG: 25 TABLET ORAL at 09:51

## 2022-07-17 RX ADMIN — ONDANSETRON 4 MG: 2 INJECTION INTRAMUSCULAR; INTRAVENOUS at 20:23

## 2022-07-17 RX ADMIN — METHOCARBAMOL TABLETS 750 MG: 750 TABLET, COATED ORAL at 20:52

## 2022-07-17 RX ADMIN — CARBIDOPA AND LEVODOPA 1 TABLET: 10; 100 TABLET ORAL at 16:40

## 2022-07-17 RX ADMIN — APIXABAN 5 MG: 5 TABLET, FILM COATED ORAL at 20:52

## 2022-07-17 RX ADMIN — Medication 10 ML: at 09:52

## 2022-07-17 RX ADMIN — APIXABAN 5 MG: 5 TABLET, FILM COATED ORAL at 09:51

## 2022-07-17 RX ADMIN — ACETAMINOPHEN 650 MG: 325 TABLET, FILM COATED ORAL at 14:15

## 2022-07-17 RX ADMIN — ARIPIPRAZOLE 2 MG: 2 TABLET ORAL at 17:55

## 2022-07-17 RX ADMIN — CARBIDOPA AND LEVODOPA 1 TABLET: 10; 100 TABLET ORAL at 09:51

## 2022-07-17 NOTE — PROGRESS NOTES
Name: Dalila Javed ADMIT: 7/15/2022   : 1950  PCP: Cinthya Valentine APRN    MRN: [id number] LOS: 2 days   AGE/SEX: 71 y.o. female  ROOM: [facility]     Subjective   Subjective   Lethargic and a littled somnolent this AM due to receiving multiple doses of opiates overnight.   Otherwise doing ok.     Review of Systems    GENERAL: No fevers, chills, sweats.    RESPIRATORY: No cough, shortness of breath, hemoptysis or wheezing.   CVS: No chest pain, palpitations, orthopnea, dyspnea on exertion   GI: No melena or hematochezia. No abdominal pain. No nausea, vomiting, constipation, diarrhea      Objective   Objective   Vital Signs  Temp:  [97.8 °F (36.6 °C)-98.9 °F (37.2 °C)] 97.8 °F (36.6 °C)  Heart Rate:  [81-97] 92  Resp:  [16-22] 18  BP: (117-150)/(65-95) 136/76  SpO2:  [92 %-96 %] 94 %  on   ;   Device (Oxygen Therapy): room air  Body mass index is 35.33 kg/m².  Physical Exam  Constitutional:       General: She is not in acute distress.  HENT:      Head: Normocephalic and atraumatic.   Cardiovascular:      Rate and Rhythm: Normal rate and regular rhythm.   Pulmonary:      Effort: Pulmonary effort is normal. No respiratory distress.   Skin:     General: Skin is warm and dry.      Findings: Bruising present.   Neurological:      General: No focal deficit present.      Mental Status: She is alert and oriented to person, place, and time.      Comments: Lethargic         Results Review     I reviewed the patient's new clinical results.  Results from last 7 days   Lab Units 22  0429 07/15/22  2117 07/12/22  0742   WBC 10*3/mm3 15.70*  --  10.20   HEMOGLOBIN g/dL 13.2 13.2 13.4   PLATELETS 10*3/mm3 216  --  218     Results from last 7 days   Lab Units 22  0429 07/15/22  2117 07/12/22  0742   SODIUM mmol/L 135* 136 138   POTASSIUM mmol/L 3.9 4.3 3.4*   CHLORIDE mmol/L 96* 98 97*   CO2 mmol/L 27.0 28.0 27.7   BUN mg/dL 13 11 11   CREATININE mg/dL 0.90 0.98 0.91   GLUCOSE mg/dL 187* 324* 294*    Estimated Creatinine Clearance: 55 mL/min (by C-G formula based on SCr of 0.9 mg/dL).  Results from last 7 days   Lab Units 07/12/22  0742   ALBUMIN g/dL 3.90   BILIRUBIN mg/dL 0.3   ALK PHOS U/L 69   AST (SGOT) U/L 17   ALT (SGPT) U/L 15     Results from last 7 days   Lab Units 07/17/22  0429 07/15/22  2117 07/12/22  0742   CALCIUM mg/dL 9.1 9.4 9.2   ALBUMIN g/dL  --   --  3.90       COVID19   Date Value Ref Range Status   07/12/2022 Not Detected Not Detected - Ref. Range Final   05/06/2022 Not Detected Not Detected - Ref. Range Final     Glucose   Date/Time Value Ref Range Status   07/15/2022 1513 195 (H) 70 - 130 mg/dL Final     Comment:     Meter: [id number] : [id number] Angel Saunders SANDRA   07/15/2022 1211 191 (H) 70 - 130 mg/dL Final     Comment:     Meter: [id number] : [id number] Silvia Guzmán RN       XR Shoulder 1 View Right  Narrative: XR SHOULDER 1 VW RIGHT-     INDICATIONS: Postoperative evaluation.     TECHNIQUE: Frontal views of the right shoulder     COMPARISON: 06/30/2022     FINDINGS:      Intact appearing right shoulder arthroplasty hardware is seen with  adjacent surgical soft tissue gas.          Impression:    Postsurgical changes.           This report was finalized on 7/15/2022 3:57 PM by Dr. Luis Enrique Deras M.D.     Peripheral Block  Rolf Chu MD     7/15/2022 12:48 PM  Peripheral Block    Patient reassessed immediately prior to procedure    Patient location during procedure: pre-op  Start time: 7/15/2022 12:41 PM  Stop time: 7/15/2022 12:44 PM  Reason for block: at surgeon's request and post-op pain management  Performed by  Anesthesiologist: Rolf Chu MD  Preanesthetic Checklist  Completed: patient identified, IV checked, site marked, risks and benefits   discussed, surgical consent, monitors and equipment checked, pre-op   evaluation and timeout performed  Prep:  Sterile barriers:cap, gloves and mask  Prep: ChloraPrep  Patient monitoring:  blood pressure monitoring, continuous pulse oximetry   and EKG  Procedure    Sedation: yes    Guidance:ultrasound guided    ULTRASOUND INTERPRETATION.  Using ultrasound guidance a 21 G gauge needle   was placed in close proximity to the brachial plexus nerve, at which   point, under ultrasound guidance anesthetic was injected in the area of   the nerve and spread of the anesthesia was seen on ultrasound in close   proximity thereto.  There were no abnormalities seen on ultrasound; a   digital image was taken; and the patient tolerated the procedure with no   complications. Images:still images obtained, printed/placed on chart    Laterality:right  Block Type:interscalene  Injection Technique:single-shotNeedle Gauge:21 G  Loss of resistance: normal.    Medications Used: ropivacaine (NAROPIN) 0.5 % injection, 30 mL  Med administered at 7/15/2022 12:44 PM    Medications  Comment:Ultrasound Interpretation:  Ultrasound guidance utilized for visualization of needle approach to nerve   plexus and verification of local anesthetic disbursement to surrounding   tissues. Photo printed and placed on chart for reference.    Post Assessment  Injection Assessment: negative aspiration for heme, no paresthesia on   injection and incremental injection  Patient Tolerance:comfortable throughout block  Complications:no    Scheduled Medications  apixaban, 5 mg, Oral, Q12H  ARIPiprazole, 2 mg, Oral, Q PM  carbidopa-levodopa, 1 tablet, Oral, TID  citalopram, 20 mg, Oral, Daily  famotidine, 40 mg, Oral, Daily  furosemide, 40 mg, Oral, QAM  glipizide, 5 mg, Oral, BID AC  lamoTRIgine, 25 mg, Oral, BID  methocarbamol, 750 mg, Oral, TID  metoprolol tartrate, 50 mg, Oral, BID  mirtazapine, 7.5 mg, Oral, Nightly  sodium chloride, 10 mL, Intravenous, Q12H  Tranexamic Acid, 1,000 mg, Intravenous, Once    Infusions  sodium chloride, 100 mL/hr    Diet  Diet Regular       Assessment/Plan     Active Hospital Problems    Diagnosis  POA   • 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       Lethargy/ Fatigue  Received a lot of opiates over night that led to some alteration in mentation  DC dilaudid and percocet 5    Type 2 diabetes  Takes glipizide 5 mg twice a day at home. OK to continue. Don't anticipate needing any additional imaging procedures      History of CVA  With right sided deficits  Resume home meds      Chronic medical problems  takes lasix at home. Most recent TTE wnl  Continue lasix, metoprolol .      Status post right shoulder arthroplasty  States that her pain is not adequately controlled.    PT/OT consult     Dispo: per primary.    Rajan Cedeño MD  [facility]  07/17/22  14:05 EDT     I wore protective equipment throughout this patient encounter including a face mask, gloves and protective eyewear.  Hand hygiene was performed before donning protective equipment and after removal when leaving the room.

## 2022-07-17 NOTE — PLAN OF CARE
Goal Outcome Evaluation:              Outcome Evaluation: POD 2 RT shoulder reverse arthroplasty, arm sling, ice pack, on PRN pain meds, ambulating to BR w/ 1 assist, voiding well. Patient seemed to be less confuse as of the moment, seen by Dr Zurita and patricia/kurtis brunner.Needs attended, will continue to monitor.

## 2022-07-17 NOTE — PLAN OF CARE
Goal Outcome Evaluation:  Plan of Care Reviewed With: patient, spouse        Progress: declining  Outcome Evaluation: Pt sitting UIC on arrival and aid present stating pt is very confused this AM, possibly due to the multiple narcotics given yesterday. Spouse present in room and very encouraging. Pt tolerated LE exercises with initial hand-over-foot assist and then max VCs and visual target/demo. Pt stood with CGA and HHA x1 - walked 3ft to bed, turned to sit, and after seated rest, walked back to chair to sit. Denies any SOA with mobility today, which is improved from yesterday, but declined any further mobility despite education about importance if pt wanting to go home. Pt states she is safe mobilizing with her , but educated them both that the pt should not be getting up without staff present right now. Pt stating she is going to go home, but PT highly discourages D/C home. Pt very unsteady, confused, and fatigues quickly with significantly limited mobility at this time. Recommending D/C to SNF. PT will continue to follow and progress as tolerated.

## 2022-07-17 NOTE — PROGRESS NOTES
"Patient still on O2 and not progressing well, may require SNF. No plan for dc today    R \"Jose\" Mike OBRIEN MD  Orthopaedic Surgery  [facility]  [phone] - [place] Office  [phone] - [place] Office    "

## 2022-07-17 NOTE — PLAN OF CARE
Goal Outcome Evaluation:  Plan of Care Reviewed With: patient        Progress: no change  Outcome Evaluation: Pt remains stable. Ambulates with SBA, sling to ADRIANA. Voiding per BRP. Increased pain, dilaudid IM dose given x1 with some relief. MD called regarding onset of confusion/agitation/restlessness, possible reaction to significant narcotics given today. Family called to sit with pt and 1 time dose og zyprexa given, pt has been resting since. No further narcotics given this shift. Dressing is cdi. SOA continues with exertion, 2L NC applied as needed. DC plan is home vs SNF. WCTM.

## 2022-07-17 NOTE — THERAPY TREATMENT NOTE
Patient Name: Dalila Javed  : 1950    MRN: [id number]                              Today's Date: 2022       Admit Date: 7/15/2022    Visit Dx: No diagnosis found.  Patient Active Problem List   Diagnosis   • Adjustment disorder with mixed anxiety and depressed mood   • Atopic rhinitis   • Coronary arteriosclerosis in native artery   • Cobalamin deficiency   • Benign colonic polyp   • Lumbar radiculopathy   • Controlled diabetes mellitus type 2 with complications ([facility])   • Binocular vision disorder with diplopia   • Dyslipidemia   • Arthropathy of hand   • Knee pain   • Cramps of lower extremity   • Low back pain   • Chronic nausea   • Obstructive sleep apnea syndrome   • Palpitations   • Ventricular premature beats   • Cephalalgia   • Colonic constipation   • Neurocardiogenic syncope   • Vitamin D deficiency   • DODSON (nonalcoholic steatohepatitis)   • Fibromyalgia   • Morbidly obese ([facility])   • Polyp of colon   • Family history of colonic polyps   • Family history of malignant neoplasm of colon   • Acute CVA (cerebrovascular accident) ([facility])   • Episode of dizziness   • Dizziness   • History of stroke   • Chronic right shoulder pain   • Encephalopathy, metabolic   • Migraine without aura or status migrainosus   • Parkinson disease ([facility])   • Hypokalemia   • Benign paroxysmal positional vertigo   • Chronic obstructive pulmonary disease ([facility])   • Gastroesophageal reflux disease   • History of percutaneous transluminal coronary angioplasty   • Hypercholesterolemia   • Hypertensive disorder   • Myocardial infarction ([facility])   • Occipital neuralgia   • Patent foramen ovale   • Transient cerebral ischemia   • Arm pain, anterior, right   • TIA (transient ischemic attack)   • Pathological fracture of vertebra due to osteoporosis with delayed healing   • Compression fracture of L1 vertebra with delayed healing   • Status post reverse arthroplasty of right shoulder     Past Medical History:   Diagnosis Date    • Abnormal weight gain    • Acquired trigger finger    • Acute myocardial infarction ([facility])    • Adjustment reaction with mixed emotional features    • Arthritis    • ASHD (arteriosclerotic heart disease)    • Asthma    • B12 deficiency    • Bacterial pneumonia    • Williamson esophagus    • Benign colonic polyp    • Bilateral knee pain    • Birthmark     haemangioma of the bone   • Bronchiectasis ([facility])    • CHF (congestive heart failure) ([facility])    • Chronic cough    • Chronic glomerulonephritis with exudative nephritis    • Chronic lumbar radiculopathy    • Diabetes mellitus ([facility])    • Diabetic peripheral neuropathy ([facility])    • Dyslipidemia    • Fibromyositis    • Fracture, humerus 2022    RIGHT   • GERD (gastroesophageal reflux disease)    • Herpes zoster    • Hyperlipidemia    • Hypertension    • Lupus anticoagulant disorder ([facility])    • Mycobacterium avium complex ([facility])    • Nephrolithiasis    • SILVIANO (obstructive sleep apnea)     DOESN'T USE HER MACHINE   • Palpitations    • Premature ventricular contraction    • Slow transit constipation    • Stroke ([facility]) 2020    RT SIDED WEAKNESS   • Vitamin D deficiency      Past Surgical History:   Procedure Laterality Date   • APPENDECTOMY     • BRONCHOSCOPY     •  SECTION     • CHOLECYSTECTOMY     • COLONOSCOPY     • COLONOSCOPY N/A 2019    Procedure: COLONOSCOPY to cecum with cold biopsy and cold and hotsnare polypectomies;  Surgeon: Suzy Eubanks MD;  Location: [facility] ENDOSCOPY;  Service: Gastroenterology   • CORONARY ANGIOPLASTY WITH STENT PLACEMENT     • EMBOLECTOMY N/A 2020    Procedure: EMERGENT CEREBRAL ANGIOGRAM;  Surgeon: Jonh Meehan MD;  Location: [facility] OR ;  Service: Neurosurgery;  Laterality: N/A;   • ENDOSCOPY N/A 2019    Procedure: ESOPHAGOGASTRODUODENOSCOPY with cold biopsies;  Surgeon: Suzy Eubanks MD;  Location: [facility] ENDOSCOPY;  Service: Gastroenterology   • KNEE ARTHROSCOPY     • OTHER SURGICAL  HISTORY      elbow surgery   • ROTATOR CUFF REPAIR     • TUBAL ABDOMINAL LIGATION        General Information     [facility] Name 07/17/22 1310          Physical Therapy Time and Intention    Document Type therapy note (daily note)  -     Mode of Treatment individual therapy;physical therapy  -[facility] Name 07/17/22 1310          General Information    Patient Profile Reviewed yes  -     Existing Precautions/Restrictions fall;non-weight bearing;shoulder  -[facility] Name 07/17/22 1310          Cognition    Orientation Status (Cognition) oriented to;person;verbal cues/prompts needed for orientation  -[facility] Name 07/17/22 1310          Safety Issues, Functional Mobility    Impairments Affecting Function (Mobility) balance;cognition;endurance/activity tolerance;pain;strength;shortness of breath;sensation/sensory awareness;range of motion (ROM)  -     Cognitive Impairments, Mobility Safety/Performance attention;awareness, need for assistance;insight into deficits/self-awareness;judgment;problem-solving/reasoning;safety precaution awareness;safety precaution follow-through;sequencing abilities  -           User Key  (r) = Recorded By, (t) = Taken By, (c) = Cosigned By    Initials Name Provider Type     Marie Lugo PT Physical Therapist               Mobility     [facility] Name 07/17/22 1310          Bed Mobility    Sit-Supine Blanco (Bed Mobility) not tested  -     Comment, (Bed Mobility) NT - UIC  -[facility] Name 07/17/22 1310          Sit-Stand Transfer    Sit-Stand Blanco (Transfers) contact guard;verbal cues  -     Assistive Device (Sit-Stand Transfers) other (see comments)  HHA x1  -[facility] Name 07/17/22 1310          Gait/Stairs (Locomotion)    Blanco Level (Gait) verbal cues;contact guard  -     Assistive Device (Gait) other (see comments)  HHA x1  -     Distance in Feet (Gait) 3ft + 3ft  -     Deviations/Abnormal Patterns (Gait) base of support, narrow;jose  decreased;gait speed decreased;stride length decreased;weight shifting decreased;festinating/shuffling  -     Bilateral Gait Deviations heel strike decreased;forward flexed posture  -     [place] Level (Stairs) not tested  -     Row Name 07/17/22 1310          Mobility    Extremity Weight-bearing Status right upper extremity  -     Right Upper Extremity (Weight-bearing Status) non weight-bearing (NWB)  -           User Key  (r) = Recorded By, (t) = Taken By, (c) = Cosigned By    Initials Name Provider Type     Marie Lugo PT Physical Therapist               Obj/Interventions     [facility] Name 07/17/22 1311          Motor Skills    Therapeutic Exercise --  10 reps B AP/LAQ/seated marches  -           User Key  (r) = Recorded By, (t) = Taken By, (c) = Cosigned By    Initials Name Provider Type     Marie Lugo PT Physical Therapist               Goals/Plan    No documentation.                Clinical Impression     Row Name 07/17/22 1311          Pain    Pre/Posttreatment Pain Comment C/o R shoulder pain but unable to give numerical value  -     Pain Intervention(s) Repositioned;Ambulation/increased activity  -[facility] Name 07/17/22 1311          Plan of Care Review    Plan of Care Reviewed With patient;spouse  -     Progress declining  -     Outcome Evaluation Pt sitting UIC on arrival and aid present stating pt is very confused this AM, possibly due to the multiple narcotics given yesterday. Spouse present in room and very encouraging. Pt tolerated LE exercises with initial hand-over-foot assist and then max VCs and visual target/demo. Pt stood with CGA and HHA x1 - walked 3ft to bed, turned to sit, and after seated rest, walked back to chair to sit. Denies any SOA with mobility today, which is improved from yesterday, but declined any further mobility despite education about importance if pt wanting to go home. Pt states she is safe mobilizing with her , but educated them  both that the pt should not be getting up without staff present right now. Pt stating she is going to go home, but PT highly discourages D/C home. Pt very unsteady, confused, and fatigues quickly with significantly limited mobility at this time. Recommending D/C to SNF. PT will continue to follow and progress as tolerated.  -     Row Name 07/17/22 1311          Vital Signs    O2 Delivery Pre Treatment room air  -     O2 Delivery Intra Treatment room air  -     O2 Delivery Post Treatment room air  -     Row Name 07/17/22 1311          Positioning and Restraints    Pre-Treatment Position sitting in chair/recliner  -     Post Treatment Position chair  -     In Chair reclined;call light within reach;encouraged to call for assist;with family/caregiver  -           User Key  (r) = Recorded By, (t) = Taken By, (c) = Cosigned By    Initials Name Provider Type    Marie Meza PT Physical Therapist               Outcome Measures     Row Name 07/17/22 1316          How much help from another person do you currently need...    Turning from your back to your side while in flat bed without using bedrails? 2  -BH     Moving from lying on back to sitting on the side of a flat bed without bedrails? 2  -BH     Moving to and from a bed to a chair (including a wheelchair)? 3  -BH     Standing up from a chair using your arms (e.g., wheelchair, bedside chair)? 3  -BH     Climbing 3-5 steps with a railing? 2  -BH     To walk in hospital room? 2  -BH     AM-PAC 6 Clicks Score (PT) 14  -     Highest level of mobility 4 --> Transferred to chair/commode  -           User Key  (r) = Recorded By, (t) = Taken By, (c) = Cosigned By    Initials Name Provider Type    Marie Meza PT Physical Therapist                             Physical Therapy Education                 Title: PT OT SLP Therapies (In Progress)     Topic: Physical Therapy (Done)     Point: Mobility training (Done)     Learning Progress Summary            Patient Acceptance, E,TB,D, VU,NR by  at 7/17/2022 1316    Acceptance, E,TB,D, VU,NR by  at 7/16/2022 1304                   Point: Home exercise program (Done)     Learning Progress Summary           Patient Acceptance, E,TB,D, VU,NR by  at 7/17/2022 1316    Acceptance, E,TB,D, VU,NR by  at 7/16/2022 1304                   Point: Body mechanics (Done)     Learning Progress Summary           Patient Acceptance, E,TB,D, VU,NR by  at 7/17/2022 1316    Acceptance, E,TB,D, VU,NR by  at 7/16/2022 1304                   Point: Precautions (Done)     Learning Progress Summary           Patient Acceptance, E,TB,D, VU,NR by  at 7/17/2022 1316    Acceptance, E,TB,D, VU,NR by  at 7/16/2022 1304                               User Key     Initials Effective Dates Name Provider Type Discipline     04/08/22 -  Marie Lugo, PT Physical Therapist PT              PT Recommendation and Plan  Planned Therapy Interventions (PT): balance training, bed mobility training, gait training, home exercise program, patient/family education, strengthening, transfer training  Plan of Care Reviewed With: patient, spouse  Progress: declining  Outcome Evaluation: Pt sitting UIC on arrival and aid present stating pt is very confused this AM, possibly due to the multiple narcotics given yesterday. Spouse present in room and very encouraging. Pt tolerated LE exercises with initial hand-over-foot assist and then max VCs and visual target/demo. Pt stood with CGA and HHA x1 - walked 3ft to bed, turned to sit, and after seated rest, walked back to chair to sit. Denies any SOA with mobility today, which is improved from yesterday, but declined any further mobility despite education about importance if pt wanting to go home. Pt states she is safe mobilizing with her , but educated them both that the pt should not be getting up without staff present right now. Pt stating she is going to go home, but PT highly discourages D/C  home. Pt very unsteady, confused, and fatigues quickly with significantly limited mobility at this time. Recommending D/C to SNF. PT will continue to follow and progress as tolerated.     Time Calculation:    PT Charges     Row Name 07/17/22 1317             Time Calculation    Start Time 0902  -      Stop Time 0918  -      Time Calculation (min) 16 min  -      PT Received On 07/17/22  -      PT - Next Appointment 07/18/22  -              Time Calculation- PT    Total Timed Code Minutes- PT 16 minute(s)  -              Timed Charges    62206 - PT Therapeutic Exercise Minutes 8  -      57559 - Gait Training Minutes  6  -      18339 - PT Therapeutic Activity Minutes 2  -BH              Total Minutes    Timed Charges Total Minutes 16  -       Total Minutes 16  -BH            User Key  (r) = Recorded By, (t) = Taken By, (c) = Cosigned By    Initials Name Provider Type     Marie Lugo, PT Physical Therapist              Therapy Charges for Today     Code Description Service Date Service Provider Modifiers Qty    30483058454 HC GAIT TRAINING EA 15 MIN 7/16/2022 Marie Lugo, PT GP 1    81386792967 HC PT EVAL MOD COMPLEXITY 2 7/16/2022 Marie Lugo, PT GP 1    71084450308 HC PT THER PROC EA 15 MIN 7/17/2022 Marie Lugo, PT GP 1          PT G-Codes  Outcome Measure Options: AM-PAC 6 Clicks Basic Mobility (PT)  AM-PAC 6 Clicks Score (PT): 14  AM-PAC 6 Clicks Score (OT): 13  Modified [place] Scale: 4 - Moderately severe disability.  Unable to walk without assistance, and unable to attend to own bodily needs without assistance.    Marie Lugo PT  7/17/2022

## 2022-07-18 ENCOUNTER — TRANSCRIBE ORDERS (OUTPATIENT)
Dept: HOME HEALTH SERVICES | Facility: HOME HEALTHCARE | Age: 72
End: 2022-07-18

## 2022-07-18 ENCOUNTER — READMISSION MANAGEMENT (OUTPATIENT)
Dept: CALL CENTER | Facility: HOSPITAL | Age: 72
End: 2022-07-18

## 2022-07-18 ENCOUNTER — HOME HEALTH ADMISSION (OUTPATIENT)
Dept: HOME HEALTH SERVICES | Facility: HOME HEALTHCARE | Age: 72
End: 2022-07-18

## 2022-07-18 VITALS
BODY MASS INDEX: 35.3 KG/M2 | RESPIRATION RATE: 16 BRPM | TEMPERATURE: 99.5 F | WEIGHT: 187 LBS | HEART RATE: 75 BPM | DIASTOLIC BLOOD PRESSURE: 73 MMHG | HEIGHT: 61 IN | OXYGEN SATURATION: 91 % | SYSTOLIC BLOOD PRESSURE: 126 MMHG

## 2022-07-18 DIAGNOSIS — Z96.611 STATUS POST REVERSE ARTHROPLASTY OF RIGHT SHOULDER: Primary | ICD-10-CM

## 2022-07-18 PROCEDURE — 97535 SELF CARE MNGMENT TRAINING: CPT

## 2022-07-18 PROCEDURE — 97110 THERAPEUTIC EXERCISES: CPT

## 2022-07-18 PROCEDURE — 63710000001 DIPHENHYDRAMINE PER 50 MG: Performed by: ORTHOPAEDIC SURGERY

## 2022-07-18 RX ORDER — HYDROCODONE BITARTRATE AND ACETAMINOPHEN 5; 325 MG/1; MG/1
2 TABLET ORAL EVERY 4 HOURS PRN
Status: DISCONTINUED | OUTPATIENT
Start: 2022-07-18 | End: 2022-07-18 | Stop reason: HOSPADM

## 2022-07-18 RX ORDER — HYDROCODONE BITARTRATE AND ACETAMINOPHEN 5; 325 MG/1; MG/1
1 TABLET ORAL EVERY 4 HOURS PRN
Status: DISCONTINUED | OUTPATIENT
Start: 2022-07-18 | End: 2022-07-18 | Stop reason: HOSPADM

## 2022-07-18 RX ORDER — HUMAN INSULIN 100 [IU]/ML
INJECTION, SUSPENSION SUBCUTANEOUS
Qty: 10 ML | Refills: 1 | Status: SHIPPED | OUTPATIENT
Start: 2022-07-18

## 2022-07-18 RX ADMIN — APIXABAN 5 MG: 5 TABLET, FILM COATED ORAL at 08:36

## 2022-07-18 RX ADMIN — FAMOTIDINE 40 MG: 20 TABLET ORAL at 08:36

## 2022-07-18 RX ADMIN — HYDROCODONE BITARTRATE AND ACETAMINOPHEN 2 TABLET: 5; 325 TABLET ORAL at 08:36

## 2022-07-18 RX ADMIN — HYDROCODONE BITARTRATE AND ACETAMINOPHEN 2 TABLET: 5; 325 TABLET ORAL at 13:13

## 2022-07-18 RX ADMIN — ACETAMINOPHEN 650 MG: 325 TABLET, FILM COATED ORAL at 02:26

## 2022-07-18 RX ADMIN — GLIPIZIDE 5 MG: 5 TABLET ORAL at 06:55

## 2022-07-18 RX ADMIN — METHOCARBAMOL TABLETS 750 MG: 750 TABLET, COATED ORAL at 08:36

## 2022-07-18 RX ADMIN — LAMOTRIGINE 25 MG: 25 TABLET ORAL at 08:36

## 2022-07-18 RX ADMIN — CITALOPRAM 20 MG: 20 TABLET, FILM COATED ORAL at 08:36

## 2022-07-18 RX ADMIN — CARBIDOPA AND LEVODOPA 1 TABLET: 10; 100 TABLET ORAL at 08:36

## 2022-07-18 RX ADMIN — ACETAMINOPHEN 650 MG: 325 TABLET, FILM COATED ORAL at 06:55

## 2022-07-18 RX ADMIN — METOPROLOL TARTRATE 50 MG: 50 TABLET, FILM COATED ORAL at 08:36

## 2022-07-18 RX ADMIN — FUROSEMIDE 40 MG: 40 TABLET ORAL at 06:55

## 2022-07-18 RX ADMIN — Medication 10 ML: at 08:37

## 2022-07-18 RX ADMIN — DIPHENHYDRAMINE HYDROCHLORIDE 25 MG: 25 CAPSULE ORAL at 02:26

## 2022-07-18 RX ADMIN — DOCUSATE SODIUM 100 MG: 100 CAPSULE, LIQUID FILLED ORAL at 08:44

## 2022-07-18 NOTE — DISCHARGE PLACEMENT REQUEST
"Katy Javed (71 y.o. Female)             Date of Birth   1950    Social Security Number       Address   [address] [place] [address]    Home Phone   [phone]    MRN   [id number]       [place]   [place]    Marital Status                               Admission Date   7/15/22    Admission Type   Elective    Admitting Provider   Dk Mckeon II, MD    Attending Provider   Dk Mckeon II, MD    Department, Room/Bed   [address], P782/1       Discharge Date       Discharge Disposition   Home or Self Care    Discharge Destination                               Attending Provider: Dk Mckeon II, MD    Allergies: Duloxetine Hcl, Viibryd [Vilazodone Hcl], Nsaids, Benadryl [Diphenhydramine], Carafate [Sucralfate], Metformin, Morphine And Related, Oxycodone, Penicillins, Statins    Isolation: None   Infection: COVID Screen (preop/placement) (07/12/22)   Code Status: CPR   Advance Care Planning Activity    Ht: 154.9 cm (61\")   Wt: 84.8 kg (187 lb)    Admission Cmt: None   Principal Problem: None                Active Insurance as of 7/15/2022     Primary Coverage     Payor Plan Insurance Group Employer/Plan Group    MEDICARE MEDICARE A & B      Payor Plan Address Payor Plan Phone Number Payor Plan Fax Number Effective Dates    [address] [phone]  10/1/2013 - None Entered    [facility] [id number]       Subscriber Name Subscriber Birth Date Member ID       KATY JAVED 1950 [id number]           Secondary Coverage     Payor Plan Insurance Group Employer/Plan Group    [employer]      Payor Plan Address Payor Plan Phone Number Payor Plan Fax Number Effective Dates    [address] [phone]  1/1/2021 - None Entered    [place] [address]       Subscriber Name Subscriber Birth Date Member ID       KATY JAVED 1950 [id number]           Tertiary Coverage "     Payor Plan Insurance Group Employer/Plan Group    [place]      Payor Plan Address Payor Plan Phone Number Payor Plan Fax Number Effective Dates    [address] [phone]  3/1/2017 - None Entered    [place] [id number]       Subscriber Name Subscriber Birth Date Member ID       KATY CARDOZA 1950 [id number]                 Emergency Contacts      (Rel.) Home Phone Work Phone Mobile Phone    Se Burger (Spouse) [phone] -- [phone]

## 2022-07-18 NOTE — CASE MANAGEMENT/SOCIAL WORK
Case Management Discharge Note      Final Note: Home with [facility]    Provided Post Acute Provider List?: N/A  N/A Provider List Comment: Prefers [facility]  Provided Post Acute Provider Quality & Resource List?: N/A  N/A Quality & Resource List Comment: Prefers [facility]    Selected Continued Care - Discharged on 7/18/2022 Admission date: 7/15/2022 - Discharge disposition: Home or Self Care    Destination    No services have been selected for the patient.              Durable Medical Equipment    No services have been selected for the patient.              Dialysis/Infusion    No services have been selected for the patient.              Home Medical Care Coordination complete.    Service Provider Selected Services Address Phone Fax Patient Preferred    [facility]  Home Health Services [address] [address] [phone] --          Therapy    No services have been selected for the patient.              Community Resources    No services have been selected for the patient.              Community & DME    No services have been selected for the patient.                Selected Continued Care - Episodes Includes selections from active Coordinated Care Management episodes    High-Risk Care Management Episode start date: 4/5/2021   There are no active outsourced providers for this episode.                    Final Discharge Disposition Code: 06 - home with home health care

## 2022-07-18 NOTE — CASE MANAGEMENT/SOCIAL WORK
Discharge Planning Assessment  [facility]     Patient Name: Dalila Javed  MRN: [id number]  Today's Date: 7/18/2022    Admit Date: 7/15/2022     Discharge Needs Assessment     Row Name 07/18/22 0941       Living Environment    People in Home spouse    Name(s) of People in Home Se Burger    Current Living Arrangements home    Primary Care Provided by self    Provides Primary Care For no one    Family Caregiver if Needed spouse    Family Caregiver Names Se Burger    Quality of Family Relationships helpful;involved;supportive    Able to Return to Prior Arrangements yes       Resource/Environmental Concerns    Resource/Environmental Concerns home accessibility    Home Accessibility Concerns stairs to enter home    Transportation Concerns none       Transition Planning    Patient/Family Anticipates Transition to home with family;home with help/services    Patient/Family Anticipated Services at Transition home health care    Transportation Anticipated family or friend will provide       Discharge Needs Assessment    Equipment Currently Used at Home rollator;cane, straight;grab bar;shower chair    Concerns to be Addressed no discharge needs identified;denies needs/concerns at this time    Anticipated Changes Related to Illness none    Equipment Needed After Discharge oxygen    Outpatient/Agency/Support Group Needs homecare agency    Discharge Facility/Level of Care Needs home with home health    Provided Post Acute Provider List? N/A    N/A Provider List Comment Prefers [facility]    Provided Post Acute Provider Quality & Resource List? N/A    N/A Quality & Resource List Comment Prefers [facility]    Patient's Choice of Community Agency(s) [facility]               Discharge Plan     Row Name 07/18/22 0943       Plan    Plan Home with [facility], pending referral    Patient/Family in Agreement with Plan yes    Plan Comments [facility] met with patient and her  Marcoheydi, [phone] at bedside, introduced self and explained role.  Patient was dozing off and on therefore patient’s  Se answered screening questions. Marcoheydi confirmed information on patient’s face sheet is accurate. Se states patient has worked with  in the past and he believes it was [facility]. No SNF history. Patient has a rollator, shower chair, grab bars in the shower, and a straight cane. Marcoheydi states there are 5 stairs to enter/exit the home with hand rails on the left hand side. Se denies there being any stairs in the home to maneuver. Patient is enrolled in the [facility] M2B program. CCP discussed recommendations regarding SNF, Se states he and his wife don’t want her to go to a facility instead they prefer to go home with HH. Marcoheydi agreeable to referral to [facility]. Marcoheydi agreeable to transport patient home.              Continued Care and Services - Admitted Since 7/15/2022    Coordination has not been started for this encounter.     Selected Continued Care - Episodes Includes selections from active Coordinated Care Management episodes    High-Risk Care Management Episode start date: 4/5/2021   There are no active outsourced providers for this episode.               Expected Discharge Date and Time     Expected Discharge Date Expected Discharge Time    Jul 16, 2022          Demographic Summary     Row Name 07/18/22 0934       General Information    Admission Type inpatient    Arrived From home    Reason for Consult discharge planning    Preferred Language English               Functional Status     Row Name 07/18/22 0941       Functional Status    Usual Activity Tolerance good    Current Activity Tolerance moderate       Functional Status, IADL    Medications assistive equipment    Meal Preparation assistive equipment    Housekeeping assistive equipment    Laundry assistive equipment    Shopping assistive equipment       Mental Status    General Appearance WDL WDL       Mental Status Summary    Recent Changes in Mental Status/Cognitive Functioning no changes        Employment/    Employment Status retired               Psychosocial    No documentation.                Abuse/Neglect    No documentation.                Legal    No documentation.                Substance Abuse    No documentation.                Patient Forms    No documentation.

## 2022-07-18 NOTE — PROGRESS NOTES
Name: Dalila Javed ADMIT: 7/15/2022   : 1950  PCP: Cinthya Valentine APRN    MRN: [id number] LOS: 3 days   AGE/SEX: 71 y.o. female  ROOM: [facility]     Subjective   Subjective   mentation better. Would like to go home with home health today     Review of Systems    GENERAL: No fevers, chills, sweats.    RESPIRATORY: No cough, shortness of breath, hemoptysis or wheezing.   CVS: No chest pain, palpitations, orthopnea, dyspnea on exertion   GI: No melena or hematochezia. No abdominal pain. No nausea, vomiting, constipation, diarrhea      Objective   Objective   Vital Signs  Temp:  [97 °F (36.1 °C)-99.5 °F (37.5 °C)] 99.5 °F (37.5 °C)  Heart Rate:  [68-85] 75  Resp:  [16] 16  BP: (118-175)/(69-80) 126/73  SpO2:  [90 %-96 %] 91 %  on   ;   Device (Oxygen Therapy): room air  Body mass index is 35.33 kg/m².  Physical Exam  Constitutional:       General: She is not in acute distress.  HENT:      Head: Normocephalic and atraumatic.   Cardiovascular:      Rate and Rhythm: Normal rate and regular rhythm.   Pulmonary:      Effort: Pulmonary effort is normal. No respiratory distress.   Skin:     General: Skin is warm and dry.      Findings: Bruising present.   Neurological:      General: No focal deficit present.      Mental Status: She is alert and oriented to person, place, and time.      Comments: Lethargic         Results Review     I reviewed the patient's new clinical results.  Results from last 7 days   Lab Units 07/17/22  0429 07/15/22  2117 07/12/22  0742   WBC 10*3/mm3 15.70*  --  10.20   HEMOGLOBIN g/dL 13.2 13.2 13.4   PLATELETS 10*3/mm3 216  --  218     Results from last 7 days   Lab Units 07/17/22  0429 07/15/22  2117 07/12/22  0742   SODIUM mmol/L 135* 136 138   POTASSIUM mmol/L 3.9 4.3 3.4*   CHLORIDE mmol/L 96* 98 97*   CO2 mmol/L 27.0 28.0 27.7   BUN mg/dL 13 11 11   CREATININE mg/dL 0.90 0.98 0.91   GLUCOSE mg/dL 187* 324* 294*   Estimated Creatinine Clearance: 55 mL/min (by C-G formula  based on SCr of 0.9 mg/dL).  Results from last 7 days   Lab Units 07/12/22  0742   ALBUMIN g/dL 3.90   BILIRUBIN mg/dL 0.3   ALK PHOS U/L 69   AST (SGOT) U/L 17   ALT (SGPT) U/L 15     Results from last 7 days   Lab Units 07/17/22  0429 07/15/22  2117 07/12/22  0742   CALCIUM mg/dL 9.1 9.4 9.2   ALBUMIN g/dL  --   --  3.90       COVID19   Date Value Ref Range Status   07/12/2022 Not Detected Not Detected - Ref. Range Final   05/06/2022 Not Detected Not Detected - Ref. Range Final     Glucose   Date/Time Value Ref Range Status   07/15/2022 1513 195 (H) 70 - 130 mg/dL Final     Comment:     Meter: [id number] : [id number] Angel Saunders SANDRA       XR Shoulder 1 View Right  Narrative: XR SHOULDER 1 VW RIGHT-     INDICATIONS: Postoperative evaluation.     TECHNIQUE: Frontal views of the right shoulder     COMPARISON: 06/30/2022     FINDINGS:      Intact appearing right shoulder arthroplasty hardware is seen with  adjacent surgical soft tissue gas.          Impression:    Postsurgical changes.           This report was finalized on 7/15/2022 3:57 PM by Dr. Luis Enrique Deras M.D.     Peripheral Block  Rolf Chu MD     7/15/2022 12:48 PM  Peripheral Block    Patient reassessed immediately prior to procedure    Patient location during procedure: pre-op  Start time: 7/15/2022 12:41 PM  Stop time: 7/15/2022 12:44 PM  Reason for block: at surgeon's request and post-op pain management  Performed by  Anesthesiologist: Rolf Chu MD  Preanesthetic Checklist  Completed: patient identified, IV checked, site marked, risks and benefits   discussed, surgical consent, monitors and equipment checked, pre-op   evaluation and timeout performed  Prep:  Sterile barriers:cap, gloves and mask  Prep: ChloraPrep  Patient monitoring: blood pressure monitoring, continuous pulse oximetry   and EKG  Procedure    Sedation: yes    Guidance:ultrasound guided    ULTRASOUND INTERPRETATION.  Using ultrasound guidance a 21  G gauge needle   was placed in close proximity to the brachial plexus nerve, at which   point, under ultrasound guidance anesthetic was injected in the area of   the nerve and spread of the anesthesia was seen on ultrasound in close   proximity thereto.  There were no abnormalities seen on ultrasound; a   digital image was taken; and the patient tolerated the procedure with no   complications. Images:still images obtained, printed/placed on chart    Laterality:right  Block Type:interscalene  Injection Technique:single-shotNeedle Gauge:21 G  Loss of resistance: normal.    Medications Used: ropivacaine (NAROPIN) 0.5 % injection, 30 mL  Med administered at 7/15/2022 12:44 PM    Medications  Comment:Ultrasound Interpretation:  Ultrasound guidance utilized for visualization of needle approach to nerve   plexus and verification of local anesthetic disbursement to surrounding   tissues. Photo printed and placed on chart for reference.    Post Assessment  Injection Assessment: negative aspiration for heme, no paresthesia on   injection and incremental injection  Patient Tolerance:comfortable throughout block  Complications:no    Scheduled Medications  apixaban, 5 mg, Oral, Q12H  ARIPiprazole, 2 mg, Oral, Q PM  carbidopa-levodopa, 1 tablet, Oral, TID  citalopram, 20 mg, Oral, Daily  famotidine, 40 mg, Oral, Daily  furosemide, 40 mg, Oral, QAM  glipizide, 5 mg, Oral, BID AC  lamoTRIgine, 25 mg, Oral, BID  methocarbamol, 750 mg, Oral, TID  metoprolol tartrate, 50 mg, Oral, BID  mirtazapine, 7.5 mg, Oral, Nightly  sodium chloride, 10 mL, Intravenous, Q12H  Tranexamic Acid, 1,000 mg, Intravenous, Once    Infusions  sodium chloride, 100 mL/hr    Diet  Diet Regular       Assessment/Plan     Active Hospital Problems    Diagnosis  POA   • Status post reverse arthroplasty of right shoulder [Z96.611]  Not Applicable   • History of stroke [Z86.73]  Not Applicable   • Morbidly obese (HCC) [E66.01]  Yes   • DODSON (nonalcoholic  steatohepatitis) [K75.81]  Yes   • Controlled diabetes mellitus type 2 with complications (HCC) [E11.8]  Yes      Resolved Hospital Problems   No resolved problems to display.     Leukocytosis  Likely reactive from surgery     Lethargy/ Fatigue  Received a lot of opiates over night that led to some alteration in mentation  DC dilaudid and percocet 5    Type 2 diabetes  Takes glipizide 5 mg twice a day at home. OK to continue. Don't anticipate needing any additional imaging procedures      History of CVA  With right sided deficits  Resume home meds      Chronic medical problems  takes lasix at home. Most recent TTE wnl  Continue lasix, metoprolol .      Status post right shoulder arthroplasty      PT/OT consult     Dispo:ok for dc from medical standpoint     Rajan Cedeño MD  [facility]  07/18/22  13:06 EDT     I wore protective equipment throughout this patient encounter including a face mask, gloves and protective eyewear.  Hand hygiene was performed before donning protective equipment and after removal when leaving the room.

## 2022-07-18 NOTE — PROGRESS NOTES
Patient is discharging today . Spouse and patient agreeable to home health and verified face sheet information is correct. Transcribed home health PT orders. Thank you !

## 2022-07-18 NOTE — OUTREACH NOTE
Prep Survey    Flowsheet Row Responses   Scient[facility] facility patient discharged from? [place]   Is LACE score < 7 ? No   Emergency Room discharge w/ pulse ox? No   Eligibility [facility]   Date of Admission 07/15/22   Date of Discharge 07/18/22   Discharge Disposition Home-Health Care Sv   Discharge diagnosis total shoulder reverse arthroplasty   Does the patient have one of the following disease processes/diagnoses(primary or secondary)? Total Joint Replacement   Does the patient have Home health ordered? Yes   What is the Home health agency?  [facility]   Is there a DME ordered? No   Prep survey completed? Yes          ALDAIR REAL - Registered Nurse

## 2022-07-18 NOTE — PLAN OF CARE
Goal Outcome Evaluation:  Plan of Care Reviewed With: patient, spouse           Outcome Evaluation: Pt seen today for OT, now POD 3 R reverse TSA. She is in bathroom at start of session. Pt requires encouragement to engage in TSA exercises per protocol. Education from OT on purpose of exercises and reduce risk of frozen shoulder then pt agreeable to attempt. She reports pain in R shoulder and has limited RUE strength at baseline from hx of stroke. Pt educated in camille dressing today for ADLs. She requires mod/max A and plans to have spouse assist at dc who was present today during treatment. Pt plans to dc home now with spouse. HH OT vs OP OT/PT to continue to address RUE post op recommended.    OT wore appropriate PPE and completed hand hygiene.

## 2022-07-18 NOTE — THERAPY TREATMENT NOTE
Patient Name: Dalila Javed  : 1950    MRN: [id number]                              Today's Date: 2022       Admit Date: 7/15/2022    Visit Dx: No diagnosis found.  Patient Active Problem List   Diagnosis   • Adjustment disorder with mixed anxiety and depressed mood   • Atopic rhinitis   • Coronary arteriosclerosis in native artery   • Cobalamin deficiency   • Benign colonic polyp   • Lumbar radiculopathy   • Controlled diabetes mellitus type 2 with complications ([facility])   • Binocular vision disorder with diplopia   • Dyslipidemia   • Arthropathy of hand   • Knee pain   • Cramps of lower extremity   • Low back pain   • Chronic nausea   • Obstructive sleep apnea syndrome   • Palpitations   • Ventricular premature beats   • Cephalalgia   • Colonic constipation   • Neurocardiogenic syncope   • Vitamin D deficiency   • DODSON (nonalcoholic steatohepatitis)   • Fibromyalgia   • Morbidly obese ([facility])   • Polyp of colon   • Family history of colonic polyps   • Family history of malignant neoplasm of colon   • Acute CVA (cerebrovascular accident) ([facility])   • Episode of dizziness   • Dizziness   • History of stroke   • Chronic right shoulder pain   • Encephalopathy, metabolic   • Migraine without aura or status migrainosus   • Parkinson disease ([facility])   • Hypokalemia   • Benign paroxysmal positional vertigo   • Chronic obstructive pulmonary disease ([facility])   • Gastroesophageal reflux disease   • History of percutaneous transluminal coronary angioplasty   • Hypercholesterolemia   • Hypertensive disorder   • Myocardial infarction ([facility])   • Occipital neuralgia   • Patent foramen ovale   • Transient cerebral ischemia   • Arm pain, anterior, right   • TIA (transient ischemic attack)   • Pathological fracture of vertebra due to osteoporosis with delayed healing   • Compression fracture of L1 vertebra with delayed healing   • Status post reverse arthroplasty of right shoulder     Past Medical History:   Diagnosis Date    • Abnormal weight gain    • Acquired trigger finger    • Acute myocardial infarction ([facility])    • Adjustment reaction with mixed emotional features    • Arthritis    • ASHD (arteriosclerotic heart disease)    • Asthma    • B12 deficiency    • Bacterial pneumonia    • Williamson esophagus    • Benign colonic polyp    • Bilateral knee pain    • Birthmark     haemangioma of the bone   • Bronchiectasis ([facility])    • CHF (congestive heart failure) ([facility])    • Chronic cough    • Chronic glomerulonephritis with exudative nephritis    • Chronic lumbar radiculopathy    • Diabetes mellitus ([facility])    • Diabetic peripheral neuropathy ([facility])    • Dyslipidemia    • Fibromyositis    • Fracture, humerus 2022    RIGHT   • GERD (gastroesophageal reflux disease)    • Herpes zoster    • Hyperlipidemia    • Hypertension    • Lupus anticoagulant disorder ([facility])    • Mycobacterium avium complex ([facility])    • Nephrolithiasis    • SILVIANO (obstructive sleep apnea)     DOESN'T USE HER MACHINE   • Palpitations    • Premature ventricular contraction    • Slow transit constipation    • Stroke ([facility]) 2020    RT SIDED WEAKNESS   • Vitamin D deficiency      Past Surgical History:   Procedure Laterality Date   • APPENDECTOMY     • BRONCHOSCOPY     •  SECTION     • CHOLECYSTECTOMY     • COLONOSCOPY     • COLONOSCOPY N/A 2019    Procedure: COLONOSCOPY to cecum with cold biopsy and cold and hotsnare polypectomies;  Surgeon: Suzy Eubanks MD;  Location: [facility] ENDOSCOPY;  Service: Gastroenterology   • CORONARY ANGIOPLASTY WITH STENT PLACEMENT     • EMBOLECTOMY N/A 2020    Procedure: EMERGENT CEREBRAL ANGIOGRAM;  Surgeon: Jonh Meehan MD;  Location: [facility] OR ;  Service: Neurosurgery;  Laterality: N/A;   • ENDOSCOPY N/A 2019    Procedure: ESOPHAGOGASTRODUODENOSCOPY with cold biopsies;  Surgeon: Suzy Eubanks MD;  Location: [facility] ENDOSCOPY;  Service: Gastroenterology   • KNEE ARTHROSCOPY     • OTHER SURGICAL  HISTORY      elbow surgery   • ROTATOR CUFF REPAIR     • TOTAL SHOULDER ARTHROPLASTY W/ DISTAL CLAVICLE EXCISION Right 7/15/2022    Procedure: TOTAL SHOULDER REVERSE ARTHROPLASTY;  Surgeon: Dk Mckeon II, MD;  Location: [facility] OR [facility];  Service: Orthopedics;  Laterality: Right;   • TUBAL ABDOMINAL LIGATION        General Information     Row Name 07/18/22 1318          OT Time and Intention    Document Type therapy note (daily note)  -     Mode of Treatment occupational therapy;individual therapy  -     Row Name 07/18/22 1318          General Information    Patient Profile Reviewed yes  -     Existing Precautions/Restrictions fall;non-weight bearing;shoulder  -     Row Name 07/18/22 1318          Cognition    Orientation Status (Cognition) oriented x 3  -     Row Name 07/18/22 1318          Safety Issues, Functional Mobility    Safety Issues Affecting Function (Mobility) insight into deficits/self-awareness;awareness of need for assistance;safety precaution awareness  -     Impairments Affecting Function (Mobility) balance;endurance/activity tolerance;pain;strength;range of motion (ROM)  -           User Key  (r) = Recorded By, (t) = Taken By, (c) = Cosigned By    Initials Name Provider Type     Lori Burton, BARNEY Occupational Therapist                 Mobility/ADL's     Row Name 07/18/22 1320          Bed Mobility    Sit-Supine [place] (Bed Mobility) minimum assist (75% patient effort)  -     Assistive Device (Bed Mobility) bed rails;head of bed elevated  -     Row Name 07/18/22 1320          Transfers    Transfers toilet transfer  -     Sit-Stand [place] (Transfers) contact guard;verbal cues  -     [place] Level (Toilet Transfer) contact guard;verbal cues  -     Assistive Device (Toilet Transfer) --  HHA  -     Row Name 07/18/22 1320          Sit-Stand Transfer    Assistive Device (Sit-Stand Transfers) --  A  -[facility] Name 07/18/22 1320           Functional Mobility    Functional Mobility- Ind. Level verbal cues required;contact guard assist  -     Functional Mobility- Comment HHA, pt reports she is able to use SPC at home  -SM     Row Name 07/18/22 1320          Activities of Daily Living    BADL Assessment/Intervention upper body dressing;lower body dressing  -SM     Row Name 07/18/22 1320          Mobility    Right Upper Extremity (Weight-bearing Status) non weight-bearing (NWB)  -SM     Row Name 07/18/22 1320          Toileting Assessment/Training    Comment, (Toileting) spouse assisting with pt care  -SM     Row Name 07/18/22 1320          Upper Body Dressing Assessment/Training    [place] Level (Upper Body Dressing) upper body dressing skills;don;pull-over garment;maximum assist (25% patient effort);verbal cues;nonverbal cues (demo/gesture)  -     Comment, (Upper Body Dressing) OT cues for camille technique to maintain shoulder precautions.  -SM     Row Name 07/18/22 1320          Lower Body Dressing Assessment/Training    [place] Level (Lower Body Dressing) lower body dressing skills;don;pants/bottoms;moderate assist (50% patient effort)  -     Comment, (Lower Body Dressing) OT provided cues for camille technique  -           User Key  (r) = Recorded By, (t) = Taken By, (c) = Cosigned By    Initials Name Provider Type    Lori Dos Santos OT Occupational Therapist               Obj/Interventions     Row Name 07/18/22 1322          Sensory Assessment (Somatosensory)    Sensory Assessment c/o of hypersensitivity to touch RUE 2/2 hx of stroke affecting RUE  -SM     Row Name 07/18/22 1322          Shoulder (Therapeutic Exercise)    Shoulder Pendulum Exercises (Therapeutic Exercise) right;sitting;needs assist to initiate movement  clockwise X10 reps  -SM     Row Name 07/18/22 1322          Elbow/Forearm (Therapeutic Exercise)    Elbow/Forearm AAROM (Therapeutic Exercise) right;flexion;extension;supination;pronation;10 repetitions  -      Row Name 07/18/22 1322          Wrist (Therapeutic Exercise)    Wrist (Therapeutic Exercise) AROM (active range of motion)  -     Wrist AROM (Therapeutic Exercise) right;flexion;extension;10 repetitions  -     Row Name 07/18/22 1322          Hand (Therapeutic Exercise)    Hand (Therapeutic Exercise) AROM (active range of motion)  -     Hand AROM/AAROM (Therapeutic Exercise) right;AROM (active range of motion);finger flexion;finger extension;10 repetitions  -     Row Name 07/18/22 1322          Balance    Static Sitting Balance standby assist  -     Position, Sitting Balance sitting edge of bed  -     Static Standing Balance contact guard  -     Dynamic Standing Balance contact guard  -           User Key  (r) = Recorded By, (t) = Taken By, (c) = Cosigned By    Initials Name Provider Type    Lori Dos Santos OT Occupational Therapist               Goals/Plan    No documentation.                Clinical Impression     Row Name 07/18/22 1323          Pain Assessment    Posttreatment Pain Rating 9/10  -     Pain Location - Side/Orientation Right  -     Pain Location - shoulder  -     Pain Intervention(s) Medication (See MAR);Nursing Notified;Ambulation/increased activity  -     Row Name 07/18/22 1323          Plan of Care Review    Plan of Care Reviewed With patient;spouse  -     Outcome Evaluation Pt seen today for OT, now POD 3 R reverse TSA. She is in bathroom at start of session. Pt requires encouragement to engage in TSA exercises per protocol. Education from OT on purpose of exercises and reduce risk of frozen shoulder then pt agreeable to attempt. She reports pain in R shoulder and has limited RUE strength at baseline from hx of stroke. Pt educated in camille dressing today for ADLs. She requires mod/max A and plans to have spouse assist at dc who was present today during treatment. Pt plans to dc home now with spouse. HH OT vs OP OT/PT to continue to address RUE post op recommended.   -     Row Name 07/18/22 1323          Therapy Plan Review/Discharge Plan (OT)    Anticipated Discharge Disposition (OT) home with 24/7 care;home with outpatient therapy services  -     Row Name 07/18/22 1323          Positioning and Restraints    Pre-Treatment Position bathroom  -SM     Post Treatment Position bed  -SM     In Bed fowlers;call light within reach;encouraged to call for assist;exit alarm on;with family/caregiver  -SM           User Key  (r) = Recorded By, (t) = Taken By, (c) = Cosigned By    Initials Name Provider Type    Lori Dos Santos OT Occupational Therapist               Outcome Measures     Row Name 07/18/22 1327          How much help from another is currently needed...    Putting on and taking off regular lower body clothing? 2  -SM     Bathing (including washing, rinsing, and drying) 2  -SM     Toileting (which includes using toilet bed pan or urinal) 2  -SM     Putting on and taking off regular upper body clothing 2  -SM     Taking care of personal grooming (such as brushing teeth) 3  -SM     Eating meals 3  -SM     AM-PAC 6 Clicks Score (OT) 14  -SM           User Key  (r) = Recorded By, (t) = Taken By, (c) = Cosigned By    Initials Name Provider Type    Lori Dos Santos OT Occupational Therapist                Occupational Therapy Education                 Title: PT OT SLP Therapies (In Progress)     Topic: Occupational Therapy (Not Started)     Point: ADL training (Not Started)     Description:   Instruct learner(s) on proper safety adaptation and remediation techniques during self care or transfers.   Instruct in proper use of assistive devices.              Learner Progress:  Not documented in this visit.          Point: Home exercise program (Not Started)     Description:   Instruct learner(s) on appropriate technique for monitoring, assisting and/or progressing therapeutic exercises/activities.              Learner Progress:  Not documented in this visit.           Point: Precautions (Not Started)     Description:   Instruct learner(s) on prescribed precautions during self-care and functional transfers.              Learner Progress:  Not documented in this visit.          Point: Body mechanics (Not Started)     Description:   Instruct learner(s) on proper positioning and spine alignment during self-care, functional mobility activities and/or exercises.              Learner Progress:  Not documented in this visit.                          OT Recommendation and Plan     Plan of Care Review  Plan of Care Reviewed With: patient, spouse  Outcome Evaluation: Pt seen today for OT, now POD 3 R reverse TSA. She is in bathroom at start of session. Pt requires encouragement to engage in TSA exercises per protocol. Education from OT on purpose of exercises and reduce risk of frozen shoulder then pt agreeable to attempt. She reports pain in R shoulder and has limited RUE strength at baseline from hx of stroke. Pt educated in camille dressing today for ADLs. She requires mod/max A and plans to have spouse assist at dc who was present today during treatment. Pt plans to dc home now with spouse. HH OT vs OP OT/PT to continue to address RUE post op recommended.     Time Calculation:    Time Calculation- OT     Row Name 07/18/22 1328             Time Calculation- OT    OT Start Time 1111  -SM      OT Stop Time 1146  -SM      OT Time Calculation (min) 35 min  -SM      Total Timed Code Minutes- OT 35 minute(s)  -SM      OT Received On 07/18/22  -      OT - Next Appointment 07/19/22  -              Timed Charges    13271 - OT Therapeutic Exercise Minutes 15  -SM      78143 - OT Self Care/Mgmt Minutes 20  -SM              Total Minutes    Timed Charges Total Minutes 35  -SM       Total Minutes 35  -SM            User Key  (r) = Recorded By, (t) = Taken By, (c) = Cosigned By    Initials Name Provider Type    Lori Dos Santos OT Occupational Therapist              Therapy Charges for Today      Code Description Service Date Service Provider Modifiers Qty    80685644348 HC OT THER PROC EA 15 MIN 7/18/2022 Lori Burton OT GO 1    [id number] HC OT SELF CARE/MGMT/TRAIN EA 15 MIN 7/18/2022 Lori Burton OT GO 1               Lori Burton OT  7/18/2022

## 2022-07-19 ENCOUNTER — TRANSITIONAL CARE MANAGEMENT TELEPHONE ENCOUNTER (OUTPATIENT)
Dept: CALL CENTER | Facility: HOSPITAL | Age: 72
End: 2022-07-19

## 2022-07-19 ENCOUNTER — HOME CARE VISIT (OUTPATIENT)
Dept: HOME HEALTH SERVICES | Facility: HOME HEALTHCARE | Age: 72
End: 2022-07-19

## 2022-07-19 VITALS
RESPIRATION RATE: 16 BRPM | HEART RATE: 77 BPM | SYSTOLIC BLOOD PRESSURE: 120 MMHG | OXYGEN SATURATION: 95 % | TEMPERATURE: 97.1 F | DIASTOLIC BLOOD PRESSURE: 78 MMHG

## 2022-07-19 PROCEDURE — G0151 HHCP-SERV OF PT,EA 15 MIN: HCPCS

## 2022-07-19 RX ORDER — CITALOPRAM 20 MG/1
20 TABLET ORAL DAILY
Qty: 90 TABLET | Refills: 0 | Status: SHIPPED | OUTPATIENT
Start: 2022-07-19

## 2022-07-19 NOTE — HOME HEALTH
Pt reports she slipped in her bedroom and fell on June 30th resulting in R humerus fracture.  She is s/p R reverse total shoulder athroplasty.   She reports the hardwood floors in this home are slippery and does not have a rug in bedroom yet.    PMHX: 2 years ago - CVA that led to R sided weakness, neuropathy, DM typ2, CHF, knee pain, back pain, 2 falls in the last year, HLD.     Pt reports fear going down steps right now.    Blood sugar 151 today.   Pt lives with spouse in a 1 story home with 3 steps to enter with a rail, and 2 without a rail.        NWB R UE and sling; Per Dr Jose Mkceon II via in Basket - okay for active and active assist ROM. NO strengthening for 8 weeks post-op.    Pt was very guarded and anxious with R UE active elbow, wrist, and hand ROM today.  Encourage her to time pain pill prior to next PT session.  Ed pt re the importance of adhering to protocol to preven scar tissue formation and to decrease pain in R UE.  She understands bleeding precautions and is compliant with DM footcare precautions.  She is very fearful of falling in her home and on the stairs.  Skilled PT necessary to develop and instruct in HEP for R UE and balance, increase her safety with transfers and ambulation on level and unlevel surfaces, train her , and assist pt with decreasing pain as needed to progress to outpatient PT.     Recommend non skid socks or shoes.  Recommend a tub transfer bench and long handled shower head for increased safety with showering.

## 2022-07-19 NOTE — CASE COMMUNICATION
PT SOC OASIS completed today with plan for 3wk1, 2wk1.  Then Justa plans to set her up at OP PT at [facility] on [address] hopefully by 7/29/2022 or 8/1/2022.  Pt lives with spouse in a 1 story home with steps to enter.  She is very fearful of falling and fear to navigate stairs.  She is s/p R reverse total shoulder secondary to a fall that led to humerus fracture.  She has hx of CVA that led to R side deficits (2 years ago) and hx of DM typ e 2 . Spouse manages all meds and is very supportive.  Pt was hesistant to start shoulder ROM today, despite receiving clarification from MD for AROM/AAROM.  She plans to get pain under control today and will start ROM tomorrow.  She did complete elbow/wrist/hand ROM today.  She reports pain is 8-9/10.  R shoulder dressing intact without visible stains.     Thank you  Suzy Sheriff

## 2022-07-19 NOTE — OUTREACH NOTE
Call Center TCM Note    Flowsheet Row Responses   [facility] patient discharged from? [place]   Does the patient have one of the following disease processes/diagnoses(primary or secondary)? Total Joint Replacement   TCM attempt successful? Yes   Call start time 1358   Call end time 1414   Discharge diagnosis total shoulder reverse arthroplasty   Is patient permission given to speak with other caregiver? Yes   List who call center can speak with Se spouse    Person spoke with today (if not patient) and relationship Edward spouse    Does the patient have all medications related to this admission filled (includes all antibiotics, pain medications, etc.) Yes   Is the patient taking all medications as directed (includes completed medication regime)? Yes   Is the patient able to teach back alternate methods of pain control? Ice   Does the patient have a follow up appointment with their surgeon? Yes  [7/28/22 surgery FU apt]   Has the patient kept scheduled appointments due by today? N/A   Comments PCP Cinthya Valentine is no longer with practice-pt aware- will route message to group-pt declined another provider   What is the Home health agency?  [facility]   Has home health visited the patient within 72 hours of discharge? Yes   Home health comments PT   Psychosocial issues? No   When is the first therapy visit scheduled (PO Day) including how many days per week  PT today 7/19/22   Did the patient receive a copy of their discharge instructions? Yes   Nursing interventions Reviewed instructions with patient   What is the patient's perception of their functional status since discharge? Improving   Is the patient able to teach back signs and symptoms of infection? Increased swelling or redness around incision (not associated with surgical edema)   Is the patient able to teach back how to prevent infection? Check incision daily   Is the patient able to teach back signs and symptoms of DVT? Redness in calf, Swelling in calf    If the patient is a current smoker, are they able to teach back resources for cessation? Not a smoker   Is the patient/caregiver able to teach back the hierarchy of who to call/visit for symptoms/problems? PCP, Specialist, Home health nurse, Urgent Care, ED, 911 Yes   TCM call completed? Yes          Ruth Hoang RN    7/19/2022, 14:16 EDT

## 2022-07-20 ENCOUNTER — HOME CARE VISIT (OUTPATIENT)
Dept: HOME HEALTH SERVICES | Facility: HOME HEALTHCARE | Age: 72
End: 2022-07-20

## 2022-07-20 ENCOUNTER — TELEPHONE (OUTPATIENT)
Dept: ORTHOPEDIC SURGERY | Facility: HOSPITAL | Age: 72
End: 2022-07-20

## 2022-07-20 VITALS
TEMPERATURE: 97.4 F | SYSTOLIC BLOOD PRESSURE: 130 MMHG | HEART RATE: 81 BPM | DIASTOLIC BLOOD PRESSURE: 70 MMHG | OXYGEN SATURATION: 93 % | RESPIRATION RATE: 18 BRPM

## 2022-07-20 PROCEDURE — G0151 HHCP-SERV OF PT,EA 15 MIN: HCPCS

## 2022-07-20 NOTE — HOME HEALTH
Pt had a small BM today.   @ end of session today pain in R UE 5-6/10.  She continues to remain very guarded in regards to PT ROM for R UE.  She becomes anxious and fear of pain.  Howevver, at the end of the PT session she reported a decrease in R UE pain.  She declined stair training today due to fear of falling despite trying to reassure her that she was safe with therapist and her spouse.     Plan for next visit: review R UE HEP and progress as tolerated, gait with SPC, stair training if patient agrees, review pain/edema management.

## 2022-07-20 NOTE — TELEPHONE ENCOUNTER
Attempted to speak with Ms. Javed to see how she is doing as she is POD 5 TSA.. She said she was working with PT at the time of this call. She was given my contact information should she have any questions/concners. She voiced understanding and appreciated the call.

## 2022-07-22 ENCOUNTER — HOME CARE VISIT (OUTPATIENT)
Dept: HOME HEALTH SERVICES | Facility: HOME HEALTHCARE | Age: 72
End: 2022-07-22

## 2022-07-22 VITALS
OXYGEN SATURATION: 94 % | SYSTOLIC BLOOD PRESSURE: 128 MMHG | TEMPERATURE: 97.2 F | DIASTOLIC BLOOD PRESSURE: 68 MMHG | RESPIRATION RATE: 18 BRPM | HEART RATE: 83 BPM

## 2022-07-22 PROCEDURE — G0151 HHCP-SERV OF PT,EA 15 MIN: HCPCS

## 2022-07-22 NOTE — HOME HEALTH
Pt reports today is not a good day.  She is having nausea and diarrhea.  She took Zofran.  She is taking pain meds every 4 hours around the clock (little bit longer at HS).   NO falls  NO med changes  NO med questions  NO abnormal bleeding    She has not been applying ice enough. Maybe only once or twice yesterday.  She remains very guarded in regards to R UE HEP.  She declined to focus on shoulder ROM today.  Ed pt re importance of mobility within restrictions to prevent scar tissue formation.  Spouse understands importance of at least coming out of sling 3x/day to move R elbow/wrist/hand.  Patient has not been compliant with this.  Spouse does not want to request stronger pain medicine because of the confusion it caused in the hospital.  Therefore, encouraged pt to ice at least 5x/day, continue to take pain meds PRN, and will re-assess on Monday.

## 2022-07-23 NOTE — PROGRESS NOTES
Enter Query Response Below      Query Response: Unable to determine             If applicable, please update the problem list.           Patient: Dalila Alanis        : 1950  Account: [id number]           Admit Date: 7/15/2022        Options to Respond to Query:    1. Access the Encounter     a. From the To-Do Side bar, click Respond With Note.     b. Click New Note     c. Answer query within the yellow box.                d. Update the Problem List if applicable.     Dr. Cedeño,    71 yr old female admitted with proximal humerus fracture that underwent right reverse total shoulder. Patient has documented history of congestive heat failure, hypertension, CAD with MI & stents, diabetes, hyperlipidemia, bronchiectasis and SILVIANO. Echo in [facility] from 2021 showed EF 65%.  The patient is being treated with Eliquis, Lasix 40mg Qam & 20mg Qpm, Lopressor and Crestor.    Can the patient's congestive heart failure be further specified as:    - Chronic HFpEF  - Chronic diastolic heart failure  - Chronic left sided heart failure  - Other (please specify) ______________  - Unable to determine    By submitting this query, we are merely seeking further clarification of documentation to accurately reflect all conditions that you are monitoring, evaluating, treating or that extend the hospitalization or utilize additional resources of care. Please utilize your independent clinical judgment when addressing the question(s) above.     This query and your response, once completed, will be entered into the legal medical record.    Sincerely,  [email or web address]  Clinical Documentation Integrity Program

## 2022-07-25 ENCOUNTER — HOME CARE VISIT (OUTPATIENT)
Dept: HOME HEALTH SERVICES | Facility: HOME HEALTHCARE | Age: 72
End: 2022-07-25

## 2022-07-25 PROCEDURE — G0151 HHCP-SERV OF PT,EA 15 MIN: HCPCS

## 2022-07-26 ENCOUNTER — READMISSION MANAGEMENT (OUTPATIENT)
Dept: CALL CENTER | Facility: HOSPITAL | Age: 72
End: 2022-07-26

## 2022-07-26 VITALS
HEART RATE: 73 BPM | OXYGEN SATURATION: 94 % | TEMPERATURE: 98.5 F | SYSTOLIC BLOOD PRESSURE: 138 MMHG | DIASTOLIC BLOOD PRESSURE: 78 MMHG

## 2022-07-26 RX ORDER — OXYCODONE HYDROCHLORIDE AND ACETAMINOPHEN 5; 325 MG/1; MG/1
1 TABLET ORAL EVERY 4 HOURS PRN
Qty: 50 TABLET | Refills: 0 | OUTPATIENT
Start: 2022-07-26

## 2022-07-26 NOTE — OUTREACH NOTE
Total Joint Week 2 Survey    Flowsheet Row Responses   [facility] patient discharged from? [place]   Does the patient have one of the following disease processes/diagnoses(primary or secondary)? Total Joint Replacement   Joint surgery performed? Shoulder   Week 2 attempt successful? No   Unsuccessful attempts Attempt 1          DAVID BUSTILLOS - Registered Nurse

## 2022-07-26 NOTE — HOME HEALTH
Patient presented today still in high pain and unable to get much relief.  She has a call out to Dr. Mckeon's office for possible stronger pill.  Her sling was on, but not in optimal position.  Bandage was intact, clean and dry.      Plan for next visit  1.  Finalize any teaching for pain management and limited HEP.   2.  Do dc assessment.

## 2022-07-27 ENCOUNTER — HOME CARE VISIT (OUTPATIENT)
Dept: HOME HEALTH SERVICES | Facility: HOME HEALTHCARE | Age: 72
End: 2022-07-27

## 2022-07-27 VITALS
RESPIRATION RATE: 18 BRPM | OXYGEN SATURATION: 92 % | HEART RATE: 73 BPM | SYSTOLIC BLOOD PRESSURE: 104 MMHG | TEMPERATURE: 99 F | DIASTOLIC BLOOD PRESSURE: 60 MMHG

## 2022-07-27 PROCEDURE — G0151 HHCP-SERV OF PT,EA 15 MIN: HCPCS

## 2022-07-27 NOTE — HOME HEALTH
Pt reports her pain is 8.5 in R shoulder sitting on the couch.  She reports she sleeps a lot to get rid of the pain.   She is sleeping 3 hours at a time. She continues to prefer to use back entrance instead of front door.  Therapist assisted her in finding out what practice Cinthya Valentine transferred to ([facility]).  Recommend pt f/u with PCP as therapist observes some UR wheezing.  She reports that is not new for her to wheeze and it is intermittent.  She does have an ortho f/u on 7/28 and starts OP PT on Monday.    Overall, significant improvement noted with quality of gait, transfers, and stair climbing today.  She is no longer fearful of leaving the home.

## 2022-07-28 NOTE — CASE COMMUNICATION
PT DC OASIS completed on 7/27/2022. Pt is now MOD I with transfers and ambulation using the cane. She tolerated R UE elbow/wrist/hand ROM but has not tolerated R UE ROM.  Therapist observed a wheeze and she reports this is off and on.  Also, she and spouse report intermittent confusion, especially on awakening at times.  Recommend she f/u with Cinthya [facility]. Pt starts OP PT on Monday.      Thank you  Suzy Sheriff PT

## 2022-07-29 ENCOUNTER — READMISSION MANAGEMENT (OUTPATIENT)
Dept: CALL CENTER | Facility: HOSPITAL | Age: 72
End: 2022-07-29

## 2022-07-29 NOTE — OUTREACH NOTE
Total Joint Week 2 Survey    Flowsheet Row Responses   [facility] patient discharged from? [place]   Does the patient have one of the following disease processes/diagnoses(primary or secondary)? Total Joint Replacement   Joint surgery performed? Shoulder   Week 2 attempt successful? Yes   Call start time 1538   Call end time 1549   Has the patient been back in either the hospital or Emergency Department since discharge? No   Does the patient have all medications related to this admission filled (includes all antibiotics, pain medications, etc.) Yes   Is the patient taking all medications as directed (includes completed medication regime)? Yes   Is the patient able to teach back alternate methods of pain control? Ice, Shoulder-elevate above heart/ keep in sling as advised   Does the patient have a follow up appointment with their surgeon? Yes   Has the patient kept scheduled appointments due by today? Yes   Comments States will make f/u appt with PCP.   Has home health visited the patient within 72 hours of discharge? N/A   Home health comments Starts outpatient PT next week-finished with  PT.   Psychosocial issues? No   Has the patient began therapy sessions (either in the home or as an out patient)? Yes   If the patient has started attending therapy, what post op day did they begin to attend (either in home or as an out patient)?   States will attend outpatient PT on 08/01/22.   Does the patient have a wound vac in place? No   Has the patient fallen since discharge? No   Did the patient receive a copy of their discharge instructions? Yes   Nursing interventions Reviewed instructions with patient   What is the patient's perception of their functional status since discharge? Improving   Is the patient able to teach back signs and symptoms of infection? Temp >100.4 for 24h or longer, Incisional drainage, Blisters around incision, Increased swelling or redness around incision (not associated with surgical  edema), Severe discomfort or pain, Changes in mobility, Shortness of breath or chest pain   Is the patient able to teach back how to prevent infection? Check incision daily, Keep incision covered if pets in house, Eat well-balanced diet, Wash hands before and after touching incision, Shower only as directed by surgeon, No tub baths, hot tub or swimming, Keep incision covered if drainage, No lotion or creams, Monitor blood sugar if diabetic   Is the patient able to teach back signs and symptoms of DVT? Redness in calf, Shortness of breath or chest pain, Swelling in calf, Area hot to touch, Severe pain in calf   Is the patient able to teach back home safety measures? Ability to shower, Modifications to reach items, Accessibility to necessary areas in home, Modifications with ADLs such as dressing, cooking, toileting   Did the patient implement home safety suggestions from pre-surgery classes if attended? N/A   Is the patient/caregiver able to teach back the hierarchy of who to call/visit for symptoms/problems? PCP, Specialist, Home health nurse, Urgent Care, ED, 911 Yes   Week 2 call completed? Yes   Wrap up additional comments Patient states is improving. Denies any s/s of infection. States will start outpatient PT next week. States BS varies, and will f/u with PCP. States is still having some surgical pain that has been evaluated by surgeon. Denies any needs today.          PABLO VAZQUEZ - Registered Nurse

## 2022-08-16 ENCOUNTER — READMISSION MANAGEMENT (OUTPATIENT)
Dept: CALL CENTER | Facility: HOSPITAL | Age: 72
End: 2022-08-16

## 2022-08-25 NOTE — OUTREACH NOTE
Medical Week 3 Survey      Responses   [facility] patient discharged from?  [place]   Does the patient have one of the following disease processes/diagnoses(primary or secondary)?  Other   Week 3 attempt successful?  Yes   Call start time  0814   Rescheduled  Rescheduled-pt requested [At therapy]   Discharge diagnosis  Dizziness  DODSON          Faith Ferris, RN   Recommend AREDS 2 multivitamin supplements. Continue FORESEE system at home. Return to Dr. Yelitza Gutierrez as scheduled.

## 2022-08-28 DIAGNOSIS — G20 PARKINSON'S DISEASE: ICD-10-CM

## 2022-09-12 ENCOUNTER — PATIENT OUTREACH (OUTPATIENT)
Dept: CASE MANAGEMENT | Facility: OTHER | Age: 72
End: 2022-09-12

## 2022-09-12 NOTE — OUTREACH NOTE
Patient Outreach    AMBULATORY CASE MANAGEMENT NOTE    Name and Relationship of Patient/Support Person:  -     Call placed to patient for monthly call and follow up call center. She states she is not doing very well right now. She has had nausea and vomiting the past couple of days. Slightly worse today. Unsure if this is a virus. Encouraged to stay well hydrated. Also suggested taking COVID test if not resolved in a day or two.   Discussed progression of her PT. Sounds as though she is doing outpatient PT but this is on hold until she sees Dr Mckeon Thursday. She states her therapist does not think she has made much progression. She feels they are pushing her to far and by doing this she becomes overwhelmed. Suggested doing her exercises at home but she reports that she was not given these handouts and does not know what to do. Due to her history of CVA her memory is limited. Suggested if she is to return to out patient PT that she should ask for these handouts to be printed out for her.   Patient not feeling well but does not have any questions at this time. Will call again next month. She does have [facility] number if needed.           PRISCILLA MATAMOROS  Ambulatory Case Management    9/12/2022, 13:16 EDT

## 2022-09-21 ENCOUNTER — READMISSION MANAGEMENT (OUTPATIENT)
Dept: CALL CENTER | Facility: HOSPITAL | Age: 72
End: 2022-09-21

## 2022-09-21 NOTE — OUTREACH NOTE
Total Joint Month 2 Survey    Flowsheet Row Responses   [facility] patient discharged from? [place]   Does the patient have one of the following disease processes/diagnoses(primary or secondary)? Total Joint Replacement   Joint surgery performed? Shoulder   Month 2 attempt successful? Yes   Call start time 1728   Call end time 1733   Has the patient been back in either the hospital or Emergency Department since discharge? No   Discharge diagnosis total shoulder reverse arthroplasty   Is the patient taking all medications as directed (includes completed medication regime)? Yes   Has the patient kept scheduled appointments due by today? Yes   Comments will be seeing the surgeon next week on 9/27   Is the patient still receiving Home Health Services? No   Is the patient still attending therapy sessions(either in the home or as an outpatient)? No   Has the patient fallen since discharge? No   Comments had to stop therapy due to some issues with her arm   What is the patient's perception of their functional status since discharge? Same  [says she is having some mobility issues in her arm- going back to see MD next week to check on it]   Is the patient able to teach back signs and symptoms of infection? Changes in mobility, Severe discomfort or pain   Is the patient/caregiver able to teach back the hierarchy of who to call/visit for symptoms/problems? PCP, Specialist, Home health nurse, Urgent Care, ED, 911 Yes   Month 2 Call Completed? Yes   Wrap up additional comments Doing ok but having some movement issues in her arm, going to see the surgeon next week.          NATHAN ALONSO - Registered Nurse

## 2022-10-25 ENCOUNTER — READMISSION MANAGEMENT (OUTPATIENT)
Dept: CALL CENTER | Facility: HOSPITAL | Age: 72
End: 2022-10-25

## 2022-10-25 NOTE — PROGRESS NOTES
DOS: 10/31/2022  NAME: Dalila Javed   : 1950  PCP: Cinthya Valentine APRN    Chief Complaint   Patient presents with   • Stroke      SUBJECTIVE  Neurological Problem:  72 y.o.RHW female with stroke (left PCA infarct in 2020), parkinsonism, migraine, ?seizure (not treated with AEDs), HTN, HLD, CAD, B12 def, Barretts esphagus, DM, Diabetic neuropathy, SILVIANO, lupus anticoagulant, PFO.  She is accompanied by her spouse.    Interval History:   **For detailed previous history, please see progress note dated 2021.    Ms. 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 She has been maintained on Eliquis and statin from stroke standpoint. When last seen by me in 2022 she had residual right hemiparesis and right-sided sensory deficits. She was also being treated for Parkinsonian symptoms with sinemet  mg TID.     Patient and spouse present today, she denies any new stroke/TIA symptoms. She does have residual paresthesias and weakness on the right that have been compounded by recent fall.   Fell, slipped out of the bed about 3 months ago, ended up with shoulder surgery by Dr. Mckeon, is getting PT. She denies any other falls. She uses a cane routinely.  She does have a h/o back pain, has been followed by pain management. She denies any seizure-like episodes.  She denies tremors, does have some issus with sleep. No hallucinations. No problems swallowing pills, no choking. Review of most recent labs from 2022 shows a CMP with glucose at 187, Na 135; CBC with elevated WBC at 15.7;  In 2022 her A1C was 8.0; Maag 2.0, B12 1850, Vit D 39.6. She denies any other changes in her health.     Review of Systems:Review of Systems   Constitutional: Negative for fatigue.   HENT: Negative for ear pain, hearing loss and tinnitus.    Eyes: Negative for photophobia, pain and visual  disturbance.   Respiratory: Positive for wheezing. Negative for chest tightness, shortness of breath and stridor.    Cardiovascular: Negative for chest pain and palpitations.   Musculoskeletal: Positive for gait problem (balance).   Neurological: Positive for weakness and numbness (right side). Negative for speech difficulty, light-headedness and headaches.   Psychiatric/Behavioral: Positive for decreased concentration and sleep disturbance (sleeplessness). Negative for confusion.    Above ROS reviewed    The following portions of the patient's history were reviewed and updated as appropriate: allergies, current medications, past family history, past medical history, past social history, past surgical history and problem list.    Current Medications:   Current Outpatient Medications:   •  ACCU-CHEK FASTCLIX LANCETS misc, USE TO TEST BLOOD SUGAR UP TO FIVE TIMES DAILY AS DIRECTED., Disp: 408 each, Rfl: 0  •  albuterol (PROAIR HFA) 108 (90 BASE) MCG/ACT inhaler,  INHALE 1 TO 2 PUFFS EVERY 4 TO 6 HOURS AS NEEDED (Patient taking differently: 2 puffs Every 4 (Four) Hours As Needed.  INHALE 1 TO 2 PUFFS EVERY 4 TO 6 HOURS AS NEEDED), Disp: 1 inhaler, Rfl: 1  •  ARIPiprazole (ABILIFY) 2 MG tablet, TAKE 1 TABLET BY MOUTH EVERY EVENING, Disp: 90 tablet, Rfl: 3  •  carbidopa-levodopa (SINEMET)  MG per tablet, TAKE 1 TABLET BY MOUTH THREE TIMES DAILY, Disp: 90 tablet, Rfl: 5  •  Cholecalciferol (VITAMIN D3) 5000 UNITS capsule capsule, Take 1 capsule by mouth daily., Disp: 30 capsule, Rfl: 5  •  citalopram (CeleXA) 20 MG tablet, TAKE 1 TABLET BY MOUTH DAILY, Disp: 90 tablet, Rfl: 0  •  diphenoxylate-atropine (LOMOTIL) 2.5-0.025 MG per tablet, Take 1 tablet by mouth 4 (Four) Times a Day As Needed for Diarrhea., Disp: 12 tablet, Rfl: 0  •  Eliquis 5 MG tablet tablet, TAKE 1 TABLET BY MOUTH TWICE DAILY (Patient taking differently: Take 1 tablet by mouth Every 12 (Twelve) Hours.), Disp: 60 tablet, Rfl: 5  •  furosemide  "(LASIX) 20 MG tablet, TAKE 2 TABLETS BY MOUTH EVERY MORNING AND 1 TABLET BY MOUTH EVERY AFTERNOON (Patient taking differently: Take 2 tablets by mouth Every Morning. TAKE 2 TABLETS BY MOUTH EVERY MORNING AND 1 TABLET BY MOUTH EVERY AFTERNOON), Disp: 270 tablet, Rfl: 1  •  gabapentin (NEURONTIN) 400 MG capsule, , Disp: , Rfl:   •  glipizide (GLUCOTROL) 5 MG tablet, TAKE 1 TABLET BY MOUTH TWICE DAILY BEFORE MEALS (Patient taking differently: Take 1 tablet by mouth 2 (Two) Times a Day Before Meals.), Disp: 180 tablet, Rfl: 0  •  glucose blood (FREESTYLE LITE) test strip, USE TO TEST FIVE TIMES DAILY, Disp: 450 each, Rfl: 0  •  HYDROcodone-acetaminophen (NORCO) 5-325 MG per tablet, Take 1-2 tablets by mouth Every 6 (Six) Hours As Needed for Moderate Pain ., Disp: 20 tablet, Rfl: 0  •  Insulin Syringe 31G X 5/16\" 1 ML misc, USE TWICE DAILY AS DIRECTED, Disp: 180 each, Rfl: 0  •  methocarbamol (ROBAXIN) 750 MG tablet, Take 1 tablet by mouth 3 (Three) Times a Day., Disp: 15 tablet, Rfl: 0  •  metoprolol tartrate (LOPRESSOR) 50 MG tablet, TAKE 1 TABLET BY MOUTH THREE TIMES DAILY (Patient taking differently: Take 1 tablet by mouth 2 (Two) Times a Day.), Disp: 270 tablet, Rfl: 0  •  mirtazapine (REMERON) 7.5 MG tablet, TAKE 1 TABLET BY MOUTH EVERY NIGHT, Disp: 90 tablet, Rfl: 0  •  nitroglycerin (NITROSTAT) 0.4 MG SL tablet, Take 1 tablet by mouth Every 5 (Five) Minutes As Needed for Chest Pain. Take no more than 3 doses in 15 minutes., Disp: 30 tablet, Rfl: 0  •  NovoLIN N 100 UNIT/ML injection, ADMINISTER 14 UNITS UNDER THE SKIN TWICE DAILY BEFORE MEALS AS DIRECTED. INCREASE BY 2 UNIT WEEKLY. MAX 30 UNIT PER DAY FOR A FASTING OF<150, Disp: 10 mL, Rfl: 1  •  omeprazole (priLOSEC) 40 MG capsule, TAKE 1 CAPSULE BY MOUTH TWICE DAILY (Patient taking differently: Take 1 capsule by mouth Daily.), Disp: 180 capsule, Rfl: 3  •  ondansetron (ZOFRAN) 4 MG tablet, Take 1 tablet by mouth Every 6 (Six) Hours As Needed for Nausea or " Vomiting., Disp: 30 tablet, Rfl: 0  •  potassium chloride (KLOR-CON) 8 MEQ CR tablet, TAKE 1 TABLET BY MOUTH TWICE DAILY (Patient taking differently: Take 1 tablet by mouth 2 (Two) Times a Day.), Disp: 180 tablet, Rfl: 1  •  rosuvastatin (CRESTOR) 20 MG tablet, TAKE 1 TABLET BY MOUTH DAILY, Disp: 90 tablet, Rfl: 1  •  Symbicort 160-4.5 MCG/ACT inhaler, INHALE 2 PUFFS BY MOUTH TWICE DAILY. RINSE MOUTH AFTER USE (Patient taking differently: Inhale 2 puffs 2 (Two) Times a Day.), Disp: 10.2 g, Rfl: 2  •  vitamin B-12 (CYANOCOBALAMIN) 100 MCG tablet, Take 50 mcg by mouth Daily., Disp: , Rfl:   •  alendronate (FOSAMAX) 70 MG tablet, TAKE 1 TABLET BY MOUTH EVERY 7 DAYS (Patient taking differently: Take 1 tablet by mouth Every 7 (Seven) Days. Takes on wed), Disp: 12 tablet, Rfl: 1  •  Chlorhexidine Gluconate Cloth 2 % pads, Apply  topically. Use as directed preop, Disp: , Rfl:   •  furosemide (LASIX) 20 MG tablet, Take 20 mg by mouth. TAKES I N THE AFTERNOON, Disp: , Rfl:   •  guaiFENesin 200 MG tablet, Take 2 tablets by mouth Every 4 (Four) Hours As Needed for Cough., Disp: 40 tablet, Rfl: 0  •  mupirocin (BACTROBAN) 2 % nasal ointment, into the nostril(s) as directed by provider. Use as directed preop, Disp: , Rfl:   •  oxyCODONE-acetaminophen (PERCOCET) 5-325 MG per tablet, Take 1 tablet by mouth Every 4 (Four) Hours As Needed., Disp: , Rfl:   •  oxyCODONE-acetaminophen (PERCOCET) 5-325 MG per tablet, Take 1 tablet by mouth Every 4 (Four) Hours As Needed for Severe Pain ., Disp: 50 tablet, Rfl: 0  **I did not stop or change the above medications.  Patient's medication list was updated to reflect medications they have reported as currently taking, including medication changes made by other providers.    OBJECTIVE  Vitals:    10/26/22 0842   BP: 128/76   Pulse: 75   SpO2: 95%     Body mass index is 34.6 kg/m².    Diagnostics:    Laboratory Results:         Lab Results   Component Value Date    WBC 15.70 (H) 07/17/2022     HGB 13.2 07/17/2022    HCT 38.9 07/17/2022    MCV 89.8 07/17/2022     07/17/2022     Lab Results   Component Value Date    GLUCOSE 187 (H) 07/17/2022    BUN 13 07/17/2022    CREATININE 0.90 07/17/2022    EGFRIFNONA 56 (L) 12/28/2021    EGFRIFAFRI 62 10/29/2021    BCR 14.4 07/17/2022    K 3.9 07/17/2022    CO2 27.0 07/17/2022    CALCIUM 9.1 07/17/2022    PROTENTOTREF 6.7 10/29/2021    ALBUMIN 3.90 07/12/2022    LABIL2 1.6 10/29/2021    AST 17 07/12/2022    ALT 15 07/12/2022     Lab Results   Component Value Date    HGBA1C 8.0 (H) 08/24/2022     Lab Results   Component Value Date    CHOL 106 08/20/2020     Lab Results   Component Value Date    HDL 26 (L) 10/29/2021    HDL 34 (L) 08/20/2020    HDL 47 04/14/2020     Lab Results   Component Value Date    LDL 69 10/29/2021    LDL 54 08/20/2020    LDL 73 04/14/2020     Lab Results   Component Value Date    TRIG 228 (H) 10/29/2021    TRIG 90 08/20/2020    TRIG 101 04/14/2020     No results found for: RPR  Lab Results   Component Value Date    TSH 1.830 04/05/2022     Lab Results   Component Value Date    IRMBOGKF42 1,850 08/24/2022       Physical Exam:  GENERAL: NAD, overweight  HEENT: Normocephalic, atraumatic   COR: RRR  Resp: Even and unlabored  Extremities: Glove on right hand d/t persistently cold; Decrease ROM of arms/shouler, recent right shoulder sugery, chronic LBP   Skin: No rashes, lesions or ulcers.  Psychiatric: Normal mood and affect.    Neurological:   MS: AO. Language normal. No neglect. Recall (3/3 with 2 clues). Follows all commands.  CN: II-XII grossly normal except for subtle disconjugate gaze, decreased sensation of right face.   Motor: Normal strength and tone on the left except for decreased hip flexors 4/5; difficulty assessing right d/t recent surgery. NO tremors noted.  Sensory: Intact to light touch in arms and legs on the left; right side with hyperesthesias in the hand.   Station and Gait: Slow, cautious, antalgic gait.   Coordination:  Normal finger to nose bilaterally    Impression/Plan:    1. Cereberovascular disease, h/o left PCA embolic stroke s/p IV tPA  Continue lifelong AC per heme  DM: Continues to be an issue, most recent A1C 8.0, improved, continue aggressive control of blood sugars.   HLD: Continue crestor 20 mg, F/U with PCP for continued cholesterol sureveillance, goal LDL < 70  SILVIANO: Encouraged compliance with CPAP  B12: Previously low but on recent labs in normal range.   Secondary stroke prevention: Ideal targets for stroke prevention would be Blood pressure < 130/80; B12 > 500 TSH in normal range and LDL < 70; HbA1c < 6.5 and smoking cessation if applicable.  Call 911 for stroke symptoms    2. Suspected one time focal seizure -- no recurrence, no indication for AED. Patient/spouse will continue to monitor    3. Parkinsonisms -- patient has been on low dose sinemet 10/100 mg TID with apparently no change in symptoms; she has no overt Parkinsonian symptoms on exam today.  Her mobility has been hampered by her chronic LBP and now recent fall s/p shoulder surgery. We discussed possible trial of sinemet to see if there is any change in mobility/gait.  Recommend continuing with PT. She is at risk for falls, reviewed importance of prevention.     She will f/u here in 6 months, sooner if symptoms warrant.     I spent a total of 40 minutes on day of visit in reviewing records, prior diagnostics, examination of patient as well as counseling and educating patient regarding diagnoses, symptoms, reviewing diagnostics with patient, pharmacologic treatment options including purpose, risk, benefits, possible side-effects, recommendations, lifestyle modifications, coordination of care and documenting plan of care.        Diagnoses and all orders for this visit:    1. Cerebrovascular disease (Primary)    2. Parkinsonian features    3. Risk for falls    4. Uncontrolled type 2 diabetes mellitus with hypoglycemia, unspecified hypoglycemia coma status  (HCC)    5. Hypercholesterolemia        Coding      Dictated using Dragon

## 2022-10-25 NOTE — OUTREACH NOTE
Total Joint Month 3 Survey    Flowsheet Row Responses   [facility] facility patient discharged from? [place]   Does the patient have one of the following disease processes/diagnoses(primary or secondary)? Total Joint Replacement   Joint surgery performed? Shoulder   Month 3 attempt successful? No   Unsuccessful attempts Attempt 1          CASE MATAMOROS - Registered Nurse

## 2022-10-26 ENCOUNTER — OFFICE VISIT (OUTPATIENT)
Dept: NEUROLOGY | Facility: CLINIC | Age: 72
End: 2022-10-26

## 2022-10-26 VITALS
OXYGEN SATURATION: 95 % | HEART RATE: 75 BPM | BODY MASS INDEX: 34.57 KG/M2 | HEIGHT: 61 IN | SYSTOLIC BLOOD PRESSURE: 128 MMHG | WEIGHT: 183.1 LBS | DIASTOLIC BLOOD PRESSURE: 76 MMHG

## 2022-10-26 DIAGNOSIS — E11.649 UNCONTROLLED TYPE 2 DIABETES MELLITUS WITH HYPOGLYCEMIA, UNSPECIFIED HYPOGLYCEMIA COMA STATUS: ICD-10-CM

## 2022-10-26 DIAGNOSIS — E78.00 HYPERCHOLESTEROLEMIA: ICD-10-CM

## 2022-10-26 DIAGNOSIS — I67.9 CEREBROVASCULAR DISEASE: Primary | ICD-10-CM

## 2022-10-26 DIAGNOSIS — Z91.81 RISK FOR FALLS: ICD-10-CM

## 2022-10-26 DIAGNOSIS — R25.9 PARKINSONIAN FEATURES: ICD-10-CM

## 2022-10-26 PROCEDURE — 99215 OFFICE O/P EST HI 40 MIN: CPT | Performed by: NURSE PRACTITIONER

## 2022-10-27 ENCOUNTER — READMISSION MANAGEMENT (OUTPATIENT)
Dept: CALL CENTER | Facility: HOSPITAL | Age: 72
End: 2022-10-27

## 2022-10-27 NOTE — OUTREACH NOTE
Total Joint Month 3 Survey    Flowsheet Row Responses   [facility] facility patient discharged from? [place]   Does the patient have one of the following disease processes/diagnoses(primary or secondary)? Total Joint Replacement   Joint surgery performed? Shoulder   Month 3 attempt successful? No   Unsuccessful attempts Attempt 2  [All numbers attempted-no answer]          DESIREE H - Registered Nurse

## 2022-11-03 ENCOUNTER — PATIENT OUTREACH (OUTPATIENT)
Dept: CASE MANAGEMENT | Facility: OTHER | Age: 72
End: 2022-11-03

## 2022-11-03 NOTE — OUTREACH NOTE
Patient Outreach    AMBULATORY CASE MANAGEMENT NOTE    Name and Relationship of Patient/Support Person: Dalila Javed - Self    Call placed to patient with message left. Patient has ACM and informed to call if she needs assistance with anything. Will continue to monitor.         PRISCILLA MATAMOROS  Ambulatory Case Management    11/3/2022, 11:30 EDT

## 2022-12-01 ENCOUNTER — APPOINTMENT (OUTPATIENT)
Dept: GENERAL RADIOLOGY | Facility: HOSPITAL | Age: 72
End: 2022-12-01

## 2022-12-01 ENCOUNTER — HOSPITAL ENCOUNTER (EMERGENCY)
Facility: HOSPITAL | Age: 72
Discharge: HOME | End: 2022-12-01
Attending: EMERGENCY MEDICINE | Admitting: EMERGENCY MEDICINE

## 2022-12-01 VITALS
RESPIRATION RATE: 20 BRPM | OXYGEN SATURATION: 93 % | DIASTOLIC BLOOD PRESSURE: 73 MMHG | BODY MASS INDEX: 34.6 KG/M2 | TEMPERATURE: 97.3 F | SYSTOLIC BLOOD PRESSURE: 120 MMHG | HEIGHT: 61 IN | HEART RATE: 54 BPM

## 2022-12-01 DIAGNOSIS — E11.65 HYPERGLYCEMIA DUE TO DIABETES MELLITUS: ICD-10-CM

## 2022-12-01 DIAGNOSIS — J06.9 URI WITH COUGH AND CONGESTION: Primary | ICD-10-CM

## 2022-12-01 LAB
ALBUMIN SERPL-MCNC: 4.1 G/DL (ref 3.5–5.2)
ALBUMIN/GLOB SERPL: 1.6 G/DL
ALP SERPL-CCNC: 50 U/L (ref 39–117)
ALT SERPL W P-5'-P-CCNC: 21 U/L (ref 1–33)
ANION GAP SERPL CALCULATED.3IONS-SCNC: 11.3 MMOL/L (ref 5–15)
AST SERPL-CCNC: 18 U/L (ref 1–32)
BACTERIA UR QL AUTO: NORMAL /HPF
BASOPHILS # BLD AUTO: 0.07 10*3/MM3 (ref 0–0.2)
BASOPHILS NFR BLD AUTO: 0.7 % (ref 0–1.5)
BILIRUB SERPL-MCNC: 0.2 MG/DL (ref 0–1.2)
BILIRUB UR QL STRIP: NEGATIVE
BUN SERPL-MCNC: 10 MG/DL (ref 8–23)
BUN/CREAT SERPL: 11 (ref 7–25)
CALCIUM SPEC-SCNC: 9.7 MG/DL (ref 8.6–10.5)
CHLORIDE SERPL-SCNC: 100 MMOL/L (ref 98–107)
CLARITY UR: CLEAR
CO2 SERPL-SCNC: 28.7 MMOL/L (ref 22–29)
COLOR UR: YELLOW
CREAT SERPL-MCNC: 0.91 MG/DL (ref 0.57–1)
DEPRECATED RDW RBC AUTO: 38 FL (ref 37–54)
EGFRCR SERPLBLD CKD-EPI 2021: 66.3 ML/MIN/1.73
EOSINOPHIL # BLD AUTO: 0.48 10*3/MM3 (ref 0–0.4)
EOSINOPHIL NFR BLD AUTO: 4.9 % (ref 0.3–6.2)
ERYTHROCYTE [DISTWIDTH] IN BLOOD BY AUTOMATED COUNT: 12.4 % (ref 12.3–15.4)
FLUAV RNA RESP QL NAA+PROBE: NOT DETECTED
FLUBV RNA RESP QL NAA+PROBE: NOT DETECTED
GLOBULIN UR ELPH-MCNC: 2.6 GM/DL
GLUCOSE BLDC GLUCOMTR-MCNC: 313 MG/DL (ref 70–130)
GLUCOSE SERPL-MCNC: 262 MG/DL (ref 65–99)
GLUCOSE UR STRIP-MCNC: ABNORMAL MG/DL
HCT VFR BLD AUTO: 42.7 % (ref 34–46.6)
HGB BLD-MCNC: 14.6 G/DL (ref 12–15.9)
HGB UR QL STRIP.AUTO: ABNORMAL
HYALINE CASTS UR QL AUTO: NORMAL /LPF
IMM GRANULOCYTES # BLD AUTO: 0.03 10*3/MM3 (ref 0–0.05)
IMM GRANULOCYTES NFR BLD AUTO: 0.3 % (ref 0–0.5)
KETONES UR QL STRIP: ABNORMAL
LEUKOCYTE ESTERASE UR QL STRIP.AUTO: NEGATIVE
LYMPHOCYTES # BLD AUTO: 4.26 10*3/MM3 (ref 0.7–3.1)
LYMPHOCYTES NFR BLD AUTO: 43.4 % (ref 19.6–45.3)
MCH RBC QN AUTO: 29.3 PG (ref 26.6–33)
MCHC RBC AUTO-ENTMCNC: 34.2 G/DL (ref 31.5–35.7)
MCV RBC AUTO: 85.7 FL (ref 79–97)
MONOCYTES # BLD AUTO: 0.82 10*3/MM3 (ref 0.1–0.9)
MONOCYTES NFR BLD AUTO: 8.4 % (ref 5–12)
NEUTROPHILS NFR BLD AUTO: 4.16 10*3/MM3 (ref 1.7–7)
NEUTROPHILS NFR BLD AUTO: 42.3 % (ref 42.7–76)
NITRITE UR QL STRIP: NEGATIVE
NRBC BLD AUTO-RTO: 0 /100 WBC (ref 0–0.2)
PH UR STRIP.AUTO: <=5 [PH] (ref 5–8)
PLATELET # BLD AUTO: 146 10*3/MM3 (ref 140–450)
PMV BLD AUTO: 10.7 FL (ref 6–12)
POTASSIUM SERPL-SCNC: 4 MMOL/L (ref 3.5–5.2)
PROT SERPL-MCNC: 6.7 G/DL (ref 6–8.5)
PROT UR QL STRIP: NEGATIVE
QT INTERVAL: 445 MS
RBC # BLD AUTO: 4.98 10*6/MM3 (ref 3.77–5.28)
RBC # UR STRIP: NORMAL /HPF
REF LAB TEST METHOD: NORMAL
RSV RNA NPH QL NAA+NON-PROBE: NOT DETECTED
SARS-COV-2 RNA RESP QL NAA+PROBE: NOT DETECTED
SODIUM SERPL-SCNC: 140 MMOL/L (ref 136–145)
SP GR UR STRIP: 1.03 (ref 1–1.03)
SQUAMOUS #/AREA URNS HPF: NORMAL /HPF
TROPONIN T SERPL-MCNC: <0.01 NG/ML (ref 0–0.03)
UROBILINOGEN UR QL STRIP: ABNORMAL
WBC # UR STRIP: NORMAL /HPF
WBC NRBC COR # BLD: 9.82 10*3/MM3 (ref 3.4–10.8)

## 2022-12-01 PROCEDURE — 99284 EMERGENCY DEPT VISIT MOD MDM: CPT

## 2022-12-01 PROCEDURE — 85025 COMPLETE CBC W/AUTO DIFF WBC: CPT | Performed by: EMERGENCY MEDICINE

## 2022-12-01 PROCEDURE — 36415 COLL VENOUS BLD VENIPUNCTURE: CPT

## 2022-12-01 PROCEDURE — 94760 N-INVAS EAR/PLS OXIMETRY 1: CPT

## 2022-12-01 PROCEDURE — 84484 ASSAY OF TROPONIN QUANT: CPT | Performed by: EMERGENCY MEDICINE

## 2022-12-01 PROCEDURE — 80053 COMPREHEN METABOLIC PANEL: CPT | Performed by: EMERGENCY MEDICINE

## 2022-12-01 PROCEDURE — 94761 N-INVAS EAR/PLS OXIMETRY MLT: CPT

## 2022-12-01 PROCEDURE — 93005 ELECTROCARDIOGRAM TRACING: CPT | Performed by: EMERGENCY MEDICINE

## 2022-12-01 PROCEDURE — 93010 ELECTROCARDIOGRAM REPORT: CPT | Performed by: INTERNAL MEDICINE

## 2022-12-01 PROCEDURE — 82962 GLUCOSE BLOOD TEST: CPT

## 2022-12-01 PROCEDURE — 94640 AIRWAY INHALATION TREATMENT: CPT

## 2022-12-01 PROCEDURE — 87637 SARSCOV2&INF A&B&RSV AMP PRB: CPT | Performed by: EMERGENCY MEDICINE

## 2022-12-01 PROCEDURE — 71045 X-RAY EXAM CHEST 1 VIEW: CPT

## 2022-12-01 PROCEDURE — 81001 URINALYSIS AUTO W/SCOPE: CPT | Performed by: EMERGENCY MEDICINE

## 2022-12-01 PROCEDURE — 94799 UNLISTED PULMONARY SVC/PX: CPT

## 2022-12-01 RX ORDER — ALBUTEROL SULFATE 90 UG/1
AEROSOL, METERED RESPIRATORY (INHALATION)
Qty: 8 G | Refills: 0 | Status: SHIPPED | OUTPATIENT
Start: 2022-12-01

## 2022-12-01 RX ORDER — ALBUTEROL SULFATE 1.25 MG/3ML
1.25 SOLUTION RESPIRATORY (INHALATION) ONCE
Status: COMPLETED | OUTPATIENT
Start: 2022-12-01 | End: 2022-12-01

## 2022-12-01 RX ORDER — SODIUM CHLORIDE 0.9 % (FLUSH) 0.9 %
10 SYRINGE (ML) INJECTION AS NEEDED
Status: DISCONTINUED | OUTPATIENT
Start: 2022-12-01 | End: 2022-12-01 | Stop reason: HOSPADM

## 2022-12-01 RX ADMIN — ALBUTEROL SULFATE 1.25 MG: 1.25 SOLUTION RESPIRATORY (INHALATION) at 01:39

## 2022-12-01 RX ADMIN — SODIUM CHLORIDE 500 ML: 9 INJECTION, SOLUTION INTRAVENOUS at 01:26

## 2022-12-01 NOTE — ED TRIAGE NOTES
Patient to ED per PV from home, ambulatory to triage w/ reports of high blood sugar at home; 360 at 2351. Patient w/ compliant w/ home insulin. Patient w/ audible wheezes during triage; states she has been wheezing for 2 months.

## 2022-12-01 NOTE — ED PROVIDER NOTES
EMERGENCY DEPARTMENT ENCOUNTER    Room Number:  13/13  Date of encounter:  12/1/2022  PCP: Cinthya Valentine APRN  Historian: Pt    Patient was placed in face mask during triage process. Patient was wearing facemask when I entered the room and throughout our encounter. I wore full protective equipment throughout this patient encounter including a face mask, eye protection, and gloves. Hand hygiene was performed before donning protective equipment and again following doffing of PPE after leaving the room.    HPI:  Chief Complaint: Hyperglycemia  A complete HPI/ROS/PMH/PSH/SH/FH are unobtainable due to: N/A   Context: Dalila Javed is a 72 y.o. female who presents to the ED c/o URI type symptoms with some dyspnea, rare cough, and sinus congestion over the last 48 hours who also developed hyperglycemia over the last 12 to 18 hours.  Denies abdominal pain, nausea or vomiting.  Tolerating p.o. fluids well.  Patient has noted that she is urinating more frequently than usual but has not noticed any dysuria.  No reported fevers or hemoptysis.  No new change in medications.  No exacerbating or relieving factors otherwise noted.      MEDICAL HISTORY REVIEW  EMR reviewed:    Date of admission: 4/29/2021  Date of discharge: 5/5/2021  Active Hospital Problems     Diagnosis   POA   • **Encephalopathy, metabolic [G93.41]   Yes   • Hypokalemia [E87.6]   Yes   • Chronic right shoulder pain [M25.511, G89.29]   Yes   • Parkinson disease (CMS/HCC) [G20]   Yes   • History of stroke [Z86.73]   Not Applicable   • Morbidly obese (CMS/HCC) [E66.01]   Yes   • DODSON (nonalcoholic steatohepatitis) [K75.81]   Yes   • Controlled diabetes mellitus type 2 with complications (CMS/HCC) [E11.8]   Yes   • Coronary arteriosclerosis in native artery [I25.10]   Yes       Resolved Hospital Problems     Diagnosis Date Resolved POA   • Fever [R50.9] 05/03/2021 Yes   • Acute UTI (urinary tract infection) [N39.0] 05/03/2021 Yes   • Migraine with  typical aura [G43.109]           PAST MEDICAL HISTORY  Active Ambulatory Problems     Diagnosis Date Noted   • Adjustment disorder with mixed anxiety and depressed mood 03/08/2016   • Atopic rhinitis 03/08/2016   • Coronary arteriosclerosis in native artery 03/08/2016   • Cobalamin deficiency 03/08/2016   • Benign colonic polyp 03/08/2016   • Lumbar radiculopathy 03/08/2016   • Controlled diabetes mellitus type 2 with complications ([facility]) 03/08/2016   • Binocular vision disorder with diplopia 03/08/2016   • Dyslipidemia 03/08/2016   • Arthropathy of hand 03/08/2016   • Knee pain 03/08/2016   • Cramps of lower extremity 03/08/2016   • Low back pain 03/08/2016   • Chronic nausea 03/08/2016   • Obstructive sleep apnea syndrome 03/08/2016   • Palpitations 03/08/2016   • Ventricular premature beats 03/08/2016   • Cephalalgia 03/08/2016   • Colonic constipation 03/08/2016   • Neurocardiogenic syncope 03/08/2016   • Vitamin D deficiency 03/08/2016   • DODSON (nonalcoholic steatohepatitis) 04/01/2016   • Fibromyalgia 03/26/2013   • Morbidly obese ([facility]) 12/10/2018   • Polyp of colon 11/01/2019   • Family history of colonic polyps 11/01/2019   • Family history of malignant neoplasm of colon 11/01/2019   • Acute CVA (cerebrovascular accident) ([facility]) 08/19/2020   • Episode of dizziness 09/30/2020   • Dizziness 09/30/2020   • History of stroke 09/30/2020   • Chronic right shoulder pain 04/30/2021   • Encephalopathy, metabolic 04/30/2021   • Migraine without aura or status migrainosus 04/30/2021   • Parkinson disease ([facility]) 04/30/2021   • Hypokalemia 05/04/2021   • Benign paroxysmal positional vertigo 11/02/2017   • Chronic obstructive pulmonary disease ([facility]) 05/09/2017   • Gastroesophageal reflux disease 09/24/2015   • History of percutaneous transluminal coronary angioplasty 10/18/2016   • Hypercholesterolemia 09/24/2015   • Hypertensive disorder 09/24/2015   • Myocardial infarction ([facility]) 05/09/2017   • Occipital neuralgia  09/23/2015   • Patent foramen ovale 10/19/2020   • Transient cerebral ischemia 11/02/2017   • Arm pain, anterior, right 08/04/2021   • TIA (transient ischemic attack) 12/27/2021   • Pathological fracture of vertebra due to osteoporosis with delayed healing 05/03/2022   • Compression fracture of L1 vertebra with delayed healing 05/03/2022   • Status post reverse arthroplasty of right shoulder 07/15/2022     Resolved Ambulatory Problems     Diagnosis Date Noted   • Flank pain 03/08/2016   • Abnormal blood sugar 03/08/2016   • Abnormal weight gain 03/08/2016   • Acquired trigger finger 03/08/2016   • Acute bronchitis 03/08/2016   • Atypical chest pain 03/08/2016   • History of Williamson's esophagus 03/08/2016   • CAP (community acquired pneumonia) 03/08/2016   • Candidiasis of skin 03/08/2016   • Diabetic peripheral neuropathy (HCC) 03/08/2016   • Breathing difficult 03/08/2016   • Dizziness 03/08/2016   • Hematuria 03/08/2016   • Migraine with typical aura 03/08/2016   • Muscle ache 03/08/2016   • MAC (mycobacterium avium-intracellulare complex) 03/08/2016   • Night sweats 03/08/2016   • Otitis externa 03/08/2016   • Abscess, perianal 03/08/2016   • Skin tag 03/08/2016   • Upper respiratory tract infection 03/08/2016   • Urinary hesitancy 03/08/2016   • Asthmatic breathing 03/08/2016   • Preoperative clearance 04/01/2016   • Fever 04/30/2021   • Acute UTI (urinary tract infection) 04/30/2021     Past Medical History:   Diagnosis Date   • Acute myocardial infarction ([facility])    • Adjustment reaction with mixed emotional features    • Arthritis    • ASHD (arteriosclerotic heart disease)    • Asthma    • B12 deficiency    • Bacterial pneumonia    • Williamson esophagus    • Bilateral knee pain    • Birthmark    • Bronchiectasis ([facility])    • CHF (congestive heart failure) ([facility])    • Chronic cough    • Chronic glomerulonephritis with exudative nephritis    • Chronic lumbar radiculopathy    • Diabetes mellitus ([facility])    •  Fibromyositis    • Fracture, humerus 2022   • GERD (gastroesophageal reflux disease)    • Herpes zoster    • Hyperlipidemia    • Hypertension    • Lupus anticoagulant disorder (HCC)    • Mycobacterium avium complex (HCC)    • Nephrolithiasis    • SILVIANO (obstructive sleep apnea)    • Premature ventricular contraction    • Slow transit constipation    • Stroke (HCC)          PAST SURGICAL HISTORY  Past Surgical History:   Procedure Laterality Date   • APPENDECTOMY     • BRONCHOSCOPY     •  SECTION     • CHOLECYSTECTOMY     • COLONOSCOPY     • COLONOSCOPY N/A 2019    Procedure: COLONOSCOPY to cecum with cold biopsy and cold and hotsnare polypectomies;  Surgeon: Suzy Eubanks MD;  Location: [facility] ENDOSCOPY;  Service: Gastroenterology   • CORONARY ANGIOPLASTY WITH STENT PLACEMENT     • EMBOLECTOMY N/A 2020    Procedure: EMERGENT CEREBRAL ANGIOGRAM;  Surgeon: Jonh Meehan MD;  Location: [facility] OR ;  Service: Neurosurgery;  Laterality: N/A;   • ENDOSCOPY N/A 2019    Procedure: ESOPHAGOGASTRODUODENOSCOPY with cold biopsies;  Surgeon: Suzy Eubanks MD;  Location: [facility] ENDOSCOPY;  Service: Gastroenterology   • KNEE ARTHROSCOPY     • OTHER SURGICAL HISTORY      elbow surgery   • ROTATOR CUFF REPAIR     • TOTAL SHOULDER ARTHROPLASTY W/ DISTAL CLAVICLE EXCISION Right 7/15/2022    Procedure: TOTAL SHOULDER REVERSE ARTHROPLASTY;  Surgeon: Dk Mckeon II, MD;  Location: [facility] OR [facility];  Service: Orthopedics;  Laterality: Right;   • TUBAL ABDOMINAL LIGATION           FAMILY HISTORY  Family History   Problem Relation Age of Onset   • Colon cancer Mother    • Uterine cancer Mother    • Arthritis Mother    • Cancer Mother    • Heart disease Mother    • Migraines Mother    • Stroke Mother    • Cancer Father         malignant brain tumor   • Aneurysm Father    • Bone cancer Maternal Aunt    • Arthritis Maternal Grandmother    • Heart disease Maternal  Grandmother    • Thyroid disease Maternal Grandmother    • Diabetes Paternal Grandmother    • Diabetes Paternal Grandfather    • Malig Hyperthermia Neg Hx          SOCIAL HISTORY  Social History     Socioeconomic History   • Marital status:    Tobacco Use   • Smoking status: Former     Types: Cigarettes     Quit date: 1980     Years since quittin.9   • Smokeless tobacco: Never   Vaping Use   • Vaping Use: Never used   Substance and Sexual Activity   • Alcohol use: No   • Drug use: No   • Sexual activity: Yes     Partners: Male         ALLERGIES  Duloxetine hcl, Viibryd [vilazodone hcl], Nsaids, Benadryl [diphenhydramine], Carafate [sucralfate], Metformin, Morphine and related, Oxycodone, Penicillins, and Statins        REVIEW OF SYSTEMS  Review of Systems     All systems reviewed and negative except for those discussed in HPI.       PHYSICAL EXAM    I have reviewed the triage vital signs and nursing notes.    ED Triage Vitals   Temp Heart Rate Resp BP SpO2   22   97.3 °F (36.3 °C) 71 20 139/86 95 %      Temp src Heart Rate Source Patient Position BP Location FiO2 (%)   22 -- 22 --   Tympanic  Standing Left arm        Physical Exam    Physical Exam   Constitutional: No distress.  Nontoxic.  HENT:  Head: Normocephalic and atraumatic.   Oropharynx: Mucous membranes are moist.   Eyes: No scleral icterus. No conjunctival pallor.  Neck: Painless range of motion noted. Neck supple.   Cardiovascular: Normal rate, regular rhythm and intact distal pulses.  Pulmonary/Chest: Speaks in full sentences with no respiratory distress or tachypnea noted.  Some wheezing is heard but it appears to be upper airway rather than the lower.    Abdominal: Soft. There is no tenderness. There is no rebound and no guarding.   Musculoskeletal: Moves all extremities equally. There is no pedal edema or calf tenderness.    Neurological: Alert.  Baseline strength and sensation noted.   Skin: Skin is pink, warm, and dry. No pallor.   Psychiatric: Mood and affect normal.   Nursing note and vitals reviewed.    LAB RESULTS  Recent Results (from the past 24 hour(s))   POC Glucose Once    Collection Time: 12/01/22 12:42 AM    Specimen: Blood   Result Value Ref Range    Glucose 313 (H) 70 - 130 mg/dL   COVID-19, FLU A/B, RSV PCR - Swab, Nasopharynx    Collection Time: 12/01/22  1:26 AM    Specimen: Nasopharynx; Swab   Result Value Ref Range    COVID19 Not Detected Not Detected - Ref. Range    Influenza A PCR Not Detected Not Detected    Influenza B PCR Not Detected Not Detected    RSV, PCR Not Detected Not Detected   Comprehensive Metabolic Panel    Collection Time: 12/01/22  1:26 AM    Specimen: Blood   Result Value Ref Range    Glucose 262 (H) 65 - 99 mg/dL    BUN 10 8 - 23 mg/dL    Creatinine 0.91 0.57 - 1.00 mg/dL    Sodium 140 136 - 145 mmol/L    Potassium 4.0 3.5 - 5.2 mmol/L    Chloride 100 98 - 107 mmol/L    CO2 28.7 22.0 - 29.0 mmol/L    Calcium 9.7 8.6 - 10.5 mg/dL    Total Protein 6.7 6.0 - 8.5 g/dL    Albumin 4.10 3.50 - 5.20 g/dL    ALT (SGPT) 21 1 - 33 U/L    AST (SGOT) 18 1 - 32 U/L    Alkaline Phosphatase 50 39 - 117 U/L    Total Bilirubin 0.2 0.0 - 1.2 mg/dL    Globulin 2.6 gm/dL    A/G Ratio 1.6 g/dL    BUN/Creatinine Ratio 11.0 7.0 - 25.0    Anion Gap 11.3 5.0 - 15.0 mmol/L    eGFR 66.3 >60.0 mL/min/1.73   Troponin    Collection Time: 12/01/22  1:26 AM    Specimen: Blood   Result Value Ref Range    Troponin T <0.010 0.000 - 0.030 ng/mL   CBC Auto Differential    Collection Time: 12/01/22  1:26 AM    Specimen: Blood   Result Value Ref Range    WBC 9.82 3.40 - 10.80 10*3/mm3    RBC 4.98 3.77 - 5.28 10*6/mm3    Hemoglobin 14.6 12.0 - 15.9 g/dL    Hematocrit 42.7 34.0 - 46.6 %    MCV 85.7 79.0 - 97.0 fL    MCH 29.3 26.6 - 33.0 pg    MCHC 34.2 31.5 - 35.7 g/dL    RDW 12.4 12.3 - 15.4 %    RDW-SD 38.0 37.0 - 54.0 fl    MPV 10.7  6.0 - 12.0 fL    Platelets 146 140 - 450 10*3/mm3    Neutrophil % 42.3 (L) 42.7 - 76.0 %    Lymphocyte % 43.4 19.6 - 45.3 %    Monocyte % 8.4 5.0 - 12.0 %    Eosinophil % 4.9 0.3 - 6.2 %    Basophil % 0.7 0.0 - 1.5 %    Immature Grans % 0.3 0.0 - 0.5 %    Neutrophils, Absolute 4.16 1.70 - 7.00 10*3/mm3    Lymphocytes, Absolute 4.26 (H) 0.70 - 3.10 10*3/mm3    Monocytes, Absolute 0.82 0.10 - 0.90 10*3/mm3    Eosinophils, Absolute 0.48 (H) 0.00 - 0.40 10*3/mm3    Basophils, Absolute 0.07 0.00 - 0.20 10*3/mm3    Immature Grans, Absolute 0.03 0.00 - 0.05 10*3/mm3    nRBC 0.0 0.0 - 0.2 /100 WBC   Urinalysis With Culture If Indicated - Urine, Clean Catch    Collection Time: 12/01/22  1:38 AM    Specimen: Urine, Clean Catch   Result Value Ref Range    Color, UA Yellow Yellow, Straw    Appearance, UA Clear Clear    pH, UA <=5.0 5.0 - 8.0    Specific Gravity, UA 1.026 1.005 - 1.030    Glucose, UA >=1000 mg/dL (3+) (A) Negative    Ketones, UA Trace (A) Negative    Bilirubin, UA Negative Negative    Blood, UA Small (1+) (A) Negative    Protein, UA Negative Negative    Leuk Esterase, UA Negative Negative    Nitrite, UA Negative Negative    Urobilinogen, UA 0.2 E.U./dL 0.2 - 1.0 E.U./dL   Urinalysis, Microscopic Only - Urine, Clean Catch    Collection Time: 12/01/22  1:38 AM    Specimen: Urine, Clean Catch   Result Value Ref Range    RBC, UA 0-2 None Seen, 0-2 /HPF    WBC, UA 0-2 None Seen, 0-2 /HPF    Bacteria, UA None Seen None Seen /HPF    Squamous Epithelial Cells, UA 0-2 None Seen, 0-2 /HPF    Hyaline Casts, UA 0-2 None Seen /LPF    Methodology Automated Microscopy    ECG 12 Lead Dyspnea    Collection Time: 12/01/22  1:53 AM   Result Value Ref Range    QT Interval 445 ms       Ordered the above labs and independently reviewed the results.        RADIOLOGY  XR Chest 1 View    Result Date: 12/1/2022  SINGLE VIEW OF THE CHEST  HISTORY: Dyspnea  COMPARISON: 12/27/2021  FINDINGS: Heart size is within normal limits. No  pneumothorax, pleural effusion, or acute infiltrate is seen. Chronic scarring is suspected within the left midlung. No definite acute infiltrates are seen.      No acute findings.  This report was finalized on 12/1/2022 1:52 AM by Dr. Diandra Mcmahon M.D.        I ordered the above noted radiological studies. Reviewed by me and discussed with radiologist.  See dictation for official radiology interpretation.      PROCEDURES    Procedures        MEDICATIONS GIVEN IN ER    Medications   sodium chloride 0.9 % flush 10 mL (has no administration in time range)   sodium chloride 0.9 % bolus 500 mL (500 mL Intravenous New Bag 12/1/22 0126)   albuterol (PROVENTIL) nebulizer solution 0.042% 1.25 mg/3mL (1.25 mg Nebulization Given 12/1/22 0139)         PROGRESS, DATA ANALYSIS, CONSULTS, AND MEDICAL DECISION MAKING    My differential diagnosis for dyspnea includes but is not limited to:  Asthma, COPD, pneumonia, pulmonary embolus, acute respiratory distress syndrome, pneumothorax, pleural effusion, pulmonary fibrosis, congestive heart failure, myocardial infarction, DKA, uremia, acidosis, sepsis, anemia, drug related, hyperventilation, CNS disease      All labs have been independently reviewed by me.  All radiology studies have been reviewed by me and discussed with radiologist dictating the report.   EKG's independently viewed and interpreted by me.  Discussion below represents my analysis of pertinent findings related to patient's condition, differential diagnosis, treatment plan and final disposition.      ED Course as of 12/01/22 0326   u Dec 01, 2022   0156 EKG           EKG time: 0153  Rhythm/Rate: Sinus, 60  P waves and AZ: SMITH within normal limits  QRS, axis: Narrow complex  ST and T waves: No STEMI; QTC within normal limits    Interpreted Contemporaneously by me, independently viewed  Comparison: 7/12/2022-no acute change   [RS]   0157 Leukocytes, UA: Negative [RS]   0157 Nitrite, UA: Negative [RS]   0157 WBC, UA:  0-2 [RS]   0157 Bacteria, UA: None Seen [RS]   0157 WBC: 9.82 [RS]   0157 Hemoglobin: 14.6 [RS]   0157 Hematocrit: 42.7 [RS]   0242 Glucose(!): 262 [RS]   0242 BUN: 10 [RS]   0242 Creatinine: 0.91 [RS]   0242 Sodium: 140 [RS]   0242 Potassium: 4.0 [RS]   0242 Total Bilirubin: 0.2 [RS]   0242 AST (SGOT): 18 [RS]   0242 ALT (SGPT): 21 [RS]   0300 COVID19: Not Detected [RS]   0300 Influenza A PCR: Not Detected [RS]   0300 Influenza B PCR: Not Detected [RS]   0324 Patient feels improved after nebulizer treatment.  No acute life threats identified.  All testing and results reviewed with patient.  She is agreeable discharge planning close outpatient follow-up with PCP. [RS]      ED Course User Index  [RS] Justin Kelley MD       AS OF 03:26 EST VITALS:    BP - 120/73  HR - 55  TEMP - 97.3 °F (36.3 °C) (Tympanic)  O2 SATS - 94%        DIAGNOSIS  Final diagnoses:   URI with cough and congestion   Hyperglycemia due to diabetes mellitus (HCC)         DISPOSITION  DISCHARGE    Patient discharged in stable condition.    Reviewed implications of results, diagnosis, meds, responsibility to follow up, warning signs and symptoms of possible worsening, potential complications and reasons to return to ER.    Patient/Family voiced understanding of above instructions.    Discussed plan for discharge, as there is no emergent indication for admission. Patient referred to primary care provider for regular health maintenance. Pt/family is agreeable and understands need for follow up and possible repeat testing.  Pt is aware that discharge does not mean that nothing is wrong but it indicates no emergency is present that requires admission and they must continue care with follow-up as given below or physician of their choice.     FOLLOW-UP  Cinthya Valentine, APRN  [address]  [place] [id number] [phone]    Call today  Follow-up on elevated blood glucose         Medication List      Changed    * albuterol sulfate HFA  108 (90 Base) MCG/ACT inhaler  Commonly known as: ProAir HFA  INHALE 1 TO 2 PUFFS EVERY 4 TO 6 HOURS AS NEEDED  What changed:   · how much to take  · when to take this  · reasons to take this     * albuterol sulfate  (90 Base) MCG/ACT inhaler  Commonly known as: PROVENTIL HFA;VENTOLIN HFA;PROAIR HFA  Inhale 2 puffs every 4 hours when necessary wheeze, dyspnea, cough  What changed: You were already taking a medication with the same name, and this prescription was added. Make sure you understand how and when to take each.     alendronate 70 MG tablet  Commonly known as: FOSAMAX  TAKE 1 TABLET BY MOUTH EVERY 7 DAYS  What changed: additional instructions     Eliquis 5 MG tablet tablet  Generic drug: apixaban  TAKE 1 TABLET BY MOUTH TWICE DAILY  What changed:   · how much to take  · when to take this     * furosemide 20 MG tablet  Commonly known as: LASIX  What changed: Another medication with the same name was changed. Make sure you understand how and when to take each.     * furosemide 20 MG tablet  Commonly known as: LASIX  TAKE 2 TABLETS BY MOUTH EVERY MORNING AND 1 TABLET BY MOUTH EVERY AFTERNOON  What changed: See the new instructions.     metoprolol tartrate 50 MG tablet  Commonly known as: LOPRESSOR  TAKE 1 TABLET BY MOUTH THREE TIMES DAILY  What changed: when to take this     omeprazole 40 MG capsule  Commonly known as: priLOSEC  TAKE 1 CAPSULE BY MOUTH TWICE DAILY  What changed: when to take this     Symbicort 160-4.5 MCG/ACT inhaler  Generic drug: budesonide-formoterol  INHALE 2 PUFFS BY MOUTH TWICE DAILY. RINSE MOUTH AFTER USE  What changed:   · when to take this  · additional instructions         * This list has 4 medication(s) that are the same as other medications prescribed for you. Read the directions carefully, and ask your doctor or other care provider to review them with you.               Where to Get Your Medications      These medications were sent to [id number] -  [place], [place] - [address] AT [facility] OF [facility] [address] - [phone]  - [phone] FX  [address], [facility] [address]    Phone: [phone]   · albuterol sulfate  (90 Base) MCG/ACT inhaler            Justin Kelley MD  12/01/22 0326

## 2023-03-25 ENCOUNTER — HOSPITAL ENCOUNTER (OUTPATIENT)
Facility: HOSPITAL | Age: 73
Setting detail: OBSERVATION
Discharge: HOME | End: 2023-03-28
Attending: EMERGENCY MEDICINE | Admitting: HOSPITALIST
Payer: MEDICARE

## 2023-03-25 ENCOUNTER — APPOINTMENT (OUTPATIENT)
Dept: CT IMAGING | Facility: HOSPITAL | Age: 73
End: 2023-03-25
Payer: MEDICARE

## 2023-03-25 ENCOUNTER — APPOINTMENT (OUTPATIENT)
Dept: GENERAL RADIOLOGY | Facility: HOSPITAL | Age: 73
End: 2023-03-25
Payer: MEDICARE

## 2023-03-25 DIAGNOSIS — D68.9 COAGULOPATHY: ICD-10-CM

## 2023-03-25 DIAGNOSIS — J44.1 COPD EXACERBATION: ICD-10-CM

## 2023-03-25 DIAGNOSIS — G81.91 RIGHT HEMIPARESIS: ICD-10-CM

## 2023-03-25 DIAGNOSIS — R42 DIZZINESS: Primary | ICD-10-CM

## 2023-03-25 DIAGNOSIS — I48.91 ATRIAL FIBRILLATION, UNSPECIFIED TYPE: ICD-10-CM

## 2023-03-25 DIAGNOSIS — G20 PARKINSON'S DISEASE: ICD-10-CM

## 2023-03-25 LAB
ABO GROUP BLD: NORMAL
ALBUMIN SERPL-MCNC: 4.4 G/DL (ref 3.5–5.2)
ALBUMIN/GLOB SERPL: 1.4 G/DL
ALP SERPL-CCNC: 49 U/L (ref 39–117)
ALT SERPL W P-5'-P-CCNC: 34 U/L (ref 1–33)
ANION GAP SERPL CALCULATED.3IONS-SCNC: 11 MMOL/L (ref 5–15)
APTT PPP: 27.5 SECONDS (ref 22.7–35.4)
AST SERPL-CCNC: 42 U/L (ref 1–32)
BASOPHILS # BLD AUTO: 0.06 10*3/MM3 (ref 0–0.2)
BASOPHILS NFR BLD AUTO: 0.5 % (ref 0–1.5)
BILIRUB SERPL-MCNC: 0.2 MG/DL (ref 0–1.2)
BLD GP AB SCN SERPL QL: NEGATIVE
BUN SERPL-MCNC: 13 MG/DL (ref 8–23)
BUN/CREAT SERPL: 13.7 (ref 7–25)
CALCIUM SPEC-SCNC: 9.3 MG/DL (ref 8.6–10.5)
CHLORIDE SERPL-SCNC: 96 MMOL/L (ref 98–107)
CO2 SERPL-SCNC: 31 MMOL/L (ref 22–29)
CREAT SERPL-MCNC: 0.95 MG/DL (ref 0.57–1)
DEPRECATED RDW RBC AUTO: 40.7 FL (ref 37–54)
EGFRCR SERPLBLD CKD-EPI 2021: 63 ML/MIN/1.73
EOSINOPHIL # BLD AUTO: 0.34 10*3/MM3 (ref 0–0.4)
EOSINOPHIL NFR BLD AUTO: 3 % (ref 0.3–6.2)
ERYTHROCYTE [DISTWIDTH] IN BLOOD BY AUTOMATED COUNT: 12.3 % (ref 12.3–15.4)
GLOBULIN UR ELPH-MCNC: 3.1 GM/DL
GLUCOSE BLDC GLUCOMTR-MCNC: 186 MG/DL (ref 70–130)
GLUCOSE BLDC GLUCOMTR-MCNC: 230 MG/DL (ref 70–130)
GLUCOSE SERPL-MCNC: 222 MG/DL (ref 65–99)
HCT VFR BLD AUTO: 47.1 % (ref 34–46.6)
HGB BLD-MCNC: 15.4 G/DL (ref 12–15.9)
HOLD SPECIMEN: NORMAL
HOLD SPECIMEN: NORMAL
IMM GRANULOCYTES # BLD AUTO: 0.06 10*3/MM3 (ref 0–0.05)
IMM GRANULOCYTES NFR BLD AUTO: 0.5 % (ref 0–0.5)
INR PPP: 1.08 (ref 0.9–1.1)
LYMPHOCYTES # BLD AUTO: 4.51 10*3/MM3 (ref 0.7–3.1)
LYMPHOCYTES NFR BLD AUTO: 39.9 % (ref 19.6–45.3)
MCH RBC QN AUTO: 29.9 PG (ref 26.6–33)
MCHC RBC AUTO-ENTMCNC: 32.7 G/DL (ref 31.5–35.7)
MCV RBC AUTO: 91.5 FL (ref 79–97)
MONOCYTES # BLD AUTO: 0.68 10*3/MM3 (ref 0.1–0.9)
MONOCYTES NFR BLD AUTO: 6 % (ref 5–12)
NEUTROPHILS NFR BLD AUTO: 5.64 10*3/MM3 (ref 1.7–7)
NEUTROPHILS NFR BLD AUTO: 50.1 % (ref 42.7–76)
NRBC BLD AUTO-RTO: 0 /100 WBC (ref 0–0.2)
NT-PROBNP SERPL-MCNC: 108 PG/ML (ref 0–900)
PLATELET # BLD AUTO: 193 10*3/MM3 (ref 140–450)
PMV BLD AUTO: 10.2 FL (ref 6–12)
POTASSIUM SERPL-SCNC: 3.7 MMOL/L (ref 3.5–5.2)
PROT SERPL-MCNC: 7.5 G/DL (ref 6–8.5)
PROTHROMBIN TIME: 14.1 SECONDS (ref 11.7–14.2)
QT INTERVAL: 435 MS
RBC # BLD AUTO: 5.15 10*6/MM3 (ref 3.77–5.28)
RH BLD: NEGATIVE
SODIUM SERPL-SCNC: 138 MMOL/L (ref 136–145)
T&S EXPIRATION DATE: NORMAL
TROPONIN T SERPL HS-MCNC: 42 NG/L
WBC NRBC COR # BLD: 11.29 10*3/MM3 (ref 3.4–10.8)
WHOLE BLOOD HOLD COAG: NORMAL
WHOLE BLOOD HOLD SPECIMEN: NORMAL

## 2023-03-25 PROCEDURE — 93005 ELECTROCARDIOGRAM TRACING: CPT | Performed by: EMERGENCY MEDICINE

## 2023-03-25 PROCEDURE — 82565 ASSAY OF CREATININE: CPT

## 2023-03-25 PROCEDURE — 96361 HYDRATE IV INFUSION ADD-ON: CPT

## 2023-03-25 PROCEDURE — 82962 GLUCOSE BLOOD TEST: CPT

## 2023-03-25 PROCEDURE — 86901 BLOOD TYPING SEROLOGIC RH(D): CPT | Performed by: EMERGENCY MEDICINE

## 2023-03-25 PROCEDURE — 86850 RBC ANTIBODY SCREEN: CPT | Performed by: EMERGENCY MEDICINE

## 2023-03-25 PROCEDURE — 84484 ASSAY OF TROPONIN QUANT: CPT | Performed by: EMERGENCY MEDICINE

## 2023-03-25 PROCEDURE — 86900 BLOOD TYPING SEROLOGIC ABO: CPT | Performed by: EMERGENCY MEDICINE

## 2023-03-25 PROCEDURE — 80053 COMPREHEN METABOLIC PANEL: CPT | Performed by: EMERGENCY MEDICINE

## 2023-03-25 PROCEDURE — 71045 X-RAY EXAM CHEST 1 VIEW: CPT

## 2023-03-25 PROCEDURE — G0378 HOSPITAL OBSERVATION PER HR: HCPCS

## 2023-03-25 PROCEDURE — 25510000001 IOPAMIDOL PER 1 ML: Performed by: EMERGENCY MEDICINE

## 2023-03-25 PROCEDURE — 85730 THROMBOPLASTIN TIME PARTIAL: CPT | Performed by: EMERGENCY MEDICINE

## 2023-03-25 PROCEDURE — 93010 ELECTROCARDIOGRAM REPORT: CPT | Performed by: INTERNAL MEDICINE

## 2023-03-25 PROCEDURE — 70496 CT ANGIOGRAPHY HEAD: CPT

## 2023-03-25 PROCEDURE — 94799 UNLISTED PULMONARY SVC/PX: CPT

## 2023-03-25 PROCEDURE — 94640 AIRWAY INHALATION TREATMENT: CPT

## 2023-03-25 PROCEDURE — 94761 N-INVAS EAR/PLS OXIMETRY MLT: CPT

## 2023-03-25 PROCEDURE — 0042T HC CT CEREBRAL PERFUSION W/WO CONTRAST: CPT

## 2023-03-25 PROCEDURE — 83880 ASSAY OF NATRIURETIC PEPTIDE: CPT | Performed by: EMERGENCY MEDICINE

## 2023-03-25 PROCEDURE — 85610 PROTHROMBIN TIME: CPT | Performed by: EMERGENCY MEDICINE

## 2023-03-25 PROCEDURE — 99285 EMERGENCY DEPT VISIT HI MDM: CPT

## 2023-03-25 PROCEDURE — 85025 COMPLETE CBC W/AUTO DIFF WBC: CPT | Performed by: EMERGENCY MEDICINE

## 2023-03-25 PROCEDURE — 70498 CT ANGIOGRAPHY NECK: CPT

## 2023-03-25 RX ORDER — SODIUM CHLORIDE 0.9 % (FLUSH) 0.9 %
10 SYRINGE (ML) INJECTION AS NEEDED
Status: DISCONTINUED | OUTPATIENT
Start: 2023-03-25 | End: 2023-03-28 | Stop reason: HOSPADM

## 2023-03-25 RX ORDER — ONDANSETRON 2 MG/ML
4 INJECTION INTRAMUSCULAR; INTRAVENOUS EVERY 6 HOURS PRN
Status: DISCONTINUED | OUTPATIENT
Start: 2023-03-25 | End: 2023-03-28 | Stop reason: HOSPADM

## 2023-03-25 RX ORDER — PANTOPRAZOLE SODIUM 40 MG/1
40 TABLET, DELAYED RELEASE ORAL
Status: DISCONTINUED | OUTPATIENT
Start: 2023-03-26 | End: 2023-03-28 | Stop reason: HOSPADM

## 2023-03-25 RX ORDER — METOPROLOL TARTRATE 50 MG/1
50 TABLET, FILM COATED ORAL 2 TIMES DAILY
Status: DISCONTINUED | OUTPATIENT
Start: 2023-03-25 | End: 2023-03-28 | Stop reason: HOSPADM

## 2023-03-25 RX ORDER — UBIDECARENONE 75 MG
50 CAPSULE ORAL DAILY
Status: DISCONTINUED | OUTPATIENT
Start: 2023-03-26 | End: 2023-03-28 | Stop reason: HOSPADM

## 2023-03-25 RX ORDER — INSULIN LISPRO 100 [IU]/ML
0-9 INJECTION, SOLUTION INTRAVENOUS; SUBCUTANEOUS
Status: DISCONTINUED | OUTPATIENT
Start: 2023-03-25 | End: 2023-03-28 | Stop reason: HOSPADM

## 2023-03-25 RX ORDER — CITALOPRAM 20 MG/1
20 TABLET ORAL DAILY
Status: DISCONTINUED | OUTPATIENT
Start: 2023-03-26 | End: 2023-03-28 | Stop reason: HOSPADM

## 2023-03-25 RX ORDER — ALBUTEROL SULFATE 2.5 MG/3ML
2.5 SOLUTION RESPIRATORY (INHALATION) EVERY 6 HOURS PRN
Status: DISCONTINUED | OUTPATIENT
Start: 2023-03-25 | End: 2023-03-28 | Stop reason: HOSPADM

## 2023-03-25 RX ORDER — POTASSIUM CHLORIDE 750 MG/1
20 TABLET, FILM COATED, EXTENDED RELEASE ORAL 2 TIMES DAILY
Status: DISCONTINUED | OUTPATIENT
Start: 2023-03-25 | End: 2023-03-26

## 2023-03-25 RX ORDER — ALBUTEROL SULFATE 2.5 MG/3ML
2.5 SOLUTION RESPIRATORY (INHALATION) ONCE
Status: COMPLETED | OUTPATIENT
Start: 2023-03-25 | End: 2023-03-25

## 2023-03-25 RX ORDER — MIRTAZAPINE 15 MG/1
7.5 TABLET, FILM COATED ORAL NIGHTLY
Status: DISCONTINUED | OUTPATIENT
Start: 2023-03-26 | End: 2023-03-28 | Stop reason: HOSPADM

## 2023-03-25 RX ORDER — NICOTINE POLACRILEX 4 MG
15 LOZENGE BUCCAL
Status: DISCONTINUED | OUTPATIENT
Start: 2023-03-25 | End: 2023-03-28 | Stop reason: HOSPADM

## 2023-03-25 RX ORDER — BUDESONIDE AND FORMOTEROL FUMARATE DIHYDRATE 160; 4.5 UG/1; UG/1
2 AEROSOL RESPIRATORY (INHALATION)
Status: DISCONTINUED | OUTPATIENT
Start: 2023-03-26 | End: 2023-03-28 | Stop reason: HOSPADM

## 2023-03-25 RX ORDER — ACETAMINOPHEN 650 MG/1
650 SUPPOSITORY RECTAL EVERY 4 HOURS PRN
Status: DISCONTINUED | OUTPATIENT
Start: 2023-03-25 | End: 2023-03-26

## 2023-03-25 RX ORDER — HYDROCODONE BITARTRATE AND ACETAMINOPHEN 5; 325 MG/1; MG/1
1 TABLET ORAL EVERY 6 HOURS PRN
Status: DISCONTINUED | OUTPATIENT
Start: 2023-03-25 | End: 2023-03-27

## 2023-03-25 RX ORDER — BISACODYL 10 MG
10 SUPPOSITORY, RECTAL RECTAL DAILY PRN
Status: DISCONTINUED | OUTPATIENT
Start: 2023-03-25 | End: 2023-03-28 | Stop reason: HOSPADM

## 2023-03-25 RX ORDER — ASPIRIN 325 MG
325 TABLET ORAL DAILY
Status: DISCONTINUED | OUTPATIENT
Start: 2023-03-25 | End: 2023-03-28 | Stop reason: HOSPADM

## 2023-03-25 RX ORDER — MELATONIN
5000 DAILY
Status: DISCONTINUED | OUTPATIENT
Start: 2023-03-26 | End: 2023-03-28 | Stop reason: HOSPADM

## 2023-03-25 RX ORDER — ASPIRIN 300 MG/1
300 SUPPOSITORY RECTAL DAILY
Status: DISCONTINUED | OUTPATIENT
Start: 2023-03-25 | End: 2023-03-28 | Stop reason: HOSPADM

## 2023-03-25 RX ORDER — SODIUM CHLORIDE 9 MG/ML
125 INJECTION, SOLUTION INTRAVENOUS CONTINUOUS
Status: DISCONTINUED | OUTPATIENT
Start: 2023-03-25 | End: 2023-03-26

## 2023-03-25 RX ORDER — DEXTROSE MONOHYDRATE 25 G/50ML
25 INJECTION, SOLUTION INTRAVENOUS
Status: DISCONTINUED | OUTPATIENT
Start: 2023-03-25 | End: 2023-03-28 | Stop reason: HOSPADM

## 2023-03-25 RX ORDER — IBUPROFEN 600 MG/1
1 TABLET ORAL
Status: DISCONTINUED | OUTPATIENT
Start: 2023-03-25 | End: 2023-03-28 | Stop reason: HOSPADM

## 2023-03-25 RX ORDER — ARIPIPRAZOLE 2 MG/1
2 TABLET ORAL EVERY EVENING
Status: DISCONTINUED | OUTPATIENT
Start: 2023-03-26 | End: 2023-03-28 | Stop reason: HOSPADM

## 2023-03-25 RX ORDER — FUROSEMIDE 40 MG/1
40 TABLET ORAL EVERY MORNING
Status: DISCONTINUED | OUTPATIENT
Start: 2023-03-26 | End: 2023-03-26

## 2023-03-25 RX ORDER — ATORVASTATIN CALCIUM 80 MG/1
80 TABLET, FILM COATED ORAL NIGHTLY
Status: DISCONTINUED | OUTPATIENT
Start: 2023-03-25 | End: 2023-03-28 | Stop reason: HOSPADM

## 2023-03-25 RX ORDER — SODIUM CHLORIDE 9 MG/ML
75 INJECTION, SOLUTION INTRAVENOUS CONTINUOUS
Status: DISCONTINUED | OUTPATIENT
Start: 2023-03-25 | End: 2023-03-27

## 2023-03-25 RX ORDER — ACETAMINOPHEN 325 MG/1
650 TABLET ORAL EVERY 4 HOURS PRN
Status: DISCONTINUED | OUTPATIENT
Start: 2023-03-25 | End: 2023-03-28 | Stop reason: HOSPADM

## 2023-03-25 RX ADMIN — HYDROCODONE BITARTRATE AND ACETAMINOPHEN 1 TABLET: 5; 325 TABLET ORAL at 22:59

## 2023-03-25 RX ADMIN — APIXABAN 5 MG: 5 TABLET, FILM COATED ORAL at 22:59

## 2023-03-25 RX ADMIN — ATORVASTATIN CALCIUM 80 MG: 80 TABLET, FILM COATED ORAL at 22:58

## 2023-03-25 RX ADMIN — IOPAMIDOL 150 ML: 755 INJECTION, SOLUTION INTRAVENOUS at 18:44

## 2023-03-25 RX ADMIN — SODIUM CHLORIDE 75 ML/HR: 9 INJECTION, SOLUTION INTRAVENOUS at 22:59

## 2023-03-25 RX ADMIN — SODIUM CHLORIDE 500 ML: 9 INJECTION, SOLUTION INTRAVENOUS at 18:53

## 2023-03-25 RX ADMIN — SODIUM CHLORIDE 125 ML/HR: 9 INJECTION, SOLUTION INTRAVENOUS at 18:53

## 2023-03-25 RX ADMIN — METOPROLOL TARTRATE 50 MG: 50 TABLET, FILM COATED ORAL at 22:59

## 2023-03-25 RX ADMIN — ALBUTEROL SULFATE 2.5 MG: 2.5 SOLUTION RESPIRATORY (INHALATION) at 19:48

## 2023-03-25 NOTE — ED NOTES
"Nursing report ED to floor  Dalila Javed  72 y.o.  female    HPI :   Chief Complaint   Patient presents with    Dizziness    Gait Problem       Admitting doctor:   Eda Mcguire MD    Admitting diagnosis:   The primary encounter diagnosis was Dizziness. Diagnoses of COPD exacerbation (HCC), Coagulopathy (HCC): Eliquis induced, Right hemiparesis (HCC) chronic from residual stroke, Atrial fibrillation, unspecified type (HCC), and Parkinson's disease (HCC) were also pertinent to this visit.    Code status:   Current Code Status       Date Active Code Status Order ID Comments User Context       3/25/2023 1918 CPR (Attempt to Resuscitate) [id number]  Eda Mcguire MD ED        Question Answer    Code Status (Patient has no pulse and is not breathing) CPR (Attempt to Resuscitate)    Medical Interventions (Patient has pulse or is breathing) Full                    Allergies:   Duloxetine hcl, Viibryd [vilazodone hcl], Nsaids, Benadryl [diphenhydramine], Carafate [sucralfate], Metformin, Morphine and related, Oxycodone, Penicillins, and Statins    Isolation:   No active isolations    Intake and Output  No intake or output data in the 24 hours ending 03/25/23 1934    Weight:       03/25/23 1810   Weight: 81.6 kg (180 lb)       Most recent vitals:   Vitals:    03/25/23 1804 03/25/23 1810 03/25/23 1906 03/25/23 1931   BP:  122/97 138/74    BP Location:  Left arm     Patient Position:  Lying     Pulse:  65 68 68   Resp: 28 28 18    Temp:  98.6 °F (37 °C)     TempSrc:  Oral     SpO2:  93% 95% 95%   Weight:  81.6 kg (180 lb)     Height:  154.9 cm (61\")         Active LDAs/IV Access:   Lines, Drains & Airways       Active LDAs       Name Placement date Placement time Site Days    Peripheral IV 03/25/23 1816 Left Antecubital 03/25/23 1816  Antecubital  less than 1                    Labs (abnormal labs have a star):   Labs Reviewed   COMPREHENSIVE METABOLIC PANEL - Abnormal; Notable for the following " components:       Result Value    Glucose 222 (*)     Chloride 96 (*)     CO2 31.0 (*)     ALT (SGPT) 34 (*)     AST (SGOT) 42 (*)     All other components within normal limits    Narrative:     GFR Normal >60  Chronic Kidney Disease <60  Kidney Failure <15    The GFR formula is only valid for adults with stable renal function between ages 18 and 70.   SINGLE HSTROPONIN T - Abnormal; Notable for the following components:    HS Troponin T 42 (*)     All other components within normal limits    Narrative:     High Sensitive Troponin T Reference Range:  <10.0 ng/L- Negative Female for AMI  <15.0 ng/L- Negative Male for AMI  >=10 - Abnormal Female indicating possible myocardial injury.  >=15 - Abnormal Male indicating possible myocardial injury.   Clinicians would have to utilize clinical acumen, EKG, Troponin, and serial changes to determine if it is an Acute Myocardial Infarction or myocardial injury due to an underlying chronic condition.        CBC WITH AUTO DIFFERENTIAL - Abnormal; Notable for the following components:    WBC 11.29 (*)     Hematocrit 47.1 (*)     Lymphocytes, Absolute 4.51 (*)     Immature Grans, Absolute 0.06 (*)     All other components within normal limits   POCT GLUCOSE FINGERSTICK - Abnormal; Notable for the following components:    Glucose 230 (*)     All other components within normal limits   PROTIME-INR - Normal   APTT - Normal   RAINBOW DRAW    Narrative:     The following orders were created for panel order Allison Draw.  Procedure                               Abnormality         Status                     ---------                               -----------         ------                     Green Top (Gel)[[id number]]                                  Final result               Lavender Top[[id number]]                                     Final result               Gold Top - [facility][[id number]]                                   Final result               Light Blue Top[[id number]]                                    Final result                 Please view results for these tests on the individual orders.   BNP (IN-HOUSE)   POCT GLUCOSE FINGERSTICK   POCT GLUCOSE FINGERSTICK   POCT GLUCOSE FINGERSTICK   POCT GLUCOSE FINGERSTICK   POCT GLUCOSE FINGERSTICK   POCT GLUCOSE FINGERSTICK   POCT GLUCOSE FINGERSTICK   POCT GLUCOSE FINGERSTICK   TYPE AND SCREEN   CBC AND DIFFERENTIAL    Narrative:     The following orders were created for panel order CBC & Differential.  Procedure                               Abnormality         Status                     ---------                               -----------         ------                     CBC Auto Differential[[id number]]        Abnormal            Final result                 Please view results for these tests on the individual orders.   GREEN TOP   LAVENDER TOP   GOLD TOP - SST   LIGHT BLUE TOP       EKG:   ECG 12 Lead Stroke Evaluation   Preliminary Result   HEART RATE= 73  bpm   RR Interval= 822  ms   IL Interval= 176  ms   P Horizontal Axis= -3  deg   P Front Axis= 54  deg   QRSD Interval= 89  ms   QT Interval= 435  ms   QRS Axis= 53  deg   T Wave Axis= 40  deg   - BORDERLINE ECG -   Sinus rhythm   Borderline T abnormalities, anterior leads   Baseline wander in lead(s) V6   Electronically Signed By:    Date and Time of Study: 2023-03-25 19:02:00          Meds given in ED:   Medications   sodium chloride 0.9 % flush 10 mL (has no administration in time range)   sodium chloride 0.9 % infusion (125 mL/hr Intravenous New Bag 3/25/23 3285)   albuterol (PROVENTIL) nebulizer solution 0.083% 2.5 mg/3mL (has no administration in time range)   sodium chloride 0.9 % infusion (has no administration in time range)   acetaminophen (TYLENOL) tablet 650 mg (has no administration in time range)     Or   acetaminophen (TYLENOL) suppository 650 mg (has no administration in time range)   aspirin tablet 325 mg (has no administration in time range)     Or   aspirin suppository 300  mg (has no administration in time range)   atorvastatin (LIPITOR) tablet 80 mg (has no administration in time range)   ondansetron (ZOFRAN) injection 4 mg (has no administration in time range)   bisacodyl (DULCOLAX) suppository 10 mg (has no administration in time range)   dextrose (GLUTOSE) oral gel 15 g (has no administration in time range)   dextrose (D50W) (25 g/50 mL) IV injection 25 g (has no administration in time range)   glucagon (GLUCAGEN) injection 1 mg (has no administration in time range)   insulin lispro (ADMELOG) injection 0-9 Units (has no administration in time range)   sodium chloride 0.9 % bolus 500 mL (500 mL Intravenous New Bag 3/25/23 6153)   iopamidol (ISOVUE-370) 76 % injection 150 mL (150 mL Intravenous Given 3/25/23 184)       Imaging results:  CT Angiogram Head w AI Analysis of LVO    Result Date: 3/25/2023  1. No evidence for acute intracranial abnormality or hemorrhage. Remote left occipital lobe infarction. 2. Atherosclerotic disease involving both proximal ICAs and the cavernous segments opacities without NASCET stenosis or significant change compared to prior exam 2021. 3. 2 right lobe thyroid low-density nodules.       CT CEREBRAL PERFUSION WITH & WITHOUT CONTRAST    Result Date: 3/25/2023  1. No evidence for acute intracranial abnormality or hemorrhage. Remote left occipital lobe infarction. 2. Atherosclerotic disease involving both proximal ICAs and the cavernous segments opacities without NASCET stenosis or significant change compared to prior exam 2021. 3. 2 right lobe thyroid low-density nodules.        Ambulatory status:   - Assist x 2    Social issues:   Social History     Socioeconomic History    Marital status:    Tobacco Use    Smoking status: Former     Types: Cigarettes     Quit date: 1980     Years since quittin.2    Smokeless tobacco: Never   Vaping Use    Vaping Use: Never used   Substance and Sexual Activity    Alcohol use: No    Drug use:  No    Sexual activity: Yes     Partners: Male       NIH Stroke Scale:  Interval: baseline      Yin Ennis RN  03/25/23 19:34 EDT

## 2023-03-25 NOTE — ED NOTES
"Pt to ED from home via pv. Pt reports unsteady gait, dizziness, and visual changes x 1 hour. Pt states \"I had a stroke 5 years ago and this feels the same way.\" Pt reports worsening numbness on her R side, but states numbness has been present since her last stroke. Pt also audibly wheezing during triage, pt states this has been going on for one month.     Pt in mask, this RN in PPE.  "

## 2023-03-25 NOTE — ED NOTES
Patient states she had sudden onset dizziness with difficulty walking at 17:00 today.  Patient states this is similar to when she had a stroke a few years ago.

## 2023-03-25 NOTE — H&P
HISTORY AND PHYSICAL   [facility]        Date of Admission: 3/25/2023  Patient Identification:  Name: Dalila Javed  Age: 72 y.o.  Sex: female  :  1950  MRN: [id number]                     Primary Care Physician: Cinthya Valentine APRN    Chief Complaint:  72 year old female who presented to the emergency room with dizziness and difficulty walking which started this afternoon; she has a history of cva and has had some memory issues; she denies any changes in her vision; she has been wheezing    History of Present Illness:   As above    Past Medical History:  Past Medical History:   Diagnosis Date   • Abnormal weight gain    • Acquired trigger finger    • Acute myocardial infarction (HCC)    • Adjustment reaction with mixed emotional features    • Arthritis    • ASHD (arteriosclerotic heart disease)    • Asthma    • B12 deficiency    • Bacterial pneumonia    • Williamson esophagus    • Benign colonic polyp    • Bilateral knee pain    • Birthmark     haemangioma of the bone   • Bronchiectasis ([facility])    • CHF (congestive heart failure) ([facility])    • Chronic cough    • Chronic glomerulonephritis with exudative nephritis    • Chronic lumbar radiculopathy    • Diabetes mellitus ([facility])    • Diabetic peripheral neuropathy ([facility])    • Dyslipidemia    • Fibromyositis    • Fracture, humerus 2022    RIGHT   • GERD (gastroesophageal reflux disease)    • Herpes zoster    • Hyperlipidemia    • Hypertension    • Lupus anticoagulant disorder ([facility])    • Mycobacterium avium complex ([facility])    • Nephrolithiasis    • SILVIANO (obstructive sleep apnea)     DOESN'T USE HER MACHINE   • Palpitations    • Premature ventricular contraction    • Slow transit constipation    • Stroke ([facility]) 2020    RT SIDED WEAKNESS   • Vitamin D deficiency      Past Surgical History:  Past Surgical History:   Procedure Laterality Date   • APPENDECTOMY     • BRONCHOSCOPY     •  SECTION     • CHOLECYSTECTOMY     • COLONOSCOPY    "  • COLONOSCOPY N/A 11/5/2019    Procedure: COLONOSCOPY to cecum with cold biopsy and cold and hotsnare polypectomies;  Surgeon: Suzy Eubanks MD;  Location: [facility] ENDOSCOPY;  Service: Gastroenterology   • CORONARY ANGIOPLASTY WITH STENT PLACEMENT     • EMBOLECTOMY N/A 8/19/2020    Procedure: EMERGENT CEREBRAL ANGIOGRAM;  Surgeon: Jonh Meehan MD;  Location: [facility] OR 18/19;  Service: Neurosurgery;  Laterality: N/A;   • ENDOSCOPY N/A 11/5/2019    Procedure: ESOPHAGOGASTRODUODENOSCOPY with cold biopsies;  Surgeon: Suzy Eubanks MD;  Location: [facility] ENDOSCOPY;  Service: Gastroenterology   • KNEE ARTHROSCOPY     • OTHER SURGICAL HISTORY      elbow surgery   • ROTATOR CUFF REPAIR     • TOTAL SHOULDER ARTHROPLASTY W/ DISTAL CLAVICLE EXCISION Right 7/15/2022    Procedure: TOTAL SHOULDER REVERSE ARTHROPLASTY;  Surgeon: Dk Mckeon II, MD;  Location: [facility] OR [facility];  Service: Orthopedics;  Laterality: Right;   • TUBAL ABDOMINAL LIGATION        Home Meds:  (Not in a hospital admission)      Allergies:  Allergies   Allergen Reactions   • Duloxetine Hcl Hallucinations   • Viibryd [Vilazodone Hcl] Hallucinations   • Nsaids Other (See Comments)     Doesn't remember   • Benadryl [Diphenhydramine] Other (See Comments)     shaking   • Carafate [Sucralfate] GI Intolerance   • Metformin Other (See Comments)     Doesn't remember   • Morphine And Related Nausea And Vomiting   • Oxycodone Nausea And Vomiting   • Penicillins Rash     Fever, \"can't breathe\"   • Statins Other (See Comments)     Can't remember     Immunizations:  Immunization History   Administered Date(s) Administered   • COVID-19 (PFIZER) BIVALENT BOOSTER 12+YRS 11/02/2022   • COVID-19 (PFIZER) PURPLE CAP 04/02/2021, 04/23/2021, 11/17/2021   • FluLaval/Fluzone >6mos 10/29/2021   • FluMist 2-49yrs 09/25/2015   • Fluad Quad 65+ 09/01/2020   • Fluzone High Dose =>65 Years ([employer] ONLY) 11/15/2016, 10/31/2017, 10/02/2018, 09/25/2019   • " Pneumococcal Polysaccharide (PPSV23) 11/15/2016   • Pneumococcal, Unspecified 2015     Social History:   Social History     Social History Narrative   • Not on file     Social History     Socioeconomic History   • Marital status:    Tobacco Use   • Smoking status: Former     Types: Cigarettes     Quit date: 1980     Years since quittin.2   • Smokeless tobacco: Never   Vaping Use   • Vaping Use: Never used   Substance and Sexual Activity   • Alcohol use: No   • Drug use: No   • Sexual activity: Yes     Partners: Male       Family History:  Family History   Problem Relation Age of Onset   • Colon cancer Mother    • Uterine cancer Mother    • Arthritis Mother    • Cancer Mother    • Heart disease Mother    • Migraines Mother    • Stroke Mother    • Cancer Father         malignant brain tumor   • Aneurysm Father    • Bone cancer Maternal Aunt    • Arthritis Maternal Grandmother    • Heart disease Maternal Grandmother    • Thyroid disease Maternal Grandmother    • Diabetes Paternal Grandmother    • Diabetes Paternal Grandfather    • Malig Hyperthermia Neg Hx         Review of Systems  See history of present illness and past medical history.  Patient denies headache, dizziness, syncope, falls, trauma, change in vision, change in hearing, change in taste, changes in weight, changes in appetite, focal weakness, numbness, or paresthesia.  Patient denies chest pain, palpitations, dyspnea, orthopnea, PND, cough, sinus pressure, rhinorrhea, epistaxis, hemoptysis, nausea, vomiting,hematemesis, diarrhea, constipation or hematchezia.  Denies cold or heat intolerance, polydipsia, polyuria, polyphagia. Denies hematuria, pyuria, dysuria, hesitancy, frequency or urgency. Denies consumption of raw and under cooked meats foods or change in water source.  Denies fever, chills, sweats, night sweats.  Denies missing any routine medications. Remainder of ROS is negative.    Objective:  T Max 24 hrs: Temp (24hrs),  "Av.6 °F (37 °C), Min:98.6 °F (37 °C), Max:98.6 °F (37 °C)    Vitals Ranges:   Temp:  [98.6 °F (37 °C)] 98.6 °F (37 °C)  Heart Rate:  [65] 65  Resp:  [28] 28  BP: (122)/(97) 122/97      Exam:  /97 (BP Location: Left arm, Patient Position: Lying)   Pulse 65   Temp 98.6 °F (37 °C) (Oral)   Resp 28   Ht 154.9 cm (61\")   Wt 81.6 kg (180 lb)   SpO2 93%   BMI 34.01 kg/m²     General Appearance:    Alert, cooperative, no distress, appears stated age   Head:    Normocephalic, without obvious abnormality, atraumatic   Eyes:    PERRL, conjunctivae/corneas clear, EOM's intact, both eyes   Ears:    Normal external ear canals, both ears   Nose:   Nares normal, septum midline, mucosa normal, no drainage    or sinus tenderness   Throat:   Lips, mucosa, and tongue normal   Neck:   Supple, symmetrical, trachea midline, no adenopathy;     thyroid:  no enlargement/tenderness/nodules; no carotid    bruit or JVD   Back:     Symmetric, no curvature, ROM normal, no CVA tenderness   Lungs:     Decreased breath sounds bilaterally, respirations unlabored   Chest Wall:    No tenderness or deformity    Heart:    Regular rate and rhythm, S1 and S2 normal, no murmur, rub   or gallop   Abdomen:     Soft, nontender, bowel sounds active all four quadrants,     no masses, no hepatomegaly, no splenomegaly   Extremities:   Extremities normal, atraumatic, no cyanosis or edema   Pulses:   2+ and symmetric all extremities   Skin:   Skin color, texture, turgor normal, no rashes or lesions               .    Data Review:  Labs in chart were reviewed.  WBC   Date Value Ref Range Status   2023 11.29 (H) 3.40 - 10.80 10*3/mm3 Final     Hemoglobin   Date Value Ref Range Status   2023 15.4 12.0 - 15.9 g/dL Final     Hematocrit   Date Value Ref Range Status   2023 47.1 (H) 34.0 - 46.6 % Final     Platelets   Date Value Ref Range Status   2023 193 140 - 450 10*3/mm3 Final     Sodium   Date Value Ref Range Status "   03/25/2023 138 136 - 145 mmol/L Final     Potassium   Date Value Ref Range Status   03/25/2023 3.7 3.5 - 5.2 mmol/L Final     Chloride   Date Value Ref Range Status   03/25/2023 96 (L) 98 - 107 mmol/L Final     CO2   Date Value Ref Range Status   03/25/2023 31.0 (H) 22.0 - 29.0 mmol/L Final     BUN   Date Value Ref Range Status   03/25/2023 13 8 - 23 mg/dL Final     Creatinine   Date Value Ref Range Status   03/25/2023 0.95 0.57 - 1.00 mg/dL Final     Glucose   Date Value Ref Range Status   03/25/2023 222 (H) 65 - 99 mg/dL Final     Calcium   Date Value Ref Range Status   03/25/2023 9.3 8.6 - 10.5 mg/dL Final     AST (SGOT)   Date Value Ref Range Status   03/25/2023 42 (H) 1 - 32 U/L Final     ALT (SGPT)   Date Value Ref Range Status   03/25/2023 34 (H) 1 - 33 U/L Final     Alkaline Phosphatase   Date Value Ref Range Status   03/25/2023 49 39 - 117 U/L Final                Imaging Results (All)     Procedure Component Value Units Date/Time    XR Chest 1 View [[id number]] Resulted: 03/25/23 1907     Updated: 03/25/23 1908    CT Angiogram Head w AI Analysis of LVO [[id number]] Collected: 03/25/23 1906     Updated: 03/25/23 1906    Narrative:      CT ANGIOGRAM HEAD AND NECK WITH IV CONTRAST, CT CEREBRAL PERFUSION WITH  AND WITHOUT CONTRAST.     HISTORY: Dizziness, gait problem.     TECHNIQUE: Head CT was obtained without IV contrast followed by  intravenous contrast administration and CT angiogram of the head and  neck with IV contrast and this data was reconstructed in coronal and  sagittal planes and 3-dimensional volume rendering was performed. CT  cerebral perfusion imaging was obtained with and without IV contrast as  well as use of rapid software.     AI analysis of LVO was utilized.     Radiation dose reduction techniques were utilized, including automated  exposure control and exposure modulation based on body size.     COMPARISON: CT angiogram head and neck 12/28/2021.     FINDINGS: Noncontrast exam  demonstrates no abnormal areas of increased  attenuation intraaxially to suggest hemorrhage. No extra-axial fluid  collection is observed. Within the posterior medial-inferior left  occipital lobe there is a chronic infarction that exhibits no change in  size or configuration when compared to the prior examination in 12/2021.  There is mild atrophy associated with ventricular enlargement. There is  no evidence for cerebral edema or mass effect or shift of midline  structures. CT cerebral perfusion imaging demonstrates 0 mL where there  is cerebral blood flow less than 30% and 0 mL where there is T MAX  greater than 6 seconds.     Great vessel origins appear patent. There is mild atherosclerotic  disease involving both proximal internal carotid arteries with 0% NASCET  stenosis by NASCET criteria. There are also atherosclerotic  calcifications involving the cavernous segment of both internal carotid  arteries with mild narrowing. The distal cervical, petrous, cavernous,  supracavernous internal carotid arteries are patent. Both middle  cerebral arteries and anterior cerebral arteries and major branches are  patent. The left vertebral artery arises from the left subclavian artery  just distal to its origin. Both cervical vertebral arteries are patent.  The intracranial segments of both vertebral arteries and the basilar  artery and both posterior cerebral arteries are patent. Postcontrast  enhanced head CT demonstrates no abnormal intracranial enhancement.     There are 2 low-density right lobe thyroid nodules measuring up to 1.2  cm. No endotracheal lesion is demonstrated. The lung apices appear  clear. There is a right shoulder arthroplasty. Degenerative disc disease  is present in the cervical spine with disc space narrowing at C5-6 and  C6-7.       Impression:      1. No evidence for acute intracranial abnormality or hemorrhage. Remote  left occipital lobe infarction.  2. Atherosclerotic disease involving both  proximal ICAs and the  cavernous segments opacities without NASCET stenosis or significant  change compared to prior exam 12/28/2021.  3. 2 right lobe thyroid low-density nodules.          CT CEREBRAL PERFUSION WITH & WITHOUT CONTRAST [[id number]] Collected: 03/25/23 1906     Updated: 03/25/23 1906    Narrative:      CT ANGIOGRAM HEAD AND NECK WITH IV CONTRAST, CT CEREBRAL PERFUSION WITH  AND WITHOUT CONTRAST.     HISTORY: Dizziness, gait problem.     TECHNIQUE: Head CT was obtained without IV contrast followed by  intravenous contrast administration and CT angiogram of the head and  neck with IV contrast and this data was reconstructed in coronal and  sagittal planes and 3-dimensional volume rendering was performed. CT  cerebral perfusion imaging was obtained with and without IV contrast as  well as use of rapid software.     AI analysis of LVO was utilized.     Radiation dose reduction techniques were utilized, including automated  exposure control and exposure modulation based on body size.     COMPARISON: CT angiogram head and neck 12/28/2021.     FINDINGS: Noncontrast exam demonstrates no abnormal areas of increased  attenuation intraaxially to suggest hemorrhage. No extra-axial fluid  collection is observed. Within the posterior medial-inferior left  occipital lobe there is a chronic infarction that exhibits no change in  size or configuration when compared to the prior examination in 12/2021.  There is mild atrophy associated with ventricular enlargement. There is  no evidence for cerebral edema or mass effect or shift of midline  structures. CT cerebral perfusion imaging demonstrates 0 mL where there  is cerebral blood flow less than 30% and 0 mL where there is T MAX  greater than 6 seconds.     Great vessel origins appear patent. There is mild atherosclerotic  disease involving both proximal internal carotid arteries with 0% NASCET  stenosis by NASCET criteria. There are also atherosclerotic  calcifications  involving the cavernous segment of both internal carotid  arteries with mild narrowing. The distal cervical, petrous, cavernous,  supracavernous internal carotid arteries are patent. Both middle  cerebral arteries and anterior cerebral arteries and major branches are  patent. The left vertebral artery arises from the left subclavian artery  just distal to its origin. Both cervical vertebral arteries are patent.  The intracranial segments of both vertebral arteries and the basilar  artery and both posterior cerebral arteries are patent. Postcontrast  enhanced head CT demonstrates no abnormal intracranial enhancement.     There are 2 low-density right lobe thyroid nodules measuring up to 1.2  cm. No endotracheal lesion is demonstrated. The lung apices appear  clear. There is a right shoulder arthroplasty. Degenerative disc disease  is present in the cervical spine with disc space narrowing at C5-6 and  C6-7.       Impression:      1. No evidence for acute intracranial abnormality or hemorrhage. Remote  left occipital lobe infarction.  2. Atherosclerotic disease involving both proximal ICAs and the  cavernous segments opacities without NASCET stenosis or significant  change compared to prior exam 12/28/2021.  3. 2 right lobe thyroid low-density nodules.          CT Angiogram Neck [[id number]] Resulted: 03/25/23 1844     Updated: 03/25/23 1850            Assessment:  Active Hospital Problems    Diagnosis  POA   • Dizziness [R42]  Yes      Resolved Hospital Problems   No resolved problems to display.   hypertension  Diabetes  Asthma  Dizziness  Obesity  Sleep apnea    Plan:  Will start stroke workup   Neuro to see  accu checks, insulin sliding scale  Monitor on telemetry  [facility] patient and ed provider    Eda Mcguire MD  3/25/2023  19:23 EDT

## 2023-03-25 NOTE — ED PROVIDER NOTES
EMERGENCY DEPARTMENT ENCOUNTER    Room Number:  N529/1  Date of encounter:  3/26/2023  PCP: Cinthya Valentine APRN  Historian: Patient and spouse  Relevant information and history provided by sources other than the patient will be included below and in the ED Course.  Review of pertinent past medical records may also be included in record below and ED Course.    HPI:  Chief Complaint: Dizziness, wheezing, bad taste in mouth  A complete HPI/ROS/PMH/PSH/SH/FH are unobtainable due to: Patient from previous stroke has had some poor memory and forgetful.  Context: Dalila Javed is a 72 y.o. female who presents to the ED c/o the symptoms started she thinks a few hours prior to arrival here.  Since her previous stroke she does have poor memory and poor short-term memory as well as long-term memory.  She is not certain of the exact time of onset.  She describes her symptoms as a dizziness.  She feels the dizziness is like the room is spinning.  She also feels unstable when she attempts to walk.  With her previous stroke she has chronic double vision and has chronic weakness to her right arm and right leg.  She will ambulate with a crutch for short distances.  She did have some increased problems ambulating today and felt unsteady.  Again she is not certain of the exact onset of the symptoms.  She is concerned because she remembers having dizziness and unsteadiness with her gait on her previous stroke.  There is been no change in her weakness to her right arm and right leg and that has not worsened.  She also reports no new change in her vision.  She chronically has worse vision in her right eye than her left eye.  She also reports wheezing that is been going on for about 1 month.  She does have a history of asthma and reactive airway disease.  She does have a history of atrial fibrillation and when she had her last stroke she was started on blood thinning medicine.  She takes Eliquis and has not missed any dose.   Her last dose of Eliquis was at 9:20 AM.  She did have some chest pain off and on for the past several days but denies having any chest pain at this time.  She does take chronic pain medicine as well as several other medicines which could have CNS side effects including Abilify, carbidopa-levodopa, Celexa Remeron, Robaxin.  Denies any pain anywhere at this time.  No headache or neck pain.  No chest pain, or abdominal pain.  No extremity pain        Previous Episodes: She is concerned that she might be having another stroke again.  With her previous stroke she had unsteadiness with gait and dizziness.  She also was weaker on the right side which she is not having at this time.  Current Symptoms: See above    MEDICAL HISTORY REVIEWED  Patient has multiple comorbidities.  She has a history of previous CVA with residual right hemiparesis and visual change, atrial fibrillation anticoagulated on Eliquis.  She has also history of benign positional vertigo, coronary artery disease as well as reactive airway disease and COPD.  She has a history of Parkinson's disease and history of metabolic encephalopathy in the past.  She has chronic musculoskeletal pain and is on chronic opiate pain medicine.  She is diabetic she also have obstructive sleep apnea but is noncompliant with the CPAP at night because it causes claustrophobia.  She also has nonalcoholic hepatosteatosis.    I reviewed the note from the cardiologist Dr. Torres at [facility].  This was from March 13, 2023.  Patient complains of some episodic upper anterior chest pain.  There was no diaphoresis or dyspnea or nausea or vomiting patient has a history of a patent foramen ovale, he thought that she probably had stable angina and was going to continue her current medical regime.  Instructed her to return if it became more severe and prolonged.    I reviewed records from the primary care physician's office from January 3, 2023.  She had cough and wheezing.  She does not  routinely use her inhalers she also does not routinely use her CPAP.  She does have a history of orthopnea and chronic bronchitis.  I reviewed records from her primary care physician office in [facility] on January 24, 2023.  He mentions she does have a history of MAC but could not tolerate antibiotics she has history of frequent coughs  PAST MEDICAL HISTORY  Active Ambulatory Problems     Diagnosis Date Noted   • Adjustment disorder with mixed anxiety and depressed mood 03/08/2016   • Atopic rhinitis 03/08/2016   • Coronary arteriosclerosis in native artery 03/08/2016   • Cobalamin deficiency 03/08/2016   • Benign colonic polyp 03/08/2016   • Lumbar radiculopathy 03/08/2016   • Controlled diabetes mellitus type 2 with complications ([facility]) 03/08/2016   • Binocular vision disorder with diplopia 03/08/2016   • Dyslipidemia 03/08/2016   • Arthropathy of hand 03/08/2016   • Knee pain 03/08/2016   • Cramps of lower extremity 03/08/2016   • Low back pain 03/08/2016   • Chronic nausea 03/08/2016   • Obstructive sleep apnea syndrome 03/08/2016   • Palpitations 03/08/2016   • Ventricular premature beats 03/08/2016   • Cephalalgia 03/08/2016   • Colonic constipation 03/08/2016   • Neurocardiogenic syncope 03/08/2016   • Vitamin D deficiency 03/08/2016   • DODSON (nonalcoholic steatohepatitis) 04/01/2016   • Fibromyalgia 03/26/2013   • Morbidly obese ([facility]) 12/10/2018   • Polyp of colon 11/01/2019   • Family history of colonic polyps 11/01/2019   • Family history of malignant neoplasm of colon 11/01/2019   • Acute CVA (cerebrovascular accident) ([facility]) 08/19/2020   • Episode of dizziness 09/30/2020   • Dizziness 09/30/2020   • History of stroke 09/30/2020   • Chronic right shoulder pain 04/30/2021   • Encephalopathy, metabolic 04/30/2021   • Migraine without aura or status migrainosus 04/30/2021   • Parkinson disease ([facility]) 04/30/2021   • Hypokalemia 05/04/2021   • Benign paroxysmal positional vertigo 11/02/2017   • Chronic obstructive  pulmonary disease (HCC) 05/09/2017   • Gastroesophageal reflux disease 09/24/2015   • History of percutaneous transluminal coronary angioplasty 10/18/2016   • Hypercholesterolemia 09/24/2015   • Hypertensive disorder 09/24/2015   • Myocardial infarction ([facility]) 05/09/2017   • Occipital neuralgia 09/23/2015   • Patent foramen ovale 10/19/2020   • Transient cerebral ischemia 11/02/2017   • Arm pain, anterior, right 08/04/2021   • TIA (transient ischemic attack) 12/27/2021   • Pathological fracture of vertebra due to osteoporosis with delayed healing 05/03/2022   • Compression fracture of L1 vertebra with delayed healing 05/03/2022   • Status post reverse arthroplasty of right shoulder 07/15/2022     Resolved Ambulatory Problems     Diagnosis Date Noted   • Flank pain 03/08/2016   • Abnormal blood sugar 03/08/2016   • Abnormal weight gain 03/08/2016   • Acquired trigger finger 03/08/2016   • Acute bronchitis 03/08/2016   • Atypical chest pain 03/08/2016   • History of Williamson's esophagus 03/08/2016   • CAP (community acquired pneumonia) 03/08/2016   • Candidiasis of skin 03/08/2016   • Diabetic peripheral neuropathy (HCC) 03/08/2016   • Breathing difficult 03/08/2016   • Dizziness 03/08/2016   • Hematuria 03/08/2016   • Migraine with typical aura 03/08/2016   • Muscle ache 03/08/2016   • MAC (mycobacterium avium-intracellulare complex) 03/08/2016   • Night sweats 03/08/2016   • Otitis externa 03/08/2016   • Abscess, perianal 03/08/2016   • Skin tag 03/08/2016   • Upper respiratory tract infection 03/08/2016   • Urinary hesitancy 03/08/2016   • Asthmatic breathing 03/08/2016   • Preoperative clearance 04/01/2016   • Fever 04/30/2021   • Acute UTI (urinary tract infection) 04/30/2021     Past Medical History:   Diagnosis Date   • Acute myocardial infarction ([facility])    • Adjustment reaction with mixed emotional features    • Arthritis    • ASHD (arteriosclerotic heart disease)    • Asthma    • B12 deficiency    • Bacterial  pneumonia    • Williamson esophagus    • Bilateral knee pain    • Birthmark    • Bronchiectasis (HCC)    • CHF (congestive heart failure) (HCC)    • Chronic cough    • Chronic glomerulonephritis with exudative nephritis    • Chronic lumbar radiculopathy    • Diabetes mellitus (HCC)    • Fibromyositis    • Fracture, humerus 2022   • GERD (gastroesophageal reflux disease)    • Herpes zoster    • Hyperlipidemia    • Hypertension    • Lupus anticoagulant disorder (HCC)    • Mycobacterium avium complex (HCC)    • Nephrolithiasis    • SILVIANO (obstructive sleep apnea)    • Premature ventricular contraction    • Slow transit constipation    • Stroke (HCC)          PAST SURGICAL HISTORY  Past Surgical History:   Procedure Laterality Date   • APPENDECTOMY     • BRONCHOSCOPY     •  SECTION     • CHOLECYSTECTOMY     • COLONOSCOPY     • COLONOSCOPY N/A 2019    Procedure: COLONOSCOPY to cecum with cold biopsy and cold and hotsnare polypectomies;  Surgeon: Suzy Eubanks MD;  Location: [facility] ENDOSCOPY;  Service: Gastroenterology   • CORONARY ANGIOPLASTY WITH STENT PLACEMENT     • EMBOLECTOMY N/A 2020    Procedure: EMERGENT CEREBRAL ANGIOGRAM;  Surgeon: Jonh Meehan MD;  Location: [facility] OR ;  Service: Neurosurgery;  Laterality: N/A;   • ENDOSCOPY N/A 2019    Procedure: ESOPHAGOGASTRODUODENOSCOPY with cold biopsies;  Surgeon: Suzy Eubanks MD;  Location: [facility] ENDOSCOPY;  Service: Gastroenterology   • KNEE ARTHROSCOPY     • OTHER SURGICAL HISTORY      elbow surgery   • ROTATOR CUFF REPAIR     • TOTAL SHOULDER ARTHROPLASTY W/ DISTAL CLAVICLE EXCISION Right 7/15/2022    Procedure: TOTAL SHOULDER REVERSE ARTHROPLASTY;  Surgeon: Dk Mckeon II, MD;  Location: [facility] OR [facility];  Service: Orthopedics;  Laterality: Right;   • TUBAL ABDOMINAL LIGATION           FAMILY HISTORY  Family History   Problem Relation Age of Onset   • Colon cancer Mother    • Uterine cancer  Mother    • Arthritis Mother    • Cancer Mother    • Heart disease Mother    • Migraines Mother    • Stroke Mother    • Cancer Father         malignant brain tumor   • Aneurysm Father    • Bone cancer Maternal Aunt    • Arthritis Maternal Grandmother    • Heart disease Maternal Grandmother    • Thyroid disease Maternal Grandmother    • Diabetes Paternal Grandmother    • Diabetes Paternal Grandfather    • Malig Hyperthermia Neg Hx          SOCIAL HISTORY  Social History     Socioeconomic History   • Marital status:    Tobacco Use   • Smoking status: Former     Types: Cigarettes     Quit date: 1980     Years since quittin.2   • Smokeless tobacco: Never   Vaping Use   • Vaping Use: Never used   Substance and Sexual Activity   • Alcohol use: No   • Drug use: No   • Sexual activity: Yes     Partners: Male         ALLERGIES  Duloxetine hcl, Viibryd [vilazodone hcl], Nsaids, Benadryl [diphenhydramine], Carafate [sucralfate], Metformin, Morphine and related, Oxycodone, Penicillins, and Statins        REVIEW OF SYSTEMS  Review of Systems     All systems reviewed and negative except for those discussed in HPI.       PHYSICAL EXAM    I have reviewed the triage vital signs and nursing notes.    ED Triage Vitals   Temp Heart Rate Resp BP SpO2   23 1810 23 1810 23 1804 23 1810 23 181   98.6 °F (37 °C) 65 28 122/97 93 %      Temp src Heart Rate Source Patient Position BP Location FiO2 (%)   23 1810 23 1810 23 1810 23 --   Oral Monitor Lying Left arm        GENERAL: This is an elderly female with multiple comorbidities.  No acute cardiovascular distress.Vital signs on my initial evaluation patient has a normal blood pressure and normal heart rate.  Oxygen saturation is 95% on room air.  Patient has some mild audible wheezing.  Some mild audible squeak  HENT: nares patent  Head/neck/ face are symmetric without gross deformity, signs of trauma, or  swelling  EYES: no scleral icterus, no conjunctival pallor.  Pupils are equal round reactive to light.  Has chronic vision change in the right eye which is at baseline.  Extraocular muscles are intact.  No new visual change or no new visual deficit  NECK: Supple, no meningismus  CV: regular rhythm, regular rate with intact distal pulses.  Soft systolic murmur 2 out of 6.  RESPIRATORY: Patient has a mild audible squeak insistent with a very mild wheeze.  Does not appear to be in respiratory distress.  Rest of lungs sound clear.  ABDOMEN: soft and nontender.  Super morbidly obese.  MUSCULOSKELETAL: no deformity.  Patient has 2+ edema more prominent to her lower extremities.  She has intact distal pulses to upper and lower extremities are equal strong and symmetric.  NEURO: alert and appropriate, moves all extremities, follows commands.  Please see NIH stroke scale  SKIN: warm, dry    Vital signs and nursing notes reviewed.        LAB RESULTS  Recent Results (from the past 24 hour(s))   POC Glucose Once    Collection Time: 03/25/23  8:38 PM    Specimen: Blood   Result Value Ref Range    Glucose 186 (H) 70 - 130 mg/dL   CBC (No Diff)    Collection Time: 03/26/23  4:46 AM    Specimen: Blood   Result Value Ref Range    WBC 12.08 (H) 3.40 - 10.80 10*3/mm3    RBC 4.67 3.77 - 5.28 10*6/mm3    Hemoglobin 14.2 12.0 - 15.9 g/dL    Hematocrit 41.5 34.0 - 46.6 %    MCV 88.9 79.0 - 97.0 fL    MCH 30.4 26.6 - 33.0 pg    MCHC 34.2 31.5 - 35.7 g/dL    RDW 12.2 (L) 12.3 - 15.4 %    RDW-SD 39.7 37.0 - 54.0 fl    MPV 10.2 6.0 - 12.0 fL    Platelets 164 140 - 450 10*3/mm3   Comprehensive Metabolic Panel    Collection Time: 03/26/23  4:46 AM    Specimen: Blood   Result Value Ref Range    Glucose 187 (H) 65 - 99 mg/dL    BUN 12 8 - 23 mg/dL    Creatinine 0.89 0.57 - 1.00 mg/dL    Sodium 142 136 - 145 mmol/L    Potassium 3.8 3.5 - 5.2 mmol/L    Chloride 105 98 - 107 mmol/L    CO2 28.0 22.0 - 29.0 mmol/L    Calcium 9.3 8.6 - 10.5 mg/dL     Total Protein 6.5 6.0 - 8.5 g/dL    Albumin 3.7 3.5 - 5.2 g/dL    ALT (SGPT) 11 1 - 33 U/L    AST (SGOT) 28 1 - 32 U/L    Alkaline Phosphatase 38 (L) 39 - 117 U/L    Total Bilirubin 0.3 0.0 - 1.2 mg/dL    Globulin 2.8 gm/dL    A/G Ratio 1.3 g/dL    BUN/Creatinine Ratio 13.5 7.0 - 25.0    Anion Gap 9.0 5.0 - 15.0 mmol/L    eGFR 68.1 >60.0 mL/min/1.73   Hemoglobin A1c    Collection Time: 03/26/23  4:46 AM    Specimen: Blood   Result Value Ref Range    Hemoglobin A1C 9.70 (H) 4.80 - 5.60 %   Lipid Panel    Collection Time: 03/26/23  4:46 AM    Specimen: Blood   Result Value Ref Range    Total Cholesterol 138 0 - 200 mg/dL    Triglycerides 218 (H) 0 - 150 mg/dL    HDL Cholesterol 28 (L) 40 - 60 mg/dL    LDL Cholesterol  74 0 - 100 mg/dL    VLDL Cholesterol 36 5 - 40 mg/dL    LDL/HDL Ratio 2.37    TSH    Collection Time: 03/26/23  4:46 AM    Specimen: Blood   Result Value Ref Range    TSH 1.310 0.270 - 4.200 uIU/mL   POC Glucose Once    Collection Time: 03/26/23  6:11 AM    Specimen: Blood   Result Value Ref Range    Glucose 174 (H) 70 - 130 mg/dL   POC Glucose Once    Collection Time: 03/26/23 11:17 AM    Specimen: Blood   Result Value Ref Range    Glucose 256 (H) 70 - 130 mg/dL   POC Glucose Once    Collection Time: 03/26/23  4:33 PM    Specimen: Blood   Result Value Ref Range    Glucose 214 (H) 70 - 130 mg/dL   POC Glucose Once    Collection Time: 03/26/23  6:19 PM    Specimen: Blood   Result Value Ref Range    Glucose 212 (H) 70 - 130 mg/dL       Ordered the above labs and independently reviewed the results.        RADIOLOGY  No Radiology Exams Resulted Within Past 24 Hours    I ordered the above noted radiological studies. Reviewed by me and discussed with radiologist.  See dictation for official radiology interpretation.      PROCEDURES    Critical Care  Performed by: Brian Sood MD  Authorized by: Brian Sood MD     Critical care provider statement:     Critical care time (minutes): 30.    Critical care  time was exclusive of:  Separately billable procedures and treating other patients    Critical care was necessary to treat or prevent imminent or life-threatening deterioration of the following conditions:  CNS failure or compromise    Critical care was time spent personally by me on the following activities:  Blood draw for specimens, development of treatment plan with patient or surrogate, discussions with consultants, evaluation of patient's response to treatment, examination of patient, obtaining history from patient or surrogate, review of old charts, re-evaluation of patient's condition, pulse oximetry, ordering and review of radiographic studies, ordering and review of laboratory studies and ordering and performing treatments and interventions    I assumed direction of critical care for this patient from another provider in my specialty: no      Care discussed with: admitting provider            MEDICATIONS GIVEN IN ER    Medications   sodium chloride 0.9 % flush 10 mL (has no administration in time range)   sodium chloride 0.9 % infusion (75 mL/hr Intravenous Rate/Dose Verify 3/26/23 1536)   acetaminophen (TYLENOL) tablet 650 mg (650 mg Oral Given 3/26/23 0100)   aspirin tablet 325 mg (325 mg Oral Given 3/26/23 0807)     Or   aspirin suppository 300 mg ( Rectal Not Given:  See Alt 3/26/23 0807)   atorvastatin (LIPITOR) tablet 80 mg (80 mg Oral Given 3/25/23 2258)   ondansetron (ZOFRAN) injection 4 mg (4 mg Intravenous Given 3/26/23 1849)   bisacodyl (DULCOLAX) suppository 10 mg (has no administration in time range)   dextrose (GLUTOSE) oral gel 15 g (has no administration in time range)   dextrose (D50W) (25 g/50 mL) IV injection 25 g (has no administration in time range)   glucagon (GLUCAGEN) injection 1 mg (has no administration in time range)   insulin lispro (ADMELOG) injection 0-9 Units (4 Units Subcutaneous Given 3/26/23 1713)   albuterol (PROVENTIL) nebulizer solution 0.083% 2.5 mg/3mL (has no  administration in time range)   ARIPiprazole (ABILIFY) tablet 2 mg (2 mg Oral Given 3/26/23 1713)   cholecalciferol (VITAMIN D3) tablet 5,000 Units (5,000 Units Oral Given 3/26/23 0807)   citalopram (CeleXA) tablet 20 mg (20 mg Oral Given 3/26/23 0807)   apixaban (ELIQUIS) tablet 5 mg (5 mg Oral Given 3/26/23 0807)   HYDROcodone-acetaminophen (NORCO) 5-325 MG per tablet 1 tablet (1 tablet Oral Given 3/26/23 1924)   metoprolol tartrate (LOPRESSOR) tablet 50 mg (50 mg Oral Given 3/26/23 0807)   mirtazapine (REMERON) tablet 7.5 mg (7.5 mg Oral Given 3/26/23 0100)   pantoprazole (PROTONIX) EC tablet 40 mg (40 mg Oral Given 3/26/23 0617)   vitamin B-12 (CYANOCOBALAMIN) tablet 50 mcg (50 mcg Oral Given 3/26/23 0807)   budesonide-formoterol (SYMBICORT) 160-4.5 MCG/ACT inhaler 2 puff (2 puffs Inhalation Given 3/26/23 1926)   diazePAM (VALIUM) injection 5 mg (has no administration in time range)   LORazepam (ATIVAN) injection 1 mg (has no administration in time range)   fluticasone (FLONASE) 50 MCG/ACT nasal spray 2 spray (has no administration in time range)   sodium chloride 0.9 % bolus 500 mL (500 mL Intravenous New Bag 3/25/23 1853)   albuterol (PROVENTIL) nebulizer solution 0.083% 2.5 mg/3mL (2.5 mg Nebulization Given 3/1950)   iopamidol (ISOVUE-370) 76 % injection 150 mL (150 mL Intravenous Given 3/25/23 1844)         All labs have been independently reviewed by me.  All radiology studies have been reviewed by me and I discussed with radiologist dictating the report when indicated below.  All EKG's independently viewed and interpreted by me.  Discussion below represents my analysis of pertinent findings related to patient's condition, differential diagnosis, treatment plan and final disposition.        PROGRESS, DATA ANALYSIS, CONSULTS, AND MEDICAL DECISION MAKING    This is a patient that has symptoms of dizziness and unsteady gait.  She is on Eliquis for A-fib.  History of previous stroke.  We will get an do a  team D and do a stat CT of the head and CT angiogram.  It is very possible etiology of her symptoms are not from an acute CNS event.  With her multiple comorbidities and multiple medicines at play that very well could be contributing factors.  She also does have a history of benign positional vertigo and dizziness not related to a stroke.  Regardless she is not a thrombolytic candidate she has been compliant with Eliquis and her last dose of Eliquis was at 9:20 AM.  I discussed with the patient as well as the spouse of the test that we will order an initial treatment plan that we will start.  We will start her on some IV fluids.  We will also give her a breathing treatment.  We will consult stroke neurologist.  All questions answered.  She is not having any pain at this time.  Informed the patient and the spouse she will need to be admitted to the hospital.      ED Course as of 03/26/23 1935   Sat Mar 25, 2023   1827 I did this initial stroke exam I am initial evaluation of the patient when I went to see her at about 6:10 PM. [MM]   1828 This patient is not a thrombolytic candidate.  She is on Eliquis with her last dose of Eliquis at 9:20 AM.  She is also not missed any dose. [MM]   1828 I did call a team D. [MM]   1828 I did discuss the case with the stroke neurologist Dr. Singh and informed her of the significant past medical history and comorbidities and her current medicines she is on.  Informed her of her current presenting symptoms.  She agrees that this patient is not a thrombolytic candidate.  Agrees with getting a CT angiogram to make sure there is no large vessel occlusion.  She will consult. [MM]   1905 My own independent interpretation of the EKG that was done at 7:02 PM was a rate of 73 it is sinus rhythm narrow complex normal axis there are some movement artifact I do not appreciate any acute injury pattern  There are some nonspecific changes  I compared to a previous EKG that was on December 1, 2022  and I do not see any changes looks very similar. [MM]   1907 HS Troponin T(!): 42 [MM]   1908 I reviewed the CT angiogram and head results.  There is no evidence for acute intracranial abnormality or hemorrhage.  There is remote left occipital lobe infarct there is some atherosclerotic disease involving the proximal ICAs bilaterally there is no signs of hemorrhage.  I did again review the radiologist report.  Please see complete dictated report from radiologist [MM]   1909 I did review the report of the CT perfusion and there was no evidence for acute intracranial abnormality or hemorrhage or the appearance of any acute perfusion deficit. [MM]   1912 I have reevaluated this patient when she returned from CT.  I have also reviewed the results of the CT scan.  When I am seeing her neurologic status has not definitely seem to worsen.  Does not complain of dizziness right this moment.  Again the dizziness seems to be when she gets up and moves or moves her head.  She really needs to urinate and that is her main complaint.  She has a pair what can with no urine in the Marcela a container. [MM]   1913 Patient is already anticoagulated on Eliquis.  I do not think this is a stroke.  I informed the patient and the spouse that if this is a stroke it is a small vessel stroke and there is really no other acute interventions that we can do.  We will admit her to the hospital and continue to watch her.  All questions answered. [MM]   1914 Patient's repeat vitals are unremarkable. [MM]   1919 I did discuss the case with Dr. Mcguire who is on for [facility].  Informed her of the patient's presenting symptoms results of the CT scan and lab work.  She will trend the troponin. [MM]   1919 Resp: 28  She is anxious on my evaluation she does not appear to be in respiratory distress again she does have some wheezing.  She has not received her breathing treatment as of yet.  Denies any chest pain.  Looking old records has a history of chronic  bronchitis and wheezing in the past. [MM]   1950 proBNP: 108.0 [MM]   1950 On my own independent interpretation of the chest x-ray.  I do not see any active disease.  I do not see any definitive florid congestive heart failure or any obvious pleural effusions.  No pneumothorax.  No focal pulmonary consolidation. [MM]      ED Course User Index  [MM] Brian Sood MD       AS OF 19:35 EDT VITALS:    BP - 158/77  HR - 80  TEMP - 98.7 °F (37.1 °C) (Oral)  02 SATS - 97%    SOCIAL DETERMINANTS OF HEALTH THAT IMPACT OR LIMIT CARE (For example..Homelessness,safe discharge, inability to obtain care, follow up, or prescriptions):      DIAGNOSIS  Final diagnoses:   Dizziness   COPD exacerbation (HCC)   Coagulopathy (HCC): Eliquis induced   Right hemiparesis (HCC) chronic from residual stroke   Atrial fibrillation, unspecified type (HCC)   Parkinson's disease (HCC)         DISPOSITION  I have reviewed the test results with my patient and explained the current treatment plan.  I answered all of the patient's questions.  The patient will be admitted to monitor bed at this time.  The patient is not hypotensive and is tolerating their current disease condition well enough for a monitored bed at this time.  The patient's current condition does not require intensive care treatment at this time.            DICTATED UTILIZING DRAGON DICTATION    Note Disclaimer: At [facility], we believe that sharing information builds trust and better relationships. You are receiving this note because you recently visited [facility]. It is possible you will see health information before a provider has talked with you about it. This kind of information can be easy to misunderstand. To help you fully understand what it means for your health, we urge you to discuss this note with your provider.     Brian Sood MD  03/26/23 1936

## 2023-03-26 ENCOUNTER — APPOINTMENT (OUTPATIENT)
Dept: MRI IMAGING | Facility: HOSPITAL | Age: 73
End: 2023-03-26
Payer: MEDICARE

## 2023-03-26 PROBLEM — E66.9 OBESITY (BMI 30-39.9): Status: ACTIVE | Noted: 2023-03-26

## 2023-03-26 LAB
ALBUMIN SERPL-MCNC: 3.7 G/DL (ref 3.5–5.2)
ALBUMIN/GLOB SERPL: 1.3 G/DL
ALP SERPL-CCNC: 38 U/L (ref 39–117)
ALT SERPL W P-5'-P-CCNC: 11 U/L (ref 1–33)
ANION GAP SERPL CALCULATED.3IONS-SCNC: 9 MMOL/L (ref 5–15)
AST SERPL-CCNC: 28 U/L (ref 1–32)
BILIRUB SERPL-MCNC: 0.3 MG/DL (ref 0–1.2)
BUN SERPL-MCNC: 12 MG/DL (ref 8–23)
BUN/CREAT SERPL: 13.5 (ref 7–25)
CALCIUM SPEC-SCNC: 9.3 MG/DL (ref 8.6–10.5)
CHLORIDE SERPL-SCNC: 105 MMOL/L (ref 98–107)
CHOLEST SERPL-MCNC: 138 MG/DL (ref 0–200)
CO2 SERPL-SCNC: 28 MMOL/L (ref 22–29)
CREAT SERPL-MCNC: 0.89 MG/DL (ref 0.57–1)
DEPRECATED RDW RBC AUTO: 39.7 FL (ref 37–54)
EGFRCR SERPLBLD CKD-EPI 2021: 68.1 ML/MIN/1.73
ERYTHROCYTE [DISTWIDTH] IN BLOOD BY AUTOMATED COUNT: 12.2 % (ref 12.3–15.4)
GLOBULIN UR ELPH-MCNC: 2.8 GM/DL
GLUCOSE BLDC GLUCOMTR-MCNC: 174 MG/DL (ref 70–130)
GLUCOSE BLDC GLUCOMTR-MCNC: 212 MG/DL (ref 70–130)
GLUCOSE BLDC GLUCOMTR-MCNC: 214 MG/DL (ref 70–130)
GLUCOSE BLDC GLUCOMTR-MCNC: 256 MG/DL (ref 70–130)
GLUCOSE SERPL-MCNC: 187 MG/DL (ref 65–99)
HBA1C MFR BLD: 9.7 % (ref 4.8–5.6)
HCT VFR BLD AUTO: 41.5 % (ref 34–46.6)
HDLC SERPL-MCNC: 28 MG/DL (ref 40–60)
HGB BLD-MCNC: 14.2 G/DL (ref 12–15.9)
LDLC SERPL CALC-MCNC: 74 MG/DL (ref 0–100)
LDLC/HDLC SERPL: 2.37 {RATIO}
MCH RBC QN AUTO: 30.4 PG (ref 26.6–33)
MCHC RBC AUTO-ENTMCNC: 34.2 G/DL (ref 31.5–35.7)
MCV RBC AUTO: 88.9 FL (ref 79–97)
PLATELET # BLD AUTO: 164 10*3/MM3 (ref 140–450)
PMV BLD AUTO: 10.2 FL (ref 6–12)
POTASSIUM SERPL-SCNC: 3.8 MMOL/L (ref 3.5–5.2)
PROT SERPL-MCNC: 6.5 G/DL (ref 6–8.5)
RBC # BLD AUTO: 4.67 10*6/MM3 (ref 3.77–5.28)
SODIUM SERPL-SCNC: 142 MMOL/L (ref 136–145)
TRIGL SERPL-MCNC: 218 MG/DL (ref 0–150)
TSH SERPL DL<=0.05 MIU/L-ACNC: 1.31 UIU/ML (ref 0.27–4.2)
VLDLC SERPL-MCNC: 36 MG/DL (ref 5–40)
WBC NRBC COR # BLD: 12.08 10*3/MM3 (ref 3.4–10.8)

## 2023-03-26 PROCEDURE — 25010000002 ONDANSETRON PER 1 MG: Performed by: INTERNAL MEDICINE

## 2023-03-26 PROCEDURE — 80061 LIPID PANEL: CPT | Performed by: INTERNAL MEDICINE

## 2023-03-26 PROCEDURE — 94760 N-INVAS EAR/PLS OXIMETRY 1: CPT

## 2023-03-26 PROCEDURE — 83036 HEMOGLOBIN GLYCOSYLATED A1C: CPT | Performed by: INTERNAL MEDICINE

## 2023-03-26 PROCEDURE — 84443 ASSAY THYROID STIM HORMONE: CPT | Performed by: INTERNAL MEDICINE

## 2023-03-26 PROCEDURE — 99233 SBSQ HOSP IP/OBS HIGH 50: CPT | Performed by: PSYCHIATRY & NEUROLOGY

## 2023-03-26 PROCEDURE — 94799 UNLISTED PULMONARY SVC/PX: CPT

## 2023-03-26 PROCEDURE — G0378 HOSPITAL OBSERVATION PER HR: HCPCS

## 2023-03-26 PROCEDURE — 80053 COMPREHEN METABOLIC PANEL: CPT | Performed by: INTERNAL MEDICINE

## 2023-03-26 PROCEDURE — 94664 DEMO&/EVAL PT USE INHALER: CPT

## 2023-03-26 PROCEDURE — 63710000001 INSULIN LISPRO (HUMAN) PER 5 UNITS: Performed by: INTERNAL MEDICINE

## 2023-03-26 PROCEDURE — 96361 HYDRATE IV INFUSION ADD-ON: CPT

## 2023-03-26 PROCEDURE — 36415 COLL VENOUS BLD VENIPUNCTURE: CPT | Performed by: INTERNAL MEDICINE

## 2023-03-26 PROCEDURE — 82962 GLUCOSE BLOOD TEST: CPT

## 2023-03-26 PROCEDURE — 94761 N-INVAS EAR/PLS OXIMETRY MLT: CPT

## 2023-03-26 PROCEDURE — 97530 THERAPEUTIC ACTIVITIES: CPT | Performed by: PHYSICAL THERAPIST

## 2023-03-26 PROCEDURE — 97166 OT EVAL MOD COMPLEX 45 MIN: CPT | Performed by: OCCUPATIONAL THERAPIST

## 2023-03-26 PROCEDURE — 96374 THER/PROPH/DIAG INJ IV PUSH: CPT

## 2023-03-26 PROCEDURE — 85027 COMPLETE CBC AUTOMATED: CPT | Performed by: INTERNAL MEDICINE

## 2023-03-26 PROCEDURE — 97161 PT EVAL LOW COMPLEX 20 MIN: CPT | Performed by: PHYSICAL THERAPIST

## 2023-03-26 RX ORDER — LORAZEPAM 2 MG/ML
1 INJECTION INTRAMUSCULAR ONCE
Status: DISCONTINUED | OUTPATIENT
Start: 2023-03-26 | End: 2023-03-28 | Stop reason: HOSPADM

## 2023-03-26 RX ORDER — FLUTICASONE PROPIONATE 50 MCG
2 SPRAY, SUSPENSION (ML) NASAL DAILY
Status: DISCONTINUED | OUTPATIENT
Start: 2023-03-26 | End: 2023-03-28 | Stop reason: HOSPADM

## 2023-03-26 RX ORDER — DIAZEPAM 5 MG/ML
5 INJECTION, SOLUTION INTRAMUSCULAR; INTRAVENOUS ONCE
Status: COMPLETED | OUTPATIENT
Start: 2023-03-26 | End: 2023-03-27

## 2023-03-26 RX ADMIN — APIXABAN 5 MG: 5 TABLET, FILM COATED ORAL at 08:07

## 2023-03-26 RX ADMIN — CARBIDOPA AND LEVODOPA 1 TABLET: 10; 100 TABLET ORAL at 08:07

## 2023-03-26 RX ADMIN — INSULIN LISPRO 2 UNITS: 100 INJECTION, SOLUTION INTRAVENOUS; SUBCUTANEOUS at 08:08

## 2023-03-26 RX ADMIN — ONDANSETRON 4 MG: 2 INJECTION INTRAMUSCULAR; INTRAVENOUS at 18:49

## 2023-03-26 RX ADMIN — APIXABAN 5 MG: 5 TABLET, FILM COATED ORAL at 20:17

## 2023-03-26 RX ADMIN — HYDROCODONE BITARTRATE AND ACETAMINOPHEN 1 TABLET: 5; 325 TABLET ORAL at 13:30

## 2023-03-26 RX ADMIN — PANTOPRAZOLE SODIUM 40 MG: 40 TABLET, DELAYED RELEASE ORAL at 06:17

## 2023-03-26 RX ADMIN — ASPIRIN 325 MG: 325 TABLET ORAL at 01:00

## 2023-03-26 RX ADMIN — MIRTAZAPINE 7.5 MG: 15 TABLET, FILM COATED ORAL at 20:17

## 2023-03-26 RX ADMIN — HYDROCODONE BITARTRATE AND ACETAMINOPHEN 1 TABLET: 5; 325 TABLET ORAL at 19:24

## 2023-03-26 RX ADMIN — INSULIN LISPRO 6 UNITS: 100 INJECTION, SOLUTION INTRAVENOUS; SUBCUTANEOUS at 11:54

## 2023-03-26 RX ADMIN — FLUTICASONE PROPIONATE 2 SPRAY: 50 SPRAY, METERED NASAL at 20:18

## 2023-03-26 RX ADMIN — MIRTAZAPINE 7.5 MG: 15 TABLET, FILM COATED ORAL at 01:00

## 2023-03-26 RX ADMIN — BUDESONIDE AND FORMOTEROL FUMARATE DIHYDRATE 2 PUFF: 160; 4.5 AEROSOL RESPIRATORY (INHALATION) at 19:26

## 2023-03-26 RX ADMIN — Medication 50 MCG: at 08:07

## 2023-03-26 RX ADMIN — HYDROCODONE BITARTRATE AND ACETAMINOPHEN 1 TABLET: 5; 325 TABLET ORAL at 07:18

## 2023-03-26 RX ADMIN — ARIPIPRAZOLE 2 MG: 2 TABLET ORAL at 17:13

## 2023-03-26 RX ADMIN — BUDESONIDE AND FORMOTEROL FUMARATE DIHYDRATE 2 PUFF: 160; 4.5 AEROSOL RESPIRATORY (INHALATION) at 06:51

## 2023-03-26 RX ADMIN — ATORVASTATIN CALCIUM 80 MG: 80 TABLET, FILM COATED ORAL at 20:17

## 2023-03-26 RX ADMIN — METOPROLOL TARTRATE 50 MG: 50 TABLET, FILM COATED ORAL at 20:17

## 2023-03-26 RX ADMIN — INSULIN LISPRO 4 UNITS: 100 INJECTION, SOLUTION INTRAVENOUS; SUBCUTANEOUS at 17:13

## 2023-03-26 RX ADMIN — ASPIRIN 325 MG: 325 TABLET ORAL at 08:07

## 2023-03-26 RX ADMIN — SODIUM CHLORIDE 75 ML/HR: 9 INJECTION, SOLUTION INTRAVENOUS at 12:55

## 2023-03-26 RX ADMIN — METOPROLOL TARTRATE 50 MG: 50 TABLET, FILM COATED ORAL at 08:07

## 2023-03-26 RX ADMIN — FUROSEMIDE 40 MG: 40 TABLET ORAL at 08:08

## 2023-03-26 RX ADMIN — Medication 5000 UNITS: at 08:07

## 2023-03-26 RX ADMIN — POTASSIUM CHLORIDE 20 MEQ: 750 TABLET, EXTENDED RELEASE ORAL at 08:07

## 2023-03-26 RX ADMIN — CARBIDOPA AND LEVODOPA 1 TABLET: 10; 100 TABLET ORAL at 00:59

## 2023-03-26 RX ADMIN — CITALOPRAM 20 MG: 20 TABLET, FILM COATED ORAL at 08:07

## 2023-03-26 RX ADMIN — ACETAMINOPHEN 650 MG: 325 TABLET, FILM COATED ORAL at 01:00

## 2023-03-26 NOTE — PROGRESS NOTES
[facility] Acute Rehab  Stroke screening per stroke order set via Epic. Please note that this is a screening and this pt will not be actively followed by the Admission RN's. If you feel that this pt is or will be appropriate for Acute Rehab, please contact the Admission Nurse Office at [phone] and a full evaluation will be initiated.     Che Stallings RN  Acute Rehab Admission Nurse

## 2023-03-26 NOTE — PLAN OF CARE
Goal Outcome Evaluation:  Plan of Care Reviewed With: patient           Outcome Evaluation: The patient is a 72 year old female admitted to the hospital with new onset dizziness and COPD exacerbation. PMH includes DM, fibromyalgia, obesity, CVA, MI, and recent R reverse TSA. Prior to admission, she was independent with all ADLs and primarily used a straight cane for mobility. Upon evalation today, she requires standby assist for bed mobility and is able to ambulate 60 ft with straight cane and CGA. She exhibits limitations in LE strength and endurance impacting functional mobility. She would benefit from additional skilled physical therapy to address the stated deficits and return to her previous level of function.

## 2023-03-26 NOTE — CONSULTS
Neurology Consult Note    Referring Provider: Dr. Mcguire  Reason for Consultation: stroke like symptoms    History of present illness:    The patient is 72 year old woman with history of right PCA stroke who presented to ED with complaint of dizziness described as room spinning.     She has chronic visual disturbance and right sided weakness from prior stroke. She is on Eliquis for atrial fibrillation    Symptoms started yesterday, with return of vertigo sensation and difficulty walking.  She did not fall but needed her 's assisitance.    She reports no change in vision but has a new scratchy, foreign body sensation in her right eye.     No headache, but some pain of the upper chest and back, similar to when she had her initial stroke.    Reviewed neurology office notes (lat 10/22) and prior admission notes (4/21, 5/21 and 12/21).      Past Medical History  Past Medical History:   Diagnosis Date   • Abnormal weight gain    • Acquired trigger finger    • Acute myocardial infarction (HCC)    • Adjustment reaction with mixed emotional features    • Arthritis    • ASHD (arteriosclerotic heart disease)    • Asthma    • B12 deficiency    • Bacterial pneumonia    • Williamson esophagus    • Benign colonic polyp    • Bilateral knee pain    • Birthmark     haemangioma of the bone   • Bronchiectasis ([facility])    • CHF (congestive heart failure) ([facility])    • Chronic cough    • Chronic glomerulonephritis with exudative nephritis    • Chronic lumbar radiculopathy    • Diabetes mellitus ([facility])    • Diabetic peripheral neuropathy ([facility])    • Dyslipidemia    • Fibromyositis    • Fracture, humerus 06/30/2022    RIGHT   • GERD (gastroesophageal reflux disease)    • Herpes zoster    • Hyperlipidemia    • Hypertension    • Lupus anticoagulant disorder ([facility])    • Mycobacterium avium complex ([facility])    • Nephrolithiasis    • SILVIANO (obstructive sleep apnea)     DOESN'T USE HER MACHINE   • Palpitations    • Premature ventricular contraction    •  "Slow transit constipation    • Stroke ([facility])     RT SIDED WEAKNESS   • Vitamin D deficiency        Past Surgical History  Past Surgical History:   Procedure Laterality Date   • APPENDECTOMY     • BRONCHOSCOPY     •  SECTION     • CHOLECYSTECTOMY     • COLONOSCOPY     • COLONOSCOPY N/A 2019    Procedure: COLONOSCOPY to cecum with cold biopsy and cold and hotsnare polypectomies;  Surgeon: Suzy Eubanks MD;  Location: [facility] ENDOSCOPY;  Service: Gastroenterology   • CORONARY ANGIOPLASTY WITH STENT PLACEMENT     • EMBOLECTOMY N/A 2020    Procedure: EMERGENT CEREBRAL ANGIOGRAM;  Surgeon: Jonh Meehan MD;  Location: [facility] HYBRID OR ;  Service: Neurosurgery;  Laterality: N/A;   • ENDOSCOPY N/A 2019    Procedure: ESOPHAGOGASTRODUODENOSCOPY with cold biopsies;  Surgeon: Suzy Eubanks MD;  Location: [facility] ENDOSCOPY;  Service: Gastroenterology   • KNEE ARTHROSCOPY     • OTHER SURGICAL HISTORY      elbow surgery   • ROTATOR CUFF REPAIR     • TOTAL SHOULDER ARTHROPLASTY W/ DISTAL CLAVICLE EXCISION Right 7/15/2022    Procedure: TOTAL SHOULDER REVERSE ARTHROPLASTY;  Surgeon: Dk Mckeon II, MD;  Location: [facility] OR [facility];  Service: Orthopedics;  Laterality: Right;   • TUBAL ABDOMINAL LIGATION         Allergies   Allergen Reactions   • Duloxetine Hcl Hallucinations   • Viibryd [Vilazodone Hcl] Hallucinations   • Nsaids Other (See Comments)     Doesn't remember   • Benadryl [Diphenhydramine] Other (See Comments)     shaking   • Carafate [Sucralfate] GI Intolerance   • Metformin Other (See Comments)     Doesn't remember   • Morphine And Related Nausea And Vomiting   • Oxycodone Nausea And Vomiting   • Penicillins Rash     Fever, \"can't breathe\"   • Statins Other (See Comments)     Can't remember       Social History  Social History     Socioeconomic History   • Marital status:    Tobacco Use   • Smoking status: Former     Types: Cigarettes     Quit date: 1980 "     Years since quittin.2   • Smokeless tobacco: Never   Vaping Use   • Vaping Use: Never used   Substance and Sexual Activity   • Alcohol use: No   • Drug use: No   • Sexual activity: Yes     Partners: Male     Review of Systems   Eyes: Positive for pain.        Foreign sensation in right eye   Respiratory: Positive for wheezing.    Cardiovascular: Positive for chest pain.   Musculoskeletal: Positive for gait problem.   All other systems reviewed and are negative.    Medications  Scheduled Meds:apixaban, 5 mg, Oral, Q12H  ARIPiprazole, 2 mg, Oral, Q PM  aspirin, 325 mg, Oral, Daily   Or  aspirin, 300 mg, Rectal, Daily  atorvastatin, 80 mg, Oral, Nightly  budesonide-formoterol, 2 puff, Inhalation, BID - RT  carbidopa-levodopa, 1 tablet, Oral, TID  cholecalciferol, 5,000 Units, Oral, Daily  citalopram, 20 mg, Oral, Daily  furosemide, 40 mg, Oral, QAM  insulin lispro, 0-9 Units, Subcutaneous, TID With Meals  metoprolol tartrate, 50 mg, Oral, BID  mirtazapine, 7.5 mg, Oral, Nightly  pantoprazole, 40 mg, Oral, Q AM  potassium chloride, 20 mEq, Oral, BID  vitamin B-12, 50 mcg, Oral, Daily      Continuous Infusions:sodium chloride, 125 mL/hr, Last Rate: 125 mL/hr (23 1853)  sodium chloride, 75 mL/hr, Last Rate: 75 mL/hr (23 1255)      PRN Meds:.•  acetaminophen **OR** acetaminophen  •  albuterol  •  bisacodyl  •  dextrose  •  dextrose  •  glucagon (human recombinant)  •  HYDROcodone-acetaminophen  •  ondansetron  •  sodium chloride    Vital Signs   Temp:  [97.6 °F (36.4 °C)-98.6 °F (37 °C)] 97.6 °F (36.4 °C)  Heart Rate:  [62-90] 64  Resp:  [18-28] 20  BP: (122-168)/(66-97) 145/66    Examination:  General: Well-groomed, well-nourished  HEENT:  No rash, no sign of trauma, conjunctiva not injected, no foreign body present in right eye, although exam limited by patient discomfort  CVS:  Regular rate and rhythm. Good peripheral perfusion.   Resp: non labored breathing, no wheezes  Extremities:  No signs of  peripheral edema  Musculoskeletal: right shoulder pain past 45 degrees arm abduction  Neurologic:   Alert oriented and fluent   No dysarthria   EOMF without nystagmus   Pupils symmetric and equally reactive   Face symmetric   Normal power on the left side   Right hemiparesis, 4/5 with increased tone   Normal coordination by FNF   Reflexes symmetric and not hyperactive   Plantar responses flexor   Sensory exam grossly intact   Gait not tested  Psychiatric: no anxiety    Results Review:  Results from last 7 days   Lab Units 03/26/23  0446 03/25/23  1825   WBC 10*3/mm3 12.08* 11.29*   HEMOGLOBIN g/dL 14.2 15.4   HEMATOCRIT % 41.5 47.1*   PLATELETS 10*3/mm3 164 193        Results from last 7 days   Lab Units 03/26/23  0446 03/25/23  1825   SODIUM mmol/L 142 138   POTASSIUM mmol/L 3.8 3.7   CHLORIDE mmol/L 105 96*   CO2 mmol/L 28.0 31.0*   BUN mg/dL 12 13   CREATININE mg/dL 0.89 0.95   CALCIUM mg/dL 9.3 9.3   BILIRUBIN mg/dL 0.3 0.2   ALK PHOS U/L 38* 49   ALT (SGPT) U/L 11 34*   AST (SGOT) U/L 28 42*   GLUCOSE mg/dL 187* 222*      Lab Results   Component Value Date    [id number] 1,850 08/24/2022     Lab Results   Component Value Date    TSH 1.310 03/26/2023     Radiology  images reviewed independently  Head CT showed no acute pathology with chronic left occipital infarct    CTA head/neck shows showed no large vessel occlusion, with unchanged bilateral cervical carotid disease    CT perfusion showed no acute perfusion deficit    Medical Decision Making and Recommendations  Dizziness  Exam appears unchanged compared to previous admissions    Will pursue brain MRI to rule out new acute ischemia not seen on head CT    Continue Eliquis for now    Foreign body sensation OD  Consider ophthalmology consult tomorrow of not improved  DAPT with aspirin 81 mg and clopidogrel 75 mg for 21 days then aspirin 81 mg monotherapy    I discussed these findings and my recommendations with patient and family      Annamaria Singh,  MD  03/26/23  12:57 EDT

## 2023-03-26 NOTE — THERAPY EVALUATION
Patient Name: Dalila Javed  : 1950    MRN: [id number]                              Today's Date: 3/26/2023       Admit Date: 3/25/2023    Visit Dx:     ICD-10-CM ICD-9-CM   1. Dizziness  R42 780.4   2. COPD exacerbation ([facility])  J44.1 491.21   3. Coagulopathy ([facility]): Eliquis induced  D68.9 286.9   4. Right hemiparesis ([facility]) chronic from residual stroke  G81.91 342.90   5. Atrial fibrillation, unspecified type ([facility])  I48.91 427.31   6. Parkinson's disease ([facility])  G20 332.0     Patient Active Problem List   Diagnosis   • Adjustment disorder with mixed anxiety and depressed mood   • Atopic rhinitis   • Coronary arteriosclerosis in native artery   • Cobalamin deficiency   • Benign colonic polyp   • Lumbar radiculopathy   • Controlled diabetes mellitus type 2 with complications ([facility])   • Binocular vision disorder with diplopia   • Dyslipidemia   • Arthropathy of hand   • Knee pain   • Cramps of lower extremity   • Low back pain   • Chronic nausea   • Obstructive sleep apnea syndrome   • Palpitations   • Ventricular premature beats   • Cephalalgia   • Colonic constipation   • Neurocardiogenic syncope   • Vitamin D deficiency   • DODSON (nonalcoholic steatohepatitis)   • Fibromyalgia   • Morbidly obese ([facility])   • Polyp of colon   • Family history of colonic polyps   • Family history of malignant neoplasm of colon   • Acute CVA (cerebrovascular accident) ([facility])   • Episode of dizziness   • Dizziness   • History of stroke   • Chronic right shoulder pain   • Encephalopathy, metabolic   • Migraine without aura or status migrainosus   • Parkinson disease ([facility])   • Hypokalemia   • Benign paroxysmal positional vertigo   • Chronic obstructive pulmonary disease ([facility])   • Gastroesophageal reflux disease   • History of percutaneous transluminal coronary angioplasty   • Hypercholesterolemia   • Hypertensive disorder   • Myocardial infarction ([facility])   • Occipital neuralgia   • Patent foramen ovale   • Transient cerebral  ischemia   • Arm pain, anterior, right   • TIA (transient ischemic attack)   • Pathological fracture of vertebra due to osteoporosis with delayed healing   • Compression fracture of L1 vertebra with delayed healing   • Status post reverse arthroplasty of right shoulder     Past Medical History:   Diagnosis Date   • Abnormal weight gain    • Acquired trigger finger    • Acute myocardial infarction (HCC)    • Adjustment reaction with mixed emotional features    • Arthritis    • ASHD (arteriosclerotic heart disease)    • Asthma    • B12 deficiency    • Bacterial pneumonia    • Williamson esophagus    • Benign colonic polyp    • Bilateral knee pain    • Birthmark     haemangioma of the bone   • Bronchiectasis ([facility])    • CHF (congestive heart failure) ([facility])    • Chronic cough    • Chronic glomerulonephritis with exudative nephritis    • Chronic lumbar radiculopathy    • Diabetes mellitus ([facility])    • Diabetic peripheral neuropathy ([facility])    • Dyslipidemia    • Fibromyositis    • Fracture, humerus 2022    RIGHT   • GERD (gastroesophageal reflux disease)    • Herpes zoster    • Hyperlipidemia    • Hypertension    • Lupus anticoagulant disorder ([facility])    • Mycobacterium avium complex ([facility])    • Nephrolithiasis    • SILVIANO (obstructive sleep apnea)     DOESN'T USE HER MACHINE   • Palpitations    • Premature ventricular contraction    • Slow transit constipation    • Stroke ([facility]) 2020    RT SIDED WEAKNESS   • Vitamin D deficiency      Past Surgical History:   Procedure Laterality Date   • APPENDECTOMY     • BRONCHOSCOPY     •  SECTION     • CHOLECYSTECTOMY     • COLONOSCOPY     • COLONOSCOPY N/A 2019    Procedure: COLONOSCOPY to cecum with cold biopsy and cold and hotsnare polypectomies;  Surgeon: Suzy Eubanks MD;  Location: [facility] ENDOSCOPY;  Service: Gastroenterology   • CORONARY ANGIOPLASTY WITH STENT PLACEMENT     • EMBOLECTOMY N/A 2020    Procedure: EMERGENT CEREBRAL ANGIOGRAM;  Surgeon: Shefali  MD Jonh;  Location: [facility] HYBRID OR 18/19;  Service: Neurosurgery;  Laterality: N/A;   • ENDOSCOPY N/A 11/5/2019    Procedure: ESOPHAGOGASTRODUODENOSCOPY with cold biopsies;  Surgeon: Suzy Eubanks MD;  Location: [facility] ENDOSCOPY;  Service: Gastroenterology   • KNEE ARTHROSCOPY     • OTHER SURGICAL HISTORY      elbow surgery   • ROTATOR CUFF REPAIR     • TOTAL SHOULDER ARTHROPLASTY W/ DISTAL CLAVICLE EXCISION Right 7/15/2022    Procedure: TOTAL SHOULDER REVERSE ARTHROPLASTY;  Surgeon: Dk Mckeon II, MD;  Location: [facility] OR [facility];  Service: Orthopedics;  Laterality: Right;   • TUBAL ABDOMINAL LIGATION        General Information     Row Name 03/26/23 1409          OT Time and Intention    Document Type evaluation  -     Mode of Treatment occupational therapy;individual therapy  -     Row Name 03/26/23 1409          General Information    Patient Profile Reviewed yes  -     Prior Level of Function independent:;ADL's;transfer;gait;all household mobility  -     Existing Precautions/Restrictions fall  -     Barriers to Rehab none identified  -     Row Name 03/26/23 1409          Living Environment    People in Home spouse  -     Row Name 03/26/23 1409          Cognition    Orientation Status (Cognition) oriented x 4  -     Row Name 03/26/23 1409          Safety Issues, Functional Mobility    Impairments Affecting Function (Mobility) balance;endurance/activity tolerance;strength  -           User Key  (r) = Recorded By, (t) = Taken By, (c) = Cosigned By    Initials Name Provider Type     Brenda Baez, OTR Occupational Therapist                 Mobility/ADL's     Row Name 03/26/23 1411          Bed Mobility    Sit-Supine [place] (Bed Mobility) set up;standby assist  -     Row Name 03/26/23 1411          Transfers    Transfers sit-stand transfer;stand-sit transfer  -     Row Name 03/26/23 1411          Bed-Chair Transfer    Bed-Chair [place] (Transfers) not  tested  -[facility] Name 03/26/23 1411          Sit-Stand Transfer    Sit-Stand [place] (Transfers) set up;standby assist;contact guard  -     Assistive Device (Sit-Stand Transfers) cane, straight  -[facility] Name 03/26/23 1411          Stand-Sit Transfer    Stand-Sit [place] (Transfers) set up;contact guard  -     Assistive Device (Stand-Sit Transfers) cane, straight  [facility] Name 03/26/23 1411          Functional Mobility    Functional Mobility- Ind. Level set up required;contact guard assist  -     Functional Mobility- Device cane, straight  -     Functional Mobility- Comment in room, to door, to bed  -[facility] Name 03/26/23 1411          Activities of Daily Living    BADL Assessment/Intervention --  pt already completed ADLs  -           User Key  (r) = Recorded By, (t) = Taken By, (c) = Cosigned By    Initials Name Provider Type     Brenda Baez, OTR Occupational Therapist               Obj/Interventions     [facility] Name 03/26/23 1412          Sensory Assessment (Somatosensory)    Sensory Assessment (Somatosensory) UE sensation intact  -KP     Row Name 03/26/23 1412          Vision Assessment/Intervention    Visual Impairment/Limitations WFL  -[facility] Name 03/26/23 1412          Range of Motion Comprehensive    General Range of Motion upper extremity range of motion deficits identified  -     Comment, General Range of Motion h/o R reverse total shoulder about 6 months ago. 1/4 R shoulder abd, 1/2 R shoulder flexion. R elbow 7/8, R hand 8/8. L UE 8/8  -[facility] Name 03/26/23 1412          Strength Comprehensive (MMT)    General Manual Muscle Testing (MMT) Assessment upper extremity strength deficits identified  -     Comment, General Manual Muscle Testing (MMT) Assessment R shoulder very weak post surgery 6 months ago, per pt report suppose to start more PT soon. R shoulder 2- to 2+/5 R elbow 3-/5.  L UE 4-/5  -[facility] Name 03/26/23 1412          Motor Skills     Motor Skills coordination;functional endurance  -     Coordination gross motor deficit;right;upper extremity  -     Row Name 03/26/23 1412          Balance    Static Sitting Balance standby assist  -     Dynamic Sitting Balance standby assist  -     Position, Sitting Balance sitting edge of bed  -     Static Standing Balance set-up;contact guard  -     Position/Device Used, Standing Balance cane, straight  -KP     Balance Interventions sitting;standing;supported;sit to stand;static  -KP           User Key  (r) = Recorded By, (t) = Taken By, (c) = Cosigned By    Initials Name Provider Type    Brenda Mayorga, OTR Occupational Therapist               Goals/Plan     Row Name 03/26/23 1418          Transfer Goal 1 (OT)    Activity/Assistive Device (Transfer Goal 1, OT) bed-to-chair/chair-to-bed;toilet  -     [place] Level/Cues Needed (Transfer Goal 1, OT) modified independence;standby assist  -     Time Frame (Transfer Goal 1, OT) short term goal (STG);2 weeks  -KP     Progress/Outcome (Transfer Goal 1, OT) goal ongoing  -     Row Name 03/26/23 1418          Bathing Goal 1 (OT)    Activity/Device (Bathing Goal 1, OT) bathing skills, all  -KP     [place] Level/Cues Needed (Bathing Goal 1, OT) standby assist;modified independence  -     Time Frame (Bathing Goal 1, OT) short term goal (STG);2 weeks  -     Progress/Outcomes (Bathing Goal 1, OT) goal ongoing  -     Row Name 03/26/23 1418          ROM Goal 1 (OT)    ROM Goal 1 (OT) incr R UE ROM in shoulder to 1/2 abd and 7/8 shoulder flexion  -     Time Frame (ROM Goal 1, OT) short term goal (STG);2 weeks  -KP     Progress/Outcome (ROM Goal 1, OT) goal ongoing  -     Row Name 03/26/23 1418          Therapy Assessment/Plan (OT)    Planned Therapy Interventions (OT) activity tolerance training;functional balance retraining;occupation/activity based interventions;ROM/therapeutic exercise;strengthening exercise;transfer/mobility  retraining;patient/caregiver education/training;BADL retraining  -           User Key  (r) = Recorded By, (t) = Taken By, (c) = Cosigned By    Initials Name Provider Type    Brenda Mayorga, OTR Occupational Therapist               Clinical Impression     Row Name 03/26/23 1415          Pain Assessment    Pretreatment Pain Rating 6/10  -KP     Posttreatment Pain Rating 6/10  -KP     Pain Location - Side/Orientation Right  -KP     Pain Location upper  -     Pain Location - shoulder  -KP     Row Name 03/26/23 1415          Plan of Care Review    Plan of Care Reviewed With patient;spouse  -     Progress no change  -     Outcome Evaluation pt admitted to hospital w dizziness and worried that she may have had a stroke so she came to be seen, pt lives w spouse at home and was independent PTA. pt reports she has decr movement in R UE from R reverse total shoulder done about 6 months ago, pt demo def sig decr in R shoulder ROM about 1/4 abd to 1/2 fl AROM. pt has good ROM in L UE. pt completed sit to stand w CGA and cane and walked in room w CGA. pt already completed ADLs. pt cont to benefit from OT to incr ADL, strength, balance, and tsf.  -     Row Name 03/26/23 1415          Therapy Assessment/Plan (OT)    Rehab Potential (OT) good, to achieve stated therapy goals  -     Criteria for Skilled Therapeutic Interventions Met (OT) yes;skilled treatment is necessary;meets criteria  -     Therapy Frequency (OT) 5 times/wk  -     Row Name 03/26/23 1415          Therapy Plan Review/Discharge Plan (OT)    Anticipated Discharge Disposition (OT) home with assist;home with outpatient therapy services  for R shoulder  -KP     Row Name 03/26/23 1415          Positioning and Restraints    Pre-Treatment Position in bed  -KP     Post Treatment Position bed  -KP     In Bed fowlers;call light within reach;encouraged to call for assist;exit alarm on;with family/caregiver  -           User Key  (r) = Recorded By,  (t) = Taken By, (c) = Cosigned By    Initials Name Provider Type    Brenda Mayorga, OTR Occupational Therapist               Outcome Measures     Row Name 03/26/23 1419          How much help from another is currently needed...    Putting on and taking off regular lower body clothing? 3  -KP     Bathing (including washing, rinsing, and drying) 3  -KP     Toileting (which includes using toilet bed pan or urinal) 3  -KP     Putting on and taking off regular upper body clothing 3  -KP     Taking care of personal grooming (such as brushing teeth) 3  -KP     Eating meals 4  -KP     AM-PAC 6 Clicks Score (OT) 19  -     Row Name 03/26/23 0942          How much help from another person do you currently need...    Turning from your back to your side while in flat bed without using bedrails? 4  -EL     Moving from lying on back to sitting on the side of a flat bed without bedrails? 3  -EL     Moving to and from a bed to a chair (including a wheelchair)? 3  -EL     Standing up from a chair using your arms (e.g., wheelchair, bedside chair)? 3  -EL     Climbing 3-5 steps with a railing? 3  -EL     To walk in hospital room? 3  -EL     AM-PAC 6 Clicks Score (PT) 19  -EL     Highest level of mobility 6 --> Walked 10 steps or more  -     Row Name 03/26/23 1419          Modified Julissa Scale    Modified McLennan Scale 4 - Moderately severe disability.  Unable to walk without assistance, and unable to attend to own bodily needs without assistance.  -     Row Name 03/26/23 1419 03/26/23 0942       Functional Assessment    Outcome Measure Options AM-PAC 6 Clicks Daily Activity (OT);Modified McLennan  - AM-PAC 6 Clicks Basic Mobility (PT)  -EL          User Key  (r) = Recorded By, (t) = Taken By, (c) = Cosigned By    Initials Name Provider Type    Brenda Mayorga OTR Occupational Therapist    Cinthya Weldon, LUIS Physical Therapist                Occupational Therapy Education     Title: PT OT SLP Therapies  (In Progress)     Topic: Occupational Therapy (In Progress)     Point: ADL training (Done)     Description:   Instruct learner(s) on proper safety adaptation and remediation techniques during self care or transfers.   Instruct in proper use of assistive devices.              Learning Progress Summary           Patient Acceptance, E,TB,D, VU,DU by  at 3/26/2023 1419    Comment: ed pt on role of OT. benefit of therapy. ed on ROM ex and POC w OT. ed on cont therapy once dc for R shoulder. pt reports ex they were doing last in therapy including raising shoulder in flexion and abd   Family Acceptance, E,TB,D, VU,DU by  at 3/26/2023 1419    Comment: ed pt on role of OT. benefit of therapy. ed on ROM ex and POC w OT. ed on cont therapy once dc for R shoulder. pt reports ex they were doing last in therapy including raising shoulder in flexion and abd                   Point: Home exercise program (Not Started)     Description:   Instruct learner(s) on appropriate technique for monitoring, assisting and/or progressing therapeutic exercises/activities.              Learner Progress:  Not documented in this visit.          Point: Precautions (Done)     Description:   Instruct learner(s) on prescribed precautions during self-care and functional transfers.              Learning Progress Summary           Patient Acceptance, E,TB,D, VU,DU by  at 3/26/2023 1419    Comment: ed pt on role of OT. benefit of therapy. ed on ROM ex and POC w OT. ed on cont therapy once dc for R shoulder. pt reports ex they were doing last in therapy including raising shoulder in flexion and abd   Family Acceptance, E,TB,D, VU,DU by  at 3/26/2023 1419    Comment: ed pt on role of OT. benefit of therapy. ed on ROM ex and POC w OT. ed on cont therapy once dc for R shoulder. pt reports ex they were doing last in therapy including raising shoulder in flexion and abd                   Point: Body mechanics (Done)     Description:   Instruct learner(s)  on proper positioning and spine alignment during self-care, functional mobility activities and/or exercises.              Learning Progress Summary           Patient Acceptance, E,TB,D, VU,DU by  at 3/26/2023 1419    Comment: ed pt on role of OT. benefit of therapy. ed on ROM ex and POC w OT. ed on cont therapy once dc for R shoulder. pt reports ex they were doing last in therapy including raising shoulder in flexion and abd   Family Acceptance, E,TB,D, VU,DU by  at 3/26/2023 1419    Comment: ed pt on role of OT. benefit of therapy. ed on ROM ex and POC w OT. ed on cont therapy once dc for R shoulder. pt reports ex they were doing last in therapy including raising shoulder in flexion and abd                               User Key     Initials Effective Dates Name Provider Type Discipline     06/16/21 -  Brenda Baez, OTR Occupational Therapist OT              OT Recommendation and Plan  Planned Therapy Interventions (OT): activity tolerance training, functional balance retraining, occupation/activity based interventions, ROM/therapeutic exercise, strengthening exercise, transfer/mobility retraining, patient/caregiver education/training, BADL retraining  Therapy Frequency (OT): 5 times/wk  Plan of Care Review  Plan of Care Reviewed With: patient, spouse  Progress: no change  Outcome Evaluation: pt admitted to hospital w dizziness and worried that she may have had a stroke so she came to be seen, pt lives w spouse at home and was independent PTA. pt reports she has decr movement in R UE from R reverse total shoulder done about 6 months ago, pt demo def sig decr in R shoulder ROM about 1/4 abd to 1/2 fl AROM. pt has good ROM in L UE. pt completed sit to stand w CGA and cane and walked in room w CGA. pt already completed ADLs. pt cont to benefit from OT to incr ADL, strength, balance, and tsf.     Time Calculation:    Time Calculation- OT     Row Name 03/26/23 6535             Time Calculation- OT    OT  Start Time 0951  -      OT Stop Time 1002 -      OT Time Calculation (min) 11 min  -      OT Received On 03/26/23  -      OT - Next Appointment 03/27/23  -      OT Goal Re-Cert Due Date 04/07/23  -         Untimed Charges    OT Eval/Re-eval Minutes 11  -KP         Total Minutes    Untimed Charges Total Minutes 11  -       Total Minutes 11 -            User Key  (r) = Recorded By, (t) = Taken By, (c) = Cosigned By    Initials Name Provider Type    Brenda Mayorga OTR Occupational Therapist              Therapy Charges for Today     Code Description Service Date Service Provider Modifiers Qty    32693058646 HC OT EVAL MOD COMPLEXITY 2 3/26/2023 Brenda Baez OTR GO 1               RA Almazan  3/26/2023

## 2023-03-26 NOTE — PLAN OF CARE
Goal Outcome Evaluation:  Plan of Care Reviewed With: patient, spouse        Progress: no change  Outcome Evaluation: pt admitted to hospital w dizziness and worried that she may have had a stroke so she came to be seen, pt lives w spouse at home and was independent PTA. pt reports she has decr movement in R UE from R reverse total shoulder done about 6 months ago, pt demo def sig decr in R shoulder ROM about 1/4 abd to 1/2 fl AROM. pt has good ROM in L UE. pt completed sit to stand w CGA and cane and walked in room w CGA. pt already completed ADLs. pt cont to benefit from OT to incr ADL, strength, balance, and tsf.

## 2023-03-26 NOTE — PROGRESS NOTES
Name: Dalila Javed ADMIT: 3/25/2023   : 1950  PCP: Cinthya Valentine APRN    MRN: [id number] LOS: 0 days   AGE/SEX: 72 y.o. female  ROOM: [facility]     Subjective   Subjective   No new complaint    Review of Systems     Objective   Objective   Vital Signs  Temp:  [97.6 °F (36.4 °C)-98.6 °F (37 °C)] 97.6 °F (36.4 °C)  Heart Rate:  [62-90] 64  Resp:  [18-28] 20  BP: (122-168)/(66-97) 145/66  SpO2:  [90 %-100 %] 93 %  on   ;   Device (Oxygen Therapy): room air  Body mass index is 34.12 kg/m².  Physical Exam  Vitals and nursing note reviewed.   Constitutional:       General: She is not in acute distress.  Cardiovascular:      Rate and Rhythm: Normal rate and regular rhythm.   Pulmonary:      Effort: Pulmonary effort is normal.      Breath sounds: Normal breath sounds.   Abdominal:      General: Bowel sounds are normal.      Palpations: Abdomen is soft.      Tenderness: There is no abdominal tenderness.   Musculoskeletal:         General: No swelling.   Skin:     General: Skin is warm and dry.   Neurological:      Mental Status: She is alert. Mental status is at baseline.            Results Review:       I reviewed the patient's new clinical results.  Results from last 7 days   Lab Units 23  0446 23  1825   WBC 10*3/mm3 12.08* 11.29*   HEMOGLOBIN g/dL 14.2 15.4   PLATELETS 10*3/mm3 164 193     Results from last 7 days   Lab Units 23  0446 23  1825   SODIUM mmol/L 142 138   POTASSIUM mmol/L 3.8 3.7   CHLORIDE mmol/L 105 96*   CO2 mmol/L 28.0 31.0*   BUN mg/dL 12 13   CREATININE mg/dL 0.89 0.95   GLUCOSE mg/dL 187* 222*   EGFR mL/min/1.73 68.1 63.0     Results from last 7 days   Lab Units 23  0446 23  1825   CALCIUM mg/dL 9.3 9.3   ALBUMIN g/dL 3.7 4.4     Results from last 7 days   Lab Units 23  0446   HDL CHOL mg/dL 28*   LDL CHOL mg/dL 74   HEMOGLOBIN A1C % 9.70*   TSH uIU/mL 1.310     Glucose   Date/Time Value Ref Range Status   2023 0611 174 (H) 70 -  130 mg/dL Final     Comment:     Meter: [id number] : [id number] Dayana FLORES   03/25/2023 2038 186 (H) 70 - 130 mg/dL Final     Comment:     Meter: [id number] : [id number] Dayana FLORES   03/25/2023 1811 230 (H) 70 - 130 mg/dL Final     Comment:     RN Notified R and V Meter: [id number] : [id number] Ariannearpita FLORES         Results for orders placed during the hospital encounter of 12/27/21    Adult Transthoracic Echo Complete W/ Cont if Necessary Per Protocol (With Agitated Saline)    Interpretation Summary  · Estimated right ventricular systolic pressure from tricuspid regurgitation is normal (<35 mmHg).  · Estimated left ventricular EF = 65% Left ventricular systolic function is normal.  · Left ventricular diastolic function was normal.  · Saline test results are negative.      I have personally reviewed all medications:  Scheduled Medications  apixaban, 5 mg, Oral, Q12H  ARIPiprazole, 2 mg, Oral, Q PM  aspirin, 325 mg, Oral, Daily   Or  aspirin, 300 mg, Rectal, Daily  atorvastatin, 80 mg, Oral, Nightly  budesonide-formoterol, 2 puff, Inhalation, BID - RT  carbidopa-levodopa, 1 tablet, Oral, TID  cholecalciferol, 5,000 Units, Oral, Daily  citalopram, 20 mg, Oral, Daily  furosemide, 40 mg, Oral, QAM  insulin lispro, 0-9 Units, Subcutaneous, TID With Meals  metoprolol tartrate, 50 mg, Oral, BID  mirtazapine, 7.5 mg, Oral, Nightly  pantoprazole, 40 mg, Oral, Q AM  potassium chloride, 20 mEq, Oral, BID  vitamin B-12, 50 mcg, Oral, Daily    Infusions  sodium chloride, 125 mL/hr, Last Rate: 125 mL/hr (03/25/23 1853)  sodium chloride, 75 mL/hr, Last Rate: 75 mL/hr (03/25/23 2259)    Diet  Diet: Diabetic Diets; Consistent Carbohydrate; Texture: Regular Texture (IDDSI 7); Fluid Consistency: Thin (IDDSI 0)    I have personally reviewed:  [x]  Laboratory   [x]  Microbiology   [x]  Radiology   [x]  EKG/Telemetry  [x]  Cardiology/Vascular   []  Pathology    []  Records         Assessment/Plan     Active  Hospital Problems    Diagnosis  POA   • **Dizziness [R42]  Yes   • Obesity (BMI 30-39.9) [E66.9]  Unknown   • Parkinson disease (HCC) [G20]  Yes   • DODSON (nonalcoholic steatohepatitis) [K75.81]  Yes   • Coronary arteriosclerosis in native artery [I25.10]  Yes   • Controlled diabetes mellitus type 2 with complications (HCC) [E11.8]  Yes   • Dyslipidemia [E78.5]  Yes   • Obstructive sleep apnea syndrome [G47.33]  Yes      Resolved Hospital Problems   No resolved problems to display.       Patient admitted because of dizziness and concern for stroke given her history.  Stroke work-up ordered including MRI brain.  Patient requesting IV Valium before procedure.  Neurology following.  Will defer management of antiplatelets/anticoagulation to them.  Monitor blood glucose.  Hold Lasix/KCl while on IV fluids      · Eliquis (home med) for DVT prophylaxis.  · Full code.  · Discussed with patient and family.  · Anticipate discharge home with family timing yet to be determined.      Brett Harman MD  [facility]  03/26/23  08:53 EDT

## 2023-03-26 NOTE — THERAPY EVALUATION
Patient Name: Dalila Javed  : 1950    MRN: [id number]                              Today's Date: 3/26/2023       Admit Date: 3/25/2023    Visit Dx:     ICD-10-CM ICD-9-CM   1. Dizziness  R42 780.4   2. COPD exacerbation ([facility])  J44.1 491.21   3. Coagulopathy ([facility]): Eliquis induced  D68.9 286.9   4. Right hemiparesis ([facility]) chronic from residual stroke  G81.91 342.90   5. Atrial fibrillation, unspecified type ([facility])  I48.91 427.31   6. Parkinson's disease ([facility])  G20 332.0     Patient Active Problem List   Diagnosis   • Adjustment disorder with mixed anxiety and depressed mood   • Atopic rhinitis   • Coronary arteriosclerosis in native artery   • Cobalamin deficiency   • Benign colonic polyp   • Lumbar radiculopathy   • Controlled diabetes mellitus type 2 with complications ([facility])   • Binocular vision disorder with diplopia   • Dyslipidemia   • Arthropathy of hand   • Knee pain   • Cramps of lower extremity   • Low back pain   • Chronic nausea   • Obstructive sleep apnea syndrome   • Palpitations   • Ventricular premature beats   • Cephalalgia   • Colonic constipation   • Neurocardiogenic syncope   • Vitamin D deficiency   • DODSON (nonalcoholic steatohepatitis)   • Fibromyalgia   • Morbidly obese ([facility])   • Polyp of colon   • Family history of colonic polyps   • Family history of malignant neoplasm of colon   • Acute CVA (cerebrovascular accident) ([facility])   • Episode of dizziness   • Dizziness   • History of stroke   • Chronic right shoulder pain   • Encephalopathy, metabolic   • Migraine without aura or status migrainosus   • Parkinson disease ([facility])   • Hypokalemia   • Benign paroxysmal positional vertigo   • Chronic obstructive pulmonary disease ([facility])   • Gastroesophageal reflux disease   • History of percutaneous transluminal coronary angioplasty   • Hypercholesterolemia   • Hypertensive disorder   • Myocardial infarction ([facility])   • Occipital neuralgia   • Patent foramen ovale   • Transient cerebral  ischemia   • Arm pain, anterior, right   • TIA (transient ischemic attack)   • Pathological fracture of vertebra due to osteoporosis with delayed healing   • Compression fracture of L1 vertebra with delayed healing   • Status post reverse arthroplasty of right shoulder     Past Medical History:   Diagnosis Date   • Abnormal weight gain    • Acquired trigger finger    • Acute myocardial infarction (HCC)    • Adjustment reaction with mixed emotional features    • Arthritis    • ASHD (arteriosclerotic heart disease)    • Asthma    • B12 deficiency    • Bacterial pneumonia    • Williamson esophagus    • Benign colonic polyp    • Bilateral knee pain    • Birthmark     haemangioma of the bone   • Bronchiectasis ([facility])    • CHF (congestive heart failure) ([facility])    • Chronic cough    • Chronic glomerulonephritis with exudative nephritis    • Chronic lumbar radiculopathy    • Diabetes mellitus ([facility])    • Diabetic peripheral neuropathy ([facility])    • Dyslipidemia    • Fibromyositis    • Fracture, humerus 2022    RIGHT   • GERD (gastroesophageal reflux disease)    • Herpes zoster    • Hyperlipidemia    • Hypertension    • Lupus anticoagulant disorder ([facility])    • Mycobacterium avium complex ([facility])    • Nephrolithiasis    • SILVIANO (obstructive sleep apnea)     DOESN'T USE HER MACHINE   • Palpitations    • Premature ventricular contraction    • Slow transit constipation    • Stroke ([facility]) 2020    RT SIDED WEAKNESS   • Vitamin D deficiency      Past Surgical History:   Procedure Laterality Date   • APPENDECTOMY     • BRONCHOSCOPY     •  SECTION     • CHOLECYSTECTOMY     • COLONOSCOPY     • COLONOSCOPY N/A 2019    Procedure: COLONOSCOPY to cecum with cold biopsy and cold and hotsnare polypectomies;  Surgeon: Suzy Eubanks MD;  Location: [facility] ENDOSCOPY;  Service: Gastroenterology   • CORONARY ANGIOPLASTY WITH STENT PLACEMENT     • EMBOLECTOMY N/A 2020    Procedure: EMERGENT CEREBRAL ANGIOGRAM;  Surgeon: Shefali  "MD Jonh;  Location: [facility] HYBRID OR 18/19;  Service: Neurosurgery;  Laterality: N/A;   • ENDOSCOPY N/A 11/5/2019    Procedure: ESOPHAGOGASTRODUODENOSCOPY with cold biopsies;  Surgeon: Suzy Eubanks MD;  Location: [facility] ENDOSCOPY;  Service: Gastroenterology   • KNEE ARTHROSCOPY     • OTHER SURGICAL HISTORY      elbow surgery   • ROTATOR CUFF REPAIR     • TOTAL SHOULDER ARTHROPLASTY W/ DISTAL CLAVICLE EXCISION Right 7/15/2022    Procedure: TOTAL SHOULDER REVERSE ARTHROPLASTY;  Surgeon: Dk Mckeon II, MD;  Location: [facility] OR [facility] [place];  Service: Orthopedics;  Laterality: Right;   • TUBAL ABDOMINAL LIGATION        General Information     Row Name 03/26/23 0937          Physical Therapy Time and Intention    Document Type evaluation  -EL     Mode of Treatment individual therapy;physical therapy  -EL     Row Name 03/26/23 0937          General Information    Patient Profile Reviewed yes  -EL     Prior Level of Function independent:;all household mobility;ADL's  -EL     Existing Precautions/Restrictions fall  -EL     Row Name 03/26/23 0937          Living Environment    People in Home spouse  -EL     Row Name 03/26/23 0937          Home Main Entrance    Number of Stairs, Main Entrance three;four  -EL     Stair Railings, Main Entrance railings safe and in good condition  -EL     Row Name 03/26/23 0937          Stairs Within Home, Primary    Stairs, Within Home, Primary Has basement and attic but \"doesn't need to go in there\" per patient report  -EL     Number of Stairs, Within Home, Primary none  -EL     Row Name 03/26/23 0937          Cognition    Orientation Status (Cognition) oriented x 4  -EL     Row Name 03/26/23 0937          Safety Issues, Functional Mobility    Impairments Affecting Function (Mobility) endurance/activity tolerance;strength;pain  -EL           User Key  (r) = Recorded By, (t) = Taken By, (c) = Cosigned By    Initials Name Provider Type    Cinthya Weldon PT Physical " Therapist               Mobility     Row Name 03/26/23 0938          Bed Mobility    Bed Mobility supine-sit;sit-supine  -EL     Supine-Sit [place] (Bed Mobility) supervision  -EL     Sit-Supine [place] (Bed Mobility) supervision  -EL     Assistive Device (Bed Mobility) bed rails;head of bed elevated  -     Row Name 03/26/23 0938          Bed-Chair Transfer    Bed-Chair [place] (Transfers) not tested  -     Row Name 03/26/23 0938          Sit-Stand Transfer    Sit-Stand [place] (Transfers) supervision  -EL     Assistive Device (Sit-Stand Transfers) cane, straight  -     Row Name 03/26/23 0938          Gait/Stairs (Locomotion)    [place] Level (Gait) contact guard  -     Assistive Device (Gait) cane, straight  -EL     Distance in Feet (Gait) 60  -EL     Deviations/Abnormal Patterns (Gait) jose decreased;gait speed decreased;stride length decreased  -EL           User Key  (r) = Recorded By, (t) = Taken By, (c) = Cosigned By    Initials Name Provider Type    EL Cinthya Hoover, PT Physical Therapist               Obj/Interventions     Row Name 03/26/23 0938          Range of Motion Comprehensive    General Range of Motion bilateral lower extremity ROM WFL  -[facility] Name 03/26/23 0938          Strength Comprehensive (MMT)    General Manual Muscle Testing (MMT) Assessment lower extremity strength deficits identified  -EL     Comment, General Manual Muscle Testing (MMT) Assessment Generalized weakness BLEs grossly 4-/5  -     Row Name 03/26/23 0938          Motor Skills    Motor Skills functional endurance  -EL     Functional Endurance fair  -[facility] Name 03/26/23 0938          Balance    Balance Assessment sitting static balance;sitting dynamic balance;standing static balance;standing dynamic balance  -EL     Static Sitting Balance standby assist  -EL     Dynamic Sitting Balance standby assist  -EL     Position, Sitting Balance sitting edge of bed;unsupported  -EL     Static  Standing Balance contact guard  -EL     Dynamic Standing Balance contact guard  -EL     Position/Device Used, Standing Balance cane, straight  -EL           User Key  (r) = Recorded By, (t) = Taken By, (c) = Cosigned By    Initials Name Provider Type    Cinthya Weldon PT Physical Therapist               Goals/Plan     Row Name 03/26/23 0942          Bed Mobility Goal 1 (PT)    Activity/Assistive Device (Bed Mobility Goal 1, PT) sit to supine;supine to sit  -EL     [place] Level/Cues Needed (Bed Mobility Goal 1, PT) modified independence  -EL     Time Frame (Bed Mobility Goal 1, PT) 1 week  -EL     Row Name 03/26/23 0942          Transfer Goal 1 (PT)    Activity/Assistive Device (Transfer Goal 1, PT) bed-to-chair/chair-to-bed;sit-to-stand/stand-to-sit;cane, straight  -EL     [place] Level/Cues Needed (Transfer Goal 1, PT) standby assist  -EL     Time Frame (Transfer Goal 1, PT) 1 week  -EL     Row Name 03/26/23 0942          Gait Training Goal 1 (PT)    Activity/Assistive Device (Gait Training Goal 1, PT) increase endurance/gait distance;cane, straight;decrease fall risk  -EL     [place] Level (Gait Training Goal 1, PT) standby assist  -EL     Distance (Gait Training Goal 1, PT) 150  -EL     Time Frame (Gait Training Goal 1, PT) 1 week  -EL           User Key  (r) = Recorded By, (t) = Taken By, (c) = Cosigned By    Initials Name Provider Type    Cinthya Weldon PT Physical Therapist               Clinical Impression     Row Name 03/26/23 0939          Pain    Pretreatment Pain Rating 8/10  -EL     Posttreatment Pain Rating 8/10  -EL     Pain Location - Side/Orientation Right  -EL     Pain Location - shoulder  -EL     Pain Intervention(s) Repositioned  -EL     Row Name 03/26/23 0939          Plan of Care Review    Plan of Care Reviewed With patient  -EL     Outcome Evaluation The patient is a 72 year old female admitted to the hospital with new onset dizziness and COPD exacerbation. [facility]  "includes DM, fibromyalgia, obesity, CVA, MI, and recent R reverse TSA. Prior to admission, she was independent with all ADLs and primarily used a straight cane for mobility. Upon evalation today, she requires standby assist for bed mobility and is able to ambulate 60 ft with straight cane and CGA. She exhibits limitations in LE strength and endurance impacting functional mobility. She would benefit from additional skilled physical therapy to address the stated deficits and return to her previous level of function.  -EL     Row Name 03/26/23 0939          Therapy Assessment/Plan (PT)    Patient/Family Therapy Goals Statement (PT) \"Get stronger and go home.\"  -EL     Rehab Potential (PT) good, to achieve stated therapy goals  -EL     Criteria for Skilled Interventions Met (PT) yes;meets criteria;skilled treatment is necessary  -EL     Therapy Frequency (PT) 6 times/wk  -EL     Row Name 03/26/23 0939          Positioning and Restraints    Pre-Treatment Position in bed  -EL     Post Treatment Position bed  -EL     In Bed supine;exit alarm on;encouraged to call for assist;call light within reach  -EL           User Key  (r) = Recorded By, (t) = Taken By, (c) = Cosigned By    Initials Name Provider Type    EL Cinthya Hoover, PT Physical Therapist               Outcome Measures     Row Name 03/26/23 0942          How much help from another person do you currently need...    Turning from your back to your side while in flat bed without using bedrails? 4  -EL     Moving from lying on back to sitting on the side of a flat bed without bedrails? 3  -EL     Moving to and from a bed to a chair (including a wheelchair)? 3  -EL     Standing up from a chair using your arms (e.g., wheelchair, bedside chair)? 3  -EL     Climbing 3-5 steps with a railing? 3  -EL     To walk in hospital room? 3  -EL     AM-PAC 6 Clicks Score (PT) 19  -EL     Highest level of mobility 6 --> Walked 10 steps or more  -EL     Row Name 03/26/23 0942       "    Functional Assessment    Outcome Measure Options AM-PAC 6 Clicks Basic Mobility (PT)  -EL           User Key  (r) = Recorded By, (t) = Taken By, (c) = Cosigned By    Initials Name Provider Type    Cinthya Weldon PT Physical Therapist                             Physical Therapy Education     Title: PT OT SLP Therapies (In Progress)     Topic: Physical Therapy (In Progress)     Point: Mobility training (Done)     Learning Progress Summary           Patient Acceptance, E, VU by EL at 3/26/2023 0943                   Point: Home exercise program (Not Started)     Learner Progress:  Not documented in this visit.          Point: Body mechanics (Done)     Learning Progress Summary           Patient Acceptance, E, VU by EL at 3/26/2023 0943                   Point: Precautions (Done)     Learning Progress Summary           Patient Acceptance, E, VU by EL at 3/26/2023 0943                               User Key     Initials Effective Dates Name Provider Type [facility] 11/16/21 -  Cinthya Hoover PT Physical Therapist PT              PT Recommendation and Plan     Plan of Care Reviewed With: patient  Outcome Evaluation: The patient is a 72 year old female admitted to the hospital with new onset dizziness and COPD exacerbation. PMH includes DM, fibromyalgia, obesity, CVA, MI, and recent R reverse TSA. Prior to admission, she was independent with all ADLs and primarily used a straight cane for mobility. Upon evalation today, she requires standby assist for bed mobility and is able to ambulate 60 ft with straight cane and CGA. She exhibits limitations in LE strength and endurance impacting functional mobility. She would benefit from additional skilled physical therapy to address the stated deficits and return to her previous level of function.     Time Calculation:    PT Charges     Row Name 03/26/23 0944             Time Calculation    Start Time 0850  -EL      Stop Time 0901  -EL      Time Calculation (min)  11 min  -EL      PT Received On 03/26/23  -EL      PT - Next Appointment 03/27/23  -EL      PT Goal Re-Cert Due Date 04/02/23  -EL         Time Calculation- PT    Total Timed Code Minutes- PT 8 minute(s)  -EL            User Key  (r) = Recorded By, (t) = Taken By, (c) = Cosigned By    Initials Name Provider Type    Cinthya Weldon, PT Physical Therapist              Therapy Charges for Today     Code Description Service Date Service Provider Modifiers Qty    07813969064 HC PT EVAL LOW COMPLEXITY 1 3/26/2023 Cinthya Hoover, PT GP 1    85332725747 HC PT THERAPEUTIC ACT EA 15 MIN 3/26/2023 Cinthya Hoover, PT GP 1          PT G-Codes  Outcome Measure Options: AM-PAC 6 Clicks Basic Mobility (PT)  AM-PAC 6 Clicks Score (PT): 19  PT Discharge Summary  Anticipated Discharge Disposition (PT): home with home health, home with assist    Cinthya Hoover, PT  3/26/2023

## 2023-03-27 ENCOUNTER — APPOINTMENT (OUTPATIENT)
Dept: CARDIOLOGY | Facility: HOSPITAL | Age: 73
End: 2023-03-27
Payer: MEDICARE

## 2023-03-27 ENCOUNTER — APPOINTMENT (OUTPATIENT)
Dept: MRI IMAGING | Facility: HOSPITAL | Age: 73
End: 2023-03-27
Payer: MEDICARE

## 2023-03-27 LAB
ANION GAP SERPL CALCULATED.3IONS-SCNC: 7.6 MMOL/L (ref 5–15)
AORTIC DIMENSIONLESS INDEX: 1 (DI)
BH CV ECHO MEAS - ACS: 1.92 CM
BH CV ECHO MEAS - AO MAX PG: 6.7 MMHG
BH CV ECHO MEAS - AO MEAN PG: 3.1 MMHG
BH CV ECHO MEAS - AO V2 MAX: 129 CM/SEC
BH CV ECHO MEAS - AO V2 VTI: 24.8 CM
BH CV ECHO MEAS - AVA(I,D): 2.7 CM2
BH CV ECHO MEAS - EDV(CUBED): 77 ML
BH CV ECHO MEAS - EDV(MOD-SP2): 75 ML
BH CV ECHO MEAS - EDV(MOD-SP4): 75 ML
BH CV ECHO MEAS - EF(MOD-BP): 68.5 %
BH CV ECHO MEAS - EF(MOD-SP2): 72 %
BH CV ECHO MEAS - EF(MOD-SP4): 64 %
BH CV ECHO MEAS - ESV(CUBED): 21.4 ML
BH CV ECHO MEAS - ESV(MOD-SP2): 21 ML
BH CV ECHO MEAS - ESV(MOD-SP4): 27 ML
BH CV ECHO MEAS - FS: 34.8 %
BH CV ECHO MEAS - IVS/LVPW: 1.12 CM
BH CV ECHO MEAS - IVSD: 1.1 CM
BH CV ECHO MEAS - LAT PEAK E' VEL: 9.5 CM/SEC
BH CV ECHO MEAS - LV DIASTOLIC VOL/BSA (35-75): 41.5 CM2
BH CV ECHO MEAS - LV MASS(C)D: 149 GRAMS
BH CV ECHO MEAS - LV MAX PG: 7.7 MMHG
BH CV ECHO MEAS - LV MEAN PG: 2.8 MMHG
BH CV ECHO MEAS - LV SYSTOLIC VOL/BSA (12-30): 14.9 CM2
BH CV ECHO MEAS - LV V1 MAX: 138.3 CM/SEC
BH CV ECHO MEAS - LV V1 VTI: 24.8 CM
BH CV ECHO MEAS - LVIDD: 4.3 CM
BH CV ECHO MEAS - LVIDS: 2.8 CM
BH CV ECHO MEAS - LVOT AREA: 2.7 CM2
BH CV ECHO MEAS - LVOT DIAM: 1.85 CM
BH CV ECHO MEAS - LVPWD: 0.99 CM
BH CV ECHO MEAS - MED PEAK E' VEL: 9.1 CM/SEC
BH CV ECHO MEAS - MV A DUR: 0.1 SEC
BH CV ECHO MEAS - MV A MAX VEL: 68.6 CM/SEC
BH CV ECHO MEAS - MV DEC SLOPE: 352.5 CM/SEC2
BH CV ECHO MEAS - MV DEC TIME: 0.18 MSEC
BH CV ECHO MEAS - MV E MAX VEL: 82.7 CM/SEC
BH CV ECHO MEAS - MV E/A: 1.21
BH CV ECHO MEAS - MV MAX PG: 3.6 MMHG
BH CV ECHO MEAS - MV MEAN PG: 1.33 MMHG
BH CV ECHO MEAS - MV P1/2T: 79 MSEC
BH CV ECHO MEAS - MV V2 VTI: 27.6 CM
BH CV ECHO MEAS - MVA(P1/2T): 2.8 CM2
BH CV ECHO MEAS - MVA(VTI): 2.41 CM2
BH CV ECHO MEAS - PA ACC TIME: 0.12 SEC
BH CV ECHO MEAS - PA PR(ACCEL): 26.7 MMHG
BH CV ECHO MEAS - PA V2 MAX: 78.5 CM/SEC
BH CV ECHO MEAS - PULM A REVS VEL: 31.4 CM/SEC
BH CV ECHO MEAS - PULM DIAS VEL: 50.5 CM/SEC
BH CV ECHO MEAS - PULM S/D: 1.37
BH CV ECHO MEAS - PULM SYS VEL: 69 CM/SEC
BH CV ECHO MEAS - RV MAX PG: 2.2 MMHG
BH CV ECHO MEAS - RV V1 MAX: 74.1 CM/SEC
BH CV ECHO MEAS - RV V1 VTI: 16.8 CM
BH CV ECHO MEAS - SI(MOD-SP2): 29.9 ML/M2
BH CV ECHO MEAS - SI(MOD-SP4): 26.6 ML/M2
BH CV ECHO MEAS - SV(LVOT): 66.7 ML
BH CV ECHO MEAS - SV(MOD-SP2): 54 ML
BH CV ECHO MEAS - SV(MOD-SP4): 48 ML
BH CV ECHO MEAS - TR MAX PG: 19.2 MMHG
BH CV ECHO MEAS - TR MAX VEL: 219.1 CM/SEC
BH CV ECHO MEASUREMENTS AVERAGE E/E' RATIO: 8.89
BH CV ECHO SHUNT ASSESSMENT PERFORMED (HIDDEN SCRIPTING): 1
BH CV XLRA - RV BASE: 3.7 CM
BH CV XLRA - RV LENGTH: 6.2 CM
BH CV XLRA - RV MID: 2.06 CM
BH CV XLRA - TDI S': 11.7 CM/SEC
BUN SERPL-MCNC: 9 MG/DL (ref 8–23)
BUN/CREAT SERPL: 11.4 (ref 7–25)
CALCIUM SPEC-SCNC: 8.5 MG/DL (ref 8.6–10.5)
CHLORIDE SERPL-SCNC: 102 MMOL/L (ref 98–107)
CO2 SERPL-SCNC: 23.4 MMOL/L (ref 22–29)
CREAT SERPL-MCNC: 0.79 MG/DL (ref 0.57–1)
DEPRECATED RDW RBC AUTO: 39.6 FL (ref 37–54)
EGFRCR SERPLBLD CKD-EPI 2021: 78.6 ML/MIN/1.73
ERYTHROCYTE [DISTWIDTH] IN BLOOD BY AUTOMATED COUNT: 11.9 % (ref 12.3–15.4)
GLUCOSE BLDC GLUCOMTR-MCNC: 175 MG/DL (ref 70–130)
GLUCOSE BLDC GLUCOMTR-MCNC: 189 MG/DL (ref 70–130)
GLUCOSE BLDC GLUCOMTR-MCNC: 241 MG/DL (ref 70–130)
GLUCOSE BLDC GLUCOMTR-MCNC: 247 MG/DL (ref 70–130)
GLUCOSE SERPL-MCNC: 171 MG/DL (ref 65–99)
HCT VFR BLD AUTO: 40.4 % (ref 34–46.6)
HGB BLD-MCNC: 13.7 G/DL (ref 12–15.9)
LEFT ATRIUM VOLUME INDEX: 32.2 ML/M2
MAXIMAL PREDICTED HEART RATE: 146 BPM
MCH RBC QN AUTO: 30.4 PG (ref 26.6–33)
MCHC RBC AUTO-ENTMCNC: 33.9 G/DL (ref 31.5–35.7)
MCV RBC AUTO: 89.6 FL (ref 79–97)
PLATELET # BLD AUTO: 161 10*3/MM3 (ref 140–450)
PMV BLD AUTO: 10.2 FL (ref 6–12)
POTASSIUM SERPL-SCNC: 3.7 MMOL/L (ref 3.5–5.2)
RBC # BLD AUTO: 4.51 10*6/MM3 (ref 3.77–5.28)
SODIUM SERPL-SCNC: 133 MMOL/L (ref 136–145)
STRESS TARGET HR: 124 BPM
WBC NRBC COR # BLD: 10.01 10*3/MM3 (ref 3.4–10.8)

## 2023-03-27 PROCEDURE — 82962 GLUCOSE BLOOD TEST: CPT

## 2023-03-27 PROCEDURE — G0378 HOSPITAL OBSERVATION PER HR: HCPCS

## 2023-03-27 PROCEDURE — 63710000001 INSULIN LISPRO (HUMAN) PER 5 UNITS: Performed by: INTERNAL MEDICINE

## 2023-03-27 PROCEDURE — 99233 SBSQ HOSP IP/OBS HIGH 50: CPT | Performed by: NURSE PRACTITIONER

## 2023-03-27 PROCEDURE — 94799 UNLISTED PULMONARY SVC/PX: CPT

## 2023-03-27 PROCEDURE — 70551 MRI BRAIN STEM W/O DYE: CPT

## 2023-03-27 PROCEDURE — 93306 TTE W/DOPPLER COMPLETE: CPT

## 2023-03-27 PROCEDURE — 85027 COMPLETE CBC AUTOMATED: CPT | Performed by: HOSPITALIST

## 2023-03-27 PROCEDURE — 93306 TTE W/DOPPLER COMPLETE: CPT | Performed by: INTERNAL MEDICINE

## 2023-03-27 PROCEDURE — 96361 HYDRATE IV INFUSION ADD-ON: CPT

## 2023-03-27 PROCEDURE — 96375 TX/PRO/DX INJ NEW DRUG ADDON: CPT

## 2023-03-27 PROCEDURE — 80048 BASIC METABOLIC PNL TOTAL CA: CPT | Performed by: HOSPITALIST

## 2023-03-27 PROCEDURE — 25010000002 DIAZEPAM PER 5 MG: Performed by: HOSPITALIST

## 2023-03-27 RX ORDER — DIAZEPAM 5 MG/ML
5 INJECTION, SOLUTION INTRAMUSCULAR; INTRAVENOUS ONCE
Status: DISCONTINUED | OUTPATIENT
Start: 2023-03-27 | End: 2023-03-28 | Stop reason: HOSPADM

## 2023-03-27 RX ORDER — HYDROCODONE BITARTRATE AND ACETAMINOPHEN 5; 325 MG/1; MG/1
1 TABLET ORAL 4 TIMES DAILY
Status: DISCONTINUED | OUTPATIENT
Start: 2023-03-27 | End: 2023-03-28 | Stop reason: HOSPADM

## 2023-03-27 RX ADMIN — INSULIN LISPRO 4 UNITS: 100 INJECTION, SOLUTION INTRAVENOUS; SUBCUTANEOUS at 13:58

## 2023-03-27 RX ADMIN — HYDROCODONE BITARTRATE AND ACETAMINOPHEN 1 TABLET: 5; 325 TABLET ORAL at 21:06

## 2023-03-27 RX ADMIN — HYDROCODONE BITARTRATE AND ACETAMINOPHEN 1 TABLET: 5; 325 TABLET ORAL at 17:48

## 2023-03-27 RX ADMIN — ASPIRIN 325 MG: 325 TABLET ORAL at 09:18

## 2023-03-27 RX ADMIN — INSULIN LISPRO 2 UNITS: 100 INJECTION, SOLUTION INTRAVENOUS; SUBCUTANEOUS at 09:29

## 2023-03-27 RX ADMIN — MIRTAZAPINE 7.5 MG: 15 TABLET, FILM COATED ORAL at 21:06

## 2023-03-27 RX ADMIN — APIXABAN 5 MG: 5 TABLET, FILM COATED ORAL at 09:17

## 2023-03-27 RX ADMIN — INSULIN LISPRO 4 UNITS: 100 INJECTION, SOLUTION INTRAVENOUS; SUBCUTANEOUS at 17:48

## 2023-03-27 RX ADMIN — PANTOPRAZOLE SODIUM 40 MG: 40 TABLET, DELAYED RELEASE ORAL at 06:18

## 2023-03-27 RX ADMIN — FLUTICASONE PROPIONATE 2 SPRAY: 50 SPRAY, METERED NASAL at 09:19

## 2023-03-27 RX ADMIN — HYDROCODONE BITARTRATE AND ACETAMINOPHEN 1 TABLET: 5; 325 TABLET ORAL at 01:17

## 2023-03-27 RX ADMIN — HYDROCODONE BITARTRATE AND ACETAMINOPHEN 1 TABLET: 5; 325 TABLET ORAL at 09:30

## 2023-03-27 RX ADMIN — SODIUM CHLORIDE 75 ML/HR: 9 INJECTION, SOLUTION INTRAVENOUS at 01:19

## 2023-03-27 RX ADMIN — DIAZEPAM 5 MG: 5 INJECTION, SOLUTION INTRAMUSCULAR; INTRAVENOUS at 13:55

## 2023-03-27 RX ADMIN — BUDESONIDE AND FORMOTEROL FUMARATE DIHYDRATE 2 PUFF: 160; 4.5 AEROSOL RESPIRATORY (INHALATION) at 10:36

## 2023-03-27 RX ADMIN — ATORVASTATIN CALCIUM 80 MG: 80 TABLET, FILM COATED ORAL at 21:06

## 2023-03-27 RX ADMIN — ACETAMINOPHEN 650 MG: 325 TABLET, FILM COATED ORAL at 06:20

## 2023-03-27 RX ADMIN — METOPROLOL TARTRATE 50 MG: 50 TABLET, FILM COATED ORAL at 09:17

## 2023-03-27 RX ADMIN — CITALOPRAM 20 MG: 20 TABLET, FILM COATED ORAL at 09:17

## 2023-03-27 RX ADMIN — BUDESONIDE AND FORMOTEROL FUMARATE DIHYDRATE 2 PUFF: 160; 4.5 AEROSOL RESPIRATORY (INHALATION) at 20:13

## 2023-03-27 RX ADMIN — ARIPIPRAZOLE 2 MG: 2 TABLET ORAL at 17:48

## 2023-03-27 RX ADMIN — Medication 5000 UNITS: at 09:17

## 2023-03-27 RX ADMIN — APIXABAN 5 MG: 5 TABLET, FILM COATED ORAL at 21:06

## 2023-03-27 RX ADMIN — METOPROLOL TARTRATE 50 MG: 50 TABLET, FILM COATED ORAL at 21:06

## 2023-03-27 RX ADMIN — Medication 50 MCG: at 09:19

## 2023-03-27 NOTE — PLAN OF CARE
Goal Outcome Evaluation:              Outcome Evaluation: Patient passed swallow screen. RN reports she is tolerating diet. ST to sign off at this time. Re-consult as indicated.

## 2023-03-27 NOTE — DISCHARGE SUMMARY
Patient Name: Dalila Javed  : 1950  MRN: [id number]    Date of Admission: 3/25/2023  Date of Discharge:  3/28/2023  Primary Care Physician: Cinthya Valentine APRN      Chief Complaint:   Dizziness and Gait Problem      Discharge Diagnoses     Active Hospital Problems    Diagnosis  POA   • **Peripheral vertigo [H81.399]  Yes   • Obesity (BMI 30-39.9) [E66.9]  Unknown   • Parkinson disease (HCC) [G20]  Yes   • DODSON (nonalcoholic steatohepatitis) [K75.81]  Yes   • Coronary arteriosclerosis in native artery [I25.10]  Yes   • Controlled diabetes mellitus type 2 with complications (HCC) [E11.8]  Yes   • Dyslipidemia [E78.5]  Yes   • Obstructive sleep apnea syndrome [G47.33]  Yes      Resolved Hospital Problems   No resolved problems to display.        Hospital Course     Ms. Javed is a 72-year-old right-handed female with history of left PCA stroke in 2020, parkinsonism, migraine, questionable seizure, (not treated with AEDs), HTN, HLD, CAD, B12 deficiency, Williamson's esophagus, DM, diabetic neuropathy, SILVIANO, lupus anticoagulant, PFO, and A-fib, on Eliquis 5 mg twice daily and Crestor 20 mg daily prior to arrival, who presented 3/25/2023 with complaints of dizziness described as room spinning with associated difficulty walking and dull headache.  She was seen by neurology and underwent extensive work-up as outlined below.  Eventually MRI was done that was negative for any acute infarcts.  Neurology recommends continuing Eliquis.  No additional antiplatelets indicated at present.  They changed her statin to full dose Lipitor 80 mg.  They recommended better control of her diabetes.  Hemoglobin A1c 9.7%.  She will follow-up with her PCP and/or endocrinologist.  Suspected diagnosis peripheral vertigo.  If symptoms recur recommend outpatient PT referral for vestibular evaluation/treatment.      Day of Discharge     Subjective:  Feels better agreeable to go home.    Physical Exam:  Temp:   [98.1 °F (36.7 °C)-98.7 °F (37.1 °C)] 98.1 °F (36.7 °C)  Heart Rate:  [54-74] 54  Resp:  [18] 18  BP: (103-157)/(65-82) 103/82  Body mass index is 34.01 kg/m².  Physical Exam  Vitals and nursing note reviewed.   Constitutional:       General: She is not in acute distress.  Cardiovascular:      Rate and Rhythm: Normal rate and regular rhythm.   Pulmonary:      Effort: Pulmonary effort is normal.      Breath sounds: Normal breath sounds.   Abdominal:      General: Bowel sounds are normal.      Palpations: Abdomen is soft.      Tenderness: There is no abdominal tenderness.   Musculoskeletal:         General: No swelling.   Skin:     General: Skin is warm and dry.   Neurological:      Mental Status: She is alert. Mental status is at baseline.         Consultants     Consult Orders (all) (From admission, onward)     Start     Ordered    03/25/23 1918  Consult to Case Management   Once        Provider:  (Not yet assigned)    03/25/23 1918 03/25/23 1918  Consult to Diabetes Educator  Once        Provider:  (Not yet assigned)    03/25/23 1918 03/25/23 1918  Inpatient Neurology Consult Stroke  Once        Specialty:  Neurology  Provider:  James Zamora MD    03/25/23 1918              Procedures     Imaging Results (All)     Procedure Component Value Units Date/Time    CT Angiogram Head w AI Analysis of LVO [[id number]] Collected: 03/25/23 1906     Updated: 03/27/23 0932    Narrative:      CT ANGIOGRAM HEAD AND NECK WITH IV CONTRAST, CT CEREBRAL PERFUSION WITH  AND WITHOUT CONTRAST.     HISTORY: Dizziness, gait problem.     TECHNIQUE: Head CT was obtained without IV contrast followed by  intravenous contrast administration and CT angiogram of the head and  neck with IV contrast and this data was reconstructed in coronal and  sagittal planes and 3-dimensional volume rendering was performed. CT  cerebral perfusion imaging was obtained with and without IV contrast as  well as use of rapid  software. AI analysis of LVO was utilized.  Radiation dose reduction techniques were utilized, including automated  exposure control and exposure modulation based on body size.     COMPARISON: CT angiogram head and neck 12/28/2021.     FINDINGS: Noncontrast exam demonstrates no abnormal areas of increased  attenuation intraaxially to suggest hemorrhage. No extra-axial fluid  collection is observed. Within the posterior medial-inferior left  occipital lobe there is a chronic infarction that exhibits no change in  size or configuration when compared to the prior examination in 12/2021.  There is mild atrophy associated with ventricular enlargement. There is  no evidence for cerebral edema or mass effect or shift of midline  structures. CT cerebral perfusion imaging demonstrates 0 mL where there  is cerebral blood flow less than 30% and 0 mL where there is T MAX  greater than 6 seconds.     Great vessel origins appear patent. There is mild atherosclerotic  disease involving both proximal internal carotid arteries with 0% NASCET  stenosis by NASCET criteria. There are also atherosclerotic  calcifications involving the cavernous segment of both internal carotid  arteries with mild narrowing. The distal cervical, petrous, cavernous,  supracavernous internal carotid arteries are patent. Both middle  cerebral arteries and anterior cerebral arteries and major branches are  patent. The left vertebral artery arises from the left subclavian artery  just distal to its origin. Both cervical vertebral arteries are patent.  The intracranial segments of both vertebral arteries and the basilar  artery and both posterior cerebral arteries are patent. Postcontrast  enhanced head CT demonstrates no abnormal intracranial enhancement.     There are 2 low-density right lobe thyroid nodules measuring up to 1.2  cm. No endotracheal lesion is demonstrated. The lung apices appear  clear. There is a right shoulder arthroplasty. Degenerative  disc disease  is present in the cervical spine with disc space narrowing at C5-6 and  C6-7.       Impression:      1. No evidence for acute intracranial abnormality or hemorrhage. Remote  left occipital lobe infarction.  2. Atherosclerotic disease involving both proximal ICAs and the  cavernous segments opacities without NASCET stenosis or significant  change compared to prior exam 12/28/2021.  3. 2 right lobe thyroid low-density nodules.         Noncontrast exam findings called to the stroke neurologist on-call at  6:41 PM and contrasted exam findings called at 6:55 PM.     This report was finalized on 3/25/2023 7:38 PM by Dr. Rigoberto Lema M.D.       CT Angiogram Neck [[id number]] Collected: 03/25/23 1906     Updated: 03/27/23 0932    Narrative:      CT ANGIOGRAM HEAD AND NECK WITH IV CONTRAST, CT CEREBRAL PERFUSION WITH  AND WITHOUT CONTRAST.     HISTORY: Dizziness, gait problem.     TECHNIQUE: Head CT was obtained without IV contrast followed by  intravenous contrast administration and CT angiogram of the head and  neck with IV contrast and this data was reconstructed in coronal and  sagittal planes and 3-dimensional volume rendering was performed. CT  cerebral perfusion imaging was obtained with and without IV contrast as  well as use of rapid software. AI analysis of LVO was utilized.  Radiation dose reduction techniques were utilized, including automated  exposure control and exposure modulation based on body size.     COMPARISON: CT angiogram head and neck 12/28/2021.     FINDINGS: Noncontrast exam demonstrates no abnormal areas of increased  attenuation intraaxially to suggest hemorrhage. No extra-axial fluid  collection is observed. Within the posterior medial-inferior left  occipital lobe there is a chronic infarction that exhibits no change in  size or configuration when compared to the prior examination in 12/2021.  There is mild atrophy associated with ventricular enlargement. There is  no evidence  for cerebral edema or mass effect or shift of midline  structures. CT cerebral perfusion imaging demonstrates 0 mL where there  is cerebral blood flow less than 30% and 0 mL where there is T MAX  greater than 6 seconds.     Great vessel origins appear patent. There is mild atherosclerotic  disease involving both proximal internal carotid arteries with 0% NASCET  stenosis by NASCET criteria. There are also atherosclerotic  calcifications involving the cavernous segment of both internal carotid  arteries with mild narrowing. The distal cervical, petrous, cavernous,  supracavernous internal carotid arteries are patent. Both middle  cerebral arteries and anterior cerebral arteries and major branches are  patent. The left vertebral artery arises from the left subclavian artery  just distal to its origin. Both cervical vertebral arteries are patent.  The intracranial segments of both vertebral arteries and the basilar  artery and both posterior cerebral arteries are patent. Postcontrast  enhanced head CT demonstrates no abnormal intracranial enhancement.     There are 2 low-density right lobe thyroid nodules measuring up to 1.2  cm. No endotracheal lesion is demonstrated. The lung apices appear  clear. There is a right shoulder arthroplasty. Degenerative disc disease  is present in the cervical spine with disc space narrowing at C5-6 and  C6-7.       Impression:      1. No evidence for acute intracranial abnormality or hemorrhage. Remote  left occipital lobe infarction.  2. Atherosclerotic disease involving both proximal ICAs and the  cavernous segments opacities without NASCET stenosis or significant  change compared to prior exam 12/28/2021.  3. 2 right lobe thyroid low-density nodules.         Noncontrast exam findings called to the stroke neurologist on-call at  6:41 PM and contrasted exam findings called at 6:55 PM.     This report was finalized on 3/25/2023 7:38 PM by Dr. Rigoberto Lema M.D.       CT CEREBRAL  PERFUSION WITH & WITHOUT CONTRAST [[id number]] Collected: 03/25/23 1906     Updated: 03/27/23 0932    Narrative:      CT ANGIOGRAM HEAD AND NECK WITH IV CONTRAST, CT CEREBRAL PERFUSION WITH  AND WITHOUT CONTRAST.     HISTORY: Dizziness, gait problem.     TECHNIQUE: Head CT was obtained without IV contrast followed by  intravenous contrast administration and CT angiogram of the head and  neck with IV contrast and this data was reconstructed in coronal and  sagittal planes and 3-dimensional volume rendering was performed. CT  cerebral perfusion imaging was obtained with and without IV contrast as  well as use of rapid software. AI analysis of LVO was utilized.  Radiation dose reduction techniques were utilized, including automated  exposure control and exposure modulation based on body size.     COMPARISON: CT angiogram head and neck 12/28/2021.     FINDINGS: Noncontrast exam demonstrates no abnormal areas of increased  attenuation intraaxially to suggest hemorrhage. No extra-axial fluid  collection is observed. Within the posterior medial-inferior left  occipital lobe there is a chronic infarction that exhibits no change in  size or configuration when compared to the prior examination in 12/2021.  There is mild atrophy associated with ventricular enlargement. There is  no evidence for cerebral edema or mass effect or shift of midline  structures. CT cerebral perfusion imaging demonstrates 0 mL where there  is cerebral blood flow less than 30% and 0 mL where there is T MAX  greater than 6 seconds.     Great vessel origins appear patent. There is mild atherosclerotic  disease involving both proximal internal carotid arteries with 0% NASCET  stenosis by NASCET criteria. There are also atherosclerotic  calcifications involving the cavernous segment of both internal carotid  arteries with mild narrowing. The distal cervical, petrous, cavernous,  supracavernous internal carotid arteries are patent. Both middle  cerebral  arteries and anterior cerebral arteries and major branches are  patent. The left vertebral artery arises from the left subclavian artery  just distal to its origin. Both cervical vertebral arteries are patent.  The intracranial segments of both vertebral arteries and the basilar  artery and both posterior cerebral arteries are patent. Postcontrast  enhanced head CT demonstrates no abnormal intracranial enhancement.     There are 2 low-density right lobe thyroid nodules measuring up to 1.2  cm. No endotracheal lesion is demonstrated. The lung apices appear  clear. There is a right shoulder arthroplasty. Degenerative disc disease  is present in the cervical spine with disc space narrowing at C5-6 and  C6-7.       Impression:      1. No evidence for acute intracranial abnormality or hemorrhage. Remote  left occipital lobe infarction.  2. Atherosclerotic disease involving both proximal ICAs and the  cavernous segments opacities without NASCET stenosis or significant  change compared to prior exam 12/28/2021.  3. 2 right lobe thyroid low-density nodules.         Noncontrast exam findings called to the stroke neurologist on-call at  6:41 PM and contrasted exam findings called at 6:55 PM.     This report was finalized on 3/25/2023 7:38 PM by Dr. Rigoberto Lema M.D.       XR Chest 1 View [[id number]] Collected: 03/25/23 1956     Updated: 03/25/23 2235    Narrative:      CHEST SINGLE VIEW     HISTORY: Dizziness.     COMPARISON: Portable chest 12/01/2022.     FINDINGS: Cardiomediastinal silhouette is within normal limits. Lungs  appear clear and there is no evidence for pulmonary edema or pleural  effusion or infiltrate. Mild linear scarring within the left midlung  superimposing the caudal aspect of the scapula. Reverse right shoulder  arthroplasty is present.       Impression:      No evidence for active disease in the chest.     This report was finalized on 3/25/2023 10:32 PM by Dr. Rigoberto Lema M.D.            Results for orders placed during the hospital encounter of 03/25/23    Adult transthoracic echo complete    Interpretation Summary  •  Left ventricular systolic function is normal. Calculated left ventricular EF = 68.5%  •  Left ventricular wall thickness is consistent with borderline concentric hypertrophy.  •  Left ventricular diastolic function was normal.  •  Left atrial volume is mildly increased.  •  Saline test results are negative.    Pertinent Labs     Results from last 7 days   Lab Units 03/27/23 0457 03/26/23 0446 03/25/23  1825   WBC 10*3/mm3 10.01 12.08* 11.29*   HEMOGLOBIN g/dL 13.7 14.2 15.4   PLATELETS 10*3/mm3 161 164 193     Results from last 7 days   Lab Units 03/27/23 0457 03/26/23 0446 03/25/23  1825   SODIUM mmol/L 133* 142 138   POTASSIUM mmol/L 3.7 3.8 3.7   CHLORIDE mmol/L 102 105 96*   CO2 mmol/L 23.4 28.0 31.0*   BUN mg/dL 9 12 13   CREATININE mg/dL 0.79 0.89 0.95   GLUCOSE mg/dL 171* 187* 222*   EGFR mL/min/1.73 78.6 68.1 63.0     Results from last 7 days   Lab Units 03/26/23  0446 03/25/23  1825   ALBUMIN g/dL 3.7 4.4   BILIRUBIN mg/dL 0.3 0.2   ALK PHOS U/L 38* 49   AST (SGOT) U/L 28 42*   ALT (SGPT) U/L 11 34*     Results from last 7 days   Lab Units 03/27/23 0457 03/26/23 0446 03/25/23  1825   CALCIUM mg/dL 8.5* 9.3 9.3   ALBUMIN g/dL  --  3.7 4.4       Results from last 7 days   Lab Units 03/25/23  1825   HSTROP T ng/L 42*   PROBNP pg/mL 108.0       Results from last 7 days   Lab Units 03/26/23  0446   CHOLESTEROL mg/dL 138   TRIGLYCERIDES mg/dL 218*   HDL CHOL mg/dL 28*   LDL CHOL mg/dL 74             Test Results Pending at Discharge       Discharge Details        Discharge Medications      Changes to Medications      Instructions Start Date   albuterol sulfate  (90 Base) MCG/ACT inhaler  Commonly known as: ProAir HFA  What changed:   · how much to take  · when to take this  · reasons to take this    INHALE 1 TO 2 PUFFS EVERY 4 TO 6 HOURS AS NEEDED      albuterol  sulfate  (90 Base) MCG/ACT inhaler  Commonly known as: PROVENTIL HFA;VENTOLIN HFA;PROAIR HFA  What changed: Another medication with the same name was changed. Make sure you understand how and when to take each.   Inhale 2 puffs every 4 hours when necessary wheeze, dyspnea, cough      alendronate 70 MG tablet  Commonly known as: FOSAMAX  What changed: additional instructions   TAKE 1 TABLET BY MOUTH EVERY 7 DAYS      Eliquis 5 MG tablet tablet  Generic drug: apixaban  What changed:   · how much to take  · when to take this   TAKE 1 TABLET BY MOUTH TWICE DAILY      furosemide 20 MG tablet  Commonly known as: LASIX  What changed: See the new instructions.   TAKE 2 TABLETS BY MOUTH EVERY MORNING AND 1 TABLET BY MOUTH EVERY AFTERNOON      NovoLIN N 100 UNIT/ML injection  Generic drug: insulin NPH  What changed: See the new instructions.   ADMINISTER 14 UNITS UNDER THE SKIN TWICE DAILY BEFORE MEALS AS DIRECTED. INCREASE BY 2 UNIT WEEKLY. MAX 30 UNIT PER DAY FOR A FASTING OF<150      omeprazole 40 MG capsule  Commonly known as: priLOSEC  What changed: when to take this   TAKE 1 CAPSULE BY MOUTH TWICE DAILY      Symbicort 160-4.5 MCG/ACT inhaler  Generic drug: budesonide-formoterol  What changed:   · when to take this  · additional instructions   INHALE 2 PUFFS BY MOUTH TWICE DAILY. RINSE MOUTH AFTER USE         Continue These Medications      Instructions Start Date   Accu-Chek FastClix Lancets misc   USE TO TEST BLOOD SUGAR UP TO FIVE TIMES DAILY AS DIRECTED.      ARIPiprazole 2 MG tablet  Commonly known as: ABILIFY   2 mg, Oral, Every Evening      Chlorhexidine Gluconate Cloth 2 % pads   Apply externally, Use as directed preop      citalopram 20 MG tablet  Commonly known as: CeleXA   20 mg, Oral, Daily      diphenoxylate-atropine 2.5-0.025 MG per tablet  Commonly known as: LOMOTIL   1 tablet, Oral, 4 Times Daily PRN      FREESTYLE LITE test strip  Generic drug: glucose blood   USE TO TEST FIVE TIMES DAILY     "  gabapentin 400 MG capsule  Commonly known as: NEURONTIN   400 mg, Oral, 3 Times Daily      glipizide 5 MG tablet  Commonly known as: GLUCOTROL   TAKE 1 TABLET BY MOUTH TWICE DAILY BEFORE MEALS      guaiFENesin 200 MG tablet   400 mg, Oral, Every 4 Hours PRN      HYDROcodone-acetaminophen 5-325 MG per tablet  Commonly known as: NORCO   1-2 tablets, Oral, Every 6 Hours PRN      Insulin Syringe 31G X 5/16\" 1 ML misc   USE TWICE DAILY AS DIRECTED      methocarbamol 750 MG tablet  Commonly known as: ROBAXIN   750 mg, Oral, 3 Times Daily      metoprolol tartrate 50 MG tablet  Commonly known as: LOPRESSOR   TAKE 1 TABLET BY MOUTH THREE TIMES DAILY      mirtazapine 7.5 MG tablet  Commonly known as: REMERON   7.5 mg, Oral, Nightly      nitroglycerin 0.4 MG SL tablet  Commonly known as: NITROSTAT   0.4 mg, Oral, Every 5 Minutes PRN, Take no more than 3 doses in 15 minutes.      ondansetron 4 MG tablet  Commonly known as: ZOFRAN   4 mg, Oral, Every 6 Hours PRN      potassium chloride 8 MEQ CR tablet  Commonly known as: KLOR-CON   TAKE 1 TABLET BY MOUTH TWICE DAILY      rosuvastatin 20 MG tablet  Commonly known as: CRESTOR   20 mg, Oral, Daily      vitamin B-12 100 MCG tablet  Commonly known as: CYANOCOBALAMIN   50 mcg, Oral, Daily      vitamin D3 125 MCG (5000 UT) capsule capsule   5,000 Units, Oral, Daily             Allergies   Allergen Reactions   • Duloxetine Hcl Hallucinations   • Viibryd [Vilazodone Hcl] Hallucinations   • Nsaids Other (See Comments)     Doesn't remember   • Benadryl [Diphenhydramine] Other (See Comments)     shaking   • Carafate [Sucralfate] GI Intolerance   • Metformin Other (See Comments)     Doesn't remember   • Morphine And Related Nausea And Vomiting   • Oxycodone Nausea And Vomiting   • Penicillins Rash     Fever, \"can't breathe\"   • Statins Other (See Comments)     Can't remember       Discharge Disposition:  Home or Self Care      Discharge Diet:  No active diet order      Discharge Activity: "   Activity Instructions     Activity as Tolerated            CODE STATUS:    Code Status and Medical Interventions:   Ordered at: 03/25/23 1918     Code Status (Patient has no pulse and is not breathing):    CPR (Attempt to Resuscitate)     Medical Interventions (Patient has pulse or is breathing):    Full       Future Appointments   Date Time Provider Department Center   4/26/2023 11:00 AM Daphne Salmeron APRN [facility]     Additional Instructions for the Follow-ups that You Need to Schedule     Discharge Follow-up with PCP   As directed       Currently Documented PCP:    Cinthya Valentine APRN    PCP Phone Number:    [phone]     Follow Up Details: 1 to 2 weeks (or sooner if problems)            Follow-up Information     Cinthya Valentine APRN .    Specialty: Family Medicine  Why: 1 to 2 weeks (or sooner if problems)  Contact information:  [address]  [place] [address]  [phone]                         Additional Instructions for the Follow-ups that You Need to Schedule     Discharge Follow-up with PCP   As directed       Currently Documented PCP:    Cinthya Valentine APRN    PCP Phone Number:    [phone]     Follow Up Details: 1 to 2 weeks (or sooner if problems)           Time Spent on Discharge:  Greater than 30 minutes      Brett Harman MD  [facility]  03/28/23  14:14 EDT

## 2023-03-27 NOTE — PROGRESS NOTES
Name: Dalila Javed ADMIT: 3/25/2023   : 1950  PCP: Cinthya Valentine APRN    MRN: [id number] LOS: 0 days   AGE/SEX: 72 y.o. female  ROOM: [facility]     Subjective   Subjective   Dizziness a little better.  No new complaints.  Has chronic back pain radiating into her legs.    Review of Systems     Objective   Objective   Vital Signs  Temp:  [97.9 °F (36.6 °C)-98.9 °F (37.2 °C)] 98.1 °F (36.7 °C)  Heart Rate:  [63-87] 64  Resp:  [16-20] 18  BP: (147-168)/(66-77) 159/74  SpO2:  [93 %-97 %] 93 %  on   ;   Device (Oxygen Therapy): room air  Body mass index is 34.12 kg/m².  Physical Exam  Vitals and nursing note reviewed.   Constitutional:       General: She is not in acute distress.  Cardiovascular:      Rate and Rhythm: Normal rate and regular rhythm.   Pulmonary:      Effort: Pulmonary effort is normal.      Breath sounds: Normal breath sounds.   Abdominal:      General: Bowel sounds are normal.      Palpations: Abdomen is soft.      Tenderness: There is no abdominal tenderness.   Musculoskeletal:         General: No swelling.   Skin:     General: Skin is warm and dry.   Neurological:      Mental Status: She is alert. Mental status is at baseline.            Results Review:       I reviewed the patient's new clinical results.  Results from last 7 days   Lab Units 23  0457 23  0446 23  1825   WBC 10*3/mm3 10.01 12.08* 11.29*   HEMOGLOBIN g/dL 13.7 14.2 15.4   PLATELETS 10*3/mm3 161 164 193     Results from last 7 days   Lab Units 23  0457 23  0446 23  1825   SODIUM mmol/L 133* 142 138   POTASSIUM mmol/L 3.7 3.8 3.7   CHLORIDE mmol/L 102 105 96*   CO2 mmol/L 23.4 28.0 31.0*   BUN mg/dL 9 12 13   CREATININE mg/dL 0.79 0.89 0.95   GLUCOSE mg/dL 171* 187* 222*   EGFR mL/min/1.73 78.6 68.1 63.0     Results from last 7 days   Lab Units 23  0457 23  0446 23  1825   CALCIUM mg/dL 8.5* 9.3 9.3   ALBUMIN g/dL  --  3.7 4.4     Results from last 7 days   Lab  Units 03/26/23  0446   HDL CHOL mg/dL 28*   LDL CHOL mg/dL 74   HEMOGLOBIN A1C % 9.70*   TSH uIU/mL 1.310     Glucose   Date/Time Value Ref Range Status   03/27/2023 0623 189 (H) 70 - 130 mg/dL Final     Comment:     Meter: [id number] : [id number] Raf Diop NA   03/26/2023 1819 212 (H) 70 - 130 mg/dL Final     Comment:     Meter: [id number] : [id number] Iam Alcala NA   03/26/2023 1633 214 (H) 70 - 130 mg/dL Final     Comment:     Meter: [id number] : [id number] Iam Hopeina NA   03/26/2023 1117 256 (H) 70 - 130 mg/dL Final     Comment:     Meter: [id number] : [id number] Iam Hopeina NA   03/26/2023 0611 174 (H) 70 - 130 mg/dL Final     Comment:     Meter: [id number] : [id number] Dayana Helton NA   03/25/2023 2038 186 (H) 70 - 130 mg/dL Final     Comment:     Meter: [id number] : [id number] Dayana Riza NA   03/25/2023 1811 230 (H) 70 - 130 mg/dL Final     Comment:     RN Notified R and V Meter: [id number] : [id number] Arianne FLORES         Results for orders placed during the hospital encounter of 12/27/21    Adult Transthoracic Echo Complete W/ Cont if Necessary Per Protocol (With Agitated Saline)    Interpretation Summary  · Estimated right ventricular systolic pressure from tricuspid regurgitation is normal (<35 mmHg).  · Estimated left ventricular EF = 65% Left ventricular systolic function is normal.  · Left ventricular diastolic function was normal.  · Saline test results are negative.      I have personally reviewed all medications:  Scheduled Medications  apixaban, 5 mg, Oral, Q12H  ARIPiprazole, 2 mg, Oral, Q PM  aspirin, 325 mg, Oral, Daily   Or  aspirin, 300 mg, Rectal, Daily  atorvastatin, 80 mg, Oral, Nightly  budesonide-formoterol, 2 puff, Inhalation, BID - RT  cholecalciferol, 5,000 Units, Oral, Daily  citalopram, 20 mg, Oral, Daily  diazePAM, 5 mg, Intravenous, Once  fluticasone, 2 spray, Each Nare, Daily  insulin lispro, 0-9 Units, Subcutaneous, TID With  Meals  LORazepam, 1 mg, Intravenous, Once  metoprolol tartrate, 50 mg, Oral, BID  mirtazapine, 7.5 mg, Oral, Nightly  pantoprazole, 40 mg, Oral, Q AM  vitamin B-12, 50 mcg, Oral, Daily    Infusions  sodium chloride, 75 mL/hr, Last Rate: 75 mL/hr (03/27/23 0119)    Diet  Diet: Diabetic Diets; Consistent Carbohydrate; Texture: Regular Texture (IDDSI 7); Fluid Consistency: Thin (IDDSI 0)    I have personally reviewed:  [x]  Laboratory   [x]  Microbiology   [x]  Radiology   [x]  EKG/Telemetry  [x]  Cardiology/Vascular   []  Pathology    []  Records         Assessment/Plan     Active Hospital Problems    Diagnosis  POA   • **Dizziness [R42]  Yes   • Obesity (BMI 30-39.9) [E66.9]  Unknown   • Parkinson disease (HCC) [G20]  Yes   • DODSON (nonalcoholic steatohepatitis) [K75.81]  Yes   • Coronary arteriosclerosis in native artery [I25.10]  Yes   • Controlled diabetes mellitus type 2 with complications (HCC) [E11.8]  Yes   • Dyslipidemia [E78.5]  Yes   • Obstructive sleep apnea syndrome [G47.33]  Yes      Resolved Hospital Problems   No resolved problems to display.       72 y.o. female with Dizziness.    Still waiting on MRI.  Defer antiplatelets to neurology.  Await their follow-up assessment.  Per patient request will change her Norco to scheduled as she takes at home.  Stop IV fluids.    Monitor blood glucose.  Hold Lasix/KCl.      · Eliquis (home med) for DVT prophylaxis.  · Full code.  · Discussed with patient, nursing staff and care team on multidisciplinary rounds.  · Anticipate discharge home with family timing yet to be determined.      Brett Harman MD  [facility]  03/27/23  08:40 EDT

## 2023-03-27 NOTE — PROGRESS NOTES
"DOS: 3/27/2023  NAME: Dalila Javed   : 1950  PCP: Cinthya Valentine APRN  Chief Complaint   Patient presents with   • Dizziness   • Gait Problem     Stroke    Subjective: Vertigo is currently resolved.  She does note a dull headache.  Reports history of migraines but not in a long time and this is not similar to her migraines.Pt seen in follow up today, however the problem is new to the examiner.      Objective:  Vital signs: /85   Pulse 64   Temp 98.1 °F (36.7 °C) (Oral)   Resp 18   Ht 154.9 cm (61\")   Wt 81.6 kg (180 lb)   SpO2 91%   BMI 34.01 kg/m²       General appearance: Well developed, well nourished, well groomed, alert and cooperative.   HEENT: Normocephalic.   Cardiac: Regular rate and rhythm. No murmurs.   Chest Exam: Clear to auscultation bilaterally, no wheezes, no rhonchi.  Extremities: Normal, no edema.   Skin: No rashes or birthmarks.     Higher integrative function: Awake/alert, oriented to self and location. Oriented to March \".\" Intact recent memory, attention/concentration. Speech fluent.   CN II: Right superior quadrantanopia  CN III IV VI: EOMI, no nystagmus. PERRL.  CN V: Decreased right facial sensation.  CN VII: Facial movements are symmetric, no weakness.   CN VIII: Auditory acuity is normal.   CN IX & X: Symmetric palatal movement.   CN XI: Sternocleidomastoid and trapezius are normal. No weakness.   CN XII: The tongue is midline. No atrophy or fasciculations.   Motor: Right arm and leg 4-5, left arm and leg 5 out of 5.  No fasciculations, rigidity, spasticity or abnormal movements.   Sensation: Decreased to light touch in right arm and leg.  Station and gait: Deferred.  Coordination: Finger to nose test showed no dysmetria.     Scheduled Meds:apixaban, 5 mg, Oral, Q12H  ARIPiprazole, 2 mg, Oral, Q PM  aspirin, 325 mg, Oral, Daily   Or  aspirin, 300 mg, Rectal, Daily  atorvastatin, 80 mg, Oral, Nightly  budesonide-formoterol, 2 puff, Inhalation, BID - " RT  cholecalciferol, 5,000 Units, Oral, Daily  citalopram, 20 mg, Oral, Daily  diazePAM, 5 mg, Intravenous, Once  fluticasone, 2 spray, Each Nare, Daily  HYDROcodone-acetaminophen, 1 tablet, Oral, 4x Daily  insulin lispro, 0-9 Units, Subcutaneous, TID With Meals  LORazepam, 1 mg, Intravenous, Once  metoprolol tartrate, 50 mg, Oral, BID  mirtazapine, 7.5 mg, Oral, Nightly  pantoprazole, 40 mg, Oral, Q AM  vitamin B-12, 50 mcg, Oral, Daily      Continuous Infusions:   PRN Meds:.•  acetaminophen **OR** [DISCONTINUED] acetaminophen  •  albuterol  •  bisacodyl  •  dextrose  •  dextrose  •  glucagon (human recombinant)  •  ondansetron  •  sodium chloride    Laboratory results:  Lab Results   Component Value Date    GLUCOSE 171 (H) 03/27/2023    CALCIUM 8.5 (L) 03/27/2023     (L) 03/27/2023    K 3.7 03/27/2023    CO2 23.4 03/27/2023     03/27/2023    BUN 9 03/27/2023    CREATININE 0.79 03/27/2023    EGFRIFAFRI 62 10/29/2021    EGFRIFNONA 56 (L) 12/28/2021    BCR 11.4 03/27/2023    ANIONGAP 7.6 03/27/2023     Lab Results   Component Value Date    WBC 10.01 03/27/2023    HGB 13.7 03/27/2023    HCT 40.4 03/27/2023    MCV 89.6 03/27/2023     03/27/2023     Lab Results   Component Value Date    CHOL 138 03/26/2023    CHOL 106 08/20/2020     Lab Results   Component Value Date    HDL 28 (L) 03/26/2023    HDL 26 (L) 10/29/2021    HDL 34 (L) 08/20/2020     Lab Results   Component Value Date    LDL 74 03/26/2023    LDL 69 10/29/2021    LDL 54 08/20/2020     Lab Results   Component Value Date    TRIG 218 (H) 03/26/2023    TRIG 228 (H) 10/29/2021    TRIG 90 08/20/2020         Lab 03/26/23  0446   HEMOGLOBIN A1C 9.70*      Review and interpretation of imaging: CT head images viewed by me, no acute findings seen, chronic left occipital stroke noted.  Adult transthoracic echo complete    Result Date: 3/27/2023  •  Left ventricular systolic function is normal. Calculated left ventricular EF = 68.5% •  Left ventricular  wall thickness is consistent with borderline concentric hypertrophy. •  Left ventricular diastolic function was normal. •  Left atrial volume is mildly increased. •  Saline test results are negative.     CT Angiogram Neck    Result Date: 3/25/2023  CT ANGIOGRAM HEAD AND NECK WITH IV CONTRAST, CT CEREBRAL PERFUSION WITH AND WITHOUT CONTRAST.  HISTORY: Dizziness, gait problem.  TECHNIQUE: Head CT was obtained without IV contrast followed by intravenous contrast administration and CT angiogram of the head and neck with IV contrast and this data was reconstructed in coronal and sagittal planes and 3-dimensional volume rendering was performed. CT cerebral perfusion imaging was obtained with and without IV contrast as well as use of rapid software. AI analysis of LVO was utilized. Radiation dose reduction techniques were utilized, including automated exposure control and exposure modulation based on body size.  COMPARISON: CT angiogram head and neck 12/28/2021.  FINDINGS: Noncontrast exam demonstrates no abnormal areas of increased attenuation intraaxially to suggest hemorrhage. No extra-axial fluid collection is observed. Within the posterior medial-inferior left occipital lobe there is a chronic infarction that exhibits no change in size or configuration when compared to the prior examination in 12/2021. There is mild atrophy associated with ventricular enlargement. There is no evidence for cerebral edema or mass effect or shift of midline structures. CT cerebral perfusion imaging demonstrates 0 mL where there is cerebral blood flow less than 30% and 0 mL where there is T MAX greater than 6 seconds.  Great vessel origins appear patent. There is mild atherosclerotic disease involving both proximal internal carotid arteries with 0% NASCET stenosis by NASCET criteria. There are also atherosclerotic calcifications involving the cavernous segment of both internal carotid arteries with mild narrowing. The distal cervical,  petrous, cavernous, supracavernous internal carotid arteries are patent. Both middle cerebral arteries and anterior cerebral arteries and major branches are patent. The left vertebral artery arises from the left subclavian artery just distal to its origin. Both cervical vertebral arteries are patent. The intracranial segments of both vertebral arteries and the basilar artery and both posterior cerebral arteries are patent. Postcontrast enhanced head CT demonstrates no abnormal intracranial enhancement.  There are 2 low-density right lobe thyroid nodules measuring up to 1.2 cm. No endotracheal lesion is demonstrated. The lung apices appear clear. There is a right shoulder arthroplasty. Degenerative disc disease is present in the cervical spine with disc space narrowing at C5-6 and C6-7.      1. No evidence for acute intracranial abnormality or hemorrhage. Remote left occipital lobe infarction. 2. Atherosclerotic disease involving both proximal ICAs and the cavernous segments opacities without NASCET stenosis or significant change compared to prior exam 12/28/2021. 3. 2 right lobe thyroid low-density nodules.   Noncontrast exam findings called to the stroke neurologist on-call at 6:41 PM and contrasted exam findings called at 6:55 PM.  This report was finalized on 3/25/2023 7:38 PM by Dr. Rigoberto Lema M.D.      XR Chest 1 View    Result Date: 3/25/2023  CHEST SINGLE VIEW  HISTORY: Dizziness.  COMPARISON: Portable chest 12/01/2022.  FINDINGS: Cardiomediastinal silhouette is within normal limits. Lungs appear clear and there is no evidence for pulmonary edema or pleural effusion or infiltrate. Mild linear scarring within the left midlung superimposing the caudal aspect of the scapula. Reverse right shoulder arthroplasty is present.      No evidence for active disease in the chest.  This report was finalized on 3/25/2023 10:32 PM by Dr. Rigoberto Lema M.D.      CT Angiogram Head w AI Analysis of LVO    Result Date:  3/25/2023  CT ANGIOGRAM HEAD AND NECK WITH IV CONTRAST, CT CEREBRAL PERFUSION WITH AND WITHOUT CONTRAST.  HISTORY: Dizziness, gait problem.  TECHNIQUE: Head CT was obtained without IV contrast followed by intravenous contrast administration and CT angiogram of the head and neck with IV contrast and this data was reconstructed in coronal and sagittal planes and 3-dimensional volume rendering was performed. CT cerebral perfusion imaging was obtained with and without IV contrast as well as use of rapid software. AI analysis of LVO was utilized. Radiation dose reduction techniques were utilized, including automated exposure control and exposure modulation based on body size.  COMPARISON: CT angiogram head and neck 12/28/2021.  FINDINGS: Noncontrast exam demonstrates no abnormal areas of increased attenuation intraaxially to suggest hemorrhage. No extra-axial fluid collection is observed. Within the posterior medial-inferior left occipital lobe there is a chronic infarction that exhibits no change in size or configuration when compared to the prior examination in 12/2021. There is mild atrophy associated with ventricular enlargement. There is no evidence for cerebral edema or mass effect or shift of midline structures. CT cerebral perfusion imaging demonstrates 0 mL where there is cerebral blood flow less than 30% and 0 mL where there is T MAX greater than 6 seconds.  Great vessel origins appear patent. There is mild atherosclerotic disease involving both proximal internal carotid arteries with 0% NASCET stenosis by NASCET criteria. There are also atherosclerotic calcifications involving the cavernous segment of both internal carotid arteries with mild narrowing. The distal cervical, petrous, cavernous, supracavernous internal carotid arteries are patent. Both middle cerebral arteries and anterior cerebral arteries and major branches are patent. The left vertebral artery arises from the left subclavian artery just distal  to its origin. Both cervical vertebral arteries are patent. The intracranial segments of both vertebral arteries and the basilar artery and both posterior cerebral arteries are patent. Postcontrast enhanced head CT demonstrates no abnormal intracranial enhancement.  There are 2 low-density right lobe thyroid nodules measuring up to 1.2 cm. No endotracheal lesion is demonstrated. The lung apices appear clear. There is a right shoulder arthroplasty. Degenerative disc disease is present in the cervical spine with disc space narrowing at C5-6 and C6-7.      1. No evidence for acute intracranial abnormality or hemorrhage. Remote left occipital lobe infarction. 2. Atherosclerotic disease involving both proximal ICAs and the cavernous segments opacities without NASCET stenosis or significant change compared to prior exam 12/28/2021. 3. 2 right lobe thyroid low-density nodules.   Noncontrast exam findings called to the stroke neurologist on-call at 6:41 PM and contrasted exam findings called at 6:55 PM.  This report was finalized on 3/25/2023 7:38 PM by Dr. Rigoberto Lema M.D.      CT CEREBRAL PERFUSION WITH & WITHOUT CONTRAST    Result Date: 3/25/2023  CT ANGIOGRAM HEAD AND NECK WITH IV CONTRAST, CT CEREBRAL PERFUSION WITH AND WITHOUT CONTRAST.  HISTORY: Dizziness, gait problem.  TECHNIQUE: Head CT was obtained without IV contrast followed by intravenous contrast administration and CT angiogram of the head and neck with IV contrast and this data was reconstructed in coronal and sagittal planes and 3-dimensional volume rendering was performed. CT cerebral perfusion imaging was obtained with and without IV contrast as well as use of rapid software. AI analysis of LVO was utilized. Radiation dose reduction techniques were utilized, including automated exposure control and exposure modulation based on body size.  COMPARISON: CT angiogram head and neck 12/28/2021.  FINDINGS: Noncontrast exam demonstrates no abnormal areas of  increased attenuation intraaxially to suggest hemorrhage. No extra-axial fluid collection is observed. Within the posterior medial-inferior left occipital lobe there is a chronic infarction that exhibits no change in size or configuration when compared to the prior examination in 12/2021. There is mild atrophy associated with ventricular enlargement. There is no evidence for cerebral edema or mass effect or shift of midline structures. CT cerebral perfusion imaging demonstrates 0 mL where there is cerebral blood flow less than 30% and 0 mL where there is T MAX greater than 6 seconds.  Great vessel origins appear patent. There is mild atherosclerotic disease involving both proximal internal carotid arteries with 0% NASCET stenosis by NASCET criteria. There are also atherosclerotic calcifications involving the cavernous segment of both internal carotid arteries with mild narrowing. The distal cervical, petrous, cavernous, supracavernous internal carotid arteries are patent. Both middle cerebral arteries and anterior cerebral arteries and major branches are patent. The left vertebral artery arises from the left subclavian artery just distal to its origin. Both cervical vertebral arteries are patent. The intracranial segments of both vertebral arteries and the basilar artery and both posterior cerebral arteries are patent. Postcontrast enhanced head CT demonstrates no abnormal intracranial enhancement.  There are 2 low-density right lobe thyroid nodules measuring up to 1.2 cm. No endotracheal lesion is demonstrated. The lung apices appear clear. There is a right shoulder arthroplasty. Degenerative disc disease is present in the cervical spine with disc space narrowing at C5-6 and C6-7.      1. No evidence for acute intracranial abnormality or hemorrhage. Remote left occipital lobe infarction. 2. Atherosclerotic disease involving both proximal ICAs and the cavernous segments opacities without NASCET stenosis or  significant change compared to prior exam 12/28/2021. 3. 2 right lobe thyroid low-density nodules.   Noncontrast exam findings called to the stroke neurologist on-call at 6:41 PM and contrasted exam findings called at 6:55 PM.  This report was finalized on 3/25/2023 7:38 PM by Dr. Rigoberto Lema M.D.        Impression:  72-year-old right-handed female with history of left PCA stroke in August 2020, parkinsonism, migraine, questionable seizure, (not treated with AEDs), HTN, HLD, CAD, B12 deficiency, Williamson's esophagus, DM, diabetic neuropathy, SILVIANO, lupus anticoagulant, PFO, and A-fib, on Eliquis 5 mg twice daily and Crestor 20 mg daily prior to arrival, who presented 3/25/2023 with complaints of dizziness described as room spinning with associated difficulty walking and dull headache.  Dizziness has since resolved.    Work-up:  ECG: NSR  CT head: No acute findings, chronic left occipital lobe infarct noted.  CTA head/neck: Atherosclerotic disease in bilateral proximal ICAs and cavernous ICAs, stable from December 2021.  2 right lobe thyroid low-density nodules.  2D echo: EF 68.5%, mildly increased left atrial volume.  Saline test results are negative.  Trace mitral valve and tricuspid regurgitation.  Labs: LDL 74, TSH1.31, hemoglobin A1c 9.70%.    Dx:  Vertigo, resolved  H/o Left occipital stroke  On chronic anticoagulation     Plan:  Full dose aspirin added to Eliquis for now  Statin changed to Lipitor 80 mg  Follow-up MRI brain if no acute stroke patient can go back to anticoagulation without antiplatelet and home medication Crestor  Needs better control of diabetes  Neurochecks  BP control  Stroke Education  SONAM/SCDs  PT/OT/ST  Discussed with Dr. Zamora today.  We will follow-up after MRI brain completed.

## 2023-03-27 NOTE — NURSING NOTE
"Diabetes Education  Assessment/Teaching    Patient Name:  Dalila Javed  YOB: 1950  MRN: [id number]  Admit Date:  3/25/2023      Assessment Date:  3/27/2023  Flowsheet Row Most Recent Value   General Information     Referral From: Other -order set. Meet with 71 y/o at bedside to assess needs for DM ed. Introduce self to pt.    Height 154.9 cm (61\")   Height Method Stated   Weight 81.6 kg (180 lb)   Weight Method Bed scale   Diabetes History    What type of diabetes do you have? Type 2   Length of Diabetes Diagnosis 6 - 10 years   Have you had high blood sugar? (>140mg/dl) yes -current a1c >9.   Education Preferences    Barriers to Learning -- no learning barriers evident at this time.   Assessment Topics    Reducing Risk - Assessment Needs education/Review -re cva risk factors.   Healthy Coping - Assessment Competent   DM Goals    Contact Plan Follow-up medical care          Flowsheet Row Most Recent Value   DM Education Needs    Reducing Risks Blood pressure, DM.   Healthy Coping Appropriate   Discharge Plan -- tbd.   Teaching Method Discussion   Patient Response -- Needs reinforcement.       Other Comments:  pt denies new educational needs at this time.  Electronically signed by:  Val Diehl RN  03/27/23 12:56 EDT  "

## 2023-03-27 NOTE — PLAN OF CARE
Goal Outcome Evaluation:  Plan of Care Reviewed With: patient        Progress: no change  Outcome Evaluation: Pt. A&Ox4.  Report of back pain and pain medication given per MD order.  NIHSS = 3.  No new neurological changes during the shift.  VSS.  WCTM for the rest of the shift.

## 2023-03-28 ENCOUNTER — READMISSION MANAGEMENT (OUTPATIENT)
Dept: CALL CENTER | Facility: HOSPITAL | Age: 73
End: 2023-03-28
Payer: MEDICARE

## 2023-03-28 VITALS
RESPIRATION RATE: 18 BRPM | HEART RATE: 54 BPM | TEMPERATURE: 98.1 F | BODY MASS INDEX: 33.99 KG/M2 | SYSTOLIC BLOOD PRESSURE: 103 MMHG | OXYGEN SATURATION: 93 % | HEIGHT: 61 IN | DIASTOLIC BLOOD PRESSURE: 82 MMHG | WEIGHT: 180 LBS

## 2023-03-28 PROBLEM — H81.399 PERIPHERAL VERTIGO: Status: ACTIVE | Noted: 2020-09-30

## 2023-03-28 LAB — GLUCOSE BLDC GLUCOMTR-MCNC: 168 MG/DL (ref 70–130)

## 2023-03-28 PROCEDURE — 94664 DEMO&/EVAL PT USE INHALER: CPT

## 2023-03-28 PROCEDURE — 94761 N-INVAS EAR/PLS OXIMETRY MLT: CPT

## 2023-03-28 PROCEDURE — G0378 HOSPITAL OBSERVATION PER HR: HCPCS

## 2023-03-28 PROCEDURE — 25010000002 ONDANSETRON PER 1 MG: Performed by: INTERNAL MEDICINE

## 2023-03-28 PROCEDURE — 94799 UNLISTED PULMONARY SVC/PX: CPT

## 2023-03-28 PROCEDURE — 99232 SBSQ HOSP IP/OBS MODERATE 35: CPT | Performed by: NURSE PRACTITIONER

## 2023-03-28 PROCEDURE — 82962 GLUCOSE BLOOD TEST: CPT

## 2023-03-28 PROCEDURE — 97110 THERAPEUTIC EXERCISES: CPT | Performed by: OCCUPATIONAL THERAPIST

## 2023-03-28 PROCEDURE — 96376 TX/PRO/DX INJ SAME DRUG ADON: CPT

## 2023-03-28 PROCEDURE — 63710000001 INSULIN LISPRO (HUMAN) PER 5 UNITS: Performed by: INTERNAL MEDICINE

## 2023-03-28 RX ADMIN — Medication 5000 UNITS: at 08:19

## 2023-03-28 RX ADMIN — FLUTICASONE PROPIONATE 2 SPRAY: 50 SPRAY, METERED NASAL at 08:19

## 2023-03-28 RX ADMIN — APIXABAN 5 MG: 5 TABLET, FILM COATED ORAL at 08:19

## 2023-03-28 RX ADMIN — ONDANSETRON 4 MG: 2 INJECTION INTRAMUSCULAR; INTRAVENOUS at 08:53

## 2023-03-28 RX ADMIN — INSULIN LISPRO 2 UNITS: 100 INJECTION, SOLUTION INTRAVENOUS; SUBCUTANEOUS at 08:18

## 2023-03-28 RX ADMIN — METOPROLOL TARTRATE 50 MG: 50 TABLET, FILM COATED ORAL at 08:19

## 2023-03-28 RX ADMIN — Medication 50 MCG: at 08:19

## 2023-03-28 RX ADMIN — PANTOPRAZOLE SODIUM 40 MG: 40 TABLET, DELAYED RELEASE ORAL at 06:32

## 2023-03-28 RX ADMIN — HYDROCODONE BITARTRATE AND ACETAMINOPHEN 1 TABLET: 5; 325 TABLET ORAL at 08:18

## 2023-03-28 RX ADMIN — ASPIRIN 325 MG: 325 TABLET ORAL at 08:19

## 2023-03-28 RX ADMIN — BUDESONIDE AND FORMOTEROL FUMARATE DIHYDRATE 2 PUFF: 160; 4.5 AEROSOL RESPIRATORY (INHALATION) at 11:26

## 2023-03-28 RX ADMIN — CITALOPRAM 20 MG: 20 TABLET, FILM COATED ORAL at 08:19

## 2023-03-28 NOTE — CASE MANAGEMENT/SOCIAL WORK
Case Management Discharge Note      Final Note: Home no additional dc orders noted for CCP. Erica JAFFE/CCP         Selected Continued Care - Discharged on 3/28/2023 Admission date: 3/25/2023 - Discharge disposition: Home or Self Care    Destination    No services have been selected for the patient.              Durable Medical Equipment    No services have been selected for the patient.              Dialysis/Infusion    No services have been selected for the patient.              Home Medical Care    No services have been selected for the patient.              Therapy    No services have been selected for the patient.              Community Resources    No services have been selected for the patient.              Community & DME    No services have been selected for the patient.                  Transportation Services  Private: Car    Final Discharge Disposition Code: 01 - home or self-care

## 2023-03-28 NOTE — PLAN OF CARE
Goal Outcome Evaluation:  Plan of Care Reviewed With: patient        Progress: improving  Outcome Evaluation: Pt A&Ox4.  No new neurological changes.  VSS.  WCTM for the rest of the shift.

## 2023-03-28 NOTE — THERAPY DISCHARGE NOTE
Acute Care - Occupational Therapy Discharge  [facility]    Patient Name: Dalila Javed  : 1950    MRN: [id number]                              Today's Date: 3/28/2023       Admit Date: 3/25/2023    Visit Dx:     ICD-10-CM ICD-9-CM   1. Dizziness  R42 780.4   2. COPD exacerbation ([facility])  J44.1 491.21   3. Coagulopathy ([facility]): Eliquis induced  D68.9 286.9   4. Right hemiparesis ([facility]) chronic from residual stroke  G81.91 342.90   5. Atrial fibrillation, unspecified type ([facility])  I48.91 427.31   6. Parkinson's disease ([facility])  G20 332.0     Patient Active Problem List   Diagnosis   • Adjustment disorder with mixed anxiety and depressed mood   • Atopic rhinitis   • Coronary arteriosclerosis in native artery   • Cobalamin deficiency   • Benign colonic polyp   • Lumbar radiculopathy   • Controlled diabetes mellitus type 2 with complications ([facility])   • Binocular vision disorder with diplopia   • Dyslipidemia   • Arthropathy of hand   • Knee pain   • Cramps of lower extremity   • Low back pain   • Chronic nausea   • Obstructive sleep apnea syndrome   • Palpitations   • Ventricular premature beats   • Cephalalgia   • Colonic constipation   • Neurocardiogenic syncope   • Vitamin D deficiency   • DODSON (nonalcoholic steatohepatitis)   • Fibromyalgia   • Morbidly obese ([facility])   • Polyp of colon   • Family history of colonic polyps   • Family history of malignant neoplasm of colon   • Acute CVA (cerebrovascular accident) ([facility])   • Episode of dizziness   • Dizziness   • History of stroke   • Chronic right shoulder pain   • Encephalopathy, metabolic   • Migraine without aura or status migrainosus   • Parkinson disease ([facility])   • Hypokalemia   • Benign paroxysmal positional vertigo   • Chronic obstructive pulmonary disease ([facility])   • Gastroesophageal reflux disease   • History of percutaneous transluminal coronary angioplasty   • Hypercholesterolemia   • Hypertensive disorder   • Myocardial infarction ([facility])   • Occipital  neuralgia   • Patent foramen ovale   • Transient cerebral ischemia   • Arm pain, anterior, right   • TIA (transient ischemic attack)   • Pathological fracture of vertebra due to osteoporosis with delayed healing   • Compression fracture of L1 vertebra with delayed healing   • Status post reverse arthroplasty of right shoulder   • Obesity (BMI 30-39.9)     Past Medical History:   Diagnosis Date   • Abnormal weight gain    • Acquired trigger finger    • Acute myocardial infarction ([facility])    • Adjustment reaction with mixed emotional features    • Arthritis    • ASHD (arteriosclerotic heart disease)    • Asthma    • B12 deficiency    • Bacterial pneumonia    • Williamson esophagus    • Benign colonic polyp    • Bilateral knee pain    • Birthmark     haemangioma of the bone   • Bronchiectasis ([facility])    • CHF (congestive heart failure) ([facility])    • Chronic cough    • Chronic glomerulonephritis with exudative nephritis    • Chronic lumbar radiculopathy    • Diabetes mellitus ([facility])    • Diabetic peripheral neuropathy ([facility])    • Dyslipidemia    • Fibromyositis    • Fracture, humerus 2022    RIGHT   • GERD (gastroesophageal reflux disease)    • Herpes zoster    • Hyperlipidemia    • Hypertension    • Lupus anticoagulant disorder ([facility])    • Mycobacterium avium complex ([facility])    • Nephrolithiasis    • SILVIANO (obstructive sleep apnea)     DOESN'T USE HER MACHINE   • Palpitations    • Premature ventricular contraction    • Slow transit constipation    • Stroke ([facility]) 2020    RT SIDED WEAKNESS   • Vitamin D deficiency      Past Surgical History:   Procedure Laterality Date   • APPENDECTOMY     • BRONCHOSCOPY     •  SECTION     • CHOLECYSTECTOMY     • COLONOSCOPY     • COLONOSCOPY N/A 2019    Procedure: COLONOSCOPY to cecum with cold biopsy and cold and hotsnare polypectomies;  Surgeon: Suzy Eubanks MD;  Location: [facility] ENDOSCOPY;  Service: Gastroenterology   • CORONARY ANGIOPLASTY WITH STENT PLACEMENT     •  EMBOLECTOMY N/A 8/19/2020    Procedure: EMERGENT CEREBRAL ANGIOGRAM;  Surgeon: Jonh Meehan MD;  Location: [facility] OR 18/19;  Service: Neurosurgery;  Laterality: N/A;   • ENDOSCOPY N/A 11/5/2019    Procedure: ESOPHAGOGASTRODUODENOSCOPY with cold biopsies;  Surgeon: Suzy Eubanks MD;  Location: [facility] ENDOSCOPY;  Service: Gastroenterology   • KNEE ARTHROSCOPY     • OTHER SURGICAL HISTORY      elbow surgery   • ROTATOR CUFF REPAIR     • TOTAL SHOULDER ARTHROPLASTY W/ DISTAL CLAVICLE EXCISION Right 7/15/2022    Procedure: TOTAL SHOULDER REVERSE ARTHROPLASTY;  Surgeon: Dk Mckeon II, MD;  Location: [facility] OR [facility];  Service: Orthopedics;  Laterality: Right;   • TUBAL ABDOMINAL LIGATION        General Information     Row Name 03/28/23 1222          OT Time and Intention    Document Type therapy note (daily note)  -     Mode of Treatment individual therapy;occupational therapy  -     Row Name 03/28/23 1222          General Information    Existing Precautions/Restrictions fall  -     Row Name 03/28/23 1222          Cognition    Orientation Status (Cognition) oriented x 4  -     Row Name 03/28/23 1222          Safety Issues, Functional Mobility    Impairments Affecting Function (Mobility) balance;endurance/activity tolerance  -           User Key  (r) = Recorded By, (t) = Taken By, (c) = Cosigned By    Initials Name Provider Type     Brenda Baez, OTR Occupational Therapist               Mobility/ADL's     Row Name 03/28/23 1225          Bed Mobility    Comment, (Bed Mobility) NT pt declined not feeling well  -     Row Name 03/28/23 1225          Transfers    Comment, (Transfers) NT pt became nauseated during session  -     Row Name 03/28/23 1225          Functional Mobility    Functional Mobility- Ind. Level not appropriate to assess  -     Functional Mobility- Comment as pt is too nauseated. notified nsg  -     Row Name 03/28/23 1226          Activities of Daily  Living    BADL Assessment/Intervention grooming  -     Row Name 03/28/23 1225          Grooming Assessment/Training    [place] Level (Grooming) grooming skills;wash face, hands;standby assist;set up  -     Position (Grooming) sitting up in bed  -           User Key  (r) = Recorded By, (t) = Taken By, (c) = Cosigned By    Initials Name Provider Type    Brenda Mayorga OTR Occupational Therapist               Obj/Interventions     Row Name 03/28/23 1227          Elbow/Forearm (Therapeutic Exercise)    Elbow/Forearm (Therapeutic Exercise) AROM (active range of motion)  -     Elbow/Forearm AROM (Therapeutic Exercise) left;right;flexion;extension;10 repetitions;2 sets;supine  attempted to work shoulder some but very difficult on R UE. L UE AAROM shoulder fl/ext  -     Row Name 03/28/23 1227          Motor Skills    Therapeutic Exercise elbow/forearm  -     Row Name 03/28/23 1227          Balance    Comment, Balance NT pt declined EOB not feeling well.  -           User Key  (r) = Recorded By, (t) = Taken By, (c) = Cosigned By    Initials Name Provider Type     Brenda Baez, RA Occupational Therapist               Goals/Plan    No documentation.                Clinical Impression     Row Name 03/28/23 1227          Pain Assessment    Pretreatment Pain Rating 4/10  -KP     Posttreatment Pain Rating 4/10  -KP     Pain Location - Side/Orientation Right  -     Pain Location upper  -     Pain Location - shoulder  -     Pain Intervention(s) Repositioned  -     Row Name 03/28/23 1227          Plan of Care Review    Plan of Care Reviewed With patient  -     Progress no change  -     Outcome Evaluation pt worked w OT in tx session, but very short session as she started to feel nauseated. Did not want to get up this date, also tired and having some pain in R shoulder. pt completed ROM ex w R and L UE and washed her face w SBA. noted now pt is dc today w family.  -     [place]  Name 03/28/23 1227          Positioning and Restraints    Pre-Treatment Position in bed  -KP     Post Treatment Position bed  -KP     In Bed notified nsg;call light within reach;encouraged to call for assist;yee  pt refuses bed alarm  -KP           User Key  (r) = Recorded By, (t) = Taken By, (c) = Cosigned By    Initials Name Provider Type    Brenda Mayorga OTR Occupational Therapist               Outcome Measures     Row Name 03/28/23 1229          How much help from another is currently needed...    Putting on and taking off regular lower body clothing? 3  -KP     Bathing (including washing, rinsing, and drying) 3  -KP     Toileting (which includes using toilet bed pan or urinal) 3  -KP     Putting on and taking off regular upper body clothing 3  -KP     Taking care of personal grooming (such as brushing teeth) 3  -KP     Eating meals 4  -KP     AM-PAC 6 Clicks Score (OT) 19  -KP           User Key  (r) = Recorded By, (t) = Taken By, (c) = Cosigned By    Initials Name Provider Type    Brenda Mayorga OTR Occupational Therapist              Occupational Therapy Education     Title: PT OT SLP Therapies (Resolved)     Topic: Occupational Therapy (Resolved)     Point: ADL training (Resolved)     Description:   Instruct learner(s) on proper safety adaptation and remediation techniques during self care or transfers.   Instruct in proper use of assistive devices.              Learning Progress Summary           Patient Acceptance, E,TB,D, VU,DU by  at 3/26/2023 1419    Comment: ed pt on role of OT. benefit of therapy. ed on ROM ex and POC w OT. ed on cont therapy once dc for R shoulder. pt reports ex they were doing last in therapy including raising shoulder in flexion and abd   Family Acceptance, E,TB,D, VU,DU by  at 3/26/2023 1419    Comment: ed pt on role of OT. benefit of therapy. ed on ROM ex and POC w OT. ed on cont therapy once dc for R shoulder. pt reports ex they were doing last  in therapy including raising shoulder in flexion and abd                   Point: Home exercise program (Resolved)     Description:   Instruct learner(s) on appropriate technique for monitoring, assisting and/or progressing therapeutic exercises/activities.              Learner Progress:  Not documented in this visit.          Point: Precautions (Resolved)     Description:   Instruct learner(s) on prescribed precautions during self-care and functional transfers.              Learning Progress Summary           Patient Acceptance, E,TB,D, VU,DU by  at 3/26/2023 1419    Comment: ed pt on role of OT. benefit of therapy. ed on ROM ex and POC w OT. ed on cont therapy once dc for R shoulder. pt reports ex they were doing last in therapy including raising shoulder in flexion and abd   Family Acceptance, E,TB,D, VU,DU by  at 3/26/2023 1419    Comment: ed pt on role of OT. benefit of therapy. ed on ROM ex and POC w OT. ed on cont therapy once dc for R shoulder. pt reports ex they were doing last in therapy including raising shoulder in flexion and abd                   Point: Body mechanics (Resolved)     Description:   Instruct learner(s) on proper positioning and spine alignment during self-care, functional mobility activities and/or exercises.              Learning Progress Summary           Patient Acceptance, E,TB,D, VU,DU by  at 3/26/2023 1419    Comment: ed pt on role of OT. benefit of therapy. ed on ROM ex and POC w OT. ed on cont therapy once dc for R shoulder. pt reports ex they were doing last in therapy including raising shoulder in flexion and abd   Family Acceptance, E,TB,D, VU,DU by  at 3/26/2023 1419    Comment: ed pt on role of OT. benefit of therapy. ed on ROM ex and POC w OT. ed on cont therapy once dc for R shoulder. pt reports ex they were doing last in therapy including raising shoulder in flexion and abd                               User Key     Initials Effective Dates Name Provider Type  Discipline     06/16/21 -  Brenda Baez OTR Occupational Therapist OT              OT Recommendation and Plan  Planned Therapy Interventions (OT): activity tolerance training, functional balance retraining, occupation/activity based interventions, ROM/therapeutic exercise, strengthening exercise, transfer/mobility retraining, patient/caregiver education/training, BADL retraining  Therapy Frequency (OT): 5 times/wk  Plan of Care Review  Plan of Care Reviewed With: patient  Progress: no change  Outcome Evaluation: pt worked w OT in tx session, but very short session as she started to feel nauseated. Did not want to get up this date, also tired and having some pain in R shoulder. pt completed ROM ex w R and L UE and washed her face w SBA. noted now pt is dc today w family.  Plan of Care Reviewed With: patient  Outcome Evaluation: pt worked w OT in tx session, but very short session as she started to feel nauseated. Did not want to get up this date, also tired and having some pain in R shoulder. pt completed ROM ex w R and L UE and washed her face w SBA. noted now pt is dc today w family.     Time Calculation:    Time Calculation- OT     Row Name 03/28/23 1231             Time Calculation- OT    OT Start Time 0827  -      OT Stop Time 0839  -      OT Time Calculation (min) 12 min  -      Total Timed Code Minutes- OT 12 minute(s)  -      OT Received On 03/28/23  -      OT - Next Appointment 03/30/23  -            User Key  (r) = Recorded By, (t) = Taken By, (c) = Cosigned By    Initials Name Provider Type     Brenda Baez OTR Occupational Therapist              Therapy Charges for Today     Code Description Service Date Service Provider Modifiers Qty    [id number]  OT THER PROC EA 15 MIN 3/28/2023 Brenda Baez OTR GO 1             OT Discharge Summary  Anticipated Discharge Disposition (OT): home with assist, home with outpatient therapy services (for R  shoulder)  Reason for Discharge: Discharge from facility  Discharge Destination: Home, Home with assist, Home with outpatient services (for R shoulder)    Brenda Baez, OTR  3/28/2023

## 2023-03-28 NOTE — PROGRESS NOTES
"DOS: 3/28/2023  NAME: Dalila Javed   : 1950  PCP: Cinthya Valentine APRN  Chief Complaint   Patient presents with   • Dizziness   • Gait Problem     Stroke    Subjective: States she feels a little dizzy but no sarah vertigo. No vision change. Feels she is at baseline. Her  is at the bedside.     Objective:  Vital signs: /82 (BP Location: Right arm, Patient Position: Lying)   Pulse 54   Temp 98.1 °F (36.7 °C) (Oral)   Resp 18   Ht 154.9 cm (61\")   Wt 81.6 kg (180 lb)   SpO2 93%   BMI 34.01 kg/m²       General appearance: Well developed, well nourished, well groomed, alert and cooperative.   HEENT: Normocephalic.   Cardiac: Regular rate and rhythm. No murmurs.   Chest Exam: Clear to auscultation bilaterally, no wheezes, no rhonchi.  Extremities: Normal, no edema.   Skin: No rashes or birthmarks.      Higher integrative function: Awake/alert, oriented to self and location. Oriented to 2023. Intact recent memory, attention/concentration. Speech fluent.   CN II: R superior quadrantanopia.   CN III IV VI: EOMI without nystagmus.  PERRL.  CN V: Decreased right facial sensation.  CN VII: Facial movements are symmetric, no weakness.   CN VIII: Auditory acuity is normal.   CN IX & X: Symmetric palatal movement.   CN XI: Sternocleidomastoid and trapezius are normal. No weakness.   CN XII: The tongue is midline. No atrophy or fasciculations.   Motor: Right arm and leg 4/5, left arm and leg 5 out of 5.  No fasciculations, rigidity, spasticity or abnormal movements.   Sensation: Decreased to light touch in right arm and leg.  Station and gait: Deferred.  Coordination: Finger to nose test showed no dysmetria.   The patient was reexamined, changes noted.     Scheduled Meds:apixaban, 5 mg, Oral, Q12H  ARIPiprazole, 2 mg, Oral, Q PM  aspirin, 325 mg, Oral, Daily   Or  aspirin, 300 mg, Rectal, Daily  atorvastatin, 80 mg, Oral, Nightly  budesonide-formoterol, 2 puff, Inhalation, BID - " RT  cholecalciferol, 5,000 Units, Oral, Daily  citalopram, 20 mg, Oral, Daily  diazePAM, 5 mg, Intravenous, Once  fluticasone, 2 spray, Each Nare, Daily  HYDROcodone-acetaminophen, 1 tablet, Oral, 4x Daily  insulin lispro, 0-9 Units, Subcutaneous, TID With Meals  LORazepam, 1 mg, Intravenous, Once  metoprolol tartrate, 50 mg, Oral, BID  mirtazapine, 7.5 mg, Oral, Nightly  pantoprazole, 40 mg, Oral, Q AM  vitamin B-12, 50 mcg, Oral, Daily      Continuous Infusions:   PRN Meds:.•  acetaminophen **OR** [DISCONTINUED] acetaminophen  •  albuterol  •  bisacodyl  •  dextrose  •  dextrose  •  glucagon (human recombinant)  •  ondansetron  •  sodium chloride      Laboratory results:  Lab Results   Component Value Date    GLUCOSE 171 (H) 03/27/2023    CALCIUM 8.5 (L) 03/27/2023     (L) 03/27/2023    K 3.7 03/27/2023    CO2 23.4 03/27/2023     03/27/2023    BUN 9 03/27/2023    CREATININE 0.79 03/27/2023    EGFRIFAFRI 62 10/29/2021    EGFRIFNONA 56 (L) 12/28/2021    BCR 11.4 03/27/2023    ANIONGAP 7.6 03/27/2023     Lab Results   Component Value Date    WBC 10.01 03/27/2023    HGB 13.7 03/27/2023    HCT 40.4 03/27/2023    MCV 89.6 03/27/2023     03/27/2023     Lab Results   Component Value Date    CHOL 138 03/26/2023    CHOL 106 08/20/2020     Lab Results   Component Value Date    HDL 28 (L) 03/26/2023    HDL 26 (L) 10/29/2021    HDL 34 (L) 08/20/2020     Lab Results   Component Value Date    LDL 74 03/26/2023    LDL 69 10/29/2021    LDL 54 08/20/2020     Lab Results   Component Value Date    TRIG 218 (H) 03/26/2023    TRIG 228 (H) 10/29/2021    TRIG 90 08/20/2020         Lab 03/26/23  0446   HEMOGLOBIN A1C 9.70*      Review and interpretation of imaging:MRI brain images viewed by me, no acute findings seen.   Adult transthoracic echo complete    Result Date: 3/27/2023  •  Left ventricular systolic function is normal. Calculated left ventricular EF = 68.5% •  Left ventricular wall thickness is consistent with  borderline concentric hypertrophy. •  Left ventricular diastolic function was normal. •  Left atrial volume is mildly increased. •  Saline test results are negative.     CT Angiogram Neck    Result Date: 3/25/2023  CT ANGIOGRAM HEAD AND NECK WITH IV CONTRAST, CT CEREBRAL PERFUSION WITH AND WITHOUT CONTRAST.  HISTORY: Dizziness, gait problem.  TECHNIQUE: Head CT was obtained without IV contrast followed by intravenous contrast administration and CT angiogram of the head and neck with IV contrast and this data was reconstructed in coronal and sagittal planes and 3-dimensional volume rendering was performed. CT cerebral perfusion imaging was obtained with and without IV contrast as well as use of rapid software. AI analysis of LVO was utilized. Radiation dose reduction techniques were utilized, including automated exposure control and exposure modulation based on body size.  COMPARISON: CT angiogram head and neck 12/28/2021.  FINDINGS: Noncontrast exam demonstrates no abnormal areas of increased attenuation intraaxially to suggest hemorrhage. No extra-axial fluid collection is observed. Within the posterior medial-inferior left occipital lobe there is a chronic infarction that exhibits no change in size or configuration when compared to the prior examination in 12/2021. There is mild atrophy associated with ventricular enlargement. There is no evidence for cerebral edema or mass effect or shift of midline structures. CT cerebral perfusion imaging demonstrates 0 mL where there is cerebral blood flow less than 30% and 0 mL where there is T MAX greater than 6 seconds.  Great vessel origins appear patent. There is mild atherosclerotic disease involving both proximal internal carotid arteries with 0% NASCET stenosis by NASCET criteria. There are also atherosclerotic calcifications involving the cavernous segment of both internal carotid arteries with mild narrowing. The distal cervical, petrous, cavernous, supracavernous  internal carotid arteries are patent. Both middle cerebral arteries and anterior cerebral arteries and major branches are patent. The left vertebral artery arises from the left subclavian artery just distal to its origin. Both cervical vertebral arteries are patent. The intracranial segments of both vertebral arteries and the basilar artery and both posterior cerebral arteries are patent. Postcontrast enhanced head CT demonstrates no abnormal intracranial enhancement.  There are 2 low-density right lobe thyroid nodules measuring up to 1.2 cm. No endotracheal lesion is demonstrated. The lung apices appear clear. There is a right shoulder arthroplasty. Degenerative disc disease is present in the cervical spine with disc space narrowing at C5-6 and C6-7.      1. No evidence for acute intracranial abnormality or hemorrhage. Remote left occipital lobe infarction. 2. Atherosclerotic disease involving both proximal ICAs and the cavernous segments opacities without NASCET stenosis or significant change compared to prior exam 12/28/2021. 3. 2 right lobe thyroid low-density nodules.   Noncontrast exam findings called to the stroke neurologist on-call at 6:41 PM and contrasted exam findings called at 6:55 PM.  This report was finalized on 3/25/2023 7:38 PM by Dr. Rigoberto Lema M.D.      MRI Brain Without Contrast    Result Date: 3/28/2023  MRI OF THE BRAIN WITHOUT CONTRAST  CLINICAL HISTORY: Memory issues. Confusion.  TECHNIQUE: MRI of the brain was obtained with sagittal T1, axial T1, axial FLAIR, axial T2, axial diffusion, and axial gradient echo images.  FINDINGS:  There are findings consistent with a chronic infarct within left PCA distribution where there is a focus of encephalomalacia within the inferior medial portion of the left temporal and occipital lobes measuring up to 6.1 x 1.8 cm in greatest axial dimensions. A chronic infarct is seen within the left thalamus measuring up to approximately 17 x 3 mm in greatest  axial dimensions. Additionally, there is a 13 x 3 mm focus of T2 hyperintensity within the left cerebral peduncle suspicious for a chronic infarct at this site as well.  However, there are no abnormal foci of restricted diffusion. The ventricles, sulci, and cisterns are age appropriate. There are no abnormal areas of susceptibility artifact. The major intracranial flow related signal voids are unremarkable. The midline intracranial anatomy is unremarkable. Mild changes of chronic small vessel ischemic phenomena are noted.       There is no evidence for acute intracranial pathology.  There are findings consistent with a chronic infarct within the inferior medial portion of the left temporal and occipital lobes within the left PCA distribution. Additionally, there is a chronic infarct within the left thalamus measuring up to 17 x 3 mm in greatest axial dimensions that is also likely within left PCA distribution. Finally, there is a linear area of T2 hyperintensity within the left cerebral peduncle that is suspicious for chronic infarct at this location as well.  This report was finalized on 3/28/2023 6:57 AM by Dr. Rafal Helm M.D.      XR Chest 1 View    Result Date: 3/25/2023  CHEST SINGLE VIEW  HISTORY: Dizziness.  COMPARISON: Portable chest 12/01/2022.  FINDINGS: Cardiomediastinal silhouette is within normal limits. Lungs appear clear and there is no evidence for pulmonary edema or pleural effusion or infiltrate. Mild linear scarring within the left midlung superimposing the caudal aspect of the scapula. Reverse right shoulder arthroplasty is present.      No evidence for active disease in the chest.  This report was finalized on 3/25/2023 10:32 PM by Dr. Rigoberto Lema M.D.      CT Angiogram Head w AI Analysis of LVO    Result Date: 3/25/2023  CT ANGIOGRAM HEAD AND NECK WITH IV CONTRAST, CT CEREBRAL PERFUSION WITH AND WITHOUT CONTRAST.  HISTORY: Dizziness, gait problem.  TECHNIQUE: Head CT was obtained  without IV contrast followed by intravenous contrast administration and CT angiogram of the head and neck with IV contrast and this data was reconstructed in coronal and sagittal planes and 3-dimensional volume rendering was performed. CT cerebral perfusion imaging was obtained with and without IV contrast as well as use of rapid software. AI analysis of LVO was utilized. Radiation dose reduction techniques were utilized, including automated exposure control and exposure modulation based on body size.  COMPARISON: CT angiogram head and neck 12/28/2021.  FINDINGS: Noncontrast exam demonstrates no abnormal areas of increased attenuation intraaxially to suggest hemorrhage. No extra-axial fluid collection is observed. Within the posterior medial-inferior left occipital lobe there is a chronic infarction that exhibits no change in size or configuration when compared to the prior examination in 12/2021. There is mild atrophy associated with ventricular enlargement. There is no evidence for cerebral edema or mass effect or shift of midline structures. CT cerebral perfusion imaging demonstrates 0 mL where there is cerebral blood flow less than 30% and 0 mL where there is T MAX greater than 6 seconds.  Great vessel origins appear patent. There is mild atherosclerotic disease involving both proximal internal carotid arteries with 0% NASCET stenosis by NASCET criteria. There are also atherosclerotic calcifications involving the cavernous segment of both internal carotid arteries with mild narrowing. The distal cervical, petrous, cavernous, supracavernous internal carotid arteries are patent. Both middle cerebral arteries and anterior cerebral arteries and major branches are patent. The left vertebral artery arises from the left subclavian artery just distal to its origin. Both cervical vertebral arteries are patent. The intracranial segments of both vertebral arteries and the basilar artery and both posterior cerebral arteries  are patent. Postcontrast enhanced head CT demonstrates no abnormal intracranial enhancement.  There are 2 low-density right lobe thyroid nodules measuring up to 1.2 cm. No endotracheal lesion is demonstrated. The lung apices appear clear. There is a right shoulder arthroplasty. Degenerative disc disease is present in the cervical spine with disc space narrowing at C5-6 and C6-7.      1. No evidence for acute intracranial abnormality or hemorrhage. Remote left occipital lobe infarction. 2. Atherosclerotic disease involving both proximal ICAs and the cavernous segments opacities without NASCET stenosis or significant change compared to prior exam 12/28/2021. 3. 2 right lobe thyroid low-density nodules.   Noncontrast exam findings called to the stroke neurologist on-call at 6:41 PM and contrasted exam findings called at 6:55 PM.  This report was finalized on 3/25/2023 7:38 PM by Dr. Rigoberto Lema M.D.      CT CEREBRAL PERFUSION WITH & WITHOUT CONTRAST    Result Date: 3/25/2023  CT ANGIOGRAM HEAD AND NECK WITH IV CONTRAST, CT CEREBRAL PERFUSION WITH AND WITHOUT CONTRAST.  HISTORY: Dizziness, gait problem.  TECHNIQUE: Head CT was obtained without IV contrast followed by intravenous contrast administration and CT angiogram of the head and neck with IV contrast and this data was reconstructed in coronal and sagittal planes and 3-dimensional volume rendering was performed. CT cerebral perfusion imaging was obtained with and without IV contrast as well as use of rapid software. AI analysis of LVO was utilized. Radiation dose reduction techniques were utilized, including automated exposure control and exposure modulation based on body size.  COMPARISON: CT angiogram head and neck 12/28/2021.  FINDINGS: Noncontrast exam demonstrates no abnormal areas of increased attenuation intraaxially to suggest hemorrhage. No extra-axial fluid collection is observed. Within the posterior medial-inferior left occipital lobe there is a  chronic infarction that exhibits no change in size or configuration when compared to the prior examination in 12/2021. There is mild atrophy associated with ventricular enlargement. There is no evidence for cerebral edema or mass effect or shift of midline structures. CT cerebral perfusion imaging demonstrates 0 mL where there is cerebral blood flow less than 30% and 0 mL where there is T MAX greater than 6 seconds.  Great vessel origins appear patent. There is mild atherosclerotic disease involving both proximal internal carotid arteries with 0% NASCET stenosis by NASCET criteria. There are also atherosclerotic calcifications involving the cavernous segment of both internal carotid arteries with mild narrowing. The distal cervical, petrous, cavernous, supracavernous internal carotid arteries are patent. Both middle cerebral arteries and anterior cerebral arteries and major branches are patent. The left vertebral artery arises from the left subclavian artery just distal to its origin. Both cervical vertebral arteries are patent. The intracranial segments of both vertebral arteries and the basilar artery and both posterior cerebral arteries are patent. Postcontrast enhanced head CT demonstrates no abnormal intracranial enhancement.  There are 2 low-density right lobe thyroid nodules measuring up to 1.2 cm. No endotracheal lesion is demonstrated. The lung apices appear clear. There is a right shoulder arthroplasty. Degenerative disc disease is present in the cervical spine with disc space narrowing at C5-6 and C6-7.      1. No evidence for acute intracranial abnormality or hemorrhage. Remote left occipital lobe infarction. 2. Atherosclerotic disease involving both proximal ICAs and the cavernous segments opacities without NASCET stenosis or significant change compared to prior exam 12/28/2021. 3. 2 right lobe thyroid low-density nodules.   Noncontrast exam findings called to the stroke neurologist on-call at 6:41 PM  and contrasted exam findings called at 6:55 PM.  This report was finalized on 3/25/2023 7:38 PM by Dr. Rigoberto Lema M.D.          Impression:  72-year-old right-handed female with history of left PCA stroke in August 2020, parkinsonism, migraine, questionable seizure, (not treated with AEDs), HTN, HLD, CAD, B12 deficiency, Williamson's esophagus, DM, diabetic neuropathy, SILVIANO, lupus anticoagulant, PFO, and A-fib, on Eliquis 5 mg twice daily and Crestor 20 mg daily prior to arrival, who presented 3/25/2023 with complaints of dizziness described as room spinning with associated difficulty walking and dull headache.  Dizziness has since resolved.     Work-up:  ECG: NSR  CT head: No acute findings, chronic left occipital lobe infarct noted.  CTA head/neck: Atherosclerotic disease in bilateral proximal ICAs and cavernous ICAs, stable from December 2021.  2 right lobe thyroid low-density nodules.  MRI brain 3/27: no acute findings. Chronic L occipital stroke noted.   2D echo: EF 68.5%, mildly increased left atrial volume.  Saline test results are negative.  Trace mitral valve and tricuspid regurgitation.  Labs: LDL 74, TSH1.31, hemoglobin A1c 9.70%.    Diagnosis  Perioheral vertigo, resolved  H/O left occipital stroke   The above impression statement reviewed and changes noted.    Plan:  If vertigo recurs recommend outpatient PT referral for vestibular evaluation/treatment  Continue eliquis and statin at home dosages  OK for d/c from a neurology standpoint. D/W Dr Zamora today. Neurology will sign off but please call with further quesitons/concenrs.

## 2023-03-28 NOTE — PLAN OF CARE
Goal Outcome Evaluation:              Outcome Evaluation: She is alert and oriented x 4. No changes in neuro status. Able to get up to bedside commode with little assistance from staff.

## 2023-03-28 NOTE — DISCHARGE SUMMARY
Patient Name: Dalila Javed  : 1950  MRN: [id number]    Date of Admission: 3/25/2023  Date of Discharge:  3/28/2023  Primary Care Physician: Cinthya Valentine APRN      Chief Complaint:   Dizziness and Gait Problem      Discharge Diagnoses     Active Hospital Problems    Diagnosis  POA   • **Peripheral vertigo [H81.399]  Yes   • Obesity (BMI 30-39.9) [E66.9]  Unknown   • Parkinson disease (HCC) [G20]  Yes   • DODSON (nonalcoholic steatohepatitis) [K75.81]  Yes   • Coronary arteriosclerosis in native artery [I25.10]  Yes   • Controlled diabetes mellitus type 2 with complications (HCC) [E11.8]  Yes   • Dyslipidemia [E78.5]  Yes   • Obstructive sleep apnea syndrome [G47.33]  Yes      Resolved Hospital Problems   No resolved problems to display.        Hospital Course     Ms. Javed is a 72-year-old right-handed female with history of left PCA stroke in 2020, parkinsonism, migraine, questionable seizure, (not treated with AEDs), HTN, HLD, CAD, B12 deficiency, Williamson's esophagus, DM, diabetic neuropathy, SILVIANO, lupus anticoagulant, PFO, and A-fib, on Eliquis 5 mg twice daily and Crestor 20 mg daily prior to arrival, who presented 3/25/2023 with complaints of dizziness described as room spinning with associated difficulty walking and dull headache.  Dizziness has since resolved.  She was seen by neurology who basically performed stroke work-up with results outlined below...    ECG: NSR  CT head: No acute findings, chronic left occipital lobe infarct noted.  CTA head/neck: Atherosclerotic disease in bilateral proximal ICAs and cavernous ICAs, stable from 2021.  2 right lobe thyroid low-density nodules.  2D echo: EF 68.5%, mildly increased left atrial volume.  Saline test results are negative.  Trace mitral valve and tricuspid regurgitation.  MRI brain: No acute intracranial pathology; multiple chronic infarcts  Labs: LDL 74, TSH1.31, hemoglobin A1c 9.70%.      Given the no acute  stroke was identified neurology recommends going back to preadmission medications including Eliquis and Crestor.  Recommend follow-up with PCP.  Needs better control of diabetes given A1c 9.7%.    Suspected diagnosis peripheral vertigo.  If symptoms recur recommend outpatient PT referral for vestibular evaluation/treatment.      Day of Discharge     Subjective:  Overall feels better.  Feels well to go home today.    Physical Exam:  Temp:  [98.1 °F (36.7 °C)-98.7 °F (37.1 °C)] 98.1 °F (36.7 °C)  Heart Rate:  [54-74] 54  Resp:  [18] 18  BP: (103-157)/(65-82) 103/82  Body mass index is 34.01 kg/m².  Physical Exam  Vitals and nursing note reviewed.   Constitutional:       General: She is not in acute distress.  Cardiovascular:      Rate and Rhythm: Normal rate and regular rhythm.   Pulmonary:      Effort: Pulmonary effort is normal.      Breath sounds: Normal breath sounds.   Abdominal:      General: Bowel sounds are normal.      Palpations: Abdomen is soft.      Tenderness: There is no abdominal tenderness.   Musculoskeletal:         General: No swelling.   Skin:     General: Skin is warm and dry.   Neurological:      Mental Status: She is alert. Mental status is at baseline.         Consultants     Consult Orders (all) (From admission, onward)     Start     Ordered    03/25/23 1918  Notify Stroke Coordinator  Once        Provider:  (Not yet assigned)    03/25/23 1918 03/25/23 1918  Inpatient Rehab Admission Consult  Once        Provider:  (Not yet assigned)    03/25/23 1918 03/25/23 1918  Consult to Case Management   Once        Provider:  (Not yet assigned)    03/25/23 1918 03/25/23 1918  Consult to Diabetes Educator  Once        Provider:  (Not yet assigned)    03/25/23 1918 03/25/23 1918  Inpatient Neurology Consult Stroke  Once        Specialty:  Neurology  Provider:  James Zamora MD    03/25/23 1918              Procedures     Imaging Results (All)     Procedure Component  Value Units Date/Time    MRI Brain Without Contrast [[id number]] Collected: 03/27/23 1559     Updated: 03/28/23 0701    Narrative:      MRI OF THE BRAIN WITHOUT CONTRAST     CLINICAL HISTORY: Memory issues. Confusion.     TECHNIQUE: MRI of the brain was obtained with sagittal T1, axial T1,  axial FLAIR, axial T2, axial diffusion, and axial gradient echo images.     FINDINGS:     There are findings consistent with a chronic infarct within left PCA  distribution where there is a focus of encephalomalacia within the  inferior medial portion of the left temporal and occipital lobes  measuring up to 6.1 x 1.8 cm in greatest axial dimensions. A chronic  infarct is seen within the left thalamus measuring up to approximately  17 x 3 mm in greatest axial dimensions. Additionally, there is a 13 x 3  mm focus of T2 hyperintensity within the left cerebral peduncle  suspicious for a chronic infarct at this site as well.     However, there are no abnormal foci of restricted diffusion. The  ventricles, sulci, and cisterns are age appropriate. There are no  abnormal areas of susceptibility artifact. The major intracranial flow  related signal voids are unremarkable. The midline intracranial anatomy  is unremarkable. Mild changes of chronic small vessel ischemic phenomena  are noted.       Impression:         There is no evidence for acute intracranial pathology.     There are findings consistent with a chronic infarct within the inferior  medial portion of the left temporal and occipital lobes within the left  PCA distribution. Additionally, there is a chronic infarct within the  left thalamus measuring up to 17 x 3 mm in greatest axial dimensions  that is also likely within left PCA distribution. Finally, there is a  linear area of T2 hyperintensity within the left cerebral peduncle that  is suspicious for chronic infarct at this location as well.     This report was finalized on 3/28/2023 6:57 AM by Dr. Rafal Helm M.D.       CT  Angiogram Head w AI Analysis of LVO [[id number]] Collected: 03/25/23 1906     Updated: 03/27/23 0932    Narrative:      CT ANGIOGRAM HEAD AND NECK WITH IV CONTRAST, CT CEREBRAL PERFUSION WITH  AND WITHOUT CONTRAST.     HISTORY: Dizziness, gait problem.     TECHNIQUE: Head CT was obtained without IV contrast followed by  intravenous contrast administration and CT angiogram of the head and  neck with IV contrast and this data was reconstructed in coronal and  sagittal planes and 3-dimensional volume rendering was performed. CT  cerebral perfusion imaging was obtained with and without IV contrast as  well as use of rapid software. AI analysis of LVO was utilized.  Radiation dose reduction techniques were utilized, including automated  exposure control and exposure modulation based on body size.     COMPARISON: CT angiogram head and neck 12/28/2021.     FINDINGS: Noncontrast exam demonstrates no abnormal areas of increased  attenuation intraaxially to suggest hemorrhage. No extra-axial fluid  collection is observed. Within the posterior medial-inferior left  occipital lobe there is a chronic infarction that exhibits no change in  size or configuration when compared to the prior examination in 12/2021.  There is mild atrophy associated with ventricular enlargement. There is  no evidence for cerebral edema or mass effect or shift of midline  structures. CT cerebral perfusion imaging demonstrates 0 mL where there  is cerebral blood flow less than 30% and 0 mL where there is T MAX  greater than 6 seconds.     Great vessel origins appear patent. There is mild atherosclerotic  disease involving both proximal internal carotid arteries with 0% NASCET  stenosis by NASCET criteria. There are also atherosclerotic  calcifications involving the cavernous segment of both internal carotid  arteries with mild narrowing. The distal cervical, petrous, cavernous,  supracavernous internal carotid arteries are patent. Both middle  cerebral  arteries and anterior cerebral arteries and major branches are  patent. The left vertebral artery arises from the left subclavian artery  just distal to its origin. Both cervical vertebral arteries are patent.  The intracranial segments of both vertebral arteries and the basilar  artery and both posterior cerebral arteries are patent. Postcontrast  enhanced head CT demonstrates no abnormal intracranial enhancement.     There are 2 low-density right lobe thyroid nodules measuring up to 1.2  cm. No endotracheal lesion is demonstrated. The lung apices appear  clear. There is a right shoulder arthroplasty. Degenerative disc disease  is present in the cervical spine with disc space narrowing at C5-6 and  C6-7.       Impression:      1. No evidence for acute intracranial abnormality or hemorrhage. Remote  left occipital lobe infarction.  2. Atherosclerotic disease involving both proximal ICAs and the  cavernous segments opacities without NASCET stenosis or significant  change compared to prior exam 12/28/2021.  3. 2 right lobe thyroid low-density nodules.         Noncontrast exam findings called to the stroke neurologist on-call at  6:41 PM and contrasted exam findings called at 6:55 PM.     This report was finalized on 3/25/2023 7:38 PM by Dr. Rigoberto Lema M.D.       CT Angiogram Neck [[id number]] Collected: 03/25/23 1906     Updated: 03/27/23 0932    Narrative:      CT ANGIOGRAM HEAD AND NECK WITH IV CONTRAST, CT CEREBRAL PERFUSION WITH  AND WITHOUT CONTRAST.     HISTORY: Dizziness, gait problem.     TECHNIQUE: Head CT was obtained without IV contrast followed by  intravenous contrast administration and CT angiogram of the head and  neck with IV contrast and this data was reconstructed in coronal and  sagittal planes and 3-dimensional volume rendering was performed. CT  cerebral perfusion imaging was obtained with and without IV contrast as  well as use of rapid software. AI analysis of LVO was utilized.  Radiation  dose reduction techniques were utilized, including automated  exposure control and exposure modulation based on body size.     COMPARISON: CT angiogram head and neck 12/28/2021.     FINDINGS: Noncontrast exam demonstrates no abnormal areas of increased  attenuation intraaxially to suggest hemorrhage. No extra-axial fluid  collection is observed. Within the posterior medial-inferior left  occipital lobe there is a chronic infarction that exhibits no change in  size or configuration when compared to the prior examination in 12/2021.  There is mild atrophy associated with ventricular enlargement. There is  no evidence for cerebral edema or mass effect or shift of midline  structures. CT cerebral perfusion imaging demonstrates 0 mL where there  is cerebral blood flow less than 30% and 0 mL where there is T MAX  greater than 6 seconds.     Great vessel origins appear patent. There is mild atherosclerotic  disease involving both proximal internal carotid arteries with 0% NASCET  stenosis by NASCET criteria. There are also atherosclerotic  calcifications involving the cavernous segment of both internal carotid  arteries with mild narrowing. The distal cervical, petrous, cavernous,  supracavernous internal carotid arteries are patent. Both middle  cerebral arteries and anterior cerebral arteries and major branches are  patent. The left vertebral artery arises from the left subclavian artery  just distal to its origin. Both cervical vertebral arteries are patent.  The intracranial segments of both vertebral arteries and the basilar  artery and both posterior cerebral arteries are patent. Postcontrast  enhanced head CT demonstrates no abnormal intracranial enhancement.     There are 2 low-density right lobe thyroid nodules measuring up to 1.2  cm. No endotracheal lesion is demonstrated. The lung apices appear  clear. There is a right shoulder arthroplasty. Degenerative disc disease  is present in the cervical spine with disc  space narrowing at C5-6 and  C6-7.       Impression:      1. No evidence for acute intracranial abnormality or hemorrhage. Remote  left occipital lobe infarction.  2. Atherosclerotic disease involving both proximal ICAs and the  cavernous segments opacities without NASCET stenosis or significant  change compared to prior exam 12/28/2021.  3. 2 right lobe thyroid low-density nodules.         Noncontrast exam findings called to the stroke neurologist on-call at  6:41 PM and contrasted exam findings called at 6:55 PM.     This report was finalized on 3/25/2023 7:38 PM by Dr. Rigoberto Lema M.D.       CT CEREBRAL PERFUSION WITH & WITHOUT CONTRAST [[id number]] Collected: 03/25/23 1906     Updated: 03/27/23 0932    Narrative:      CT ANGIOGRAM HEAD AND NECK WITH IV CONTRAST, CT CEREBRAL PERFUSION WITH  AND WITHOUT CONTRAST.     HISTORY: Dizziness, gait problem.     TECHNIQUE: Head CT was obtained without IV contrast followed by  intravenous contrast administration and CT angiogram of the head and  neck with IV contrast and this data was reconstructed in coronal and  sagittal planes and 3-dimensional volume rendering was performed. CT  cerebral perfusion imaging was obtained with and without IV contrast as  well as use of rapid software. AI analysis of LVO was utilized.  Radiation dose reduction techniques were utilized, including automated  exposure control and exposure modulation based on body size.     COMPARISON: CT angiogram head and neck 12/28/2021.     FINDINGS: Noncontrast exam demonstrates no abnormal areas of increased  attenuation intraaxially to suggest hemorrhage. No extra-axial fluid  collection is observed. Within the posterior medial-inferior left  occipital lobe there is a chronic infarction that exhibits no change in  size or configuration when compared to the prior examination in 12/2021.  There is mild atrophy associated with ventricular enlargement. There is  no evidence for cerebral edema or mass  effect or shift of midline  structures. CT cerebral perfusion imaging demonstrates 0 mL where there  is cerebral blood flow less than 30% and 0 mL where there is T MAX  greater than 6 seconds.     Great vessel origins appear patent. There is mild atherosclerotic  disease involving both proximal internal carotid arteries with 0% NASCET  stenosis by NASCET criteria. There are also atherosclerotic  calcifications involving the cavernous segment of both internal carotid  arteries with mild narrowing. The distal cervical, petrous, cavernous,  supracavernous internal carotid arteries are patent. Both middle  cerebral arteries and anterior cerebral arteries and major branches are  patent. The left vertebral artery arises from the left subclavian artery  just distal to its origin. Both cervical vertebral arteries are patent.  The intracranial segments of both vertebral arteries and the basilar  artery and both posterior cerebral arteries are patent. Postcontrast  enhanced head CT demonstrates no abnormal intracranial enhancement.     There are 2 low-density right lobe thyroid nodules measuring up to 1.2  cm. No endotracheal lesion is demonstrated. The lung apices appear  clear. There is a right shoulder arthroplasty. Degenerative disc disease  is present in the cervical spine with disc space narrowing at C5-6 and  C6-7.       Impression:      1. No evidence for acute intracranial abnormality or hemorrhage. Remote  left occipital lobe infarction.  2. Atherosclerotic disease involving both proximal ICAs and the  cavernous segments opacities without NASCET stenosis or significant  change compared to prior exam 12/28/2021.  3. 2 right lobe thyroid low-density nodules.         Noncontrast exam findings called to the stroke neurologist on-call at  6:41 PM and contrasted exam findings called at 6:55 PM.     This report was finalized on 3/25/2023 7:38 PM by Dr. Rigoberto Lema M.D.       XR Chest 1 View [[id number]] Collected:  03/25/23 1956     Updated: 03/25/23 2235    Narrative:      CHEST SINGLE VIEW     HISTORY: Dizziness.     COMPARISON: Portable chest 12/01/2022.     FINDINGS: Cardiomediastinal silhouette is within normal limits. Lungs  appear clear and there is no evidence for pulmonary edema or pleural  effusion or infiltrate. Mild linear scarring within the left midlung  superimposing the caudal aspect of the scapula. Reverse right shoulder  arthroplasty is present.       Impression:      No evidence for active disease in the chest.     This report was finalized on 3/25/2023 10:32 PM by Dr. Rigoberto Lema M.D.           Results for orders placed during the hospital encounter of 03/25/23    Adult transthoracic echo complete    Interpretation Summary  •  Left ventricular systolic function is normal. Calculated left ventricular EF = 68.5%  •  Left ventricular wall thickness is consistent with borderline concentric hypertrophy.  •  Left ventricular diastolic function was normal.  •  Left atrial volume is mildly increased.  •  Saline test results are negative.    Pertinent Labs     Results from last 7 days   Lab Units 03/27/23 0457 03/26/23 0446 03/25/23  1825   WBC 10*3/mm3 10.01 12.08* 11.29*   HEMOGLOBIN g/dL 13.7 14.2 15.4   PLATELETS 10*3/mm3 161 164 193     Results from last 7 days   Lab Units 03/27/23 0457 03/26/23 0446 03/25/23  1825   SODIUM mmol/L 133* 142 138   POTASSIUM mmol/L 3.7 3.8 3.7   CHLORIDE mmol/L 102 105 96*   CO2 mmol/L 23.4 28.0 31.0*   BUN mg/dL 9 12 13   CREATININE mg/dL 0.79 0.89 0.95   GLUCOSE mg/dL 171* 187* 222*   EGFR mL/min/1.73 78.6 68.1 63.0     Results from last 7 days   Lab Units 03/26/23 0446 03/25/23  1825   ALBUMIN g/dL 3.7 4.4   BILIRUBIN mg/dL 0.3 0.2   ALK PHOS U/L 38* 49   AST (SGOT) U/L 28 42*   ALT (SGPT) U/L 11 34*     Results from last 7 days   Lab Units 03/27/23 0457 03/26/23 0446 03/25/23  1825   CALCIUM mg/dL 8.5* 9.3 9.3   ALBUMIN g/dL  --  3.7 4.4       Results from last 7  days   Lab Units 03/25/23  1825   HSTROP T ng/L 42*   PROBNP pg/mL 108.0       Results from last 7 days   Lab Units 03/26/23  0446   CHOLESTEROL mg/dL 138   TRIGLYCERIDES mg/dL 218*   HDL CHOL mg/dL 28*   LDL CHOL mg/dL 74             Test Results Pending at Discharge       Discharge Details        Discharge Medications      Changes to Medications      Instructions Start Date   albuterol sulfate  (90 Base) MCG/ACT inhaler  Commonly known as: ProAir HFA  What changed:   · how much to take  · when to take this  · reasons to take this    INHALE 1 TO 2 PUFFS EVERY 4 TO 6 HOURS AS NEEDED      albuterol sulfate  (90 Base) MCG/ACT inhaler  Commonly known as: PROVENTIL HFA;VENTOLIN HFA;PROAIR HFA  What changed: Another medication with the same name was changed. Make sure you understand how and when to take each.   Inhale 2 puffs every 4 hours when necessary wheeze, dyspnea, cough      alendronate 70 MG tablet  Commonly known as: FOSAMAX  What changed: additional instructions   TAKE 1 TABLET BY MOUTH EVERY 7 DAYS      Eliquis 5 MG tablet tablet  Generic drug: apixaban  What changed:   · how much to take  · when to take this   TAKE 1 TABLET BY MOUTH TWICE DAILY      furosemide 20 MG tablet  Commonly known as: LASIX  What changed: See the new instructions.   TAKE 2 TABLETS BY MOUTH EVERY MORNING AND 1 TABLET BY MOUTH EVERY AFTERNOON      NovoLIN N 100 UNIT/ML injection  Generic drug: insulin NPH  What changed: See the new instructions.   ADMINISTER 14 UNITS UNDER THE SKIN TWICE DAILY BEFORE MEALS AS DIRECTED. INCREASE BY 2 UNIT WEEKLY. MAX 30 UNIT PER DAY FOR A FASTING OF<150      omeprazole 40 MG capsule  Commonly known as: priLOSEC  What changed: when to take this   TAKE 1 CAPSULE BY MOUTH TWICE DAILY      Symbicort 160-4.5 MCG/ACT inhaler  Generic drug: budesonide-formoterol  What changed:   · when to take this  · additional instructions   INHALE 2 PUFFS BY MOUTH TWICE DAILY. RINSE MOUTH AFTER USE        "  Continue These Medications      Instructions Start Date   Accu-Chek FastClix Lancets misc   USE TO TEST BLOOD SUGAR UP TO FIVE TIMES DAILY AS DIRECTED.      ARIPiprazole 2 MG tablet  Commonly known as: ABILIFY   2 mg, Oral, Every Evening      Chlorhexidine Gluconate Cloth 2 % pads   Apply externally, Use as directed preop      citalopram 20 MG tablet  Commonly known as: CeleXA   20 mg, Oral, Daily      diphenoxylate-atropine 2.5-0.025 MG per tablet  Commonly known as: LOMOTIL   1 tablet, Oral, 4 Times Daily PRN      FREESTYLE LITE test strip  Generic drug: glucose blood   USE TO TEST FIVE TIMES DAILY      gabapentin 400 MG capsule  Commonly known as: NEURONTIN   400 mg, Oral, 3 Times Daily      glipizide 5 MG tablet  Commonly known as: GLUCOTROL   TAKE 1 TABLET BY MOUTH TWICE DAILY BEFORE MEALS      guaiFENesin 200 MG tablet   400 mg, Oral, Every 4 Hours PRN      HYDROcodone-acetaminophen 5-325 MG per tablet  Commonly known as: NORCO   1-2 tablets, Oral, Every 6 Hours PRN      Insulin Syringe 31G X 5/16\" 1 ML misc   USE TWICE DAILY AS DIRECTED      methocarbamol 750 MG tablet  Commonly known as: ROBAXIN   750 mg, Oral, 3 Times Daily      metoprolol tartrate 50 MG tablet  Commonly known as: LOPRESSOR   TAKE 1 TABLET BY MOUTH THREE TIMES DAILY      mirtazapine 7.5 MG tablet  Commonly known as: REMERON   7.5 mg, Oral, Nightly      nitroglycerin 0.4 MG SL tablet  Commonly known as: NITROSTAT   0.4 mg, Oral, Every 5 Minutes PRN, Take no more than 3 doses in 15 minutes.      ondansetron 4 MG tablet  Commonly known as: ZOFRAN   4 mg, Oral, Every 6 Hours PRN      potassium chloride 8 MEQ CR tablet  Commonly known as: KLOR-CON   TAKE 1 TABLET BY MOUTH TWICE DAILY      rosuvastatin 20 MG tablet  Commonly known as: CRESTOR   20 mg, Oral, Daily      vitamin B-12 100 MCG tablet  Commonly known as: CYANOCOBALAMIN   50 mcg, Oral, Daily      vitamin D3 125 MCG (5000 UT) capsule capsule   5,000 Units, Oral, Daily       " "      Allergies   Allergen Reactions   • Duloxetine Hcl Hallucinations   • Viibryd [Vilazodone Hcl] Hallucinations   • Nsaids Other (See Comments)     Doesn't remember   • Benadryl [Diphenhydramine] Other (See Comments)     shaking   • Carafate [Sucralfate] GI Intolerance   • Metformin Other (See Comments)     Doesn't remember   • Morphine And Related Nausea And Vomiting   • Oxycodone Nausea And Vomiting   • Penicillins Rash     Fever, \"can't breathe\"   • Statins Other (See Comments)     Can't remember       Discharge Disposition:  Home or Self Care      Discharge Diet:  No active diet order      Discharge Activity:   Activity Instructions     Activity as Tolerated            CODE STATUS:    Code Status and Medical Interventions:   Ordered at: 03/25/23 1918     Code Status (Patient has no pulse and is not breathing):    CPR (Attempt to Resuscitate)     Medical Interventions (Patient has pulse or is breathing):    Full       Future Appointments   Date Time Provider Department Center   4/26/2023 11:00 AM Daphne Salmeron APRN [facility]     Additional Instructions for the Follow-ups that You Need to Schedule     Discharge Follow-up with PCP   As directed       Currently Documented PCP:    Cinthya Valentine APRN    PCP Phone Number:    [phone]     Follow Up Details: 1 to 2 weeks (or sooner if problems)            Follow-up Information     Cinthya Valentine APRN .    Specialty: Family Medicine  Why: 1 to 2 weeks (or sooner if problems)  Contact information:  [address]  [place] [address]  [phone]                         Additional Instructions for the Follow-ups that You Need to Schedule     Discharge Follow-up with PCP   As directed       Currently Documented PCP:    Cinthya Valentine APRN    PCP Phone Number:    [phone]     Follow Up Details: 1 to 2 weeks (or sooner if problems)           Time Spent on Discharge:  Greater than 30 minutes      Brett Harman MD  [facility]  03/28/23  09:29 EDT

## 2023-03-28 NOTE — PLAN OF CARE
Goal Outcome Evaluation:  Plan of Care Reviewed With: patient        Progress: no change  Outcome Evaluation: pt worked w OT in tx session, but very short session as she started to feel nauseated. Did not want to get up this date, also tired and having some pain in R shoulder. pt completed ROM ex w R and L UE and washed her face w SBA. noted now pt is dc today w family.

## 2023-03-28 NOTE — NURSING NOTE
1032 - paged neurology for discharge clearance.     1039 - spoke w/ APRMONIQUE Brar. Patient is okay to be discharged from Neurology's standpoint.

## 2023-03-29 LAB — CREAT BLDA-MCNC: 1 MG/DL (ref 0.6–1.3)

## 2023-03-29 NOTE — OUTREACH NOTE
Prep Survey    Flowsheet Row Responses   [facility] facility patient discharged from? [place]   Is LACE score < 7 ? No   Eligibility Readm Mgmt   Discharge diagnosis Peripheral vertigo   Does the patient have one of the following disease processes/diagnoses(primary or secondary)? Other   Does the patient have Home health ordered? No   Is there a DME ordered? No   Prep survey completed? Yes          Teetee MATAMOROS - Registered Nurse

## 2023-04-04 ENCOUNTER — READMISSION MANAGEMENT (OUTPATIENT)
Dept: CALL CENTER | Facility: HOSPITAL | Age: 73
End: 2023-04-04
Payer: MEDICARE

## 2023-04-04 NOTE — OUTREACH NOTE
Medical Week 1 Survey    Flowsheet Row Responses   [facility] patient discharged from? [place]   Does the patient have one of the following disease processes/diagnoses(primary or secondary)? Other   Week 1 attempt successful? No   Unsuccessful attempts Attempt 1          Stephanie BERKOWITZ - Licensed Nurse

## 2023-04-05 ENCOUNTER — TELEPHONE (OUTPATIENT)
Dept: NEUROLOGY | Facility: CLINIC | Age: 73
End: 2023-04-05
Payer: MEDICARE

## 2023-04-06 ENCOUNTER — READMISSION MANAGEMENT (OUTPATIENT)
Dept: CALL CENTER | Facility: HOSPITAL | Age: 73
End: 2023-04-06
Payer: MEDICARE

## 2023-04-06 NOTE — OUTREACH NOTE
Medical Week 1 Survey    Flowsheet Row Responses   [facility] patient discharged from? [place]   Does the patient have one of the following disease processes/diagnoses(primary or secondary)? Other   Week 1 attempt successful? No   Unsuccessful attempts Attempt 2          Prateek BUSTILLOS - Registered Nurse

## 2023-04-11 ENCOUNTER — READMISSION MANAGEMENT (OUTPATIENT)
Dept: CALL CENTER | Facility: HOSPITAL | Age: 73
End: 2023-04-11
Payer: MEDICARE

## 2023-04-11 NOTE — OUTREACH NOTE
Medical Week 1 Survey    Flowsheet Row Responses   [facility] patient discharged from? [place]   Does the patient have one of the following disease processes/diagnoses(primary or secondary)? Other   Week 1 attempt successful? No   Unsuccessful attempts Attempt 3   Revoke Decline to participate          Stephanie BERKOWITZ - Licensed Nurse

## 2023-04-28 ENCOUNTER — TRANSCRIBE ORDERS (OUTPATIENT)
Dept: ADMINISTRATIVE | Facility: HOSPITAL | Age: 73
End: 2023-04-28
Payer: MEDICARE

## 2023-04-28 DIAGNOSIS — R05.9 COUGH, UNSPECIFIED TYPE: Primary | ICD-10-CM

## 2023-05-04 ENCOUNTER — TELEPHONE (OUTPATIENT)
Dept: NEUROLOGY | Facility: CLINIC | Age: 73
End: 2023-05-04
Payer: MEDICARE

## 2023-05-10 ENCOUNTER — OFFICE VISIT (OUTPATIENT)
Dept: NEUROLOGY | Facility: CLINIC | Age: 73
End: 2023-05-10
Payer: MEDICARE

## 2023-05-10 VITALS
WEIGHT: 185 LBS | HEART RATE: 65 BPM | OXYGEN SATURATION: 95 % | TEMPERATURE: 97.1 F | BODY MASS INDEX: 34.93 KG/M2 | SYSTOLIC BLOOD PRESSURE: 132 MMHG | HEIGHT: 61 IN | DIASTOLIC BLOOD PRESSURE: 70 MMHG

## 2023-05-10 DIAGNOSIS — I67.9 CEREBROVASCULAR DISEASE: Primary | ICD-10-CM

## 2023-05-10 DIAGNOSIS — R42 DIZZINESS: ICD-10-CM

## 2023-05-10 PROCEDURE — 1159F MED LIST DOCD IN RCRD: CPT | Performed by: PSYCHIATRY & NEUROLOGY

## 2023-05-10 PROCEDURE — 99213 OFFICE O/P EST LOW 20 MIN: CPT | Performed by: PSYCHIATRY & NEUROLOGY

## 2023-05-10 PROCEDURE — 1160F RVW MEDS BY RX/DR IN RCRD: CPT | Performed by: PSYCHIATRY & NEUROLOGY

## 2023-05-10 PROCEDURE — 3075F SYST BP GE 130 - 139MM HG: CPT | Performed by: PSYCHIATRY & NEUROLOGY

## 2023-05-10 PROCEDURE — 3078F DIAST BP <80 MM HG: CPT | Performed by: PSYCHIATRY & NEUROLOGY

## 2023-05-10 NOTE — PROGRESS NOTES
"Chief complaint: Dizziness    Patient ID: Dalila Javed is a 72 y.o. female.    HPI: I had the pleasure of seeing your patient again today.  As you may know she is a 72-year-old female here for the management of stroke as well as dizziness.  She was recently seen in the hospital for symptoms of \"vertigo\".  The patient says that for quite some time she has noted that when she stands up or changes positions she will experience a pulling or dizziness like sensation.  She says that it is quite difficult to accurately describe the sensation that she feels.  She says that she feels a fullness in her left ear.  She was seen emergently as a mention for the symptoms In March of this year.  She did have an MRI due to her history of stroke which did show the chronic infarcts within the inferior medial portion of the left temporal and occipital lobes within the left PCA distribution.  There is also chronic infarcts seen within the left thalamus also likely a PCA distribution stroke.  She has had CTA of the head and neck.  The latest performed during her hospitalization.  There was atherosclerotic disease involving both internal carotid arteries and the cavernous segments without significant stenoses.  No change from previous CTA from December 2021.    The following portions of the patient's history were reviewed and updated as appropriate: allergies, current medications, past family history, past medical history, past social history, past surgical history and problem list.    Review of Systems   Constitutional: Positive for fatigue.   Eyes: Negative for visual disturbance.   Musculoskeletal: Positive for gait problem.   Neurological: Positive for weakness and numbness. Negative for dizziness and headaches.   Psychiatric/Behavioral: Negative for confusion, decreased concentration and sleep disturbance.      I have reviewed the review of systems above performed by my medical assistant.      Vitals:    05/10/23 1015 "   BP: 132/70   Pulse: 65   Temp: 97.1 °F (36.2 °C)   SpO2: 95%       Neurologic Exam     Mental Status   Oriented to person, place, and time.   Concentration: normal.   Level of consciousness: alert  Knowledge: consistent with education (No deficits found.).     Cranial Nerves     CN II   Visual fields full to confrontation.     CN III, IV, VI   Pupils are equal, round, and reactive to light.  Extraocular motions are normal.   CN III: no CN III palsy  CN VI: no CN VI palsy    CN V   Facial sensation intact.     CN VII   Facial expression full, symmetric.     CN VIII   CN VIII normal.     CN IX, X   CN IX normal.   CN X normal.     CN XI   CN XI normal.     CN XII   CN XII normal.     Motor Exam     Strength   Right neck flexion: 5/5  Left neck flexion: 5/5  Right neck extension: 5/5  Left neck extension: 5/5  Right deltoid: 5/5  Left deltoid: 5/5  Right biceps: 5/5  Left biceps: 5/5  Right triceps: 5/5  Left triceps: 5/5  Right wrist flexion: 5/5  Left wrist flexion: 5/5  Right wrist extension: 5/5  Left wrist extension: 5/5  Right interossei: 5/5  Left interossei: 5/5  Right abdominals: 5/5  Left abdominals: 5/5  Right iliopsoas: 5/5  Left iliopsoas: 5/5  Right quadriceps: 5/5  Left quadriceps: 5/5  Right hamstrin/5  Left hamstrin/5  Right glutei: 5/5  Left glutei: 5/5  Right anterior tibial: 5/5  Left anterior tibial: 5/5  Right posterior tibial: 5/5  Left posterior tibial: 5/5  Right peroneal: 5/5  Left peroneal: 5/5  Right gastroc: 5/5  Left gastroc: 5/5    Sensory Exam   Light touch normal.   Vibration normal.     Gait, Coordination, and Reflexes     Gait  Gait: normal    Reflexes   Right brachioradialis: 2+  Left brachioradialis: 2+  Right biceps: 2+  Left biceps: 2+  Right triceps: 2+  Left triceps: 2+  Right patellar: 2+  Left patellar: 2+  Right achilles: 2+  Left achilles: 2+  Right : 2+  Left : 2+Station is normal.       Physical Exam  Vitals reviewed.   Constitutional:       Appearance:  She is well-developed.   HENT:      Head: Normocephalic and atraumatic.   Eyes:      Extraocular Movements: EOM normal.      Pupils: Pupils are equal, round, and reactive to light.   Cardiovascular:      Rate and Rhythm: Normal rate and regular rhythm.   Pulmonary:      Breath sounds: Normal breath sounds.   Musculoskeletal:         General: Normal range of motion.   Skin:     General: Skin is warm.   Neurological:      Mental Status: She is oriented to person, place, and time.      Gait: Gait is intact.      Deep Tendon Reflexes:      Reflex Scores:       Tricep reflexes are 2+ on the right side and 2+ on the left side.       Bicep reflexes are 2+ on the right side and 2+ on the left side.       Brachioradialis reflexes are 2+ on the right side and 2+ on the left side.       Patellar reflexes are 2+ on the right side and 2+ on the left side.       Achilles reflexes are 2+ on the right side and 2+ on the left side.        Procedures    Assessment/Plan: I would like to refer her to the [facility] [place].  Her dizziness does sound somewhat vestibular in etiology.  Continue Eliquis as scheduled.  We will see her back in 6 months or sooner if needed.  A total of 20 minutes was spent face-to-face with the patient today.  Of that greater than 50% of this time was spent discussing signs and symptoms of dizziness, patient education, plan of care and prognosis.         Diagnoses and all orders for this visit:    1. Cerebrovascular disease (Primary)    2. Dizziness  -     Basic Vestibular Evaluation; Future           Luis Enrique Santacruz II, MD

## 2023-05-23 ENCOUNTER — HOSPITAL ENCOUNTER (OUTPATIENT)
Facility: HOSPITAL | Age: 73
Setting detail: OBSERVATION
Discharge: HOME | End: 2023-05-24
Attending: EMERGENCY MEDICINE | Admitting: EMERGENCY MEDICINE
Payer: MEDICARE

## 2023-05-23 ENCOUNTER — APPOINTMENT (OUTPATIENT)
Dept: GENERAL RADIOLOGY | Facility: HOSPITAL | Age: 73
End: 2023-05-23
Payer: MEDICARE

## 2023-05-23 DIAGNOSIS — Z95.5 HISTORY OF CORONARY ARTERY STENT PLACEMENT: ICD-10-CM

## 2023-05-23 DIAGNOSIS — R73.9 HYPERGLYCEMIA: ICD-10-CM

## 2023-05-23 DIAGNOSIS — R07.9 CHEST PAIN, UNSPECIFIED TYPE: Primary | ICD-10-CM

## 2023-05-23 LAB
ANION GAP SERPL CALCULATED.3IONS-SCNC: 8.7 MMOL/L (ref 5–15)
BASOPHILS # BLD AUTO: 0.03 10*3/MM3 (ref 0–0.2)
BASOPHILS NFR BLD AUTO: 0.3 % (ref 0–1.5)
BUN SERPL-MCNC: 14 MG/DL (ref 8–23)
BUN/CREAT SERPL: 15.9 (ref 7–25)
CALCIUM SPEC-SCNC: 9.7 MG/DL (ref 8.6–10.5)
CHLORIDE SERPL-SCNC: 100 MMOL/L (ref 98–107)
CO2 SERPL-SCNC: 31.3 MMOL/L (ref 22–29)
CREAT SERPL-MCNC: 0.88 MG/DL (ref 0.57–1)
DEPRECATED RDW RBC AUTO: 39.9 FL (ref 37–54)
EGFRCR SERPLBLD CKD-EPI 2021: 69.1 ML/MIN/1.73
EOSINOPHIL # BLD AUTO: 0.29 10*3/MM3 (ref 0–0.4)
EOSINOPHIL NFR BLD AUTO: 2.5 % (ref 0.3–6.2)
ERYTHROCYTE [DISTWIDTH] IN BLOOD BY AUTOMATED COUNT: 12 % (ref 12.3–15.4)
GEN 5 2HR TROPONIN T REFLEX: 33 NG/L
GLUCOSE BLDC GLUCOMTR-MCNC: 213 MG/DL (ref 70–130)
GLUCOSE SERPL-MCNC: 359 MG/DL (ref 65–99)
HCT VFR BLD AUTO: 43.6 % (ref 34–46.6)
HGB BLD-MCNC: 14.1 G/DL (ref 12–15.9)
IMM GRANULOCYTES # BLD AUTO: 0.04 10*3/MM3 (ref 0–0.05)
IMM GRANULOCYTES NFR BLD AUTO: 0.3 % (ref 0–0.5)
LYMPHOCYTES # BLD AUTO: 3.24 10*3/MM3 (ref 0.7–3.1)
LYMPHOCYTES NFR BLD AUTO: 27.6 % (ref 19.6–45.3)
MAGNESIUM SERPL-MCNC: 1.8 MG/DL (ref 1.6–2.4)
MCH RBC QN AUTO: 29.6 PG (ref 26.6–33)
MCHC RBC AUTO-ENTMCNC: 32.3 G/DL (ref 31.5–35.7)
MCV RBC AUTO: 91.4 FL (ref 79–97)
MONOCYTES # BLD AUTO: 0.67 10*3/MM3 (ref 0.1–0.9)
MONOCYTES NFR BLD AUTO: 5.7 % (ref 5–12)
NEUTROPHILS NFR BLD AUTO: 63.6 % (ref 42.7–76)
NEUTROPHILS NFR BLD AUTO: 7.49 10*3/MM3 (ref 1.7–7)
NRBC BLD AUTO-RTO: 0 /100 WBC (ref 0–0.2)
PLATELET # BLD AUTO: 178 10*3/MM3 (ref 140–450)
PMV BLD AUTO: 10.5 FL (ref 6–12)
POTASSIUM SERPL-SCNC: 4.2 MMOL/L (ref 3.5–5.2)
QT INTERVAL: 412 MS
RBC # BLD AUTO: 4.77 10*6/MM3 (ref 3.77–5.28)
SODIUM SERPL-SCNC: 140 MMOL/L (ref 136–145)
TROPONIN T DELTA: -5 NG/L
TROPONIN T SERPL HS-MCNC: 38 NG/L
WBC NRBC COR # BLD: 11.76 10*3/MM3 (ref 3.4–10.8)

## 2023-05-23 PROCEDURE — 36415 COLL VENOUS BLD VENIPUNCTURE: CPT

## 2023-05-23 PROCEDURE — 93005 ELECTROCARDIOGRAM TRACING: CPT

## 2023-05-23 PROCEDURE — 96374 THER/PROPH/DIAG INJ IV PUSH: CPT

## 2023-05-23 PROCEDURE — 83735 ASSAY OF MAGNESIUM: CPT | Performed by: EMERGENCY MEDICINE

## 2023-05-23 PROCEDURE — 94640 AIRWAY INHALATION TREATMENT: CPT

## 2023-05-23 PROCEDURE — 71045 X-RAY EXAM CHEST 1 VIEW: CPT

## 2023-05-23 PROCEDURE — 99284 EMERGENCY DEPT VISIT MOD MDM: CPT

## 2023-05-23 PROCEDURE — 82948 REAGENT STRIP/BLOOD GLUCOSE: CPT

## 2023-05-23 PROCEDURE — 93005 ELECTROCARDIOGRAM TRACING: CPT | Performed by: NURSE PRACTITIONER

## 2023-05-23 PROCEDURE — G0378 HOSPITAL OBSERVATION PER HR: HCPCS

## 2023-05-23 PROCEDURE — 93005 ELECTROCARDIOGRAM TRACING: CPT | Performed by: EMERGENCY MEDICINE

## 2023-05-23 PROCEDURE — 25010000002 ONDANSETRON PER 1 MG: Performed by: PHYSICIAN ASSISTANT

## 2023-05-23 PROCEDURE — 84484 ASSAY OF TROPONIN QUANT: CPT | Performed by: EMERGENCY MEDICINE

## 2023-05-23 PROCEDURE — 63710000001 INSULIN ISOPHANE HUMAN PER 5 UNITS: Performed by: EMERGENCY MEDICINE

## 2023-05-23 PROCEDURE — 80048 BASIC METABOLIC PNL TOTAL CA: CPT | Performed by: EMERGENCY MEDICINE

## 2023-05-23 PROCEDURE — 85025 COMPLETE CBC W/AUTO DIFF WBC: CPT | Performed by: EMERGENCY MEDICINE

## 2023-05-23 RX ORDER — HYDROCODONE BITARTRATE AND ACETAMINOPHEN 5; 325 MG/1; MG/1
1 TABLET ORAL EVERY 6 HOURS PRN
Status: DISCONTINUED | OUTPATIENT
Start: 2023-05-23 | End: 2023-05-24 | Stop reason: HOSPADM

## 2023-05-23 RX ORDER — NICOTINE POLACRILEX 4 MG
15 LOZENGE BUCCAL
Status: DISCONTINUED | OUTPATIENT
Start: 2023-05-23 | End: 2023-05-24 | Stop reason: HOSPADM

## 2023-05-23 RX ORDER — ONDANSETRON 4 MG/1
4 TABLET, FILM COATED ORAL EVERY 6 HOURS PRN
Status: DISCONTINUED | OUTPATIENT
Start: 2023-05-23 | End: 2023-05-24 | Stop reason: HOSPADM

## 2023-05-23 RX ORDER — NITROGLYCERIN 0.4 MG/1
0.4 TABLET SUBLINGUAL
Status: DISCONTINUED | OUTPATIENT
Start: 2023-05-23 | End: 2023-05-24 | Stop reason: HOSPADM

## 2023-05-23 RX ORDER — FUROSEMIDE 40 MG/1
40 TABLET ORAL EVERY MORNING
Status: DISCONTINUED | OUTPATIENT
Start: 2023-05-24 | End: 2023-05-24 | Stop reason: HOSPADM

## 2023-05-23 RX ORDER — DEXTROSE MONOHYDRATE 25 G/50ML
25 INJECTION, SOLUTION INTRAVENOUS
Status: DISCONTINUED | OUTPATIENT
Start: 2023-05-23 | End: 2023-05-24 | Stop reason: HOSPADM

## 2023-05-23 RX ORDER — CITALOPRAM 20 MG/1
20 TABLET ORAL DAILY
Status: DISCONTINUED | OUTPATIENT
Start: 2023-05-24 | End: 2023-05-24 | Stop reason: HOSPADM

## 2023-05-23 RX ORDER — ARIPIPRAZOLE 2 MG/1
2 TABLET ORAL EVERY EVENING
Status: DISCONTINUED | OUTPATIENT
Start: 2023-05-23 | End: 2023-05-24 | Stop reason: HOSPADM

## 2023-05-23 RX ORDER — DIPHENOXYLATE HYDROCHLORIDE AND ATROPINE SULFATE 2.5; .025 MG/1; MG/1
1 TABLET ORAL 4 TIMES DAILY PRN
Status: DISCONTINUED | OUTPATIENT
Start: 2023-05-23 | End: 2023-05-24 | Stop reason: HOSPADM

## 2023-05-23 RX ORDER — SODIUM CHLORIDE 0.9 % (FLUSH) 0.9 %
10 SYRINGE (ML) INJECTION AS NEEDED
Status: DISCONTINUED | OUTPATIENT
Start: 2023-05-23 | End: 2023-05-24 | Stop reason: HOSPADM

## 2023-05-23 RX ORDER — PANTOPRAZOLE SODIUM 40 MG/1
40 TABLET, DELAYED RELEASE ORAL
Status: DISCONTINUED | OUTPATIENT
Start: 2023-05-24 | End: 2023-05-24 | Stop reason: HOSPADM

## 2023-05-23 RX ORDER — CHOLECALCIFEROL (VITAMIN D3) 125 MCG
5 CAPSULE ORAL NIGHTLY PRN
Status: DISCONTINUED | OUTPATIENT
Start: 2023-05-23 | End: 2023-05-24 | Stop reason: HOSPADM

## 2023-05-23 RX ORDER — ALBUTEROL SULFATE 2.5 MG/3ML
2.5 SOLUTION RESPIRATORY (INHALATION) EVERY 6 HOURS PRN
Status: DISCONTINUED | OUTPATIENT
Start: 2023-05-23 | End: 2023-05-24 | Stop reason: HOSPADM

## 2023-05-23 RX ORDER — SODIUM CHLORIDE 0.9 % (FLUSH) 0.9 %
10 SYRINGE (ML) INJECTION EVERY 12 HOURS SCHEDULED
Status: DISCONTINUED | OUTPATIENT
Start: 2023-05-23 | End: 2023-05-24 | Stop reason: HOSPADM

## 2023-05-23 RX ORDER — MIRTAZAPINE 15 MG/1
7.5 TABLET, FILM COATED ORAL NIGHTLY
Status: DISCONTINUED | OUTPATIENT
Start: 2023-05-23 | End: 2023-05-24 | Stop reason: HOSPADM

## 2023-05-23 RX ORDER — SODIUM CHLORIDE 9 MG/ML
40 INJECTION, SOLUTION INTRAVENOUS AS NEEDED
Status: DISCONTINUED | OUTPATIENT
Start: 2023-05-23 | End: 2023-05-24 | Stop reason: HOSPADM

## 2023-05-23 RX ORDER — GABAPENTIN 400 MG/1
400 CAPSULE ORAL 3 TIMES DAILY
Status: DISCONTINUED | OUTPATIENT
Start: 2023-05-23 | End: 2023-05-24 | Stop reason: HOSPADM

## 2023-05-23 RX ORDER — ROSUVASTATIN CALCIUM 20 MG/1
20 TABLET, COATED ORAL DAILY
Status: DISCONTINUED | OUTPATIENT
Start: 2023-05-23 | End: 2023-05-24 | Stop reason: HOSPADM

## 2023-05-23 RX ORDER — ONDANSETRON 2 MG/ML
4 INJECTION INTRAMUSCULAR; INTRAVENOUS EVERY 6 HOURS PRN
Status: DISCONTINUED | OUTPATIENT
Start: 2023-05-23 | End: 2023-05-24 | Stop reason: HOSPADM

## 2023-05-23 RX ORDER — ACETAMINOPHEN 325 MG/1
650 TABLET ORAL EVERY 4 HOURS PRN
Status: DISCONTINUED | OUTPATIENT
Start: 2023-05-23 | End: 2023-05-24 | Stop reason: HOSPADM

## 2023-05-23 RX ORDER — BUDESONIDE AND FORMOTEROL FUMARATE DIHYDRATE 160; 4.5 UG/1; UG/1
2 AEROSOL RESPIRATORY (INHALATION)
Status: DISCONTINUED | OUTPATIENT
Start: 2023-05-23 | End: 2023-05-24 | Stop reason: HOSPADM

## 2023-05-23 RX ORDER — IBUPROFEN 600 MG/1
1 TABLET ORAL
Status: DISCONTINUED | OUTPATIENT
Start: 2023-05-23 | End: 2023-05-24 | Stop reason: HOSPADM

## 2023-05-23 RX ORDER — GLIPIZIDE 5 MG/1
5 TABLET ORAL
Status: DISCONTINUED | OUTPATIENT
Start: 2023-05-23 | End: 2023-05-24 | Stop reason: HOSPADM

## 2023-05-23 RX ORDER — INSULIN LISPRO 100 [IU]/ML
3-14 INJECTION, SOLUTION INTRAVENOUS; SUBCUTANEOUS
Status: DISCONTINUED | OUTPATIENT
Start: 2023-05-23 | End: 2023-05-24 | Stop reason: HOSPADM

## 2023-05-23 RX ADMIN — GLIPIZIDE 5 MG: 5 TABLET ORAL at 20:24

## 2023-05-23 RX ADMIN — INSULIN HUMAN 18 UNITS: 100 INJECTION, SUSPENSION SUBCUTANEOUS at 20:25

## 2023-05-23 RX ADMIN — HYDROCODONE BITARTRATE AND ACETAMINOPHEN 1 TABLET: 5; 325 TABLET ORAL at 20:20

## 2023-05-23 RX ADMIN — GABAPENTIN 400 MG: 400 CAPSULE ORAL at 20:20

## 2023-05-23 RX ADMIN — ARIPIPRAZOLE 2 MG: 2 TABLET ORAL at 20:20

## 2023-05-23 RX ADMIN — METOPROLOL TARTRATE 50 MG: 25 TABLET, FILM COATED ORAL at 20:20

## 2023-05-23 RX ADMIN — Medication 10 ML: at 20:29

## 2023-05-23 RX ADMIN — ONDANSETRON 4 MG: 2 INJECTION INTRAMUSCULAR; INTRAVENOUS at 17:38

## 2023-05-23 RX ADMIN — BUDESONIDE AND FORMOTEROL FUMARATE DIHYDRATE 2 PUFF: 160; 4.5 AEROSOL RESPIRATORY (INHALATION) at 19:52

## 2023-05-23 RX ADMIN — ROSUVASTATIN CALCIUM 20 MG: 20 TABLET, FILM COATED ORAL at 20:20

## 2023-05-23 RX ADMIN — MIRTAZAPINE 7.5 MG: 15 TABLET, FILM COATED ORAL at 20:24

## 2023-05-23 NOTE — PLAN OF CARE
Goal Outcome Evaluation:      Pt admitted for chest pain. Trop elevated x2. Cards consulted. Npo at midnight. Pt denies chest pain at this time but complains of nausea.

## 2023-05-23 NOTE — H&P
[facility]   HISTORY AND PHYSICAL    Patient Name: Dalila Javed  : 1950  MRN: [id number]  Primary Care Physician:  Cinthya Valentine APRN  Date of admission: 2023    Subjective   Subjective     Chief Complaint:   Chief Complaint   Patient presents with   • Chest Pain         HPI:    Dalila Javed is a 72 y.o. female with history of left PCA stroke in 2020, parkinsonism, HTN, HLD, CAD, GERD, diabetes, diabetic neuropathy, SILVIANO, lupus anticoagulant, PFO, and A-fib, who presented to the emergency department today complaining of chest pain.  Patient states she developed chest pain this morning and it radiated into her left neck, jaw, and under her left arm.  Patient states she had associated nausea, denies diaphoresis or shortness of breath.  Patient was sitting down when chest pain started, it was not associated with any exertion or strenuous activity.    ED evaluation reviewed, high-sensitivity troponin 38, 33, glucose 359, WBC 11.76, chest x-ray negative acute, EKG shows sinus rhythm with rate of 64 bpm, no acute ST-T changes.    Patient saw her cardiologist Dr. Pedro Pablo Lomas on 3/13/2023.  At that time, she was having intermittent chest pains.  He recommended walking 45 minutes to an hour every day and if chest pain became more frequent or more severe, or last longer, or changes in character, patient will need repeat cardiac catheterization.    Review of Systems   All systems were reviewed and negative except for: What is discussed in the HPI    Personal History     Past Medical History:   Diagnosis Date   • Abnormal weight gain    • Acquired trigger finger    • Acute myocardial infarction    • Adjustment reaction with mixed emotional features    • Arthritis    • ASHD (arteriosclerotic heart disease)    • Asthma    • B12 deficiency    • Bacterial pneumonia    • Williamson esophagus    • Benign colonic polyp    • Bilateral knee pain    • Birthmark     haemangioma of the bone    • Bronchiectasis    • CHF (congestive heart failure)    • Chronic cough    • Chronic glomerulonephritis with exudative nephritis    • Chronic lumbar radiculopathy    • Diabetes mellitus    • Diabetic peripheral neuropathy    • Dyslipidemia    • Fibromyositis    • Fracture, humerus 2022    RIGHT   • GERD (gastroesophageal reflux disease)    • Herpes zoster    • Hyperlipidemia    • Hypertension    • Lupus anticoagulant disorder    • Mycobacterium avium complex    • Nephrolithiasis    • SILVIANO (obstructive sleep apnea)     DOESN'T USE HER MACHINE   • Palpitations    • Premature ventricular contraction    • Slow transit constipation    • Stroke 2020    RT SIDED WEAKNESS   • Vitamin D deficiency        Past Surgical History:   Procedure Laterality Date   • APPENDECTOMY     • BRONCHOSCOPY     •  SECTION     • CHOLECYSTECTOMY     • COLONOSCOPY     • COLONOSCOPY N/A 2019    Procedure: COLONOSCOPY to cecum with cold biopsy and cold and hotsnare polypectomies;  Surgeon: Suzy Eubanks MD;  Location: [facility] ENDOSCOPY;  Service: Gastroenterology   • CORONARY ANGIOPLASTY WITH STENT PLACEMENT     • EMBOLECTOMY N/A 2020    Procedure: EMERGENT CEREBRAL ANGIOGRAM;  Surgeon: Jonh Meehan MD;  Location: [facility] OR ;  Service: Neurosurgery;  Laterality: N/A;   • ENDOSCOPY N/A 2019    Procedure: ESOPHAGOGASTRODUODENOSCOPY with cold biopsies;  Surgeon: Suzy Eubanks MD;  Location: [facility] ENDOSCOPY;  Service: Gastroenterology   • KNEE ARTHROSCOPY     • OTHER SURGICAL HISTORY      elbow surgery   • ROTATOR CUFF REPAIR     • TOTAL SHOULDER ARTHROPLASTY W/ DISTAL CLAVICLE EXCISION Right 7/15/2022    Procedure: TOTAL SHOULDER REVERSE ARTHROPLASTY;  Surgeon: Dk Mckeon II, MD;  [place] [facility] [place] [facility];  Service: Orthopedics;  Laterality: Right;   • TUBAL ABDOMINAL LIGATION         Family History: family history includes Aneurysm in her father; Arthritis in her maternal  "grandmother and mother; Bone cancer in her maternal aunt; Cancer in her father and mother; Colon cancer in her mother; Diabetes in her paternal grandfather and paternal grandmother; Heart disease in her maternal grandmother and mother; Migraines in her mother; Stroke in her mother; Thyroid disease in her maternal grandmother; Uterine cancer in her mother. Otherwise pertinent FHx was reviewed and not pertinent to current issue.    Social History:  reports that she quit smoking about 43 years ago. Her smoking use included cigarettes. She has never used smokeless tobacco. She reports that she does not drink alcohol and does not use drugs.    Home Medications:  ARIPiprazole, Accu-Chek FastClix Lancets, Chlorhexidine Gluconate Cloth, HYDROcodone-acetaminophen, Insulin Syringe, albuterol sulfate HFA, alendronate, apixaban, budesonide-formoterol, citalopram, diphenoxylate-atropine, furosemide, gabapentin, glipizide, glucose blood, guaiFENesin, insulin NPH, methocarbamol, metoprolol tartrate, mirtazapine, nitroglycerin, omeprazole, ondansetron, potassium chloride, rosuvastatin, vitamin B-12, and vitamin D3    Allergies:  Allergies   Allergen Reactions   • Duloxetine Hcl Hallucinations   • Viibryd [Vilazodone Hcl] Hallucinations   • Nsaids Other (See Comments)     Doesn't remember   • Benadryl [Diphenhydramine] Other (See Comments)     shaking   • Carafate [Sucralfate] GI Intolerance   • Metformin Other (See Comments)     Doesn't remember   • Morphine And Related Nausea And Vomiting   • Oxycodone Nausea And Vomiting   • Penicillins Rash     Fever, \"can't breathe\"   • Statins Other (See Comments)     Can't remember       Objective   Objective     Vitals:   Temp:  [98.2 °F (36.8 °C)-99.3 °F (37.4 °C)] 98.2 °F (36.8 °C)  Heart Rate:  [61-92] 62  Resp:  [18] 18  BP: (134-139)/(61-72) 139/72  Physical Exam     GENERAL: 72 y.o. YO female a/o x 4, NAD  SKIN: Warm pink and dry   HEENT:  PERRL, EOM intact, conjunctivae normal, " sclerae clear  NECK: supple, no JVD  LUNGS: Clear to auscultation bilaterally without wheezes, rales or rhonchi.  No accessory muscle use and no nasal flaring.  CARDIAC:  Regular rate and rhythm, S1-S2.  No murmurs, rubs or gallops.  No peripheral edema.  Equal pulses bilaterally.  ABDOMEN: Soft, nontender, nondistended.  No guarding or rebound tenderness.  Normal bowel sounds.  MUSCULOSKELETAL: Moves all extremities well.  No deformity.  NEURO: Cranial nerves II through XII grossly intact.  No gross focal deficits.  Alert.  Normal speech and motor.  PSYCH: Normal mood and affect      Result Review    Result Review:  I have personally reviewed the results from the time of this admission to 5/23/2023 18:26 EDT and agree with these findings:  []  Laboratory list / accordion  []  Microbiology  []  Radiology  []  EKG/Telemetry   []  Cardiology/Vascular   []  Pathology  []  Old records  []  Other:  Most notable findings include:  high-sensitivity troponin 38, 33, glucose 359, WBC 11.76    XR Chest 1 View    Result Date: 5/23/2023   No active disease in the chest.  This report was finalized on 5/23/2023 3:41 PM by Dr. Rafal Helm M.D.            Assessment & Plan   Assessment / Plan     Brief Patient Summary:  Dalila Javed is a 72 y.o. female who is being admitted to observation unit for further evaluation of chest pain    Active Hospital Problems:  Active Hospital Problems    Diagnosis    • **Chest pain      Plan:     Chest pain  CAD  -2016 PCI with stent distal RCA into PLV branch 90% to 0% with 3.5 x 24 mm stent dilated to 3.63 for non-STEMI   -Consult cardiology  -High sensitivity troponin 38, 33  -ECG reviewed, nonischemic, repeat in a.m.  -N.p.o. after midnight  -Nitroglycerin as needed chest pain  -Hold Eliquis in anticipation of intervention  -Monitor    Atrial fibrillation  -Continue metoprolol  -Hold Eliquis    Hypertension  -Continue metoprolol    Hyperlipidemia  -Continue  statin    Diabetes  -Blood glucose 359 at time of admission  -Insulin dependent   -Accu-Checks 3 times daily with meals  -Moderate dose correctional factor with hypoglycemic protocol, continue home insulin regimen  -Hold oral diabetic medication    History of CVA  -Residual mild right-sided deficit    DVT prophylaxis:  Mechanical DVT prophylaxis orders are present.    CODE STATUS:    Level Of Support Discussed With: Patient  Code Status (Patient has no pulse and is not breathing): CPR (Attempt to Resuscitate)  Medical Interventions (Patient has pulse or is breathing): Full Support    Admission Status:  I believe this patient meets observation status.     75 minutes has been spent by [facility] providers in the care of this patient while under observation status    I wore an N95 mask, face shield, and gloves during this patient encounter. Patient also wearing a surgical mask throughout encounter. Hand hygeine performed before and after seeing the patient.    Electronically signed by SLIM Blake, 05/23/23, 6:26 PM EDT.

## 2023-05-23 NOTE — ED TRIAGE NOTES
Patient states left sided pain that radiates from chest and shoulder into her face that started about 25 min ago. States it is improving.

## 2023-05-23 NOTE — ED NOTES
"..Nursing report ED to floor  Dalila Javed  72 y.o.  female    HPI :   Chief Complaint   Patient presents with    Chest Pain       Admitting doctor:   Asher Fenton MD    Admitting diagnosis:   The primary encounter diagnosis was Chest pain, unspecified type. Diagnoses of History of coronary artery stent placement and Hyperglycemia were also pertinent to this visit.    Code status:   Current Code Status       Date Active Code Status Order ID Comments User Context       5/23/2023 1627 CPR (Attempt to Resuscitate) [id number]  Joselin Preston PA ED        Question Answer    Code Status (Patient has no pulse and is not breathing) CPR (Attempt to Resuscitate)    Medical Interventions (Patient has pulse or is breathing) Full Support    Level Of Support Discussed With Patient                    Allergies:   Duloxetine hcl, Viibryd [vilazodone hcl], Nsaids, Benadryl [diphenhydramine], Carafate [sucralfate], Metformin, Morphine and related, Oxycodone, Penicillins, and Statins    Isolation:   No active isolations    Intake and Output  No intake or output data in the 24 hours ending 05/23/23 1700    Weight:       05/23/23  1432   Weight: 83.9 kg (185 lb)       Most recent vitals:   Vitals:    05/23/23 1417 05/23/23 1432 05/23/23 1531   BP:  137/68 134/61   BP Location:  Left arm    Patient Position:  Sitting    Pulse: 92  61   Resp: 18     Temp: 99.3 °F (37.4 °C)     SpO2: 97%  93%   Weight:  83.9 kg (185 lb)    Height:  157.5 cm (62\")        Active LDAs/IV Access:   Lines, Drains & Airways       Active LDAs       Name Placement date Placement time Site Days    Peripheral IV 05/23/23 1452 Anterior;Left Forearm 05/23/23  1452  Forearm  less than 1                    Labs (abnormal labs have a star):   Labs Reviewed   BASIC METABOLIC PANEL - Abnormal; Notable for the following components:       Result Value    Glucose 359 (*)     CO2 31.3 (*)     All other components within normal limits    Narrative:     GFR " Normal >60  Chronic Kidney Disease <60  Kidney Failure <15    The GFR formula is only valid for adults with stable renal function between ages 18 and 70.   TROPONIN - Abnormal; Notable for the following components:    HS Troponin T 38 (*)     All other components within normal limits    Narrative:     High Sensitive Troponin T Reference Range:  <10.0 ng/L- Negative Female for AMI  <15.0 ng/L- Negative Male for AMI  >=10 - Abnormal Female indicating possible myocardial injury.  >=15 - Abnormal Male indicating possible myocardial injury.   Clinicians would have to utilize clinical acumen, EKG, Troponin, and serial changes to determine if it is an Acute Myocardial Infarction or myocardial injury due to an underlying chronic condition.        CBC WITH AUTO DIFFERENTIAL - Abnormal; Notable for the following components:    WBC 11.76 (*)     RDW 12.0 (*)     Neutrophils, Absolute 7.49 (*)     Lymphocytes, Absolute 3.24 (*)     All other components within normal limits   MAGNESIUM - Normal   HIGH SENSITIVITIY TROPONIN T 2HR   CBC AND DIFFERENTIAL    Narrative:     The following orders were created for panel order CBC & Differential.  Procedure                               Abnormality         Status                     ---------                               -----------         ------                     CBC Auto Differential[[id number]]        Abnormal            Final result                 Please view results for these tests on the individual orders.       EKG:   ECG 12 Lead Chest Pain   Preliminary Result   HEART RATE= 64  bpm   RR Interval= 938  ms   OH Interval= 151  ms   P Horizontal Axis= -14  deg   P Front Axis= 35  deg   QRSD Interval= 80  ms   QT Interval= 412  ms   QRS Axis= 21  deg   T Wave Axis= 20  deg   - NORMAL ECG -   Sinus rhythm   Electronically Signed By:    Date and Time of Study: 2023-05-23 14:23:09          Meds given in ED:   Medications   sodium chloride 0.9 % flush 10 mL (has no administration in  time range)   sodium chloride 0.9 % flush 10 mL (has no administration in time range)   sodium chloride 0.9 % infusion 40 mL (has no administration in time range)   ondansetron (ZOFRAN) tablet 4 mg (has no administration in time range)     Or   ondansetron (ZOFRAN) injection 4 mg (has no administration in time range)   nitroglycerin (NITROSTAT) SL tablet 0.4 mg (has no administration in time range)   acetaminophen (TYLENOL) tablet 650 mg (has no administration in time range)   melatonin tablet 5 mg (has no administration in time range)       Imaging results:  XR Chest 1 View    Result Date: 2023   No active disease in the chest.  This report was finalized on 2023 3:41 PM by Dr. Rafal Helm M.D.       Ambulatory status:   - ambulatory    Social issues:   Social History     Socioeconomic History    Marital status:    Tobacco Use    Smoking status: Former     Types: Cigarettes     Quit date: 1980     Years since quittin.4    Smokeless tobacco: Never   Vaping Use    Vaping Use: Never used   Substance and Sexual Activity    Alcohol use: No    Drug use: No    Sexual activity: Yes     Partners: Male       NIH Stroke Scale:         Lolita Guadarrama, LD  23 17:00 EDT

## 2023-05-23 NOTE — ED PROVIDER NOTES
EMERGENCY DEPARTMENT ENCOUNTER    Room Number:  112/1  Date seen:  5/23/2023  PCP: Cinthya Valentine APRN  Historian: Patient      HPI:  Chief Complaint: Chest pain  A complete HPI/ROS/PMH/PSH/SH/FH are unobtainable due to: Nothing  Context: Dalila Javed is a 72 y.o. female who presents to the ED c/o chest pain this afternoon.  Patient reports that the pain lasted for about an hour.  It finally subsided after she took a nitroglycerin.  She does have a mild headache currently.  She reports the pain was localized to the left side of her chest and radiated to her left arm.  She did feel little short of breath with it as well.  She had some mild nausea.  She denies diaphoresis.  Patient does have a history of coronary disease.  Her cardiologist is Dr. Pedro Pablo Lomas.  She believes that her last heart cath was in 2016 and she required a stent at that time.  She reports that this pain was very uncomfortable and really scared her.  She does not typically have to take nitroglycerin.            PAST MEDICAL HISTORY  Active Ambulatory Problems     Diagnosis Date Noted   • Adjustment disorder with mixed anxiety and depressed mood 03/08/2016   • Atopic rhinitis 03/08/2016   • Coronary arteriosclerosis in native artery 03/08/2016   • Cobalamin deficiency 03/08/2016   • Benign colonic polyp 03/08/2016   • Lumbar radiculopathy 03/08/2016   • Controlled diabetes mellitus type 2 with complications (HCC) 03/08/2016   • Binocular vision disorder with diplopia 03/08/2016   • Dyslipidemia 03/08/2016   • Arthropathy of hand 03/08/2016   • Knee pain 03/08/2016   • Cramps of lower extremity 03/08/2016   • Low back pain 03/08/2016   • Chronic nausea 03/08/2016   • Obstructive sleep apnea syndrome 03/08/2016   • Palpitations 03/08/2016   • Ventricular premature beats 03/08/2016   • Cephalalgia 03/08/2016   • Colonic constipation 03/08/2016   • Neurocardiogenic syncope 03/08/2016   • Vitamin D deficiency 03/08/2016   • DODSON  (nonalcoholic steatohepatitis) 04/01/2016   • Fibromyalgia 03/26/2013   • Morbidly obese (HCC) 12/10/2018   • Polyp of colon 11/01/2019   • Family history of colonic polyps 11/01/2019   • Family history of malignant neoplasm of colon 11/01/2019   • Acute CVA (cerebrovascular accident) 08/19/2020   • Episode of dizziness 09/30/2020   • Peripheral vertigo 09/30/2020   • History of stroke 09/30/2020   • Chronic right shoulder pain 04/30/2021   • Encephalopathy, metabolic 04/30/2021   • Migraine without aura or status migrainosus 04/30/2021   • Parkinson disease (HCC) 04/30/2021   • Hypokalemia 05/04/2021   • Benign paroxysmal positional vertigo 11/02/2017   • Chronic obstructive pulmonary disease 05/09/2017   • Gastroesophageal reflux disease 09/24/2015   • History of percutaneous transluminal coronary angioplasty 10/18/2016   • Hypercholesterolemia 09/24/2015   • Hypertensive disorder 09/24/2015   • Myocardial infarction 05/09/2017   • Occipital neuralgia 09/23/2015   • Patent foramen ovale 10/19/2020   • Transient cerebral ischemia 11/02/2017   • Arm pain, anterior, right 08/04/2021   • TIA (transient ischemic attack) 12/27/2021   • Pathological fracture of vertebra due to osteoporosis with delayed healing 05/03/2022   • Compression fracture of L1 vertebra with delayed healing 05/03/2022   • Status post reverse arthroplasty of right shoulder 07/15/2022   • Obesity (BMI 30-39.9) 03/26/2023     Resolved Ambulatory Problems     Diagnosis Date Noted   • Flank pain 03/08/2016   • Abnormal blood sugar 03/08/2016   • Abnormal weight gain 03/08/2016   • Acquired trigger finger 03/08/2016   • Acute bronchitis 03/08/2016   • Atypical chest pain 03/08/2016   • History of Williamson's esophagus 03/08/2016   • CAP (community acquired pneumonia) 03/08/2016   • Candidiasis of skin 03/08/2016   • Diabetic peripheral neuropathy 03/08/2016   • Breathing difficult 03/08/2016   • Dizziness 03/08/2016   • Hematuria 03/08/2016   • Migraine  with typical aura 2016   • Muscle ache 2016   • MAC (mycobacterium avium-intracellulare complex) 2016   • Night sweats 2016   • Otitis externa 2016   • Abscess, perianal 2016   • Skin tag 2016   • Upper respiratory tract infection 2016   • Urinary hesitancy 2016   • Asthmatic breathing 2016   • Preoperative clearance 2016   • Fever 2021   • Acute UTI (urinary tract infection) 2021     Past Medical History:   Diagnosis Date   • Acute myocardial infarction    • Adjustment reaction with mixed emotional features    • Arthritis    • ASHD (arteriosclerotic heart disease)    • Asthma    • B12 deficiency    • Bacterial pneumonia    • Williamson esophagus    • Bilateral knee pain    • Birthmark    • Bronchiectasis    • CHF (congestive heart failure)    • Chronic cough    • Chronic glomerulonephritis with exudative nephritis    • Chronic lumbar radiculopathy    • Diabetes mellitus    • Fibromyositis    • Fracture, humerus 2022   • GERD (gastroesophageal reflux disease)    • Herpes zoster    • Hyperlipidemia    • Hypertension    • Lupus anticoagulant disorder    • Mycobacterium avium complex    • Nephrolithiasis    • SILVIANO (obstructive sleep apnea)    • Premature ventricular contraction    • Slow transit constipation    • Stroke          PAST SURGICAL HISTORY  Past Surgical History:   Procedure Laterality Date   • APPENDECTOMY     • BRONCHOSCOPY     •  SECTION     • CHOLECYSTECTOMY     • COLONOSCOPY     • COLONOSCOPY N/A 2019    Procedure: COLONOSCOPY to cecum with cold biopsy and cold and hotsnare polypectomies;  Surgeon: Suzy Eubanks MD;  Location: [facility] ENDOSCOPY;  Service: Gastroenterology   • CORONARY ANGIOPLASTY WITH STENT PLACEMENT     • EMBOLECTOMY N/A 2020    Procedure: EMERGENT CEREBRAL ANGIOGRAM;  Surgeon: Jonh Meehan MD;  Location: [facility] HYBRID OR ;  Service: Neurosurgery;  Laterality: N/A;    • ENDOSCOPY N/A 2019    Procedure: ESOPHAGOGASTRODUODENOSCOPY with cold biopsies;  Surgeon: Suzy Eubanks MD;  Location: [facility] ENDOSCOPY;  Service: Gastroenterology   • KNEE ARTHROSCOPY     • OTHER SURGICAL HISTORY      elbow surgery   • ROTATOR CUFF REPAIR     • TOTAL SHOULDER ARTHROPLASTY W/ DISTAL CLAVICLE EXCISION Right 7/15/2022    Procedure: TOTAL SHOULDER REVERSE ARTHROPLASTY;  Surgeon: Dk Mckeon II, MD;  Location:  [facility];  Service: Orthopedics;  Laterality: Right;   • TUBAL ABDOMINAL LIGATION           FAMILY HISTORY  Family History   Problem Relation Age of Onset   • Colon cancer Mother    • Uterine cancer Mother    • Arthritis Mother    • Cancer Mother    • Heart disease Mother    • Migraines Mother    • Stroke Mother    • Cancer Father         malignant brain tumor   • Aneurysm Father    • Bone cancer Maternal Aunt    • Arthritis Maternal Grandmother    • Heart disease Maternal Grandmother    • Thyroid disease Maternal Grandmother    • Diabetes Paternal Grandmother    • Diabetes Paternal Grandfather    • Malig Hyperthermia Neg Hx          SOCIAL HISTORY  Social History     Socioeconomic History   • Marital status:    Tobacco Use   • Smoking status: Former     Types: Cigarettes     Quit date: 1980     Years since quittin.4   • Smokeless tobacco: Never   Vaping Use   • Vaping Use: Never used   Substance and Sexual Activity   • Alcohol use: No   • Drug use: No   • Sexual activity: Yes     Partners: Male         ALLERGIES  Duloxetine hcl, Viibryd [vilazodone hcl], Nsaids, Benadryl [diphenhydramine], Carafate [sucralfate], Metformin, Morphine and related, Oxycodone, Penicillins, and Statins        REVIEW OF SYSTEMS  Review of Systems   Review of all 14 systems is negative other than stated in the HPI above.      PHYSICAL EXAM  ED Triage Vitals   Temp Heart Rate Resp BP SpO2   23 1417 23 1417 23 1417 23 1432 23 1417   99.3 °F (37.4  °C) 92 18 137/68 97 %      Temp src Heart Rate Source Patient Position BP Location FiO2 (%)   -- -- 05/23/23 1432 05/23/23 1432 --     Sitting Left arm        Physical Exam      GENERAL: Awake and alert, no acute distress  HENT: nares patent  EYES: no scleral icterus, EOMI  CV: regular rhythm, normal rate, murmur not appreciated, no peripheral edema  RESPIRATORY: normal effort, cranial nerves II through XII grossly intact  ABDOMEN: soft  MUSCULOSKELETAL: no deformity  NEURO: alert, moves all extremities, follows commands  PSYCH:  calm, cooperative  SKIN: warm, dry    Vital signs and nursing notes reviewed.          LAB RESULTS  Recent Results (from the past 24 hour(s))   ECG 12 Lead Chest Pain    Collection Time: 05/23/23  2:23 PM   Result Value Ref Range    QT Interval 412 ms   Basic Metabolic Panel    Collection Time: 05/23/23  2:39 PM    Specimen: Blood   Result Value Ref Range    Glucose 359 (H) 65 - 99 mg/dL    BUN 14 8 - 23 mg/dL    Creatinine 0.88 0.57 - 1.00 mg/dL    Sodium 140 136 - 145 mmol/L    Potassium 4.2 3.5 - 5.2 mmol/L    Chloride 100 98 - 107 mmol/L    CO2 31.3 (H) 22.0 - 29.0 mmol/L    Calcium 9.7 8.6 - 10.5 mg/dL    BUN/Creatinine Ratio 15.9 7.0 - 25.0    Anion Gap 8.7 5.0 - 15.0 mmol/L    eGFR 69.1 >60.0 mL/min/1.73   High Sensitivity Troponin T    Collection Time: 05/23/23  2:39 PM    Specimen: Blood   Result Value Ref Range    HS Troponin T 38 (H) <10 ng/L   Magnesium    Collection Time: 05/23/23  2:39 PM    Specimen: Blood   Result Value Ref Range    Magnesium 1.8 1.6 - 2.4 mg/dL   CBC Auto Differential    Collection Time: 05/23/23  2:39 PM    Specimen: Blood   Result Value Ref Range    WBC 11.76 (H) 3.40 - 10.80 10*3/mm3    RBC 4.77 3.77 - 5.28 10*6/mm3    Hemoglobin 14.1 12.0 - 15.9 g/dL    Hematocrit 43.6 34.0 - 46.6 %    MCV 91.4 79.0 - 97.0 fL    MCH 29.6 26.6 - 33.0 pg    MCHC 32.3 31.5 - 35.7 g/dL    RDW 12.0 (L) 12.3 - 15.4 %    RDW-SD 39.9 37.0 - 54.0 fl    MPV 10.5 6.0 - 12.0 fL     Platelets 178 140 - 450 10*3/mm3    Neutrophil % 63.6 42.7 - 76.0 %    Lymphocyte % 27.6 19.6 - 45.3 %    Monocyte % 5.7 5.0 - 12.0 %    Eosinophil % 2.5 0.3 - 6.2 %    Basophil % 0.3 0.0 - 1.5 %    Immature Grans % 0.3 0.0 - 0.5 %    Neutrophils, Absolute 7.49 (H) 1.70 - 7.00 10*3/mm3    Lymphocytes, Absolute 3.24 (H) 0.70 - 3.10 10*3/mm3    Monocytes, Absolute 0.67 0.10 - 0.90 10*3/mm3    Eosinophils, Absolute 0.29 0.00 - 0.40 10*3/mm3    Basophils, Absolute 0.03 0.00 - 0.20 10*3/mm3    Immature Grans, Absolute 0.04 0.00 - 0.05 10*3/mm3    nRBC 0.0 0.0 - 0.2 /100 WBC   High Sensitivity Troponin T 2Hr    Collection Time: 05/23/23  5:12 PM    Specimen: Blood   Result Value Ref Range    HS Troponin T 33 (H) <10 ng/L    Troponin T Delta -5 (L) >=-4 - <+4 ng/L   POC Glucose Once    Collection Time: 05/23/23  8:26 PM    Specimen: Blood   Result Value Ref Range    Glucose 213 (H) 70 - 130 mg/dL   ECG 12 Lead Chest Pain    Collection Time: 05/23/23 10:24 PM   Result Value Ref Range    QT Interval 426 ms       Ordered the above labs and reviewed the results.        RADIOLOGY  XR Chest 1 View    Result Date: 5/23/2023  STAT PORTABLE RADIOGRAPHIC VIEW OF THE CHEST  CLINICAL HISTORY: Chest pain.  FINDINGS:  The lung volumes are low. Otherwise, the lungs are clear of acute infiltrates. Cardiomediastinal silhouette is within normal limits. The osseous structures are remarkable for postsurgical changes from a prior right shoulder arthroplasty procedure.       No active disease in the chest.  This report was finalized on 5/23/2023 3:41 PM by Dr. Rafal Helm M.D.        Ordered the above noted radiological studies. Reviewed by me in PACS.            PROCEDURES  Procedures              MEDICATIONS GIVEN IN ER  Medications   sodium chloride 0.9 % flush 10 mL (10 mL Intravenous Given 5/23/23 2029)   sodium chloride 0.9 % flush 10 mL (has no administration in time range)   sodium chloride 0.9 % infusion 40 mL (has no administration  in time range)   ondansetron (ZOFRAN) tablet 4 mg ( Oral Not Given:  See Alt 5/23/23 1738)     Or   ondansetron (ZOFRAN) injection 4 mg (4 mg Intravenous Given 5/23/23 1738)   nitroglycerin (NITROSTAT) SL tablet 0.4 mg (has no administration in time range)   acetaminophen (TYLENOL) tablet 650 mg (has no administration in time range)   melatonin tablet 5 mg (has no administration in time range)   albuterol (PROVENTIL) nebulizer solution 0.083% 2.5 mg/3mL (has no administration in time range)   ARIPiprazole (ABILIFY) tablet 2 mg (2 mg Oral Given 5/23/23 2020)   citalopram (CeleXA) tablet 20 mg (has no administration in time range)   diphenoxylate-atropine (LOMOTIL) 2.5-0.025 MG per tablet 1 tablet (has no administration in time range)   furosemide (LASIX) tablet 40 mg (has no administration in time range)   gabapentin (NEURONTIN) capsule 400 mg (400 mg Oral Given 5/23/23 2020)   glipizide (GLUCOTROL) tablet 5 mg (5 mg Oral Given 5/23/23 2024)   HYDROcodone-acetaminophen (NORCO) 5-325 MG per tablet 1 tablet (1 tablet Oral Given 5/23/23 2020)   metoprolol tartrate (LOPRESSOR) tablet 50 mg (50 mg Oral Given 5/23/23 2020)   mirtazapine (REMERON) tablet 7.5 mg (7.5 mg Oral Given 5/23/23 2024)   insulin NPH (humuLIN N,novoLIN N) injection 18 Units (18 Units Subcutaneous Given 5/23/23 2025)   pantoprazole (PROTONIX) EC tablet 40 mg (has no administration in time range)   rosuvastatin (CRESTOR) tablet 20 mg (20 mg Oral Given 5/23/23 2020)   budesonide-formoterol (SYMBICORT) 160-4.5 MCG/ACT inhaler 2 puff (2 puffs Inhalation Given 5/23/23 1952)   dextrose (GLUTOSE) oral gel 15 g (has no administration in time range)   dextrose (D50W) (25 g/50 mL) IV injection 25 g (has no administration in time range)   glucagon (GLUCAGEN) injection 1 mg (has no administration in time range)   insulin lispro (HUMALOG/ADMELOG) injection 3-14 Units (3 Units Subcutaneous Not Given 5/23/23 2037)                   MEDICAL DECISION MAKING, PROGRESS,  and CONSULTS    All labs have been independently reviewed by me.  All radiology studies have been reviewed by me and I have also reviewed the radiology report.   EKG's independently viewed and interpreted by me.  Discussion below represents my analysis of pertinent findings related to patient's condition, differential diagnosis, treatment plan and final disposition.      Additional sources:  - Discussed/ obtained information from independent historians: N/A    - External (non-ED) record review: Prior cardiology records reviewed and summarized below    - Chronic or social conditions impacting care: N/A    - Shared decision making: Patient informed of plan for admission for further evaluation of her chest pain.      Orders placed during this visit:  Orders Placed This Encounter   Procedures   • XR Chest 1 View   • Basic Metabolic Panel   • High Sensitivity Troponin T   • Magnesium   • CBC Auto Differential   • High Sensitivity Troponin T 2Hr   • CBC (No Diff)   • Basic Metabolic Panel   • High Sensitivity Troponin T   • NPO Diet NPO Type: Strict NPO   • Intake & Output   • Weigh Patient   • Place Sequential Compression Device   • Maintain Sequential Compression Device   • Telemetry - Maintain IV Access   • Telemetry - Place Orders & Notify Provider of Results When Patient Experiences Acute Chest Pain, Dysrhythmia or Respiratory Distress   • May Be Off Telemetry for Tests   • Vital Signs   • Code Status and Medical Interventions:   • Inpatient Cardiology Consult   • POC Glucose 4x Daily AC & at Bedtime   • POC Glucose Once   • ECG 12 Lead Chest Pain   • ECG 12 Lead Chest Pain   • ECG 12 Lead Chest Pain   • Insert Peripheral IV   • Initiate ED Observation Status   • CBC & Differential         Differential diagnosis includes but is not limited to:    Acute coronary syndrome  Stable angina  GERD  Pneumonia  Pericarditis        Independent interpretation of labs, radiology studies, and discussions with consultants:  ED  Course as of 05/23/23 9935   Tue May 23, 2023   1553 HS Troponin T(!): 38 [JR]   1554 WBC(!): 11.76 [JR]   1554 Magnesium: 1.8 [JR]   1554 Glucose(!): 359 [JR]   1554 Creatinine: 0.88 [JR]   1554 EKG interpreted by me:  Time: 1423  Rate and rhythm: Sinus rhythm, 64  MD: Normal  QRS: Normal axis  ST and T waves: T wave inversion lead III [JR]   1555 Chest x-ray independently interpreted in PACS demonstrates no infiltrate, no pleural effusion. [JR]   1602 Record review: I reviewed prior echo from 3/27/2023 that showed EF 68%, borderline concentric hypertrophy, normal diastolic function.  I also reviewed stress test from 2019.  I reviewed cardiac catheterization from 1/23/2015.  There was a patent stent within the LAD.  There was 80% ostial stenosis of the second diagonal branch.  There is no other significant coronary stenosis.  I also reviewed cardiology progress note at the [facility] from 3/13/2023.  Patient was seen for an aching pain in her chest that was intermittent and short-lived.  She does have a history of patent foramen ovale.  She has reportedly had PCI with a stent to the distal RCA in 2016. [JR]   1632 Discussed with Joselin Preston PA-C, in the ED observation unit who agrees to admit for further evaluation of patient's chest pain. [JR]      ED Course User Index  [JR] Donaldo Kraus MD                 DIAGNOSIS  Final diagnoses:   Chest pain, unspecified type   History of coronary artery stent placement   Hyperglycemia         DISPOSITION  Admit            Latest Documented Vital Signs:  As of 23:55 EDT  BP- 117/54 HR- 61 Temp- 97.6 °F (36.4 °C) (Oral) O2 sat- 92%              --    Please note that portions of this were completed with a voice recognition program.       Note Disclaimer: At [facility], we believe that sharing information builds trust and better relationships. You are receiving this note because you are receiving care at [facility] or recently visited. It is  possible you will see health information before a provider has talked with you about it. This kind of information can be easy to misunderstand. To help you fully understand what it means for your health, we urge you to discuss this note with your provider.           Donaldo Kraus MD  05/23/23 2729

## 2023-05-24 ENCOUNTER — READMISSION MANAGEMENT (OUTPATIENT)
Dept: CALL CENTER | Facility: HOSPITAL | Age: 73
End: 2023-05-24
Payer: COMMERCIAL

## 2023-05-24 VITALS
SYSTOLIC BLOOD PRESSURE: 124 MMHG | DIASTOLIC BLOOD PRESSURE: 69 MMHG | WEIGHT: 185 LBS | OXYGEN SATURATION: 92 % | HEART RATE: 59 BPM | TEMPERATURE: 97.8 F | RESPIRATION RATE: 17 BRPM | BODY MASS INDEX: 34.04 KG/M2 | HEIGHT: 62 IN

## 2023-05-24 LAB
ANION GAP SERPL CALCULATED.3IONS-SCNC: 7 MMOL/L (ref 5–15)
BUN SERPL-MCNC: 14 MG/DL (ref 8–23)
BUN/CREAT SERPL: 15.6 (ref 7–25)
CALCIUM SPEC-SCNC: 9.1 MG/DL (ref 8.6–10.5)
CHLORIDE SERPL-SCNC: 101 MMOL/L (ref 98–107)
CO2 SERPL-SCNC: 32 MMOL/L (ref 22–29)
CREAT SERPL-MCNC: 0.9 MG/DL (ref 0.57–1)
DEPRECATED RDW RBC AUTO: 39.6 FL (ref 37–54)
EGFRCR SERPLBLD CKD-EPI 2021: 67.2 ML/MIN/1.73
ERYTHROCYTE [DISTWIDTH] IN BLOOD BY AUTOMATED COUNT: 12 % (ref 12.3–15.4)
GLUCOSE BLDC GLUCOMTR-MCNC: 121 MG/DL (ref 70–130)
GLUCOSE BLDC GLUCOMTR-MCNC: 215 MG/DL (ref 70–130)
GLUCOSE SERPL-MCNC: 141 MG/DL (ref 65–99)
HCT VFR BLD AUTO: 41.7 % (ref 34–46.6)
HGB BLD-MCNC: 14.1 G/DL (ref 12–15.9)
MCH RBC QN AUTO: 30.2 PG (ref 26.6–33)
MCHC RBC AUTO-ENTMCNC: 33.8 G/DL (ref 31.5–35.7)
MCV RBC AUTO: 89.3 FL (ref 79–97)
PLATELET # BLD AUTO: 164 10*3/MM3 (ref 140–450)
PMV BLD AUTO: 10.2 FL (ref 6–12)
POTASSIUM SERPL-SCNC: 4.4 MMOL/L (ref 3.5–5.2)
QT INTERVAL: 426 MS
QT INTERVAL: 448 MS
RBC # BLD AUTO: 4.67 10*6/MM3 (ref 3.77–5.28)
SODIUM SERPL-SCNC: 140 MMOL/L (ref 136–145)
TROPONIN T SERPL HS-MCNC: 31 NG/L
WBC NRBC COR # BLD: 11.52 10*3/MM3 (ref 3.4–10.8)

## 2023-05-24 PROCEDURE — 93005 ELECTROCARDIOGRAM TRACING: CPT | Performed by: PHYSICIAN ASSISTANT

## 2023-05-24 PROCEDURE — 82948 REAGENT STRIP/BLOOD GLUCOSE: CPT

## 2023-05-24 PROCEDURE — 84484 ASSAY OF TROPONIN QUANT: CPT | Performed by: PHYSICIAN ASSISTANT

## 2023-05-24 PROCEDURE — G0378 HOSPITAL OBSERVATION PER HR: HCPCS

## 2023-05-24 PROCEDURE — 80048 BASIC METABOLIC PNL TOTAL CA: CPT | Performed by: PHYSICIAN ASSISTANT

## 2023-05-24 PROCEDURE — 85027 COMPLETE CBC AUTOMATED: CPT | Performed by: PHYSICIAN ASSISTANT

## 2023-05-24 RX ADMIN — HYDROCODONE BITARTRATE AND ACETAMINOPHEN 1 TABLET: 5; 325 TABLET ORAL at 03:30

## 2023-05-24 NOTE — PROGRESS NOTES
MD Attestation Note    I supervised care provided by the midlevel provider.    The TYLER and I have discussed this patient's history, physical exam, and treatment plan. I have reviewed the documentation and personally had a face to face interaction with the patient  I affirm the documentation and agree with the treatment and plan.     I provided a substantive portion of the care of the patient.  I personally performed the physical exam in its entirety, and below are my findings.        Subjective  Pt is a 72 y.o. female admitted from Emergency Department for evaluation and treatment of chest pain.  Patient chest pain-free this morning and anxious to go home.    Physical Exam  GENERAL: Alert and in NAD, Vitals reviewed  HENT: nares patent  EYES: no scleral icterus  CV: regular rhythm, regular rate-no murmur  RESPIRATORY: normal effort, clear to auscultation bilaterally-O2 sats upper 90s on room air  ABDOMEN: soft, nontender to palpation  MUSCULOSKELETAL: no deformity  NEURO: Strength sensation and coordination are grossly intact.  Speech and mentation are unremarkable  SKIN: warm, dry    Assessment/Plan  I discussed tx and evaluation of this patient with SLIM Preston.  Serial troponins noted.  Appreciate consultation by cardiologist, Dr. Mathieu Stuart.  Will discharge at this morning with outpatient follow-up as per cardiology recommendations.

## 2023-05-24 NOTE — PROGRESS NOTES
.  ED OBSERVATION PROGRESS/DISCHARGE SUMMARY    Date of Admission: 5/23/2023   LOS: 0 days   PCP: Cinthya Valentine APRN      Subjective  Patient reports feeling improved this morning, no further episodes of chest pain.      Hospital Outcome:  72-year-old female was seen and examined at bedside following admission to the observation unit due to chest pain.  Laboratory evaluation in the ED include high-sensitivity troponin 38, 33 with a delta of -5, glucose 359, potassium 4.2, WBC 11.7, and platelets 178.  Chest x-ray negative acute.  EKG nonischemic.  Throughout the night patient had an episode of chest discomfort, EKG obtained and shows sinus rhythm without any evidence of ischemia.    Patient was seen by cardiology, Dr. Tate.  He does not see any role for performing noninvasive testing at this time.  Patient can be discharged home and follow-up with her cardiologist Dr. Pedro Pablo Lomas.     ROS:  General: no fevers, chills  Respiratory: no cough, dyspnea  Cardiovascular: no chest pain, palpitations  Abdomen: No abdominal pain, nausea, vomiting, or diarrhea  Neurologic: No focal weakness    Objective   Physical Exam:  I have reviewed the vital signs.  Temp:  [97.6 °F (36.4 °C)-99.3 °F (37.4 °C)] 97.6 °F (36.4 °C)  Heart Rate:  [61-92] 61  Resp:  [16-20] 16  BP: (117-139)/(54-72) 117/54  General Appearance:    Alert, cooperative, no distress  Head:    Normocephalic, atraumatic  Eyes:    Sclerae anicteric  Neck:   Supple, no mass  Lungs: Clear to auscultation bilaterally, respirations unlabored  Heart: Regular rate and rhythm, S1 and S2 normal, no murmur, rub or gallop  Abdomen:  Soft, non-tender, bowel sounds active, nondistended  Extremities: No clubbing, cyanosis, or edema to lower extremities  Pulses:  2+ and symmetric in distal lower extremities  Skin: No rashes   Neurologic: Oriented x3, Normal strength to extremities    Results Review:    I have reviewed the labs, radiology results and diagnostic  studies.    Results from last 7 days   Lab Units 05/23/23  1439   WBC 10*3/mm3 11.76*   HEMOGLOBIN g/dL 14.1   HEMATOCRIT % 43.6   PLATELETS 10*3/mm3 178     Results from last 7 days   Lab Units 05/23/23  1439   SODIUM mmol/L 140   POTASSIUM mmol/L 4.2   CHLORIDE mmol/L 100   CO2 mmol/L 31.3*   BUN mg/dL 14   CREATININE mg/dL 0.88   CALCIUM mg/dL 9.7   GLUCOSE mg/dL 359*     Imaging Results (Last 24 Hours)     Procedure Component Value Units Date/Time    XR Chest 1 View [[id number]] Collected: 05/23/23 1539     Updated: 05/23/23 1544    Narrative:      STAT PORTABLE RADIOGRAPHIC VIEW OF THE CHEST     CLINICAL HISTORY: Chest pain.     FINDINGS:     The lung volumes are low. Otherwise, the lungs are clear of acute  infiltrates. Cardiomediastinal silhouette is within normal limits. The  osseous structures are remarkable for postsurgical changes from a prior  right shoulder arthroplasty procedure.       Impression:         No active disease in the chest.     This report was finalized on 5/23/2023 3:41 PM by Dr. Rafal Helm M.D.             I have reviewed the medications.  ---------------------------------------------------------------------------------------------  Assessment & Plan   Assessment/Problem List    Chest pain      Plan:    Chest pain  CAD  -2016 PCI with stent distal RCA into PLV branch 90% to 0% with 3.5 x 24 mm stent dilated to 3.63 for non-STEMI   -High sensitivity troponin 38, 33  -ECG reviewed, nonischemic, repeat in a.m.  -N.p.o. after midnight  -Nitroglycerin as needed chest pain  -Hold Eliquis in anticipation of intervention  -Recent echo on 3/23 shows EF 68% and normal systolic and diastolic function  -Consult cardiology, Dr. Tate  -No need for noninvasive testing at this time, discharge home and follow-up with primary cardiologist, Dr. Pedro Pablo Lomas     Atrial fibrillation  -Continue metoprolol  -Hold Eliquis     Hypertension  -Continue metoprolol     Hyperlipidemia  -Continue  statin     Diabetes  -Blood glucose 359 at time of admission  -Insulin dependent   -Accu-Checks 3 times daily with meals  -Moderate dose correctional factor with hypoglycemic protocol, continue home insulin regimen  -Hold oral diabetic medication     History of CVA  -Residual mild right-sided deficit    Disposition: Home     Follow-up after Discharge: PCP and cardiology    This note will serve as a discharge summary    Dylan Lomas, APRN 05/24/23 02:08 EDT    I have worn appropriate PPE during this patient encounter, sanitized my hands both with entering and exiting patient's room.      33 minutes has been spent by [facility] providers in the care of this patient while under observation status

## 2023-05-24 NOTE — PLAN OF CARE
Goal Outcome Evaluation:  Plan of Care Reviewed With: patient           Outcome Evaluation: c/o pain-meds were given.

## 2023-05-24 NOTE — CONSULTS
"Chief complaint: 72-year-old female retired  with chest pain.    Present illness:  This woman gets episodes of mild chest pain roughly 2 days and 7.  Yesterday she had a severe episode of chest pain.  She was at rest when it started.  There were several different elements of her distress.    She had discomfort in the region of the left anterior axillary line radiating into the left arm which is characteristic of her symptoms when she had her stroke about 3 years ago.  She is often anxious and gets these episodes of vague discomfort in the left upper and lower extremity and the left side of the chest which she says is characteristic of her previous stroke.    She had a different type of chest discomfort with pressure in the center of the chest.  This is not a common complaint for her.  She says she had dyspnea.  The  observed her but did not see any respiratory distress.  The intensity and character of this pain were different from recent episodes of discomfort and so she called EMS.  The duration of her episode may have been as long as 1 hour.  It may have been less.  She is not able to clearly specify.    She had difficulty sleeping at night but no chest pain.  This morning she feels well.  She feels that all of her distress is completely resolved.    Past history:  Coronary heart disease.  Remote LAD stent.  Coronary angiogram January 23, 2015: Patent LAD stent.  Small to medium caliber second diagonal branch had 80% ostial stenosis.  Left circumflex no disease.  Right coronary 25% stenosis.    Echocardiogram March 27, 2023 showed 69% ejection fraction.  Unremarkable study.    Previous CVA occurring 2 to 3 years ago.  According to her  there was \"a blood clot in the vein in the neck.  She was started on apixaban.  I cannot elicit a reliable history for either atrial fibrillation or patent foramen ovale.  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    Other past history includes: DODSON, obesity, dyslipidemia, diabetes, sleep apnea, neurocardiogenic syncope.    Family history noncontributory    Home medications:  Albuterol inhaler as needed  Albuterol nebulizer  Alendronate 70 mg  Apixaban 5 mg twice daily  Aripiprazole 2 mg each evening  Budesonide formoterol inhaler twice daily 160/4.5  Cholecalciferol 5000 units  Citalopram 20 mg  Furosemide 40 mg morning and 20 mg evening  Gabapentin 400 mg 3 times daily  Glipizide 5 mg twice daily  Guaifenesin as needed  Hydrocodone acetaminophen 5/325 taken every 6 hours for moderate pain  Methocarbamol 750 as needed 3 times daily  Metoprolol tartrate 50 mg 3 times daily  Mirtazapine 7.5 mg each evening  Novolin insulin 14 units twice daily  Omeprazole 40 mg twice daily  Ondansetron 4 mg as needed  Potassium 8 mEq twice daily  Rosuvastatin 20 mg daily    Drug allergies: Duloxetine, Laza Rodolfo, intolerant nonsteroidal anti-inflammatory drug treatment, metformin, oxycodone nausea, penicillin rash, diphenhydramine anxiousness, sucralfate    Social: Resides with her .  He enjoys good health.    Review of system: General she gives her height is 62 inches.  Weight 185 pounds with no recent change.  No fever.    Exam:  Blood pressure 121/65, respirations 16, heart rate 59 with sinus bradycardia no fever.  Skin: No peripheral rash ulcer  Eyes: Pupils reactive no xanthelasma  Oral: Fair dentition, no pallor, no dryness  Neck: Central vein pressure 5 cm with normal contours.  Carotid impulse normal contour.  No carotid bruit  Spine: No scoliosis  Chest: Clear  Cardiac: [place] midclavicular line.  Normal first and second heart sound.  No murmur.  No gallop.  Abdomen: Soft, nontender, no organ enlargement, no aortic enlargement, no bruit, no mass  Extremities: No edema  Rectal not performed  Peripheral pulses pedal pulses are full bilaterally.  Femoral  impulse full bilaterally.  Neurologic: Alert, oriented x3, no agitation, motor function intact in extremities.    ECG serial ECGs all showed normal sinus rhythm.    Initial troponin yesterday at 1439 was 38, at 1712 troponin was 33, at 0338 troponin was 31.    Assessment:  Nonanginal chest discomfort.  Pattern of troponin elevation does not correspond to acute injury related to chest pain episode.  Chronic anxiousness.  Recent favorable echocardiogram.  Chronic diuretic treatment  Ongoing treatment with apixaban.  In truth I am not certain that I understand the indication for this drug but it is somehow related to her previous TIA.  Chronic immobility, sedentary lifestyle.    Recommendation:  I do not see a role for performing noninvasive testing to assess for myocardial ischemia.  If testing is normal and she continues to complain of similar chest pain she will end up getting a coronary angiogram.  If the testing is abnormal and she remains free from discomfort, I recommend no progression to angiography since she has preserved ventricular function and this current episode is so unlike an unstable coronary episode.    Since she is doing well I recommend that she may be discharged at this time and observe.  She may follow-up with Dr. Pedro Pablo Lomas

## 2023-05-24 NOTE — DISCHARGE INSTRUCTIONS
Follow-up with your cardiologist Dr. Lomas.  Return to the emergency department with worsening symptoms, uncontrolled pain, inability to tolerate oral liquids, fever greater than 101°F not controlled by Tylenol or as needed with emergent concerns.

## 2023-05-25 NOTE — OUTREACH NOTE
Prep Survey    Flowsheet Row Responses   [facility] facility patient discharged from? [place]   Is LACE score < 7 ? No   Eligibility Readm Mgmt   Discharge diagnosis Chest pain   Does the patient have one of the following disease processes/diagnoses(primary or secondary)? Other   Does the patient have Home health ordered? No   Is there a DME ordered? No   Prep survey completed? Yes          Teetee MATAMOROS - Registered Nurse

## 2023-05-30 ENCOUNTER — READMISSION MANAGEMENT (OUTPATIENT)
Dept: CALL CENTER | Facility: HOSPITAL | Age: 73
End: 2023-05-30

## 2023-05-30 NOTE — OUTREACH NOTE
Medical Week 1 Survey    Flowsheet Row Responses   [facility] patient discharged from? [place]   Does the patient have one of the following disease processes/diagnoses(primary or secondary)? Other   Week 1 attempt successful? Yes   Call start time 1131   Call end time 1139   Discharge diagnosis Chest pain   Meds reviewed with patient/caregiver? Yes   Is the patient having any side effects they believe may be caused by any medication additions or changes? No   Does the patient have all medications ordered at discharge? N/A   Is the patient taking all medications as directed (includes completed medication regime)? Yes   Comments regarding appointments CT Chest 6/5/23   Does the patient have a primary care provider?  Yes   Does the patient have an appointment with their PCP within 7 days of discharge? No   What is preventing the patient from scheduling follow up appointments within 7 days of discharge? Haven't had time   Nursing Interventions Educated patient on importance of making appointment   Has the patient kept scheduled appointments due by today? N/A   Comments Pt states she has an established cardiologist   Has home health visited the patient within 72 hours of discharge? N/A   Psychosocial issues? No   Did the patient receive a copy of their discharge instructions? Yes   Nursing interventions Reviewed instructions with patient   What is the patient's perception of their health status since discharge? New symptoms unrelated to diagnosis  [No c/o CP,  however, pt states she has pain/swelling in the right leg (not new per pt)-encouraged pt to speak with her PCP ]   Is the patient/caregiver able to teach back signs and symptoms related to disease process for when to call PCP? Yes   Is the patient/caregiver able to teach back signs and symptoms related to disease process for when to call 911? Yes   Is the patient/caregiver able to teach back the hierarchy of who to call/visit for symptoms/problems? PCP,  Specialist, Home health nurse, Urgent Care, ED, 911 Yes   If the patient is a current smoker, are they able to teach back resources for cessation? Not a smoker   Week 1 call completed? Yes          Ruth LYONS - Registered Nurse

## 2023-06-05 ENCOUNTER — HOSPITAL ENCOUNTER (OUTPATIENT)
Dept: CT IMAGING | Facility: HOSPITAL | Age: 73
Discharge: HOME | End: 2023-06-05
Admitting: INTERNAL MEDICINE
Payer: MEDICARE

## 2023-06-05 DIAGNOSIS — R05.9 COUGH, UNSPECIFIED TYPE: ICD-10-CM

## 2023-06-05 PROCEDURE — 71250 CT THORAX DX C-: CPT

## 2023-06-08 ENCOUNTER — READMISSION MANAGEMENT (OUTPATIENT)
Dept: CALL CENTER | Facility: HOSPITAL | Age: 73
End: 2023-06-08
Payer: COMMERCIAL

## 2023-06-08 NOTE — OUTREACH NOTE
Medical Week 2 Survey      Flowsheet Row Responses   [facility] patient discharged from? [place]   Does the patient have one of the following disease processes/diagnoses(primary or secondary)? Other   Week 2 attempt successful? No   Unsuccessful attempts Attempt 1            Prateek BUSTILLOS - Registered Nurse

## 2023-06-14 ENCOUNTER — READMISSION MANAGEMENT (OUTPATIENT)
Dept: CALL CENTER | Facility: HOSPITAL | Age: 73
End: 2023-06-14
Payer: COMMERCIAL

## 2023-06-14 NOTE — OUTREACH NOTE
Medical Week 2 Survey      Flowsheet Row Responses   [facility] patient discharged from? [place]   Does the patient have one of the following disease processes/diagnoses(primary or secondary)? Other   Week 2 attempt successful? No   Unsuccessful attempts Attempt 2            Jaclyn Rodrigues Registered Nurse

## 2023-08-23 NOTE — PROGRESS NOTES
"Subjective   Dalila Valerio is a 69 y.o. female who presents for a follow up after being admitted to [facility] on 5/19/20 for pneumonia. Was also treated in ER on 5/27/20 and 5/31/20 following a head injury and altered mental status. Was told in ER there were signs of acute kidney injury and wants to discuss.     History of Present Illness   Got head injury by missing step up onto curb at [employer]. Hurts badly and is very bruised to knees and ankles. Also having trouble with concentrating and trouble with simple math. Still having occasional HA, at onset of injury was having daily HA. Did not go to hospital until 2-3 days after fall. Doing a lot of sleeping since the concussion. Has not fallen since  Gets sharp pain and muscle spasm down legs. Takes gabapentin for this. Is worried about Parkinson's as Dad had.   The following portions of the patient's history were reviewed and updated as appropriate: allergies, current medications, past family history, past medical history, past social history, past surgical history and problem list.    Review of Systems   Constitutional: Negative for chills and fever.   HENT: Negative.    Respiratory: Negative for cough and shortness of breath.    Cardiovascular: Positive for leg swelling. Negative for chest pain and palpitations.   Musculoskeletal: Positive for arthralgias, back pain, gait problem, joint swelling and myalgias.   Neurological: Positive for speech difficulty, numbness and headache. Negative for dizziness, facial asymmetry, weakness, light-headedness and confusion.   Hematological: Negative for adenopathy. Bruises/bleeds easily.   Psychiatric/Behavioral: Positive for behavioral problems, decreased concentration, sleep disturbance, depressed mood and stress. Negative for self-injury and suicidal ideas. The patient is nervous/anxious.      /82   Pulse 87   Temp 98.5 °F (36.9 °C) (Oral)   Ht 157.5 cm (62\")   Wt 81.6 kg (180 lb)   SpO2 96%   BMI 32.92 kg/m² " BMI 45 noted contributing to comorbidities  Would benefit from weight loss and lifestyle modifications     Objective   Physical Exam   Constitutional: She appears well-developed and well-nourished. She appears distressed.   Eyes: Pupils are equal, round, and reactive to light. Conjunctivae and EOM are normal.   Neck: Normal range of motion. Neck supple. No tracheal deviation present. No thyromegaly present.   Cardiovascular: Normal rate, regular rhythm and normal heart sounds.   Pulses:       Dorsalis pedis pulses are 2+ on the right side, and 2+ on the left side.        Posterior tibial pulses are 1+ on the right side.   Paresthesia R shin, marked edema   Pulmonary/Chest: Effort normal and breath sounds normal. No respiratory distress.   Musculoskeletal: She exhibits edema (firm pitting to knees niyah) and tenderness (niyah calves positive homans niyah).   Lymphadenopathy:     She has no cervical adenopathy.   Neurological: A sensory deficit is present.   Skin: Skin is warm and dry. Capillary refill takes less than 2 seconds. Bruising (shins R>L with paresthesia to R anterior shin area) and ecchymosis noted. No rash noted. There is erythema.   Psychiatric: Her mood appears anxious. Her speech is delayed. She is withdrawn. She expresses no homicidal and no suicidal ideation. She expresses no suicidal plans and no homicidal plans. She exhibits abnormal recent memory.   Nursing note and vitals reviewed.    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 This should not precipitate ROSALINE  Post concussive syndrome, FH parkinson's, resting tremor--refer to neurology. Discussed nature and length of time to heal varies. Avoid stimulating environments, bright lights, concentration problems common, should improve.   ROSALINE--Reviewed discharge summary 5/19/2020 and no mention of ROSALINE. Has some early CKD1 that we have been monitoring. Subsequent ER visit with Crt 2.1 Is off all NSAIDS--would continue off and recheck BMP. Drinks 24 bottles of water in a month, encouraged to increase water intake.  Lower extremity edema--painful, severe with contusions--check for DVT and compartment syndrome. STAT doppler appropriate.

## 2023-09-08 NOTE — OUTREACH NOTE
Total Joint Month 1 Survey    Flowsheet Row Responses   [facility] patient discharged from? [place]   Does the patient have one of the following disease processes/diagnoses(primary or secondary)? Total Joint Replacement   Joint surgery performed? Shoulder   Month 1 attempt successful? Yes   Call start time 1413   Call end time 1415   Has the patient been back in either the hospital or Emergency Department since discharge? No   Discharge diagnosis total shoulder reverse arthroplasty   Is patient permission given to speak with other caregiver? Yes   Is the patient taking all medications as directed (includes completed medication regime)? Yes   Is the patient able to teach back alternate methods of pain control? Ice, Shoulder-elevate above heart/ keep in sling as advised   Has the patient kept scheduled appointments due by today? Yes   Is the patient still receiving Home Health Services? Yes   Home health comments Struggling with PT d/t previous stroke and age she says.   Is the patient still attending therapy sessions(either in the home or as an outpatient)? Yes   Has the patient fallen since discharge? No   What is the patient's perception of their functional status since discharge? Improving   Is the patient able to teach back signs and symptoms of infection? Temp >100.4 for 24h or longer, Incisional drainage, Blisters around incision, Increased swelling or redness around incision (not associated with surgical edema), Severe discomfort or pain, Changes in mobility, Shortness of breath or chest pain   If the patient is a current smoker, are they able to teach back resources for cessation? Not a smoker   Is the patient/caregiver able to teach back the hierarchy of who to call/visit for symptoms/problems? PCP, Specialist, Home health nurse, Urgent Care, ED, 911 Yes   Additional teach back comments PT is still working with her, she just has to do different movements.   Month 1 call completed? Yes   Wrap up  additional comments Improving some. Will discuss HH orders with MERCEDES PINEDA - Registered Nurse   Skyrizi Counseling: I discussed with the patient the risks of risankizumab-rzaa including but not limited to immunosuppression, and serious infections.  The patient understands that monitoring is required including a PPD at baseline and must alert us or the primary physician if symptoms of infection or other concerning signs are noted.

## 2023-11-27 NOTE — PROGRESS NOTES
Case Management Discharge Note      Final Note: Pt was dc'd home         Selected Continued Care - Discharged on 10/1/2020 Admission date: 9/30/2020 - Discharge disposition: Home or Self Care    Destination    No services have been selected for the patient.              Durable Medical Equipment    No services have been selected for the patient.              Dialysis/Infusion    No services have been selected for the patient.              Home Medical Care    No services have been selected for the patient.              Therapy    No services have been selected for the patient.              Community Resources    No services have been selected for the patient.                Selected Continued Care - Prior Encounters Includes selections from prior encounters from 7/2/2020 to 10/1/2020    Discharged on 8/25/2020 Admission date: 8/19/2020 - Discharge disposition: Home-Health Care [facility]    Home Medical Care     Service Provider Selected Services Address Phone Fax    [facility] [address] [address] [phone]       Internal Comment last updated by Oksana Valdovinos, RN 8/24/2020 1142    1st choice if able to take                               Transportation Services  Private: Car    Final Discharge Disposition Code: 01 - home or self-care   Routine safety standards initiated.  Routine nursing standards initiated.

## 2024-07-31 NOTE — PROGRESS NOTES
Please call the patient regarding her abnormal result. a1c 7.6, not to goal. Start glipizide 2.5mg qd #30 3rf, call if any low blood sugars. Follow up 3 months. Hayden Harrison (Resident)

## 2025-03-17 NOTE — PLAN OF CARE
Problem: Patient Care Overview  Goal: Plan of Care Review  Flowsheets  Taken 8/21/2020 1217  Plan of Care Reviewed With: patient  Taken 8/21/2020 1209  Outcome Summary: Pt seen today for OT eval following CVA s/p tpa; pt was previously indep with ADLs and used a rollator. This date pt is very nauseous and complaining of abdominal discomfort. She transfered to the commode min Ax2 but requires max A for all toileting skillls. Pt shows generalized weakness and poor endurance, would benefit from OT to address deficits. Will most likely need rehab at d/c.    Patient was not wearing a face mask during this therapy encounter. Therapist used appropriate personal protective equipment including mask and gloves.  Mask used was standard procedure mask. Appropriate PPE was worn during the entire therapy session. Hand hygiene was completed before and after therapy session. Patient is not in enhanced droplet precautions.           New issue, mostly between eyes and in top of nose.  Suspect allergies.  Will treat with Xyzal and will check labs.

## (undated) DEVICE — PK SHLDR OPN 40

## (undated) DEVICE — CATH DEL MARKSMAN MICRO 2.8/3.2F 10X150

## (undated) DEVICE — DUAL CUT SAGITTAL BLADE

## (undated) DEVICE — MANIFLD BLCK 200PSI 2PORT OFF RT

## (undated) DEVICE — 450 ML BOTTLE OF 0.05% CHLORHEXIDINE GLUCONATE IN 99.95% STERILE WATER FOR IRRIGATION, USP AND APPLICATOR.: Brand: IRRISEPT ANTIMICROBIAL WOUND LAVAGE

## (undated) DEVICE — RADIFOCUS TORQUE DEVICE MULTI-TORQUE VISE: Brand: RADIFOCUS TORQUE DEVICE

## (undated) DEVICE — TR BAND RADIAL ARTERY COMPRESSION DEVICE: Brand: TR BAND

## (undated) DEVICE — GLIDESHEATH SLENDER NITINOL HYDROPHILIC COATED INTRODUCER SHEATH: Brand: GLIDESHEATH SLENDER

## (undated) DEVICE — NEEDLE, QUINCKE, 20GX3.5": Brand: MEDLINE

## (undated) DEVICE — PREP IM ENCHANCED TOTAL HIP BONE                                    PREPARATION KIT: Brand: PREP-IM

## (undated) DEVICE — ADAPT Y ROT GATEWAY PLS

## (undated) DEVICE — STPCK 3WY HP ROT

## (undated) DEVICE — GLV SURG BIOGEL LTX PF 8

## (undated) DEVICE — COPILOT BLEEDBACK CONTROL VALVE: Brand: COPILOT

## (undated) DEVICE — Device: Brand: FUBUKI

## (undated) DEVICE — GLV SURG SENSICARE PI MIC PF SZ7 LF STRL

## (undated) DEVICE — THE TORRENT IRRIGATION SCOPE CONNECTOR IS USED WITH THE TORRENT IRRIGATION TUBING TO PROVIDE IRRIGATION FLUIDS SUCH AS STERILE WATER DURING GASTROINTESTINAL ENDOSCOPIC PROCEDURES WHEN USED IN CONJUNCTION WITH AN IRRIGATION PUMP (OR ELECTROSURGICAL UNIT).: Brand: TORRENT

## (undated) DEVICE — GLV SURG BIOGEL LTX PF 7

## (undated) DEVICE — TBG ART PRESS 24 IN

## (undated) DEVICE — MAT FLR ABSORBENT LG 4FT 10 2.5FT

## (undated) DEVICE — TUBING, SUCTION, 1/4" X 10', STRAIGHT: Brand: MEDLINE

## (undated) DEVICE — KT VLV BIOGUARD SXN BIOP AIR/H20 CONN 4PC DISP

## (undated) DEVICE — ZIP 16 SURGICAL SKIN CLOSURE DEVICE, PSA: Brand: ZIP 16 SURGICAL SKIN CLOSURE DEVICE

## (undated) DEVICE — APPL CHLORAPREP HI/LITE 26ML ORNG

## (undated) DEVICE — BITEBLOCK OMNI BLOC

## (undated) DEVICE — BG WAST DISPOSABLE DEPOT W/TBG48IN S/COCK SPK1400

## (undated) DEVICE — 0.2 MICRON INTRAVENOUS FILTER FOR 96 HOUR USE WITH MICRO-BORE EXTENSION TUBING AN LUER-LOCK ADAPTER: Brand: PALL POSIDYNE® ELD FILTER

## (undated) DEVICE — T-DRAPE,EXTREMITY,STERILE: Brand: MEDLINE

## (undated) DEVICE — 1000 S-DRAPE TOWEL DRAPE 10/BX: Brand: STERI-DRAPE™

## (undated) DEVICE — CEMENT MIXING SYSTEM WITH FEMORAL BREAKWAY NOZZLE: Brand: REVOLUTION

## (undated) DEVICE — THE SINGLE USE ETRAP – POLYP TRAP IS USED FOR SUCTION RETRIEVAL OF ENDOSCOPICALLY REMOVED POLYPS.: Brand: ETRAP

## (undated) DEVICE — EVOS SMALL 2.5MM DRILL W/AO QC SHORT: Brand: EVOS

## (undated) DEVICE — DRAPE,REIN 53X77,STERILE: Brand: MEDLINE

## (undated) DEVICE — CANSTR SXN PENUMBRA ENGINE

## (undated) DEVICE — CANN NASL CO2 TRULINK W/O2 A/

## (undated) DEVICE — TBG PENCL TELESCP MEGADYNE SMOKE EVAC 10FT

## (undated) DEVICE — GLV SURG SENSICARE W/ALOE PF LF 9 STRL

## (undated) DEVICE — BASN GW RINGMASTER

## (undated) DEVICE — TRAP FLD MINIVAC MEGADYNE 100ML

## (undated) DEVICE — SENSR O2 OXIMAX FNGR A/ 18IN NONSTR

## (undated) DEVICE — ST ACC MICROPUNCTURE STFF .018 ECHO/PLDM/TP 4F/10CM 21G/7CM

## (undated) DEVICE — STANDARD GUIDEWIRE WITH HYDROPHILIC COATING: Brand: SYNCHRO 2

## (undated) DEVICE — GLV SURG PREMIERPRO ORTHO LTX PF SZ8.5 BRN

## (undated) DEVICE — 3M™ IOBAN™ 2 ANTIMICROBIAL INCISE DRAPE 6650EZ: Brand: IOBAN™ 2

## (undated) DEVICE — PK ANGIO CERBRL RAD 40

## (undated) DEVICE — STROKE ACCESS: Brand: SOFIA 5F-125CM STR

## (undated) DEVICE — RADIFOCUS GLIDEWIRE: Brand: GLIDEWIRE

## (undated) DEVICE — SOL ISO/ALC RUB 70PCT 4OZ

## (undated) DEVICE — GLV SURG PREMIERPRO ORTHO LTX PF SZ9 BRN

## (undated) DEVICE — DRAPE,U/ SHT,SPLIT,PLAS,STERIL: Brand: MEDLINE

## (undated) DEVICE — SNAR POLYP SENSATION STDOVL 27 240 BX40

## (undated) DEVICE — SINGLE-USE BIOPSY FORCEPS: Brand: RADIAL JAW 4

## (undated) DEVICE — DRSNG SURESITE WNDW 4X4.5

## (undated) DEVICE — CVR PROB 96IN LF STRL

## (undated) DEVICE — GLV SURG SIGNATURE ESSENTIAL PF LTX SZ8.5

## (undated) DEVICE — ANTIBACTERIAL UNDYED BRAIDED (POLYGLACTIN 910), SYNTHETIC ABSORBABLE SUTURE: Brand: COATED VICRYL

## (undated) DEVICE — RADIFOCUS GLIDECATH: Brand: GLIDECATH

## (undated) DEVICE — TBG CONN ASP PENUMBRA SYSTEM MAX .88IN

## (undated) DEVICE — HANDPIECE SET WITH COAXIAL HIGH FLOW TIP AND SUCTION TUBE: Brand: INTERPULSE